# Patient Record
Sex: MALE | Race: WHITE | NOT HISPANIC OR LATINO | Employment: OTHER | URBAN - NONMETROPOLITAN AREA
[De-identification: names, ages, dates, MRNs, and addresses within clinical notes are randomized per-mention and may not be internally consistent; named-entity substitution may affect disease eponyms.]

---

## 2017-02-13 ENCOUNTER — OFFICE VISIT (OUTPATIENT)
Dept: PAIN MEDICINE | Facility: CLINIC | Age: 52
End: 2017-02-13

## 2017-02-13 ENCOUNTER — APPOINTMENT (OUTPATIENT)
Dept: LAB | Facility: HOSPITAL | Age: 52
End: 2017-02-13

## 2017-02-13 VITALS
BODY MASS INDEX: 22.51 KG/M2 | HEIGHT: 69 IN | DIASTOLIC BLOOD PRESSURE: 100 MMHG | WEIGHT: 152 LBS | SYSTOLIC BLOOD PRESSURE: 198 MMHG

## 2017-02-13 DIAGNOSIS — M79.18 MYOFASCIAL PAIN: ICD-10-CM

## 2017-02-13 DIAGNOSIS — M50.30 DDD (DEGENERATIVE DISC DISEASE), CERVICAL: ICD-10-CM

## 2017-02-13 DIAGNOSIS — M54.12 CERVICAL RADICULITIS: ICD-10-CM

## 2017-02-13 DIAGNOSIS — M47.817 LUMBOSACRAL SPONDYLOSIS WITHOUT MYELOPATHY: Primary | ICD-10-CM

## 2017-02-13 PROCEDURE — 80307 DRUG TEST PRSMV CHEM ANLYZR: CPT | Performed by: PAIN MEDICINE

## 2017-02-13 PROCEDURE — 99214 OFFICE O/P EST MOD 30 MIN: CPT | Performed by: PAIN MEDICINE

## 2017-02-13 PROCEDURE — G0481 DRUG TEST DEF 8-14 CLASSES: HCPCS | Performed by: PAIN MEDICINE

## 2017-02-13 RX ORDER — HYDROCODONE BITARTRATE AND ACETAMINOPHEN 7.5; 325 MG/1; MG/1
1 TABLET ORAL 4 TIMES DAILY
Qty: 120 TABLET | Refills: 0 | Status: SHIPPED | OUTPATIENT
Start: 2017-02-13 | End: 2017-03-15

## 2017-02-13 NOTE — PROGRESS NOTES
Lyle Corona is a 51 y.o. male.   1965    HPI:   Location: neck, lower back and bilateral knee and bilateral shoulder  Quality: aching, sharp and tingling  Severity: 6/10  Timing: constant  Alleviating: pain medication  Aggravating: increased activity, weather and distress      Missed appointment, out of opioid for about 2 mos.  States pain is less well controlled.    The following portions of the patient's history were reviewed by me and updated as appropriate: allergies, current medications, past family history, past medical history, past social history, past surgical history and problem list.    Past Medical History   Diagnosis Date   • Aneurysm of infrarenal abdominal aorta    • Angina pectoris    • Anxiety    • Cervical radiculitis    • Cervical spondylosis without myelopathy    • Chest pain    • Chronic kidney disease, stage 3    • Chronic total occlusion of coronary artery    • Common iliac aneurysm    • Coronary arteriosclerosis    • Coronary atherosclerosis of native coronary artery    • Degeneration of cervical intervertebral disc    • Drug therapy      Long-term drug therapy   • Drug therapy      Other long term (current) drug therapy   • Essential hypertension    • Headache    • History of echocardiogram 07/11/2014     Echocardiogram W/ color flow 96467 (1) - Aortic root mildly dilated. AV trileaflet. Mild CLVH with early diastolic dysfunction. EF 50-60%. Possible small inferior left ventricular aneurysm. No sig. regurg. seen. Normal RV size and function. No intracardiac mass, pericardial effusion.   • Hyperlipidemia    • Hypertensive disorder    • Intermittent claudication    • Myofascial pain    • Palpitations    • Preoperative cardiovascular examination    • Tobacco dependence syndrome    • Uric acid renal calculus        Social History     Social History   • Marital status:      Spouse name: N/A   • Number of children: N/A   • Years of education: N/A     Occupational History   •  Not on file.     Social History Main Topics   • Smoking status: Former Smoker     Quit date: 8/28/2016   • Smokeless tobacco: Never Used   • Alcohol use No   • Drug use: No   • Sexual activity: Defer      Comment: Marital status: Single     Other Topics Concern   • Not on file     Social History Narrative       Family History   Problem Relation Age of Onset   • Hyperlipidemia Mother    • Hypertension Mother    • Cancer Father    • Hyperlipidemia Father    • Hypertension Father    • Cancer Brother    • Diabetes Other    • Diabetes Other          Current Outpatient Prescriptions:   •  aspirin 81 MG tablet, Take 81 mg by mouth., Disp: , Rfl:   •  carvedilol (COREG) 12.5 MG tablet, Take 12.5 mg by mouth 2 (two) times a day. Indication: heart, Disp: , Rfl:   •  cyclobenzaprine (FLEXERIL) 10 MG tablet, Take 10 mg by mouth 3 (three) times a day. Indication: Muscle spasm, Disp: , Rfl:   •  hydrochlorothiazide (MICROZIDE) 12.5 MG capsule, , Disp: , Rfl: 6  •  losartan (COZAAR) 25 MG tablet, Take 25 mg by mouth., Disp: , Rfl:   •  pantoprazole (PROTONIX) 40 MG EC tablet, , Disp: , Rfl: 1  •  pravastatin (PRAVACHOL) 40 MG tablet, Take 1 tablet by mouth every night., Disp: 90 tablet, Rfl: 4  •  ranitidine (ZANTAC) 150 MG tablet, Take 150 mg by mouth 2 (two) times a day. Indication: gerd, Disp: , Rfl:   •  terazosin (HYTRIN) 1 MG capsule, , Disp: , Rfl:   •  ticagrelor (BRILINTA) 90 MG tablet tablet, Take 90 mg by mouth 2 (two) times a day. Indication: blood thinner, Disp: , Rfl:   •  HYDROcodone-acetaminophen (LORTAB)  MG per tablet, Take 1 tablet by mouth every 8 (eight) hours as needed. Indication: pain, Disp: , Rfl:   •  HYDROcodone-acetaminophen (NORCO) 7.5-325 MG per tablet, Take 1 tablet by mouth 4 (Four) Times a Day for 30 days., Disp: 120 tablet, Rfl: 0    Allergies   Allergen Reactions   • Amlodipine Rash   • Isosorbide Mononitrate [Isosorbide] Rash   • Lisinopril Rash   • Metoprolol Rash         Review of  Systems   Musculoskeletal: Positive for back pain (lower) and neck pain.        Bilateral knee pain  Bilateral shoulder pain       10 system review of systems was reviewed and negative except for above.    Physical Exam   Constitutional: He appears well-developed and well-nourished. No distress.   Musculoskeletal:        Cervical back: He exhibits decreased range of motion (Flexion and extension limited.  Facet loading bilaterally. L>R) and tenderness (tender to palpation cervical paraspinous musculature.).   Neurological: He is alert.   Psychiatric: He has a normal mood and affect. His behavior is normal. Judgment normal.       Lyle was seen today for neck pain, back pain, shoulder pain and knee pain.    Diagnoses and all orders for this visit:    Lumbosacral spondylosis without myelopathy  -     Ambulatory Referral to Physical Therapy Evaluate and treat (neck pain, s/p acdf >1 yr ago)  -     Tox[facility] Select 13 (MW)    DDD (degenerative disc disease), cervical  -     Ambulatory Referral to Physical Therapy Evaluate and treat (neck pain, s/p acdf >1 yr ago)  -     ToxASSURE Select 13 (MW)    Cervical radiculitis  -     Ambulatory Referral to Physical Therapy Evaluate and treat (neck pain, s/p acdf >1 yr ago)  -     ToxASSURE Select 13 (MW)    Myofascial pain  -     Ambulatory Referral to Physical Therapy Evaluate and treat (neck pain, s/p acdf >1 yr ago)  -     ToxASSURE Select 13 (MW)    Other orders  -     HYDROcodone-acetaminophen (NORCO) 7.5-325 MG per tablet; Take 1 tablet by mouth 4 (Four) Times a Day for 30 days.        Medication: Patient reports no negative side effects, Patient reports appropriate usage and storage habits and Refill opioid medication as above.  This is a reduction after he has been off for two months.  Instructed to take is BP medication asap as his BP was up today.  States he missed it this am.  Suggested he consider going to urgent care.    Interventional: none at this time.  May be  candidate for interventions depending on results of PT.    Rehab: rehabillitation for neck    Behavioral: No aberrant behavior noted. [facility] Report #[id number]  was reviewed and is consistent with stated history    Urine drug screen Ordered today to test for drugs of abuse and prescribed medications.  Should be neg for opioid.          This document has been electronically signed by Socrates Alcantara MD on February 13, 2017 9:29 AM

## 2017-02-17 LAB
CONV REPORT SUMMARY: NORMAL
Lab: NORMAL

## 2017-02-27 RX ORDER — HYDROCHLOROTHIAZIDE 12.5 MG/1
12.5 CAPSULE, GELATIN COATED ORAL DAILY
Qty: 30 CAPSULE | Refills: 11 | Status: SHIPPED | OUTPATIENT
Start: 2017-02-27 | End: 2017-12-11 | Stop reason: HOSPADM

## 2017-03-06 DIAGNOSIS — I25.10 CAD IN NATIVE ARTERY: Primary | ICD-10-CM

## 2017-05-11 ENCOUNTER — HOSPITAL ENCOUNTER (INPATIENT)
Facility: HOSPITAL | Age: 52
LOS: 1 days | Discharge: HOME | End: 2017-05-13
Attending: EMERGENCY MEDICINE | Admitting: INTERNAL MEDICINE

## 2017-05-11 ENCOUNTER — APPOINTMENT (OUTPATIENT)
Dept: CT IMAGING | Facility: HOSPITAL | Age: 52
End: 2017-05-11

## 2017-05-11 ENCOUNTER — APPOINTMENT (OUTPATIENT)
Dept: GENERAL RADIOLOGY | Facility: HOSPITAL | Age: 52
End: 2017-05-11

## 2017-05-11 DIAGNOSIS — I20.0 UNSTABLE ANGINA (HCC): ICD-10-CM

## 2017-05-11 DIAGNOSIS — I16.1 HYPERTENSIVE EMERGENCY, NO CHF: Primary | ICD-10-CM

## 2017-05-11 LAB
ANION GAP SERPL CALCULATED.3IONS-SCNC: 15 MMOL/L (ref 5–15)
APTT PPP: 34.9 SECONDS (ref 20–40.3)
BASOPHILS # BLD AUTO: 0.04 10*3/MM3 (ref 0–0.2)
BASOPHILS NFR BLD AUTO: 0.4 % (ref 0–2)
BUN BLD-MCNC: 23 MG/DL (ref 7–21)
BUN/CREAT SERPL: 14.2 (ref 7–25)
CALCIUM SPEC-SCNC: 9.2 MG/DL (ref 8.4–10.2)
CHLORIDE SERPL-SCNC: 99 MMOL/L (ref 95–110)
CK MB SERPL-CCNC: 0.5 NG/ML (ref 0–5)
CK SERPL-CCNC: 31 U/L (ref 55–170)
CO2 SERPL-SCNC: 22 MMOL/L (ref 22–31)
CREAT BLD-MCNC: 1.62 MG/DL (ref 0.7–1.3)
D-DIMER, QUANTITATIVE (MAD,POW, STR): 498 NG/ML (FEU) (ref 0–470)
DEPRECATED RDW RBC AUTO: 42.3 FL (ref 35.1–43.9)
EOSINOPHIL # BLD AUTO: 0.19 10*3/MM3 (ref 0–0.7)
EOSINOPHIL NFR BLD AUTO: 2.1 % (ref 0–7)
ERYTHROCYTE [DISTWIDTH] IN BLOOD BY AUTOMATED COUNT: 14.6 % (ref 11.5–14.5)
GFR SERPL CREATININE-BSD FRML MDRD: 45 ML/MIN/1.73 (ref 56–130)
GLUCOSE BLD-MCNC: 107 MG/DL (ref 60–100)
HCT VFR BLD AUTO: 38.5 % (ref 39–49)
HGB BLD-MCNC: 13.7 G/DL (ref 13.7–17.3)
IMM GRANULOCYTES # BLD: 0.02 10*3/MM3 (ref 0–0.02)
IMM GRANULOCYTES NFR BLD: 0.2 % (ref 0–0.5)
INR PPP: 1.01 (ref 0.8–1.2)
LYMPHOCYTES # BLD AUTO: 3.97 10*3/MM3 (ref 0.6–4.2)
LYMPHOCYTES NFR BLD AUTO: 44.1 % (ref 10–50)
MCH RBC QN AUTO: 28.5 PG (ref 26.5–34)
MCHC RBC AUTO-ENTMCNC: 35.6 G/DL (ref 31.5–36.3)
MCV RBC AUTO: 80.2 FL (ref 80–98)
MONOCYTES # BLD AUTO: 0.88 10*3/MM3 (ref 0–0.9)
MONOCYTES NFR BLD AUTO: 9.8 % (ref 0–12)
NEUTROPHILS # BLD AUTO: 3.9 10*3/MM3 (ref 2–8.6)
NEUTROPHILS NFR BLD AUTO: 43.4 % (ref 37–80)
PLATELET # BLD AUTO: 158 10*3/MM3 (ref 150–450)
PMV BLD AUTO: 8.5 FL (ref 8–12)
POTASSIUM BLD-SCNC: 4 MMOL/L (ref 3.5–5.1)
PROTHROMBIN TIME: 13.2 SECONDS (ref 11.1–15.3)
RBC # BLD AUTO: 4.8 10*6/MM3 (ref 4.37–5.74)
SODIUM BLD-SCNC: 136 MMOL/L (ref 137–145)
TROPONIN I SERPL-MCNC: <0.012 NG/ML
WBC NRBC COR # BLD: 9 10*3/MM3 (ref 3.2–9.8)

## 2017-05-11 PROCEDURE — 99285 EMERGENCY DEPT VISIT HI MDM: CPT

## 2017-05-11 PROCEDURE — 82550 ASSAY OF CK (CPK): CPT | Performed by: EMERGENCY MEDICINE

## 2017-05-11 PROCEDURE — 93005 ELECTROCARDIOGRAM TRACING: CPT

## 2017-05-11 PROCEDURE — 85379 FIBRIN DEGRADATION QUANT: CPT | Performed by: EMERGENCY MEDICINE

## 2017-05-11 PROCEDURE — 84484 ASSAY OF TROPONIN QUANT: CPT | Performed by: EMERGENCY MEDICINE

## 2017-05-11 PROCEDURE — 85025 COMPLETE CBC W/AUTO DIFF WBC: CPT | Performed by: EMERGENCY MEDICINE

## 2017-05-11 PROCEDURE — 82553 CREATINE MB FRACTION: CPT | Performed by: EMERGENCY MEDICINE

## 2017-05-11 PROCEDURE — 85730 THROMBOPLASTIN TIME PARTIAL: CPT | Performed by: EMERGENCY MEDICINE

## 2017-05-11 PROCEDURE — 25010000002 MORPHINE PER 10 MG: Performed by: EMERGENCY MEDICINE

## 2017-05-11 PROCEDURE — 71010 HC CHEST PA OR AP: CPT

## 2017-05-11 PROCEDURE — 85610 PROTHROMBIN TIME: CPT | Performed by: EMERGENCY MEDICINE

## 2017-05-11 PROCEDURE — 80048 BASIC METABOLIC PNL TOTAL CA: CPT | Performed by: EMERGENCY MEDICINE

## 2017-05-11 RX ORDER — SODIUM CHLORIDE 0.9 % (FLUSH) 0.9 %
10 SYRINGE (ML) INJECTION AS NEEDED
Status: DISCONTINUED | OUTPATIENT
Start: 2017-05-11 | End: 2017-05-13 | Stop reason: HOSPADM

## 2017-05-11 RX ORDER — MORPHINE SULFATE 4 MG/ML
4 INJECTION, SOLUTION INTRAMUSCULAR; INTRAVENOUS ONCE
Status: COMPLETED | OUTPATIENT
Start: 2017-05-11 | End: 2017-05-11

## 2017-05-11 RX ORDER — SODIUM CHLORIDE 9 MG/ML
125 INJECTION, SOLUTION INTRAVENOUS CONTINUOUS
Status: DISCONTINUED | OUTPATIENT
Start: 2017-05-11 | End: 2017-05-12

## 2017-05-11 RX ORDER — NITROGLYCERIN 20 MG/100ML
10-50 INJECTION INTRAVENOUS
Status: DISCONTINUED | OUTPATIENT
Start: 2017-05-11 | End: 2017-05-12

## 2017-05-11 RX ORDER — ASPIRIN 81 MG/1
324 TABLET, CHEWABLE ORAL ONCE
Status: COMPLETED | OUTPATIENT
Start: 2017-05-11 | End: 2017-05-11

## 2017-05-11 RX ORDER — NITROGLYCERIN 0.4 MG/1
0.4 TABLET SUBLINGUAL
Status: DISCONTINUED | OUTPATIENT
Start: 2017-05-11 | End: 2017-05-13 | Stop reason: HOSPADM

## 2017-05-11 RX ADMIN — ASPIRIN 324 MG: 81 TABLET, CHEWABLE ORAL at 22:49

## 2017-05-11 RX ADMIN — SODIUM CHLORIDE 125 ML/HR: 9 INJECTION, SOLUTION INTRAVENOUS at 22:54

## 2017-05-11 RX ADMIN — MORPHINE SULFATE 4 MG: 4 INJECTION, SOLUTION INTRAMUSCULAR; INTRAVENOUS at 22:58

## 2017-05-11 RX ADMIN — Medication 10 ML: at 23:05

## 2017-05-11 RX ADMIN — NITROGLYCERIN 10 MCG/MIN: 20 INJECTION INTRAVENOUS at 23:01

## 2017-05-11 RX ADMIN — NITROGLYCERIN 0.4 MG: 0.4 TABLET SUBLINGUAL at 22:49

## 2017-05-12 PROBLEM — I51.7 LVH (LEFT VENTRICULAR HYPERTROPHY): Chronic | Status: ACTIVE | Noted: 2017-05-12

## 2017-05-12 PROBLEM — I16.1 HYPERTENSIVE EMERGENCY, NO CHF: Status: ACTIVE | Noted: 2017-05-12

## 2017-05-12 LAB
BILIRUB UR QL STRIP: NEGATIVE
CK MB SERPL-CCNC: 0.49 NG/ML (ref 0–5)
CK MB SERPL-CCNC: 0.55 NG/ML (ref 0–5)
CK SERPL-CCNC: 25 U/L (ref 55–170)
CLARITY UR: CLEAR
COLOR UR: YELLOW
GLUCOSE UR STRIP-MCNC: NEGATIVE MG/DL
HGB UR QL STRIP.AUTO: NEGATIVE
HOLD SPECIMEN: NORMAL
HOLD SPECIMEN: NORMAL
KETONES UR QL STRIP: NEGATIVE
LEUKOCYTE ESTERASE UR QL STRIP.AUTO: NEGATIVE
NITRITE UR QL STRIP: NEGATIVE
PH UR STRIP.AUTO: 5.5 [PH] (ref 5–9)
PROT UR QL STRIP: NEGATIVE
SP GR UR STRIP: 1.01 (ref 1–1.03)
TROPONIN I SERPL-MCNC: <0.012 NG/ML
TSH SERPL DL<=0.05 MIU/L-ACNC: 2.92 MIU/ML (ref 0.46–4.68)
UROBILINOGEN UR QL STRIP: NORMAL
WHOLE BLOOD HOLD SPECIMEN: NORMAL

## 2017-05-12 PROCEDURE — 81003 URINALYSIS AUTO W/O SCOPE: CPT | Performed by: INTERNAL MEDICINE

## 2017-05-12 PROCEDURE — 82550 ASSAY OF CK (CPK): CPT | Performed by: INTERNAL MEDICINE

## 2017-05-12 PROCEDURE — 25010000002 HYDRALAZINE PER 20 MG: Performed by: INTERNAL MEDICINE

## 2017-05-12 PROCEDURE — 25010000002 ENOXAPARIN PER 10 MG: Performed by: INTERNAL MEDICINE

## 2017-05-12 PROCEDURE — 25010000002 ENOXAPARIN PER 10 MG: Performed by: EMERGENCY MEDICINE

## 2017-05-12 PROCEDURE — 25010000002 MORPHINE SULFATE (PF) 2 MG/ML SOLUTION: Performed by: INTERNAL MEDICINE

## 2017-05-12 PROCEDURE — 82553 CREATINE MB FRACTION: CPT | Performed by: INTERNAL MEDICINE

## 2017-05-12 PROCEDURE — 99254 IP/OBS CNSLTJ NEW/EST MOD 60: CPT | Performed by: INTERNAL MEDICINE

## 2017-05-12 PROCEDURE — 25010000002 MORPHINE PER 10 MG: Performed by: EMERGENCY MEDICINE

## 2017-05-12 PROCEDURE — 94760 N-INVAS EAR/PLS OXIMETRY 1: CPT

## 2017-05-12 PROCEDURE — 94799 UNLISTED PULMONARY SVC/PX: CPT

## 2017-05-12 PROCEDURE — 84443 ASSAY THYROID STIM HORMONE: CPT | Performed by: INTERNAL MEDICINE

## 2017-05-12 PROCEDURE — 84484 ASSAY OF TROPONIN QUANT: CPT | Performed by: EMERGENCY MEDICINE

## 2017-05-12 PROCEDURE — 96372 THER/PROPH/DIAG INJ SC/IM: CPT

## 2017-05-12 PROCEDURE — 84484 ASSAY OF TROPONIN QUANT: CPT | Performed by: INTERNAL MEDICINE

## 2017-05-12 RX ORDER — PRAVASTATIN SODIUM 40 MG
40 TABLET ORAL NIGHTLY
Status: DISCONTINUED | OUTPATIENT
Start: 2017-05-12 | End: 2017-05-13 | Stop reason: HOSPADM

## 2017-05-12 RX ORDER — NICOTINE 21 MG/24HR
1 PATCH, TRANSDERMAL 24 HOURS TRANSDERMAL EVERY 24 HOURS
Status: DISCONTINUED | OUTPATIENT
Start: 2017-05-12 | End: 2017-05-13 | Stop reason: HOSPADM

## 2017-05-12 RX ORDER — ONDANSETRON 4 MG/1
4 TABLET, FILM COATED ORAL EVERY 6 HOURS PRN
Status: DISCONTINUED | OUTPATIENT
Start: 2017-05-12 | End: 2017-05-13 | Stop reason: HOSPADM

## 2017-05-12 RX ORDER — LABETALOL 300 MG/1
300 TABLET, FILM COATED ORAL EVERY 12 HOURS SCHEDULED
Status: DISCONTINUED | OUTPATIENT
Start: 2017-05-12 | End: 2017-05-13 | Stop reason: HOSPADM

## 2017-05-12 RX ORDER — TERAZOSIN 1 MG/1
1 CAPSULE ORAL NIGHTLY
Status: DISCONTINUED | OUTPATIENT
Start: 2017-05-12 | End: 2017-05-13 | Stop reason: HOSPADM

## 2017-05-12 RX ORDER — CARVEDILOL 12.5 MG/1
12.5 TABLET ORAL 2 TIMES DAILY
Status: DISCONTINUED | OUTPATIENT
Start: 2017-05-12 | End: 2017-05-12

## 2017-05-12 RX ORDER — ALUMINA, MAGNESIA, AND SIMETHICONE 2400; 2400; 240 MG/30ML; MG/30ML; MG/30ML
10 SUSPENSION ORAL EVERY 6 HOURS
Status: DISCONTINUED | OUTPATIENT
Start: 2017-05-12 | End: 2017-05-13 | Stop reason: HOSPADM

## 2017-05-12 RX ORDER — ACETAMINOPHEN 325 MG/1
650 TABLET ORAL EVERY 4 HOURS PRN
Status: DISCONTINUED | OUTPATIENT
Start: 2017-05-12 | End: 2017-05-13 | Stop reason: HOSPADM

## 2017-05-12 RX ORDER — HYDRALAZINE HYDROCHLORIDE 20 MG/ML
20 INJECTION INTRAMUSCULAR; INTRAVENOUS EVERY 6 HOURS PRN
Status: DISCONTINUED | OUTPATIENT
Start: 2017-05-12 | End: 2017-05-13 | Stop reason: HOSPADM

## 2017-05-12 RX ORDER — ASPIRIN 81 MG/1
81 TABLET ORAL DAILY
Status: DISCONTINUED | OUTPATIENT
Start: 2017-05-12 | End: 2017-05-13 | Stop reason: HOSPADM

## 2017-05-12 RX ORDER — MORPHINE SULFATE 4 MG/ML
4 INJECTION, SOLUTION INTRAMUSCULAR; INTRAVENOUS ONCE
Status: COMPLETED | OUTPATIENT
Start: 2017-05-12 | End: 2017-05-12

## 2017-05-12 RX ORDER — NITROGLYCERIN 80 MG/1
1 PATCH TRANSDERMAL DAILY
Status: DISCONTINUED | OUTPATIENT
Start: 2017-05-12 | End: 2017-05-13 | Stop reason: HOSPADM

## 2017-05-12 RX ORDER — ONDANSETRON 2 MG/ML
4 INJECTION INTRAMUSCULAR; INTRAVENOUS EVERY 6 HOURS PRN
Status: DISCONTINUED | OUTPATIENT
Start: 2017-05-12 | End: 2017-05-13 | Stop reason: HOSPADM

## 2017-05-12 RX ORDER — LOSARTAN POTASSIUM 50 MG/1
50 TABLET ORAL DAILY
Status: DISCONTINUED | OUTPATIENT
Start: 2017-05-12 | End: 2017-05-12

## 2017-05-12 RX ORDER — LABETALOL HYDROCHLORIDE 5 MG/ML
20 INJECTION, SOLUTION INTRAVENOUS ONCE
Status: COMPLETED | OUTPATIENT
Start: 2017-05-12 | End: 2017-05-12

## 2017-05-12 RX ORDER — LOSARTAN POTASSIUM 50 MG/1
100 TABLET ORAL DAILY
Status: DISCONTINUED | OUTPATIENT
Start: 2017-05-12 | End: 2017-05-13 | Stop reason: HOSPADM

## 2017-05-12 RX ORDER — MORPHINE SULFATE 2 MG/ML
1 INJECTION, SOLUTION INTRAMUSCULAR; INTRAVENOUS
Status: DISCONTINUED | OUTPATIENT
Start: 2017-05-12 | End: 2017-05-13 | Stop reason: HOSPADM

## 2017-05-12 RX ORDER — PANTOPRAZOLE SODIUM 40 MG/1
40 TABLET, DELAYED RELEASE ORAL
Status: DISCONTINUED | OUTPATIENT
Start: 2017-05-13 | End: 2017-05-13 | Stop reason: HOSPADM

## 2017-05-12 RX ORDER — ALUMINA, MAGNESIA, AND SIMETHICONE 2400; 2400; 240 MG/30ML; MG/30ML; MG/30ML
15 SUSPENSION ORAL EVERY 6 HOURS PRN
Status: DISCONTINUED | OUTPATIENT
Start: 2017-05-12 | End: 2017-05-13 | Stop reason: HOSPADM

## 2017-05-12 RX ORDER — ONDANSETRON 4 MG/1
4 TABLET, ORALLY DISINTEGRATING ORAL EVERY 6 HOURS PRN
Status: DISCONTINUED | OUTPATIENT
Start: 2017-05-12 | End: 2017-05-13 | Stop reason: HOSPADM

## 2017-05-12 RX ORDER — BISACODYL 10 MG
10 SUPPOSITORY, RECTAL RECTAL DAILY PRN
Status: DISCONTINUED | OUTPATIENT
Start: 2017-05-12 | End: 2017-05-13 | Stop reason: HOSPADM

## 2017-05-12 RX ORDER — HYDRALAZINE HYDROCHLORIDE 20 MG/ML
10 INJECTION INTRAMUSCULAR; INTRAVENOUS EVERY 6 HOURS PRN
Status: DISCONTINUED | OUTPATIENT
Start: 2017-05-12 | End: 2017-05-12

## 2017-05-12 RX ORDER — SODIUM CHLORIDE 9 MG/ML
100 INJECTION, SOLUTION INTRAVENOUS CONTINUOUS
Status: DISCONTINUED | OUTPATIENT
Start: 2017-05-12 | End: 2017-05-13 | Stop reason: HOSPADM

## 2017-05-12 RX ORDER — SODIUM CHLORIDE 0.9 % (FLUSH) 0.9 %
1-10 SYRINGE (ML) INJECTION AS NEEDED
Status: DISCONTINUED | OUTPATIENT
Start: 2017-05-12 | End: 2017-05-13 | Stop reason: HOSPADM

## 2017-05-12 RX ORDER — HYDROCHLOROTHIAZIDE 12.5 MG/1
12.5 CAPSULE, GELATIN COATED ORAL DAILY
Status: DISCONTINUED | OUTPATIENT
Start: 2017-05-12 | End: 2017-05-13 | Stop reason: HOSPADM

## 2017-05-12 RX ORDER — DOCUSATE SODIUM 100 MG/1
200 CAPSULE, LIQUID FILLED ORAL 2 TIMES DAILY
Status: DISCONTINUED | OUTPATIENT
Start: 2017-05-12 | End: 2017-05-13 | Stop reason: HOSPADM

## 2017-05-12 RX ORDER — FAMOTIDINE 20 MG/1
20 TABLET, FILM COATED ORAL 2 TIMES DAILY
Status: DISCONTINUED | OUTPATIENT
Start: 2017-05-12 | End: 2017-05-13 | Stop reason: HOSPADM

## 2017-05-12 RX ORDER — LABETALOL 200 MG/1
200 TABLET, FILM COATED ORAL EVERY 12 HOURS SCHEDULED
Status: DISCONTINUED | OUTPATIENT
Start: 2017-05-12 | End: 2017-05-12

## 2017-05-12 RX ADMIN — TICAGRELOR 90 MG: 90 TABLET ORAL at 08:31

## 2017-05-12 RX ADMIN — MORPHINE SULFATE 1 MG: 2 INJECTION, SOLUTION INTRAMUSCULAR; INTRAVENOUS at 15:31

## 2017-05-12 RX ADMIN — PRAVASTATIN SODIUM 40 MG: 40 TABLET ORAL at 20:55

## 2017-05-12 RX ADMIN — LOSARTAN POTASSIUM 100 MG: 50 TABLET, FILM COATED ORAL at 08:30

## 2017-05-12 RX ADMIN — DOCUSATE SODIUM 200 MG: 100 CAPSULE, LIQUID FILLED ORAL at 08:30

## 2017-05-12 RX ADMIN — LABETALOL HCL 300 MG: 300 TABLET, FILM COATED ORAL at 20:54

## 2017-05-12 RX ADMIN — SODIUM CHLORIDE 100 ML/HR: 900 INJECTION, SOLUTION INTRAVENOUS at 06:21

## 2017-05-12 RX ADMIN — LABETALOL HYDROCHLORIDE 200 MG: 200 TABLET, FILM COATED ORAL at 08:30

## 2017-05-12 RX ADMIN — ENOXAPARIN SODIUM 70 MG: 80 INJECTION SUBCUTANEOUS at 02:24

## 2017-05-12 RX ADMIN — NITROGLYCERIN 1 PATCH: 0.4 PATCH TRANSDERMAL at 10:26

## 2017-05-12 RX ADMIN — ALUMINUM HYDROXIDE, MAGNESIUM HYDROXIDE, AND DIMETHICONE 10 ML: 400; 400; 40 SUSPENSION ORAL at 06:41

## 2017-05-12 RX ADMIN — ASPIRIN 81 MG: 81 TABLET, COATED ORAL at 08:30

## 2017-05-12 RX ADMIN — ALUMINUM HYDROXIDE, MAGNESIUM HYDROXIDE, AND DIMETHICONE 10 ML: 400; 400; 40 SUSPENSION ORAL at 13:41

## 2017-05-12 RX ADMIN — ENOXAPARIN SODIUM 40 MG: 40 INJECTION SUBCUTANEOUS at 08:31

## 2017-05-12 RX ADMIN — HYDROCHLOROTHIAZIDE 12.5 MG: 12.5 CAPSULE ORAL at 08:30

## 2017-05-12 RX ADMIN — FAMOTIDINE 20 MG: 20 TABLET ORAL at 18:23

## 2017-05-12 RX ADMIN — MORPHINE SULFATE 4 MG: 4 INJECTION, SOLUTION INTRAMUSCULAR; INTRAVENOUS at 02:19

## 2017-05-12 RX ADMIN — MORPHINE SULFATE 1 MG: 2 INJECTION, SOLUTION INTRAMUSCULAR; INTRAVENOUS at 06:30

## 2017-05-12 RX ADMIN — ALUMINUM HYDROXIDE, MAGNESIUM HYDROXIDE, AND DIMETHICONE 10 ML: 400; 400; 40 SUSPENSION ORAL at 20:55

## 2017-05-12 RX ADMIN — HYDRALAZINE HYDROCHLORIDE 10 MG: 20 INJECTION INTRAMUSCULAR; INTRAVENOUS at 10:59

## 2017-05-12 RX ADMIN — MORPHINE SULFATE 1 MG: 2 INJECTION, SOLUTION INTRAMUSCULAR; INTRAVENOUS at 18:23

## 2017-05-12 RX ADMIN — MORPHINE SULFATE 1 MG: 2 INJECTION, SOLUTION INTRAMUSCULAR; INTRAVENOUS at 11:03

## 2017-05-12 RX ADMIN — SODIUM CHLORIDE 100 ML/HR: 900 INJECTION, SOLUTION INTRAVENOUS at 13:43

## 2017-05-12 RX ADMIN — HYDRALAZINE HYDROCHLORIDE 20 MG: 20 INJECTION INTRAMUSCULAR; INTRAVENOUS at 22:27

## 2017-05-12 RX ADMIN — DOCUSATE SODIUM 200 MG: 100 CAPSULE, LIQUID FILLED ORAL at 17:07

## 2017-05-12 RX ADMIN — SODIUM CHLORIDE 100 ML/HR: 900 INJECTION, SOLUTION INTRAVENOUS at 22:27

## 2017-05-12 RX ADMIN — TERAZOSIN HYDROCHLORIDE 1 MG: 1 CAPSULE ORAL at 20:55

## 2017-05-12 RX ADMIN — LABETALOL HYDROCHLORIDE 20 MG: 5 INJECTION INTRAVENOUS at 02:18

## 2017-05-12 RX ADMIN — FAMOTIDINE 20 MG: 20 TABLET ORAL at 08:31

## 2017-05-12 RX ADMIN — TICAGRELOR 90 MG: 90 TABLET ORAL at 18:23

## 2017-05-13 ENCOUNTER — APPOINTMENT (OUTPATIENT)
Dept: CARDIOLOGY | Facility: HOSPITAL | Age: 52
End: 2017-05-13
Attending: INTERNAL MEDICINE

## 2017-05-13 VITALS
HEART RATE: 79 BPM | WEIGHT: 148.4 LBS | TEMPERATURE: 98 F | BODY MASS INDEX: 21.98 KG/M2 | DIASTOLIC BLOOD PRESSURE: 87 MMHG | HEIGHT: 69 IN | OXYGEN SATURATION: 97 % | RESPIRATION RATE: 18 BRPM | SYSTOLIC BLOOD PRESSURE: 135 MMHG

## 2017-05-13 LAB
ALBUMIN SERPL-MCNC: 3.7 G/DL (ref 3.4–4.8)
ALBUMIN/GLOB SERPL: 1.3 G/DL (ref 1.1–1.8)
ALP SERPL-CCNC: 86 U/L (ref 38–126)
ALT SERPL W P-5'-P-CCNC: 19 U/L (ref 21–72)
ANION GAP SERPL CALCULATED.3IONS-SCNC: 13 MMOL/L (ref 5–15)
ARTICHOKE IGE QN: 91 MG/DL (ref 1–129)
AST SERPL-CCNC: 24 U/L (ref 17–59)
BH CV ECHO MEAS - ACS: 1.7 CM
BH CV ECHO MEAS - AO MAX PG (FULL): 0.6 MMHG
BH CV ECHO MEAS - AO MAX PG: 6.7 MMHG
BH CV ECHO MEAS - AO MEAN PG (FULL): 0.31 MMHG
BH CV ECHO MEAS - AO MEAN PG: 4 MMHG
BH CV ECHO MEAS - AO ROOT AREA: 9.7 CM^2
BH CV ECHO MEAS - AO ROOT DIAM: 3.5 CM
BH CV ECHO MEAS - AO V2 MAX: 129 CM/SEC
BH CV ECHO MEAS - AO V2 MEAN: 92.1 CM/SEC
BH CV ECHO MEAS - AO V2 VTI: 21.4 CM
BH CV ECHO MEAS - AVA(I,A): 4.2 CM^2
BH CV ECHO MEAS - AVA(I,D): 4.2 CM^2
BH CV ECHO MEAS - AVA(V,A): 3.9 CM^2
BH CV ECHO MEAS - AVA(V,D): 3.9 CM^2
BH CV ECHO MEAS - EDV(CUBED): 116.1 ML
BH CV ECHO MEAS - EDV(TEICH): 111.6 ML
BH CV ECHO MEAS - EF(CUBED): 69.3 %
BH CV ECHO MEAS - EF(TEICH): 60.7 %
BH CV ECHO MEAS - ESV(CUBED): 35.6 ML
BH CV ECHO MEAS - ESV(TEICH): 43.8 ML
BH CV ECHO MEAS - FS: 32.5 %
BH CV ECHO MEAS - IVS/LVPW: 0.83
BH CV ECHO MEAS - IVSD: 1.1 CM
BH CV ECHO MEAS - LA DIMENSION: 3.7 CM
BH CV ECHO MEAS - LA/AO: 1.1
BH CV ECHO MEAS - LV MASS(C)D: 219.7 GRAMS
BH CV ECHO MEAS - LV MAX PG: 6 MMHG
BH CV ECHO MEAS - LV MEAN PG: 3.6 MMHG
BH CV ECHO MEAS - LV V1 MAX: 123 CM/SEC
BH CV ECHO MEAS - LV V1 MEAN: 89 CM/SEC
BH CV ECHO MEAS - LV V1 VTI: 22.3 CM
BH CV ECHO MEAS - LVIDD: 4.9 CM
BH CV ECHO MEAS - LVIDS: 3.3 CM
BH CV ECHO MEAS - LVOT AREA (M): 4.2 CM^2
BH CV ECHO MEAS - LVOT AREA: 4.1 CM^2
BH CV ECHO MEAS - LVOT DIAM: 2.3 CM
BH CV ECHO MEAS - LVPWD: 1.3 CM
BH CV ECHO MEAS - MR MAX PG: 53.5 MMHG
BH CV ECHO MEAS - MR MAX VEL: 365.8 CM/SEC
BH CV ECHO MEAS - MV A MAX VEL: 99.8 CM/SEC
BH CV ECHO MEAS - MV DEC SLOPE: 312.5 CM/SEC^2
BH CV ECHO MEAS - MV E MAX VEL: 88.7 CM/SEC
BH CV ECHO MEAS - MV E/A: 0.89
BH CV ECHO MEAS - MV P1/2T MAX VEL: 90.2 CM/SEC
BH CV ECHO MEAS - MV P1/2T: 84.6 MSEC
BH CV ECHO MEAS - MVA P1/2T LCG: 2.4 CM^2
BH CV ECHO MEAS - MVA(P1/2T): 2.6 CM^2
BH CV ECHO MEAS - PA MAX PG: 1.6 MMHG
BH CV ECHO MEAS - PA V2 MAX: 62.4 CM/SEC
BH CV ECHO MEAS - RAP SYSTOLE: 5 MMHG
BH CV ECHO MEAS - RVDD: 2.6 CM
BH CV ECHO MEAS - RVSP: 25.6 MMHG
BH CV ECHO MEAS - SV(AO): 208.1 ML
BH CV ECHO MEAS - SV(CUBED): 80.4 ML
BH CV ECHO MEAS - SV(LVOT): 90.5 ML
BH CV ECHO MEAS - SV(TEICH): 67.8 ML
BH CV ECHO MEAS - TR MAX VEL: 226.7 CM/SEC
BILIRUB SERPL-MCNC: 0.7 MG/DL (ref 0.2–1.3)
BUN BLD-MCNC: 22 MG/DL (ref 7–21)
BUN/CREAT SERPL: 11.8 (ref 7–25)
CALCIUM SPEC-SCNC: 8.4 MG/DL (ref 8.4–10.2)
CHLORIDE SERPL-SCNC: 103 MMOL/L (ref 95–110)
CHOLEST SERPL-MCNC: 145 MG/DL (ref 0–199)
CO2 SERPL-SCNC: 21 MMOL/L (ref 22–31)
CREAT BLD-MCNC: 1.87 MG/DL (ref 0.7–1.3)
GFR SERPL CREATININE-BSD FRML MDRD: 38 ML/MIN/1.73 (ref 60–130)
GLOBULIN UR ELPH-MCNC: 2.9 GM/DL (ref 2.3–3.5)
GLUCOSE BLD-MCNC: 99 MG/DL (ref 60–100)
HDLC SERPL-MCNC: 25 MG/DL (ref 60–200)
LDLC/HDLC SERPL: 3.82 {RATIO} (ref 0–3.55)
LV EF 2D ECHO EST: 60 %
MAGNESIUM SERPL-MCNC: 2.1 MG/DL (ref 1.6–2.3)
POTASSIUM BLD-SCNC: 3.9 MMOL/L (ref 3.5–5.1)
PROT SERPL-MCNC: 6.6 G/DL (ref 6.3–8.6)
SODIUM BLD-SCNC: 137 MMOL/L (ref 137–145)
TRIGL SERPL-MCNC: 122 MG/DL (ref 20–199)
WHOLE BLOOD HOLD SPECIMEN: NORMAL

## 2017-05-13 PROCEDURE — 93320 DOPPLER ECHO COMPLETE: CPT

## 2017-05-13 PROCEDURE — 93306 TTE W/DOPPLER COMPLETE: CPT | Performed by: INTERNAL MEDICINE

## 2017-05-13 PROCEDURE — 80061 LIPID PANEL: CPT | Performed by: INTERNAL MEDICINE

## 2017-05-13 PROCEDURE — 83735 ASSAY OF MAGNESIUM: CPT | Performed by: INTERNAL MEDICINE

## 2017-05-13 PROCEDURE — 80053 COMPREHEN METABOLIC PANEL: CPT | Performed by: INTERNAL MEDICINE

## 2017-05-13 RX ORDER — NICOTINE 21 MG/24HR
1 PATCH, TRANSDERMAL 24 HOURS TRANSDERMAL EVERY 24 HOURS
Qty: 30 PATCH | Refills: 1 | Status: SHIPPED | OUTPATIENT
Start: 2017-05-13 | End: 2018-01-02

## 2017-05-13 RX ORDER — LABETALOL 300 MG/1
300 TABLET, FILM COATED ORAL EVERY 12 HOURS SCHEDULED
Qty: 60 TABLET | Refills: 1 | Status: SHIPPED | OUTPATIENT
Start: 2017-05-13 | End: 2017-12-11 | Stop reason: HOSPADM

## 2017-05-13 RX ORDER — NITROGLYCERIN 80 MG/1
1 PATCH TRANSDERMAL DAILY
Qty: 30 PATCH | Refills: 1 | Status: SHIPPED | OUTPATIENT
Start: 2017-05-13 | End: 2017-06-12 | Stop reason: SDUPTHER

## 2017-05-13 RX ORDER — LOSARTAN POTASSIUM 100 MG/1
100 TABLET ORAL DAILY
Qty: 30 TABLET | Refills: 1 | Status: SHIPPED | OUTPATIENT
Start: 2017-05-13 | End: 2017-12-11 | Stop reason: HOSPADM

## 2017-05-13 RX ORDER — PSEUDOEPHEDRINE HCL 30 MG
200 TABLET ORAL 2 TIMES DAILY
Qty: 60 CAPSULE | Refills: 1 | Status: ON HOLD | OUTPATIENT
Start: 2017-05-13 | End: 2020-08-18

## 2017-05-13 RX ADMIN — NITROGLYCERIN 1 PATCH: 0.4 PATCH TRANSDERMAL at 09:32

## 2017-05-13 RX ADMIN — PANTOPRAZOLE SODIUM 40 MG: 40 TABLET, DELAYED RELEASE ORAL at 05:36

## 2017-05-13 RX ADMIN — LABETALOL HCL 300 MG: 300 TABLET, FILM COATED ORAL at 09:31

## 2017-05-13 RX ADMIN — ASPIRIN 81 MG: 81 TABLET, COATED ORAL at 09:31

## 2017-05-13 RX ADMIN — DOCUSATE SODIUM 200 MG: 100 CAPSULE, LIQUID FILLED ORAL at 09:31

## 2017-05-13 RX ADMIN — LOSARTAN POTASSIUM 100 MG: 50 TABLET, FILM COATED ORAL at 09:31

## 2017-05-13 RX ADMIN — FAMOTIDINE 20 MG: 20 TABLET ORAL at 09:33

## 2017-05-13 RX ADMIN — HYDROCHLOROTHIAZIDE 12.5 MG: 12.5 CAPSULE ORAL at 09:31

## 2017-05-13 RX ADMIN — TICAGRELOR 90 MG: 90 TABLET ORAL at 09:31

## 2017-06-12 ENCOUNTER — OFFICE VISIT (OUTPATIENT)
Dept: CARDIOLOGY | Facility: CLINIC | Age: 52
End: 2017-06-12

## 2017-06-12 VITALS
WEIGHT: 153 LBS | HEIGHT: 69 IN | SYSTOLIC BLOOD PRESSURE: 128 MMHG | DIASTOLIC BLOOD PRESSURE: 92 MMHG | BODY MASS INDEX: 22.66 KG/M2 | HEART RATE: 83 BPM | OXYGEN SATURATION: 97 %

## 2017-06-12 DIAGNOSIS — N18.30 CHRONIC KIDNEY DISEASE, STAGE 3 (HCC): Chronic | ICD-10-CM

## 2017-06-12 DIAGNOSIS — I25.10 CORONARY ARTERIOSCLEROSIS: Primary | Chronic | ICD-10-CM

## 2017-06-12 PROCEDURE — 99212 OFFICE O/P EST SF 10 MIN: CPT | Performed by: INTERNAL MEDICINE

## 2017-06-12 RX ORDER — NITROGLYCERIN 80 MG/1
1 PATCH TRANSDERMAL DAILY
Qty: 30 PATCH | Refills: 6 | Status: SHIPPED | OUTPATIENT
Start: 2017-06-12 | End: 2018-01-02 | Stop reason: RX

## 2017-06-12 NOTE — PROGRESS NOTES
Chief complaint : Coronary artery disease    History of Present Illness very pleasant 51-year-old gentleman who comes today for follow-up visit.  Patient was recently in the hospital with complaint of chest pain and accelerated hypertension.  Blood pressure was in the 200 range.  His losartan was increased 200 mg and he was given Nitropaste patches.  Patient has stopped the losartan due to extreme fatigue and dizziness.  He is also stop the metoprolol and currently is on labetalol.  He continues to have dyspnea on moderate exertion he also complains of occasional chest pain on exertion.  Patient has no orthopnea or PND.  And he has no palpitations dizziness or presyncopal symptoms.    Subjective      Review of Systems   Constitution: Positive for weakness and malaise/fatigue.   Cardiovascular: Positive for chest pain.   Gastrointestinal: Positive for diarrhea.       Past Medical History:   Diagnosis Date   • Aneurysm of infrarenal abdominal aorta    • Anxiety    • Cervical radiculitis    • Cervical spondylosis without myelopathy    • Chronic kidney disease, stage 3    • Common iliac aneurysm    • Coronary arteriosclerosis    • Degeneration of cervical intervertebral disc    • Essential hypertension    • GERD (gastroesophageal reflux disease)    • Headache    • Hyperlipidemia    • Intermittent claudication    • Myocardial infarction    • Uric acid renal calculus        Family History   Problem Relation Age of Onset   • Hyperlipidemia Mother    • Hypertension Mother    • Cancer Father    • Hyperlipidemia Father    • Hypertension Father    • Cancer Brother    • Diabetes Other    • Diabetes Other        Amlodipine; Lisinopril; and Metoprolol     reports that he quit smoking about 9 months ago. He has never used smokeless tobacco. He reports that he does not drink alcohol or use illicit drugs.    Objective     Vital Signs  Heart Rate:  [83] 83  BP: (128)/(92) 128/92    Physical Exam   Constitutional: He is oriented to  person, place, and time. He appears well-developed and well-nourished.   HENT:   Head: Normocephalic and atraumatic.   Eyes: Conjunctivae and EOM are normal. Pupils are equal, round, and reactive to light.   Neck: Neck supple. No JVD present. Carotid bruit is not present. No tracheal deviation and no edema present.   Cardiovascular: Normal rate, regular rhythm, S1 normal, S2 normal, normal heart sounds and intact distal pulses.  Exam reveals no gallop, no S3, no S4 and no friction rub.    No murmur heard.  Pulmonary/Chest: Effort normal and breath sounds normal. He has no wheezes. He has no rales. He exhibits no tenderness.   Abdominal: Bowel sounds are normal. He exhibits no abdominal bruit and no pulsatile midline mass. There is no rebound and no guarding.   Musculoskeletal: Normal range of motion. He exhibits no edema.   Neurological: He is alert and oriented to person, place, and time.   Skin: Skin is warm and dry.   Psychiatric: He has a normal mood and affect.       Procedures    Assessment/Plan     Patient Active Problem List   Diagnosis   • Essential hypertension   • Hypertensive emergency, no CHF   • Coronary arteriosclerosis   • Chronic kidney disease, stage 3   • LVH (left ventricular hypertrophy)     1. Coronary arteriosclerosis  August 1, 2016 patient underwent PCI to the right coronary artery with a 2.5 x 38 and 2.5 x 23 mm xience alpine stent from mid to distal RCA.  We will continue with aspirin and Brilinta.  We'll continue with risk factor modification and guideline direct medical therapy.  Patient has quit smoking cigarettes back in August 2016.  If he continues to have symptoms and/or consider a nuclear perfusion imaging stress test.  I have asked him to see his nephrologist and see if we can start him back on losartan 25 mg by mouth daily.    I discussed the patients findings and my recommendations with patient.          This document has been electronically signed by Merlene Dodge MD on June 12,  2017 10:28 AM     Merlene Dodge MD  06/12/17  10:28 AM

## 2017-10-31 ENCOUNTER — OFFICE VISIT (OUTPATIENT)
Dept: PAIN MEDICINE | Facility: CLINIC | Age: 52
End: 2017-10-31

## 2017-10-31 VITALS
SYSTOLIC BLOOD PRESSURE: 170 MMHG | DIASTOLIC BLOOD PRESSURE: 100 MMHG | BODY MASS INDEX: 23.33 KG/M2 | HEIGHT: 69 IN | WEIGHT: 157.5 LBS

## 2017-10-31 DIAGNOSIS — M79.18 MYOFACIAL MUSCLE PAIN: ICD-10-CM

## 2017-10-31 DIAGNOSIS — M54.12 CERVICAL RADICULOPATHY: ICD-10-CM

## 2017-10-31 DIAGNOSIS — M50.30 DDD (DEGENERATIVE DISC DISEASE), CERVICAL: Primary | ICD-10-CM

## 2017-10-31 DIAGNOSIS — M47.812 CERVICAL SPONDYLOSIS WITHOUT MYELOPATHY: ICD-10-CM

## 2017-10-31 PROCEDURE — 99214 OFFICE O/P EST MOD 30 MIN: CPT | Performed by: PAIN MEDICINE

## 2017-10-31 RX ORDER — TAMSULOSIN HYDROCHLORIDE 0.4 MG/1
1 CAPSULE ORAL NIGHTLY
Status: ON HOLD | COMMUNITY
End: 2020-08-18

## 2017-10-31 NOTE — PROGRESS NOTES
"Lyle Corona is a 51 y.o. male.   1965    HPI:   Location: neck, bilateral shoulder, lower back and bilateral knee  Quality: aching, sharp and tingling  Severity: 7/10  Timing: constant  Alleviating: pain medication  Aggravating: increased activity, weather and distress    Pt with return visit from FEB 2017-  Pt states \"I did complete PT and UDS\"- I have reviewed notes and images. Pt will not be an opioid candidate today- Pt states \"home struggles and now live in shelter- but safe\". Will offer any interventions non-opiate. Instructed pt to seek immediate tx if BP remains high- pt reports \"has not took meds BP today\"    The following portions of the patient's history were reviewed by me and updated as appropriate: allergies, current medications, past family history, past medical history, past social history, past surgical history and problem list.    Past Medical History:   Diagnosis Date   • Aneurysm of infrarenal abdominal aorta    • Anxiety    • Cervical radiculitis    • Cervical spondylosis without myelopathy    • Chronic kidney disease, stage 3    • Common iliac aneurysm    • Coronary arteriosclerosis    • Degeneration of cervical intervertebral disc    • Essential hypertension    • GERD (gastroesophageal reflux disease)    • Headache    • Hyperlipidemia    • Intermittent claudication    • Myocardial infarction    • Uric acid renal calculus        Social History     Social History   • Marital status:      Spouse name: N/A   • Number of children: N/A   • Years of education: N/A     Occupational History   • Not on file.     Social History Main Topics   • Smoking status: Former Smoker     Quit date: 8/28/2016   • Smokeless tobacco: Never Used   • Alcohol use No   • Drug use: No   • Sexual activity: Defer      Comment: Marital status: Single     Other Topics Concern   • Not on file     Social History Narrative       Family History   Problem Relation Age of Onset   • Hyperlipidemia Mother    • " Hypertension Mother    • Cancer Father    • Hyperlipidemia Father    • Hypertension Father    • Cancer Brother    • Diabetes Other    • Diabetes Other          Current Outpatient Prescriptions:   •  aspirin 81 MG tablet, Take 81 mg by mouth., Disp: , Rfl:   •  cyclobenzaprine (FLEXERIL) 10 MG tablet, Take 10 mg by mouth 3 (three) times a day. Indication: Muscle spasm, Disp: , Rfl:   •  docusate sodium 100 MG capsule, Take 200 mg by mouth 2 (Two) Times a Day., Disp: 60 capsule, Rfl: 1  •  hydrochlorothiazide (MICROZIDE) 12.5 MG capsule, Take 1 capsule by mouth Daily., Disp: 30 capsule, Rfl: 11  •  HYDROcodone-acetaminophen (LORTAB)  MG per tablet, Take 1 tablet by mouth every 8 (eight) hours as needed. Indication: pain, Disp: , Rfl:   •  labetalol (NORMODYNE) 300 MG tablet, Take 1 tablet by mouth Every 12 (Twelve) Hours., Disp: 60 tablet, Rfl: 1  •  losartan (COZAAR) 100 MG tablet, Take 1 tablet by mouth Daily., Disp: 30 tablet, Rfl: 1  •  nitroglycerin (NITRODUR) 0.4 MG/HR patch, Place 1 patch on the skin Daily., Disp: 30 patch, Rfl: 6  •  pantoprazole (PROTONIX) 40 MG EC tablet, , Disp: , Rfl: 1  •  pravastatin (PRAVACHOL) 40 MG tablet, Take 1 tablet by mouth every night., Disp: 90 tablet, Rfl: 4  •  ranitidine (ZANTAC) 150 MG tablet, Take 150 mg by mouth 2 (two) times a day. Indication: gerd, Disp: , Rfl:   •  tamsulosin (FLOMAX) 0.4 MG capsule 24 hr capsule, Take 1 capsule by mouth Every Night., Disp: , Rfl:   •  terazosin (HYTRIN) 1 MG capsule, Take 1 mg by mouth Every Night., Disp: , Rfl:   •  ticagrelor (BRILINTA) 90 MG tablet tablet, Take 90 mg by mouth 2 (two) times a day. Indication: blood thinner, Disp: , Rfl:   •  nicotine (NICODERM CQ) 21 MG/24HR patch, Place 1 patch on the skin Daily., Disp: 30 patch, Rfl: 1    Allergies   Allergen Reactions   • Amlodipine Rash   • Lisinopril Rash   • Metoprolol Rash       Review of Systems   Musculoskeletal: Positive for back pain (lower) and neck pain.         "Bilateral knee pain  Bilateral shoulder pain     All other systems reviewed and are negative.    All systems reviewed and negative except for above.    Physical Exam   Constitutional: He is oriented to person, place, and time. He appears well-developed and well-nourished. No distress.   Pulmonary/Chest: Effort normal. No respiratory distress.   Musculoskeletal:        Cervical back: He exhibits decreased range of motion (limited flex and ext with mild cervical facet loading left > right) and tenderness (jessica cerv TTP muscles).   Neurological: He is alert and oriented to person, place, and time. He displays no tremor. He displays no seizure activity. Coordination and gait normal.   Reflex Scores:       Tricep reflexes are 2+ on the right side and 2+ on the left side.       Bicep reflexes are 2+ on the right side and 2+ on the left side.       Brachioradialis reflexes are 2+ on the right side and 2+ on the left side.       Patellar reflexes are 2+ on the right side and 2+ on the left side.       Achilles reflexes are 2+ on the right side and 2+ on the left side.  Bilateral arm str 5/5    Equal hand    Skin: Skin is warm and dry. No rash noted. No pallor.   Psychiatric: He has a normal mood and affect. His behavior is normal. Judgment normal. He expresses no homicidal and no suicidal ideation. He expresses no suicidal plans and no homicidal plans.   Nursing note and vitals reviewed.      Lyle was seen today for neck pain, back pain, knee pain and shoulder pain.    Diagnoses and all orders for this visit:    DDD (degenerative disc disease), cervical    Cervical spondylosis without myelopathy    Cervical radiculopathy    Myofacial muscle pain        Medication: I have discussed and ordered compounded cream topically. This has been fully explained to the patient, who indicates understanding. No opiates discussed.       Interventional: Pt reports \"cervical injections offered\" - may call back in future if decided.  Pt " is chronically on brilinta- cardiology- Dr. Dodge. RICARDO and any neurological impairments discussed with patient that may need of emergency evaluation.    Rehab: Finished PT with min effectiveness.     Behavioral: No aberrant behavior noted. [facility] Report # [id number]  was reviewed and is consistent with stated history    Urine drug screen Reviewed from last visit and is inappropriate- No RX drug in 2/17/2017          This document has been electronically signed by AHSAN Abdi on October 31, 2017 3:18 PM          This document has been electronically signed by AHSAN Abdi on October 31, 2017 3:18 PM

## 2017-11-27 ENCOUNTER — HOSPITAL ENCOUNTER (INPATIENT)
Facility: HOSPITAL | Age: 52
LOS: 14 days | Discharge: NURSING, REHAB OR LONG-TERM CARE | End: 2017-12-11
Attending: FAMILY MEDICINE | Admitting: FAMILY MEDICINE

## 2017-11-27 DIAGNOSIS — Z74.09 IMPAIRED MOBILITY AND ADLS: ICD-10-CM

## 2017-11-27 DIAGNOSIS — I25.10 CORONARY ARTERIOSCLEROSIS: Primary | ICD-10-CM

## 2017-11-27 DIAGNOSIS — I21.4 NSTEMI (NON-ST ELEVATED MYOCARDIAL INFARCTION) (HCC): ICD-10-CM

## 2017-11-27 DIAGNOSIS — Z74.09 IMPAIRED FUNCTIONAL MOBILITY, BALANCE, GAIT, AND ENDURANCE: ICD-10-CM

## 2017-11-27 DIAGNOSIS — I25.110 CORONARY ARTERY DISEASE INVOLVING NATIVE CORONARY ARTERY OF NATIVE HEART WITH UNSTABLE ANGINA PECTORIS (HCC): Chronic | ICD-10-CM

## 2017-11-27 DIAGNOSIS — Z78.9 IMPAIRED MOBILITY AND ADLS: ICD-10-CM

## 2017-11-27 DIAGNOSIS — I20.0 UNSTABLE ANGINA (HCC): ICD-10-CM

## 2017-11-27 LAB
ALBUMIN SERPL-MCNC: 4.3 G/DL (ref 3.4–4.8)
ALBUMIN/GLOB SERPL: 1.2 G/DL (ref 1.1–1.8)
ALP SERPL-CCNC: 99 U/L (ref 38–126)
ALT SERPL W P-5'-P-CCNC: 20 U/L (ref 21–72)
ANION GAP SERPL CALCULATED.3IONS-SCNC: 13 MMOL/L (ref 5–15)
APTT PPP: 31.1 SECONDS (ref 20–40.3)
AST SERPL-CCNC: 21 U/L (ref 17–59)
BASOPHILS # BLD AUTO: 0.03 10*3/MM3 (ref 0–0.2)
BASOPHILS # BLD MANUAL: 0.09 10*3/MM3 (ref 0–0.2)
BASOPHILS NFR BLD AUTO: 0.3 % (ref 0–2)
BASOPHILS NFR BLD AUTO: 1 % (ref 0–2)
BILIRUB SERPL-MCNC: 0.6 MG/DL (ref 0.2–1.3)
BUN BLD-MCNC: 26 MG/DL (ref 7–21)
BUN/CREAT SERPL: 13.1 (ref 7–25)
CALCIUM SPEC-SCNC: 9.5 MG/DL (ref 8.4–10.2)
CHLORIDE SERPL-SCNC: 101 MMOL/L (ref 95–110)
CO2 SERPL-SCNC: 23 MMOL/L (ref 22–31)
CREAT BLD-MCNC: 1.98 MG/DL (ref 0.7–1.3)
DEPRECATED RDW RBC AUTO: 42.1 FL (ref 35.1–43.9)
EOSINOPHIL # BLD AUTO: 0.18 10*3/MM3 (ref 0–0.7)
EOSINOPHIL # BLD MANUAL: 0.09 10*3/MM3 (ref 0–0.7)
EOSINOPHIL NFR BLD AUTO: 1.9 % (ref 0–7)
EOSINOPHIL NFR BLD MANUAL: 1 % (ref 0–7)
ERYTHROCYTE [DISTWIDTH] IN BLOOD BY AUTOMATED COUNT: 14 % (ref 11.5–14.5)
GFR SERPL CREATININE-BSD FRML MDRD: 36 ML/MIN/1.73 (ref 60–130)
GLOBULIN UR ELPH-MCNC: 3.5 GM/DL (ref 2.3–3.5)
GLUCOSE BLD-MCNC: 96 MG/DL (ref 60–100)
HCT VFR BLD AUTO: 36.1 % (ref 39–49)
HGB BLD-MCNC: 12.2 G/DL (ref 13.7–17.3)
IMM GRANULOCYTES # BLD: 0.03 10*3/MM3 (ref 0–0.02)
IMM GRANULOCYTES NFR BLD: 0.3 % (ref 0–0.5)
INR PPP: 1.03 (ref 0.8–1.2)
LYMPHOCYTES # BLD AUTO: 5.3 10*3/MM3 (ref 0.6–4.2)
LYMPHOCYTES # BLD MANUAL: 4.92 10*3/MM3 (ref 0.6–4.2)
LYMPHOCYTES NFR BLD AUTO: 57.1 % (ref 10–50)
LYMPHOCYTES NFR BLD MANUAL: 53 % (ref 10–50)
LYMPHOCYTES NFR BLD MANUAL: 7 % (ref 0–12)
MCH RBC QN AUTO: 27.7 PG (ref 26.5–34)
MCHC RBC AUTO-ENTMCNC: 33.8 G/DL (ref 31.5–36.3)
MCV RBC AUTO: 82 FL (ref 80–98)
MONOCYTES # BLD AUTO: 0.65 10*3/MM3 (ref 0–0.9)
MONOCYTES # BLD AUTO: 0.66 10*3/MM3 (ref 0–0.9)
MONOCYTES NFR BLD AUTO: 7.1 % (ref 0–12)
NEUTROPHILS # BLD AUTO: 3.09 10*3/MM3 (ref 2–8.6)
NEUTROPHILS # BLD AUTO: 3.34 10*3/MM3 (ref 2–8.6)
NEUTROPHILS NFR BLD AUTO: 33.3 % (ref 37–80)
NEUTROPHILS NFR BLD MANUAL: 36 % (ref 37–80)
PLAT MORPH BLD: NORMAL
PLATELET # BLD AUTO: 162 10*3/MM3 (ref 150–450)
PMV BLD AUTO: 9 FL (ref 8–12)
POTASSIUM BLD-SCNC: 3.8 MMOL/L (ref 3.5–5.1)
PROLYMPHOCYTES NFR BLD MANUAL: 1 % (ref 0–0)
PROT SERPL-MCNC: 7.8 G/DL (ref 6.3–8.6)
PROTHROMBIN TIME: 13.4 SECONDS (ref 11.1–15.3)
RBC # BLD AUTO: 4.4 10*6/MM3 (ref 4.37–5.74)
RBC MORPH BLD: NORMAL
SODIUM BLD-SCNC: 137 MMOL/L (ref 137–145)
TROPONIN I SERPL-MCNC: 0.53 NG/ML
TROPONIN I SERPL-MCNC: 0.54 NG/ML
VARIANT LYMPHS NFR BLD MANUAL: 1 % (ref 0–5)
WBC MORPH BLD: NORMAL
WBC NRBC COR # BLD: 9.29 10*3/MM3 (ref 3.2–9.8)

## 2017-11-27 PROCEDURE — 85007 BL SMEAR W/DIFF WBC COUNT: CPT | Performed by: NURSE PRACTITIONER

## 2017-11-27 PROCEDURE — 85610 PROTHROMBIN TIME: CPT | Performed by: NURSE PRACTITIONER

## 2017-11-27 PROCEDURE — 25010000002 HEPARIN (PORCINE) PER 1000 UNITS: Performed by: NURSE PRACTITIONER

## 2017-11-27 PROCEDURE — 25010000002 MORPHINE PER 10 MG: Performed by: FAMILY MEDICINE

## 2017-11-27 PROCEDURE — 85025 COMPLETE CBC W/AUTO DIFF WBC: CPT | Performed by: NURSE PRACTITIONER

## 2017-11-27 PROCEDURE — 25010000002 HYDRALAZINE PER 20 MG: Performed by: NURSE PRACTITIONER

## 2017-11-27 PROCEDURE — 80053 COMPREHEN METABOLIC PANEL: CPT | Performed by: NURSE PRACTITIONER

## 2017-11-27 PROCEDURE — 93010 ELECTROCARDIOGRAM REPORT: CPT | Performed by: INTERNAL MEDICINE

## 2017-11-27 PROCEDURE — 93005 ELECTROCARDIOGRAM TRACING: CPT | Performed by: FAMILY MEDICINE

## 2017-11-27 PROCEDURE — 85730 THROMBOPLASTIN TIME PARTIAL: CPT | Performed by: NURSE PRACTITIONER

## 2017-11-27 PROCEDURE — 84484 ASSAY OF TROPONIN QUANT: CPT | Performed by: NURSE PRACTITIONER

## 2017-11-27 RX ORDER — HYDROCHLOROTHIAZIDE 12.5 MG/1
12.5 CAPSULE, GELATIN COATED ORAL DAILY
Status: DISCONTINUED | OUTPATIENT
Start: 2017-11-27 | End: 2017-11-28

## 2017-11-27 RX ORDER — TAMSULOSIN HYDROCHLORIDE 0.4 MG/1
0.4 CAPSULE ORAL NIGHTLY
Status: DISCONTINUED | OUTPATIENT
Start: 2017-11-27 | End: 2017-12-04 | Stop reason: HOSPADM

## 2017-11-27 RX ORDER — ATORVASTATIN CALCIUM 10 MG/1
10 TABLET, FILM COATED ORAL DAILY
Status: DISCONTINUED | OUTPATIENT
Start: 2017-11-27 | End: 2017-11-28

## 2017-11-27 RX ORDER — LOSARTAN POTASSIUM 50 MG/1
100 TABLET ORAL DAILY
Status: DISCONTINUED | OUTPATIENT
Start: 2017-11-27 | End: 2017-11-28

## 2017-11-27 RX ORDER — ONDANSETRON 2 MG/ML
4 INJECTION INTRAMUSCULAR; INTRAVENOUS EVERY 6 HOURS PRN
Status: DISCONTINUED | OUTPATIENT
Start: 2017-11-27 | End: 2017-12-04 | Stop reason: HOSPADM

## 2017-11-27 RX ORDER — PANTOPRAZOLE SODIUM 40 MG/1
40 TABLET, DELAYED RELEASE ORAL
Status: DISCONTINUED | OUTPATIENT
Start: 2017-11-28 | End: 2017-12-04 | Stop reason: HOSPADM

## 2017-11-27 RX ORDER — HEPARIN SODIUM 5000 [USP'U]/ML
40 INJECTION, SOLUTION INTRAVENOUS; SUBCUTANEOUS AS NEEDED
Status: DISCONTINUED | OUTPATIENT
Start: 2017-11-27 | End: 2017-11-28

## 2017-11-27 RX ORDER — HEPARIN SODIUM 5000 [USP'U]/ML
80 INJECTION, SOLUTION INTRAVENOUS; SUBCUTANEOUS AS NEEDED
Status: DISCONTINUED | OUTPATIENT
Start: 2017-11-27 | End: 2017-11-28

## 2017-11-27 RX ORDER — TERAZOSIN 1 MG/1
1 CAPSULE ORAL NIGHTLY
Status: DISCONTINUED | OUTPATIENT
Start: 2017-11-27 | End: 2017-12-04 | Stop reason: HOSPADM

## 2017-11-27 RX ORDER — MORPHINE SULFATE 2 MG/ML
1 INJECTION, SOLUTION INTRAMUSCULAR; INTRAVENOUS
Status: DISCONTINUED | OUTPATIENT
Start: 2017-11-27 | End: 2017-11-29

## 2017-11-27 RX ORDER — SODIUM CHLORIDE 0.9 % (FLUSH) 0.9 %
1-10 SYRINGE (ML) INJECTION AS NEEDED
Status: DISCONTINUED | OUTPATIENT
Start: 2017-11-27 | End: 2017-12-04 | Stop reason: HOSPADM

## 2017-11-27 RX ORDER — HYDRALAZINE HYDROCHLORIDE 20 MG/ML
10 INJECTION INTRAMUSCULAR; INTRAVENOUS EVERY 6 HOURS PRN
Status: DISCONTINUED | OUTPATIENT
Start: 2017-11-27 | End: 2017-12-04 | Stop reason: HOSPADM

## 2017-11-27 RX ORDER — NITROGLYCERIN 20 MG/100ML
5-200 INJECTION INTRAVENOUS
Status: DISCONTINUED | OUTPATIENT
Start: 2017-11-27 | End: 2017-12-05

## 2017-11-27 RX ORDER — ACETYLCYSTEINE 200 MG/ML
600 SOLUTION ORAL; RESPIRATORY (INHALATION) ONCE
Status: DISCONTINUED | OUTPATIENT
Start: 2017-11-27 | End: 2017-11-27 | Stop reason: CLARIF

## 2017-11-27 RX ORDER — LABETALOL 300 MG/1
300 TABLET, FILM COATED ORAL EVERY 12 HOURS SCHEDULED
Status: DISCONTINUED | OUTPATIENT
Start: 2017-11-27 | End: 2017-11-29

## 2017-11-27 RX ORDER — FAMOTIDINE 20 MG/1
20 TABLET, FILM COATED ORAL 2 TIMES DAILY
Status: DISCONTINUED | OUTPATIENT
Start: 2017-11-27 | End: 2017-12-04 | Stop reason: HOSPADM

## 2017-11-27 RX ORDER — SODIUM CHLORIDE 9 MG/ML
100 INJECTION, SOLUTION INTRAVENOUS CONTINUOUS
Status: DISCONTINUED | OUTPATIENT
Start: 2017-11-27 | End: 2017-11-28

## 2017-11-27 RX ORDER — ACETYLCYSTEINE 100 MG/ML
6 SOLUTION ORAL; RESPIRATORY (INHALATION) ONCE
Status: COMPLETED | OUTPATIENT
Start: 2017-11-27 | End: 2017-11-27

## 2017-11-27 RX ADMIN — SODIUM CHLORIDE 100 ML/HR: 9 INJECTION, SOLUTION INTRAVENOUS at 18:09

## 2017-11-27 RX ADMIN — FAMOTIDINE 20 MG: 20 TABLET ORAL at 20:18

## 2017-11-27 RX ADMIN — ACETYLCYSTEINE 6 ML: 100 SOLUTION ORAL; RESPIRATORY (INHALATION) at 21:43

## 2017-11-27 RX ADMIN — LABETALOL HCL 300 MG: 300 TABLET, FILM COATED ORAL at 20:19

## 2017-11-27 RX ADMIN — HYDRALAZINE HYDROCHLORIDE 10 MG: 20 INJECTION INTRAMUSCULAR; INTRAVENOUS at 17:18

## 2017-11-27 RX ADMIN — ATORVASTATIN CALCIUM 10 MG: 10 TABLET, FILM COATED ORAL at 20:17

## 2017-11-27 RX ADMIN — NITROGLYCERIN 10 MCG/MIN: 20 INJECTION INTRAVENOUS at 18:09

## 2017-11-27 RX ADMIN — MORPHINE SULFATE 1 MG: 2 INJECTION, SOLUTION INTRAMUSCULAR; INTRAVENOUS at 21:45

## 2017-11-27 RX ADMIN — HEPARIN SODIUM 18 UNITS/KG/HR: 10000 INJECTION, SOLUTION INTRAVENOUS at 20:44

## 2017-11-28 ENCOUNTER — PREP FOR SURGERY (OUTPATIENT)
Dept: OTHER | Facility: HOSPITAL | Age: 52
End: 2017-11-28

## 2017-11-28 DIAGNOSIS — I20.0 UNSTABLE ANGINA (HCC): Primary | ICD-10-CM

## 2017-11-28 DIAGNOSIS — I21.4 NSTEMI (NON-ST ELEVATED MYOCARDIAL INFARCTION) (HCC): ICD-10-CM

## 2017-11-28 LAB
ALBUMIN SERPL-MCNC: 3.9 G/DL (ref 3.4–4.8)
ALBUMIN/GLOB SERPL: 1.3 G/DL (ref 1.1–1.8)
ALP SERPL-CCNC: 95 U/L (ref 38–126)
ALT SERPL W P-5'-P-CCNC: 28 U/L (ref 21–72)
ANION GAP SERPL CALCULATED.3IONS-SCNC: 10 MMOL/L (ref 5–15)
APTT PPP: 102.2 SECONDS (ref 20–40.3)
APTT PPP: 33.3 SECONDS (ref 20–40.3)
APTT PPP: 75.7 SECONDS (ref 20–40.3)
AST SERPL-CCNC: 31 U/L (ref 17–59)
BASOPHILS # BLD AUTO: 0.02 10*3/MM3 (ref 0–0.2)
BASOPHILS NFR BLD AUTO: 0.2 % (ref 0–2)
BILIRUB SERPL-MCNC: 0.6 MG/DL (ref 0.2–1.3)
BUN BLD-MCNC: 23 MG/DL (ref 7–21)
BUN/CREAT SERPL: 12.6 (ref 7–25)
CALCIUM SPEC-SCNC: 8.6 MG/DL (ref 8.4–10.2)
CHLORIDE SERPL-SCNC: 105 MMOL/L (ref 95–110)
CO2 SERPL-SCNC: 22 MMOL/L (ref 22–31)
CREAT BLD-MCNC: 1.83 MG/DL (ref 0.7–1.3)
DEPRECATED RDW RBC AUTO: 42.8 FL (ref 35.1–43.9)
EOSINOPHIL # BLD AUTO: 0.12 10*3/MM3 (ref 0–0.7)
EOSINOPHIL NFR BLD AUTO: 1.4 % (ref 0–7)
ERYTHROCYTE [DISTWIDTH] IN BLOOD BY AUTOMATED COUNT: 14.1 % (ref 11.5–14.5)
GFR SERPL CREATININE-BSD FRML MDRD: 39 ML/MIN/1.73 (ref 56–130)
GLOBULIN UR ELPH-MCNC: 3 GM/DL (ref 2.3–3.5)
GLUCOSE BLD-MCNC: 90 MG/DL (ref 60–100)
HCT VFR BLD AUTO: 33.1 % (ref 39–49)
HGB BLD-MCNC: 11.3 G/DL (ref 13.7–17.3)
HOLD SPECIMEN: NORMAL
IMM GRANULOCYTES # BLD: 0.02 10*3/MM3 (ref 0–0.02)
IMM GRANULOCYTES NFR BLD: 0.2 % (ref 0–0.5)
INR PPP: 0.99 (ref 0.8–1.2)
LYMPHOCYTES # BLD AUTO: 4.02 10*3/MM3 (ref 0.6–4.2)
LYMPHOCYTES NFR BLD AUTO: 45.4 % (ref 10–50)
MCH RBC QN AUTO: 28.1 PG (ref 26.5–34)
MCHC RBC AUTO-ENTMCNC: 34.1 G/DL (ref 31.5–36.3)
MCV RBC AUTO: 82.3 FL (ref 80–98)
MONOCYTES # BLD AUTO: 0.66 10*3/MM3 (ref 0–0.9)
MONOCYTES NFR BLD AUTO: 7.5 % (ref 0–12)
NEUTROPHILS # BLD AUTO: 4.01 10*3/MM3 (ref 2–8.6)
NEUTROPHILS NFR BLD AUTO: 45.3 % (ref 37–80)
NRBC BLD MANUAL-RTO: 0 /100 WBC (ref 0–0)
PLATELET # BLD AUTO: 153 10*3/MM3 (ref 150–450)
PMV BLD AUTO: 9.4 FL (ref 8–12)
POTASSIUM BLD-SCNC: 4 MMOL/L (ref 3.5–5.1)
PROT SERPL-MCNC: 6.9 G/DL (ref 6.3–8.6)
PROTHROMBIN TIME: 13 SECONDS (ref 11.1–15.3)
RBC # BLD AUTO: 4.02 10*6/MM3 (ref 4.37–5.74)
SODIUM BLD-SCNC: 137 MMOL/L (ref 137–145)
TROPONIN I SERPL-MCNC: 0.52 NG/ML
WBC NRBC COR # BLD: 8.85 10*3/MM3 (ref 3.2–9.8)
WHOLE BLOOD HOLD SPECIMEN: NORMAL

## 2017-11-28 PROCEDURE — 85730 THROMBOPLASTIN TIME PARTIAL: CPT | Performed by: NURSE PRACTITIONER

## 2017-11-28 PROCEDURE — C1769 GUIDE WIRE: HCPCS | Performed by: INTERNAL MEDICINE

## 2017-11-28 PROCEDURE — C1760 CLOSURE DEV, VASC: HCPCS | Performed by: INTERNAL MEDICINE

## 2017-11-28 PROCEDURE — 80053 COMPREHEN METABOLIC PANEL: CPT | Performed by: NURSE PRACTITIONER

## 2017-11-28 PROCEDURE — 85610 PROTHROMBIN TIME: CPT | Performed by: INTERNAL MEDICINE

## 2017-11-28 PROCEDURE — 99233 SBSQ HOSP IP/OBS HIGH 50: CPT | Performed by: NURSE PRACTITIONER

## 2017-11-28 PROCEDURE — B2011ZZ PLAIN RADIOGRAPHY OF MULTIPLE CORONARY ARTERIES USING LOW OSMOLAR CONTRAST: ICD-10-PCS | Performed by: INTERNAL MEDICINE

## 2017-11-28 PROCEDURE — 25010000002 HEPARIN (PORCINE) PER 1000 UNITS: Performed by: INTERNAL MEDICINE

## 2017-11-28 PROCEDURE — 4A023N7 MEASUREMENT OF CARDIAC SAMPLING AND PRESSURE, LEFT HEART, PERCUTANEOUS APPROACH: ICD-10-PCS | Performed by: INTERNAL MEDICINE

## 2017-11-28 PROCEDURE — 25010000002 MORPHINE PER 10 MG: Performed by: FAMILY MEDICINE

## 2017-11-28 PROCEDURE — C1894 INTRO/SHEATH, NON-LASER: HCPCS | Performed by: INTERNAL MEDICINE

## 2017-11-28 PROCEDURE — 85025 COMPLETE CBC W/AUTO DIFF WBC: CPT | Performed by: NURSE PRACTITIONER

## 2017-11-28 PROCEDURE — 93454 CORONARY ARTERY ANGIO S&I: CPT | Performed by: INTERNAL MEDICINE

## 2017-11-28 PROCEDURE — 93005 ELECTROCARDIOGRAM TRACING: CPT | Performed by: NURSE PRACTITIONER

## 2017-11-28 PROCEDURE — 0 IOPAMIDOL PER 1 ML: Performed by: INTERNAL MEDICINE

## 2017-11-28 PROCEDURE — 84484 ASSAY OF TROPONIN QUANT: CPT | Performed by: NURSE PRACTITIONER

## 2017-11-28 PROCEDURE — 85730 THROMBOPLASTIN TIME PARTIAL: CPT | Performed by: INTERNAL MEDICINE

## 2017-11-28 PROCEDURE — 93010 ELECTROCARDIOGRAM REPORT: CPT | Performed by: INTERNAL MEDICINE

## 2017-11-28 RX ORDER — ACETYLCYSTEINE 100 MG/ML
6 SOLUTION ORAL; RESPIRATORY (INHALATION) EVERY 12 HOURS
Status: COMPLETED | OUTPATIENT
Start: 2017-11-28 | End: 2017-11-29

## 2017-11-28 RX ORDER — HEPARIN SODIUM 5000 [USP'U]/ML
40 INJECTION, SOLUTION INTRAVENOUS; SUBCUTANEOUS AS NEEDED
Status: DISCONTINUED | OUTPATIENT
Start: 2017-11-28 | End: 2017-12-04 | Stop reason: HOSPADM

## 2017-11-28 RX ORDER — ATORVASTATIN CALCIUM 40 MG/1
40 TABLET, FILM COATED ORAL DAILY
Status: DISCONTINUED | OUTPATIENT
Start: 2017-11-29 | End: 2017-12-02

## 2017-11-28 RX ORDER — SODIUM CHLORIDE 9 MG/ML
125 INJECTION, SOLUTION INTRAVENOUS CONTINUOUS
Status: DISCONTINUED | OUTPATIENT
Start: 2017-11-28 | End: 2017-11-28

## 2017-11-28 RX ORDER — HEPARIN SODIUM 5000 [USP'U]/ML
80 INJECTION, SOLUTION INTRAVENOUS; SUBCUTANEOUS AS NEEDED
Status: DISCONTINUED | OUTPATIENT
Start: 2017-11-28 | End: 2017-12-04 | Stop reason: HOSPADM

## 2017-11-28 RX ORDER — SODIUM CHLORIDE 0.9 % (FLUSH) 0.9 %
1-10 SYRINGE (ML) INJECTION AS NEEDED
Status: DISCONTINUED | OUTPATIENT
Start: 2017-11-28 | End: 2017-11-28

## 2017-11-28 RX ORDER — ACETYLCYSTEINE 200 MG/ML
600 SOLUTION ORAL; RESPIRATORY (INHALATION) EVERY 12 HOURS SCHEDULED
Status: DISCONTINUED | OUTPATIENT
Start: 2017-11-28 | End: 2017-11-28

## 2017-11-28 RX ORDER — SODIUM CHLORIDE 9 MG/ML
125 INJECTION, SOLUTION INTRAVENOUS CONTINUOUS
Status: CANCELLED | OUTPATIENT
Start: 2017-11-28

## 2017-11-28 RX ORDER — RANOLAZINE 500 MG/1
500 TABLET, EXTENDED RELEASE ORAL EVERY 12 HOURS SCHEDULED
Status: DISCONTINUED | OUTPATIENT
Start: 2017-11-28 | End: 2017-12-02

## 2017-11-28 RX ORDER — LIDOCAINE HYDROCHLORIDE 20 MG/ML
INJECTION, SOLUTION INFILTRATION; PERINEURAL AS NEEDED
Status: DISCONTINUED | OUTPATIENT
Start: 2017-11-28 | End: 2017-11-28 | Stop reason: HOSPADM

## 2017-11-28 RX ORDER — HYDRALAZINE HYDROCHLORIDE 50 MG/1
50 TABLET, FILM COATED ORAL EVERY 8 HOURS SCHEDULED
Status: DISCONTINUED | OUTPATIENT
Start: 2017-11-28 | End: 2017-12-04 | Stop reason: HOSPADM

## 2017-11-28 RX ORDER — SODIUM CHLORIDE 0.9 % (FLUSH) 0.9 %
1-10 SYRINGE (ML) INJECTION AS NEEDED
Status: CANCELLED | OUTPATIENT
Start: 2017-11-28

## 2017-11-28 RX ADMIN — MORPHINE SULFATE 1 MG: 2 INJECTION, SOLUTION INTRAMUSCULAR; INTRAVENOUS at 09:34

## 2017-11-28 RX ADMIN — SODIUM BICARBONATE 100 ML/HR: 84 INJECTION, SOLUTION INTRAVENOUS at 13:32

## 2017-11-28 RX ADMIN — HYDROCHLOROTHIAZIDE 12.5 MG: 12.5 CAPSULE ORAL at 08:14

## 2017-11-28 RX ADMIN — HYDRALAZINE HYDROCHLORIDE 50 MG: 50 TABLET ORAL at 13:32

## 2017-11-28 RX ADMIN — MORPHINE SULFATE 1 MG: 2 INJECTION, SOLUTION INTRAMUSCULAR; INTRAVENOUS at 21:19

## 2017-11-28 RX ADMIN — ACETYLCYSTEINE 6 ML: 100 SOLUTION ORAL; RESPIRATORY (INHALATION) at 12:33

## 2017-11-28 RX ADMIN — RANOLAZINE 500 MG: 500 TABLET, FILM COATED, EXTENDED RELEASE ORAL at 20:07

## 2017-11-28 RX ADMIN — TAMSULOSIN HYDROCHLORIDE 0.4 MG: 0.4 CAPSULE ORAL at 20:07

## 2017-11-28 RX ADMIN — FAMOTIDINE 20 MG: 20 TABLET ORAL at 18:28

## 2017-11-28 RX ADMIN — PANTOPRAZOLE SODIUM 40 MG: 40 TABLET, DELAYED RELEASE ORAL at 06:17

## 2017-11-28 RX ADMIN — MORPHINE SULFATE 1 MG: 2 INJECTION, SOLUTION INTRAMUSCULAR; INTRAVENOUS at 16:53

## 2017-11-28 RX ADMIN — MORPHINE SULFATE 1 MG: 2 INJECTION, SOLUTION INTRAMUSCULAR; INTRAVENOUS at 12:33

## 2017-11-28 RX ADMIN — LABETALOL HCL 300 MG: 300 TABLET, FILM COATED ORAL at 08:14

## 2017-11-28 RX ADMIN — HEPARIN SODIUM 14.51 UNITS/KG/HR: 10000 INJECTION, SOLUTION INTRAVENOUS at 19:25

## 2017-11-28 RX ADMIN — RANOLAZINE 500 MG: 500 TABLET, FILM COATED, EXTENDED RELEASE ORAL at 12:33

## 2017-11-28 RX ADMIN — LOSARTAN POTASSIUM 100 MG: 50 TABLET, FILM COATED ORAL at 08:14

## 2017-11-28 RX ADMIN — NITROGLYCERIN 60 MCG/MIN: 20 INJECTION INTRAVENOUS at 16:47

## 2017-11-28 RX ADMIN — SODIUM CHLORIDE 100 ML/HR: 9 INJECTION, SOLUTION INTRAVENOUS at 04:13

## 2017-11-28 RX ADMIN — FAMOTIDINE 20 MG: 20 TABLET ORAL at 08:14

## 2017-11-28 RX ADMIN — ATORVASTATIN CALCIUM 10 MG: 10 TABLET, FILM COATED ORAL at 08:15

## 2017-11-29 ENCOUNTER — APPOINTMENT (OUTPATIENT)
Dept: CARDIOLOGY | Facility: HOSPITAL | Age: 52
End: 2017-11-29
Attending: INTERNAL MEDICINE

## 2017-11-29 ENCOUNTER — APPOINTMENT (OUTPATIENT)
Dept: ULTRASOUND IMAGING | Facility: HOSPITAL | Age: 52
End: 2017-11-29

## 2017-11-29 ENCOUNTER — APPOINTMENT (OUTPATIENT)
Dept: PULMONOLOGY | Facility: HOSPITAL | Age: 52
End: 2017-11-29

## 2017-11-29 LAB
ALBUMIN SERPL-MCNC: 3.6 G/DL (ref 3.4–4.8)
ALBUMIN/GLOB SERPL: 1.2 G/DL (ref 1.1–1.8)
ALP SERPL-CCNC: 90 U/L (ref 38–126)
ALT SERPL W P-5'-P-CCNC: 21 U/L (ref 21–72)
ANION GAP SERPL CALCULATED.3IONS-SCNC: 12 MMOL/L (ref 5–15)
APTT PPP: 59 SECONDS (ref 20–40.3)
APTT PPP: 68.9 SECONDS (ref 20–40.3)
APTT PPP: 93.8 SECONDS (ref 20–40.3)
AST SERPL-CCNC: 22 U/L (ref 17–59)
BASOPHILS # BLD AUTO: 0.01 10*3/MM3 (ref 0–0.2)
BASOPHILS NFR BLD AUTO: 0.1 % (ref 0–2)
BH CV ECHO MEAS - ACS: 2.3 CM
BH CV ECHO MEAS - AO ISTHMUS: 3 CM
BH CV ECHO MEAS - AO MAX PG (FULL): 4 MMHG
BH CV ECHO MEAS - AO MAX PG: 9.7 MMHG
BH CV ECHO MEAS - AO MEAN PG (FULL): 1.9 MMHG
BH CV ECHO MEAS - AO MEAN PG: 5.7 MMHG
BH CV ECHO MEAS - AO ROOT AREA (BSA CORRECTED): 2.2
BH CV ECHO MEAS - AO ROOT AREA: 13.9 CM^2
BH CV ECHO MEAS - AO ROOT DIAM: 4.2 CM
BH CV ECHO MEAS - AO V2 MAX: 156 CM/SEC
BH CV ECHO MEAS - AO V2 MEAN: 115.7 CM/SEC
BH CV ECHO MEAS - AO V2 VTI: 29.7 CM
BH CV ECHO MEAS - ASC AORTA: 3.4 CM
BH CV ECHO MEAS - AVA(I,A): 3.2 CM^2
BH CV ECHO MEAS - AVA(I,D): 3.2 CM^2
BH CV ECHO MEAS - AVA(V,A): 3.1 CM^2
BH CV ECHO MEAS - AVA(V,D): 3.1 CM^2
BH CV ECHO MEAS - BSA(HAYCOCK): 1.9 M^2
BH CV ECHO MEAS - BSA: 1.9 M^2
BH CV ECHO MEAS - BZI_BMI: 23.8 KILOGRAMS/M^2
BH CV ECHO MEAS - BZI_METRIC_HEIGHT: 175 CM
BH CV ECHO MEAS - BZI_METRIC_WEIGHT: 73 KG
BH CV ECHO MEAS - EDV(CUBED): 133.7 ML
BH CV ECHO MEAS - EDV(TEICH): 124.6 ML
BH CV ECHO MEAS - EF(CUBED): 68.4 %
BH CV ECHO MEAS - EF(TEICH): 59.6 %
BH CV ECHO MEAS - ESV(CUBED): 42.3 ML
BH CV ECHO MEAS - ESV(TEICH): 50.3 ML
BH CV ECHO MEAS - FS: 31.9 %
BH CV ECHO MEAS - IVS/LVPW: 1
BH CV ECHO MEAS - IVSD: 1.3 CM
BH CV ECHO MEAS - LA DIMENSION: 3.9 CM
BH CV ECHO MEAS - LA/AO: 0.92
BH CV ECHO MEAS - LV MASS(C)D: 266.3 GRAMS
BH CV ECHO MEAS - LV MASS(C)DI: 141.5 GRAMS/M^2
BH CV ECHO MEAS - LV MAX PG: 5.8 MMHG
BH CV ECHO MEAS - LV MEAN PG: 3.8 MMHG
BH CV ECHO MEAS - LV V1 MAX: 120 CM/SEC
BH CV ECHO MEAS - LV V1 MEAN: 93 CM/SEC
BH CV ECHO MEAS - LV V1 VTI: 23.8 CM
BH CV ECHO MEAS - LVIDD: 5.1 CM
BH CV ECHO MEAS - LVIDS: 3.5 CM
BH CV ECHO MEAS - LVOT AREA: 4 CM^2
BH CV ECHO MEAS - LVOT DIAM: 2.3 CM
BH CV ECHO MEAS - LVPWD: 1.3 CM
BH CV ECHO MEAS - MR MAX PG: 60.7 MMHG
BH CV ECHO MEAS - MR MAX VEL: 389.4 CM/SEC
BH CV ECHO MEAS - MV A MAX VEL: 81.6 CM/SEC
BH CV ECHO MEAS - MV E MAX VEL: 107.4 CM/SEC
BH CV ECHO MEAS - MV E/A: 1.3
BH CV ECHO MEAS - MV MAX PG: 5.2 MMHG
BH CV ECHO MEAS - MV MEAN PG: 2.6 MMHG
BH CV ECHO MEAS - MV P1/2T MAX VEL: 90.7 CM/SEC
BH CV ECHO MEAS - MV V2 MAX: 114 CM/SEC
BH CV ECHO MEAS - MV V2 MEAN: 78.3 CM/SEC
BH CV ECHO MEAS - MV V2 VTI: 26.1 CM
BH CV ECHO MEAS - MVA P1/2T LCG: 2.4 CM^2
BH CV ECHO MEAS - MVA(VTI): 3.7 CM^2
BH CV ECHO MEAS - PA MAX PG: 3.5 MMHG
BH CV ECHO MEAS - PA V2 MAX: 92.9 CM/SEC
BH CV ECHO MEAS - RAP SYSTOLE: 5 MMHG
BH CV ECHO MEAS - RVDD: 2.7 CM
BH CV ECHO MEAS - RVSP: 23 MMHG
BH CV ECHO MEAS - SI(AO): 218.8 ML/M^2
BH CV ECHO MEAS - SI(CUBED): 48.6 ML/M^2
BH CV ECHO MEAS - SI(LVOT): 50.8 ML/M^2
BH CV ECHO MEAS - SI(TEICH): 39.5 ML/M^2
BH CV ECHO MEAS - SV(AO): 411.6 ML
BH CV ECHO MEAS - SV(CUBED): 91.4 ML
BH CV ECHO MEAS - SV(LVOT): 95.5 ML
BH CV ECHO MEAS - SV(TEICH): 74.2 ML
BH CV ECHO MEAS - TR MAX VEL: 212 CM/SEC
BILIRUB SERPL-MCNC: 0.5 MG/DL (ref 0.2–1.3)
BUN BLD-MCNC: 24 MG/DL (ref 7–21)
BUN/CREAT SERPL: 11.4 (ref 7–25)
CALCIUM SPEC-SCNC: 8.4 MG/DL (ref 8.4–10.2)
CHLORIDE SERPL-SCNC: 101 MMOL/L (ref 95–110)
CO2 SERPL-SCNC: 25 MMOL/L (ref 22–31)
CREAT BLD-MCNC: 2.1 MG/DL (ref 0.7–1.3)
DEPRECATED RDW RBC AUTO: 42.7 FL (ref 35.1–43.9)
EOSINOPHIL # BLD AUTO: 0.12 10*3/MM3 (ref 0–0.7)
EOSINOPHIL NFR BLD AUTO: 1.8 % (ref 0–7)
ERYTHROCYTE [DISTWIDTH] IN BLOOD BY AUTOMATED COUNT: 14.2 % (ref 11.5–14.5)
GFR SERPL CREATININE-BSD FRML MDRD: 33 ML/MIN/1.73 (ref 60–130)
GLOBULIN UR ELPH-MCNC: 2.9 GM/DL (ref 2.3–3.5)
GLUCOSE BLD-MCNC: 103 MG/DL (ref 60–100)
HCT VFR BLD AUTO: 31.4 % (ref 39–49)
HGB BLD-MCNC: 10.7 G/DL (ref 13.7–17.3)
HOLD SPECIMEN: NORMAL
IMM GRANULOCYTES # BLD: 0.02 10*3/MM3 (ref 0–0.02)
IMM GRANULOCYTES NFR BLD: 0.3 % (ref 0–0.5)
LV EF 2D ECHO EST: 65 %
LYMPHOCYTES # BLD AUTO: 3.27 10*3/MM3 (ref 0.6–4.2)
LYMPHOCYTES NFR BLD AUTO: 48.2 % (ref 10–50)
MAXIMAL PREDICTED HEART RATE: 167 BPM
MCH RBC QN AUTO: 28 PG (ref 26.5–34)
MCHC RBC AUTO-ENTMCNC: 34.1 G/DL (ref 31.5–36.3)
MCV RBC AUTO: 82.2 FL (ref 80–98)
MONOCYTES # BLD AUTO: 0.55 10*3/MM3 (ref 0–0.9)
MONOCYTES NFR BLD AUTO: 8.1 % (ref 0–12)
NEUTROPHILS # BLD AUTO: 2.81 10*3/MM3 (ref 2–8.6)
NEUTROPHILS NFR BLD AUTO: 41.5 % (ref 37–80)
PLATELET # BLD AUTO: 139 10*3/MM3 (ref 150–450)
PMV BLD AUTO: 9.1 FL (ref 8–12)
POTASSIUM BLD-SCNC: 3.6 MMOL/L (ref 3.5–5.1)
PROT SERPL-MCNC: 6.5 G/DL (ref 6.3–8.6)
RBC # BLD AUTO: 3.82 10*6/MM3 (ref 4.37–5.74)
SODIUM BLD-SCNC: 138 MMOL/L (ref 137–145)
STRESS TARGET HR: 142 BPM
WBC NRBC COR # BLD: 6.78 10*3/MM3 (ref 3.2–9.8)

## 2017-11-29 PROCEDURE — 85730 THROMBOPLASTIN TIME PARTIAL: CPT | Performed by: INTERNAL MEDICINE

## 2017-11-29 PROCEDURE — 99232 SBSQ HOSP IP/OBS MODERATE 35: CPT | Performed by: NURSE PRACTITIONER

## 2017-11-29 PROCEDURE — 94010 BREATHING CAPACITY TEST: CPT | Performed by: INTERNAL MEDICINE

## 2017-11-29 PROCEDURE — 80053 COMPREHEN METABOLIC PANEL: CPT | Performed by: NURSE PRACTITIONER

## 2017-11-29 PROCEDURE — 93970 EXTREMITY STUDY: CPT

## 2017-11-29 PROCEDURE — 93880 EXTRACRANIAL BILAT STUDY: CPT

## 2017-11-29 PROCEDURE — 25010000002 MORPHINE PER 10 MG: Performed by: NURSE PRACTITIONER

## 2017-11-29 PROCEDURE — 93306 TTE W/DOPPLER COMPLETE: CPT | Performed by: INTERNAL MEDICINE

## 2017-11-29 PROCEDURE — 85025 COMPLETE CBC W/AUTO DIFF WBC: CPT | Performed by: NURSE PRACTITIONER

## 2017-11-29 PROCEDURE — 94010 BREATHING CAPACITY TEST: CPT

## 2017-11-29 PROCEDURE — 93010 ELECTROCARDIOGRAM REPORT: CPT | Performed by: INTERNAL MEDICINE

## 2017-11-29 PROCEDURE — 93306 TTE W/DOPPLER COMPLETE: CPT

## 2017-11-29 PROCEDURE — 94799 UNLISTED PULMONARY SVC/PX: CPT

## 2017-11-29 PROCEDURE — 25010000002 MORPHINE PER 10 MG: Performed by: FAMILY MEDICINE

## 2017-11-29 PROCEDURE — 93005 ELECTROCARDIOGRAM TRACING: CPT | Performed by: NURSE PRACTITIONER

## 2017-11-29 PROCEDURE — 25010000002 HEPARIN (PORCINE) PER 1000 UNITS: Performed by: INTERNAL MEDICINE

## 2017-11-29 RX ORDER — LABETALOL 100 MG/1
100 TABLET, FILM COATED ORAL EVERY 12 HOURS SCHEDULED
Status: DISCONTINUED | OUTPATIENT
Start: 2017-11-29 | End: 2017-12-04

## 2017-11-29 RX ORDER — SODIUM CHLORIDE 9 MG/ML
75 INJECTION, SOLUTION INTRAVENOUS CONTINUOUS
Status: DISCONTINUED | OUTPATIENT
Start: 2017-11-29 | End: 2017-11-30

## 2017-11-29 RX ORDER — MORPHINE SULFATE 2 MG/ML
2 INJECTION, SOLUTION INTRAMUSCULAR; INTRAVENOUS
Status: DISCONTINUED | OUTPATIENT
Start: 2017-11-29 | End: 2017-12-02 | Stop reason: CLARIF

## 2017-11-29 RX ORDER — LORAZEPAM 0.5 MG/1
0.5 TABLET ORAL EVERY 8 HOURS PRN
Status: DISCONTINUED | OUTPATIENT
Start: 2017-11-29 | End: 2017-12-04 | Stop reason: HOSPADM

## 2017-11-29 RX ADMIN — TAMSULOSIN HYDROCHLORIDE 0.4 MG: 0.4 CAPSULE ORAL at 20:59

## 2017-11-29 RX ADMIN — HEPARIN SODIUM 18 UNITS/KG/HR: 10000 INJECTION, SOLUTION INTRAVENOUS at 21:24

## 2017-11-29 RX ADMIN — MORPHINE SULFATE 1 MG: 2 INJECTION, SOLUTION INTRAMUSCULAR; INTRAVENOUS at 04:18

## 2017-11-29 RX ADMIN — MORPHINE SULFATE 1 MG: 2 INJECTION, SOLUTION INTRAMUSCULAR; INTRAVENOUS at 10:19

## 2017-11-29 RX ADMIN — FAMOTIDINE 20 MG: 20 TABLET ORAL at 16:51

## 2017-11-29 RX ADMIN — HYDRALAZINE HYDROCHLORIDE 50 MG: 50 TABLET ORAL at 21:00

## 2017-11-29 RX ADMIN — RANOLAZINE 500 MG: 500 TABLET, FILM COATED, EXTENDED RELEASE ORAL at 08:51

## 2017-11-29 RX ADMIN — ACETYLCYSTEINE 6 ML: 100 SOLUTION ORAL; RESPIRATORY (INHALATION) at 14:51

## 2017-11-29 RX ADMIN — ACETYLCYSTEINE 6 ML: 100 SOLUTION ORAL; RESPIRATORY (INHALATION) at 01:00

## 2017-11-29 RX ADMIN — LORAZEPAM 0.5 MG: 0.5 TABLET ORAL at 10:43

## 2017-11-29 RX ADMIN — ACETYLCYSTEINE 6 ML: 100 SOLUTION ORAL; RESPIRATORY (INHALATION) at 23:52

## 2017-11-29 RX ADMIN — MORPHINE SULFATE 2 MG: 2 INJECTION, SOLUTION INTRAMUSCULAR; INTRAVENOUS at 16:51

## 2017-11-29 RX ADMIN — RANOLAZINE 500 MG: 500 TABLET, FILM COATED, EXTENDED RELEASE ORAL at 20:57

## 2017-11-29 RX ADMIN — LABETALOL HCL 100 MG: 300 TABLET, FILM COATED ORAL at 14:47

## 2017-11-29 RX ADMIN — TERAZOSIN HYDROCHLORIDE 1 MG: 1 CAPSULE ORAL at 20:58

## 2017-11-29 RX ADMIN — NITROGLYCERIN 65 MCG/MIN: 20 INJECTION INTRAVENOUS at 04:20

## 2017-11-29 RX ADMIN — PANTOPRAZOLE SODIUM 40 MG: 40 TABLET, DELAYED RELEASE ORAL at 05:18

## 2017-11-29 RX ADMIN — MORPHINE SULFATE 2 MG: 2 INJECTION, SOLUTION INTRAMUSCULAR; INTRAVENOUS at 23:47

## 2017-11-29 RX ADMIN — SODIUM CHLORIDE 75 ML/HR: 9 INJECTION, SOLUTION INTRAVENOUS at 09:49

## 2017-11-29 RX ADMIN — FAMOTIDINE 20 MG: 20 TABLET ORAL at 08:51

## 2017-11-29 RX ADMIN — SODIUM CHLORIDE 75 ML/HR: 9 INJECTION, SOLUTION INTRAVENOUS at 21:28

## 2017-11-29 RX ADMIN — ATORVASTATIN CALCIUM 40 MG: 40 TABLET, FILM COATED ORAL at 08:50

## 2017-11-29 RX ADMIN — LABETALOL HCL 100 MG: 300 TABLET, FILM COATED ORAL at 21:22

## 2017-11-30 ENCOUNTER — ANESTHESIA EVENT (OUTPATIENT)
Dept: PERIOP | Facility: HOSPITAL | Age: 52
End: 2017-11-30

## 2017-11-30 ENCOUNTER — APPOINTMENT (OUTPATIENT)
Dept: GENERAL RADIOLOGY | Facility: HOSPITAL | Age: 52
End: 2017-11-30

## 2017-11-30 LAB
ABO GROUP BLD: NORMAL
ALBUMIN SERPL-MCNC: 3.5 G/DL (ref 3.4–4.8)
ALBUMIN/GLOB SERPL: 1.2 G/DL (ref 1.1–1.8)
ALP SERPL-CCNC: 88 U/L (ref 38–126)
ALT SERPL W P-5'-P-CCNC: 26 U/L (ref 21–72)
ANION GAP SERPL CALCULATED.3IONS-SCNC: 14 MMOL/L (ref 5–15)
APTT PPP: 108.5 SECONDS (ref 20–40.3)
APTT PPP: 56 SECONDS (ref 20–40.3)
APTT PPP: 59 SECONDS (ref 20–40.3)
AST SERPL-CCNC: 20 U/L (ref 17–59)
BASOPHILS # BLD AUTO: 0.01 10*3/MM3 (ref 0–0.2)
BASOPHILS NFR BLD AUTO: 0.1 % (ref 0–2)
BILIRUB SERPL-MCNC: 0.4 MG/DL (ref 0.2–1.3)
BLD GP AB SCN SERPL QL: NEGATIVE
BUN BLD-MCNC: 19 MG/DL (ref 7–21)
BUN/CREAT SERPL: 9.1 (ref 7–25)
CALCIUM SPEC-SCNC: 8.5 MG/DL (ref 8.4–10.2)
CHLORIDE SERPL-SCNC: 101 MMOL/L (ref 95–110)
CLOSE TME COLL+ADP + EPINEP PNL BLD: 43 % (ref 80–97)
CO2 SERPL-SCNC: 24 MMOL/L (ref 22–31)
CREAT BLD-MCNC: 2.09 MG/DL (ref 0.7–1.3)
DEPRECATED RDW RBC AUTO: 42.3 FL (ref 35.1–43.9)
EOSINOPHIL # BLD AUTO: 0.15 10*3/MM3 (ref 0–0.7)
EOSINOPHIL NFR BLD AUTO: 1.8 % (ref 0–7)
ERYTHROCYTE [DISTWIDTH] IN BLOOD BY AUTOMATED COUNT: 13.9 % (ref 11.5–14.5)
FIBRINOGEN PPP-MCNC: 422 MG/DL (ref 228–514)
GFR SERPL CREATININE-BSD FRML MDRD: 33 ML/MIN/1.73 (ref 60–130)
GLOBULIN UR ELPH-MCNC: 3 GM/DL (ref 2.3–3.5)
GLUCOSE BLD-MCNC: 119 MG/DL (ref 60–100)
HBA1C MFR BLD: 5.5 % (ref 4–5.6)
HCT VFR BLD AUTO: 30.1 % (ref 39–49)
HGB BLD-MCNC: 10.2 G/DL (ref 13.7–17.3)
HOLD SPECIMEN: NORMAL
HOLD SPECIMEN: NORMAL
IMM GRANULOCYTES # BLD: 0.02 10*3/MM3 (ref 0–0.02)
IMM GRANULOCYTES NFR BLD: 0.2 % (ref 0–0.5)
LYMPHOCYTES # BLD AUTO: 4.06 10*3/MM3 (ref 0.6–4.2)
LYMPHOCYTES NFR BLD AUTO: 49.7 % (ref 10–50)
Lab: NORMAL
MCH RBC QN AUTO: 27.9 PG (ref 26.5–34)
MCHC RBC AUTO-ENTMCNC: 33.9 G/DL (ref 31.5–36.3)
MCV RBC AUTO: 82.2 FL (ref 80–98)
MONOCYTES # BLD AUTO: 0.53 10*3/MM3 (ref 0–0.9)
MONOCYTES NFR BLD AUTO: 6.5 % (ref 0–12)
NEUTROPHILS # BLD AUTO: 3.4 10*3/MM3 (ref 2–8.6)
NEUTROPHILS NFR BLD AUTO: 41.7 % (ref 37–80)
PA COLL PRP: 44 % (ref 70–100)
PLATELET # BLD AUTO: 143 10*3/MM3 (ref 150–450)
PLATELET # BLD AUTO: 143 10*3/MM3 (ref 150–450)
PMV BLD AUTO: 9.1 FL (ref 8–12)
POTASSIUM BLD-SCNC: 3.6 MMOL/L (ref 3.5–5.1)
PROT SERPL-MCNC: 6.5 G/DL (ref 6.3–8.6)
RBC # BLD AUTO: 3.66 10*6/MM3 (ref 4.37–5.74)
RH BLD: NEGATIVE
SODIUM BLD-SCNC: 139 MMOL/L (ref 137–145)
TSH SERPL DL<=0.05 MIU/L-ACNC: 2.11 MIU/ML (ref 0.46–4.68)
WBC NRBC COR # BLD: 8.17 10*3/MM3 (ref 3.2–9.8)
WHOLE BLOOD HOLD SPECIMEN: NORMAL

## 2017-11-30 PROCEDURE — 83550 IRON BINDING TEST: CPT | Performed by: INTERNAL MEDICINE

## 2017-11-30 PROCEDURE — 86850 RBC ANTIBODY SCREEN: CPT | Performed by: NURSE PRACTITIONER

## 2017-11-30 PROCEDURE — 99233 SBSQ HOSP IP/OBS HIGH 50: CPT | Performed by: NURSE PRACTITIONER

## 2017-11-30 PROCEDURE — 82728 ASSAY OF FERRITIN: CPT | Performed by: INTERNAL MEDICINE

## 2017-11-30 PROCEDURE — 86901 BLOOD TYPING SEROLOGIC RH(D): CPT | Performed by: NURSE PRACTITIONER

## 2017-11-30 PROCEDURE — 85576 BLOOD PLATELET AGGREGATION: CPT | Performed by: NURSE PRACTITIONER

## 2017-11-30 PROCEDURE — 84443 ASSAY THYROID STIM HORMONE: CPT | Performed by: NURSE PRACTITIONER

## 2017-11-30 PROCEDURE — 83036 HEMOGLOBIN GLYCOSYLATED A1C: CPT | Performed by: NURSE PRACTITIONER

## 2017-11-30 PROCEDURE — 71010 HC CHEST PA OR AP: CPT

## 2017-11-30 PROCEDURE — 85384 FIBRINOGEN ACTIVITY: CPT | Performed by: NURSE PRACTITIONER

## 2017-11-30 PROCEDURE — 83540 ASSAY OF IRON: CPT | Performed by: INTERNAL MEDICINE

## 2017-11-30 PROCEDURE — 85025 COMPLETE CBC W/AUTO DIFF WBC: CPT | Performed by: NURSE PRACTITIONER

## 2017-11-30 PROCEDURE — 25010000002 MORPHINE PER 10 MG: Performed by: NURSE PRACTITIONER

## 2017-11-30 PROCEDURE — 85730 THROMBOPLASTIN TIME PARTIAL: CPT | Performed by: INTERNAL MEDICINE

## 2017-11-30 PROCEDURE — 99231 SBSQ HOSP IP/OBS SF/LOW 25: CPT | Performed by: NURSE PRACTITIONER

## 2017-11-30 PROCEDURE — 86900 BLOOD TYPING SEROLOGIC ABO: CPT | Performed by: NURSE PRACTITIONER

## 2017-11-30 PROCEDURE — 80053 COMPREHEN METABOLIC PANEL: CPT | Performed by: NURSE PRACTITIONER

## 2017-11-30 RX ORDER — FLUTICASONE PROPIONATE 50 MCG
2 SPRAY, SUSPENSION (ML) NASAL DAILY
Status: DISCONTINUED | OUTPATIENT
Start: 2017-11-30 | End: 2017-12-04 | Stop reason: HOSPADM

## 2017-11-30 RX ORDER — BISACODYL 5 MG/1
10 TABLET, DELAYED RELEASE ORAL DAILY
Status: DISCONTINUED | OUTPATIENT
Start: 2017-11-30 | End: 2017-12-04 | Stop reason: HOSPADM

## 2017-11-30 RX ORDER — ALPRAZOLAM 0.25 MG/1
0.25 TABLET ORAL 4 TIMES DAILY PRN
Status: DISCONTINUED | OUTPATIENT
Start: 2017-11-30 | End: 2017-12-04 | Stop reason: HOSPADM

## 2017-11-30 RX ADMIN — TAMSULOSIN HYDROCHLORIDE 0.4 MG: 0.4 CAPSULE ORAL at 20:37

## 2017-11-30 RX ADMIN — PANTOPRAZOLE SODIUM 40 MG: 40 TABLET, DELAYED RELEASE ORAL at 06:13

## 2017-11-30 RX ADMIN — LABETALOL HCL 100 MG: 300 TABLET, FILM COATED ORAL at 20:37

## 2017-11-30 RX ADMIN — ALPRAZOLAM 0.25 MG: 0.25 TABLET ORAL at 21:34

## 2017-11-30 RX ADMIN — MORPHINE SULFATE 2 MG: 2 INJECTION, SOLUTION INTRAMUSCULAR; INTRAVENOUS at 20:23

## 2017-11-30 RX ADMIN — HYDRALAZINE HYDROCHLORIDE 50 MG: 50 TABLET ORAL at 16:15

## 2017-11-30 RX ADMIN — NITROGLYCERIN 100 MCG/MIN: 20 INJECTION INTRAVENOUS at 09:59

## 2017-11-30 RX ADMIN — ATORVASTATIN CALCIUM 40 MG: 40 TABLET, FILM COATED ORAL at 08:58

## 2017-11-30 RX ADMIN — FLUTICASONE PROPIONATE 2 SPRAY: 50 SPRAY, METERED NASAL at 10:00

## 2017-11-30 RX ADMIN — TERAZOSIN HYDROCHLORIDE 1 MG: 1 CAPSULE ORAL at 20:37

## 2017-11-30 RX ADMIN — HYDRALAZINE HYDROCHLORIDE 50 MG: 50 TABLET ORAL at 06:12

## 2017-11-30 RX ADMIN — ALPRAZOLAM 0.25 MG: 0.25 TABLET ORAL at 18:19

## 2017-11-30 RX ADMIN — ALPRAZOLAM 0.25 MG: 0.25 TABLET ORAL at 11:31

## 2017-11-30 RX ADMIN — FAMOTIDINE 20 MG: 20 TABLET ORAL at 08:58

## 2017-11-30 RX ADMIN — MORPHINE SULFATE 2 MG: 2 INJECTION, SOLUTION INTRAMUSCULAR; INTRAVENOUS at 10:23

## 2017-11-30 RX ADMIN — HYDRALAZINE HYDROCHLORIDE 50 MG: 50 TABLET ORAL at 21:30

## 2017-11-30 RX ADMIN — MORPHINE SULFATE 2 MG: 2 INJECTION, SOLUTION INTRAMUSCULAR; INTRAVENOUS at 16:13

## 2017-11-30 RX ADMIN — NITROGLYCERIN 100 MCG/MIN: 20 INJECTION INTRAVENOUS at 20:17

## 2017-11-30 RX ADMIN — MUPIROCIN 1 APPLICATION: 20 OINTMENT TOPICAL at 18:20

## 2017-11-30 RX ADMIN — RANOLAZINE 500 MG: 500 TABLET, FILM COATED, EXTENDED RELEASE ORAL at 08:58

## 2017-11-30 RX ADMIN — NITROGLYCERIN 100 MCG/MIN: 20 INJECTION INTRAVENOUS at 01:38

## 2017-11-30 RX ADMIN — LABETALOL HCL 100 MG: 300 TABLET, FILM COATED ORAL at 08:58

## 2017-11-30 RX ADMIN — LORAZEPAM 0.5 MG: 0.5 TABLET ORAL at 09:00

## 2017-11-30 RX ADMIN — RANOLAZINE 500 MG: 500 TABLET, FILM COATED, EXTENDED RELEASE ORAL at 20:36

## 2017-11-30 RX ADMIN — FAMOTIDINE 20 MG: 20 TABLET ORAL at 18:19

## 2017-11-30 RX ADMIN — BISACODYL 5 MG: 5 TABLET, COATED ORAL at 09:59

## 2017-12-01 LAB
ALBUMIN SERPL-MCNC: 3.5 G/DL (ref 3.4–4.8)
ALBUMIN/GLOB SERPL: 1.2 G/DL (ref 1.1–1.8)
ALP SERPL-CCNC: 94 U/L (ref 38–126)
ALT SERPL W P-5'-P-CCNC: 24 U/L (ref 21–72)
ANION GAP SERPL CALCULATED.3IONS-SCNC: 10 MMOL/L (ref 5–15)
APTT PPP: 51.9 SECONDS (ref 20–40.3)
APTT PPP: 58.3 SECONDS (ref 20–40.3)
AST SERPL-CCNC: 29 U/L (ref 17–59)
BASOPHILS # BLD AUTO: 0.01 10*3/MM3 (ref 0–0.2)
BASOPHILS NFR BLD AUTO: 0.1 % (ref 0–2)
BILIRUB SERPL-MCNC: 0.7 MG/DL (ref 0.2–1.3)
BILIRUB UR QL STRIP: NEGATIVE
BUN BLD-MCNC: 16 MG/DL (ref 7–21)
BUN/CREAT SERPL: 7.4 (ref 7–25)
CALCIUM SPEC-SCNC: 8.7 MG/DL (ref 8.4–10.2)
CHLORIDE SERPL-SCNC: 101 MMOL/L (ref 95–110)
CLARITY UR: CLEAR
CO2 SERPL-SCNC: 24 MMOL/L (ref 22–31)
COLOR UR: YELLOW
CREAT BLD-MCNC: 2.15 MG/DL (ref 0.7–1.3)
DEPRECATED RDW RBC AUTO: 43 FL (ref 35.1–43.9)
EOSINOPHIL # BLD AUTO: 0.14 10*3/MM3 (ref 0–0.7)
EOSINOPHIL NFR BLD AUTO: 1.8 % (ref 0–7)
ERYTHROCYTE [DISTWIDTH] IN BLOOD BY AUTOMATED COUNT: 14.2 % (ref 11.5–14.5)
FERRITIN SERPL-MCNC: 42.6 NG/ML (ref 17.9–464)
GFR SERPL CREATININE-BSD FRML MDRD: 32 ML/MIN/1.73 (ref 60–130)
GLOBULIN UR ELPH-MCNC: 3 GM/DL (ref 2.3–3.5)
GLUCOSE BLD-MCNC: 113 MG/DL (ref 60–100)
GLUCOSE UR STRIP-MCNC: NEGATIVE MG/DL
HCT VFR BLD AUTO: 30.1 % (ref 39–49)
HGB BLD-MCNC: 10.1 G/DL (ref 13.7–17.3)
HGB UR QL STRIP.AUTO: NEGATIVE
IMM GRANULOCYTES # BLD: 0.04 10*3/MM3 (ref 0–0.02)
IMM GRANULOCYTES NFR BLD: 0.5 % (ref 0–0.5)
IRON 24H UR-MRATE: 31 MCG/DL (ref 49–181)
IRON SATN MFR SERPL: 10 % (ref 20–55)
KETONES UR QL STRIP: NEGATIVE
LEUKOCYTE ESTERASE UR QL STRIP.AUTO: NEGATIVE
LYMPHOCYTES # BLD AUTO: 3.01 10*3/MM3 (ref 0.6–4.2)
LYMPHOCYTES NFR BLD AUTO: 38.5 % (ref 10–50)
MCH RBC QN AUTO: 27.8 PG (ref 26.5–34)
MCHC RBC AUTO-ENTMCNC: 33.6 G/DL (ref 31.5–36.3)
MCV RBC AUTO: 82.9 FL (ref 80–98)
MONOCYTES # BLD AUTO: 0.64 10*3/MM3 (ref 0–0.9)
MONOCYTES NFR BLD AUTO: 8.2 % (ref 0–12)
NEUTROPHILS # BLD AUTO: 3.98 10*3/MM3 (ref 2–8.6)
NEUTROPHILS NFR BLD AUTO: 50.9 % (ref 37–80)
NITRITE UR QL STRIP: NEGATIVE
PH UR STRIP.AUTO: 6 [PH] (ref 5–9)
PLATELET # BLD AUTO: 150 10*3/MM3 (ref 150–450)
PMV BLD AUTO: 9.1 FL (ref 8–12)
POTASSIUM BLD-SCNC: 3.8 MMOL/L (ref 3.5–5.1)
PROT SERPL-MCNC: 6.5 G/DL (ref 6.3–8.6)
PROT UR QL STRIP: NEGATIVE
RBC # BLD AUTO: 3.63 10*6/MM3 (ref 4.37–5.74)
SODIUM BLD-SCNC: 135 MMOL/L (ref 137–145)
SP GR UR STRIP: 1 (ref 1–1.03)
TIBC SERPL-MCNC: 298 MCG/DL (ref 261–462)
UROBILINOGEN UR QL STRIP: NORMAL
WBC NRBC COR # BLD: 7.82 10*3/MM3 (ref 3.2–9.8)

## 2017-12-01 PROCEDURE — 85025 COMPLETE CBC W/AUTO DIFF WBC: CPT | Performed by: NURSE PRACTITIONER

## 2017-12-01 PROCEDURE — 25010000002 HEPARIN (PORCINE) PER 1000 UNITS: Performed by: INTERNAL MEDICINE

## 2017-12-01 PROCEDURE — 80053 COMPREHEN METABOLIC PANEL: CPT | Performed by: NURSE PRACTITIONER

## 2017-12-01 PROCEDURE — 99232 SBSQ HOSP IP/OBS MODERATE 35: CPT | Performed by: INTERNAL MEDICINE

## 2017-12-01 PROCEDURE — 25010000002 ONDANSETRON PER 1 MG: Performed by: NURSE PRACTITIONER

## 2017-12-01 PROCEDURE — 99232 SBSQ HOSP IP/OBS MODERATE 35: CPT | Performed by: NURSE PRACTITIONER

## 2017-12-01 PROCEDURE — 85730 THROMBOPLASTIN TIME PARTIAL: CPT | Performed by: INTERNAL MEDICINE

## 2017-12-01 PROCEDURE — 81003 URINALYSIS AUTO W/O SCOPE: CPT | Performed by: NURSE PRACTITIONER

## 2017-12-01 PROCEDURE — 25010000002 MORPHINE PER 10 MG: Performed by: NURSE PRACTITIONER

## 2017-12-01 RX ORDER — TAMSULOSIN HYDROCHLORIDE 0.4 MG/1
0.4 CAPSULE ORAL NIGHTLY
Status: DISCONTINUED | OUTPATIENT
Start: 2017-12-01 | End: 2017-12-01

## 2017-12-01 RX ADMIN — FAMOTIDINE 20 MG: 20 TABLET ORAL at 09:26

## 2017-12-01 RX ADMIN — LABETALOL HCL 100 MG: 300 TABLET, FILM COATED ORAL at 09:26

## 2017-12-01 RX ADMIN — NITROGLYCERIN 100 MCG/MIN: 20 INJECTION INTRAVENOUS at 13:28

## 2017-12-01 RX ADMIN — MORPHINE SULFATE 2 MG: 2 INJECTION, SOLUTION INTRAMUSCULAR; INTRAVENOUS at 04:11

## 2017-12-01 RX ADMIN — MORPHINE SULFATE 2 MG: 2 INJECTION, SOLUTION INTRAMUSCULAR; INTRAVENOUS at 21:23

## 2017-12-01 RX ADMIN — MUPIROCIN 1 APPLICATION: 20 OINTMENT TOPICAL at 21:25

## 2017-12-01 RX ADMIN — FLUTICASONE PROPIONATE 2 SPRAY: 50 SPRAY, METERED NASAL at 09:27

## 2017-12-01 RX ADMIN — ONDANSETRON 4 MG: 2 INJECTION INTRAMUSCULAR; INTRAVENOUS at 11:40

## 2017-12-01 RX ADMIN — ATORVASTATIN CALCIUM 40 MG: 40 TABLET, FILM COATED ORAL at 09:26

## 2017-12-01 RX ADMIN — BISACODYL 10 MG: 5 TABLET, COATED ORAL at 09:26

## 2017-12-01 RX ADMIN — MORPHINE SULFATE 2 MG: 2 INJECTION, SOLUTION INTRAMUSCULAR; INTRAVENOUS at 11:47

## 2017-12-01 RX ADMIN — Medication 10 ML: at 13:29

## 2017-12-01 RX ADMIN — RANOLAZINE 500 MG: 500 TABLET, FILM COATED, EXTENDED RELEASE ORAL at 09:26

## 2017-12-01 RX ADMIN — HYDRALAZINE HYDROCHLORIDE 50 MG: 50 TABLET ORAL at 13:28

## 2017-12-01 RX ADMIN — MUPIROCIN 1 APPLICATION: 20 OINTMENT TOPICAL at 09:27

## 2017-12-01 RX ADMIN — ALPRAZOLAM 0.25 MG: 0.25 TABLET ORAL at 06:14

## 2017-12-01 RX ADMIN — TERAZOSIN HYDROCHLORIDE 1 MG: 1 CAPSULE ORAL at 21:24

## 2017-12-01 RX ADMIN — HYDRALAZINE HYDROCHLORIDE 50 MG: 50 TABLET ORAL at 21:23

## 2017-12-01 RX ADMIN — NITROGLYCERIN 100 MCG/MIN: 20 INJECTION INTRAVENOUS at 04:12

## 2017-12-01 RX ADMIN — HYDRALAZINE HYDROCHLORIDE 50 MG: 50 TABLET ORAL at 06:13

## 2017-12-01 RX ADMIN — TAMSULOSIN HYDROCHLORIDE 0.4 MG: 0.4 CAPSULE ORAL at 21:24

## 2017-12-01 RX ADMIN — HEPARIN SODIUM 11.61 UNITS/KG/HR: 10000 INJECTION, SOLUTION INTRAVENOUS at 01:46

## 2017-12-01 RX ADMIN — FAMOTIDINE 20 MG: 20 TABLET ORAL at 18:05

## 2017-12-01 RX ADMIN — ALPRAZOLAM 0.25 MG: 0.25 TABLET ORAL at 13:28

## 2017-12-01 RX ADMIN — PANTOPRAZOLE SODIUM 40 MG: 40 TABLET, DELAYED RELEASE ORAL at 06:14

## 2017-12-01 RX ADMIN — MORPHINE SULFATE 2 MG: 2 INJECTION, SOLUTION INTRAMUSCULAR; INTRAVENOUS at 13:47

## 2017-12-01 RX ADMIN — ALPRAZOLAM 0.25 MG: 0.25 TABLET ORAL at 21:23

## 2017-12-01 NOTE — PAYOR COMM NOTE
"Lyle Corona (51 y.o. Male)     Date of Birth Social Security Number Address Home Phone MRN    1965  [address] [facility] [address] [phone] [id number]    Nondenominational Marital Status          Mandaeism        Admission Date Admission Type Admitting Provider Attending Provider Department, Room/Bed    11/27/17 Urgent Taz Guzmán MD Gibson, Bryce, MD [facility] CRITICAL CARE, 02/A    Discharge Date Discharge Disposition Discharge Destination                      Attending Provider: Taz Guzmán MD     Allergies:  Imdur [Isosorbide Dinitrate], Amlodipine, Lisinopril, Metoprolol    Isolation:  None   Infection:  None   Code Status:  FULL    Ht:  69\" (175.3 cm)   Wt:  160 lb 15 oz (73 kg)    Admission Cmt:  None   Principal Problem:  None                Active Insurance as of 11/27/2017     Primary Coverage     Payor Plan Insurance Group Employer/Plan Group    PASSPORT PASSPORT MEDICAID     Payor Plan Address Payor Plan Phone Number Effective From Effective To    [address] [phone] 4/1/2014     [place] [address]       Subscriber Name Subscriber Birth Date Member ID       LYLE CORONA [id number]                 Emergency Contacts      (Rel.) Home Phone Work Phone Mobile Phone    Sue Márquez (Friend) [phone] -- --        SUJEY Woody  [facility]  [phone]   Phone  [phone]   Fax  Continued Stay Review    Vital Signs (last 24 hours)       11/30 0700  -  12/01 0659 12/01 0700  -  12/01 1025   Most Recent    Temp (°F) 97.8 -  98.8      98.2     98.2 (36.8)    Heart Rate 74 -  97    78 -  84     84    Resp 15 -  19       17    BP 95/53 -  155/69    123/71 -  132/75     123/81    SpO2 (%) 91 -  97    93 -  96     94          Hospital Medications (all)       Dose Frequency Start End    acetylcysteine (MUCOMYST) 10 % solution 6 mL 6 mL Once 11/27/2017 11/27/2017    Sig - Route: Take 6 mL by mouth " "1 (One) Time. - Oral    acetylcysteine (MUCOMYST) 10 % solution 6 mL 6 mL Every 12 Hours 11/28/2017 11/29/2017    Sig - Route: Take 6 mL by mouth Every 12 (Twelve) Hours. - Oral    ALPRAZolam (XANAX) tablet 0.25 mg 0.25 mg 4 Times Daily PRN 11/30/2017 12/10/2017    Sig - Route: Take 1 tablet by mouth 4 (Four) Times a Day As Needed for Anxiety. - Oral    atorvastatin (LIPITOR) tablet 40 mg 40 mg Daily 11/29/2017     Sig - Route: Take 1 tablet by mouth Daily. - Oral    bisacodyl (DULCOLAX) EC tablet 10 mg 10 mg Daily 11/30/2017     Sig - Route: Take 2 tablets by mouth Daily. - Oral    ceFAZolin (ANCEF) in SWFI 2 g/20ml IV PUSH syringe 2 g On Call to O.R. 12/4/2017 12/5/2017    Sig - Route: Infuse 20 mL into a venous catheter On Call to the Operating Room. - Intravenous    famotidine (PEPCID) tablet 20 mg 20 mg 2 Times Daily 11/27/2017     Sig - Route: Take 1 tablet by mouth 2 (Two) Times a Day. - Oral    fluticasone (FLONASE) 50 MCG/ACT nasal spray 2 spray 2 spray Daily 11/30/2017     Sig - Route: 2 sprays by Each Nare route Daily. - Each Nare    heparin (porcine) 5000 UNIT/ML injection 2,800 Units 40 Units/kg × 68.9 kg As Needed 11/28/2017     Sig - Route: Infuse 0.56 mL into a venous catheter As Needed (Per Heparin Nomogram - PTT 35-45). - Intravenous    Linked Group 1:  \"And\" Linked Group Details        heparin (porcine) 5000 UNIT/ML injection 5,500 Units 80 Units/kg × 68.9 kg As Needed 11/28/2017     Sig - Route: Infuse 1.1 mL into a venous catheter As Needed (Per Heparin Nomogram - PTT Less Than 35). - Intravenous    Linked Group 1:  \"And\" Linked Group Details        heparin 77918 units/250 mL (100 units/mL) in 0.45 % NaCl infusion 18 Units/kg/hr × 68.9 kg Titrated 11/28/2017     Sig - Route: Infuse 1,240 Units/hr into a venous catheter Dose Adjusted By Provider As Needed. - Intravenous    Linked Group 1:  \"And\" Linked Group Details        hydrALAZINE (APRESOLINE) injection 10 mg 10 mg Every 6 Hours PRN " 11/27/2017     Sig - Route: Infuse 0.5 mL into a venous catheter Every 6 (Six) Hours As Needed for High Blood Pressure. - Intravenous    hydrALAZINE (APRESOLINE) tablet 50 mg 50 mg Every 8 Hours Scheduled 11/28/2017     Sig - Route: Take 1 tablet by mouth Every 8 (Eight) Hours. - Oral    labetalol (NORMODYNE) tablet 100 mg 100 mg Every 12 Hours Scheduled 11/29/2017     Sig - Route: Take 1 tablet by mouth Every 12 (Twelve) Hours. - Oral    LORazepam (ATIVAN) tablet 0.5 mg 0.5 mg Every 8 Hours PRN 11/29/2017 12/9/2017    Sig - Route: Take 1 tablet by mouth Every 8 (Eight) Hours As Needed for Anxiety. - Oral    morphine injection 2 mg 2 mg Every 2 Hours PRN 11/29/2017 12/7/2017    Sig - Route: Infuse 1 mL into a venous catheter Every 2 (Two) Hours As Needed for Severe Pain . - Intravenous    mupirocin (BACTROBAN) 2 % ointment 1 application 1 application Every 12 Hours Scheduled 11/30/2017 12/2/2017    Sig - Route: 1 application into each nostril Every 12 (Twelve) Hours. - Nasal    nitroglycerin 50 mg/250 mL (0.2 mg/mL) infusion 5-200 mcg/min Titrated 11/27/2017     Sig - Route: Infuse 5-200 mcg/min into a venous catheter Dose Adjusted By Provider As Needed. - Intravenous    ondansetron (ZOFRAN) injection 4 mg 4 mg Every 6 Hours PRN 11/27/2017     Sig - Route: Infuse 2 mL into a venous catheter Every 6 (Six) Hours As Needed for Nausea or Vomiting. - Intravenous    pantoprazole (PROTONIX) EC tablet 40 mg 40 mg Every Early Morning 11/28/2017     Sig - Route: Take 1 tablet by mouth Every Morning. - Oral    polyethylene glycol 3350 powder (packet) 17 g Daily 12/2/2017     Sig - Route: Take 17 g by mouth Daily. - Oral    ranolazine (RANEXA) 12 hr tablet 500 mg 500 mg Every 12 Hours Scheduled 11/28/2017     Sig - Route: Take 1 tablet by mouth Every 12 (Twelve) Hours. - Oral    sodium chloride 0.9 % flush 1-10 mL 1-10 mL As Needed 11/27/2017     Sig - Route: Infuse 1-10 mL into a venous catheter As Needed for Line Care. -  "Intravenous    tamsulosin (FLOMAX) 24 hr capsule 0.4 mg 0.4 mg Nightly 11/27/2017     Sig - Route: Take 1 capsule by mouth Every Night. - Oral    terazosin (HYTRIN) capsule 1 mg 1 mg Nightly 11/27/2017     Sig - Route: Take 1 capsule by mouth Every Night. - Oral    acetylcysteine (MUCOMYST) 20 % solution 600 mg (Discontinued) 600 mg Once 11/27/2017 11/27/2017    Sig - Route: Take 3 mL by mouth 1 (One) Time. - Oral    Reason for Discontinue: Formulary change    acetylcysteine (MUCOMYST) 20 % solution 600 mg (Discontinued) 600 mg Every 12 Hours Scheduled 11/28/2017 11/28/2017    Sig - Route: Take 3 mL by mouth Every 12 (Twelve) Hours. - Oral    Reason for Discontinue: Error    atorvastatin (LIPITOR) tablet 10 mg (Discontinued) 10 mg Daily 11/27/2017 11/28/2017    Sig - Route: Take 1 tablet by mouth Daily. - Oral    heparin (porcine) 5000 UNIT/ML injection 2,800 Units (Discontinued) 40 Units/kg × 68.9 kg As Needed 11/27/2017 11/28/2017    Sig - Route: Infuse 0.56 mL into a venous catheter As Needed (Per Heparin Nomogram - PTT 35-45). - Intravenous    Linked Group 2:  \"And\" Linked Group Details        heparin (porcine) 5000 UNIT/ML injection 5,500 Units (Discontinued) 80 Units/kg × 68.9 kg As Needed 11/27/2017 11/28/2017    Sig - Route: Infuse 1.1 mL into a venous catheter As Needed (Per Heparin Nomogram - PTT Less Than 35). - Intravenous    Linked Group 2:  \"And\" Linked Group Details        heparin 1000 units in sodium chloride 0.9% IV infusion (Discontinued)  As Needed 11/28/2017 11/28/2017    Sig: As Needed.    Reason for Discontinue: Patient Discharge    heparin 87973 units/250 mL (100 units/mL) in 0.45 % NaCl infusion (Discontinued) 15 Units/kg/hr × 68.9 kg Titrated 11/27/2017 11/28/2017    Sig - Route: Infuse 1,033 Units/hr into a venous catheter Dose Adjusted By Provider As Needed. - Intravenous    Linked Group 2:  \"And\" Linked Group Details        heparin 5000 units in sodium chloride 0.9% IV infusion " (Discontinued)  As Needed 11/28/2017 11/28/2017    Sig: As Needed.    Reason for Discontinue: Patient Discharge    hydrochlorothiazide (MICROZIDE) capsule 12.5 mg (Discontinued) 12.5 mg Daily 11/27/2017 11/28/2017    Sig - Route: Take 1 capsule by mouth Daily. - Oral    iopamidol (ISOVUE-370) 76 % injection (Discontinued)  As Needed 11/28/2017 11/28/2017    Sig: As Needed.    Reason for Discontinue: Patient Discharge    labetalol (NORMODYNE) tablet 300 mg (Discontinued) 300 mg Every 12 Hours Scheduled 11/27/2017 11/29/2017    Sig - Route: Take 1 tablet by mouth Every 12 (Twelve) Hours. - Oral    lidocaine (XYLOCAINE) 2% injection (Discontinued)  As Needed 11/28/2017 11/28/2017    Sig: As Needed.    Reason for Discontinue: Patient Discharge    losartan (COZAAR) tablet 100 mg (Discontinued) 100 mg Daily 11/27/2017 11/28/2017    Sig - Route: Take 2 tablets by mouth Daily. - Oral    morphine injection 1 mg (Discontinued) 1 mg Every 2 Hours PRN 11/27/2017 11/29/2017    Sig - Route: Infuse 0.5 mL into a venous catheter Every 2 (Two) Hours As Needed for Severe Pain . - Intravenous    sodium bicarbonate 8.4 % 75 mEq in sodium chloride 0.45 % 1,000 mL infusion (less than 75 mEq) (Discontinued) 100 mL/hr Continuous 11/28/2017 11/29/2017    Sig - Route: Infuse 100 mL/hr into a venous catheter Continuous. - Intravenous    sodium chloride 0.9 % flush 1-10 mL (Discontinued) 1-10 mL As Needed 11/28/2017 11/28/2017    Sig - Route: Infuse 1-10 mL into a venous catheter As Needed for Line Care. - Intravenous    sodium chloride 0.9 % infusion (Discontinued) 100 mL/hr Continuous 11/27/2017 11/28/2017    Sig - Route: Infuse 100 mL/hr into a venous catheter Continuous. - Intravenous    sodium chloride 0.9 % infusion (Discontinued) 125 mL/hr Continuous 11/28/2017 11/28/2017    Sig - Route: Infuse 125 mL/hr into a venous catheter Continuous. - Intravenous    sodium chloride 0.9 % infusion (Discontinued) 75 mL/hr Continuous 11/29/2017  11/30/2017    Sig - Route: Infuse 75 mL/hr into a venous catheter Continuous. - Intravenous    tamsulosin (FLOMAX) 24 hr capsule 0.4 mg (Discontinued) 0.4 mg Nightly 12/1/2017 12/1/2017    Sig - Route: Take 1 capsule by mouth Every Night. - Oral          Lab Results (last 24 hours)     Procedure Component Value Units Date/Time    Extra Tubes [[id number]] Collected:  11/30/17 0925    Specimen:  Blood from Blood, Venous Line Updated:  11/30/17 1031    Narrative:       The following orders were created for panel order Extra Tubes.  Procedure                               Abnormality         Status                     ---------                               -----------         ------                     Lavender Top[[id number]]                                     Final result                 Please view results for these tests on the individual orders.    Lavender Top [[id number]] Collected:  11/30/17 0925    Specimen:  Blood Updated:  11/30/17 1031     Extra Tube hold for add-on      Auto resulted       Extra Tubes [[id number]] Collected:  11/30/17 0925    Specimen:  Blood from Blood, Venous Line Updated:  11/30/17 1031    Narrative:       The following orders were created for panel order Extra Tubes.  Procedure                               Abnormality         Status                     ---------                               -----------         ------                     Green Top (Gel)[[id number]]                                  Final result                 Please view results for these tests on the individual orders.    Green Top (Gel) [[id number]] Collected:  11/30/17 0925    Specimen:  Blood Updated:  11/30/17 1031     Extra Tube Hold for add-ons.      Auto resulted.       aPTT [[id number]]  (Abnormal) Collected:  11/30/17 1926    Specimen:  Blood Updated:  11/30/17 1956     PTT 59.0 (H) seconds     Narrative:       The recommended Heparin therapeutic range is 68-97 seconds.    CBC & Differential [[id number]] Collected:   12/01/17 0421    Specimen:  Blood Updated:  12/01/17 0431    Narrative:       The following orders were created for panel order CBC & Differential.  Procedure                               Abnormality         Status                     ---------                               -----------         ------                     CBC Auto Differential[[id number]]        Abnormal            Final result                 Please view results for these tests on the individual orders.    CBC Auto Differential [[id number]]  (Abnormal) Collected:  12/01/17 0421    Specimen:  Blood Updated:  12/01/17 0431     WBC 7.82 10*3/mm3      RBC 3.63 (L) 10*6/mm3      Hemoglobin 10.1 (L) g/dL      Hematocrit 30.1 (L) %      MCV 82.9 fL      MCH 27.8 pg      MCHC 33.6 g/dL      RDW 14.2 %      RDW-SD 43.0 fl      MPV 9.1 fL      Platelets 150 10*3/mm3      Neutrophil % 50.9 %      Lymphocyte % 38.5 %      Monocyte % 8.2 %      Eosinophil % 1.8 %      Basophil % 0.1 %      Immature Grans % 0.5 %      Neutrophils, Absolute 3.98 10*3/mm3      Lymphocytes, Absolute 3.01 10*3/mm3      Monocytes, Absolute 0.64 10*3/mm3      Eosinophils, Absolute 0.14 10*3/mm3      Basophils, Absolute 0.01 10*3/mm3      Immature Grans, Absolute 0.04 (H) 10*3/mm3     Comprehensive Metabolic Panel [[id number]]  (Abnormal) Collected:  12/01/17 0421    Specimen:  Blood Updated:  12/01/17 0442     Glucose 113 (H) mg/dL      BUN 16 mg/dL      Creatinine 2.15 (H) mg/dL      Sodium 135 (L) mmol/L      Potassium 3.8 mmol/L      Chloride 101 mmol/L      CO2 24.0 mmol/L      Calcium 8.7 mg/dL      Total Protein 6.5 g/dL      Albumin 3.50 g/dL      ALT (SGPT) 24 U/L      AST (SGOT) 29 U/L      Alkaline Phosphatase 94 U/L      Total Bilirubin 0.7 mg/dL      eGFR Non African Amer 32 (L) mL/min/1.73      Globulin 3.0 gm/dL      A/G Ratio 1.2 g/dL      BUN/Creatinine Ratio 7.4     Anion Gap 10.0 mmol/L     aPTT [[id number]]  (Abnormal) Collected:  12/01/17 0732    Specimen:  Blood  Updated:  12/01/17 0840     PTT 58.3 (H) seconds     Narrative:       The recommended Heparin therapeutic range is 68-97 seconds.    Urinalysis With / Culture If Indicated - Urine, Clean Catch [[id number]]  (Normal) Collected:  12/01/17 0956    Specimen:  Urine from Urine, Clean Catch Updated:  12/01/17 1017     Color, UA Yellow     Appearance, UA Clear     pH, UA 6.0     Specific Gravity, UA 1.003      Result obtained by Refractometer        Glucose, UA Negative     Ketones, UA Negative     Bilirubin, UA Negative     Blood, UA Negative     Protein, UA Negative     Leuk Esterase, UA Negative     Nitrite, UA Negative     Urobilinogen, UA 1.0 E.U./dL    Narrative:       Urine microscopic not indicated.        Imaging Results (last 24 hours)     ** No results found for the last 24 hours. **        Operative/Procedure Notes (last 24 hours) (Notes from 11/30/2017 10:25 AM through 12/1/2017 10:25 AM)     No notes of this type exist for this encounter.           Physician Progress Notes (last 24 hours) (Notes from 11/30/2017 10:25 AM through 12/1/2017 10:25 AM)      Seymour Ricci MD at 11/30/2017 12:25 PM  Version 1 of 1                [facility]  INPATIENT PROGRESS NOTE     LOS: 3 days   Patient Care Team:  AHSAN Mayer as PCP - General    Chief Complaint:  <principal problem not specified>      Subjective     Interval History:   Patient is seen for follow-up today. He continues to have periodic chest pressure and remains on heparin and nitroglycerin infusion.  He complains of the bilateral nasal congestion with postnasal drip but is doing well otherwise.    Patient Complaints: As above    History taken from: Patient    Review of Systems:    Review of Systems   Constitutional: Negative for activity change, appetite change, chills, diaphoresis, fatigue and fever.   HENT: Positive for postnasal drip. Negative for trouble swallowing and voice change.     Eyes: Negative for photophobia and visual disturbance.   Respiratory: Negative for cough, choking, chest tightness, shortness of breath, wheezing and stridor.    Cardiovascular: Positive for chest pain. Negative for palpitations and leg swelling.   Gastrointestinal: Negative for abdominal distention, abdominal pain, blood in stool, constipation, diarrhea, nausea and vomiting.   Endocrine: Negative for cold intolerance, heat intolerance, polydipsia, polyphagia and polyuria.   Genitourinary: Negative for decreased urine volume, difficulty urinating, dysuria, enuresis, flank pain, frequency, hematuria and urgency.   Musculoskeletal: Negative for arthralgias, gait problem, myalgias, neck pain and neck stiffness.   Skin: Negative for pallor, rash and wound.   Neurological: Negative for dizziness, tremors, seizures, syncope, facial asymmetry, speech difficulty, weakness, light-headedness, numbness and headaches.   Hematological: Does not bruise/bleed easily.   Psychiatric/Behavioral: Negative for agitation, behavioral problems and confusion.         Objective     Vital Signs  Temp:  [97.6 °F (36.4 °C)-98.6 °F (37 °C)] 97.8 °F (36.6 °C)  Heart Rate:  [67-97] 80  Resp:  [15-20] 19  BP: (106-155)/(59-90) 114/64    Physical Exam:   Physical Exam   Constitutional: He is oriented to person, place, and time. He appears well-developed and well-nourished. No distress.   HENT:   Head: Normocephalic and atraumatic.   No sinus tenderness.   Eyes: EOM are normal. Pupils are equal, round, and reactive to light. Right eye exhibits no discharge. Left eye exhibits no discharge. No scleral icterus.   Neck: Normal range of motion. Neck supple. No JVD present. No thyromegaly present.   Cardiovascular: Normal rate, regular rhythm and normal heart sounds.  Exam reveals no gallop and no friction rub.    No murmur heard.  Pulmonary/Chest: Effort normal and breath sounds normal. He has no wheezes. He has no rales. He exhibits no tenderness.    Abdominal: Soft. Bowel sounds are normal. He exhibits no distension and no mass. There is no tenderness. There is no rebound and no guarding.   Musculoskeletal: He exhibits no edema, tenderness or deformity.   Neurological: He is alert and oriented to person, place, and time. No cranial nerve deficit. He exhibits normal muscle tone. Coordination normal.   Skin: Skin is warm and dry. No rash noted. He is not diaphoretic. No erythema. No pallor.   Psychiatric: He has a normal mood and affect. His behavior is normal. Judgment and thought content normal.   Nursing note and vitals reviewed.         Results Review:         Results from last 7 days  Lab Units 11/30/17  0140 11/29/17  0143 11/28/17  1031 11/27/17  1710   SODIUM mmol/L 139 138 137 137   POTASSIUM mmol/L 3.6 3.6 4.0 3.8   CHLORIDE mmol/L 101 101 105 101   CO2 mmol/L 24.0 25.0 22.0 23.0   BUN mg/dL 19 24* 23* 26*   CREATININE mg/dL 2.09* 2.10* 1.83* 1.98*   GLUCOSE mg/dL 119* 103* 90 96   CALCIUM mg/dL 8.5 8.4 8.6 9.5   BILIRUBIN mg/dL 0.4 0.5 0.6 0.6   ALK PHOS U/L 88 90 95 99   ALT (SGPT) U/L 26 21 28 20*   AST (SGOT) U/L 20 22 31 21               Results from last 7 days  Lab Units 11/30/17  0141 11/30/17  0140 11/29/17  0143 11/28/17  1031 11/27/17  1828   WBC 10*3/mm3  --  8.17 6.78 8.85 9.29   HEMOGLOBIN g/dL  --  10.2* 10.7* 11.3* 12.2*   HEMATOCRIT %  --  30.1* 31.4* 33.1* 36.1*   PLATELETS 10*3/mm3 143* 143* 139* 153 162       Lab Results   Component Value Date    CKTOTAL 25 (L) 05/12/2017    CKMB 0.55 05/12/2017    TROPONINI 0.522 (C) 11/28/2017       CO2   Date Value Ref Range Status   11/30/2017 24.0 22.0 - 31.0 mmol/L Final         Results from last 7 days  Lab Units 11/28/17  1743 11/27/17  2040   INR  0.99 1.03        Imaging Results (last 7 days)     ** No results found for the last 168 hours. **                                    Medication Review:   Current Facility-Administered Medications   Medication Dose Route Frequency Provider Last  Rate Last Dose   • ALPRAZolam (XANAX) tablet 0.25 mg  0.25 mg Oral 4x Daily PRN Seymour Ricci MD   0.25 mg at 11/30/17 1131   • atorvastatin (LIPITOR) tablet 40 mg  40 mg Oral Daily Carlos Charles MD   40 mg at 11/30/17 0858   • bisacodyl (DULCOLAX) EC tablet 10 mg  10 mg Oral Daily Seymour Ricci MD   5 mg at 11/30/17 0959   • [START ON 12/4/2017] ceFAZolin (ANCEF) in SWFI 2 g/20ml IV PUSH syringe  2 g Intravenous On Call to OR AHSAN Rand       • famotidine (PEPCID) tablet 20 mg  20 mg Oral BID Nataliya G Levill, APRN   20 mg at 11/30/17 0858   • fluticasone (FLONASE) 50 MCG/ACT nasal spray 2 spray  2 spray Each Nare Daily Seymour Ricci MD   2 spray at 11/30/17 1000   • heparin 84764 units/250 mL (100 units/mL) in 0.45 % NaCl infusion  18 Units/kg/hr Intravenous Titrated Carlos Charles MD 8 mL/hr at 11/30/17 0418 11.611 Units/kg/hr at 11/30/17 0418    And   • heparin (porcine) 5000 UNIT/ML injection 5,500 Units  80 Units/kg Intravenous PRN Carlos Charles MD        And   • heparin (porcine) 5000 UNIT/ML injection 2,800 Units  40 Units/kg Intravenous PRN Carlos Charles MD       • hydrALAZINE (APRESOLINE) injection 10 mg  10 mg Intravenous Q6H PRN Nataliya G Levill, APRN   10 mg at 11/27/17 1718   • hydrALAZINE (APRESOLINE) tablet 50 mg  50 mg Oral Q8H Adriane Butler MD   50 mg at 11/30/17 0612   • labetalol (NORMODYNE) tablet 100 mg  100 mg Oral Q12H Adriane Butler MD   100 mg at 11/30/17 0858   • LORazepam (ATIVAN) tablet 0.5 mg  0.5 mg Oral Q8H PRN AHSAN Rand   0.5 mg at 11/30/17 0900   • morphine injection 2 mg  2 mg Intravenous Q2H PRN AHSAN Rand   2 mg at 11/30/17 1023   • mupirocin (BACTROBAN) 2 % ointment 1 application  1 application Nasal Q12H AHSAN Radn       • nitroglycerin 50 mg/250 mL (0.2 mg/mL) infusion  5-200 mcg/min Intravenous Titrated Nataliyagenia Bee APRN 30 mL/hr at 11/30/17 0959 100 mcg/min at 11/30/17  0959   • ondansetron (ZOFRAN) injection 4 mg  4 mg Intravenous Q6H PRN Nataliya G Levill, APRN       • pantoprazole (PROTONIX) EC tablet 40 mg  40 mg Oral Q AM Nataliya G Levill, APRN   40 mg at 11/30/17 0613   • ranolazine (RANEXA) 12 hr tablet 500 mg  500 mg Oral Q12H Lily Amanda Yepez, APRN   500 mg at 11/30/17 0858   • sodium chloride 0.9 % flush 1-10 mL  1-10 mL Intravenous PRN Nataliya G Levill, APRN       • tamsulosin (FLOMAX) 24 hr capsule 0.4 mg  0.4 mg Oral Nightly Nataliya G Levill, APRN   0.4 mg at 11/29/17 2059   • terazosin (HYTRIN) capsule 1 mg  1 mg Oral Nightly Nataliya G Levill, APRN   1 mg at 11/29/17 2058         Assessment/Plan   1. NSTEMI S/p cardiac cath. with multi-vessel coronary artery disease: Continue current optimal medical management while awaiting cardiac revascularization.  Echocardiogram Shows mild concentric left ventricular wall thickness, normal left ventricular systolic function with ejection fraction of 65%.  There is hypokinesis of the inferior wall.  Cardiologist and CT surgery are actively following.  2.  Acute on chronickidney injury: Creatinine has plateaued. IV hydration has been stopped by the nephrologist.  Continue to monitor renal function and avoiding all nephrotoxic agents. Nephrologist is following.  3.  Hypertension: Stable.  4.  Possible allergic rhinitis with postnasal drip: Prescribe Flonase.  5.  Continue GI and DVT prophylaxis.        Seymour Ricci MD  11/30/17      EMR Dragon/Transcription disclaimer:   Much of this encounter note is an electronic transcription/translation of spoken language to printed text. The electronic translation of spoken language may permit erroneous, or at times, nonsensical words or phrases to be inadvertently transcribed; Although I have reviewed the note for such errors, some may still exist.                 Electronically signed by Seymour Ricci MD at 11/30/2017 12:34 PM      Carlos Charles MD at 11/30/2017 12:44 PM   Version 1 of 1         CARDIOLOGY PROGRESS NOTE      LOS: 3 days   Patient Care Team:  AHSAN Mayer as PCP - General    Problems/Diagnoses:    NSTEMI (non-ST elevated myocardial infarction)    Unstable angina  The patient presents with active problems as noted above undergoing cardiac catheterization which revealed evidence of multivessel coronary artery disease thus recommended for surgical revascularization.  Cardiothoracic services have been consulted and plan for surgical revascularization on Monday, December 4th.     JOSE (mild)/CKD Stage III: Nephrology services, Dr. Butler, is following.       Subjective     Interval History:     She continues to have 4-5 out of 10 chest discomfort.  On IV nitroglycerin and maximal medical management.  Arrangements for CT surgery in progress.  Platelet function testing has been performed.  CT surgery aware.    Review of Systems:     Constitutional:  Denies recent weight loss, weight gain, fever or chills, no change in exercise tolerance     HENT:  Denies any hearing loss, epistaxis, hoarseness, or difficulty speaking.     Eyes: Wears eyeglasses or contact lenses     Respiratory:  Denies dyspnea ,no cough, wheezing, or hemoptysis.     Cardiovascular: Has intermittent chest pain.  No palpitation, dizziness or syncope    Gastrointestinal:  Denies change in bowel habits, dyspepsia, ulcer disease, hematochezia, or melena.       Objective     Vital Signs  Temp:  [97.6 °F (36.4 °C)-98.6 °F (37 °C)] 97.8 °F (36.6 °C)  Heart Rate:  [67-97] 80  Resp:  [15-20] 19  BP: (106-155)/(59-90) 114/64    Physical Exam:    Constitutional: Cooperative, alert and oriented    HENT:   Head: Normocephalic, normal hair patterns, no masses or tenderness.  Ears, Nose, and Throat: No gross abnormalities. No pallor or cyanosis.   Eyes: EOMS intact, PERRL, conjunctivae and lids unremarkable. Fundoscopic exam and visual fields not performed.   Neck: No palpable masses or adenopathy, no thyromegaly, no  JVD, carotid pulses are full and equal bilaterally and without  Bruits.     Cardiovascular: Regular rhythm, S1 and S2 normal, no S3 or S4.  No murmurs, gallops, or rubs detected.     Pulmonary/Chest: Chest: normal symmetry, no tenderness to palpation, normal respiratory excursion,  no use of accessory muscles.            Pulmonary: Normal breath sounds. No rales or ronchi.    Abdominal: Abdomen soft, bowel sounds normoactive, no masses, no hepatosplenomegaly, non-tender, no bruits.     Musculoskeletal: No deformities, clubbing, cyanosis, erythema, or edema observed.     Neurological: No gross motor or sensory deficits noted, affect appropriate, oriented to time, person, place.     Skin: Warm and dry to the touch, no apparent skin lesions or masses noted.     Psychiatric: He has a normal mood and affect. His behavior is normal. Judgment and thought content normal.     Results Review:    Lab Results (last 24 hours)     Procedure Component Value Units Date/Time    aPTT [[id number]]  (Abnormal) Collected:  11/29/17 1626    Specimen:  Blood Updated:  11/29/17 1651     PTT 59.0 (H) seconds     Narrative:       The recommended Heparin therapeutic range is 68-97 seconds.    CBC & Differential [[id number]] Collected:  11/30/17 0140    Specimen:  Blood Updated:  11/30/17 0151    Narrative:       The following orders were created for panel order CBC & Differential.  Procedure                               Abnormality         Status                     ---------                               -----------         ------                     CBC Auto Differential[[id number]]        Abnormal            Final result                 Please view results for these tests on the individual orders.    CBC Auto Differential [[id number]]  (Abnormal) Collected:  11/30/17 0140    Specimen:  Blood Updated:  11/30/17 0151     WBC 8.17 10*3/mm3      RBC 3.66 (L) 10*6/mm3      Hemoglobin 10.2 (L) g/dL      Hematocrit 30.1 (L) %      MCV 82.2 fL       MCH 27.9 pg      MCHC 33.9 g/dL      RDW 13.9 %      RDW-SD 42.3 fl      MPV 9.1 fL      Platelets 143 (L) 10*3/mm3      Neutrophil % 41.7 %      Lymphocyte % 49.7 %      Monocyte % 6.5 %      Eosinophil % 1.8 %      Basophil % 0.1 %      Immature Grans % 0.2 %      Neutrophils, Absolute 3.40 10*3/mm3      Lymphocytes, Absolute 4.06 10*3/mm3      Monocytes, Absolute 0.53 10*3/mm3      Eosinophils, Absolute 0.15 10*3/mm3      Basophils, Absolute 0.01 10*3/mm3      Immature Grans, Absolute 0.02 10*3/mm3     Comprehensive Metabolic Panel [[id number]]  (Abnormal) Collected:  11/30/17 0140    Specimen:  Blood Updated:  11/30/17 0205     Glucose 119 (H) mg/dL      BUN 19 mg/dL      Creatinine 2.09 (H) mg/dL      Sodium 139 mmol/L      Potassium 3.6 mmol/L      Chloride 101 mmol/L      CO2 24.0 mmol/L      Calcium 8.5 mg/dL      Total Protein 6.5 g/dL      Albumin 3.50 g/dL      ALT (SGPT) 26 U/L      AST (SGOT) 20 U/L      Alkaline Phosphatase 88 U/L      Total Bilirubin 0.4 mg/dL      eGFR Non African Amer 33 (L) mL/min/1.73      Globulin 3.0 gm/dL      A/G Ratio 1.2 g/dL      BUN/Creatinine Ratio 9.1     Anion Gap 14.0 mmol/L     Fibrinogen [[id number]]  (Normal) Collected:  11/30/17 0140    Specimen:  Blood Updated:  11/30/17 0221     Fibrinogen 422 mg/dL     aPTT [[id number]]  (Abnormal) Collected:  11/30/17 0140    Specimen:  Blood Updated:  11/30/17 0221     .5 (H) seconds     Narrative:       The recommended Heparin therapeutic range is 68-97 seconds.    Extra Tubes [[id number]] Collected:  11/30/17 0140    Specimen:  Blood from Blood, Venous Line Updated:  11/30/17 0246    Narrative:       The following orders were created for panel order Extra Tubes.  Procedure                               Abnormality         Status                     ---------                               -----------         ------                     Gold Top - RUST[[id number]]                                   Final result                  Please view results for these tests on the individual orders.    Gold Top - SST [[id number]] Collected:  11/30/17 0140    Specimen:  Blood Updated:  11/30/17 0246     Extra Tube Hold for add-ons.      Auto resulted.       TSH [[id number]]  (Normal) Collected:  11/30/17 0140    Specimen:  Blood Updated:  11/30/17 0302     TSH 2.110 mIU/mL     Platelet Function ADP [[id number]]  (Abnormal) Collected:  11/30/17 0141    Specimen:  Blood Updated:  11/30/17 0307     ADP Aggregation, % Platelet 43 (L) %      Platelets 143 (L) 10*3/mm3     Platelet Function Collagen [[id number]]  (Abnormal) Collected:  11/30/17 0141    Specimen:  Blood Updated:  11/30/17 0307     Collagen Aggregation 44 (L) %     Hemoglobin A1c [[id number]]  (Normal) Collected:  11/30/17 0140    Specimen:  Blood Updated:  11/30/17 0625     Hemoglobin A1C 5.5 %     aPTT [[id number]]  (Abnormal) Collected:  11/30/17 0925    Specimen:  Blood Updated:  11/30/17 0957     PTT 56.0 (H) seconds     Narrative:       The recommended Heparin therapeutic range is 68-97 seconds.    Extra Tubes [[id number]] Collected:  11/30/17 0925    Specimen:  Blood from Blood, Venous Line Updated:  11/30/17 1031    Narrative:       The following orders were created for panel order Extra Tubes.  Procedure                               Abnormality         Status                     ---------                               -----------         ------                     Lavender Top[[id number]]                                     Final result                 Please view results for these tests on the individual orders.    Lavender Top [[id number]] Collected:  11/30/17 0925    Specimen:  Blood Updated:  11/30/17 1031     Extra Tube hold for add-on      Auto resulted       Extra Tubes [[id number]] Collected:  11/30/17 0925    Specimen:  Blood from Blood, Venous Line Updated:  11/30/17 1031    Narrative:       The following orders were created for panel order Extra Tubes.  Procedure                                Abnormality         Status                     ---------                               -----------         ------                     Green Top (Gel)[[id number]]                                  Final result                 Please view results for these tests on the individual orders.    Green Top (Gel) [[id number]] Collected:  11/30/17 0925    Specimen:  Blood Updated:  11/30/17 1031     Extra Tube Hold for add-ons.      Auto resulted.           Imaging Results (last 24 hours)     Procedure Component Value Units Date/Time    US Vein Mapping Bilateral [[id number]] Collected:  11/29/17 1544     Updated:  11/29/17 1906    Narrative:       .  EXAMINATION:  Ultrasound, venous, lower extremity    CLINICAL INDICATION / HISTORY:  Preoperative evaluation   TECHNIQUE: Bilateral greater saphenous veins                          grayscale, color flow, spectral and  Doppler     FINDINGS:    Imaged vessels:                                           Right (mm)   Left (mm)  GSV - thigh, prox               5.5                  4.9  GSV - thigh, mid                4.7                  3.1  GSV - thigh, distal             4.6                  3.5  GSV - knee                        5.9                  3.6  GSV - calf, prox                 5.0                  2.9  GSV - calf, mid                  4.7                  4.7  GSV - calf, distal               5.4                  4.2      .    Impression:       CONCLUSION:  1. Venous mapping with sizes as above.    Electronically signed by:  TK Santiago MD  11/29/2017 7:05  PM CST Workstation: [id number]    US Carotid Bilateral [[id number]] Collected:  11/29/17 1544     Updated:  11/29/17 1910    Narrative:       PROCEDURE: US CAROTID BILATERAL    INDICATION: Left carotid bruit. Preop evaluation for CABG.    COMPARISON: None.    RIGHT CAROTID BIFURCATION FINDINGS.    Peak systolic velocities in centimeters per second. CCA 58, ICA  73, . Right ICA/CCA peak  systolic velocity ratio 1.3.    Real-time imaging demonstrates small amount of plaque bulb and  the origin of the right ICA with well less than 50% stenosis with  peak systolic velocity at the level of the plaque of 44 cm/s.    LEFT CAROTID BIFURCATION FINDINGS.    Peak systolic velocities in centimeters per second. Left CCA 85,  ICA 95, ECA 90. Left ICA/CCA peak systolic velocity ratio 1.1.    Real-time imaging demonstrates small amount of plaque in the left  carotid bulb and origin proximal left ICA with less than 50%  stenosis of the left ICA. Peak systolic velocity at the level of  plaque in the proximal left ICA 69 cm/s.    VERTEBRAL ARTERIES. There is antegrade flow through both  vertebral arteries.      Impression:       Small amount of plaque in the origin proximal right  ICA with less than 50% stenosis.    Small amount of plaque in the left carotid bulb and proximal left  ICA with less than 50% stenosis.    Antegrade flow through both vertebral arteries.    [id number]      Electronically signed by:  Luis Pruitt MD  11/29/2017 7:09  PM CST Workstation: [id number]    XR Chest 1 View [[id number]] Collected:  11/30/17 0537     Updated:  11/30/17 0619    Narrative:       Exam: AP portable chest    INDICATION: Shortness of breath    COMPARISON: 5/11/2017    FINDINGS: AP portable chest. Status post anterior fusion  discectomy in the lower cervical spine. The bony structures are  intact. The cardiomediastinal silhouette is unremarkable lungs  are clear. No pneumothorax or pleural effusion.      Impression:       No acute cardiopulmonary abnormality.    Electronically signed by:  Adam Lopez MD  11/30/2017 6:17 AM  CST Workstation: [id number]          Medication Review:   Current Facility-Administered Medications   Medication Dose Route Frequency Provider Last Rate Last Dose   • ALPRAZolam (XANAX) tablet 0.25 mg  0.25 mg Oral 4x Daily PRN Seymour Ricci MD   0.25 mg at 11/30/17 1131   • atorvastatin  (LIPITOR) tablet 40 mg  40 mg Oral Daily Carlos Charles MD   40 mg at 11/30/17 0858   • bisacodyl (DULCOLAX) EC tablet 10 mg  10 mg Oral Daily Seymour Ricci MD   5 mg at 11/30/17 0959   • [START ON 12/4/2017] ceFAZolin (ANCEF) in SWFI 2 g/20ml IV PUSH syringe  2 g Intravenous On Call to OR Elizabeth Benson, APRN       • famotidine (PEPCID) tablet 20 mg  20 mg Oral BID Nataliya G Levill, APRN   20 mg at 11/30/17 0858   • fluticasone (FLONASE) 50 MCG/ACT nasal spray 2 spray  2 spray Each Nare Daily Seymour Ricci MD   2 spray at 11/30/17 1000   • heparin 44267 units/250 mL (100 units/mL) in 0.45 % NaCl infusion  18 Units/kg/hr Intravenous Titrated Carlos Charles MD 8 mL/hr at 11/30/17 0418 11.611 Units/kg/hr at 11/30/17 0418    And   • heparin (porcine) 5000 UNIT/ML injection 5,500 Units  80 Units/kg Intravenous PRN Carlos Charles MD        And   • heparin (porcine) 5000 UNIT/ML injection 2,800 Units  40 Units/kg Intravenous PRN Carlos Charles MD       • hydrALAZINE (APRESOLINE) injection 10 mg  10 mg Intravenous Q6H PRN Nataliya G Levill, APRN   10 mg at 11/27/17 1718   • hydrALAZINE (APRESOLINE) tablet 50 mg  50 mg Oral Q8H Adriane Butler MD   50 mg at 11/30/17 0612   • labetalol (NORMODYNE) tablet 100 mg  100 mg Oral Q12H Adriane Butler MD   100 mg at 11/30/17 0858   • LORazepam (ATIVAN) tablet 0.5 mg  0.5 mg Oral Q8H PRN Elizabeth Benson, APRN   0.5 mg at 11/30/17 0900   • morphine injection 2 mg  2 mg Intravenous Q2H PRN Elizabeth Benson, APRN   2 mg at 11/30/17 1023   • mupirocin (BACTROBAN) 2 % ointment 1 application  1 application Nasal Q12H AHSAN Rand       • nitroglycerin 50 mg/250 mL (0.2 mg/mL) infusion  5-200 mcg/min Intravenous Titrated Nataliya G Levill, APRN 30 mL/hr at 11/30/17 0959 100 mcg/min at 11/30/17 0959   • ondansetron (ZOFRAN) injection 4 mg  4 mg Intravenous Q6H PRN Nataliya G Levill, APRN       • pantoprazole (PROTONIX) EC tablet 40 mg  40 mg  Oral Q AM Nataliya G Levill, APRN   40 mg at 11/30/17 0613   • ranolazine (RANEXA) 12 hr tablet 500 mg  500 mg Oral Q12H Lily Yepez, APRN   500 mg at 11/30/17 0858   • sodium chloride 0.9 % flush 1-10 mL  1-10 mL Intravenous PRN Nataliya G Levill, APRN       • tamsulosin (FLOMAX) 24 hr capsule 0.4 mg  0.4 mg Oral Nightly Nataliya G Levill, APRN   0.4 mg at 11/29/17 2059   • terazosin (HYTRIN) capsule 1 mg  1 mg Oral Nightly Nataliya G Levill, APRN   1 mg at 11/29/17 2058         Assessment/Plan     Continue present management with IV heparin, IV nitroglycerin, labetalol, statins, aspirin, Ranexa.  Patient awaiting CT surgery.    Active Problems:    Unstable angina    NSTEMI (non-ST elevated myocardial infarction)        Carlos Charles MD  11/30/17  12:44 PM             Electronically signed by Carlos Charles MD at 11/30/2017 12:48 PM      AHSAN Rand at 12/1/2017  8:50 AM  Version 1 of 1           Cardiothoracic - Vascular Surgery Daily Note        LOS: 4 days       Chief Complaint: Chest pain pressure continuous increases with activity, difficult voiding at intervals and mild burning      Subjective       VAS 4    ROS:    Review of Systems   Constitution: Positive for malaise/fatigue. Negative for chills, decreased appetite, fever and weakness.   HENT: Negative for hoarse voice, nosebleeds and stridor.    Eyes: Negative for visual disturbance.   Cardiovascular: Positive for chest pain and palpitations. Negative for claudication, cyanosis, irregular heartbeat and leg swelling.   Respiratory: Positive for shortness of breath. Negative for cough and hemoptysis.    Hematologic/Lymphatic: Does not bruise/bleed easily.   Skin: Negative for color change, poor wound healing and rash.   Musculoskeletal: Negative for back pain, falls and muscle weakness.   Gastrointestinal: Negative for abdominal pain, hematemesis, melena and nausea.   Genitourinary: Positive for dysuria and incomplete emptying.  Negative for hematuria.   Neurological: Negative for brief paralysis, dizziness, light-headedness, numbness and paresthesias.   Psychiatric/Behavioral: Negative for altered mental status.   Allergic/Immunologic: Negative for hives.         Objective     Vital Signs  Temp:  [97.9 °F (36.6 °C)-98.8 °F (37.1 °C)] 98 °F (36.7 °C)  Heart Rate:  [74-93] 82  Resp:  [15-19] 17  BP: ()/(53-82) 132/75  Body mass index is 23.77 kg/(m^2).    Intake/Output Summary (Last 24 hours) at 12/01/17 0850  Last data filed at 12/01/17 0603   Gross per 24 hour   Intake             1175 ml   Output             3350 ml   Net            -2175 ml          Wt Readings from Last 3 Encounters:   11/28/17 160 lb 15 oz (73 kg)   10/31/17 157 lb 8 oz (71.4 kg)   06/12/17 153 lb (69.4 kg)         Physical Exam   Physical Exam   Constitutional: He is oriented to person, place, and time. He appears well-nourished.   HENT:   Head: Normocephalic.   Mouth/Throat: Oropharynx is clear and moist.   Eyes: Conjunctivae are normal. Pupils are equal, round, and reactive to light.   Neck: Neck supple. No JVD present.   Cardiovascular: Normal rate, regular rhythm, normal heart sounds and intact distal pulses.    Pulmonary/Chest: Effort normal and breath sounds normal. No stridor.   Abdominal: Soft. Bowel sounds are normal.   Musculoskeletal: He exhibits no edema.   Neurological: He is alert and oriented to person, place, and time.   Skin: Skin is warm and dry. No erythema.   Psychiatric: His behavior is normal.   Calm   Nursing note and vitals reviewed.        Results Review:    Lab Results   Component Value Date    WBC 7.82 12/01/2017    HGB 10.1 (L) 12/01/2017    HCT 30.1 (L) 12/01/2017    MCV 82.9 12/01/2017     12/01/2017     Lab Results   Component Value Date    GLUCOSE 113 (H) 12/01/2017    BUN 16 12/01/2017    CREATININE 2.15 (H) 12/01/2017    EGFRIFNONA 32 (L) 12/01/2017    BCR 7.4 12/01/2017    K 3.8 12/01/2017    CO2 24.0 12/01/2017     CALCIUM 8.7 12/01/2017    ALBUMIN 3.50 12/01/2017    LABIL2 1.2 12/01/2017    AST 29 12/01/2017    ALT 24 12/01/2017                     PT/INR:    Protime   Date Value Ref Range Status   11/28/2017 13.0 11.1 - 15.3 Seconds Final   /  INR   Date Value Ref Range Status   11/28/2017 0.99 0.80 - 1.20 Final               atorvastatin 40 mg Oral Daily   bisacodyl 10 mg Oral Daily   [START ON 12/4/2017] ceFAZolin 2 g Intravenous On Call to OR   famotidine 20 mg Oral BID   fluticasone 2 spray Each Nare Daily   hydrALAZINE 50 mg Oral Q8H   labetalol 100 mg Oral Q12H   mupirocin 1 application Nasal Q12H   pantoprazole 40 mg Oral Q AM   [START ON 12/2/2017] polyethylene glycol 17 g Oral Daily   ranolazine 500 mg Oral Q12H   tamsulosin 0.4 mg Oral Nightly   terazosin 1 mg Oral Nightly       heparin (porcine) 18 Units/kg/hr Last Rate: 11.611 Units/kg/hr (12/01/17 0146)   nitroglycerin 5-200 mcg/min Last Rate: 100 mcg/min (12/01/17 9567)       Patient Active Problem List   Diagnosis Code   • Essential hypertension I10   • Hypertensive emergency, no CHF I16.1   • Coronary arteriosclerosis I25.10   • Chronic kidney disease, stage 3 N18.3   • LVH (left ventricular hypertrophy) I51.7   • NSTEMI (non-ST elevated myocardial infarction) I21.4   • Unstable angina I20.0         ASSESSMENT/PLAN     3 V CAD NSTEMI:    NTG infusion maximum with morphine to control USA.   Continue heparin infusion until 4 hours prior to surgery  CABG is warranted.   Would prefer to reduce chance of blood product transfusion till 12/04/17 if patient symptoms will allow.  Have notified blood bank of potential need for platelet transfusion with surgery.   Platelets and blood ordered for Monday  Continue beta blocker and statin therapy.  ACE/ARB contraindicated by renal.  Hold ASA,    Patient understands, agrees, and wishes to proceed with plan.  No questions.    Urinary symptoms:  Check urinalysis with culture if indicated.  On flomax.     Disp:  Continue care  in CCU                   Electronically signed by AHSAN Rand at 12/1/2017  8:55 AM      Seymour Ricci MD at 12/1/2017 10:18 AM  Version 1 of 1                [facility] Medicine Services  INPATIENT PROGRESS NOTE     LOS: 4 days   Patient Care Team:  AHSAN Mayer as PCP - General    Chief Complaint:  <principal problem not specified>      Subjective     Interval History:   Patient is seen for follow-up today. He continues to have periodic chest pressure and remains on heparin and nitroglycerin infusion. Bilateral nasal congestion with postnasal drip has resolved with Flonase.  He complains of constipation but is doing well otherwise.    Patient Complaints: As above    History taken from: Patient    Review of Systems:    Review of Systems   Constitutional: Negative for activity change, appetite change, chills, diaphoresis, fatigue and fever.   HENT: Negative for postnasal drip, trouble swallowing and voice change.    Eyes: Negative for photophobia and visual disturbance.   Respiratory: Negative for cough, choking, chest tightness, shortness of breath, wheezing and stridor.    Cardiovascular: Positive for chest pain. Negative for palpitations and leg swelling.   Gastrointestinal: Positive for constipation. Negative for abdominal distention, abdominal pain, blood in stool, diarrhea, nausea and vomiting.   Endocrine: Negative for cold intolerance, heat intolerance, polydipsia, polyphagia and polyuria.   Genitourinary: Negative for decreased urine volume, difficulty urinating, dysuria, enuresis, flank pain, frequency, hematuria and urgency.   Musculoskeletal: Negative for arthralgias, gait problem, myalgias, neck pain and neck stiffness.   Skin: Negative for pallor, rash and wound.   Neurological: Negative for dizziness, tremors, seizures, syncope, facial asymmetry, speech difficulty, weakness, light-headedness, numbness and headaches.   Hematological: Does not  bruise/bleed easily.   Psychiatric/Behavioral: Negative for agitation, behavioral problems and confusion.         Objective     Vital Signs  Temp:  [97.9 °F (36.6 °C)-98.8 °F (37.1 °C)] 98.2 °F (36.8 °C)  Heart Rate:  [74-87] 84  Resp:  [15-19] 17  BP: ()/(53-82) 123/81    Physical Exam:   Physical Exam   Constitutional: He is oriented to person, place, and time. He appears well-developed and well-nourished. No distress.   HENT:   Head: Normocephalic and atraumatic.   Eyes: EOM are normal. Pupils are equal, round, and reactive to light. Right eye exhibits no discharge. Left eye exhibits no discharge. No scleral icterus.   Neck: Normal range of motion. Neck supple. No JVD present. No thyromegaly present.   Cardiovascular: Normal rate, regular rhythm and normal heart sounds.  Exam reveals no gallop and no friction rub.    No murmur heard.  Pulmonary/Chest: Effort normal and breath sounds normal. He has no wheezes. He has no rales. He exhibits no tenderness.   Abdominal: Soft. Bowel sounds are normal. He exhibits no distension and no mass. There is no tenderness. There is no rebound and no guarding.   Musculoskeletal: He exhibits no edema, tenderness or deformity.   Neurological: He is alert and oriented to person, place, and time. No cranial nerve deficit. He exhibits normal muscle tone. Coordination normal.   Skin: Skin is warm and dry. No rash noted. He is not diaphoretic. No erythema. No pallor.   Psychiatric: He has a normal mood and affect. His behavior is normal. Judgment and thought content normal.   Nursing note and vitals reviewed.         Results Review:         Results from last 7 days  Lab Units 12/01/17  0421 11/30/17  0140 11/29/17  0143 11/28/17  1031 11/27/17  1710   SODIUM mmol/L 135* 139 138 137 137   POTASSIUM mmol/L 3.8 3.6 3.6 4.0 3.8   CHLORIDE mmol/L 101 101 101 105 101   CO2 mmol/L 24.0 24.0 25.0 22.0 23.0   BUN mg/dL 16 19 24* 23* 26*   CREATININE mg/dL 2.15* 2.09* 2.10* 1.83* 1.98*    GLUCOSE mg/dL 113* 119* 103* 90 96   CALCIUM mg/dL 8.7 8.5 8.4 8.6 9.5   BILIRUBIN mg/dL 0.7 0.4 0.5 0.6 0.6   ALK PHOS U/L 94 88 90 95 99   ALT (SGPT) U/L 24 26 21 28 20*   AST (SGOT) U/L 29 20 22 31 21               Results from last 7 days  Lab Units 12/01/17  0421 11/30/17  0141 11/30/17  0140 11/29/17  0143 11/28/17  1031 11/27/17  1828   WBC 10*3/mm3 7.82  --  8.17 6.78 8.85 9.29   HEMOGLOBIN g/dL 10.1*  --  10.2* 10.7* 11.3* 12.2*   HEMATOCRIT % 30.1*  --  30.1* 31.4* 33.1* 36.1*   PLATELETS 10*3/mm3 150 143* 143* 139* 153 162       Lab Results   Component Value Date    CKTOTAL 25 (L) 05/12/2017    CKMB 0.55 05/12/2017    TROPONINI 0.522 (C) 11/28/2017       CO2   Date Value Ref Range Status   12/01/2017 24.0 22.0 - 31.0 mmol/L Final         Results from last 7 days  Lab Units 11/28/17  1743 11/27/17  2040   INR  0.99 1.03        Imaging Results (last 7 days)     ** No results found for the last 168 hours. **                                    Medication Review:   Current Facility-Administered Medications   Medication Dose Route Frequency Provider Last Rate Last Dose   • ALPRAZolam (XANAX) tablet 0.25 mg  0.25 mg Oral 4x Daily PRN Seymour Ricci MD   0.25 mg at 12/01/17 0614   • atorvastatin (LIPITOR) tablet 40 mg  40 mg Oral Daily Carlos Charles MD   40 mg at 12/01/17 0926   • bisacodyl (DULCOLAX) EC tablet 10 mg  10 mg Oral Daily Seymour Ricci MD   10 mg at 12/01/17 0926   • [START ON 12/4/2017] ceFAZolin (ANCEF) in SWFI 2 g/20ml IV PUSH syringe  2 g Intravenous On Call to OR AHSAN Rand       • famotidine (PEPCID) tablet 20 mg  20 mg Oral BID Nataliya JUSTICE Bee APRMACRINA   20 mg at 12/01/17 0926   • fluticasone (FLONASE) 50 MCG/ACT nasal spray 2 spray  2 spray Each Nare Daily Seymour Ricci MD   2 spray at 12/01/17 0927   • heparin 37051 units/250 mL (100 units/mL) in 0.45 % NaCl infusion  18 Units/kg/hr Intravenous Titrated Carlos Charles MD 8 mL/hr at 12/01/17 0146  11.611 Units/kg/hr at 12/01/17 0146    And   • heparin (porcine) 5000 UNIT/ML injection 5,500 Units  80 Units/kg Intravenous PRN Carlos Charles MD        And   • heparin (porcine) 5000 UNIT/ML injection 2,800 Units  40 Units/kg Intravenous PRN Carlos Charles MD       • hydrALAZINE (APRESOLINE) injection 10 mg  10 mg Intravenous Q6H PRN Nataliya G Levill, APRN   10 mg at 11/27/17 1718   • hydrALAZINE (APRESOLINE) tablet 50 mg  50 mg Oral Q8H Adriane Butler MD   50 mg at 12/01/17 0613   • labetalol (NORMODYNE) tablet 100 mg  100 mg Oral Q12H Adriane Butler MD   100 mg at 12/01/17 0926   • LORazepam (ATIVAN) tablet 0.5 mg  0.5 mg Oral Q8H PRN Elizabeth J Edelen, APRN   0.5 mg at 11/30/17 0900   • morphine injection 2 mg  2 mg Intravenous Q2H PRN Elizabeth J Edelen, APRN   2 mg at 12/01/17 0411   • mupirocin (BACTROBAN) 2 % ointment 1 application  1 application Nasal Q12H Elizabeth J Edelen, APRN   1 application at 12/01/17 0927   • nitroglycerin 50 mg/250 mL (0.2 mg/mL) infusion  5-200 mcg/min Intravenous Titrated Nataliya G Levill, APRN 30 mL/hr at 12/01/17 0412 100 mcg/min at 12/01/17 0412   • ondansetron (ZOFRAN) injection 4 mg  4 mg Intravenous Q6H PRN Nataliya G Levill, APRN       • pantoprazole (PROTONIX) EC tablet 40 mg  40 mg Oral Q AM Nataliya G Levill, APRN   40 mg at 12/01/17 0614   • [START ON 12/2/2017] polyethylene glycol 3350 powder (packet)  17 g Oral Daily Elizabeth J Edelen, APRN       • ranolazine (RANEXA) 12 hr tablet 500 mg  500 mg Oral Q12H Lily Yepez, APRN   500 mg at 12/01/17 0926   • sodium chloride 0.9 % flush 1-10 mL  1-10 mL Intravenous PRN Nataliya G Levill, APRN       • tamsulosin (FLOMAX) 24 hr capsule 0.4 mg  0.4 mg Oral Nightly Nataliya G Levill, APRN   0.4 mg at 11/30/17 2037   • terazosin (HYTRIN) capsule 1 mg  1 mg Oral Nightly Nataliya G Levill, APRN   1 mg at 11/30/17 2037         Assessment/Plan   1. NSTEMI S/p cardiac cath. with multi-vessel coronary artery disease: Continue current  optimal medical management while awaiting cardiac revascularization.  Echocardiogram Shows mild concentric left ventricular wall thickness, normal left ventricular systolic function with ejection fraction of 65%.  There is hypokinesis of the inferior wall.  Cardiologist and CT surgery are actively following. Patient is awaiting revascularization surgery.  2.  Acute on chronickidney injury: Creatinine has increased to 2.15 today. IV hydration was discontinued yesterday. Continue to monitor renal function and avoiding all nephrotoxic agents. Nephrologist is following.  3.  Hypertension: Stable.  4.  Possible allergic rhinitis with postnasal drip: Resolved.   5.  Constipation: Continue bowel regimen.  6.  Continue GI and DVT prophylaxis.        Seymour Ricci MD  12/01/17      EMR Dragon/Transcription disclaimer:   Much of this encounter note is an electronic transcription/translation of spoken language to printed text. The electronic translation of spoken language may permit erroneous, or at times, nonsensical words or phrases to be inadvertently transcribed; Although I have reviewed the note for such errors, some may still exist.                 Electronically signed by Seymour Ricci MD at 12/1/2017 10:22 AM        Medical Student Notes (last 24 hours) (Notes from 11/30/2017 10:25 AM through 12/1/2017 10:25 AM)     No notes of this type exist for this encounter.        Consult Notes (last 24 hours) (Notes from 11/30/2017 10:25 AM through 12/1/2017 10:25 AM)     No notes of this type exist for this encounter.

## 2017-12-01 NOTE — PROGRESS NOTES
CARDIOLOGY PROGRESS NOTE      LOS: 4 days   Patient Care Team:  AHSAN Mayer as PCP - General    Problems/Diagnoses:     NSTEMI (non-ST elevated myocardial infarction)    Unstable angina  The patient presents with active problems as noted above undergoing cardiac catheterization which revealed evidence of multivessel coronary artery disease thus recommended for surgical revascularization.  Cardiothoracic services have been consulted and plan for surgical revascularization on Monday, December 4th.    JOSE (mild)/CKD Stage III: Nephrology services, Dr. Butler, is following.        The patient has remained hemodynamically stable with continued low-grade chest pain.  Still receiving IV nitroglycerin and IV heparin.  CT surgery planned for Monday next week.      Review of Systems:     Constitutional:  Denies recent weight loss, weight gain, fever or chills     HENT:  Denies any hearing loss, epistaxis, hoarseness, or difficulty speaking.     Eyes: Wears eyeglasses or contact lenses     Respiratory:  Denies dyspnea with exertion,no cough, wheezing, or hemoptysis.     Cardiovascular: Has chest pain,  No orthopnea, PND, peripheral edema, syncope, or claudication.     Gastrointestinal:  Denies change in bowel habits, dyspepsia, ulcer disease, hematochezia, or melena.     Endocrine: Negative for cold intolerance, heat intolerance, polydipsia, polyphagia and polyuria. Denies any history of weight change, heat/cold intolerance, polydipsia, polyuria     Genitourinary: Negative.        Objective     Vital Signs  Temp:  [97.9 °F (36.6 °C)-98.4 °F (36.9 °C)] 98.2 °F (36.8 °C)  Heart Rate:  [74-87] 79  Resp:  [15-19] 17  BP: ()/(53-81) 134/71    Physical Exam:    Constitutional: Cooperative, alert and oriented, in no acute distress.     HENT:   Head: Normocephalic, normal hair patterns, no masses or tenderness.  Ears, Nose, and Throat: No gross abnormalities. No pallor or cyanosis.   Eyes: EOMS intact, PERRL,  conjunctivae and lids unremarkable. Fundoscopic exam and visual fields not performed.   Neck: No palpable masses or adenopathy, no thyromegaly, no JVD, carotid pulses are full and equal bilaterally and without  Bruits.     Cardiovascular: Regular rhythm, S1 and S2 normal, no S3 or S4.  No murmurs, gallops, or rubs detected.     Pulmonary/Chest: Chest: normal symmetry, no tenderness to palpation, normal respiratory excursion           Pulmonary: Normal breath sounds. No rales or ronchi.    Abdominal: Abdomen soft, bowel sounds normoactive, no masses, no hepatosplenomegaly, non-tender, no bruits.     Results Review:    Lab Results (last 24 hours)     Procedure Component Value Units Date/Time    aPTT [[id number]]  (Abnormal) Collected:  11/30/17 1926    Specimen:  Blood Updated:  11/30/17 1956     PTT 59.0 (H) seconds     Narrative:       The recommended Heparin therapeutic range is 68-97 seconds.    CBC & Differential [[id number]] Collected:  12/01/17 0421    Specimen:  Blood Updated:  12/01/17 0431    Narrative:       The following orders were created for panel order CBC & Differential.  Procedure                               Abnormality         Status                     ---------                               -----------         ------                     CBC Auto Differential[[id number]]        Abnormal            Final result                 Please view results for these tests on the individual orders.    CBC Auto Differential [[id number]]  (Abnormal) Collected:  12/01/17 0421    Specimen:  Blood Updated:  12/01/17 0431     WBC 7.82 10*3/mm3      RBC 3.63 (L) 10*6/mm3      Hemoglobin 10.1 (L) g/dL      Hematocrit 30.1 (L) %      MCV 82.9 fL      MCH 27.8 pg      MCHC 33.6 g/dL      RDW 14.2 %      RDW-SD 43.0 fl      MPV 9.1 fL      Platelets 150 10*3/mm3      Neutrophil % 50.9 %      Lymphocyte % 38.5 %      Monocyte % 8.2 %      Eosinophil % 1.8 %      Basophil % 0.1 %      Immature Grans % 0.5 %      Neutrophils,  Absolute 3.98 10*3/mm3      Lymphocytes, Absolute 3.01 10*3/mm3      Monocytes, Absolute 0.64 10*3/mm3      Eosinophils, Absolute 0.14 10*3/mm3      Basophils, Absolute 0.01 10*3/mm3      Immature Grans, Absolute 0.04 (H) 10*3/mm3     Comprehensive Metabolic Panel [[id number]]  (Abnormal) Collected:  12/01/17 0421    Specimen:  Blood Updated:  12/01/17 0442     Glucose 113 (H) mg/dL      BUN 16 mg/dL      Creatinine 2.15 (H) mg/dL      Sodium 135 (L) mmol/L      Potassium 3.8 mmol/L      Chloride 101 mmol/L      CO2 24.0 mmol/L      Calcium 8.7 mg/dL      Total Protein 6.5 g/dL      Albumin 3.50 g/dL      ALT (SGPT) 24 U/L      AST (SGOT) 29 U/L      Alkaline Phosphatase 94 U/L      Total Bilirubin 0.7 mg/dL      eGFR Non African Amer 32 (L) mL/min/1.73      Globulin 3.0 gm/dL      A/G Ratio 1.2 g/dL      BUN/Creatinine Ratio 7.4     Anion Gap 10.0 mmol/L     aPTT [[id number]]  (Abnormal) Collected:  12/01/17 0732    Specimen:  Blood Updated:  12/01/17 0840     PTT 58.3 (H) seconds     Narrative:       The recommended Heparin therapeutic range is 68-97 seconds.    Urinalysis With / Culture If Indicated - Urine, Clean Catch [[id number]]  (Normal) Collected:  12/01/17 0956    Specimen:  Urine from Urine, Clean Catch Updated:  12/01/17 1017     Color, UA Yellow     Appearance, UA Clear     pH, UA 6.0     Specific Gravity, UA 1.003      Result obtained by Refractometer        Glucose, UA Negative     Ketones, UA Negative     Bilirubin, UA Negative     Blood, UA Negative     Protein, UA Negative     Leuk Esterase, UA Negative     Nitrite, UA Negative     Urobilinogen, UA 1.0 E.U./dL    Narrative:       Urine microscopic not indicated.    Iron Profile [[id number]]  (Abnormal) Collected:  11/30/17 0140    Specimen:  Blood Updated:  12/01/17 1140     Iron 31 (L) mcg/dL      TIBC 298 mcg/dL      Iron Saturation 10 (L) %     Ferritin [[id number]]  (Normal) Collected:  11/30/17 0140    Specimen:  Blood Updated:  12/01/17 1329      Ferritin 42.60 ng/mL             Medication Review:   Current Facility-Administered Medications   Medication Dose Route Frequency Provider Last Rate Last Dose   • ALPRAZolam (XANAX) tablet 0.25 mg  0.25 mg Oral 4x Daily PRN Seymour Ricci MD   0.25 mg at 12/01/17 0614   • atorvastatin (LIPITOR) tablet 40 mg  40 mg Oral Daily Carlos Charles MD   40 mg at 12/01/17 0926   • bisacodyl (DULCOLAX) EC tablet 10 mg  10 mg Oral Daily Seymour Ricci MD   10 mg at 12/01/17 0926   • [START ON 12/4/2017] ceFAZolin (ANCEF) in SWFI 2 g/20ml IV PUSH syringe  2 g Intravenous On Call to OR AHSAN Rand       • famotidine (PEPCID) tablet 20 mg  20 mg Oral BID Nataliya G Levill, APRN   20 mg at 12/01/17 0926   • fluticasone (FLONASE) 50 MCG/ACT nasal spray 2 spray  2 spray Each Nare Daily Seymour Ricci MD   2 spray at 12/01/17 0927   • heparin 39157 units/250 mL (100 units/mL) in 0.45 % NaCl infusion  18 Units/kg/hr Intravenous Titrated Carlos Charles MD 8 mL/hr at 12/01/17 0146 11.611 Units/kg/hr at 12/01/17 0146    And   • heparin (porcine) 5000 UNIT/ML injection 5,500 Units  80 Units/kg Intravenous PRN Carlos Charles MD        And   • heparin (porcine) 5000 UNIT/ML injection 2,800 Units  40 Units/kg Intravenous PRN Carlos Charles MD       • hydrALAZINE (APRESOLINE) injection 10 mg  10 mg Intravenous Q6H PRN Nataliya G Levill, APRN   10 mg at 11/27/17 1718   • hydrALAZINE (APRESOLINE) tablet 50 mg  50 mg Oral Q8H Adriane Butler MD   50 mg at 12/01/17 0613   • labetalol (NORMODYNE) tablet 100 mg  100 mg Oral Q12H Adriane Butler MD   100 mg at 12/01/17 0926   • LORazepam (ATIVAN) tablet 0.5 mg  0.5 mg Oral Q8H PRN Elizabeth Benson, APRN   0.5 mg at 11/30/17 0900   • morphine injection 2 mg  2 mg Intravenous Q2H PRN Elizabeth Benson, APRN   2 mg at 12/01/17 1147   • mupirocin (BACTROBAN) 2 % ointment 1 application  1 application Nasal Q12H Elizabeth Benson APRN   1 application at  12/01/17 0927   • nitroglycerin 50 mg/250 mL (0.2 mg/mL) infusion  5-200 mcg/min Intravenous Titrated Nataliya G Levill, APRN 30 mL/hr at 12/01/17 0412 100 mcg/min at 12/01/17 0412   • ondansetron (ZOFRAN) injection 4 mg  4 mg Intravenous Q6H PRN Nataliya G Levill, APRN   4 mg at 12/01/17 1140   • pantoprazole (PROTONIX) EC tablet 40 mg  40 mg Oral Q AM Nataliya G Levill, APRN   40 mg at 12/01/17 0614   • [START ON 12/2/2017] polyethylene glycol 3350 powder (packet)  17 g Oral Daily AHSAN Rand       • ranolazine (RANEXA) 12 hr tablet 500 mg  500 mg Oral Q12H Lily Yepez, APRN   500 mg at 12/01/17 0926   • sodium chloride 0.9 % flush 1-10 mL  1-10 mL Intravenous PRN Nataliya G Levill, APRN       • tamsulosin (FLOMAX) 24 hr capsule 0.4 mg  0.4 mg Oral Nightly Nataliya G Levill, APRN   0.4 mg at 11/30/17 2037   • terazosin (HYTRIN) capsule 1 mg  1 mg Oral Nightly Nataliya G Levill, APRN   1 mg at 11/30/17 2037         Assessment/Plan     Continue present management.  Patient awaiting CT surgery.  Nephrology following renal function closely.  Hemodynamic status and CBC being monitored closely.  Active Problems:    Unstable angina    NSTEMI (non-ST elevated myocardial infarction)        Carlos Charles MD  12/01/17  1:24 PM

## 2017-12-01 NOTE — PROGRESS NOTES
"[facility]  [address] [place] [address]. [id number]  T - [phone]  F - [phone]     Progress Note          PATIENT  DEMOGRAPHICS   PATIENT NAME: Lyle Corona                      PHYSICIAN: Jl Young MD  : 1965  MRN: [id number]   LOS: 4 days    Patient Care Team:  AHSAN Mayer as PCP - General  Subjective   SUBJECTIVE   Chest pain is better this morning rated as 5/10. No SOB, nausea, vomiting.          Objective   OBJECTIVE   Vital Signs  Temp:  [97.9 °F (36.6 °C)-98.8 °F (37.1 °C)] 98.2 °F (36.8 °C)  Heart Rate:  [74-87] 84  Resp:  [15-19] 17  BP: ()/(53-82) 123/81    Flowsheet Rows         First Filed Value    Admission Height  69\" (175.3 cm) Documented at 2017 1722    Admission Weight  151 lb 12.8 oz (68.9 kg) Documented at 2017 1722           I/O last 3 completed shifts:  In: 2881 [P.O.:240; I.V.:2641]  Out: 5290 [Urine:5290]    PHYSICAL EXAM    Physical Exam   Constitutional: He is oriented to person, place, and time. He appears well-developed.   Moderately nourished   HENT:   Head: Normocephalic and atraumatic.   Cardiovascular: Normal rate, regular rhythm and normal heart sounds.  Exam reveals no gallop and no friction rub.    No murmur heard.  Pulmonary/Chest: Effort normal and breath sounds normal. No respiratory distress. He has no wheezes. He has no rales. He exhibits no tenderness.   Abdominal: Soft. Bowel sounds are normal. He exhibits no distension and no mass. There is no tenderness. There is no guarding.   Musculoskeletal: He exhibits no edema or tenderness.   Neurological: He is alert and oriented to person, place, and time.   Skin: Skin is warm and dry.   Nursing note and vitals reviewed.      RESULTS   Results Review:      Results from last 7 days  Lab Units 17  0421 17  0140 17  0143   SODIUM mmol/L 135* 139 138   POTASSIUM mmol/L 3.8 3.6 3.6   CHLORIDE mmol/L 101 101 101   CO2 mmol/L 24.0 24.0 " 25.0   BUN mg/dL 16 19 24*   CREATININE mg/dL 2.15* 2.09* 2.10*   CALCIUM mg/dL 8.7 8.5 8.4   BILIRUBIN mg/dL 0.7 0.4 0.5   ALK PHOS U/L 94 88 90   ALT (SGPT) U/L 24 26 21   AST (SGOT) U/L 29 20 22   GLUCOSE mg/dL 113* 119* 103*       Estimated Creatinine Clearance: 41 mL/min (by C-G formula based on Cr of 2.15).        Results from last 7 days  Lab Units 12/01/17  0421 11/30/17  0141 11/30/17  0140 11/29/17  0143 11/28/17  1031 11/27/17  1828   WBC 10*3/mm3 7.82  --  8.17 6.78 8.85 9.29   HEMOGLOBIN g/dL 10.1*  --  10.2* 10.7* 11.3* 12.2*   PLATELETS 10*3/mm3 150 143* 143* 139* 153 162         Results from last 7 days  Lab Units 11/28/17  1743 11/27/17  2040   INR  0.99 1.03         Imaging Results (last 24 hours)     ** No results found for the last 24 hours. **           MEDICATIONS      atorvastatin 40 mg Oral Daily   bisacodyl 10 mg Oral Daily   [START ON 12/4/2017] ceFAZolin 2 g Intravenous On Call to OR   famotidine 20 mg Oral BID   fluticasone 2 spray Each Nare Daily   hydrALAZINE 50 mg Oral Q8H   labetalol 100 mg Oral Q12H   mupirocin 1 application Nasal Q12H   pantoprazole 40 mg Oral Q AM   [START ON 12/2/2017] polyethylene glycol 17 g Oral Daily   ranolazine 500 mg Oral Q12H   tamsulosin 0.4 mg Oral Nightly   terazosin 1 mg Oral Nightly       heparin (porcine) 18 Units/kg/hr Last Rate: 11.611 Units/kg/hr (12/01/17 0146)   nitroglycerin 5-200 mcg/min Last Rate: 100 mcg/min (12/01/17 0412)       Assessment/Plan   ASSESSMENT / PLAN    Active Problems:    NSTEMI (non-ST elevated myocardial infarction)    Unstable angina    1. CKD 3: baseline creatinine 1.7-1.9 mg/dl   - Creatinine is overall stable. Post contrast has mild elevation in creatinine.   - Hold HCTZ and Cozaar    2. Hypertension:  - Blood pressure is acceptable. HOLD HCTZ and Cozaar. On labetalol 100mg BID, hydralazine 50mg TID and terazosin 1mg daily. Also on nitro drip    3. Anemia:  - Hemoglobin is low but acceptable at 10.1 Will check iron  studies, B12 and folate     4. NSTEMI:   - s/p left heart cath which shows multivessel disease, and plan for CABG next week. Currently on nitroglycerin and heparin infusion.               This document has been electronically signed by Jl Young MD on December 1, 2017 10:20 AM

## 2017-12-01 NOTE — PROGRESS NOTES
Cardiothoracic - Vascular Surgery Daily Note        LOS: 4 days       Chief Complaint: Chest pain pressure continuous increases with activity, difficult voiding at intervals and mild burning      Subjective       VAS 4    ROS:    Review of Systems   Constitution: Positive for malaise/fatigue. Negative for chills, decreased appetite, fever and weakness.   HENT: Negative for hoarse voice, nosebleeds and stridor.    Eyes: Negative for visual disturbance.   Cardiovascular: Positive for chest pain and palpitations. Negative for claudication, cyanosis, irregular heartbeat and leg swelling.   Respiratory: Positive for shortness of breath. Negative for cough and hemoptysis.    Hematologic/Lymphatic: Does not bruise/bleed easily.   Skin: Negative for color change, poor wound healing and rash.   Musculoskeletal: Negative for back pain, falls and muscle weakness.   Gastrointestinal: Negative for abdominal pain, hematemesis, melena and nausea.   Genitourinary: Positive for dysuria and incomplete emptying. Negative for hematuria.   Neurological: Negative for brief paralysis, dizziness, light-headedness, numbness and paresthesias.   Psychiatric/Behavioral: Negative for altered mental status.   Allergic/Immunologic: Negative for hives.         Objective     Vital Signs  Temp:  [97.9 °F (36.6 °C)-98.8 °F (37.1 °C)] 98 °F (36.7 °C)  Heart Rate:  [74-93] 82  Resp:  [15-19] 17  BP: ()/(53-82) 132/75  Body mass index is 23.77 kg/(m^2).    Intake/Output Summary (Last 24 hours) at 12/01/17 0850  Last data filed at 12/01/17 0603   Gross per 24 hour   Intake             1175 ml   Output             3350 ml   Net            -2175 ml          Wt Readings from Last 3 Encounters:   11/28/17 160 lb 15 oz (73 kg)   10/31/17 157 lb 8 oz (71.4 kg)   06/12/17 153 lb (69.4 kg)         Physical Exam   Physical Exam   Constitutional: He is oriented to person, place, and time. He appears well-nourished.   HENT:   Head: Normocephalic.    Mouth/Throat: Oropharynx is clear and moist.   Eyes: Conjunctivae are normal. Pupils are equal, round, and reactive to light.   Neck: Neck supple. No JVD present.   Cardiovascular: Normal rate, regular rhythm, normal heart sounds and intact distal pulses.    Pulmonary/Chest: Effort normal and breath sounds normal. No stridor.   Abdominal: Soft. Bowel sounds are normal.   Musculoskeletal: He exhibits no edema.   Neurological: He is alert and oriented to person, place, and time.   Skin: Skin is warm and dry. No erythema.   Psychiatric: His behavior is normal.   Calm   Nursing note and vitals reviewed.        Results Review:    Lab Results   Component Value Date    WBC 7.82 12/01/2017    HGB 10.1 (L) 12/01/2017    HCT 30.1 (L) 12/01/2017    MCV 82.9 12/01/2017     12/01/2017     Lab Results   Component Value Date    GLUCOSE 113 (H) 12/01/2017    BUN 16 12/01/2017    CREATININE 2.15 (H) 12/01/2017    EGFRIFNONA 32 (L) 12/01/2017    BCR 7.4 12/01/2017    K 3.8 12/01/2017    CO2 24.0 12/01/2017    CALCIUM 8.7 12/01/2017    ALBUMIN 3.50 12/01/2017    LABIL2 1.2 12/01/2017    AST 29 12/01/2017    ALT 24 12/01/2017                     PT/INR:    Protime   Date Value Ref Range Status   11/28/2017 13.0 11.1 - 15.3 Seconds Final   /  INR   Date Value Ref Range Status   11/28/2017 0.99 0.80 - 1.20 Final               atorvastatin 40 mg Oral Daily   bisacodyl 10 mg Oral Daily   [START ON 12/4/2017] ceFAZolin 2 g Intravenous On Call to OR   famotidine 20 mg Oral BID   fluticasone 2 spray Each Nare Daily   hydrALAZINE 50 mg Oral Q8H   labetalol 100 mg Oral Q12H   mupirocin 1 application Nasal Q12H   pantoprazole 40 mg Oral Q AM   [START ON 12/2/2017] polyethylene glycol 17 g Oral Daily   ranolazine 500 mg Oral Q12H   tamsulosin 0.4 mg Oral Nightly   terazosin 1 mg Oral Nightly       heparin (porcine) 18 Units/kg/hr Last Rate: 11.611 Units/kg/hr (12/01/17 0146)   nitroglycerin 5-200 mcg/min Last Rate: 100 mcg/min (12/01/17  0412)       Patient Active Problem List   Diagnosis Code   • Essential hypertension I10   • Hypertensive emergency, no CHF I16.1   • Coronary arteriosclerosis I25.10   • Chronic kidney disease, stage 3 N18.3   • LVH (left ventricular hypertrophy) I51.7   • NSTEMI (non-ST elevated myocardial infarction) I21.4   • Unstable angina I20.0         ASSESSMENT/PLAN     3 V CAD NSTEMI:    NTG infusion maximum with morphine to control USA.   Continue heparin infusion until 4 hours prior to surgery  CABG is warranted.   Would prefer to reduce chance of blood product transfusion till 12/04/17 if patient symptoms will allow.  Have notified blood bank of potential need for platelet transfusion with surgery.   Platelets and blood ordered for Monday  Continue beta blocker and statin therapy.  ACE/ARB contraindicated by renal.  Hold ASA,    Patient understands, agrees, and wishes to proceed with plan.  No questions.    Urinary symptoms:  Check urinalysis with culture if indicated.  On flomax.     Disp:  Continue care in CCU

## 2017-12-01 NOTE — PROGRESS NOTES
[facility] Medicine Services  INPATIENT PROGRESS NOTE     LOS: 4 days   Patient Care Team:  AHSAN Mayer as PCP - General    Chief Complaint:  <principal problem not specified>      Subjective     Interval History:   Patient is seen for follow-up today. He continues to have periodic chest pressure and remains on heparin and nitroglycerin infusion. Bilateral nasal congestion with postnasal drip has resolved with Flonase.  He complains of constipation but is doing well otherwise.    Patient Complaints: As above    History taken from: Patient    Review of Systems:    Review of Systems   Constitutional: Negative for activity change, appetite change, chills, diaphoresis, fatigue and fever.   HENT: Negative for postnasal drip, trouble swallowing and voice change.    Eyes: Negative for photophobia and visual disturbance.   Respiratory: Negative for cough, choking, chest tightness, shortness of breath, wheezing and stridor.    Cardiovascular: Positive for chest pain. Negative for palpitations and leg swelling.   Gastrointestinal: Positive for constipation. Negative for abdominal distention, abdominal pain, blood in stool, diarrhea, nausea and vomiting.   Endocrine: Negative for cold intolerance, heat intolerance, polydipsia, polyphagia and polyuria.   Genitourinary: Negative for decreased urine volume, difficulty urinating, dysuria, enuresis, flank pain, frequency, hematuria and urgency.   Musculoskeletal: Negative for arthralgias, gait problem, myalgias, neck pain and neck stiffness.   Skin: Negative for pallor, rash and wound.   Neurological: Negative for dizziness, tremors, seizures, syncope, facial asymmetry, speech difficulty, weakness, light-headedness, numbness and headaches.   Hematological: Does not bruise/bleed easily.   Psychiatric/Behavioral: Negative for agitation, behavioral problems and confusion.         Objective     Vital Signs  Temp:  [97.9 °F (36.6  °C)-98.8 °F (37.1 °C)] 98.2 °F (36.8 °C)  Heart Rate:  [74-87] 84  Resp:  [15-19] 17  BP: ()/(53-82) 123/81    Physical Exam:   Physical Exam   Constitutional: He is oriented to person, place, and time. He appears well-developed and well-nourished. No distress.   HENT:   Head: Normocephalic and atraumatic.   Eyes: EOM are normal. Pupils are equal, round, and reactive to light. Right eye exhibits no discharge. Left eye exhibits no discharge. No scleral icterus.   Neck: Normal range of motion. Neck supple. No JVD present. No thyromegaly present.   Cardiovascular: Normal rate, regular rhythm and normal heart sounds.  Exam reveals no gallop and no friction rub.    No murmur heard.  Pulmonary/Chest: Effort normal and breath sounds normal. He has no wheezes. He has no rales. He exhibits no tenderness.   Abdominal: Soft. Bowel sounds are normal. He exhibits no distension and no mass. There is no tenderness. There is no rebound and no guarding.   Musculoskeletal: He exhibits no edema, tenderness or deformity.   Neurological: He is alert and oriented to person, place, and time. No cranial nerve deficit. He exhibits normal muscle tone. Coordination normal.   Skin: Skin is warm and dry. No rash noted. He is not diaphoretic. No erythema. No pallor.   Psychiatric: He has a normal mood and affect. His behavior is normal. Judgment and thought content normal.   Nursing note and vitals reviewed.         Results Review:         Results from last 7 days  Lab Units 12/01/17  0421 11/30/17  0140 11/29/17  0143 11/28/17  1031 11/27/17  1710   SODIUM mmol/L 135* 139 138 137 137   POTASSIUM mmol/L 3.8 3.6 3.6 4.0 3.8   CHLORIDE mmol/L 101 101 101 105 101   CO2 mmol/L 24.0 24.0 25.0 22.0 23.0   BUN mg/dL 16 19 24* 23* 26*   CREATININE mg/dL 2.15* 2.09* 2.10* 1.83* 1.98*   GLUCOSE mg/dL 113* 119* 103* 90 96   CALCIUM mg/dL 8.7 8.5 8.4 8.6 9.5   BILIRUBIN mg/dL 0.7 0.4 0.5 0.6 0.6   ALK PHOS U/L 94 88 90 95 99   ALT (SGPT) U/L 24 26 21  28 20*   AST (SGOT) U/L 29 20 22 31 21               Results from last 7 days  Lab Units 12/01/17  0421 11/30/17  0141 11/30/17  0140 11/29/17  0143 11/28/17  1031 11/27/17  1828   WBC 10*3/mm3 7.82  --  8.17 6.78 8.85 9.29   HEMOGLOBIN g/dL 10.1*  --  10.2* 10.7* 11.3* 12.2*   HEMATOCRIT % 30.1*  --  30.1* 31.4* 33.1* 36.1*   PLATELETS 10*3/mm3 150 143* 143* 139* 153 162       Lab Results   Component Value Date    CKTOTAL 25 (L) 05/12/2017    CKMB 0.55 05/12/2017    TROPONINI 0.522 (C) 11/28/2017       CO2   Date Value Ref Range Status   12/01/2017 24.0 22.0 - 31.0 mmol/L Final         Results from last 7 days  Lab Units 11/28/17  1743 11/27/17  2040   INR  0.99 1.03        Imaging Results (last 7 days)     ** No results found for the last 168 hours. **                                    Medication Review:   Current Facility-Administered Medications   Medication Dose Route Frequency Provider Last Rate Last Dose   • ALPRAZolam (XANAX) tablet 0.25 mg  0.25 mg Oral 4x Daily PRN Seymour Ricci MD   0.25 mg at 12/01/17 0614   • atorvastatin (LIPITOR) tablet 40 mg  40 mg Oral Daily Carlos Charles MD   40 mg at 12/01/17 0926   • bisacodyl (DULCOLAX) EC tablet 10 mg  10 mg Oral Daily Seymour Ricci MD   10 mg at 12/01/17 0926   • [START ON 12/4/2017] ceFAZolin (ANCEF) in SWFI 2 g/20ml IV PUSH syringe  2 g Intravenous On Call to OR AHSAN Rand       • famotidine (PEPCID) tablet 20 mg  20 mg Oral BID Nataliya Bee APRN   20 mg at 12/01/17 0926   • fluticasone (FLONASE) 50 MCG/ACT nasal spray 2 spray  2 spray Each Nare Daily Seymour Ricci MD   2 spray at 12/01/17 0927   • heparin 30243 units/250 mL (100 units/mL) in 0.45 % NaCl infusion  18 Units/kg/hr Intravenous Titrated Carlos Charles MD 8 mL/hr at 12/01/17 0146 11.611 Units/kg/hr at 12/01/17 0146    And   • heparin (porcine) 5000 UNIT/ML injection 5,500 Units  80 Units/kg Intravenous PRN Carlos Charles MD         And   • heparin (porcine) 5000 UNIT/ML injection 2,800 Units  40 Units/kg Intravenous PRN Carlos Charles MD       • hydrALAZINE (APRESOLINE) injection 10 mg  10 mg Intravenous Q6H PRN Nataliya G Levill, APRN   10 mg at 11/27/17 1718   • hydrALAZINE (APRESOLINE) tablet 50 mg  50 mg Oral Q8H Adriane Butler MD   50 mg at 12/01/17 0613   • labetalol (NORMODYNE) tablet 100 mg  100 mg Oral Q12H Adriane Butler MD   100 mg at 12/01/17 0926   • LORazepam (ATIVAN) tablet 0.5 mg  0.5 mg Oral Q8H PRN Elizabeth J Edelen, APRN   0.5 mg at 11/30/17 0900   • morphine injection 2 mg  2 mg Intravenous Q2H PRN Elizabeth J Edelen, APRN   2 mg at 12/01/17 0411   • mupirocin (BACTROBAN) 2 % ointment 1 application  1 application Nasal Q12H Elizabeth J Edelen, APRN   1 application at 12/01/17 0927   • nitroglycerin 50 mg/250 mL (0.2 mg/mL) infusion  5-200 mcg/min Intravenous Titrated Nataliya G Levill, APRN 30 mL/hr at 12/01/17 0412 100 mcg/min at 12/01/17 0412   • ondansetron (ZOFRAN) injection 4 mg  4 mg Intravenous Q6H PRN Nataliya G Levill, APRN       • pantoprazole (PROTONIX) EC tablet 40 mg  40 mg Oral Q AM Nataliya G Levill, APRN   40 mg at 12/01/17 0614   • [START ON 12/2/2017] polyethylene glycol 3350 powder (packet)  17 g Oral Daily Elizabeth J Edelen, APRN       • ranolazine (RANEXA) 12 hr tablet 500 mg  500 mg Oral Q12H Lily Yepez, APRN   500 mg at 12/01/17 0926   • sodium chloride 0.9 % flush 1-10 mL  1-10 mL Intravenous PRN Nataliya G Levill, APRN       • tamsulosin (FLOMAX) 24 hr capsule 0.4 mg  0.4 mg Oral Nightly Nataliya G Levill, APRN   0.4 mg at 11/30/17 2037   • terazosin (HYTRIN) capsule 1 mg  1 mg Oral Nightly Nataliya G Levill, APRN   1 mg at 11/30/17 2037         Assessment/Plan   1. NSTEMI S/p cardiac cath. with multi-vessel coronary artery disease: Continue current optimal medical management while awaiting cardiac revascularization.  Echocardiogram Shows mild concentric left ventricular wall thickness, normal left ventricular  systolic function with ejection fraction of 65%.  There is hypokinesis of the inferior wall.  Cardiologist and CT surgery are actively following. Patient is awaiting revascularization surgery.  2.  Acute on chronickidney injury: Creatinine has increased to 2.15 today. IV hydration was discontinued yesterday. Continue to monitor renal function and avoiding all nephrotoxic agents. Nephrologist is following.  3.  Hypertension: Stable.  4.  Possible allergic rhinitis with postnasal drip: Resolved.   5.  Constipation: Continue bowel regimen.  6.  Continue GI and DVT prophylaxis.        Seymour Ricci MD  12/01/17      EMR Dragon/Transcription disclaimer:   Much of this encounter note is an electronic transcription/translation of spoken language to printed text. The electronic translation of spoken language may permit erroneous, or at times, nonsensical words or phrases to be inadvertently transcribed; Although I have reviewed the note for such errors, some may still exist.

## 2017-12-01 NOTE — PLAN OF CARE
Problem: Patient Care Overview (Adult)  Goal: Adult Individualization and Mutuality    12/01/17 0459   Individualization   Patient Specific Preferences Wants door closed at all times.   Patient Specific Goals To have CABG on Monday.       Goal: Discharge Needs Assessment    12/01/17 0459   Discharge Needs Assessment   Concerns To Be Addressed adjustment to diagnosis/illness concerns   Concerns Comments Pt is homeless at this time.   Readmission Within The Last 30 Days no previous admission in last 30 days   Equipment Needed After Discharge none   Discharge Facility/Level Of Care Needs other (see comments)   Current Discharge Risk chronically ill;financial support inadequate   Discharge Disposition still a patient   Current Health   Outpatient/Agency/Support Group Needs support group(s) (specify)   Anticipated Changes Related to Illness inability to work   Self-Care   Equipment Currently Used at Home none   Living Environment   Transportation Available public transportation         Problem: Pain, Chronic (Adult)  Goal: Acceptable Pain Control/Comfort Level  Outcome: Ongoing (interventions implemented as appropriate)    12/01/17 0459   Pain, Chronic (Adult)   Acceptable Pain Control/Comfort Level making progress toward outcome         Problem: Cardiac Output, Decreased (NICU)  Goal: Identify Related Risk Factors and Signs and Symptoms  Outcome: Ongoing (interventions implemented as appropriate)    12/01/17 0459   Cardiac Output, Decreased   Cardiac Output, Decreased: Related Risk Factors cardiac muscle disease;cardiac surgery/procedure;disease process   Signs and Symptoms (Cardiac Output Decreased) fatigue;angina       Goal: Adequate Cardiac Output/Effective Tissue Perfusion  Outcome: Ongoing (interventions implemented as appropriate)    12/01/17 0459   Cardiac Output, Decreased (NICU)   Adequate Cardiac Output/Effective Tissue Perfusion making progress toward outcome

## 2017-12-02 LAB
ALBUMIN SERPL-MCNC: 3.6 G/DL (ref 3.4–4.8)
ALBUMIN/GLOB SERPL: 1.2 G/DL (ref 1.1–1.8)
ALP SERPL-CCNC: 97 U/L (ref 38–126)
ALT SERPL W P-5'-P-CCNC: 30 U/L (ref 21–72)
ANION GAP SERPL CALCULATED.3IONS-SCNC: 12 MMOL/L (ref 5–15)
APTT PPP: 50.2 SECONDS (ref 20–40.3)
APTT PPP: 69.7 SECONDS (ref 20–40.3)
AST SERPL-CCNC: 24 U/L (ref 17–59)
BASOPHILS # BLD AUTO: 0.01 10*3/MM3 (ref 0–0.2)
BASOPHILS NFR BLD AUTO: 0.1 % (ref 0–2)
BILIRUB SERPL-MCNC: 0.6 MG/DL (ref 0.2–1.3)
BUN BLD-MCNC: 15 MG/DL (ref 7–21)
BUN/CREAT SERPL: 6.8 (ref 7–25)
CALCIUM SPEC-SCNC: 8.8 MG/DL (ref 8.4–10.2)
CHLORIDE SERPL-SCNC: 102 MMOL/L (ref 95–110)
CO2 SERPL-SCNC: 22 MMOL/L (ref 22–31)
CREAT BLD-MCNC: 2.2 MG/DL (ref 0.7–1.3)
DEPRECATED RDW RBC AUTO: 43.1 FL (ref 35.1–43.9)
EOSINOPHIL # BLD AUTO: 0.16 10*3/MM3 (ref 0–0.7)
EOSINOPHIL NFR BLD AUTO: 2 % (ref 0–7)
ERYTHROCYTE [DISTWIDTH] IN BLOOD BY AUTOMATED COUNT: 14.3 % (ref 11.5–14.5)
FOLATE SERPL-MCNC: 5.48 NG/ML (ref 2.76–21)
GFR SERPL CREATININE-BSD FRML MDRD: 31 ML/MIN/1.73 (ref 56–130)
GLOBULIN UR ELPH-MCNC: 3 GM/DL (ref 2.3–3.5)
GLUCOSE BLD-MCNC: 100 MG/DL (ref 60–100)
HCT VFR BLD AUTO: 30.7 % (ref 39–49)
HGB BLD-MCNC: 10.3 G/DL (ref 13.7–17.3)
HOLD SPECIMEN: NORMAL
IMM GRANULOCYTES # BLD: 0.02 10*3/MM3 (ref 0–0.02)
IMM GRANULOCYTES NFR BLD: 0.2 % (ref 0–0.5)
LYMPHOCYTES # BLD AUTO: 3.66 10*3/MM3 (ref 0.6–4.2)
LYMPHOCYTES NFR BLD AUTO: 45 % (ref 10–50)
MCH RBC QN AUTO: 27.8 PG (ref 26.5–34)
MCHC RBC AUTO-ENTMCNC: 33.6 G/DL (ref 31.5–36.3)
MCV RBC AUTO: 82.7 FL (ref 80–98)
MONOCYTES # BLD AUTO: 0.63 10*3/MM3 (ref 0–0.9)
MONOCYTES NFR BLD AUTO: 7.7 % (ref 0–12)
NEUTROPHILS # BLD AUTO: 3.66 10*3/MM3 (ref 2–8.6)
NEUTROPHILS NFR BLD AUTO: 45 % (ref 37–80)
PLATELET # BLD AUTO: 163 10*3/MM3 (ref 150–450)
PMV BLD AUTO: 9.3 FL (ref 8–12)
POTASSIUM BLD-SCNC: 3.6 MMOL/L (ref 3.5–5.1)
PROT SERPL-MCNC: 6.6 G/DL (ref 6.3–8.6)
RBC # BLD AUTO: 3.71 10*6/MM3 (ref 4.37–5.74)
SODIUM BLD-SCNC: 136 MMOL/L (ref 137–145)
VIT B12 BLD-MCNC: 271 PG/ML (ref 239–931)
WBC NRBC COR # BLD: 8.14 10*3/MM3 (ref 3.2–9.8)

## 2017-12-02 PROCEDURE — 85730 THROMBOPLASTIN TIME PARTIAL: CPT | Performed by: FAMILY MEDICINE

## 2017-12-02 PROCEDURE — 25010000002 MORPHINE PER 10 MG: Performed by: FAMILY MEDICINE

## 2017-12-02 PROCEDURE — 99232 SBSQ HOSP IP/OBS MODERATE 35: CPT | Performed by: THORACIC SURGERY (CARDIOTHORACIC VASCULAR SURGERY)

## 2017-12-02 PROCEDURE — 82607 VITAMIN B-12: CPT | Performed by: INTERNAL MEDICINE

## 2017-12-02 PROCEDURE — 85730 THROMBOPLASTIN TIME PARTIAL: CPT | Performed by: INTERNAL MEDICINE

## 2017-12-02 PROCEDURE — 25010000002 HEPARIN (PORCINE) PER 1000 UNITS: Performed by: INTERNAL MEDICINE

## 2017-12-02 PROCEDURE — 99232 SBSQ HOSP IP/OBS MODERATE 35: CPT | Performed by: INTERNAL MEDICINE

## 2017-12-02 PROCEDURE — 80053 COMPREHEN METABOLIC PANEL: CPT | Performed by: NURSE PRACTITIONER

## 2017-12-02 PROCEDURE — 85025 COMPLETE CBC W/AUTO DIFF WBC: CPT | Performed by: NURSE PRACTITIONER

## 2017-12-02 PROCEDURE — 25010000002 NA FERRIC GLUC CPLX PER 12.5 MG: Performed by: INTERNAL MEDICINE

## 2017-12-02 PROCEDURE — 82746 ASSAY OF FOLIC ACID SERUM: CPT | Performed by: INTERNAL MEDICINE

## 2017-12-02 PROCEDURE — 25010000002 MORPHINE PER 10 MG: Performed by: NURSE PRACTITIONER

## 2017-12-02 RX ORDER — SODIUM CHLORIDE 9 MG/ML
INJECTION, SOLUTION INTRAVENOUS
Status: COMPLETED
Start: 2017-12-02 | End: 2017-12-02

## 2017-12-02 RX ORDER — RANOLAZINE 500 MG/1
1000 TABLET, EXTENDED RELEASE ORAL EVERY 12 HOURS SCHEDULED
Status: DISCONTINUED | OUTPATIENT
Start: 2017-12-02 | End: 2017-12-04 | Stop reason: HOSPADM

## 2017-12-02 RX ORDER — ATORVASTATIN CALCIUM 40 MG/1
80 TABLET, FILM COATED ORAL NIGHTLY
Status: DISCONTINUED | OUTPATIENT
Start: 2017-12-02 | End: 2017-12-04 | Stop reason: HOSPADM

## 2017-12-02 RX ORDER — MORPHINE SULFATE 1 MG/ML
2 INJECTION, SOLUTION EPIDURAL; INTRATHECAL; INTRAVENOUS
Status: DISCONTINUED | OUTPATIENT
Start: 2017-12-02 | End: 2017-12-04 | Stop reason: HOSPADM

## 2017-12-02 RX ADMIN — SODIUM CHLORIDE 125 MG: 9 INJECTION, SOLUTION INTRAVENOUS at 10:03

## 2017-12-02 RX ADMIN — RANOLAZINE 1000 MG: 500 TABLET, FILM COATED, EXTENDED RELEASE ORAL at 09:23

## 2017-12-02 RX ADMIN — FAMOTIDINE 20 MG: 20 TABLET ORAL at 18:43

## 2017-12-02 RX ADMIN — ALPRAZOLAM 0.25 MG: 0.25 TABLET ORAL at 21:45

## 2017-12-02 RX ADMIN — Medication 2 MG: at 15:29

## 2017-12-02 RX ADMIN — Medication 2 MG: at 09:29

## 2017-12-02 RX ADMIN — Medication 2 MG: at 12:20

## 2017-12-02 RX ADMIN — TAMSULOSIN HYDROCHLORIDE 0.4 MG: 0.4 CAPSULE ORAL at 21:48

## 2017-12-02 RX ADMIN — TERAZOSIN HYDROCHLORIDE 1 MG: 1 CAPSULE ORAL at 21:47

## 2017-12-02 RX ADMIN — LABETALOL HCL 100 MG: 300 TABLET, FILM COATED ORAL at 09:23

## 2017-12-02 RX ADMIN — NITROGLYCERIN 80 MCG/MIN: 20 INJECTION INTRAVENOUS at 09:24

## 2017-12-02 RX ADMIN — HYDRALAZINE HYDROCHLORIDE 50 MG: 50 TABLET ORAL at 15:23

## 2017-12-02 RX ADMIN — MORPHINE SULFATE 2 MG: 2 INJECTION, SOLUTION INTRAMUSCULAR; INTRAVENOUS at 01:21

## 2017-12-02 RX ADMIN — ALPRAZOLAM 0.25 MG: 0.25 TABLET ORAL at 03:51

## 2017-12-02 RX ADMIN — NITROGLYCERIN 80 MCG/MIN: 20 INJECTION INTRAVENOUS at 21:47

## 2017-12-02 RX ADMIN — LABETALOL HCL 100 MG: 300 TABLET, FILM COATED ORAL at 21:46

## 2017-12-02 RX ADMIN — FAMOTIDINE 20 MG: 20 TABLET ORAL at 09:23

## 2017-12-02 RX ADMIN — Medication 2 MG: at 04:01

## 2017-12-02 RX ADMIN — NITROGLYCERIN 80 MCG/MIN: 20 INJECTION INTRAVENOUS at 01:21

## 2017-12-02 RX ADMIN — FLUTICASONE PROPIONATE 2 SPRAY: 50 SPRAY, METERED NASAL at 09:24

## 2017-12-02 RX ADMIN — Medication 10 ML: at 12:20

## 2017-12-02 RX ADMIN — SODIUM CHLORIDE 500 ML: 9 INJECTION, SOLUTION INTRAVENOUS at 10:03

## 2017-12-02 RX ADMIN — RANOLAZINE 1000 MG: 500 TABLET, FILM COATED, EXTENDED RELEASE ORAL at 21:45

## 2017-12-02 RX ADMIN — ATORVASTATIN CALCIUM 80 MG: 40 TABLET, FILM COATED ORAL at 21:45

## 2017-12-02 RX ADMIN — POLYETHYLENE GLYCOL 3350 17 G: 17 POWDER, FOR SOLUTION ORAL at 09:22

## 2017-12-02 RX ADMIN — Medication 2 MG: at 21:44

## 2017-12-02 RX ADMIN — HEPARIN SODIUM 800 UNITS/HR: 10000 INJECTION, SOLUTION INTRAVENOUS at 10:02

## 2017-12-02 RX ADMIN — ALPRAZOLAM 0.25 MG: 0.25 TABLET ORAL at 10:12

## 2017-12-02 RX ADMIN — HYDRALAZINE HYDROCHLORIDE 50 MG: 50 TABLET ORAL at 21:45

## 2017-12-02 NOTE — PROGRESS NOTES
"Cardiovascular Daily Progress Note  Rafa Carrasco M.D., Ph.D., [facility]      Subjective     Interval History:   Currently CP free on nitro and heparin drips. Rested well. No new complaints.     Review of Systems - History obtained from the patient  Cardiovascular ROS: no chest pain or dyspnea on exertion    Objective     Vital Sign Min/Max for last 24 hours  Temp  Min: 97.4 °F (36.3 °C)  Max: 98.2 °F (36.8 °C)   BP  Min: 89/53  Max: 134/71   Pulse  Min: 69  Max: 87   No Data Recorded   SpO2  Min: 91 %  Max: 96 %   No Data Recorded   No Data Recorded     Flowsheet Rows         First Filed Value    Admission Height  69\" (175.3 cm) Documented at 11/27/2017 1722    Admission Weight  151 lb 12.8 oz (68.9 kg) Documented at 11/27/2017 1722        Last 3 weights    11/28/17  1630 12/01/17  0603 12/02/17  0502   Weight: 160 lb 15 oz (73 kg) (bed scales not working) (Bed scale is not working. )     Body mass index is 23.77 kg/(m^2).    Physical Exam:   GEN: alert, appears stated age and cooperative  Body Habitus: well-nourished  HEENT: Head: Normocephalic, no lesions, without obvious abnormality.  Neck / Thyroid: Supple, no masses, nodes, nodules or enlargement. No arcus senilis, xanthelasma or xanthomas.   JVP: 7 cm of water at 45 degrees HJR: absent      Carotid:  Upstroke: easily palpated bilaterally Volume: Normal.    Carotid Bruit:  None  Subclavian Bruit: Not present.    Chest:  Normal Excursion: Good    I:E: Good  Pulmonary:clear to auscultation, no wheezes, rales or rhonchi, symmetric air entry      Precordium:  No palpable heaves or thrusts. P2 is not palpable.   [place]:  normal size and placement Palpable S4: Not present.   Heart rate: normal  Heart Rhythm: regular     Heart Sounds: S1: normal  S2: normal intensity  S3: absent   S4: absent  Opening Snap: absent  A2-OS:  N/A  Pericardial rub: absent    Ejection click: None      Murmurs: Systolic: none  Diastolic: none  Abdomen: Soft, non-tender, normal bowel sounds; " no bruits, organomegaly or masses.  Extremity: no edema, cyanosis  Trophic changes: None   Pallor on elevation: Absent.    Rubor on dependency: None  Pulses: Right radial artery has 2+ (normal) pulse         DATA REVIEWED:    Lab Results (last 24 hours)     Procedure Component Value Units Date/Time    aPTT [[id number]]  (Abnormal) Collected:  12/01/17 0732    Specimen:  Blood Updated:  12/01/17 0840     PTT 58.3 (H) seconds     Narrative:       The recommended Heparin therapeutic range is 68-97 seconds.    Urinalysis With / Culture If Indicated - Urine, Clean Catch [[id number]]  (Normal) Collected:  12/01/17 0956    Specimen:  Urine from Urine, Clean Catch Updated:  12/01/17 1017     Color, UA Yellow     Appearance, UA Clear     pH, UA 6.0     Specific Gravity, UA 1.003      Result obtained by Refractometer        Glucose, UA Negative     Ketones, UA Negative     Bilirubin, UA Negative     Blood, UA Negative     Protein, UA Negative     Leuk Esterase, UA Negative     Nitrite, UA Negative     Urobilinogen, UA 1.0 E.U./dL    Narrative:       Urine microscopic not indicated.    Iron Profile [[id number]]  (Abnormal) Collected:  11/30/17 0140    Specimen:  Blood Updated:  12/01/17 1140     Iron 31 (L) mcg/dL      TIBC 298 mcg/dL      Iron Saturation 10 (L) %     Ferritin [[id number]]  (Normal) Collected:  11/30/17 0140    Specimen:  Blood Updated:  12/01/17 1321     Ferritin 42.60 ng/mL     aPTT [[id number]]  (Abnormal) Collected:  12/01/17 1909    Specimen:  Blood Updated:  12/01/17 2028     PTT 51.9 (H) seconds     Narrative:       The recommended Heparin therapeutic range is 68-97 seconds.    Vitamin B12 [[id number]] Collected:  12/02/17 0417    Specimen:  Blood Updated:  12/02/17 0448    CBC Auto Differential [[id number]]  (Abnormal) Collected:  12/02/17 0417    Specimen:  Blood Updated:  12/02/17 0453     WBC 8.14 10*3/mm3      RBC 3.71 (L) 10*6/mm3      Hemoglobin 10.3 (L) g/dL      Hematocrit 30.7 (L) %      MCV 82.7 fL       MCH 27.8 pg      MCHC 33.6 g/dL      RDW 14.3 %      RDW-SD 43.1 fl      MPV 9.3 fL      Platelets 163 10*3/mm3      Neutrophil % 45.0 %      Lymphocyte % 45.0 %      Monocyte % 7.7 %      Eosinophil % 2.0 %      Basophil % 0.1 %      Immature Grans % 0.2 %      Neutrophils, Absolute 3.66 10*3/mm3      Lymphocytes, Absolute 3.66 10*3/mm3      Monocytes, Absolute 0.63 10*3/mm3      Eosinophils, Absolute 0.16 10*3/mm3      Basophils, Absolute 0.01 10*3/mm3      Immature Grans, Absolute 0.02 10*3/mm3     CBC & Differential [[id number]] Collected:  12/02/17 0417    Specimen:  Blood Updated:  12/02/17 0453    Narrative:       The following orders were created for panel order CBC & Differential.  Procedure                               Abnormality         Status                     ---------                               -----------         ------                     CBC Auto Differential[[id number]]        Abnormal            Final result                 Please view results for these tests on the individual orders.    Comprehensive Metabolic Panel [[id number]]  (Abnormal) Collected:  12/02/17 0417    Specimen:  Blood Updated:  12/02/17 0505     Glucose 100 mg/dL      BUN 15 mg/dL      Creatinine 2.20 (H) mg/dL      Sodium 136 (L) mmol/L      Potassium 3.6 mmol/L      Chloride 102 mmol/L      CO2 22.0 mmol/L      Calcium 8.8 mg/dL      Total Protein 6.6 g/dL      Albumin 3.60 g/dL      ALT (SGPT) 30 U/L      AST (SGOT) 24 U/L      Alkaline Phosphatase 97 U/L      Total Bilirubin 0.6 mg/dL      eGFR Non African Amer 31 (L) mL/min/1.73      Globulin 3.0 gm/dL      A/G Ratio 1.2 g/dL      BUN/Creatinine Ratio 6.8 (L)     Anion Gap 12.0 mmol/L     aPTT [[id number]]  (Abnormal) Collected:  12/02/17 0417    Specimen:  Blood Updated:  12/02/17 0508     PTT 50.2 (H) seconds     Narrative:       The recommended Heparin therapeutic range is 68-97 seconds.    Folate [[id number]]  (Normal) Collected:  12/02/17 0417    Specimen:  Blood  Updated:  12/02/17 0634     Folate 5.48 ng/mL         Imaging Results (last 24 hours)     ** No results found for the last 24 hours. **                                    Assessment/Plan      1. NSTEMI with MV ASCAD. Currently on Heparin and nitro awaiting CABG with Dr. Dejesus.  ADP% was 43% on 11/30/2017 and CA of 44%. Most recent post for CABG was Monday, 12/4/2017.   -Agree with CABG, per CTS  -Increase Atorvastatin to 80mg  -Increase Ranexa to 1000 mg PO BID  -Antiplatelets on hold for CABG  -Continue Labetalol, as allergy to lopressor was noted  -Continue nitro and heparin drips    2. Mild AI. NYHA stage B.  -Out-pt follow-up    3. MENG: Mild plaque bilaterally.  -Antiplatelets, post-op  -Statin      Thank you for allowing me to participate in the care of your patient.  If I can be of any further assistance, please do not hesitate contact me.          This document has been electronically signed by Rafa Carrasco MD PhD on December 2, 2017 6:35 AM

## 2017-12-02 NOTE — PROGRESS NOTES
[facility] Medicine Services  INPATIENT PROGRESS NOTE     LOS: 5 days   Patient Care Team:  AHSAN Mayer as PCP - General    Chief Complaint:  <principal problem not specified>      Subjective     Interval History:   Patient is seen for follow-up today. He continues to have periodic chest pressure and remains on heparin and nitroglycerin infusion.  Constipation has resolved with bowel regimen.  Patient otherwise voices no new complaints.      Patient Complaints: As above    History taken from: Patient    Review of Systems:    Review of Systems   Constitutional: Negative for activity change, appetite change, chills, diaphoresis, fatigue and fever.   HENT: Negative for postnasal drip, trouble swallowing and voice change.    Eyes: Negative for photophobia and visual disturbance.   Respiratory: Negative for cough, choking, chest tightness, shortness of breath, wheezing and stridor.    Cardiovascular: Positive for chest pain. Negative for palpitations and leg swelling.   Gastrointestinal: Negative for abdominal distention, abdominal pain, blood in stool, constipation, diarrhea, nausea and vomiting.   Endocrine: Negative for cold intolerance, heat intolerance, polydipsia, polyphagia and polyuria.   Genitourinary: Negative for decreased urine volume, difficulty urinating, dysuria, enuresis, flank pain, frequency, hematuria and urgency.   Musculoskeletal: Negative for arthralgias, gait problem, myalgias, neck pain and neck stiffness.   Skin: Negative for pallor, rash and wound.   Neurological: Negative for dizziness, tremors, seizures, syncope, facial asymmetry, speech difficulty, weakness, light-headedness, numbness and headaches.   Hematological: Does not bruise/bleed easily.   Psychiatric/Behavioral: Negative for agitation, behavioral problems and confusion.         Objective     Vital Signs  Temp:  [97.4 °F (36.3 °C)-98 °F (36.7 °C)] 98 °F (36.7 °C)  Heart Rate:   [69-91] 88  BP: ()/(53-87) 133/87    Physical Exam:   Physical Exam   Constitutional: He is oriented to person, place, and time. He appears well-developed and well-nourished. No distress.   HENT:   Head: Normocephalic and atraumatic.   Eyes: EOM are normal. Pupils are equal, round, and reactive to light. Right eye exhibits no discharge. Left eye exhibits no discharge. No scleral icterus.   Neck: Normal range of motion. Neck supple. No JVD present. No thyromegaly present.   Cardiovascular: Normal rate, regular rhythm and normal heart sounds.  Exam reveals no gallop and no friction rub.    No murmur heard.  Pulmonary/Chest: Effort normal and breath sounds normal. He has no wheezes. He has no rales. He exhibits no tenderness.   Abdominal: Soft. Bowel sounds are normal. He exhibits no distension and no mass. There is no tenderness. There is no rebound and no guarding.   Musculoskeletal: He exhibits no edema, tenderness or deformity.   Neurological: He is alert and oriented to person, place, and time. No cranial nerve deficit. He exhibits normal muscle tone. Coordination normal.   Skin: Skin is warm and dry. No rash noted. He is not diaphoretic. No erythema. No pallor.   Psychiatric: He has a normal mood and affect. His behavior is normal. Judgment and thought content normal.   Nursing note and vitals reviewed.         Results Review:         Results from last 7 days  Lab Units 12/02/17  0417 12/01/17  0421 11/30/17  0140 11/29/17  0143 11/28/17  1031 11/27/17  1710   SODIUM mmol/L 136* 135* 139 138 137 137   POTASSIUM mmol/L 3.6 3.8 3.6 3.6 4.0 3.8   CHLORIDE mmol/L 102 101 101 101 105 101   CO2 mmol/L 22.0 24.0 24.0 25.0 22.0 23.0   BUN mg/dL 15 16 19 24* 23* 26*   CREATININE mg/dL 2.20* 2.15* 2.09* 2.10* 1.83* 1.98*   GLUCOSE mg/dL 100 113* 119* 103* 90 96   CALCIUM mg/dL 8.8 8.7 8.5 8.4 8.6 9.5   BILIRUBIN mg/dL 0.6 0.7 0.4 0.5 0.6 0.6   ALK PHOS U/L 97 94 88 90 95 99   ALT (SGPT) U/L 30 24 26 21 28 20*   AST  (SGOT) U/L 24 29 20 22 31 21               Results from last 7 days  Lab Units 12/02/17  0417 12/01/17  0421 11/30/17  0141 11/30/17  0140 11/29/17  0143 11/28/17  1031 11/27/17  1828   WBC 10*3/mm3 8.14 7.82  --  8.17 6.78 8.85 9.29   HEMOGLOBIN g/dL 10.3* 10.1*  --  10.2* 10.7* 11.3* 12.2*   HEMATOCRIT % 30.7* 30.1*  --  30.1* 31.4* 33.1* 36.1*   PLATELETS 10*3/mm3 163 150 143* 143* 139* 153 162       Lab Results   Component Value Date    CKTOTAL 25 (L) 05/12/2017    CKMB 0.55 05/12/2017    TROPONINI 0.522 (C) 11/28/2017       CO2   Date Value Ref Range Status   12/02/2017 22.0 22.0 - 31.0 mmol/L Final         Results from last 7 days  Lab Units 11/28/17  1743 11/27/17  2040   INR  0.99 1.03        Imaging Results (last 7 days)     ** No results found for the last 168 hours. **                                    Medication Review:   Current Facility-Administered Medications   Medication Dose Route Frequency Provider Last Rate Last Dose   • ALPRAZolam (XANAX) tablet 0.25 mg  0.25 mg Oral 4x Daily PRN Seymour Ricci MD   0.25 mg at 12/02/17 0351   • atorvastatin (LIPITOR) tablet 80 mg  80 mg Oral Nightly Rafa Carrasco MD PhD       • bisacodyl (DULCOLAX) EC tablet 10 mg  10 mg Oral Daily Seymour Ricci MD   10 mg at 12/01/17 0926   • [START ON 12/4/2017] ceFAZolin (ANCEF) in SWFI 2 g/20ml IV PUSH syringe  2 g Intravenous On Call to OR AHSAN Rand       • famotidine (PEPCID) tablet 20 mg  20 mg Oral BID AHSAN Whitaker   20 mg at 12/02/17 0923   • ferric gluconate (FERRLECIT) 125 mg in sodium chloride 0.9 % 110 mL IVPB  125 mg Intravenous Daily Vinai Hannah, MD       • fluticasone (FLONASE) 50 MCG/ACT nasal spray 2 spray  2 spray Each Nare Daily Seymour Ricci MD   2 spray at 12/02/17 0924   • heparin 59941 units/250 mL (100 units/mL) in 0.45 % NaCl infusion  18 Units/kg/hr Intravenous Titrated Carlos Charles MD 8 mL/hr at 12/01/17 0146 11.611 Units/kg/hr at 12/01/17  0146    And   • heparin (porcine) 5000 UNIT/ML injection 5,500 Units  80 Units/kg Intravenous PRN Carlos Charles MD        And   • heparin (porcine) 5000 UNIT/ML injection 2,800 Units  40 Units/kg Intravenous PRN Carlos Charles MD       • hydrALAZINE (APRESOLINE) injection 10 mg  10 mg Intravenous Q6H PRN Nataliya G Levill, APRN   10 mg at 11/27/17 1718   • hydrALAZINE (APRESOLINE) tablet 50 mg  50 mg Oral Q8H Adriane Butler MD   50 mg at 12/01/17 2123   • labetalol (NORMODYNE) tablet 100 mg  100 mg Oral Q12H Adriane Butler MD   100 mg at 12/02/17 0923   • LORazepam (ATIVAN) tablet 0.5 mg  0.5 mg Oral Q8H PRN Elizabeth J Edelen, APRN   0.5 mg at 11/30/17 0900   • morphine injection 2 mg  2 mg Intravenous Q2H PRN Taz Guzmán MD   2 mg at 12/02/17 0929   • nitroglycerin 50 mg/250 mL (0.2 mg/mL) infusion  5-200 mcg/min Intravenous Titrated Nataliya G Levill, APRN 24 mL/hr at 12/02/17 0924 80 mcg/min at 12/02/17 0924   • ondansetron (ZOFRAN) injection 4 mg  4 mg Intravenous Q6H PRN Nataliya G Levill, APRN   4 mg at 12/01/17 1140   • pantoprazole (PROTONIX) EC tablet 40 mg  40 mg Oral Q AM Nataliya G Levill, APRN   40 mg at 12/01/17 0614   • polyethylene glycol 3350 powder (packet)  17 g Oral Daily Elizabeth J Edelen, APRN   17 g at 12/02/17 0922   • ranolazine (RANEXA) 12 hr tablet 1,000 mg  1,000 mg Oral Q12H Rafa Carrasco MD PhD   1,000 mg at 12/02/17 0923   • sodium chloride 0.9 % flush 1-10 mL  1-10 mL Intravenous PRN Nataliya G Levill, APRN   10 mL at 12/01/17 1329   • tamsulosin (FLOMAX) 24 hr capsule 0.4 mg  0.4 mg Oral Nightly Nataliya G Levill, APRN   0.4 mg at 12/01/17 2124   • terazosin (HYTRIN) capsule 1 mg  1 mg Oral Nightly Nataliya G Levill, APRN   1 mg at 12/01/17 2124         Assessment/Plan   1. NSTEMI S/p cardiac cath. with multi-vessel coronary artery disease: Continue current optimal medical management while awaiting cardiac revascularization.  Echocardiogram Shows mild concentric left  ventricular wall thickness, normal left ventricular systolic function with ejection fraction of 65%.  There is hypokinesis of the inferior wall.  Cardiologist and CT surgery are actively following. Patient is awaiting revascularization surgery.  2.  Acute on chronickidney injury: Creatinine has increased to 2.20 today. Continue to monitor renal function and avoiding all nephrotoxic agents. Nephrologist is following.  3.  Hypertension: Stable.  4.  Constipation: Resolved.  Continue bowel regimen prn.  5.  Continue GI and DVT prophylaxis.        Seymour Ricci MD  12/02/17      EMR Dragon/Transcription disclaimer:   Much of this encounter note is an electronic transcription/translation of spoken language to printed text. The electronic translation of spoken language may permit erroneous, or at times, nonsensical words or phrases to be inadvertently transcribed; Although I have reviewed the note for such errors, some may still exist.

## 2017-12-02 NOTE — PROGRESS NOTES
"[facility] DAILY NOTE     LOS: 5 days   CAD  Patient Care Team:  AHSAN Mayer as PCP - General    Chief Complaint: CAD    Subjective:  Symptoms:  Stable.  He reports chest pain and anxiety.    Diet:  Adequate intake.        Vital Signs  Temp:  [97.4 °F (36.3 °C)-98 °F (36.7 °C)] 98 °F (36.7 °C)  Heart Rate:  [69-91] 88  BP: ()/(53-87) 133/87  Body mass index is 23.77 kg/(m^2).    Intake/Output Summary (Last 24 hours) at 12/02/17 0926  Last data filed at 12/02/17 0800   Gross per 24 hour   Intake              384 ml   Output             1765 ml   Net            -1381 ml     I/O this shift:  In: -   Out: 200 [Urine:200]    Wt Readings from Last 3 Encounters:   10/31/17 157 lb 8 oz (71.4 kg)   06/12/17 153 lb (69.4 kg)   05/13/17 148 lb 6.4 oz (67.3 kg)       Objective:  General Appearance:  Comfortable and in no acute distress.    Vital signs: (most recent): Blood pressure 133/87, pulse 88, temperature 98 °F (36.7 °C), temperature source Oral, resp. rate 17, height 69\" (175.3 cm), weight 160 lb 15 oz (73 kg), SpO2 93 %.    Lungs:  Normal effort.  Breath sounds clear to auscultation.    Heart: Normal rate.  Regular rhythm.    Abdomen: Abdomen is soft.    Pulses: Distal pulses are intact.    Neurological: Patient is alert and oriented to person, place and time.    Skin:  Warm and dry.            Incisions:  Clean and dry.    Results Review:      WBC No results found for: WBCS   HGB Hemoglobin   Date/Time Value Ref Range Status   12/02/2017 0417 10.3 (L) 13.7 - 17.3 g/dL Final   12/01/2017 0421 10.1 (L) 13.7 - 17.3 g/dL Final   11/30/2017 0140 10.2 (L) 13.7 - 17.3 g/dL Final      HCT Hematocrit   Date/Time Value Ref Range Status   12/02/2017 0417 30.7 (L) 39.0 - 49.0 % Final   12/01/2017 0421 30.1 (L) 39.0 - 49.0 % Final   11/30/2017 0140 30.1 (L) 39.0 - 49.0 % Final      Platlets No results found for: LABPLAT     PT/INR:  No results found for: PROTIME/No results found for: INR    Sodium Sodium   Date/Time Value " Ref Range Status   12/02/2017 0417 136 (L) 137 - 145 mmol/L Final   12/01/2017 0421 135 (L) 137 - 145 mmol/L Final   11/30/2017 0140 139 137 - 145 mmol/L Final      Potassium Potassium   Date/Time Value Ref Range Status   12/02/2017 0417 3.6 3.5 - 5.1 mmol/L Final   12/01/2017 0421 3.8 3.5 - 5.1 mmol/L Final   11/30/2017 0140 3.6 3.5 - 5.1 mmol/L Final      Chloride Chloride   Date/Time Value Ref Range Status   12/02/2017 0417 102 95 - 110 mmol/L Final   12/01/2017 0421 101 95 - 110 mmol/L Final   11/30/2017 0140 101 95 - 110 mmol/L Final      Bicarbonate No results found for: PLASMABICARB   BUN BUN   Date/Time Value Ref Range Status   12/02/2017 0417 15 7 - 21 mg/dL Final   12/01/2017 0421 16 7 - 21 mg/dL Final   11/30/2017 0140 19 7 - 21 mg/dL Final      Creatinine Creatinine   Date/Time Value Ref Range Status   12/02/2017 0417 2.20 (H) 0.70 - 1.30 mg/dL Final   12/01/2017 0421 2.15 (H) 0.70 - 1.30 mg/dL Final   11/30/2017 0140 2.09 (H) 0.70 - 1.30 mg/dL Final      Calcium Calcium   Date/Time Value Ref Range Status   12/02/2017 0417 8.8 8.4 - 10.2 mg/dL Final   12/01/2017 0421 8.7 8.4 - 10.2 mg/dL Final   11/30/2017 0140 8.5 8.4 - 10.2 mg/dL Final      Magnesium No results found for: MG     Imaging Results (last 72 hours)     Procedure Component Value Units Date/Time    US Vein Mapping Bilateral [[id number]] Collected:  11/29/17 1544     Updated:  11/29/17 1906    Narrative:       .  EXAMINATION:  Ultrasound, venous, lower extremity    CLINICAL INDICATION / HISTORY:  Preoperative evaluation   TECHNIQUE: Bilateral greater saphenous veins                          grayscale, color flow, spectral and  Doppler     FINDINGS:    Imaged vessels:                                           Right (mm)   Left (mm)  GSV - thigh, prox               5.5                  4.9  GSV - thigh, mid                4.7                  3.1  GSV - thigh, distal             4.6                  3.5  GSV - knee                        5.9                   3.6  GSV - calf, prox                 5.0                  2.9  GSV - calf, mid                  4.7                  4.7  GSV - calf, distal               5.4                  4.2      .    Impression:       CONCLUSION:  1. Venous mapping with sizes as above.    Electronically signed by:  TK Santiago MD  11/29/2017 7:05  PM CST Workstation: [id number]    US Carotid Bilateral [[id number]] Collected:  11/29/17 1544     Updated:  11/29/17 1910    Narrative:       PROCEDURE: US CAROTID BILATERAL    INDICATION: Left carotid bruit. Preop evaluation for CABG.    COMPARISON: None.    RIGHT CAROTID BIFURCATION FINDINGS.    Peak systolic velocities in centimeters per second. CCA 58, ICA  73, . Right ICA/CCA peak systolic velocity ratio 1.3.    Real-time imaging demonstrates small amount of plaque bulb and  the origin of the right ICA with well less than 50% stenosis with  peak systolic velocity at the level of the plaque of 44 cm/s.    LEFT CAROTID BIFURCATION FINDINGS.    Peak systolic velocities in centimeters per second. Left CCA 85,  ICA 95, ECA 90. Left ICA/CCA peak systolic velocity ratio 1.1.    Real-time imaging demonstrates small amount of plaque in the left  carotid bulb and origin proximal left ICA with less than 50%  stenosis of the left ICA. Peak systolic velocity at the level of  plaque in the proximal left ICA 69 cm/s.    VERTEBRAL ARTERIES. There is antegrade flow through both  vertebral arteries.      Impression:       Small amount of plaque in the origin proximal right  ICA with less than 50% stenosis.    Small amount of plaque in the left carotid bulb and proximal left  ICA with less than 50% stenosis.    Antegrade flow through both vertebral arteries.    [id number]      Electronically signed by:  Luis Pruitt MD  11/29/2017 7:09  PM CST Workstation: [id number]    XR Chest 1 View [[id number]] Collected:  11/30/17 0537     Updated:  11/30/17 0619    Narrative:       Exam: AP  portable chest    INDICATION: Shortness of breath    COMPARISON: 5/11/2017    FINDINGS: AP portable chest. Status post anterior fusion  discectomy in the lower cervical spine. The bony structures are  intact. The cardiomediastinal silhouette is unremarkable lungs  are clear. No pneumothorax or pleural effusion.      Impression:       No acute cardiopulmonary abnormality.    Electronically signed by:  Adam Lopez MD  11/30/2017 6:17 AM  CST Workstation: [id number]              atorvastatin 80 mg Oral Nightly   bisacodyl 10 mg Oral Daily   [START ON 12/4/2017] ceFAZolin 2 g Intravenous On Call to OR   famotidine 20 mg Oral BID   fluticasone 2 spray Each Nare Daily   hydrALAZINE 50 mg Oral Q8H   labetalol 100 mg Oral Q12H   pantoprazole 40 mg Oral Q AM   polyethylene glycol 17 g Oral Daily   ranolazine 1,000 mg Oral Q12H   tamsulosin 0.4 mg Oral Nightly   terazosin 1 mg Oral Nightly       heparin (porcine) 18 Units/kg/hr Last Rate: 11.611 Units/kg/hr (12/01/17 0146)   nitroglycerin 5-200 mcg/min Last Rate: 80 mcg/min (12/02/17 0924)         Patient Active Problem List   Diagnosis Code   • Essential hypertension I10   • Hypertensive emergency, no CHF I16.1   • Coronary arteriosclerosis I25.10   • Chronic kidney disease, stage 3 N18.3   • LVH (left ventricular hypertrophy) I51.7   • NSTEMI (non-ST elevated myocardial infarction) I21.4   • Unstable angina I20.0         Assessment:  (CAD  CKD3  Worse following contrast from cardiac catheterization (baseline 1.7, now 2.2)  Platelet dysfunction (Brilinta)  ).     Plan:   (CABG Monday  Allowing time for renal recovery and platelet function recovery to decrease risk of renal failure and bleeding.).       Brando Oh MD  12/02/17  9:26 AM

## 2017-12-02 NOTE — PLAN OF CARE
Problem: Patient Care Overview (Adult)  Goal: Plan of Care Review  Outcome: Ongoing (interventions implemented as appropriate)    12/02/17 0500   Coping/Psychosocial Response Interventions   Plan Of Care Reviewed With patient   Patient Care Overview   Progress no change   Outcome Evaluation   Outcome Summary/Follow up Plan Patient did not have any changes through the night. Will continue to monitor closely.       Goal: Adult Individualization and Mutuality  Outcome: Ongoing (interventions implemented as appropriate)  Goal: Discharge Needs Assessment  Outcome: Ongoing (interventions implemented as appropriate)    Problem: Cardiac Output, Decreased (NICU)  Goal: Identify Related Risk Factors and Signs and Symptoms  Outcome: Ongoing (interventions implemented as appropriate)

## 2017-12-02 NOTE — PROGRESS NOTES
"[facility]  [address] [place] [address]. [address]  T - [phone]  F - [phone]     Progress Note          PATIENT  DEMOGRAPHICS   PATIENT NAME: Lyle Corona                      PHYSICIAN: Jl Young MD  : 1965  MRN: [id number]   LOS: 5 days    Patient Care Team:  AHSAN Mayer as PCP - General  Subjective   SUBJECTIVE   Still has chest pain rated as 6/10. No SOB, nausea, vomiting, LE edema.          Objective   OBJECTIVE   Vital Signs  Temp:  [97.4 °F (36.3 °C)-98 °F (36.7 °C)] 98 °F (36.7 °C)  Heart Rate:  [69-91] 88  BP: ()/(53-87) 133/87    Flowsheet Rows         First Filed Value    Admission Height  69\" (175.3 cm) Documented at 2017 1722    Admission Weight  151 lb 12.8 oz (68.9 kg) Documented at 2017 1722           I/O last 3 completed shifts:  In: 838 [I.V.:838]  Out: 3710 [Urine:3710]    PHYSICAL EXAM    Physical Exam   Constitutional: He is oriented to person, place, and time. He appears well-developed.   Moderately nourished   HENT:   Head: Normocephalic and atraumatic.   Cardiovascular: Normal rate, regular rhythm and normal heart sounds.  Exam reveals no gallop and no friction rub.    No murmur heard.  Pulmonary/Chest: Effort normal and breath sounds normal. No respiratory distress. He has no wheezes. He has no rales. He exhibits no tenderness.   Abdominal: Soft. Bowel sounds are normal. He exhibits no distension and no mass. There is no tenderness. There is no guarding.   Musculoskeletal: He exhibits no edema or tenderness.   Neurological: He is alert and oriented to person, place, and time.   Skin: Skin is warm and dry.   Nursing note and vitals reviewed.      RESULTS   Results Review:      Results from last 7 days  Lab Units 17  0417 17  0421 17  0140   SODIUM mmol/L 136* 135* 139   POTASSIUM mmol/L 3.6 3.8 3.6   CHLORIDE mmol/L 102 101 101   CO2 mmol/L 22.0 24.0 24.0   BUN mg/dL 15 16 19   CREATININE " mg/dL 2.20* 2.15* 2.09*   CALCIUM mg/dL 8.8 8.7 8.5   BILIRUBIN mg/dL 0.6 0.7 0.4   ALK PHOS U/L 97 94 88   ALT (SGPT) U/L 30 24 26   AST (SGOT) U/L 24 29 20   GLUCOSE mg/dL 100 113* 119*       Estimated Creatinine Clearance: 40.1 mL/min (by C-G formula based on Cr of 2.2).        Results from last 7 days  Lab Units 12/02/17  0417 12/01/17  0421 11/30/17  0141 11/30/17  0140 11/29/17  0143 11/28/17  1031   WBC 10*3/mm3 8.14 7.82  --  8.17 6.78 8.85   HEMOGLOBIN g/dL 10.3* 10.1*  --  10.2* 10.7* 11.3*   PLATELETS 10*3/mm3 163 150 143* 143* 139* 153         Results from last 7 days  Lab Units 11/28/17  1743 11/27/17  2040   INR  0.99 1.03         Imaging Results (last 24 hours)     ** No results found for the last 24 hours. **           MEDICATIONS      atorvastatin 80 mg Oral Nightly   bisacodyl 10 mg Oral Daily   [START ON 12/4/2017] ceFAZolin 2 g Intravenous On Call to OR   famotidine 20 mg Oral BID   fluticasone 2 spray Each Nare Daily   hydrALAZINE 50 mg Oral Q8H   labetalol 100 mg Oral Q12H   pantoprazole 40 mg Oral Q AM   polyethylene glycol 17 g Oral Daily   ranolazine 1,000 mg Oral Q12H   tamsulosin 0.4 mg Oral Nightly   terazosin 1 mg Oral Nightly       heparin (porcine) 18 Units/kg/hr Last Rate: 11.611 Units/kg/hr (12/01/17 0146)   nitroglycerin 5-200 mcg/min Last Rate: 80 mcg/min (12/02/17 0121)       Assessment/Plan   ASSESSMENT / PLAN    Active Problems:    NSTEMI (non-ST elevated myocardial infarction)    Unstable angina    1. CKD 3: baseline creatinine 1.7-1.9 mg/dl  - Creatinine is 2.2 mg/dl this morning. This is overall stable. Has mild elevation in creatinine post contrast exposure.   - Hold HCTZ and Cozaar    2. Hypertension:  - Blood pressure is acceptable. HOLD HCTZ and Cozaar. On labetalol 100mg BID, hydralazine 50mg TID and terazosin 1mg daily. Also on nitro drip    3. Anemia:  - Hemoglobin is low but acceptable at 10.3.  Iron deficient with ferritin of 42, Tsat 10%. B12 271, folate 5.4  - Will  start IV iron    4. NSTEMI:   - s/p left heart cath which shows multivessel disease, and plan for CABG next week. Currently on nitroglycerin and heparin infusion.               This document has been electronically signed by Jl Young MD on December 2, 2017 9:15 AM

## 2017-12-03 LAB
ALBUMIN SERPL-MCNC: 3.4 G/DL (ref 3.4–4.8)
ALBUMIN/GLOB SERPL: 1.1 G/DL (ref 1.1–1.8)
ALP SERPL-CCNC: 100 U/L (ref 38–126)
ALT SERPL W P-5'-P-CCNC: 30 U/L (ref 21–72)
ANION GAP SERPL CALCULATED.3IONS-SCNC: 12 MMOL/L (ref 5–15)
APTT PPP: 69.7 SECONDS (ref 20–40.3)
APTT PPP: 92 SECONDS (ref 20–40.3)
AST SERPL-CCNC: 22 U/L (ref 17–59)
BASOPHILS # BLD AUTO: 0.01 10*3/MM3 (ref 0–0.2)
BASOPHILS NFR BLD AUTO: 0.1 % (ref 0–2)
BILIRUB SERPL-MCNC: 0.9 MG/DL (ref 0.2–1.3)
BUN BLD-MCNC: 12 MG/DL (ref 7–21)
BUN/CREAT SERPL: 5.3 (ref 7–25)
CALCIUM SPEC-SCNC: 8.7 MG/DL (ref 8.4–10.2)
CHLORIDE SERPL-SCNC: 100 MMOL/L (ref 95–110)
CO2 SERPL-SCNC: 23 MMOL/L (ref 22–31)
CREAT BLD-MCNC: 2.25 MG/DL (ref 0.7–1.3)
DEPRECATED RDW RBC AUTO: 43.3 FL (ref 35.1–43.9)
EOSINOPHIL # BLD AUTO: 0.16 10*3/MM3 (ref 0–0.7)
EOSINOPHIL NFR BLD AUTO: 2 % (ref 0–7)
ERYTHROCYTE [DISTWIDTH] IN BLOOD BY AUTOMATED COUNT: 14.4 % (ref 11.5–14.5)
GFR SERPL CREATININE-BSD FRML MDRD: 31 ML/MIN/1.73 (ref 60–130)
GLOBULIN UR ELPH-MCNC: 3.1 GM/DL (ref 2.3–3.5)
GLUCOSE BLD-MCNC: 101 MG/DL (ref 60–100)
HCT VFR BLD AUTO: 28.7 % (ref 39–49)
HGB BLD-MCNC: 9.7 G/DL (ref 13.7–17.3)
HOLD SPECIMEN: NORMAL
IMM GRANULOCYTES # BLD: 0.04 10*3/MM3 (ref 0–0.02)
IMM GRANULOCYTES NFR BLD: 0.5 % (ref 0–0.5)
LYMPHOCYTES # BLD AUTO: 3.64 10*3/MM3 (ref 0.6–4.2)
LYMPHOCYTES NFR BLD AUTO: 45.5 % (ref 10–50)
MCH RBC QN AUTO: 27.9 PG (ref 26.5–34)
MCHC RBC AUTO-ENTMCNC: 33.8 G/DL (ref 31.5–36.3)
MCV RBC AUTO: 82.5 FL (ref 80–98)
MONOCYTES # BLD AUTO: 0.68 10*3/MM3 (ref 0–0.9)
MONOCYTES NFR BLD AUTO: 8.5 % (ref 0–12)
NEUTROPHILS # BLD AUTO: 3.47 10*3/MM3 (ref 2–8.6)
NEUTROPHILS NFR BLD AUTO: 43.4 % (ref 37–80)
PLATELET # BLD AUTO: 162 10*3/MM3 (ref 150–450)
PMV BLD AUTO: 8.9 FL (ref 8–12)
POTASSIUM BLD-SCNC: 3.6 MMOL/L (ref 3.5–5.1)
PROT SERPL-MCNC: 6.5 G/DL (ref 6.3–8.6)
RBC # BLD AUTO: 3.48 10*6/MM3 (ref 4.37–5.74)
SODIUM BLD-SCNC: 135 MMOL/L (ref 137–145)
WBC NRBC COR # BLD: 8 10*3/MM3 (ref 3.2–9.8)

## 2017-12-03 PROCEDURE — 93010 ELECTROCARDIOGRAM REPORT: CPT | Performed by: INTERNAL MEDICINE

## 2017-12-03 PROCEDURE — 25010000002 HEPARIN (PORCINE) PER 1000 UNITS: Performed by: THORACIC SURGERY (CARDIOTHORACIC VASCULAR SURGERY)

## 2017-12-03 PROCEDURE — 25010000002 NA FERRIC GLUC CPLX PER 12.5 MG: Performed by: INTERNAL MEDICINE

## 2017-12-03 PROCEDURE — 85730 THROMBOPLASTIN TIME PARTIAL: CPT | Performed by: THORACIC SURGERY (CARDIOTHORACIC VASCULAR SURGERY)

## 2017-12-03 PROCEDURE — 94760 N-INVAS EAR/PLS OXIMETRY 1: CPT

## 2017-12-03 PROCEDURE — 85025 COMPLETE CBC W/AUTO DIFF WBC: CPT | Performed by: NURSE PRACTITIONER

## 2017-12-03 PROCEDURE — 93005 ELECTROCARDIOGRAM TRACING: CPT | Performed by: INTERNAL MEDICINE

## 2017-12-03 PROCEDURE — 80053 COMPREHEN METABOLIC PANEL: CPT | Performed by: NURSE PRACTITIONER

## 2017-12-03 PROCEDURE — 25010000002 MORPHINE PER 10 MG: Performed by: FAMILY MEDICINE

## 2017-12-03 PROCEDURE — 99232 SBSQ HOSP IP/OBS MODERATE 35: CPT | Performed by: INTERNAL MEDICINE

## 2017-12-03 PROCEDURE — 85730 THROMBOPLASTIN TIME PARTIAL: CPT | Performed by: INTERNAL MEDICINE

## 2017-12-03 RX ADMIN — Medication 2 MG: at 20:34

## 2017-12-03 RX ADMIN — POLYETHYLENE GLYCOL 3350 17 G: 17 POWDER, FOR SOLUTION ORAL at 08:24

## 2017-12-03 RX ADMIN — RANOLAZINE 1000 MG: 500 TABLET, FILM COATED, EXTENDED RELEASE ORAL at 21:10

## 2017-12-03 RX ADMIN — ATORVASTATIN CALCIUM 80 MG: 40 TABLET, FILM COATED ORAL at 21:08

## 2017-12-03 RX ADMIN — LABETALOL HCL 100 MG: 300 TABLET, FILM COATED ORAL at 21:11

## 2017-12-03 RX ADMIN — FAMOTIDINE 20 MG: 20 TABLET ORAL at 08:24

## 2017-12-03 RX ADMIN — Medication 2 MG: at 14:54

## 2017-12-03 RX ADMIN — Medication 2 MG: at 03:41

## 2017-12-03 RX ADMIN — NITROGLYCERIN 70 MCG/MIN: 20 INJECTION INTRAVENOUS at 19:44

## 2017-12-03 RX ADMIN — ALPRAZOLAM 0.25 MG: 0.25 TABLET ORAL at 22:31

## 2017-12-03 RX ADMIN — HYDRALAZINE HYDROCHLORIDE 50 MG: 50 TABLET ORAL at 14:57

## 2017-12-03 RX ADMIN — PANTOPRAZOLE SODIUM 40 MG: 40 TABLET, DELAYED RELEASE ORAL at 05:52

## 2017-12-03 RX ADMIN — FLUTICASONE PROPIONATE 2 SPRAY: 50 SPRAY, METERED NASAL at 08:25

## 2017-12-03 RX ADMIN — TERAZOSIN HYDROCHLORIDE 1 MG: 1 CAPSULE ORAL at 21:11

## 2017-12-03 RX ADMIN — Medication 2 MG: at 10:05

## 2017-12-03 RX ADMIN — SODIUM CHLORIDE 125 MG: 9 INJECTION, SOLUTION INTRAVENOUS at 10:05

## 2017-12-03 RX ADMIN — LABETALOL HCL 100 MG: 300 TABLET, FILM COATED ORAL at 08:25

## 2017-12-03 RX ADMIN — HEPARIN SODIUM 8 UNITS/HR: 10000 INJECTION, SOLUTION INTRAVENOUS at 14:54

## 2017-12-03 RX ADMIN — RANOLAZINE 1000 MG: 500 TABLET, FILM COATED, EXTENDED RELEASE ORAL at 08:25

## 2017-12-03 RX ADMIN — NITROGLYCERIN 70 MCG/MIN: 20 INJECTION INTRAVENOUS at 08:26

## 2017-12-03 RX ADMIN — FAMOTIDINE 20 MG: 20 TABLET ORAL at 18:32

## 2017-12-03 RX ADMIN — TAMSULOSIN HYDROCHLORIDE 0.4 MG: 0.4 CAPSULE ORAL at 21:11

## 2017-12-03 RX ADMIN — Medication 2 MG: at 22:32

## 2017-12-03 RX ADMIN — HYDRALAZINE HYDROCHLORIDE 50 MG: 50 TABLET ORAL at 21:09

## 2017-12-03 NOTE — PROGRESS NOTES
"[facility]  [address] [place] [address]. [address]  T - [phone]  F - [phone]     Progress Note          PATIENT  DEMOGRAPHICS   PATIENT NAME: Lyle Corona                      PHYSICIAN: Jl Young MD  : 1965  MRN: [id number]   LOS: 6 days    Patient Care Team:  AHSAN Mayer as PCP - General  Subjective   SUBJECTIVE   Still has chest pain rated as 5/10. No SOB, nausea, vomiting, LE edema.          Objective   OBJECTIVE   Vital Signs  Temp:  [97.4 °F (36.3 °C)-98.8 °F (37.1 °C)] 98 °F (36.7 °C)  Heart Rate:  [72-89] 80  BP: ()/(52-85) 109/59    Flowsheet Rows         First Filed Value    Admission Height  69\" (175.3 cm) Documented at 2017 1722    Admission Weight  151 lb 12.8 oz (68.9 kg) Documented at 2017 1722           I/O last 3 completed shifts:  In: 384 [I.V.:384]  Out: 2040 [Urine:0]    PHYSICAL EXAM    Physical Exam   Constitutional: He is oriented to person, place, and time. He appears well-developed.   Moderately nourished   HENT:   Head: Normocephalic and atraumatic.   Cardiovascular: Normal rate, regular rhythm and normal heart sounds.  Exam reveals no gallop and no friction rub.    No murmur heard.  Pulmonary/Chest: Effort normal and breath sounds normal. No respiratory distress. He has no wheezes. He has no rales. He exhibits no tenderness.   Abdominal: Soft. Bowel sounds are normal. He exhibits no distension and no mass. There is no tenderness. There is no guarding.   Musculoskeletal: He exhibits no edema or tenderness.   Neurological: He is alert and oriented to person, place, and time.   Skin: Skin is warm and dry.   Nursing note and vitals reviewed.      RESULTS   Results Review:      Results from last 7 days  Lab Units 17  0240 17  0417 17  0421   SODIUM mmol/L 135* 136* 135*   POTASSIUM mmol/L 3.6 3.6 3.8   CHLORIDE mmol/L 100 102 101   CO2 mmol/L 23.0 22.0 24.0   BUN mg/dL 12 15 16 "   CREATININE mg/dL 2.25* 2.20* 2.15*   CALCIUM mg/dL 8.7 8.8 8.7   BILIRUBIN mg/dL 0.9 0.6 0.7   ALK PHOS U/L 100 97 94   ALT (SGPT) U/L 30 30 24   AST (SGOT) U/L 22 24 29   GLUCOSE mg/dL 101* 100 113*       Estimated Creatinine Clearance: 39.2 mL/min (by C-G formula based on Cr of 2.25).        Results from last 7 days  Lab Units 12/03/17  0240 12/02/17  0417 12/01/17  0421 11/30/17  0141 11/30/17  0140 11/29/17  0143   WBC 10*3/mm3 8.00 8.14 7.82  --  8.17 6.78   HEMOGLOBIN g/dL 9.7* 10.3* 10.1*  --  10.2* 10.7*   PLATELETS 10*3/mm3 162 163 150 143* 143* 139*         Results from last 7 days  Lab Units 11/28/17  1743 11/27/17  2040   INR  0.99 1.03         Imaging Results (last 24 hours)     ** No results found for the last 24 hours. **           MEDICATIONS      atorvastatin 80 mg Oral Nightly   bisacodyl 10 mg Oral Daily   [START ON 12/4/2017] ceFAZolin 2 g Intravenous On Call to OR   famotidine 20 mg Oral BID   ferric gluconate (FERRLECIT) IVPB 125 mg Intravenous Daily   fluticasone 2 spray Each Nare Daily   hydrALAZINE 50 mg Oral Q8H   labetalol 100 mg Oral Q12H   pantoprazole 40 mg Oral Q AM   polyethylene glycol 17 g Oral Daily   ranolazine 1,000 mg Oral Q12H   tamsulosin 0.4 mg Oral Nightly   terazosin 1 mg Oral Nightly       heparin (porcine) 18 Units/kg/hr Last Rate: 800 Units/hr (12/02/17 1002)   nitroglycerin 5-200 mcg/min Last Rate: 70 mcg/min (12/03/17 0826)       Assessment/Plan   ASSESSMENT / PLAN    Active Problems:    NSTEMI (non-ST elevated myocardial infarction)    Unstable angina    1. CKD 3: baseline creatinine 1.7-1.9 mg/dl  - Creatinine is 2.25 mg/dl this morning. This is overall stable. Has mild elevation in creatinine post contrast exposure.   - Hold HCTZ and Cozaar    2. Hypertension:  - Blood pressure is acceptable. HOLD HCTZ and Cozaar. On labetalol 100mg BID, hydralazine 50mg TID and terazosin 1mg daily. Also on nitro drip    3. Anemia:  - Hemoglobin is low at  9.7.  Iron deficient  with ferritin of 42, Tsat 10%. B12 271, folate 5.4  - On IV iron    4. NSTEMI:   - s/p left heart cath which shows multivessel disease, and plan for CABG tomorrow. Currently on nitroglycerin and heparin infusion.               This document has been electronically signed by Jl Young MD on December 3, 2017 9:44 AM

## 2017-12-03 NOTE — PROGRESS NOTES
[facility]  INPATIENT PROGRESS NOTE     LOS: 6 days   Patient Care Team:  AHSAN Mayer as PCP - General    Chief Complaint:  <principal problem not specified>      Subjective     Interval History:   Patient is seen for follow-up today. He continues to have periodic chest pressure and remains on heparin and nitroglycerin infusion.  Patient is doing well otherwise and voices no new complaints.      Patient Complaints: As above    History taken from: Patient    Review of Systems:    Review of Systems   Constitutional: Negative for activity change, appetite change, chills, diaphoresis, fatigue and fever.   HENT: Negative for postnasal drip, trouble swallowing and voice change.    Eyes: Negative for photophobia and visual disturbance.   Respiratory: Negative for cough, choking, chest tightness, shortness of breath, wheezing and stridor.    Cardiovascular: Positive for chest pain. Negative for palpitations and leg swelling.   Gastrointestinal: Negative for abdominal distention, abdominal pain, blood in stool, constipation, diarrhea, nausea and vomiting.   Endocrine: Negative for cold intolerance, heat intolerance, polydipsia, polyphagia and polyuria.   Genitourinary: Negative for decreased urine volume, difficulty urinating, dysuria, enuresis, flank pain, frequency, hematuria and urgency.   Musculoskeletal: Negative for arthralgias, gait problem, myalgias, neck pain and neck stiffness.   Skin: Negative for pallor, rash and wound.   Neurological: Negative for dizziness, tremors, seizures, syncope, facial asymmetry, speech difficulty, weakness, light-headedness, numbness and headaches.   Hematological: Does not bruise/bleed easily.   Psychiatric/Behavioral: Negative for agitation, behavioral problems and confusion.         Objective     Vital Signs  Temp:  [97.4 °F (36.3 °C)-98.8 °F (37.1 °C)] 98 °F (36.7 °C)  Heart Rate:  [72-89] 80  BP:  ()/(52-85) 109/59    Physical Exam:   Physical Exam   Constitutional: He is oriented to person, place, and time. He appears well-developed and well-nourished. No distress.   HENT:   Head: Normocephalic and atraumatic.   Eyes: EOM are normal. Pupils are equal, round, and reactive to light. Right eye exhibits no discharge. Left eye exhibits no discharge. No scleral icterus.   Neck: Normal range of motion. Neck supple. No JVD present. No thyromegaly present.   Cardiovascular: Normal rate, regular rhythm and normal heart sounds.  Exam reveals no gallop and no friction rub.    No murmur heard.  Pulmonary/Chest: Effort normal and breath sounds normal. He has no wheezes. He has no rales. He exhibits no tenderness.   Abdominal: Soft. Bowel sounds are normal. He exhibits no distension and no mass. There is no tenderness. There is no rebound and no guarding.   Musculoskeletal: He exhibits no edema, tenderness or deformity.   Neurological: He is alert and oriented to person, place, and time. No cranial nerve deficit. He exhibits normal muscle tone. Coordination normal.   Skin: Skin is warm and dry. No rash noted. He is not diaphoretic. No erythema. No pallor.   Psychiatric: He has a normal mood and affect. His behavior is normal. Judgment and thought content normal.   Nursing note and vitals reviewed.         Results Review:         Results from last 7 days  Lab Units 12/03/17  0240 12/02/17  0417 12/01/17  0421 11/30/17  0140 11/29/17  0143 11/28/17  1031 11/27/17  1710   SODIUM mmol/L 135* 136* 135* 139 138 137 137   POTASSIUM mmol/L 3.6 3.6 3.8 3.6 3.6 4.0 3.8   CHLORIDE mmol/L 100 102 101 101 101 105 101   CO2 mmol/L 23.0 22.0 24.0 24.0 25.0 22.0 23.0   BUN mg/dL 12 15 16 19 24* 23* 26*   CREATININE mg/dL 2.25* 2.20* 2.15* 2.09* 2.10* 1.83* 1.98*   GLUCOSE mg/dL 101* 100 113* 119* 103* 90 96   CALCIUM mg/dL 8.7 8.8 8.7 8.5 8.4 8.6 9.5   BILIRUBIN mg/dL 0.9 0.6 0.7 0.4 0.5 0.6 0.6   ALK PHOS U/L 100 97 94 88 90 95 99    ALT (SGPT) U/L 30 30 24 26 21 28 20*   AST (SGOT) U/L 22 24 29 20 22 31 21               Results from last 7 days  Lab Units 12/03/17  0240 12/02/17  0417 12/01/17  0421 11/30/17  0141 11/30/17  0140 11/29/17  0143 11/28/17  1031 11/27/17  1828   WBC 10*3/mm3 8.00 8.14 7.82  --  8.17 6.78 8.85 9.29   HEMOGLOBIN g/dL 9.7* 10.3* 10.1*  --  10.2* 10.7* 11.3* 12.2*   HEMATOCRIT % 28.7* 30.7* 30.1*  --  30.1* 31.4* 33.1* 36.1*   PLATELETS 10*3/mm3 162 163 150 143* 143* 139* 153 162       Lab Results   Component Value Date    CKTOTAL 25 (L) 05/12/2017    CKMB 0.55 05/12/2017    TROPONINI 0.522 (C) 11/28/2017       CO2   Date Value Ref Range Status   12/03/2017 23.0 22.0 - 31.0 mmol/L Final         Results from last 7 days  Lab Units 11/28/17  1743 11/27/17  2040   INR  0.99 1.03        Imaging Results (last 7 days)     ** No results found for the last 168 hours. **                                    Medication Review:   Current Facility-Administered Medications   Medication Dose Route Frequency Provider Last Rate Last Dose   • ALPRAZolam (XANAX) tablet 0.25 mg  0.25 mg Oral 4x Daily PRN Seymour Ricci MD   0.25 mg at 12/02/17 2145   • atorvastatin (LIPITOR) tablet 80 mg  80 mg Oral Nightly Rafa Carrasco MD PhD   80 mg at 12/02/17 2145   • bisacodyl (DULCOLAX) EC tablet 10 mg  10 mg Oral Daily Seymour Ricci MD   10 mg at 12/01/17 0926   • [START ON 12/4/2017] ceFAZolin (ANCEF) in SWFI 2 g/20ml IV PUSH syringe  2 g Intravenous On Call to OR AHSAN Rand       • famotidine (PEPCID) tablet 20 mg  20 mg Oral BID Nataliya AHSAN Treviño   20 mg at 12/03/17 0824   • ferric gluconate (FERRLECIT) 125 mg in sodium chloride 0.9 % 110 mL IVPB  125 mg Intravenous Daily Jl Young  mL/hr at 12/02/17 1003 125 mg at 12/02/17 1003   • fluticasone (FLONASE) 50 MCG/ACT nasal spray 2 spray  2 spray Each Nare Daily Seymour Ricci MD   2 spray at 12/03/17 0825   • heparin 35175 units/250 mL (100  units/mL) in 0.45 % NaCl infusion  18 Units/kg/hr Intravenous Titrated Carlos Charles MD 8 mL/hr at 12/02/17 1002 800 Units/hr at 12/02/17 1002    And   • heparin (porcine) 5000 UNIT/ML injection 5,500 Units  80 Units/kg Intravenous PRN Carlos Charles MD        And   • heparin (porcine) 5000 UNIT/ML injection 2,800 Units  40 Units/kg Intravenous PRN Carlos Charles MD       • hydrALAZINE (APRESOLINE) injection 10 mg  10 mg Intravenous Q6H PRN Nataliya G Levill, APRN   10 mg at 11/27/17 1718   • hydrALAZINE (APRESOLINE) tablet 50 mg  50 mg Oral Q8H Adriane Butler MD   50 mg at 12/02/17 2145   • labetalol (NORMODYNE) tablet 100 mg  100 mg Oral Q12H Adriane Butler MD   100 mg at 12/03/17 0825   • LORazepam (ATIVAN) tablet 0.5 mg  0.5 mg Oral Q8H PRN Elizabeth ELIAS Edelen, APRN   0.5 mg at 11/30/17 0900   • morphine injection 2 mg  2 mg Intravenous Q2H PRN Taz Guzmán MD   2 mg at 12/03/17 0341   • nitroglycerin 50 mg/250 mL (0.2 mg/mL) infusion  5-200 mcg/min Intravenous Titrated Nataliya G Levill, APRN 21 mL/hr at 12/03/17 0826 70 mcg/min at 12/03/17 0826   • ondansetron (ZOFRAN) injection 4 mg  4 mg Intravenous Q6H PRN Nataliya G Levill, APRN   4 mg at 12/01/17 1140   • pantoprazole (PROTONIX) EC tablet 40 mg  40 mg Oral Q AM Nataliya G Levill, APRN   40 mg at 12/03/17 0552   • polyethylene glycol 3350 powder (packet)  17 g Oral Daily Elizabeth J Edelen, APRN   17 g at 12/03/17 0824   • ranolazine (RANEXA) 12 hr tablet 1,000 mg  1,000 mg Oral Q12H Rafa Carrasco MD PhD   1,000 mg at 12/03/17 0825   • sodium chloride 0.9 % flush 1-10 mL  1-10 mL Intravenous PRN Nataliya G Levill, APRN   10 mL at 12/02/17 1220   • tamsulosin (FLOMAX) 24 hr capsule 0.4 mg  0.4 mg Oral Nightly Nataliya G Levill, APRN   0.4 mg at 12/02/17 2148   • terazosin (HYTRIN) capsule 1 mg  1 mg Oral Nightly Nataliya G Levill, APRN   1 mg at 12/02/17 2147         Assessment/Plan   1. NSTEMI S/p cardiac cath. with multi-vessel coronary  artery disease: Continue current optimal medical management while awaiting cardiac revascularization.  Echocardiogram Shows mild concentric left ventricular wall thickness, normal left ventricular systolic function with ejection fraction of 65%.  There is hypokinesis of the inferior wall.  Cardiologist and CT surgery are actively following. Patient is awaiting revascularization surgery.  2.  Acute on chronickidney injury: Creatinine has increased to 2.25 today. Continue to monitor renal function and avoiding all nephrotoxic agents. Nephrologist is following.  3.  Hypertension: Stable.  4.  Constipation: Resolved.  Continue bowel regimen prn.  5.  Continue GI and DVT prophylaxis.        Seymour Ricci MD  12/03/17      EMR Dragon/Transcription disclaimer:   Much of this encounter note is an electronic transcription/translation of spoken language to printed text. The electronic translation of spoken language may permit erroneous, or at times, nonsensical words or phrases to be inadvertently transcribed; Although I have reviewed the note for such errors, some may still exist.

## 2017-12-03 NOTE — PROGRESS NOTES
"Cardiovascular Daily Progress Note  Rafa Carrasco M.D., Ph.D., [facility]      Subjective     Interval History:   Currently CP free on nitro and heparin drips. Rested well. No new complaints.     Review of Systems - History obtained from the patient  Cardiovascular ROS: no chest pain or dyspnea on exertion    Objective     Vital Sign Min/Max for last 24 hours  Temp  Min: 97.4 °F (36.3 °C)  Max: 98.8 °F (37.1 °C)   BP  Min: 88/54  Max: 137/79   Pulse  Min: 72  Max: 89   No Data Recorded   SpO2  Min: 90 %  Max: 96 %   No Data Recorded   No Data Recorded     Flowsheet Rows         First Filed Value    Admission Height  69\" (175.3 cm) Documented at 11/27/2017 1722    Admission Weight  151 lb 12.8 oz (68.9 kg) Documented at 11/27/2017 1722        Last 3 weights    11/28/17  1630 12/01/17  0603 12/02/17  0502   Weight: 160 lb 15 oz (73 kg) (bed scales not working) (Bed scale is not working. )     Body mass index is 23.77 kg/(m^2).    Physical Exam:   GEN: alert, appears stated age and cooperative  Body Habitus: well-nourished  HEENT: Head: Normocephalic, no lesions, without obvious abnormality.  Neck / Thyroid: Supple, no masses, nodes, nodules or enlargement. No arcus senilis, xanthelasma or xanthomas.   JVP: 7 cm of water at 45 degrees HJR: absent      Carotid:  Upstroke: easily palpated bilaterally Volume: Normal.    Carotid Bruit:  None  Subclavian Bruit: Not present.    Chest:  Normal Excursion: Good    I:E: Good  Pulmonary:clear to auscultation, no wheezes, rales or rhonchi, symmetric air entry      Precordium:  No palpable heaves or thrusts. P2 is not palpable.   [place]:  normal size and placement Palpable S4: Not present.   Heart rate: normal  Heart Rhythm: regular     Heart Sounds: S1: normal  S2: normal intensity  S3: absent   S4: absent  Opening Snap: absent  A2-OS:  N/A  Pericardial rub: absent    Ejection click: None      Murmurs: Systolic: none  Diastolic: none  Abdomen: Soft, non-tender, normal bowel sounds; " no bruits, organomegaly or masses.  Extremity: no edema, cyanosis  Trophic changes: None   Pallor on elevation: Absent.    Rubor on dependency: None  Pulses: Right radial artery has 2+ (normal) pulse         DATA REVIEWED:    Lab Results (last 24 hours)     Procedure Component Value Units Date/Time    aPTT [[id number]]  (Abnormal) Collected:  12/02/17 1859    Specimen:  Blood Updated:  12/02/17 1932     PTT 69.7 (H) seconds     Narrative:       The recommended Heparin therapeutic range is 68-97 seconds.    Extra Tubes [[id number]] Collected:  12/02/17 1859    Specimen:  Blood from Blood, Venous Line Updated:  12/02/17 2001    Narrative:       The following orders were created for panel order Extra Tubes.  Procedure                               Abnormality         Status                     ---------                               -----------         ------                     Gold Top - SST[[id number]]                                   Final result                 Please view results for these tests on the individual orders.    Gold Top - SST [[id number]] Collected:  12/02/17 1859    Specimen:  Blood Updated:  12/02/17 2001     Extra Tube Hold for add-ons.      Auto resulted.       CBC & Differential [[id number]] Collected:  12/03/17 0240    Specimen:  Blood Updated:  12/03/17 0311    Narrative:       The following orders were created for panel order CBC & Differential.  Procedure                               Abnormality         Status                     ---------                               -----------         ------                     CBC Auto Differential[[id number]]        Abnormal            Final result                 Please view results for these tests on the individual orders.    CBC Auto Differential [[id number]]  (Abnormal) Collected:  12/03/17 0240    Specimen:  Blood Updated:  12/03/17 0311     WBC 8.00 10*3/mm3      RBC 3.48 (L) 10*6/mm3      Hemoglobin 9.7 (L) g/dL      Hematocrit 28.7 (L) %      MCV  82.5 fL      MCH 27.9 pg      MCHC 33.8 g/dL      RDW 14.4 %      RDW-SD 43.3 fl      MPV 8.9 fL      Platelets 162 10*3/mm3      Neutrophil % 43.4 %      Lymphocyte % 45.5 %      Monocyte % 8.5 %      Eosinophil % 2.0 %      Basophil % 0.1 %      Immature Grans % 0.5 %      Neutrophils, Absolute 3.47 10*3/mm3      Lymphocytes, Absolute 3.64 10*3/mm3      Monocytes, Absolute 0.68 10*3/mm3      Eosinophils, Absolute 0.16 10*3/mm3      Basophils, Absolute 0.01 10*3/mm3      Immature Grans, Absolute 0.04 (H) 10*3/mm3     Comprehensive Metabolic Panel [[id number]]  (Abnormal) Collected:  12/03/17 0240    Specimen:  Blood Updated:  12/03/17 0327     Glucose 101 (H) mg/dL      BUN 12 mg/dL      Creatinine 2.25 (H) mg/dL      Sodium 135 (L) mmol/L      Potassium 3.6 mmol/L      Chloride 100 mmol/L      CO2 23.0 mmol/L      Calcium 8.7 mg/dL      Total Protein 6.5 g/dL      Albumin 3.40 g/dL      ALT (SGPT) 30 U/L      AST (SGOT) 22 U/L      Alkaline Phosphatase 100 U/L      Total Bilirubin 0.9 mg/dL      eGFR Non African Amer 31 (L) mL/min/1.73      Globulin 3.1 gm/dL      A/G Ratio 1.1 g/dL      BUN/Creatinine Ratio 5.3 (L)     Anion Gap 12.0 mmol/L     aPTT [[id number]]  (Abnormal) Collected:  12/03/17 0716    Specimen:  Blood Updated:  12/03/17 0742     PTT 69.7 (H) seconds     Narrative:       The recommended Heparin therapeutic range is 68-97 seconds.        Imaging Results (last 24 hours)     ** No results found for the last 24 hours. **                                    Assessment/Plan      1. NSTEMI with MV ASCAD. Currently on Heparin and nitro awaiting CABG with Dr. Dejesus.   -Agree with CABG, per CTS  -Atorvastatin to 80mg  - Ranexa to 1000 mg PO BID  -Antiplatelets on hold for CABG  -Continue Labetalol, as allergy to lopressor was noted  -Continue nitro and heparin drips  -NPO after MN     2. Mild AI. NYHA stage B.  -Out-pt follow-up    3. MENG: Mild plaque bilaterally.  -Antiplatelets, post-op  -Statin      Thank  you for allowing me to participate in the care of your patient.  If I can be of any further assistance, please do not hesitate contact me.          This document has been electronically signed by Rafa Carrasco MD PhD on December 3, 2017 8:29 AM

## 2017-12-04 ENCOUNTER — ANESTHESIA (OUTPATIENT)
Dept: PERIOP | Facility: HOSPITAL | Age: 52
End: 2017-12-04

## 2017-12-04 ENCOUNTER — APPOINTMENT (OUTPATIENT)
Dept: GENERAL RADIOLOGY | Facility: HOSPITAL | Age: 52
End: 2017-12-04

## 2017-12-04 LAB
ABO GROUP BLD: NORMAL
ALBUMIN SERPL-MCNC: 3.5 G/DL (ref 3.4–4.8)
ALBUMIN SERPL-MCNC: 3.6 G/DL (ref 3.4–4.8)
ALBUMIN/GLOB SERPL: 1.1 G/DL (ref 1.1–1.8)
ALP SERPL-CCNC: 111 U/L (ref 38–126)
ALT SERPL W P-5'-P-CCNC: 29 U/L (ref 21–72)
ANION GAP SERPL CALCULATED.3IONS-SCNC: 12 MMOL/L (ref 5–15)
ANION GAP SERPL CALCULATED.3IONS-SCNC: 14 MMOL/L (ref 5–15)
APTT PPP: 42.8 SECONDS (ref 20–40.3)
APTT PPP: 57.4 SECONDS (ref 20–40.3)
ARTERIAL PATENCY WRIST A: ABNORMAL
AST SERPL-CCNC: 29 U/L (ref 17–59)
ATMOSPHERIC PRESS: ABNORMAL MMHG
BASE EXCESS BLDA CALC-SCNC: -0.2 MMOL/L (ref -2.4–2.4)
BASE EXCESS BLDA CALC-SCNC: -1.9 MMOL/L (ref -2.4–2.4)
BASE EXCESS BLDA CALC-SCNC: -2 MMOL/L
BASE EXCESS BLDA CALC-SCNC: -2.6 MMOL/L (ref -2.4–2.4)
BASE EXCESS BLDA CALC-SCNC: -2.9 MMOL/L (ref -2.4–2.4)
BASE EXCESS BLDA CALC-SCNC: -5.6 MMOL/L (ref -2.4–2.4)
BASE EXCESS BLDA CALC-SCNC: 0.5 MMOL/L (ref -2.4–2.4)
BASE EXCESS BLDA CALC-SCNC: 1 MMOL/L
BASE EXCESS BLDA CALC-SCNC: 2 MMOL/L
BASE EXCESS BLDA CALC-SCNC: 2 MMOL/L
BASE EXCESS BLDA CALC-SCNC: 3 MMOL/L
BASE EXCESS BLDA CALC-SCNC: 3 MMOL/L
BASE EXCESS BLDV CALC-SCNC: -4.1 MMOL/L (ref -2.4–2.4)
BASOPHILS # BLD AUTO: 0.01 10*3/MM3 (ref 0–0.2)
BASOPHILS # BLD AUTO: 0.01 10*3/MM3 (ref 0–0.2)
BASOPHILS NFR BLD AUTO: 0.1 % (ref 0–2)
BASOPHILS NFR BLD AUTO: 0.1 % (ref 0–2)
BDY SITE: ABNORMAL
BILIRUB SERPL-MCNC: 0.7 MG/DL (ref 0.2–1.3)
BLD GP AB SCN SERPL QL: NEGATIVE
BUN BLD-MCNC: 13 MG/DL (ref 7–21)
BUN BLD-MCNC: 14 MG/DL (ref 7–21)
BUN/CREAT SERPL: 6 (ref 7–25)
BUN/CREAT SERPL: 6.3 (ref 7–25)
CA-I BLD-MCNC: 4.2 MG/DL (ref 4.5–4.9)
CA-I BLD-MCNC: 4.5 MG/DL (ref 4.5–4.9)
CA-I BLD-MCNC: 4.5 MG/DL (ref 4.5–4.9)
CA-I BLD-MCNC: 4.6 MG/DL (ref 4.5–4.9)
CA-I BLD-MCNC: 4.7 MG/DL (ref 4.5–4.9)
CA-I BLDA-SCNC: 3.6 MMOL/L
CA-I BLDA-SCNC: 3.8 MMOL/L
CA-I BLDA-SCNC: 3.9 MMOL/L
CA-I BLDA-SCNC: 4.2 MMOL/L
CA-I BLDA-SCNC: 4.3 MMOL/L
CA-I BLDA-SCNC: 4.8 MMOL/L
CALCIUM SPEC-SCNC: 8.6 MG/DL (ref 8.4–10.2)
CALCIUM SPEC-SCNC: 8.6 MG/DL (ref 8.4–10.2)
CHLORIDE SERPL-SCNC: 100 MMOL/L (ref 95–110)
CHLORIDE SERPL-SCNC: 98 MMOL/L (ref 95–110)
CO2 BLDA-SCNC: 21.7 MMOL/L (ref 23–27)
CO2 BLDA-SCNC: 23 MMOL/L (ref 23–27)
CO2 BLDA-SCNC: 23.1 MMOL/L (ref 23–27)
CO2 BLDA-SCNC: 24.1 MMOL/L (ref 23–27)
CO2 BLDA-SCNC: 24.6 MMOL/L (ref 23–27)
CO2 BLDA-SCNC: 24.9 MMOL/L (ref 23–27)
CO2 BLDA-SCNC: 25.8 MMOL/L (ref 23–27)
CO2 BLDA-SCNC: 26 MMOL/L (ref 23–27)
CO2 BLDA-SCNC: 26.8 MMOL/L (ref 23–27)
CO2 BLDA-SCNC: 27 MMOL/L (ref 23–27)
CO2 BLDA-SCNC: 28 MMOL/L (ref 23–27)
CO2 BLDA-SCNC: 29 MMOL/L (ref 23–27)
CO2 BLDA-SCNC: 29 MMOL/L (ref 23–27)
CO2 SERPL-SCNC: 24 MMOL/L (ref 22–31)
CO2 SERPL-SCNC: 24 MMOL/L (ref 22–31)
CREAT BLD-MCNC: 2.16 MG/DL (ref 0.7–1.3)
CREAT BLD-MCNC: 2.21 MG/DL (ref 0.7–1.3)
DEPRECATED RDW RBC AUTO: 43.2 FL (ref 35.1–43.9)
DEPRECATED RDW RBC AUTO: 43.5 FL (ref 35.1–43.9)
EOSINOPHIL # BLD AUTO: 0.07 10*3/MM3 (ref 0–0.7)
EOSINOPHIL # BLD AUTO: 0.16 10*3/MM3 (ref 0–0.7)
EOSINOPHIL NFR BLD AUTO: 0.8 % (ref 0–7)
EOSINOPHIL NFR BLD AUTO: 2.3 % (ref 0–7)
ERYTHROCYTE [DISTWIDTH] IN BLOOD BY AUTOMATED COUNT: 14.2 % (ref 11.5–14.5)
ERYTHROCYTE [DISTWIDTH] IN BLOOD BY AUTOMATED COUNT: 14.3 % (ref 11.5–14.5)
GFR SERPL CREATININE-BSD FRML MDRD: 31 ML/MIN/1.73 (ref 60–130)
GFR SERPL CREATININE-BSD FRML MDRD: 32 ML/MIN/1.73 (ref 60–130)
GLOBULIN UR ELPH-MCNC: 3.1 GM/DL (ref 2.3–3.5)
GLUCOSE BLD-MCNC: 103 MG/DL (ref 60–100)
GLUCOSE BLD-MCNC: 108 MG/DL (ref 60–100)
GLUCOSE BLDA-MCNC: 104 MMOL/L
GLUCOSE BLDA-MCNC: 108 MMOL/L
GLUCOSE BLDA-MCNC: 116 MMOL/L
GLUCOSE BLDA-MCNC: 123 MMOL/L
GLUCOSE BLDA-MCNC: 124 MMOL/L
GLUCOSE BLDA-MCNC: 125 MMOL/L
GLUCOSE BLDC GLUCOMTR-MCNC: 103 MG/DL (ref 70–130)
GLUCOSE BLDC GLUCOMTR-MCNC: 85 MG/DL (ref 70–130)
GLUCOSE BLDC GLUCOMTR-MCNC: 88 MG/DL (ref 70–130)
GLUCOSE BLDC GLUCOMTR-MCNC: 97 MG/DL (ref 70–130)
GLUCOSE BLDC GLUCOMTR-MCNC: 98 MG/DL (ref 70–130)
GLUCOSE BLDC GLUCOMTR-MCNC: 98 MG/DL (ref 70–130)
HCO3 BLDA-SCNC: 20.4 MMOL/L (ref 22–26)
HCO3 BLDA-SCNC: 22 MMOL/L (ref 22–26)
HCO3 BLDA-SCNC: 22.3 MMOL/L
HCO3 BLDA-SCNC: 23.4 MMOL/L (ref 22–26)
HCO3 BLDA-SCNC: 23.4 MMOL/L (ref 22–26)
HCO3 BLDA-SCNC: 24.6 MMOL/L (ref 22–26)
HCO3 BLDA-SCNC: 25.2 MMOL/L
HCO3 BLDA-SCNC: 25.5 MMOL/L (ref 22–26)
HCO3 BLDA-SCNC: 25.7 MMOL/L
HCO3 BLDA-SCNC: 26.5 MMOL/L
HCO3 BLDA-SCNC: 27.5 MMOL/L
HCO3 BLDA-SCNC: 27.8 MMOL/L
HCO3 BLDV-SCNC: 22.6 MMOL/L
HCT VFR BLD AUTO: 28 % (ref 39–49)
HCT VFR BLD AUTO: 28.1 % (ref 39–49)
HCT VFR BLD CALC: 28 % (ref 40–54)
HCT VFR BLD CALC: 28 % (ref 40–54)
HCT VFR BLD CALC: 29 % (ref 40–54)
HCT VFR BLDA CALC: 22 %
HCT VFR BLDA CALC: 23 %
HCT VFR BLDA CALC: 23 %
HCT VFR BLDA CALC: 24 %
HCT VFR BLDA CALC: 25 %
HCT VFR BLDA CALC: 25 %
HGB BLD-MCNC: 9.5 G/DL (ref 13.7–17.3)
HGB BLD-MCNC: 9.6 G/DL (ref 13.7–17.3)
HGB BLDA-MCNC: 10 G/DL (ref 14–18)
HGB BLDA-MCNC: 7.5 G/DL
HGB BLDA-MCNC: 7.8 G/DL
HGB BLDA-MCNC: 7.8 G/DL
HGB BLDA-MCNC: 8.2 G/DL
HGB BLDA-MCNC: 8.5 G/DL
HGB BLDA-MCNC: 8.5 G/DL
HGB BLDA-MCNC: 9.4 G/DL (ref 14–18)
HGB BLDA-MCNC: 9.5 G/DL (ref 14–18)
HGB BLDA-MCNC: 9.7 G/DL (ref 14–18)
HGB BLDA-MCNC: 9.8 G/DL (ref 14–18)
HGB BLDA-MCNC: 9.8 G/DL (ref 14–18)
HGB BLDA-MCNC: 9.9 G/DL (ref 14–18)
HOLD SPECIMEN: NORMAL
HOLD SPECIMEN: NORMAL
IMM GRANULOCYTES # BLD: 0.03 10*3/MM3 (ref 0–0.02)
IMM GRANULOCYTES # BLD: 0.07 10*3/MM3 (ref 0–0.02)
IMM GRANULOCYTES NFR BLD: 0.4 % (ref 0–0.5)
IMM GRANULOCYTES NFR BLD: 0.8 % (ref 0–0.5)
INR PPP: 1.24 (ref 0.8–1.2)
LYMPHOCYTES # BLD AUTO: 2.49 10*3/MM3 (ref 0.6–4.2)
LYMPHOCYTES # BLD AUTO: 2.88 10*3/MM3 (ref 0.6–4.2)
LYMPHOCYTES NFR BLD AUTO: 28.6 % (ref 10–50)
LYMPHOCYTES NFR BLD AUTO: 40.7 % (ref 10–50)
Lab: NORMAL
MAGNESIUM SERPL-MCNC: 4 MG/DL (ref 1.6–2.3)
MCH RBC QN AUTO: 28.2 PG (ref 26.5–34)
MCH RBC QN AUTO: 28.4 PG (ref 26.5–34)
MCHC RBC AUTO-ENTMCNC: 33.9 G/DL (ref 31.5–36.3)
MCHC RBC AUTO-ENTMCNC: 34.2 G/DL (ref 31.5–36.3)
MCV RBC AUTO: 82.4 FL (ref 80–98)
MCV RBC AUTO: 83.8 FL (ref 80–98)
MODALITY: ABNORMAL
MONOCYTES # BLD AUTO: 0.56 10*3/MM3 (ref 0–0.9)
MONOCYTES # BLD AUTO: 0.73 10*3/MM3 (ref 0–0.9)
MONOCYTES NFR BLD AUTO: 7.9 % (ref 0–12)
MONOCYTES NFR BLD AUTO: 8.4 % (ref 0–12)
NEUTROPHILS # BLD AUTO: 3.43 10*3/MM3 (ref 2–8.6)
NEUTROPHILS # BLD AUTO: 5.33 10*3/MM3 (ref 2–8.6)
NEUTROPHILS NFR BLD AUTO: 48.6 % (ref 37–80)
NEUTROPHILS NFR BLD AUTO: 61.3 % (ref 37–80)
PCO2 BLDA: 33.8 MM HG
PCO2 BLDA: 34.1 MM HG
PCO2 BLDA: 37.1 MM HG (ref 35–45)
PCO2 BLDA: 40.6 MM HG (ref 35–45)
PCO2 BLDA: 41.8 MM HG (ref 35–45)
PCO2 BLDA: 42 MM HG (ref 35–45)
PCO2 BLDA: 42.7 MM HG
PCO2 BLDA: 42.7 MM HG (ref 35–45)
PCO2 BLDA: 43.2 MM HG
PCO2 BLDA: 43.3 MM HG
PCO2 BLDA: 43.8 MM HG
PCO2 BLDA: 47.7 MM HG (ref 35–45)
PCO2 BLDV: 48.9 MM HG
PH BLDA: 7.3 PH UNITS (ref 7.35–7.45)
PH BLDA: 7.31 PH UNITS (ref 7.35–7.45)
PH BLDA: 7.37 PH UNITS (ref 7.35–7.45)
PH BLDA: 7.38 PH UNITS
PH BLDA: 7.39 PH UNITS
PH BLDA: 7.39 PH UNITS (ref 7.35–7.45)
PH BLDA: 7.39 PH UNITS (ref 7.35–7.45)
PH BLDA: 7.4 PH UNITS (ref 7.35–7.45)
PH BLDA: 7.41 PH UNITS
PH BLDA: 7.42 PH UNITS
PH BLDA: 7.43 PH UNITS
PH BLDA: 7.48 PH UNITS
PH BLDV: 7.28 PH UNITS (ref 7.31–7.42)
PHOSPHATE SERPL-MCNC: 4.3 MG/DL (ref 2.4–4.4)
PLATELET # BLD AUTO: 107 10*3/MM3 (ref 150–450)
PLATELET # BLD AUTO: 148 10*3/MM3 (ref 150–450)
PMV BLD AUTO: 8.7 FL (ref 8–12)
PMV BLD AUTO: 8.9 FL (ref 8–12)
PO2 BLDA: 111.8 MM HG (ref 80–105)
PO2 BLDA: 275 MMHG
PO2 BLDA: 352 MMHG
PO2 BLDA: 401 MMHG
PO2 BLDA: 41 MMHG
PO2 BLDA: 431 MMHG
PO2 BLDA: 453 MMHG
PO2 BLDA: 64.5 MM HG (ref 80–105)
PO2 BLDA: 87.6 MM HG (ref 80–105)
PO2 BLDA: 88.9 MM HG (ref 80–105)
PO2 BLDA: 92.5 MM HG (ref 80–105)
PO2 BLDA: 93.4 MM HG (ref 80–105)
PO2 BLDV: 52.7 MM HG
POTASSIUM BLD-SCNC: 3.7 MMOL/L (ref 3.5–5.1)
POTASSIUM BLD-SCNC: 4.2 MMOL/L (ref 3.5–5.1)
POTASSIUM BLDA-SCNC: 3.6 MMOL/L (ref 3.5–4.9)
POTASSIUM BLDA-SCNC: 3.71 MMOL/L (ref 3.6–4.9)
POTASSIUM BLDA-SCNC: 3.86 MMOL/L (ref 3.6–4.9)
POTASSIUM BLDA-SCNC: 3.9 MMOL/L (ref 3.5–4.9)
POTASSIUM BLDA-SCNC: 3.94 MMOL/L (ref 3.6–4.9)
POTASSIUM BLDA-SCNC: 3.95 MMOL/L (ref 3.6–4.9)
POTASSIUM BLDA-SCNC: 4.1 MMOL/L (ref 3.5–4.9)
POTASSIUM BLDA-SCNC: 4.14 MMOL/L (ref 3.6–4.9)
POTASSIUM BLDA-SCNC: 4.17 MMOL/L (ref 3.6–4.9)
POTASSIUM BLDA-SCNC: 4.2 MMOL/L (ref 3.5–4.9)
POTASSIUM BLDA-SCNC: 4.4 MMOL/L (ref 3.5–4.9)
POTASSIUM BLDA-SCNC: 4.4 MMOL/L (ref 3.5–4.9)
POTASSIUM BLDV-SCNC: 4.1 MMOL/L (ref 3.5–4.9)
PROT SERPL-MCNC: 6.6 G/DL (ref 6.3–8.6)
PROTHROMBIN TIME: 15.6 SECONDS (ref 11.1–15.3)
RBC # BLD AUTO: 3.34 10*6/MM3 (ref 4.37–5.74)
RBC # BLD AUTO: 3.41 10*6/MM3 (ref 4.37–5.74)
RH BLD: NEGATIVE
SAO2 % BLDCOA: 100 %
SAO2 % BLDCOA: 79 %
SAO2 % BLDCOA: 91.1 %
SAO2 % BLDCOA: 95.8 % (ref 94–100)
SAO2 % BLDCOA: 95.9 %
SAO2 % BLDCOA: 95.9 %
SAO2 % BLDCOA: 96.4 % (ref 94–100)
SAO2 % BLDCOA: 97.2 % (ref 94–100)
SAO2 % BLDCOV: 80.7 %
SODIUM BLD-SCNC: 133 MMOL/L (ref 138–146)
SODIUM BLD-SCNC: 134 MMOL/L (ref 138–146)
SODIUM BLD-SCNC: 135 MMOL/L (ref 138–146)
SODIUM BLD-SCNC: 135 MMOL/L (ref 138–146)
SODIUM BLD-SCNC: 136 MMOL/L (ref 137–145)
SODIUM BLD-SCNC: 136 MMOL/L (ref 137–145)
SODIUM BLD-SCNC: 136 MMOL/L (ref 138–146)
SODIUM BLD-SCNC: 138 MMOL/L (ref 138–146)
SODIUM BLDA-SCNC: 134 MMOL/L (ref 138–146)
SODIUM BLDA-SCNC: 134.9 MMOL/L (ref 138–146)
SODIUM BLDA-SCNC: 135.7 MMOL/L (ref 138–146)
SODIUM BLDA-SCNC: 136 MMOL/L (ref 138–146)
SODIUM BLDA-SCNC: 136.8 MMOL/L (ref 138–146)
SODIUM BLDA-SCNC: 137.2 MMOL/L (ref 138–146)
SODIUM BLDV-SCNC: 134.9 MMOL/L (ref 120–160)
WBC NRBC COR # BLD: 7.07 10*3/MM3 (ref 3.2–9.8)
WBC NRBC COR # BLD: 8.7 10*3/MM3 (ref 3.2–9.8)

## 2017-12-04 PROCEDURE — 94640 AIRWAY INHALATION TREATMENT: CPT

## 2017-12-04 PROCEDURE — 33533 CABG ARTERIAL SINGLE: CPT | Performed by: THORACIC SURGERY (CARDIOTHORACIC VASCULAR SURGERY)

## 2017-12-04 PROCEDURE — 80053 COMPREHEN METABOLIC PANEL: CPT | Performed by: NURSE PRACTITIONER

## 2017-12-04 PROCEDURE — 94799 UNLISTED PULMONARY SVC/PX: CPT

## 2017-12-04 PROCEDURE — 33508 ENDOSCOPIC VEIN HARVEST: CPT | Performed by: THORACIC SURGERY (CARDIOTHORACIC VASCULAR SURGERY)

## 2017-12-04 PROCEDURE — 25010000002 HEPARIN (PORCINE) PER 1000 UNITS: Performed by: THORACIC SURGERY (CARDIOTHORACIC VASCULAR SURGERY)

## 2017-12-04 PROCEDURE — 25010000002 SUCCINYLCHOLINE PER 20 MG: Performed by: NURSE ANESTHETIST, CERTIFIED REGISTERED

## 2017-12-04 PROCEDURE — 85025 COMPLETE CBC W/AUTO DIFF WBC: CPT | Performed by: NURSE PRACTITIONER

## 2017-12-04 PROCEDURE — 25010000002 EPINEPHRINE PER 0.1 MG: Performed by: THORACIC SURGERY (CARDIOTHORACIC VASCULAR SURGERY)

## 2017-12-04 PROCEDURE — 86900 BLOOD TYPING SEROLOGIC ABO: CPT | Performed by: THORACIC SURGERY (CARDIOTHORACIC VASCULAR SURGERY)

## 2017-12-04 PROCEDURE — 25010000003 CEFAZOLIN PER 500 MG: Performed by: NURSE ANESTHETIST, CERTIFIED REGISTERED

## 2017-12-04 PROCEDURE — 25010000002 FUROSEMIDE PER 20 MG: Performed by: NURSE ANESTHETIST, CERTIFIED REGISTERED

## 2017-12-04 PROCEDURE — 82947 ASSAY GLUCOSE BLOOD QUANT: CPT | Performed by: THORACIC SURGERY (CARDIOTHORACIC VASCULAR SURGERY)

## 2017-12-04 PROCEDURE — 33518 CABG ARTERY-VEIN TWO: CPT | Performed by: THORACIC SURGERY (CARDIOTHORACIC VASCULAR SURGERY)

## 2017-12-04 PROCEDURE — 63710000001 INSULIN REGULAR HUMAN PER 5 UNITS: Performed by: NURSE ANESTHETIST, CERTIFIED REGISTERED

## 2017-12-04 PROCEDURE — 25010000002 MORPHINE PER 10 MG: Performed by: FAMILY MEDICINE

## 2017-12-04 PROCEDURE — 25010000003 CEFAZOLIN PER 500 MG: Performed by: NURSE PRACTITIONER

## 2017-12-04 PROCEDURE — 25010000002 PROPOFOL 10 MG/ML EMULSION: Performed by: NURSE ANESTHETIST, CERTIFIED REGISTERED

## 2017-12-04 PROCEDURE — 94760 N-INVAS EAR/PLS OXIMETRY 1: CPT

## 2017-12-04 PROCEDURE — 93010 ELECTROCARDIOGRAM REPORT: CPT | Performed by: INTERNAL MEDICINE

## 2017-12-04 PROCEDURE — 84295 ASSAY OF SERUM SODIUM: CPT | Performed by: THORACIC SURGERY (CARDIOTHORACIC VASCULAR SURGERY)

## 2017-12-04 PROCEDURE — 82330 ASSAY OF CALCIUM: CPT | Performed by: THORACIC SURGERY (CARDIOTHORACIC VASCULAR SURGERY)

## 2017-12-04 PROCEDURE — 85730 THROMBOPLASTIN TIME PARTIAL: CPT | Performed by: NURSE PRACTITIONER

## 2017-12-04 PROCEDURE — 86900 BLOOD TYPING SEROLOGIC ABO: CPT

## 2017-12-04 PROCEDURE — 85730 THROMBOPLASTIN TIME PARTIAL: CPT | Performed by: THORACIC SURGERY (CARDIOTHORACIC VASCULAR SURGERY)

## 2017-12-04 PROCEDURE — 36600 WITHDRAWAL OF ARTERIAL BLOOD: CPT

## 2017-12-04 PROCEDURE — P9016 RBC LEUKOCYTES REDUCED: HCPCS

## 2017-12-04 PROCEDURE — 25010000002 PROTAMINE SULFATE PER 10 MG: Performed by: NURSE ANESTHETIST, CERTIFIED REGISTERED

## 2017-12-04 PROCEDURE — 82330 ASSAY OF CALCIUM: CPT | Performed by: NURSE PRACTITIONER

## 2017-12-04 PROCEDURE — 25010000002 ONDANSETRON PER 1 MG: Performed by: NURSE PRACTITIONER

## 2017-12-04 PROCEDURE — 86923 COMPATIBILITY TEST ELECTRIC: CPT

## 2017-12-04 PROCEDURE — 82803 BLOOD GASES ANY COMBINATION: CPT | Performed by: FAMILY MEDICINE

## 2017-12-04 PROCEDURE — 25010000002 MIDAZOLAM PER 1 MG: Performed by: NURSE ANESTHETIST, CERTIFIED REGISTERED

## 2017-12-04 PROCEDURE — 25010000002 PHENYLEPHRINE PER 1 ML: Performed by: NURSE ANESTHETIST, CERTIFIED REGISTERED

## 2017-12-04 PROCEDURE — 86901 BLOOD TYPING SEROLOGIC RH(D): CPT | Performed by: THORACIC SURGERY (CARDIOTHORACIC VASCULAR SURGERY)

## 2017-12-04 PROCEDURE — 83735 ASSAY OF MAGNESIUM: CPT | Performed by: NURSE PRACTITIONER

## 2017-12-04 PROCEDURE — 25010000002 FENTANYL CITRATE (PF) 100 MCG/2ML SOLUTION: Performed by: NURSE ANESTHETIST, CERTIFIED REGISTERED

## 2017-12-04 PROCEDURE — 25010000002 DOBUTAMINE 250 MG/20ML SOLUTION 20 ML VIAL: Performed by: NURSE ANESTHETIST, CERTIFIED REGISTERED

## 2017-12-04 PROCEDURE — 82962 GLUCOSE BLOOD TEST: CPT

## 2017-12-04 PROCEDURE — 36430 TRANSFUSION BLD/BLD COMPNT: CPT

## 2017-12-04 PROCEDURE — 84132 ASSAY OF SERUM POTASSIUM: CPT | Performed by: THORACIC SURGERY (CARDIOTHORACIC VASCULAR SURGERY)

## 2017-12-04 PROCEDURE — C1729 CATH, DRAINAGE: HCPCS | Performed by: THORACIC SURGERY (CARDIOTHORACIC VASCULAR SURGERY)

## 2017-12-04 PROCEDURE — 63710000001 INSULIN DETEMIR PER 5 UNITS: Performed by: NURSE PRACTITIONER

## 2017-12-04 PROCEDURE — 94002 VENT MGMT INPAT INIT DAY: CPT

## 2017-12-04 PROCEDURE — 25010000002 INSULIN REGULAR HUMAN PER 5 UNITS: Performed by: NURSE PRACTITIONER

## 2017-12-04 PROCEDURE — C1896 LEAD, AICD, NON SING/DUAL: HCPCS | Performed by: THORACIC SURGERY (CARDIOTHORACIC VASCULAR SURGERY)

## 2017-12-04 PROCEDURE — 85014 HEMATOCRIT: CPT | Performed by: THORACIC SURGERY (CARDIOTHORACIC VASCULAR SURGERY)

## 2017-12-04 PROCEDURE — 82803 BLOOD GASES ANY COMBINATION: CPT | Performed by: THORACIC SURGERY (CARDIOTHORACIC VASCULAR SURGERY)

## 2017-12-04 PROCEDURE — 86850 RBC ANTIBODY SCREEN: CPT | Performed by: THORACIC SURGERY (CARDIOTHORACIC VASCULAR SURGERY)

## 2017-12-04 PROCEDURE — C1751 CATH, INF, PER/CENT/MIDLINE: HCPCS | Performed by: ANESTHESIOLOGY

## 2017-12-04 PROCEDURE — 71010 HC CHEST PA OR AP: CPT

## 2017-12-04 PROCEDURE — 85018 HEMOGLOBIN: CPT | Performed by: THORACIC SURGERY (CARDIOTHORACIC VASCULAR SURGERY)

## 2017-12-04 PROCEDURE — P9041 ALBUMIN (HUMAN),5%, 50ML: HCPCS | Performed by: NURSE PRACTITIONER

## 2017-12-04 PROCEDURE — 94003 VENT MGMT INPAT SUBQ DAY: CPT

## 2017-12-04 PROCEDURE — 93005 ELECTROCARDIOGRAM TRACING: CPT | Performed by: NURSE PRACTITIONER

## 2017-12-04 PROCEDURE — 25010000002 ALBUMIN HUMAN 5% PER 50 ML: Performed by: NURSE PRACTITIONER

## 2017-12-04 PROCEDURE — 80069 RENAL FUNCTION PANEL: CPT | Performed by: NURSE PRACTITIONER

## 2017-12-04 PROCEDURE — 85610 PROTHROMBIN TIME: CPT | Performed by: NURSE PRACTITIONER

## 2017-12-04 PROCEDURE — 25010000002 PAPAVERINE PER 60 MG: Performed by: THORACIC SURGERY (CARDIOTHORACIC VASCULAR SURGERY)

## 2017-12-04 PROCEDURE — C1713 ANCHOR/SCREW BN/BN,TIS/BN: HCPCS | Performed by: THORACIC SURGERY (CARDIOTHORACIC VASCULAR SURGERY)

## 2017-12-04 PROCEDURE — 82803 BLOOD GASES ANY COMBINATION: CPT | Performed by: NURSE PRACTITIONER

## 2017-12-04 DEVICE — IMPLANTABLE DEVICE: Type: IMPLANTABLE DEVICE | Site: HEART | Status: FUNCTIONAL

## 2017-12-04 RX ORDER — ONDANSETRON 2 MG/ML
4 INJECTION INTRAMUSCULAR; INTRAVENOUS EVERY 6 HOURS PRN
Status: DISCONTINUED | OUTPATIENT
Start: 2017-12-04 | End: 2017-12-11 | Stop reason: HOSPADM

## 2017-12-04 RX ORDER — FENTANYL CITRATE 50 UG/ML
INJECTION, SOLUTION INTRAMUSCULAR; INTRAVENOUS AS NEEDED
Status: DISCONTINUED | OUTPATIENT
Start: 2017-12-04 | End: 2017-12-04 | Stop reason: SURG

## 2017-12-04 RX ORDER — SODIUM CHLORIDE, SODIUM GLUCONATE, SODIUM ACETATE, POTASSIUM CHLORIDE AND MAGNESIUM CHLORIDE 526; 502; 368; 37; 30 MG/100ML; MG/100ML; MG/100ML; MG/100ML; MG/100ML
INJECTION, SOLUTION INTRAVENOUS CONTINUOUS PRN
Status: DISCONTINUED | OUTPATIENT
Start: 2017-12-04 | End: 2017-12-04 | Stop reason: HOSPADM

## 2017-12-04 RX ORDER — CEFAZOLIN SODIUM 1 G/3ML
INJECTION, POWDER, FOR SOLUTION INTRAMUSCULAR; INTRAVENOUS AS NEEDED
Status: DISCONTINUED | OUTPATIENT
Start: 2017-12-04 | End: 2017-12-04 | Stop reason: SURG

## 2017-12-04 RX ORDER — FUROSEMIDE 10 MG/ML
20 INJECTION INTRAMUSCULAR; INTRAVENOUS ONCE
Status: DISCONTINUED | OUTPATIENT
Start: 2017-12-05 | End: 2017-12-05

## 2017-12-04 RX ORDER — HYDROCODONE BITARTRATE AND ACETAMINOPHEN 5; 325 MG/1; MG/1
1 TABLET ORAL EVERY 4 HOURS PRN
Status: DISCONTINUED | OUTPATIENT
Start: 2017-12-04 | End: 2017-12-05

## 2017-12-04 RX ORDER — TRANEXAMIC ACID 100 MG/ML
2.5 INJECTION, SOLUTION INTRAVENOUS ONCE
Status: COMPLETED | OUTPATIENT
Start: 2017-12-04 | End: 2017-12-04

## 2017-12-04 RX ORDER — HYDROCODONE BITARTRATE AND ACETAMINOPHEN 10; 325 MG/1; MG/1
1 TABLET ORAL EVERY 4 HOURS PRN
Status: DISCONTINUED | OUTPATIENT
Start: 2017-12-04 | End: 2017-12-05

## 2017-12-04 RX ORDER — PROTAMINE SULFATE 10 MG/ML
INJECTION, SOLUTION INTRAVENOUS AS NEEDED
Status: DISCONTINUED | OUTPATIENT
Start: 2017-12-04 | End: 2017-12-04 | Stop reason: SURG

## 2017-12-04 RX ORDER — POTASSIUM CHLORIDE 7.45 MG/ML
20 INJECTION INTRAVENOUS
Status: DISCONTINUED | OUTPATIENT
Start: 2017-12-04 | End: 2017-12-05

## 2017-12-04 RX ORDER — LABETALOL 100 MG/1
100 TABLET, FILM COATED ORAL EVERY 12 HOURS SCHEDULED
Status: DISCONTINUED | OUTPATIENT
Start: 2017-12-04 | End: 2017-12-11 | Stop reason: HOSPADM

## 2017-12-04 RX ORDER — MORPHINE SULFATE 1 MG/ML
2 INJECTION, SOLUTION EPIDURAL; INTRATHECAL; INTRAVENOUS
Status: DISCONTINUED | OUTPATIENT
Start: 2017-12-04 | End: 2017-12-05

## 2017-12-04 RX ORDER — SODIUM CHLORIDE, SODIUM GLUCONATE, SODIUM ACETATE, POTASSIUM CHLORIDE, AND MAGNESIUM CHLORIDE 526; 502; 368; 37; 30 MG/100ML; MG/100ML; MG/100ML; MG/100ML; MG/100ML
INJECTION, SOLUTION INTRAVENOUS CONTINUOUS PRN
Status: DISCONTINUED | OUTPATIENT
Start: 2017-12-04 | End: 2017-12-04 | Stop reason: SURG

## 2017-12-04 RX ORDER — SODIUM CHLORIDE 0.9 % (FLUSH) 0.9 %
30 SYRINGE (ML) INJECTION ONCE AS NEEDED
Status: DISCONTINUED | OUTPATIENT
Start: 2017-12-04 | End: 2017-12-05

## 2017-12-04 RX ORDER — MAGNESIUM SULFATE HEPTAHYDRATE 40 MG/ML
2 INJECTION, SOLUTION INTRAVENOUS AS NEEDED
Status: DISCONTINUED | OUTPATIENT
Start: 2017-12-04 | End: 2017-12-05

## 2017-12-04 RX ORDER — ROCURONIUM BROMIDE 10 MG/ML
INJECTION, SOLUTION INTRAVENOUS AS NEEDED
Status: DISCONTINUED | OUTPATIENT
Start: 2017-12-04 | End: 2017-12-04 | Stop reason: SURG

## 2017-12-04 RX ORDER — SUCCINYLCHOLINE CHLORIDE 20 MG/ML
INJECTION INTRAMUSCULAR; INTRAVENOUS AS NEEDED
Status: DISCONTINUED | OUTPATIENT
Start: 2017-12-04 | End: 2017-12-04 | Stop reason: SURG

## 2017-12-04 RX ORDER — IPRATROPIUM BROMIDE AND ALBUTEROL SULFATE 2.5; .5 MG/3ML; MG/3ML
3 SOLUTION RESPIRATORY (INHALATION)
Status: DISCONTINUED | OUTPATIENT
Start: 2017-12-04 | End: 2017-12-11 | Stop reason: HOSPADM

## 2017-12-04 RX ORDER — EPINEPHRINE 1 MG/ML
INJECTION, SOLUTION, CONCENTRATE INTRAVENOUS AS NEEDED
Status: DISCONTINUED | OUTPATIENT
Start: 2017-12-04 | End: 2017-12-11 | Stop reason: HOSPADM

## 2017-12-04 RX ORDER — SENNA AND DOCUSATE SODIUM 50; 8.6 MG/1; MG/1
1 TABLET, FILM COATED ORAL 2 TIMES DAILY
Status: DISCONTINUED | OUTPATIENT
Start: 2017-12-04 | End: 2017-12-11 | Stop reason: HOSPADM

## 2017-12-04 RX ORDER — ALBUMIN, HUMAN INJ 5% 5 %
500 SOLUTION INTRAVENOUS ONCE AS NEEDED
Status: DISCONTINUED | OUTPATIENT
Start: 2017-12-04 | End: 2017-12-05

## 2017-12-04 RX ORDER — DEXTROSE AND SODIUM CHLORIDE 5; .45 G/100ML; G/100ML
30 INJECTION, SOLUTION INTRAVENOUS CONTINUOUS
Status: DISCONTINUED | OUTPATIENT
Start: 2017-12-04 | End: 2017-12-05

## 2017-12-04 RX ORDER — PAPAVERINE HYDROCHLORIDE 30 MG/ML
INJECTION INTRAMUSCULAR; INTRAVENOUS AS NEEDED
Status: DISCONTINUED | OUTPATIENT
Start: 2017-12-04 | End: 2017-12-11 | Stop reason: HOSPADM

## 2017-12-04 RX ORDER — FUROSEMIDE 10 MG/ML
INJECTION INTRAMUSCULAR; INTRAVENOUS AS NEEDED
Status: DISCONTINUED | OUTPATIENT
Start: 2017-12-04 | End: 2017-12-04 | Stop reason: SURG

## 2017-12-04 RX ORDER — MIDAZOLAM HYDROCHLORIDE 1 MG/ML
INJECTION INTRAMUSCULAR; INTRAVENOUS AS NEEDED
Status: DISCONTINUED | OUTPATIENT
Start: 2017-12-04 | End: 2017-12-04 | Stop reason: SURG

## 2017-12-04 RX ORDER — PROPOFOL 10 MG/ML
VIAL (ML) INTRAVENOUS AS NEEDED
Status: DISCONTINUED | OUTPATIENT
Start: 2017-12-04 | End: 2017-12-04 | Stop reason: SURG

## 2017-12-04 RX ORDER — SODIUM CHLORIDE 9 MG/ML
30 INJECTION, SOLUTION INTRAVENOUS CONTINUOUS PRN
Status: DISCONTINUED | OUTPATIENT
Start: 2017-12-04 | End: 2017-12-05

## 2017-12-04 RX ORDER — ASPIRIN 81 MG/1
81 TABLET ORAL DAILY
Status: DISCONTINUED | OUTPATIENT
Start: 2017-12-05 | End: 2017-12-11 | Stop reason: HOSPADM

## 2017-12-04 RX ORDER — DOBUTAMINE HYDROCHLORIDE 100 MG/100ML
2-20 INJECTION INTRAVENOUS CONTINUOUS PRN
Status: DISCONTINUED | OUTPATIENT
Start: 2017-12-04 | End: 2017-12-05

## 2017-12-04 RX ORDER — ALBUMIN, HUMAN INJ 5% 5 %
1000 SOLUTION INTRAVENOUS AS NEEDED
Status: DISCONTINUED | OUTPATIENT
Start: 2017-12-04 | End: 2017-12-05

## 2017-12-04 RX ADMIN — MIDAZOLAM 2 MG: 1 INJECTION INTRAMUSCULAR; INTRAVENOUS at 07:24

## 2017-12-04 RX ADMIN — IPRATROPIUM BROMIDE AND ALBUTEROL SULFATE 3 ML: 2.5; .5 SOLUTION RESPIRATORY (INHALATION) at 21:00

## 2017-12-04 RX ADMIN — ONDANSETRON 4 MG: 2 INJECTION INTRAMUSCULAR; INTRAVENOUS at 20:07

## 2017-12-04 RX ADMIN — TRANEXAMIC ACID 730 MG: 100 INJECTION, SOLUTION INTRAVENOUS at 07:24

## 2017-12-04 RX ADMIN — MIDAZOLAM 1 MG: 1 INJECTION INTRAMUSCULAR; INTRAVENOUS at 09:20

## 2017-12-04 RX ADMIN — CEFAZOLIN SODIUM 2 G: 1 INJECTION, POWDER, FOR SOLUTION INTRAMUSCULAR; INTRAVENOUS at 11:10

## 2017-12-04 RX ADMIN — MIDAZOLAM 2 MG: 1 INJECTION INTRAMUSCULAR; INTRAVENOUS at 11:45

## 2017-12-04 RX ADMIN — HEPARIN SODIUM 5000 UNITS: 5000 INJECTION, SOLUTION INTRAVENOUS; SUBCUTANEOUS at 08:15

## 2017-12-04 RX ADMIN — DEXMEDETOMIDINE HYDROCHLORIDE 0.2 MCG/KG/HR: 100 INJECTION, SOLUTION INTRAVENOUS at 21:59

## 2017-12-04 RX ADMIN — MIDAZOLAM 1 MG: 1 INJECTION INTRAMUSCULAR; INTRAVENOUS at 10:44

## 2017-12-04 RX ADMIN — PROTAMINE SULFATE 200 MG: 10 INJECTION, SOLUTION INTRAVENOUS at 11:20

## 2017-12-04 RX ADMIN — DOBUTAMINE 5 MCG/KG/MIN: 12.5 INJECTION, SOLUTION, CONCENTRATE INTRAVENOUS at 10:35

## 2017-12-04 RX ADMIN — Medication 2 MG: at 01:35

## 2017-12-04 RX ADMIN — MIDAZOLAM 2 MG: 1 INJECTION INTRAMUSCULAR; INTRAVENOUS at 07:15

## 2017-12-04 RX ADMIN — SUCCINYLCHOLINE CHLORIDE 140 MG: 20 INJECTION, SOLUTION INTRAMUSCULAR; INTRAVENOUS at 07:24

## 2017-12-04 RX ADMIN — FENTANYL CITRATE 50 MCG: 50 INJECTION, SOLUTION INTRAMUSCULAR; INTRAVENOUS at 10:44

## 2017-12-04 RX ADMIN — SODIUM CHLORIDE 30 ML/HR: 9 INJECTION, SOLUTION INTRAVENOUS at 14:01

## 2017-12-04 RX ADMIN — ROCURONIUM BROMIDE 30 MG: 10 INJECTION INTRAVENOUS at 07:30

## 2017-12-04 RX ADMIN — INSULIN DETEMIR 20 UNITS: 100 INJECTION, SOLUTION SUBCUTANEOUS at 20:23

## 2017-12-04 RX ADMIN — WATER 2 G: 1 INJECTION INTRAMUSCULAR; INTRAVENOUS; SUBCUTANEOUS at 15:51

## 2017-12-04 RX ADMIN — SODIUM CHLORIDE, SODIUM GLUCONATE, SODIUM ACETATE, POTASSIUM CHLORIDE, AND MAGNESIUM CHLORIDE: 526; 502; 368; 37; 30 INJECTION, SOLUTION INTRAVENOUS at 10:13

## 2017-12-04 RX ADMIN — DEXTROSE AND SODIUM CHLORIDE 30 ML/HR: 5; 450 INJECTION, SOLUTION INTRAVENOUS at 14:00

## 2017-12-04 RX ADMIN — CALCIUM CHLORIDE 1 G: 100 INJECTION, SOLUTION INTRAVENOUS at 14:41

## 2017-12-04 RX ADMIN — FENTANYL CITRATE 100 MCG: 50 INJECTION, SOLUTION INTRAMUSCULAR; INTRAVENOUS at 11:45

## 2017-12-04 RX ADMIN — SODIUM CHLORIDE 1.4 UNITS/HR: 9 INJECTION, SOLUTION INTRAVENOUS at 18:07

## 2017-12-04 RX ADMIN — MIDAZOLAM 2 MG: 1 INJECTION INTRAMUSCULAR; INTRAVENOUS at 08:25

## 2017-12-04 RX ADMIN — WATER 2 G: 1 INJECTION INTRAMUSCULAR; INTRAVENOUS; SUBCUTANEOUS at 08:01

## 2017-12-04 RX ADMIN — DEXMEDETOMIDINE HYDROCHLORIDE 0.2 MCG/KG/HR: 100 INJECTION, SOLUTION INTRAVENOUS at 14:00

## 2017-12-04 RX ADMIN — PHENYLEPHRINE HYDROCHLORIDE 0.5 MCG/KG/MIN: 10 INJECTION INTRAVENOUS at 09:36

## 2017-12-04 RX ADMIN — FENTANYL CITRATE 50 MCG: 50 INJECTION, SOLUTION INTRAMUSCULAR; INTRAVENOUS at 09:48

## 2017-12-04 RX ADMIN — PROPOFOL 100 MG: 10 INJECTION, EMULSION INTRAVENOUS at 07:24

## 2017-12-04 RX ADMIN — Medication 2 MG: at 04:30

## 2017-12-04 RX ADMIN — HEPARIN SODIUM 22000 UNITS: 5000 INJECTION, SOLUTION INTRAVENOUS; SUBCUTANEOUS at 08:47

## 2017-12-04 RX ADMIN — MIDAZOLAM 1 MG: 1 INJECTION INTRAMUSCULAR; INTRAVENOUS at 09:48

## 2017-12-04 RX ADMIN — MIDAZOLAM 1 MG: 1 INJECTION INTRAMUSCULAR; INTRAVENOUS at 08:50

## 2017-12-04 RX ADMIN — HYDROCODONE BITARTRATE AND ACETAMINOPHEN 1 TABLET: 10; 325 TABLET ORAL at 20:09

## 2017-12-04 RX ADMIN — NOREPINEPHRINE BITARTRATE 0.08 MCG: 1 INJECTION INTRAVENOUS at 11:10

## 2017-12-04 RX ADMIN — ALBUMIN HUMAN 1000 ML: 0.05 INJECTION, SOLUTION INTRAVENOUS at 14:00

## 2017-12-04 RX ADMIN — HYDRALAZINE HYDROCHLORIDE 50 MG: 50 TABLET ORAL at 05:54

## 2017-12-04 RX ADMIN — DOBUTAMINE 5 MCG/KG/MIN: 12.5 INJECTION, SOLUTION, CONCENTRATE INTRAVENOUS at 10:55

## 2017-12-04 RX ADMIN — FUROSEMIDE 20 MG: 10 INJECTION, SOLUTION INTRAVENOUS at 08:25

## 2017-12-04 RX ADMIN — MIDAZOLAM 1 MG: 1 INJECTION INTRAMUSCULAR; INTRAVENOUS at 09:01

## 2017-12-04 RX ADMIN — FENTANYL CITRATE 100 MCG: 50 INJECTION, SOLUTION INTRAMUSCULAR; INTRAVENOUS at 08:38

## 2017-12-04 RX ADMIN — TRANEXAMIC ACID 876 MG: 1 INJECTION, SOLUTION INTRAVENOUS at 08:00

## 2017-12-04 RX ADMIN — FENTANYL CITRATE 100 MCG: 50 INJECTION, SOLUTION INTRAMUSCULAR; INTRAVENOUS at 08:30

## 2017-12-04 RX ADMIN — SODIUM CHLORIDE, SODIUM GLUCONATE, SODIUM ACETATE, POTASSIUM CHLORIDE, AND MAGNESIUM CHLORIDE: 526; 502; 368; 37; 30 INJECTION, SOLUTION INTRAVENOUS at 07:14

## 2017-12-04 RX ADMIN — PANTOPRAZOLE SODIUM 40 MG: 40 TABLET, DELAYED RELEASE ORAL at 05:53

## 2017-12-04 RX ADMIN — SODIUM CHLORIDE 1 UNITS/HR: 9 INJECTION, SOLUTION INTRAVENOUS at 08:01

## 2017-12-04 RX ADMIN — SODIUM CHLORIDE 2.5 MG/HR: 9 INJECTION, SOLUTION INTRAVENOUS at 14:10

## 2017-12-04 RX ADMIN — SODIUM BICARBONATE 50 MEQ: 84 INJECTION INTRAVENOUS at 14:51

## 2017-12-04 RX ADMIN — FENTANYL CITRATE 250 MCG: 50 INJECTION, SOLUTION INTRAMUSCULAR; INTRAVENOUS at 07:24

## 2017-12-04 NOTE — ANESTHESIA PROCEDURE NOTES
Central Line    Patient location during procedure: OR  Indications: central pressure monitoring, vascular access and MD/Surgeon request  Staff  Anesthesiologist: MARQUITA ECKERT  Preanesthetic Checklist  Completed: patient identified, site marked, surgical consent, pre-op evaluation, timeout performed, IV checked, risks and benefits discussed and monitors and equipment checked  Central Line Prep  Sterile Tech:gloves, cap, gown, mask and sterile barriers  Patient monitoring: blood pressure monitoring, continuous pulse oximetry and EKG  Central Line Procedure  Laterality:right  Location:internal jugular  Catheter Type:MAC, double lumen and [place]  Catheter Size:9 Fr  Guidance:ultrasound guided  PROCEDURE NOTE/ULTRASOUND INTERPRETATION.  Using ultrasound guidance the potential vascular sites for insertion of the catheter were visualized to determine the patency of the vessel to be used for vascular access.  After selecting the appropriate site for insertion, the needle was visualized under ultrasound being inserted into the internal jugular vein, followed by ultrasound confirmation of wire and catheter placement. There were no abnormalities seen on ultrasound; an image was taken; and the patient tolerated the procedure with no complications.   Assessment  Post procedure:biopatch applied, line sutured and occlusive dressing applied  Assessement:blood return through all ports, free fluid flow, chest x-ray ordered, no pneumothorax on x-ray and Shakir Test  Complications:no  Patient Tolerance:patient tolerated the procedure well with no apparent complications  Additional Notes  Line placed under U/S w/o complications.

## 2017-12-04 NOTE — ANESTHESIA POSTPROCEDURE EVALUATION
Patient: Lyle Corona    Procedure Summary     Date Anesthesia Start Anesthesia Stop Room / Location    12/04/17 0714 1229 [facility] OR 04 / [facility]       Procedure Diagnosis Surgeon Provider    CORONARY ARTERY BYPASS GRAFTINGX3, ENDOSCOPIC VEIN HARVEST     (CELL SAVER) (N/A Chest) Coronary arteriosclerosis; NSTEMI (non-ST elevated myocardial infarction)  (Coronary arteriosclerosis [I25.10]; NSTEMI (non-ST elevated myocardial infarction) [I21.4]) MD Shlomo Bailey MD          Anesthesia Type: general  Last vitals  BP   116/76 (12/04/17 0650)   Temp   98.4 °F (36.9 °C) (12/04/17 0400)   Pulse   75 (12/04/17 0650)   Resp   17 (12/01/17 0602)     SpO2   94 % (12/04/17 0650)     Post Anesthesia Care and Evaluation    Patient location during evaluation: ICU  Patient participation: complete - patient cannot participate  Post-procedure mental status: Sedated.  Pain score: 0  Pain management: adequate  Airway patency: patent  Anesthetic complications: No anesthetic complications  PONV Status: none  Cardiovascular status: acceptable  Respiratory status: ETT and acceptable  Hydration status: acceptable    Comments: Bedside sbar report given to ABRIL Lomax in ICU. No complications during transport or at sign off.

## 2017-12-04 NOTE — ANESTHESIA PROCEDURE NOTES
Airway  Urgency: elective    Airway not difficult    General Information and Staff    Patient location during procedure: OR    Indications and Patient Condition  Indications for airway management: airway protection    Preoxygenated: yes  Mask difficulty assessment: 1 - vent by mask    Final Airway Details  Final airway type: endotracheal airway      Successful airway: ETT  Cuffed: yes   Successful intubation technique: direct laryngoscopy  Facilitating devices/methods: intubating stylet  Endotracheal tube insertion site: oral  Blade: [place]  Blade size: #3  ETT size: 7.5 mm  Cormack-Lehane Classification: grade IIa - partial view of glottis  Placement verified by: chest auscultation, capnometry and palpation of cuff   Measured from: lips  ETT to lips (cm): 22  Number of attempts at approach: 1    Additional Comments  Atraumatic Intubation.

## 2017-12-04 NOTE — ANESTHESIA PROCEDURE NOTES
Arterial Line    Patient location during procedure: OR   Line placed for hemodynamic monitoring and ABGs/Labs/ISTAT.  Performed By   CRNA: SHANE GONZALEZ  Preanesthetic Checklist  Completed: patient identified, site marked, surgical consent, pre-op evaluation, timeout performed, IV checked, risks and benefits discussed and monitors and equipment checked  Arterial Line Prep   Sterile Tech: cap, gloves, gown, mask and sterile barriers  Prep: ChloraPrep  Patient monitoring: blood pressure monitoring, continuous pulse oximetry and EKG  Arterial Line Procedure   Laterality:left  Location:  radial artery  Catheter size: 20 G   Guidance: landmark technique and palpation technique  Number of attempts: 1  Successful placement: yes          Post Assessment   Dressing Type: occlusive dressing applied and secured with tape.   Complications no  Circ/Move/Sens Assessment: normal and unchanged.   Patient Tolerance: patient tolerated the procedure well with no apparent complications  Additional Notes  Ultrasound interpretation note: Under ultrasound guidance, the above artery was evaluated and deemed patent, the needle, wire and catheter were seen entering the vessel.  No abnormalities noted.

## 2017-12-04 NOTE — PAYOR COMM NOTE
"Lyle Corona (51 y.o. Male)     Date of Birth Social Security Number Address Home Phone MRN    1965  [address] [facility] [address] [phone] [id number]    Yarsani Marital Status          [employer]        Admission Date Admission Type Admitting Provider Attending Provider Department, Room/Bed    11/27/17 Urgent Taz Guzmán MD Gibson, Bryce, MD [facility] OR, MAD OR/NONE    Discharge Date Discharge Disposition Discharge Destination                      Attending Provider: Taz Guzmán MD     Allergies:  Imdur [Isosorbide Dinitrate], Amlodipine, Lisinopril, Metoprolol    Isolation:  None   Infection:  None   Code Status:  FULL    Ht:  69\" (175.3 cm)   Wt:  160 lb 15 oz (73 kg)    Admission Cmt:  None   Principal Problem:  None                Active Insurance as of 11/27/2017     Primary Coverage     Payor Plan Insurance Group Employer/Plan Group    PAS[employer]ORT PAS[employer]ORT MEDICAID     Payor Plan Address Payor Plan Phone Number Effective From Effective To    [address] [phone] 4/1/2014     [place] [address]       Subscriber Name Subscriber Birth Date Member ID       LYLE CORONA 1965 [id number]                 Emergency Contacts      (Rel.) Home Phone Work Phone Mobile Phone    Sue Márquez (Friend) [phone] -- --    Elizabeth Sagastume (Other) [phone] -- [phone]        [facility]  [phone]   Phone  [phone]   Fax  Cont. Stay Reviw       Physician Progress Notes (last 72 hours) (Notes from 12/1/2017  9:28 AM through 12/4/2017  9:28 AM)      Seymour Ricci MD at 12/1/2017 10:18 AM  Version 1 of 1                [facility] Medicine Services  INPATIENT PROGRESS NOTE     LOS: 4 days   Patient Care Team:  AHSAN Mayer as PCP - General    Chief Complaint:  <principal problem not specified>      Subjective     Interval History: "   Patient is seen for follow-up today. He continues to have periodic chest pressure and remains on heparin and nitroglycerin infusion. Bilateral nasal congestion with postnasal drip has resolved with Flonase.  He complains of constipation but is doing well otherwise.    Patient Complaints: As above    History taken from: Patient    Review of Systems:    Review of Systems   Constitutional: Negative for activity change, appetite change, chills, diaphoresis, fatigue and fever.   HENT: Negative for postnasal drip, trouble swallowing and voice change.    Eyes: Negative for photophobia and visual disturbance.   Respiratory: Negative for cough, choking, chest tightness, shortness of breath, wheezing and stridor.    Cardiovascular: Positive for chest pain. Negative for palpitations and leg swelling.   Gastrointestinal: Positive for constipation. Negative for abdominal distention, abdominal pain, blood in stool, diarrhea, nausea and vomiting.   Endocrine: Negative for cold intolerance, heat intolerance, polydipsia, polyphagia and polyuria.   Genitourinary: Negative for decreased urine volume, difficulty urinating, dysuria, enuresis, flank pain, frequency, hematuria and urgency.   Musculoskeletal: Negative for arthralgias, gait problem, myalgias, neck pain and neck stiffness.   Skin: Negative for pallor, rash and wound.   Neurological: Negative for dizziness, tremors, seizures, syncope, facial asymmetry, speech difficulty, weakness, light-headedness, numbness and headaches.   Hematological: Does not bruise/bleed easily.   Psychiatric/Behavioral: Negative for agitation, behavioral problems and confusion.         Objective     Vital Signs  Temp:  [97.9 °F (36.6 °C)-98.8 °F (37.1 °C)] 98.2 °F (36.8 °C)  Heart Rate:  [74-87] 84  Resp:  [15-19] 17  BP: ()/(53-82) 123/81    Physical Exam:   Physical Exam   Constitutional: He is oriented to person, place, and time. He appears well-developed and well-nourished. No distress.    HENT:   Head: Normocephalic and atraumatic.   Eyes: EOM are normal. Pupils are equal, round, and reactive to light. Right eye exhibits no discharge. Left eye exhibits no discharge. No scleral icterus.   Neck: Normal range of motion. Neck supple. No JVD present. No thyromegaly present.   Cardiovascular: Normal rate, regular rhythm and normal heart sounds.  Exam reveals no gallop and no friction rub.    No murmur heard.  Pulmonary/Chest: Effort normal and breath sounds normal. He has no wheezes. He has no rales. He exhibits no tenderness.   Abdominal: Soft. Bowel sounds are normal. He exhibits no distension and no mass. There is no tenderness. There is no rebound and no guarding.   Musculoskeletal: He exhibits no edema, tenderness or deformity.   Neurological: He is alert and oriented to person, place, and time. No cranial nerve deficit. He exhibits normal muscle tone. Coordination normal.   Skin: Skin is warm and dry. No rash noted. He is not diaphoretic. No erythema. No pallor.   Psychiatric: He has a normal mood and affect. His behavior is normal. Judgment and thought content normal.   Nursing note and vitals reviewed.         Results Review:         Results from last 7 days  Lab Units 12/01/17 0421 11/30/17  0140 11/29/17  0143 11/28/17  1031 11/27/17  1710   SODIUM mmol/L 135* 139 138 137 137   POTASSIUM mmol/L 3.8 3.6 3.6 4.0 3.8   CHLORIDE mmol/L 101 101 101 105 101   CO2 mmol/L 24.0 24.0 25.0 22.0 23.0   BUN mg/dL 16 19 24* 23* 26*   CREATININE mg/dL 2.15* 2.09* 2.10* 1.83* 1.98*   GLUCOSE mg/dL 113* 119* 103* 90 96   CALCIUM mg/dL 8.7 8.5 8.4 8.6 9.5   BILIRUBIN mg/dL 0.7 0.4 0.5 0.6 0.6   ALK PHOS U/L 94 88 90 95 99   ALT (SGPT) U/L 24 26 21 28 20*   AST (SGOT) U/L 29 20 22 31 21               Results from last 7 days  Lab Units 12/01/17 0421 11/30/17  0141 11/30/17  0140 11/29/17  0143 11/28/17  1031 11/27/17  1828   WBC 10*3/mm3 7.82  --  8.17 6.78 8.85 9.29   HEMOGLOBIN g/dL 10.1*  --  10.2* 10.7*  11.3* 12.2*   HEMATOCRIT % 30.1*  --  30.1* 31.4* 33.1* 36.1*   PLATELETS 10*3/mm3 150 143* 143* 139* 153 162       Lab Results   Component Value Date    CKTOTAL 25 (L) 05/12/2017    CKMB 0.55 05/12/2017    TROPONINI 0.522 (C) 11/28/2017       CO2   Date Value Ref Range Status   12/01/2017 24.0 22.0 - 31.0 mmol/L Final         Results from last 7 days  Lab Units 11/28/17  1743 11/27/17  2040   INR  0.99 1.03        Imaging Results (last 7 days)     ** No results found for the last 168 hours. **                                    Medication Review:   Current Facility-Administered Medications   Medication Dose Route Frequency Provider Last Rate Last Dose   • ALPRAZolam (XANAX) tablet 0.25 mg  0.25 mg Oral 4x Daily PRN Seymour Ricci MD   0.25 mg at 12/01/17 0614   • atorvastatin (LIPITOR) tablet 40 mg  40 mg Oral Daily Carlos Charles MD   40 mg at 12/01/17 0926   • bisacodyl (DULCOLAX) EC tablet 10 mg  10 mg Oral Daily Seymour Ricci MD   10 mg at 12/01/17 0926   • [START ON 12/4/2017] ceFAZolin (ANCEF) in SWFI 2 g/20ml IV PUSH syringe  2 g Intravenous On Call to OR AHSAN Rand       • famotidine (PEPCID) tablet 20 mg  20 mg Oral BID AHSAN Whitaker   20 mg at 12/01/17 0926   • fluticasone (FLONASE) 50 MCG/ACT nasal spray 2 spray  2 spray Each Nare Daily Seymour Ricci MD   2 spray at 12/01/17 0927   • heparin 68593 units/250 mL (100 units/mL) in 0.45 % NaCl infusion  18 Units/kg/hr Intravenous Titrated Carlos Charles MD 8 mL/hr at 12/01/17 0146 11.611 Units/kg/hr at 12/01/17 0146    And   • heparin (porcine) 5000 UNIT/ML injection 5,500 Units  80 Units/kg Intravenous PRN Carlos Charles MD        And   • heparin (porcine) 5000 UNIT/ML injection 2,800 Units  40 Units/kg Intravenous PRN Carlos Charles MD       • hydrALAZINE (APRESOLINE) injection 10 mg  10 mg Intravenous Q6H PRN AHSAN Whitaker   10 mg at 11/27/17 1718   • hydrALAZINE  (APRESOLINE) tablet 50 mg  50 mg Oral Q8H Adriane Butler MD   50 mg at 12/01/17 0613   • labetalol (NORMODYNE) tablet 100 mg  100 mg Oral Q12H Adriane Butler MD   100 mg at 12/01/17 0926   • LORazepam (ATIVAN) tablet 0.5 mg  0.5 mg Oral Q8H PRN Elizabeth J Edelen, APRN   0.5 mg at 11/30/17 0900   • morphine injection 2 mg  2 mg Intravenous Q2H PRN Elizabeth J Edelen, APRN   2 mg at 12/01/17 0411   • mupirocin (BACTROBAN) 2 % ointment 1 application  1 application Nasal Q12H Elizabeth J Edelen, APRN   1 application at 12/01/17 0927   • nitroglycerin 50 mg/250 mL (0.2 mg/mL) infusion  5-200 mcg/min Intravenous Titrated Nataliya G Levill, APRN 30 mL/hr at 12/01/17 0412 100 mcg/min at 12/01/17 0412   • ondansetron (ZOFRAN) injection 4 mg  4 mg Intravenous Q6H PRN Nataliya G Levill, APRN       • pantoprazole (PROTONIX) EC tablet 40 mg  40 mg Oral Q AM Nataliya G Levill, APRN   40 mg at 12/01/17 0614   • [START ON 12/2/2017] polyethylene glycol 3350 powder (packet)  17 g Oral Daily Elizabeth J Edelen, APRN       • ranolazine (RANEXA) 12 hr tablet 500 mg  500 mg Oral Q12H Lily Yepez, APRN   500 mg at 12/01/17 0926   • sodium chloride 0.9 % flush 1-10 mL  1-10 mL Intravenous PRN Nataliya G Levill, APRN       • tamsulosin (FLOMAX) 24 hr capsule 0.4 mg  0.4 mg Oral Nightly Nataliya G Levill, APRN   0.4 mg at 11/30/17 2037   • terazosin (HYTRIN) capsule 1 mg  1 mg Oral Nightly Nataliya G Levill, APRN   1 mg at 11/30/17 2037         Assessment/Plan   1. NSTEMI S/p cardiac cath. with multi-vessel coronary artery disease: Continue current optimal medical management while awaiting cardiac revascularization.  Echocardiogram Shows mild concentric left ventricular wall thickness, normal left ventricular systolic function with ejection fraction of 65%.  There is hypokinesis of the inferior wall.  Cardiologist and CT surgery are actively following. Patient is awaiting revascularization surgery.  2.  Acute on chronickidney injury: Creatinine has increased to  "2.15 today. IV hydration was discontinued yesterday. Continue to monitor renal function and avoiding all nephrotoxic agents. Nephrologist is following.  3.  Hypertension: Stable.  4.  Possible allergic rhinitis with postnasal drip: Resolved.   5.  Constipation: Continue bowel regimen.  6.  Continue GI and DVT prophylaxis.        Seymour Ricci MD  17      EMR Dragon/Transcription disclaimer:   Much of this encounter note is an electronic transcription/translation of spoken language to printed text. The electronic translation of spoken language may permit erroneous, or at times, nonsensical words or phrases to be inadvertently transcribed; Although I have reviewed the note for such errors, some may still exist.                 Electronically signed by Seymour Ricci MD at 2017 10:22 AM      Jl Young MD at 2017 10:20 AM  Version 1 of 1         [facility]  [address] [place] [address]. [address]  T - [phone]  F - [phone]     Progress Note          PATIENT  DEMOGRAPHICS   PATIENT NAME: Lyle Corona                      PHYSICIAN: Jl Young MD  : 1965  MRN: [id number]   LOS: 4 days    Patient Care Team:  AHSAN Mayer as PCP - General  Subjective   SUBJECTIVE   Chest pain is better this morning rated as 5/10. No SOB, nausea, vomiting.          Objective   OBJECTIVE   Vital Signs  Temp:  [97.9 °F (36.6 °C)-98.8 °F (37.1 °C)] 98.2 °F (36.8 °C)  Heart Rate:  [74-87] 84  Resp:  [15-19] 17  BP: ()/(53-82) 123/81    Flowsheet Rows         First Filed Value    Admission Height  69\" (175.3 cm) Documented at 2017 1722    Admission Weight  151 lb 12.8 oz (68.9 kg) Documented at 2017 1722           I/O last 3 completed shifts:  In: 2881 [P.O.:240; I.V.:2641]  Out: 5290 [Urine:5290]    PHYSICAL EXAM    Physical Exam   Constitutional: He is oriented to person, place, and time. He appears well-developed.   Moderately " nourished   HENT:   Head: Normocephalic and atraumatic.   Cardiovascular: Normal rate, regular rhythm and normal heart sounds.  Exam reveals no gallop and no friction rub.    No murmur heard.  Pulmonary/Chest: Effort normal and breath sounds normal. No respiratory distress. He has no wheezes. He has no rales. He exhibits no tenderness.   Abdominal: Soft. Bowel sounds are normal. He exhibits no distension and no mass. There is no tenderness. There is no guarding.   Musculoskeletal: He exhibits no edema or tenderness.   Neurological: He is alert and oriented to person, place, and time.   Skin: Skin is warm and dry.   Nursing note and vitals reviewed.      RESULTS   Results Review:      Results from last 7 days  Lab Units 12/01/17 0421 11/30/17 0140 11/29/17  0143   SODIUM mmol/L 135* 139 138   POTASSIUM mmol/L 3.8 3.6 3.6   CHLORIDE mmol/L 101 101 101   CO2 mmol/L 24.0 24.0 25.0   BUN mg/dL 16 19 24*   CREATININE mg/dL 2.15* 2.09* 2.10*   CALCIUM mg/dL 8.7 8.5 8.4   BILIRUBIN mg/dL 0.7 0.4 0.5   ALK PHOS U/L 94 88 90   ALT (SGPT) U/L 24 26 21   AST (SGOT) U/L 29 20 22   GLUCOSE mg/dL 113* 119* 103*       Estimated Creatinine Clearance: 41 mL/min (by C-G formula based on Cr of 2.15).        Results from last 7 days  Lab Units 12/01/17  0421 11/30/17  0141 11/30/17  0140 11/29/17  0143 11/28/17  1031 11/27/17  1828   WBC 10*3/mm3 7.82  --  8.17 6.78 8.85 9.29   HEMOGLOBIN g/dL 10.1*  --  10.2* 10.7* 11.3* 12.2*   PLATELETS 10*3/mm3 150 143* 143* 139* 153 162         Results from last 7 days  Lab Units 11/28/17  1743 11/27/17  2040   INR  0.99 1.03         Imaging Results (last 24 hours)     ** No results found for the last 24 hours. **           MEDICATIONS      atorvastatin 40 mg Oral Daily   bisacodyl 10 mg Oral Daily   [START ON 12/4/2017] ceFAZolin 2 g Intravenous On Call to OR   famotidine 20 mg Oral BID   fluticasone 2 spray Each Nare Daily   hydrALAZINE 50 mg Oral Q8H   labetalol 100 mg Oral Q12H   mupirocin 1  application Nasal Q12H   pantoprazole 40 mg Oral Q AM   [START ON 12/2/2017] polyethylene glycol 17 g Oral Daily   ranolazine 500 mg Oral Q12H   tamsulosin 0.4 mg Oral Nightly   terazosin 1 mg Oral Nightly       heparin (porcine) 18 Units/kg/hr Last Rate: 11.611 Units/kg/hr (12/01/17 0146)   nitroglycerin 5-200 mcg/min Last Rate: 100 mcg/min (12/01/17 7888)       Assessment/Plan   ASSESSMENT / PLAN    Active Problems:    NSTEMI (non-ST elevated myocardial infarction)    Unstable angina    1. CKD 3: baseline creatinine 1.7-1.9 mg/dl   - Creatinine is overall stable. Post contrast has mild elevation in creatinine.   - Hold HCTZ and Cozaar    2. Hypertension:  - Blood pressure is acceptable. HOLD HCTZ and Cozaar. On labetalol 100mg BID, hydralazine 50mg TID and terazosin 1mg daily. Also on nitro drip    3. Anemia:  - Hemoglobin is low but acceptable at 10.1 Will check iron studies, B12 and folate     4. NSTEMI:   - s/p left heart cath which shows multivessel disease, and plan for CABG next week. Currently on nitroglycerin and heparin infusion.               This document has been electronically signed by Jl Young MD on December 1, 2017 10:20 AM            Electronically signed by Jl Young MD at 12/1/2017  1:23 PM      Carlos Charles MD at 12/1/2017  1:23 PM  Version 1 of 1         CARDIOLOGY PROGRESS NOTE      LOS: 4 days   Patient Care Team:  AHSAN Mayer as PCP - General    Problems/Diagnoses:     NSTEMI (non-ST elevated myocardial infarction)    Unstable angina  The patient presents with active problems as noted above undergoing cardiac catheterization which revealed evidence of multivessel coronary artery disease thus recommended for surgical revascularization.  Cardiothoracic services have been consulted and plan for surgical revascularization on Monday, December 4th.    JOSE (mild)/CKD Stage III: Nephrology services, Dr. Butler, is following.        The patient has remained  hemodynamically stable with continued low-grade chest pain.  Still receiving IV nitroglycerin and IV heparin.  CT surgery planned for Monday next week.      Review of Systems:     Constitutional:  Denies recent weight loss, weight gain, fever or chills     HENT:  Denies any hearing loss, epistaxis, hoarseness, or difficulty speaking.     Eyes: Wears eyeglasses or contact lenses     Respiratory:  Denies dyspnea with exertion,no cough, wheezing, or hemoptysis.     Cardiovascular: Has chest pain,  No orthopnea, PND, peripheral edema, syncope, or claudication.     Gastrointestinal:  Denies change in bowel habits, dyspepsia, ulcer disease, hematochezia, or melena.     Endocrine: Negative for cold intolerance, heat intolerance, polydipsia, polyphagia and polyuria. Denies any history of weight change, heat/cold intolerance, polydipsia, polyuria     Genitourinary: Negative.        Objective     Vital Signs  Temp:  [97.9 °F (36.6 °C)-98.4 °F (36.9 °C)] 98.2 °F (36.8 °C)  Heart Rate:  [74-87] 79  Resp:  [15-19] 17  BP: ()/(53-81) 134/71    Physical Exam:    Constitutional: Cooperative, alert and oriented, in no acute distress.     HENT:   Head: Normocephalic, normal hair patterns, no masses or tenderness.  Ears, Nose, and Throat: No gross abnormalities. No pallor or cyanosis.   Eyes: EOMS intact, PERRL, conjunctivae and lids unremarkable. Fundoscopic exam and visual fields not performed.   Neck: No palpable masses or adenopathy, no thyromegaly, no JVD, carotid pulses are full and equal bilaterally and without  Bruits.     Cardiovascular: Regular rhythm, S1 and S2 normal, no S3 or S4.  No murmurs, gallops, or rubs detected.     Pulmonary/Chest: Chest: normal symmetry, no tenderness to palpation, normal respiratory excursion           Pulmonary: Normal breath sounds. No rales or ronchi.    Abdominal: Abdomen soft, bowel sounds normoactive, no masses, no hepatosplenomegaly, non-tender, no bruits.     Results Review:    Lab  Results (last 24 hours)     Procedure Component Value Units Date/Time    aPTT [[id number]]  (Abnormal) Collected:  11/30/17 1926    Specimen:  Blood Updated:  11/30/17 1956     PTT 59.0 (H) seconds     Narrative:       The recommended Heparin therapeutic range is 68-97 seconds.    CBC & Differential [[id number]] Collected:  12/01/17 0421    Specimen:  Blood Updated:  12/01/17 0431    Narrative:       The following orders were created for panel order CBC & Differential.  Procedure                               Abnormality         Status                     ---------                               -----------         ------                     CBC Auto Differential[[id number]]        Abnormal            Final result                 Please view results for these tests on the individual orders.    CBC Auto Differential [[id number]]  (Abnormal) Collected:  12/01/17 0421    Specimen:  Blood Updated:  12/01/17 0431     WBC 7.82 10*3/mm3      RBC 3.63 (L) 10*6/mm3      Hemoglobin 10.1 (L) g/dL      Hematocrit 30.1 (L) %      MCV 82.9 fL      MCH 27.8 pg      MCHC 33.6 g/dL      RDW 14.2 %      RDW-SD 43.0 fl      MPV 9.1 fL      Platelets 150 10*3/mm3      Neutrophil % 50.9 %      Lymphocyte % 38.5 %      Monocyte % 8.2 %      Eosinophil % 1.8 %      Basophil % 0.1 %      Immature Grans % 0.5 %      Neutrophils, Absolute 3.98 10*3/mm3      Lymphocytes, Absolute 3.01 10*3/mm3      Monocytes, Absolute 0.64 10*3/mm3      Eosinophils, Absolute 0.14 10*3/mm3      Basophils, Absolute 0.01 10*3/mm3      Immature Grans, Absolute 0.04 (H) 10*3/mm3     Comprehensive Metabolic Panel [[id number]]  (Abnormal) Collected:  12/01/17 0421    Specimen:  Blood Updated:  12/01/17 0442     Glucose 113 (H) mg/dL      BUN 16 mg/dL      Creatinine 2.15 (H) mg/dL      Sodium 135 (L) mmol/L      Potassium 3.8 mmol/L      Chloride 101 mmol/L      CO2 24.0 mmol/L      Calcium 8.7 mg/dL      Total Protein 6.5 g/dL      Albumin 3.50 g/dL      ALT (SGPT) 24  U/L      AST (SGOT) 29 U/L      Alkaline Phosphatase 94 U/L      Total Bilirubin 0.7 mg/dL      eGFR Non African Amer 32 (L) mL/min/1.73      Globulin 3.0 gm/dL      A/G Ratio 1.2 g/dL      BUN/Creatinine Ratio 7.4     Anion Gap 10.0 mmol/L     aPTT [[id number]]  (Abnormal) Collected:  12/01/17 0732    Specimen:  Blood Updated:  12/01/17 0840     PTT 58.3 (H) seconds     Narrative:       The recommended Heparin therapeutic range is 68-97 seconds.    Urinalysis With / Culture If Indicated - Urine, Clean Catch [[id number]]  (Normal) Collected:  12/01/17 0956    Specimen:  Urine from Urine, Clean Catch Updated:  12/01/17 1017     Color, UA Yellow     Appearance, UA Clear     pH, UA 6.0     Specific Gravity, UA 1.003      Result obtained by Refractometer        Glucose, UA Negative     Ketones, UA Negative     Bilirubin, UA Negative     Blood, UA Negative     Protein, UA Negative     Leuk Esterase, UA Negative     Nitrite, UA Negative     Urobilinogen, UA 1.0 E.U./dL    Narrative:       Urine microscopic not indicated.    Iron Profile [[id number]]  (Abnormal) Collected:  11/30/17 0140    Specimen:  Blood Updated:  12/01/17 1140     Iron 31 (L) mcg/dL      TIBC 298 mcg/dL      Iron Saturation 10 (L) %     Ferritin [[id number]]  (Normal) Collected:  11/30/17 0140    Specimen:  Blood Updated:  12/01/17 1321     Ferritin 42.60 ng/mL             Medication Review:   Current Facility-Administered Medications   Medication Dose Route Frequency Provider Last Rate Last Dose   • ALPRAZolam (XANAX) tablet 0.25 mg  0.25 mg Oral 4x Daily PRN Seymour Ricci MD   0.25 mg at 12/01/17 0614   • atorvastatin (LIPITOR) tablet 40 mg  40 mg Oral Daily Carlos Charles MD   40 mg at 12/01/17 0926   • bisacodyl (DULCOLAX) EC tablet 10 mg  10 mg Oral Daily Seymour Ricci MD   10 mg at 12/01/17 0926   • [START ON 12/4/2017] ceFAZolin (ANCEF) in SWFI 2 g/20ml IV PUSH syringe  2 g Intravenous On Call to OR Elizabeth Benson, APRN       •  famotidine (PEPCID) tablet 20 mg  20 mg Oral BID Nataliya G Levill, APRN   20 mg at 12/01/17 0926   • fluticasone (FLONASE) 50 MCG/ACT nasal spray 2 spray  2 spray Each Nare Daily Seymour Ricci MD   2 spray at 12/01/17 0927   • heparin 82742 units/250 mL (100 units/mL) in 0.45 % NaCl infusion  18 Units/kg/hr Intravenous Titrated Carlos Charles MD 8 mL/hr at 12/01/17 0146 11.611 Units/kg/hr at 12/01/17 0146    And   • heparin (porcine) 5000 UNIT/ML injection 5,500 Units  80 Units/kg Intravenous PRN Carlos Charles MD        And   • heparin (porcine) 5000 UNIT/ML injection 2,800 Units  40 Units/kg Intravenous PRN Carlos Charles MD       • hydrALAZINE (APRESOLINE) injection 10 mg  10 mg Intravenous Q6H PRN Nataliya G Levill, APRN   10 mg at 11/27/17 1718   • hydrALAZINE (APRESOLINE) tablet 50 mg  50 mg Oral Q8H Adriane Butler MD   50 mg at 12/01/17 0613   • labetalol (NORMODYNE) tablet 100 mg  100 mg Oral Q12H Adriane Butler MD   100 mg at 12/01/17 0926   • LORazepam (ATIVAN) tablet 0.5 mg  0.5 mg Oral Q8H PRN Elizabeth J Edelen, APRN   0.5 mg at 11/30/17 0900   • morphine injection 2 mg  2 mg Intravenous Q2H PRN Elizabeth J Edelen, APRN   2 mg at 12/01/17 1147   • mupirocin (BACTROBAN) 2 % ointment 1 application  1 application Nasal Q12H Elizabeth J Edelen, APRN   1 application at 12/01/17 0927   • nitroglycerin 50 mg/250 mL (0.2 mg/mL) infusion  5-200 mcg/min Intravenous Titrated Nataliya G Levill, APRN 30 mL/hr at 12/01/17 0412 100 mcg/min at 12/01/17 0412   • ondansetron (ZOFRAN) injection 4 mg  4 mg Intravenous Q6H PRN Nataliya G Levill, APRN   4 mg at 12/01/17 1140   • pantoprazole (PROTONIX) EC tablet 40 mg  40 mg Oral Q AM Nataliya G Levill, APRN   40 mg at 12/01/17 0614   • [START ON 12/2/2017] polyethylene glycol 3350 powder (packet)  17 g Oral Daily AHSAN Rand       • ranolazine (RANEXA) 12 hr tablet 500 mg  500 mg Oral Q12H AHSAN Verdugo   500 mg at 12/01/17 0926   •  "sodium chloride 0.9 % flush 1-10 mL  1-10 mL Intravenous PRN Nataliya G Levill, APRN       • tamsulosin (FLOMAX) 24 hr capsule 0.4 mg  0.4 mg Oral Nightly Nataliya G Levill, APRN   0.4 mg at 11/30/17 2037   • terazosin (HYTRIN) capsule 1 mg  1 mg Oral Nightly Nataliya G Levill, APRN   1 mg at 11/30/17 2037         Assessment/Plan     Continue present management.  Patient awaiting CT surgery.  Nephrology following renal function closely.  Hemodynamic status and CBC being monitored closely.  Active Problems:    Unstable angina    NSTEMI (non-ST elevated myocardial infarction)        Carlos Charles MD  12/01/17  1:24 PM           Electronically signed by Carlos Charles MD at 12/1/2017  1:28 PM      Rafa Carrasco MD PhD at 12/2/2017  6:35 AM  Version 1 of 1         Cardiovascular Daily Progress Note  Rafa Carrasco M.D., Ph.D., [facility]      Subjective     Interval History:   Currently CP free on nitro and heparin drips. Rested well. No new complaints.     Review of Systems - History obtained from the patient  Cardiovascular ROS: no chest pain or dyspnea on exertion    Objective     Vital Sign Min/Max for last 24 hours  Temp  Min: 97.4 °F (36.3 °C)  Max: 98.2 °F (36.8 °C)   BP  Min: 89/53  Max: 134/71   Pulse  Min: 69  Max: 87   No Data Recorded   SpO2  Min: 91 %  Max: 96 %   No Data Recorded   No Data Recorded     Flowsheet Rows         First Filed Value    Admission Height  69\" (175.3 cm) Documented at 11/27/2017 1722    Admission Weight  151 lb 12.8 oz (68.9 kg) Documented at 11/27/2017 1722        Last 3 weights    11/28/17  1630 12/01/17  0603 12/02/17  0502   Weight: 160 lb 15 oz (73 kg) (bed scales not working) (Bed scale is not working. )     Body mass index is 23.77 kg/(m^2).    Physical Exam:   GEN: alert, appears stated age and cooperative  Body Habitus: well-nourished  HEENT: Head: Normocephalic, no lesions, without obvious abnormality.  Neck / Thyroid: Supple, no masses, nodes, " nodules or enlargement. No arcus senilis, xanthelasma or xanthomas.   JVP: 7 cm of water at 45 degrees HJR: absent      Carotid:  Upstroke: easily palpated bilaterally Volume: Normal.    Carotid Bruit:  None  Subclavian Bruit: Not present.    Chest:  Normal Excursion: Good    I:E: Good  Pulmonary:clear to auscultation, no wheezes, rales or rhonchi, symmetric air entry      Precordium:  No palpable heaves or thrusts. P2 is not palpable.   [place]:  normal size and placement Palpable S4: Not present.   Heart rate: normal  Heart Rhythm: regular     Heart Sounds: S1: normal  S2: normal intensity  S3: absent   S4: absent  Opening Snap: absent  A2-OS:  N/A  Pericardial rub: absent    Ejection click: None      Murmurs: Systolic: none  Diastolic: none  Abdomen: Soft, non-tender, normal bowel sounds; no bruits, organomegaly or masses.  Extremity: no edema, cyanosis  Trophic changes: None   Pallor on elevation: Absent.    Rubor on dependency: None  Pulses: Right radial artery has 2+ (normal) pulse         DATA REVIEWED:    Lab Results (last 24 hours)     Procedure Component Value Units Date/Time    aPTT [[id number]]  (Abnormal) Collected:  12/01/17 0732    Specimen:  Blood Updated:  12/01/17 0840     PTT 58.3 (H) seconds     Narrative:       The recommended Heparin therapeutic range is 68-97 seconds.    Urinalysis With / Culture If Indicated - Urine, Clean Catch [[id number]]  (Normal) Collected:  12/01/17 0956    Specimen:  Urine from Urine, Clean Catch Updated:  12/01/17 1017     Color, UA Yellow     Appearance, UA Clear     pH, UA 6.0     Specific Gravity, UA 1.003      Result obtained by Refractometer        Glucose, UA Negative     Ketones, UA Negative     Bilirubin, UA Negative     Blood, UA Negative     Protein, UA Negative     Leuk Esterase, UA Negative     Nitrite, UA Negative     Urobilinogen, UA 1.0 E.U./dL    Narrative:       Urine microscopic not indicated.    Iron Profile [[id number]]  (Abnormal) Collected:  11/30/17  0140    Specimen:  Blood Updated:  12/01/17 1140     Iron 31 (L) mcg/dL      TIBC 298 mcg/dL      Iron Saturation 10 (L) %     Ferritin [[id number]]  (Normal) Collected:  11/30/17 0140    Specimen:  Blood Updated:  12/01/17 1321     Ferritin 42.60 ng/mL     aPTT [[id number]]  (Abnormal) Collected:  12/01/17 1909    Specimen:  Blood Updated:  12/01/17 2028     PTT 51.9 (H) seconds     Narrative:       The recommended Heparin therapeutic range is 68-97 seconds.    Vitamin B12 [[id number]] Collected:  12/02/17 0417    Specimen:  Blood Updated:  12/02/17 0448    CBC Auto Differential [[id number]]  (Abnormal) Collected:  12/02/17 0417    Specimen:  Blood Updated:  12/02/17 0453     WBC 8.14 10*3/mm3      RBC 3.71 (L) 10*6/mm3      Hemoglobin 10.3 (L) g/dL      Hematocrit 30.7 (L) %      MCV 82.7 fL      MCH 27.8 pg      MCHC 33.6 g/dL      RDW 14.3 %      RDW-SD 43.1 fl      MPV 9.3 fL      Platelets 163 10*3/mm3      Neutrophil % 45.0 %      Lymphocyte % 45.0 %      Monocyte % 7.7 %      Eosinophil % 2.0 %      Basophil % 0.1 %      Immature Grans % 0.2 %      Neutrophils, Absolute 3.66 10*3/mm3      Lymphocytes, Absolute 3.66 10*3/mm3      Monocytes, Absolute 0.63 10*3/mm3      Eosinophils, Absolute 0.16 10*3/mm3      Basophils, Absolute 0.01 10*3/mm3      Immature Grans, Absolute 0.02 10*3/mm3     CBC & Differential [[id number]] Collected:  12/02/17 0417    Specimen:  Blood Updated:  12/02/17 0453    Narrative:       The following orders were created for panel order CBC & Differential.  Procedure                               Abnormality         Status                     ---------                               -----------         ------                     CBC Auto Differential[[id number]]        Abnormal            Final result                 Please view results for these tests on the individual orders.    Comprehensive Metabolic Panel [[id number]]  (Abnormal) Collected:  12/02/17 0417    Specimen:  Blood Updated:   12/02/17 0505     Glucose 100 mg/dL      BUN 15 mg/dL      Creatinine 2.20 (H) mg/dL      Sodium 136 (L) mmol/L      Potassium 3.6 mmol/L      Chloride 102 mmol/L      CO2 22.0 mmol/L      Calcium 8.8 mg/dL      Total Protein 6.6 g/dL      Albumin 3.60 g/dL      ALT (SGPT) 30 U/L      AST (SGOT) 24 U/L      Alkaline Phosphatase 97 U/L      Total Bilirubin 0.6 mg/dL      eGFR Non African Amer 31 (L) mL/min/1.73      Globulin 3.0 gm/dL      A/G Ratio 1.2 g/dL      BUN/Creatinine Ratio 6.8 (L)     Anion Gap 12.0 mmol/L     aPTT [[id number]]  (Abnormal) Collected:  12/02/17 0417    Specimen:  Blood Updated:  12/02/17 0508     PTT 50.2 (H) seconds     Narrative:       The recommended Heparin therapeutic range is 68-97 seconds.    Folate [[id number]]  (Normal) Collected:  12/02/17 0417    Specimen:  Blood Updated:  12/02/17 0634     Folate 5.48 ng/mL         Imaging Results (last 24 hours)     ** No results found for the last 24 hours. **                                    Assessment/Plan      1. NSTEMI with MV ASCAD. Currently on Heparin and nitro awaiting CABG with Dr. Dejesus.  ADP% was 43% on 11/30/2017 and CA of 44%. Most recent post for CABG was Monday, 12/4/2017.   -Agree with CABG, per CTS  -Increase Atorvastatin to 80mg  -Increase Ranexa to 1000 mg PO BID  -Antiplatelets on hold for CABG  -Continue Labetalol, as allergy to lopressor was noted  -Continue nitro and heparin drips    2. Mild AI. NYHA stage B.  -Out-pt follow-up    3. MENG: Mild plaque bilaterally.  -Antiplatelets, post-op  -Statin      Thank you for allowing me to participate in the care of your patient.  If I can be of any further assistance, please do not hesitate contact me.          This document has been electronically signed by Rafa Carrasco MD PhD on December 2, 2017 6:35 AM           Electronically signed by Rafa Carrasco MD PhD at 12/2/2017  6:43 AM      Jl Young MD at 12/2/2017  9:15 AM  Version 1 of 1      "[facility]  [address] [place] [address]. [address]  T - [phone]  F - [phone]     Progress Note          PATIENT  DEMOGRAPHICS   PATIENT NAME: Lyle Corona                      PHYSICIAN: Jl Young MD  : 1965  MRN: [id number]   LOS: 5 days    Patient Care Team:  AHSAN Mayer as PCP - General  Subjective   SUBJECTIVE   Still has chest pain rated as 6/10. No SOB, nausea, vomiting, LE edema.          Objective   OBJECTIVE   Vital Signs  Temp:  [97.4 °F (36.3 °C)-98 °F (36.7 °C)] 98 °F (36.7 °C)  Heart Rate:  [69-91] 88  BP: ()/(53-87) 133/87    Flowsheet Rows         First Filed Value    Admission Height  69\" (175.3 cm) Documented at 2017 1722    Admission Weight  151 lb 12.8 oz (68.9 kg) Documented at 2017 1722           I/O last 3 completed shifts:  In: 838 [I.V.:838]  Out: 3710 [Urine:3710]    PHYSICAL EXAM    Physical Exam   Constitutional: He is oriented to person, place, and time. He appears well-developed.   Moderately nourished   HENT:   Head: Normocephalic and atraumatic.   Cardiovascular: Normal rate, regular rhythm and normal heart sounds.  Exam reveals no gallop and no friction rub.    No murmur heard.  Pulmonary/Chest: Effort normal and breath sounds normal. No respiratory distress. He has no wheezes. He has no rales. He exhibits no tenderness.   Abdominal: Soft. Bowel sounds are normal. He exhibits no distension and no mass. There is no tenderness. There is no guarding.   Musculoskeletal: He exhibits no edema or tenderness.   Neurological: He is alert and oriented to person, place, and time.   Skin: Skin is warm and dry.   Nursing note and vitals reviewed.      RESULTS   Results Review:      Results from last 7 days  Lab Units 17  0417 17  0421 17  0140   SODIUM mmol/L 136* 135* 139   POTASSIUM mmol/L 3.6 3.8 3.6   CHLORIDE mmol/L 102 101 101   CO2 mmol/L 22.0 24.0 24.0   BUN mg/dL 15 16 19 "   CREATININE mg/dL 2.20* 2.15* 2.09*   CALCIUM mg/dL 8.8 8.7 8.5   BILIRUBIN mg/dL 0.6 0.7 0.4   ALK PHOS U/L 97 94 88   ALT (SGPT) U/L 30 24 26   AST (SGOT) U/L 24 29 20   GLUCOSE mg/dL 100 113* 119*       Estimated Creatinine Clearance: 40.1 mL/min (by C-G formula based on Cr of 2.2).        Results from last 7 days  Lab Units 12/02/17  0417 12/01/17  0421 11/30/17  0141 11/30/17  0140 11/29/17  0143 11/28/17  1031   WBC 10*3/mm3 8.14 7.82  --  8.17 6.78 8.85   HEMOGLOBIN g/dL 10.3* 10.1*  --  10.2* 10.7* 11.3*   PLATELETS 10*3/mm3 163 150 143* 143* 139* 153         Results from last 7 days  Lab Units 11/28/17  1743 11/27/17  2040   INR  0.99 1.03         Imaging Results (last 24 hours)     ** No results found for the last 24 hours. **           MEDICATIONS      atorvastatin 80 mg Oral Nightly   bisacodyl 10 mg Oral Daily   [START ON 12/4/2017] ceFAZolin 2 g Intravenous On Call to OR   famotidine 20 mg Oral BID   fluticasone 2 spray Each Nare Daily   hydrALAZINE 50 mg Oral Q8H   labetalol 100 mg Oral Q12H   pantoprazole 40 mg Oral Q AM   polyethylene glycol 17 g Oral Daily   ranolazine 1,000 mg Oral Q12H   tamsulosin 0.4 mg Oral Nightly   terazosin 1 mg Oral Nightly       heparin (porcine) 18 Units/kg/hr Last Rate: 11.611 Units/kg/hr (12/01/17 0146)   nitroglycerin 5-200 mcg/min Last Rate: 80 mcg/min (12/02/17 0121)       Assessment/Plan   ASSESSMENT / PLAN    Active Problems:    NSTEMI (non-ST elevated myocardial infarction)    Unstable angina    1. CKD 3: baseline creatinine 1.7-1.9 mg/dl  - Creatinine is 2.2 mg/dl this morning. This is overall stable. Has mild elevation in creatinine post contrast exposure.   - Hold HCTZ and Cozaar    2. Hypertension:  - Blood pressure is acceptable. HOLD HCTZ and Cozaar. On labetalol 100mg BID, hydralazine 50mg TID and terazosin 1mg daily. Also on nitro drip    3. Anemia:  - Hemoglobin is low but acceptable at 10.3.  Iron deficient with ferritin of 42, Tsat 10%. B12 271,  "folate 5.4  - Will start IV iron    4. NSTEMI:   - s/p left heart cath which shows multivessel disease, and plan for CABG next week. Currently on nitroglycerin and heparin infusion.               This document has been electronically signed by Jl Young MD on December 2, 2017 9:15 AM            Electronically signed by Jl Young MD at 12/2/2017  9:27 AM      Brando Oh MD at 12/2/2017  9:26 AM  Version 1 of 1         [facility] DAILY NOTE     LOS: 5 days   CAD  Patient Care Team:  AHSAN Mayer as PCP - General    Chief Complaint: CAD    Subjective:  Symptoms:  Stable.  He reports chest pain and anxiety.    Diet:  Adequate intake.        Vital Signs  Temp:  [97.4 °F (36.3 °C)-98 °F (36.7 °C)] 98 °F (36.7 °C)  Heart Rate:  [69-91] 88  BP: ()/(53-87) 133/87  Body mass index is 23.77 kg/(m^2).    Intake/Output Summary (Last 24 hours) at 12/02/17 0926  Last data filed at 12/02/17 0800   Gross per 24 hour   Intake              384 ml   Output             1765 ml   Net            -1381 ml     I/O this shift:  In: -   Out: 200 [Urine:200]    Wt Readings from Last 3 Encounters:   10/31/17 157 lb 8 oz (71.4 kg)   06/12/17 153 lb (69.4 kg)   05/13/17 148 lb 6.4 oz (67.3 kg)       Objective:  General Appearance:  Comfortable and in no acute distress.    Vital signs: (most recent): Blood pressure 133/87, pulse 88, temperature 98 °F (36.7 °C), temperature source Oral, resp. rate 17, height 69\" (175.3 cm), weight 160 lb 15 oz (73 kg), SpO2 93 %.    Lungs:  Normal effort.  Breath sounds clear to auscultation.    Heart: Normal rate.  Regular rhythm.    Abdomen: Abdomen is soft.    Pulses: Distal pulses are intact.    Neurological: Patient is alert and oriented to person, place and time.    Skin:  Warm and dry.            Incisions:  Clean and dry.    Results Review:      WBC No results found for: WBCS   HGB Hemoglobin   Date/Time Value Ref Range Status   12/02/2017 0417 10.3 (L) 13.7 - 17.3 g/dL " Final   12/01/2017 0421 10.1 (L) 13.7 - 17.3 g/dL Final   11/30/2017 0140 10.2 (L) 13.7 - 17.3 g/dL Final      HCT Hematocrit   Date/Time Value Ref Range Status   12/02/2017 0417 30.7 (L) 39.0 - 49.0 % Final   12/01/2017 0421 30.1 (L) 39.0 - 49.0 % Final   11/30/2017 0140 30.1 (L) 39.0 - 49.0 % Final      Platlets No results found for: LABPLAT     PT/INR:  No results found for: PROTIME/No results found for: INR    Sodium Sodium   Date/Time Value Ref Range Status   12/02/2017 0417 136 (L) 137 - 145 mmol/L Final   12/01/2017 0421 135 (L) 137 - 145 mmol/L Final   11/30/2017 0140 139 137 - 145 mmol/L Final      Potassium Potassium   Date/Time Value Ref Range Status   12/02/2017 0417 3.6 3.5 - 5.1 mmol/L Final   12/01/2017 0421 3.8 3.5 - 5.1 mmol/L Final   11/30/2017 0140 3.6 3.5 - 5.1 mmol/L Final      Chloride Chloride   Date/Time Value Ref Range Status   12/02/2017 0417 102 95 - 110 mmol/L Final   12/01/2017 0421 101 95 - 110 mmol/L Final   11/30/2017 0140 101 95 - 110 mmol/L Final      Bicarbonate No results found for: PLASMABICARB   BUN BUN   Date/Time Value Ref Range Status   12/02/2017 0417 15 7 - 21 mg/dL Final   12/01/2017 0421 16 7 - 21 mg/dL Final   11/30/2017 0140 19 7 - 21 mg/dL Final      Creatinine Creatinine   Date/Time Value Ref Range Status   12/02/2017 0417 2.20 (H) 0.70 - 1.30 mg/dL Final   12/01/2017 0421 2.15 (H) 0.70 - 1.30 mg/dL Final   11/30/2017 0140 2.09 (H) 0.70 - 1.30 mg/dL Final      Calcium Calcium   Date/Time Value Ref Range Status   12/02/2017 0417 8.8 8.4 - 10.2 mg/dL Final   12/01/2017 0421 8.7 8.4 - 10.2 mg/dL Final   11/30/2017 0140 8.5 8.4 - 10.2 mg/dL Final      Magnesium No results found for: MG     Imaging Results (last 72 hours)     Procedure Component Value Units Date/Time    US Vein Mapping Bilateral [[id number]] Collected:  11/29/17 1544     Updated:  11/29/17 1906    Narrative:       .  EXAMINATION:  Ultrasound, venous, lower extremity    CLINICAL INDICATION / HISTORY:   Preoperative evaluation   TECHNIQUE: Bilateral greater saphenous veins                          grayscale, color flow, spectral and  Doppler     FINDINGS:    Imaged vessels:                                           Right (mm)   Left (mm)  GSV - thigh, prox               5.5                  4.9  GSV - thigh, mid                4.7                  3.1  GSV - thigh, distal             4.6                  3.5  GSV - knee                        5.9                  3.6  GSV - calf, prox                 5.0                  2.9  GSV - calf, mid                  4.7                  4.7  GSV - calf, distal               5.4                  4.2      .    Impression:       CONCLUSION:  1. Venous mapping with sizes as above.    Electronically signed by:  TK Santiago MD  11/29/2017 7:05  PM CST Workstation: [id number]    US Carotid Bilateral [[id number]] Collected:  11/29/17 1544     Updated:  11/29/17 1910    Narrative:       PROCEDURE: US CAROTID BILATERAL    INDICATION: Left carotid bruit. Preop evaluation for CABG.    COMPARISON: None.    RIGHT CAROTID BIFURCATION FINDINGS.    Peak systolic velocities in centimeters per second. CCA 58, ICA  73, . Right ICA/CCA peak systolic velocity ratio 1.3.    Real-time imaging demonstrates small amount of plaque bulb and  the origin of the right ICA with well less than 50% stenosis with  peak systolic velocity at the level of the plaque of 44 cm/s.    LEFT CAROTID BIFURCATION FINDINGS.    Peak systolic velocities in centimeters per second. Left CCA 85,  ICA 95, ECA 90. Left ICA/CCA peak systolic velocity ratio 1.1.    Real-time imaging demonstrates small amount of plaque in the left  carotid bulb and origin proximal left ICA with less than 50%  stenosis of the left ICA. Peak systolic velocity at the level of  plaque in the proximal left ICA 69 cm/s.    VERTEBRAL ARTERIES. There is antegrade flow through both  vertebral arteries.      Impression:       Small amount  of plaque in the origin proximal right  ICA with less than 50% stenosis.    Small amount of plaque in the left carotid bulb and proximal left  ICA with less than 50% stenosis.    Antegrade flow through both vertebral arteries.    [id number]      Electronically signed by:  Luis Pruitt MD  11/29/2017 7:09  PM CST Workstation: [id number]    XR Chest 1 View [[id number]] Collected:  11/30/17 0537     Updated:  11/30/17 0619    Narrative:       Exam: AP portable chest    INDICATION: Shortness of breath    COMPARISON: 5/11/2017    FINDINGS: AP portable chest. Status post anterior fusion  discectomy in the lower cervical spine. The bony structures are  intact. The cardiomediastinal silhouette is unremarkable lungs  are clear. No pneumothorax or pleural effusion.      Impression:       No acute cardiopulmonary abnormality.    Electronically signed by:  Adam Lopez MD  11/30/2017 6:17 AM  CST Workstation: [id number]              atorvastatin 80 mg Oral Nightly   bisacodyl 10 mg Oral Daily   [START ON 12/4/2017] ceFAZolin 2 g Intravenous On Call to OR   famotidine 20 mg Oral BID   fluticasone 2 spray Each Nare Daily   hydrALAZINE 50 mg Oral Q8H   labetalol 100 mg Oral Q12H   pantoprazole 40 mg Oral Q AM   polyethylene glycol 17 g Oral Daily   ranolazine 1,000 mg Oral Q12H   tamsulosin 0.4 mg Oral Nightly   terazosin 1 mg Oral Nightly       heparin (porcine) 18 Units/kg/hr Last Rate: 11.611 Units/kg/hr (12/01/17 0146)   nitroglycerin 5-200 mcg/min Last Rate: 80 mcg/min (12/02/17 0924)         Patient Active Problem List   Diagnosis Code   • Essential hypertension I10   • Hypertensive emergency, no CHF I16.1   • Coronary arteriosclerosis I25.10   • Chronic kidney disease, stage 3 N18.3   • LVH (left ventricular hypertrophy) I51.7   • NSTEMI (non-ST elevated myocardial infarction) I21.4   • Unstable angina I20.0         Assessment:  (CAD  CKD3  Worse following contrast from cardiac catheterization (baseline 1.7, now  2.2)  Platelet dysfunction (Brilinta)  ).     Plan:   (CABG Monday  Allowing time for renal recovery and platelet function recovery to decrease risk of renal failure and bleeding.).       Brando hO MD  12/02/17  9:26 AM                 Electronically signed by Brando Oh MD at 12/2/2017  9:33 AM      Seymour Ricci MD at 12/2/2017  9:44 AM  Version 1 of 1                [facility] Medicine Services  INPATIENT PROGRESS NOTE     LOS: 5 days   Patient Care Team:  AHSAN Mayer as PCP - General    Chief Complaint:  <principal problem not specified>      Subjective     Interval History:   Patient is seen for follow-up today. He continues to have periodic chest pressure and remains on heparin and nitroglycerin infusion.  Constipation has resolved with bowel regimen.  Patient otherwise voices no new complaints.      Patient Complaints: As above    History taken from: Patient    Review of Systems:    Review of Systems   Constitutional: Negative for activity change, appetite change, chills, diaphoresis, fatigue and fever.   HENT: Negative for postnasal drip, trouble swallowing and voice change.    Eyes: Negative for photophobia and visual disturbance.   Respiratory: Negative for cough, choking, chest tightness, shortness of breath, wheezing and stridor.    Cardiovascular: Positive for chest pain. Negative for palpitations and leg swelling.   Gastrointestinal: Negative for abdominal distention, abdominal pain, blood in stool, constipation, diarrhea, nausea and vomiting.   Endocrine: Negative for cold intolerance, heat intolerance, polydipsia, polyphagia and polyuria.   Genitourinary: Negative for decreased urine volume, difficulty urinating, dysuria, enuresis, flank pain, frequency, hematuria and urgency.   Musculoskeletal: Negative for arthralgias, gait problem, myalgias, neck pain and neck stiffness.   Skin: Negative for pallor, rash and wound.    Neurological: Negative for dizziness, tremors, seizures, syncope, facial asymmetry, speech difficulty, weakness, light-headedness, numbness and headaches.   Hematological: Does not bruise/bleed easily.   Psychiatric/Behavioral: Negative for agitation, behavioral problems and confusion.         Objective     Vital Signs  Temp:  [97.4 °F (36.3 °C)-98 °F (36.7 °C)] 98 °F (36.7 °C)  Heart Rate:  [69-91] 88  BP: ()/(53-87) 133/87    Physical Exam:   Physical Exam   Constitutional: He is oriented to person, place, and time. He appears well-developed and well-nourished. No distress.   HENT:   Head: Normocephalic and atraumatic.   Eyes: EOM are normal. Pupils are equal, round, and reactive to light. Right eye exhibits no discharge. Left eye exhibits no discharge. No scleral icterus.   Neck: Normal range of motion. Neck supple. No JVD present. No thyromegaly present.   Cardiovascular: Normal rate, regular rhythm and normal heart sounds.  Exam reveals no gallop and no friction rub.    No murmur heard.  Pulmonary/Chest: Effort normal and breath sounds normal. He has no wheezes. He has no rales. He exhibits no tenderness.   Abdominal: Soft. Bowel sounds are normal. He exhibits no distension and no mass. There is no tenderness. There is no rebound and no guarding.   Musculoskeletal: He exhibits no edema, tenderness or deformity.   Neurological: He is alert and oriented to person, place, and time. No cranial nerve deficit. He exhibits normal muscle tone. Coordination normal.   Skin: Skin is warm and dry. No rash noted. He is not diaphoretic. No erythema. No pallor.   Psychiatric: He has a normal mood and affect. His behavior is normal. Judgment and thought content normal.   Nursing note and vitals reviewed.         Results Review:         Results from last 7 days  Lab Units 12/02/17  0417 12/01/17  0421 11/30/17  0140 11/29/17  0143 11/28/17  1031 11/27/17  1710   SODIUM mmol/L 136* 135* 139 138 137 137   POTASSIUM mmol/L  3.6 3.8 3.6 3.6 4.0 3.8   CHLORIDE mmol/L 102 101 101 101 105 101   CO2 mmol/L 22.0 24.0 24.0 25.0 22.0 23.0   BUN mg/dL 15 16 19 24* 23* 26*   CREATININE mg/dL 2.20* 2.15* 2.09* 2.10* 1.83* 1.98*   GLUCOSE mg/dL 100 113* 119* 103* 90 96   CALCIUM mg/dL 8.8 8.7 8.5 8.4 8.6 9.5   BILIRUBIN mg/dL 0.6 0.7 0.4 0.5 0.6 0.6   ALK PHOS U/L 97 94 88 90 95 99   ALT (SGPT) U/L 30 24 26 21 28 20*   AST (SGOT) U/L 24 29 20 22 31 21               Results from last 7 days  Lab Units 12/02/17  0417 12/01/17  0421 11/30/17  0141 11/30/17  0140 11/29/17  0143 11/28/17  1031 11/27/17  1828   WBC 10*3/mm3 8.14 7.82  --  8.17 6.78 8.85 9.29   HEMOGLOBIN g/dL 10.3* 10.1*  --  10.2* 10.7* 11.3* 12.2*   HEMATOCRIT % 30.7* 30.1*  --  30.1* 31.4* 33.1* 36.1*   PLATELETS 10*3/mm3 163 150 143* 143* 139* 153 162       Lab Results   Component Value Date    CKTOTAL 25 (L) 05/12/2017    CKMB 0.55 05/12/2017    TROPONINI 0.522 (C) 11/28/2017       CO2   Date Value Ref Range Status   12/02/2017 22.0 22.0 - 31.0 mmol/L Final         Results from last 7 days  Lab Units 11/28/17  1743 11/27/17  2040   INR  0.99 1.03        Imaging Results (last 7 days)     ** No results found for the last 168 hours. **                                    Medication Review:   Current Facility-Administered Medications   Medication Dose Route Frequency Provider Last Rate Last Dose   • ALPRAZolam (XANAX) tablet 0.25 mg  0.25 mg Oral 4x Daily PRN Seymour Ricci MD   0.25 mg at 12/02/17 0351   • atorvastatin (LIPITOR) tablet 80 mg  80 mg Oral Nightly Rafa Carrasco MD PhD       • bisacodyl (DULCOLAX) EC tablet 10 mg  10 mg Oral Daily Seymour Ricci MD   10 mg at 12/01/17 0926   • [START ON 12/4/2017] ceFAZolin (ANCEF) in SWFI 2 g/20ml IV PUSH syringe  2 g Intravenous On Call to OR AHSAN Rand       • famotidine (PEPCID) tablet 20 mg  20 mg Oral BID AHSAN Whitaker   20 mg at 12/02/17 0923   • ferric gluconate (FERRLECIT) 125 mg in sodium  chloride 0.9 % 110 mL IVPB  125 mg Intravenous Daily Jl Young MD       • fluticasone (FLONASE) 50 MCG/ACT nasal spray 2 spray  2 spray Each Nare Daily Seymour Ricci MD   2 spray at 12/02/17 0924   • heparin 96572 units/250 mL (100 units/mL) in 0.45 % NaCl infusion  18 Units/kg/hr Intravenous Titrated Carlos Charles MD 8 mL/hr at 12/01/17 0146 11.611 Units/kg/hr at 12/01/17 0146    And   • heparin (porcine) 5000 UNIT/ML injection 5,500 Units  80 Units/kg Intravenous PRN Carlos Charles MD        And   • heparin (porcine) 5000 UNIT/ML injection 2,800 Units  40 Units/kg Intravenous PRN Carlos Charles MD       • hydrALAZINE (APRESOLINE) injection 10 mg  10 mg Intravenous Q6H PRN Nataliya G Levill, APRN   10 mg at 11/27/17 1718   • hydrALAZINE (APRESOLINE) tablet 50 mg  50 mg Oral Q8H Adriane Butler MD   50 mg at 12/01/17 2123   • labetalol (NORMODYNE) tablet 100 mg  100 mg Oral Q12H Adriane Butler MD   100 mg at 12/02/17 0923   • LORazepam (ATIVAN) tablet 0.5 mg  0.5 mg Oral Q8H PRN Elizabeth ELIAS Edelen, APRN   0.5 mg at 11/30/17 0900   • morphine injection 2 mg  2 mg Intravenous Q2H PRN Taz Guzmán MD   2 mg at 12/02/17 0929   • nitroglycerin 50 mg/250 mL (0.2 mg/mL) infusion  5-200 mcg/min Intravenous Titrated Nataliya G Levill, APRN 24 mL/hr at 12/02/17 0924 80 mcg/min at 12/02/17 0924   • ondansetron (ZOFRAN) injection 4 mg  4 mg Intravenous Q6H PRN Nataliya G Levill, APRN   4 mg at 12/01/17 1140   • pantoprazole (PROTONIX) EC tablet 40 mg  40 mg Oral Q AM Nataliya G Levill, APRN   40 mg at 12/01/17 0614   • polyethylene glycol 3350 powder (packet)  17 g Oral Daily AHSAN Rand   17 g at 12/02/17 0922   • ranolazine (RANEXA) 12 hr tablet 1,000 mg  1,000 mg Oral Q12H Rafa Carrasco MD PhD   1,000 mg at 12/02/17 0923   • sodium chloride 0.9 % flush 1-10 mL  1-10 mL Intravenous PRN AHSAN Whitaker   10 mL at 12/01/17 1329   • tamsulosin (FLOMAX) 24 hr capsule 0.4 mg   0.4 mg Oral Nightly Nataliya G Levill, APRN   0.4 mg at 12/01/17 2124   • terazosin (HYTRIN) capsule 1 mg  1 mg Oral Nightly Nataliya G Levill, APRN   1 mg at 12/01/17 2124         Assessment/Plan   1. NSTEMI S/p cardiac cath. with multi-vessel coronary artery disease: Continue current optimal medical management while awaiting cardiac revascularization.  Echocardiogram Shows mild concentric left ventricular wall thickness, normal left ventricular systolic function with ejection fraction of 65%.  There is hypokinesis of the inferior wall.  Cardiologist and CT surgery are actively following. Patient is awaiting revascularization surgery.  2.  Acute on chronickidney injury: Creatinine has increased to 2.20 today. Continue to monitor renal function and avoiding all nephrotoxic agents. Nephrologist is following.  3.  Hypertension: Stable.  4.  Constipation: Resolved.  Continue bowel regimen prn.  5.  Continue GI and DVT prophylaxis.        Seymour Ricci MD  12/02/17      EMR Dragon/Transcription disclaimer:   Much of this encounter note is an electronic transcription/translation of spoken language to printed text. The electronic translation of spoken language may permit erroneous, or at times, nonsensical words or phrases to be inadvertently transcribed; Although I have reviewed the note for such errors, some may still exist.                 Electronically signed by Seymour Ricci MD at 12/2/2017  9:47 AM      Rafa Carrasco MD PhD at 12/3/2017  8:29 AM  Version 1 of 1         Cardiovascular Daily Progress Note  Rafa Carrasco M.D., Ph.D., [facility]      Subjective     Interval History:   Currently CP free on nitro and heparin drips. Rested well. No new complaints.     Review of Systems - History obtained from the patient  Cardiovascular ROS: no chest pain or dyspnea on exertion    Objective     Vital Sign Min/Max for last 24 hours  Temp  Min: 97.4 °F (36.3 °C)  Max: 98.8 °F (37.1 °C)   BP  Min: 88/54  Max:  "137/79   Pulse  Min: 72  Max: 89   No Data Recorded   SpO2  Min: 90 %  Max: 96 %   No Data Recorded   No Data Recorded     Flowsheet Rows         First Filed Value    Admission Height  69\" (175.3 cm) Documented at 11/27/2017 1722    Admission Weight  151 lb 12.8 oz (68.9 kg) Documented at 11/27/2017 1722        Last 3 weights    11/28/17  1630 12/01/17  0603 12/02/17  0502   Weight: 160 lb 15 oz (73 kg) (bed scales not working) (Bed scale is not working. )     Body mass index is 23.77 kg/(m^2).    Physical Exam:   GEN: alert, appears stated age and cooperative  Body Habitus: well-nourished  HEENT: Head: Normocephalic, no lesions, without obvious abnormality.  Neck / Thyroid: Supple, no masses, nodes, nodules or enlargement. No arcus senilis, xanthelasma or xanthomas.   JVP: 7 cm of water at 45 degrees HJR: absent      Carotid:  Upstroke: easily palpated bilaterally Volume: Normal.    Carotid Bruit:  None  Subclavian Bruit: Not present.    Chest:  Normal Excursion: Good    I:E: Good  Pulmonary:clear to auscultation, no wheezes, rales or rhonchi, symmetric air entry      Precordium:  No palpable heaves or thrusts. P2 is not palpable.   Overton:  normal size and placement Palpable S4: Not present.   Heart rate: normal  Heart Rhythm: regular     Heart Sounds: S1: normal  S2: normal intensity  S3: absent   S4: absent  Opening Snap: absent  A2-OS:  N/A  Pericardial rub: absent    Ejection click: None      Murmurs: Systolic: none  Diastolic: none  Abdomen: Soft, non-tender, normal bowel sounds; no bruits, organomegaly or masses.  Extremity: no edema, cyanosis  Trophic changes: None   Pallor on elevation: Absent.    Rubor on dependency: None  Pulses: Right radial artery has 2+ (normal) pulse         DATA REVIEWED:    Lab Results (last 24 hours)     Procedure Component Value Units Date/Time    aPTT [[id number]]  (Abnormal) Collected:  12/02/17 1859    Specimen:  Blood Updated:  12/02/17 1932     PTT 69.7 (H) seconds     " Narrative:       The recommended Heparin therapeutic range is 68-97 seconds.    Extra Tubes [[id number]] Collected:  12/02/17 1859    Specimen:  Blood from Blood, Venous Line Updated:  12/02/17 2001    Narrative:       The following orders were created for panel order Extra Tubes.  Procedure                               Abnormality         Status                     ---------                               -----------         ------                     Gold Top - SST[[id number]]                                   Final result                 Please view results for these tests on the individual orders.    Gold Top - SST [[id number]] Collected:  12/02/17 1859    Specimen:  Blood Updated:  12/02/17 2001     Extra Tube Hold for add-ons.      Auto resulted.       CBC & Differential [[id number]] Collected:  12/03/17 0240    Specimen:  Blood Updated:  12/03/17 0311    Narrative:       The following orders were created for panel order CBC & Differential.  Procedure                               Abnormality         Status                     ---------                               -----------         ------                     CBC Auto Differential[[id number]]        Abnormal            Final result                 Please view results for these tests on the individual orders.    CBC Auto Differential [[id number]]  (Abnormal) Collected:  12/03/17 0240    Specimen:  Blood Updated:  12/03/17 0311     WBC 8.00 10*3/mm3      RBC 3.48 (L) 10*6/mm3      Hemoglobin 9.7 (L) g/dL      Hematocrit 28.7 (L) %      MCV 82.5 fL      MCH 27.9 pg      MCHC 33.8 g/dL      RDW 14.4 %      RDW-SD 43.3 fl      MPV 8.9 fL      Platelets 162 10*3/mm3      Neutrophil % 43.4 %      Lymphocyte % 45.5 %      Monocyte % 8.5 %      Eosinophil % 2.0 %      Basophil % 0.1 %      Immature Grans % 0.5 %      Neutrophils, Absolute 3.47 10*3/mm3      Lymphocytes, Absolute 3.64 10*3/mm3      Monocytes, Absolute 0.68 10*3/mm3      Eosinophils, Absolute 0.16 10*3/mm3       Basophils, Absolute 0.01 10*3/mm3      Immature Grans, Absolute 0.04 (H) 10*3/mm3     Comprehensive Metabolic Panel [[id number]]  (Abnormal) Collected:  12/03/17 0240    Specimen:  Blood Updated:  12/03/17 0327     Glucose 101 (H) mg/dL      BUN 12 mg/dL      Creatinine 2.25 (H) mg/dL      Sodium 135 (L) mmol/L      Potassium 3.6 mmol/L      Chloride 100 mmol/L      CO2 23.0 mmol/L      Calcium 8.7 mg/dL      Total Protein 6.5 g/dL      Albumin 3.40 g/dL      ALT (SGPT) 30 U/L      AST (SGOT) 22 U/L      Alkaline Phosphatase 100 U/L      Total Bilirubin 0.9 mg/dL      eGFR Non African Amer 31 (L) mL/min/1.73      Globulin 3.1 gm/dL      A/G Ratio 1.1 g/dL      BUN/Creatinine Ratio 5.3 (L)     Anion Gap 12.0 mmol/L     aPTT [[id number]]  (Abnormal) Collected:  12/03/17 0716    Specimen:  Blood Updated:  12/03/17 0742     PTT 69.7 (H) seconds     Narrative:       The recommended Heparin therapeutic range is 68-97 seconds.        Imaging Results (last 24 hours)     ** No results found for the last 24 hours. **                                    Assessment/Plan      1. NSTEMI with MV ASCAD. Currently on Heparin and nitro awaiting CABG with Dr. Dejesus.   -Agree with CABG, per CTS  -Atorvastatin to 80mg  - Ranexa to 1000 mg PO BID  -Antiplatelets on hold for CABG  -Continue Labetalol, as allergy to lopressor was noted  -Continue nitro and heparin drips  -NPO after MN     2. Mild AI. NYHA stage B.  -Out-pt follow-up    3. MENG: Mild plaque bilaterally.  -Antiplatelets, post-op  -Statin      Thank you for allowing me to participate in the care of your patient.  If I can be of any further assistance, please do not hesitate contact me.          This document has been electronically signed by Rafa Carrasco MD PhD on December 3, 2017 8:29 AM         Electronically signed by Rafa Carrasco MD PhD at 12/3/2017  8:30 AM      Jl Young MD at 12/3/2017  9:44 AM  Version 1 of 1         [facility]  [address] [place] [address]. [address]  T - [phone]  F - [phone]     Progress Note          PATIENT  DEMOGRAPHICS   PATIENT NAME: Lyle Corona                      PHYSICIAN: Jl Young MD  : 1965  MRN: [id number]   LOS: 6 days    Patient Care Team:  AHSAN Mayer as PCP - General  Subjective   SUBJECTIVE   Still has chest pain rated as 5/10. No SOB, nausea, vomiting, LE edema.          Objective   OBJECTIVE   Vital Signs  Temp:  [97.4 °F (36.3 °C)-98.8 °F (37.1 °C)] 98 °F (36.7 °C)  Heart Rate:  [72-89] 80  BP: ()/(52-85) 109/59    Flowsheet Rows         First Filed Value    Admission Height  69\" (175.3 cm) Documented at 2017 1722    Admission Weight  151 lb 12.8 oz (68.9 kg) Documented at 2017 1722           I/O last 3 completed shifts:  In: 384 [I.V.:384]  Out: 0 [Urine:0]    PHYSICAL EXAM    Physical Exam   Constitutional: He is oriented to person, place, and time. He appears well-developed.   Moderately nourished   HENT:   Head: Normocephalic and atraumatic.   Cardiovascular: Normal rate, regular rhythm and normal heart sounds.  Exam reveals no gallop and no friction rub.    No murmur heard.  Pulmonary/Chest: Effort normal and breath sounds normal. No respiratory distress. He has no wheezes. He has no rales. He exhibits no tenderness.   Abdominal: Soft. Bowel sounds are normal. He exhibits no distension and no mass. There is no tenderness. There is no guarding.   Musculoskeletal: He exhibits no edema or tenderness.   Neurological: He is alert and oriented to person, place, and time.   Skin: Skin is warm and dry.   Nursing note and vitals reviewed.      RESULTS   Results Review:      Results from last 7 days  Lab Units 17  0240 17  0417 17  0421   SODIUM mmol/L 135* 136* 135*   POTASSIUM mmol/L 3.6 3.6 3.8   CHLORIDE mmol/L 100 102 101   CO2 mmol/L 23.0 22.0 24.0   BUN mg/dL 12 15 16   CREATININE mg/dL 2.25* 2.20* " 2.15*   CALCIUM mg/dL 8.7 8.8 8.7   BILIRUBIN mg/dL 0.9 0.6 0.7   ALK PHOS U/L 100 97 94   ALT (SGPT) U/L 30 30 24   AST (SGOT) U/L 22 24 29   GLUCOSE mg/dL 101* 100 113*       Estimated Creatinine Clearance: 39.2 mL/min (by C-G formula based on Cr of 2.25).        Results from last 7 days  Lab Units 12/03/17  0240 12/02/17  0417 12/01/17  0421 11/30/17  0141 11/30/17  0140 11/29/17  0143   WBC 10*3/mm3 8.00 8.14 7.82  --  8.17 6.78   HEMOGLOBIN g/dL 9.7* 10.3* 10.1*  --  10.2* 10.7*   PLATELETS 10*3/mm3 162 163 150 143* 143* 139*         Results from last 7 days  Lab Units 11/28/17  1743 11/27/17  2040   INR  0.99 1.03         Imaging Results (last 24 hours)     ** No results found for the last 24 hours. **           MEDICATIONS      atorvastatin 80 mg Oral Nightly   bisacodyl 10 mg Oral Daily   [START ON 12/4/2017] ceFAZolin 2 g Intravenous On Call to OR   famotidine 20 mg Oral BID   ferric gluconate (FERRLECIT) IVPB 125 mg Intravenous Daily   fluticasone 2 spray Each Nare Daily   hydrALAZINE 50 mg Oral Q8H   labetalol 100 mg Oral Q12H   pantoprazole 40 mg Oral Q AM   polyethylene glycol 17 g Oral Daily   ranolazine 1,000 mg Oral Q12H   tamsulosin 0.4 mg Oral Nightly   terazosin 1 mg Oral Nightly       heparin (porcine) 18 Units/kg/hr Last Rate: 800 Units/hr (12/02/17 1002)   nitroglycerin 5-200 mcg/min Last Rate: 70 mcg/min (12/03/17 0826)       Assessment/Plan   ASSESSMENT / PLAN    Active Problems:    NSTEMI (non-ST elevated myocardial infarction)    Unstable angina    1. CKD 3: baseline creatinine 1.7-1.9 mg/dl  - Creatinine is 2.25 mg/dl this morning. This is overall stable. Has mild elevation in creatinine post contrast exposure.   - Hold HCTZ and Cozaar    2. Hypertension:  - Blood pressure is acceptable. HOLD HCTZ and Cozaar. On labetalol 100mg BID, hydralazine 50mg TID and terazosin 1mg daily. Also on nitro drip    3. Anemia:  - Hemoglobin is low at  9.7.  Iron deficient with ferritin of 42, Tsat 10%. B12  271, folate 5.4  - On IV iron    4. NSTEMI:   - s/p left heart cath which shows multivessel disease, and plan for CABG tomorrow. Currently on nitroglycerin and heparin infusion.               This document has been electronically signed by Jl Young MD on December 3, 2017 9:44 AM            Electronically signed by Jl Young MD at 12/3/2017  9:46 AM      Seymour Ricci MD at 12/3/2017  9:57 AM  Version 1 of 1                [facility] Medicine Services  INPATIENT PROGRESS NOTE     LOS: 6 days   Patient Care Team:  AHSAN Mayer as PCP - General    Chief Complaint:  <principal problem not specified>      Subjective     Interval History:   Patient is seen for follow-up today. He continues to have periodic chest pressure and remains on heparin and nitroglycerin infusion.  Patient is doing well otherwise and voices no new complaints.      Patient Complaints: As above    History taken from: Patient    Review of Systems:    Review of Systems   Constitutional: Negative for activity change, appetite change, chills, diaphoresis, fatigue and fever.   HENT: Negative for postnasal drip, trouble swallowing and voice change.    Eyes: Negative for photophobia and visual disturbance.   Respiratory: Negative for cough, choking, chest tightness, shortness of breath, wheezing and stridor.    Cardiovascular: Positive for chest pain. Negative for palpitations and leg swelling.   Gastrointestinal: Negative for abdominal distention, abdominal pain, blood in stool, constipation, diarrhea, nausea and vomiting.   Endocrine: Negative for cold intolerance, heat intolerance, polydipsia, polyphagia and polyuria.   Genitourinary: Negative for decreased urine volume, difficulty urinating, dysuria, enuresis, flank pain, frequency, hematuria and urgency.   Musculoskeletal: Negative for arthralgias, gait problem, myalgias, neck pain and neck stiffness.   Skin: Negative for pallor, rash  and wound.   Neurological: Negative for dizziness, tremors, seizures, syncope, facial asymmetry, speech difficulty, weakness, light-headedness, numbness and headaches.   Hematological: Does not bruise/bleed easily.   Psychiatric/Behavioral: Negative for agitation, behavioral problems and confusion.         Objective     Vital Signs  Temp:  [97.4 °F (36.3 °C)-98.8 °F (37.1 °C)] 98 °F (36.7 °C)  Heart Rate:  [72-89] 80  BP: ()/(52-85) 109/59    Physical Exam:   Physical Exam   Constitutional: He is oriented to person, place, and time. He appears well-developed and well-nourished. No distress.   HENT:   Head: Normocephalic and atraumatic.   Eyes: EOM are normal. Pupils are equal, round, and reactive to light. Right eye exhibits no discharge. Left eye exhibits no discharge. No scleral icterus.   Neck: Normal range of motion. Neck supple. No JVD present. No thyromegaly present.   Cardiovascular: Normal rate, regular rhythm and normal heart sounds.  Exam reveals no gallop and no friction rub.    No murmur heard.  Pulmonary/Chest: Effort normal and breath sounds normal. He has no wheezes. He has no rales. He exhibits no tenderness.   Abdominal: Soft. Bowel sounds are normal. He exhibits no distension and no mass. There is no tenderness. There is no rebound and no guarding.   Musculoskeletal: He exhibits no edema, tenderness or deformity.   Neurological: He is alert and oriented to person, place, and time. No cranial nerve deficit. He exhibits normal muscle tone. Coordination normal.   Skin: Skin is warm and dry. No rash noted. He is not diaphoretic. No erythema. No pallor.   Psychiatric: He has a normal mood and affect. His behavior is normal. Judgment and thought content normal.   Nursing note and vitals reviewed.         Results Review:         Results from last 7 days  Lab Units 12/03/17  0240 12/02/17  0417 12/01/17  0421 11/30/17  0140 11/29/17  0143 11/28/17  1031 11/27/17  1710   SODIUM mmol/L 135* 136* 135*  139 138 137 137   POTASSIUM mmol/L 3.6 3.6 3.8 3.6 3.6 4.0 3.8   CHLORIDE mmol/L 100 102 101 101 101 105 101   CO2 mmol/L 23.0 22.0 24.0 24.0 25.0 22.0 23.0   BUN mg/dL 12 15 16 19 24* 23* 26*   CREATININE mg/dL 2.25* 2.20* 2.15* 2.09* 2.10* 1.83* 1.98*   GLUCOSE mg/dL 101* 100 113* 119* 103* 90 96   CALCIUM mg/dL 8.7 8.8 8.7 8.5 8.4 8.6 9.5   BILIRUBIN mg/dL 0.9 0.6 0.7 0.4 0.5 0.6 0.6   ALK PHOS U/L 100 97 94 88 90 95 99   ALT (SGPT) U/L 30 30 24 26 21 28 20*   AST (SGOT) U/L 22 24 29 20 22 31 21               Results from last 7 days  Lab Units 12/03/17  0240 12/02/17  0417 12/01/17  0421 11/30/17  0141 11/30/17  0140 11/29/17  0143 11/28/17  1031 11/27/17  1828   WBC 10*3/mm3 8.00 8.14 7.82  --  8.17 6.78 8.85 9.29   HEMOGLOBIN g/dL 9.7* 10.3* 10.1*  --  10.2* 10.7* 11.3* 12.2*   HEMATOCRIT % 28.7* 30.7* 30.1*  --  30.1* 31.4* 33.1* 36.1*   PLATELETS 10*3/mm3 162 163 150 143* 143* 139* 153 162       Lab Results   Component Value Date    CKTOTAL 25 (L) 05/12/2017    CKMB 0.55 05/12/2017    TROPONINI 0.522 (C) 11/28/2017       CO2   Date Value Ref Range Status   12/03/2017 23.0 22.0 - 31.0 mmol/L Final         Results from last 7 days  Lab Units 11/28/17  1743 11/27/17  2040   INR  0.99 1.03        Imaging Results (last 7 days)     ** No results found for the last 168 hours. **                                    Medication Review:   Current Facility-Administered Medications   Medication Dose Route Frequency Provider Last Rate Last Dose   • ALPRAZolam (XANAX) tablet 0.25 mg  0.25 mg Oral 4x Daily PRN Seymour Ricci MD   0.25 mg at 12/02/17 2145   • atorvastatin (LIPITOR) tablet 80 mg  80 mg Oral Nightly Rafa Carrasco MD PhD   80 mg at 12/02/17 2145   • bisacodyl (DULCOLAX) EC tablet 10 mg  10 mg Oral Daily Seymour Ricci MD   10 mg at 12/01/17 0967   • [START ON 12/4/2017] ceFAZolin (ANCEF) in SWFI 2 g/20ml IV PUSH syringe  2 g Intravenous On Call to OR AHSAN Rand       • famotidine  (PEPCID) tablet 20 mg  20 mg Oral BID Nataliya G Levill, APRN   20 mg at 12/03/17 0824   • ferric gluconate (FERRLECIT) 125 mg in sodium chloride 0.9 % 110 mL IVPB  125 mg Intravenous Daily Jl Young  mL/hr at 12/02/17 1003 125 mg at 12/02/17 1003   • fluticasone (FLONASE) 50 MCG/ACT nasal spray 2 spray  2 spray Each Nare Daily Seymour Ricci MD   2 spray at 12/03/17 0825   • heparin 20493 units/250 mL (100 units/mL) in 0.45 % NaCl infusion  18 Units/kg/hr Intravenous Titrated Carlos Charles MD 8 mL/hr at 12/02/17 1002 800 Units/hr at 12/02/17 1002    And   • heparin (porcine) 5000 UNIT/ML injection 5,500 Units  80 Units/kg Intravenous PRN Carlos Charles MD        And   • heparin (porcine) 5000 UNIT/ML injection 2,800 Units  40 Units/kg Intravenous PRN Carlos Charles MD       • hydrALAZINE (APRESOLINE) injection 10 mg  10 mg Intravenous Q6H PRN Nataliya G Levill, APRN   10 mg at 11/27/17 1718   • hydrALAZINE (APRESOLINE) tablet 50 mg  50 mg Oral Q8H Adriane Butler MD   50 mg at 12/02/17 2145   • labetalol (NORMODYNE) tablet 100 mg  100 mg Oral Q12H Adriane Butler MD   100 mg at 12/03/17 0825   • LORazepam (ATIVAN) tablet 0.5 mg  0.5 mg Oral Q8H PRN Elizabeth Benson, APRN   0.5 mg at 11/30/17 0900   • morphine injection 2 mg  2 mg Intravenous Q2H PRN Taz Guzmán MD   2 mg at 12/03/17 0341   • nitroglycerin 50 mg/250 mL (0.2 mg/mL) infusion  5-200 mcg/min Intravenous Titrated Nataliya G Levill, APRN 21 mL/hr at 12/03/17 0826 70 mcg/min at 12/03/17 0826   • ondansetron (ZOFRAN) injection 4 mg  4 mg Intravenous Q6H PRN Nataliya G Levill, APRN   4 mg at 12/01/17 1140   • pantoprazole (PROTONIX) EC tablet 40 mg  40 mg Oral Q AM Nataliya G Levill, APRN   40 mg at 12/03/17 0552   • polyethylene glycol 3350 powder (packet)  17 g Oral Daily Elizabeth Benson, AHSAN   17 g at 12/03/17 0824   • ranolazine (RANEXA) 12 hr tablet 1,000 mg  1,000 mg Oral Q12H Rafa Carrasco MD PhD   1,000 mg  at 12/03/17 0825   • sodium chloride 0.9 % flush 1-10 mL  1-10 mL Intravenous PRN Nataliya G Levill, APRN   10 mL at 12/02/17 1220   • tamsulosin (FLOMAX) 24 hr capsule 0.4 mg  0.4 mg Oral Nightly Nataliya G Levill, APRN   0.4 mg at 12/02/17 2148   • terazosin (HYTRIN) capsule 1 mg  1 mg Oral Nightly Nataliya G Levill, APRN   1 mg at 12/02/17 2147         Assessment/Plan   1. NSTEMI S/p cardiac cath. with multi-vessel coronary artery disease: Continue current optimal medical management while awaiting cardiac revascularization.  Echocardiogram Shows mild concentric left ventricular wall thickness, normal left ventricular systolic function with ejection fraction of 65%.  There is hypokinesis of the inferior wall.  Cardiologist and CT surgery are actively following. Patient is awaiting revascularization surgery.  2.  Acute on chronickidney injury: Creatinine has increased to 2.25 today. Continue to monitor renal function and avoiding all nephrotoxic agents. Nephrologist is following.  3.  Hypertension: Stable.  4.  Constipation: Resolved.  Continue bowel regimen prn.  5.  Continue GI and DVT prophylaxis.        Seymour Ricci MD  12/03/17      EMR Dragon/Transcription disclaimer:   Much of this encounter note is an electronic transcription/translation of spoken language to printed text. The electronic translation of spoken language may permit erroneous, or at times, nonsensical words or phrases to be inadvertently transcribed; Although I have reviewed the note for such errors, some may still exist.                 Electronically signed by Seymour Ricci MD at 12/3/2017 10:00 AM

## 2017-12-04 NOTE — PLAN OF CARE
Problem: Patient Care Overview (Adult)  Goal: Plan of Care Review  Outcome: Ongoing (interventions implemented as appropriate)  Goal: Adult Individualization and Mutuality  Outcome: Ongoing (interventions implemented as appropriate)  Goal: Discharge Needs Assessment  Outcome: Ongoing (interventions implemented as appropriate)    Problem: Pain, Chronic (Adult)  Goal: Acceptable Pain Control/Comfort Level  Outcome: Ongoing (interventions implemented as appropriate)    Problem: Cardiac Output, Decreased (NICU)  Goal: Identify Related Risk Factors and Signs and Symptoms  Outcome: Ongoing (interventions implemented as appropriate)  Goal: Adequate Cardiac Output/Effective Tissue Perfusion  Outcome: Ongoing (interventions implemented as appropriate)

## 2017-12-04 NOTE — ANESTHESIA PREPROCEDURE EVALUATION
" Anesthesia Evaluation     Patient summary reviewed and Nursing notes reviewed   NPO Solid Status: > 8 hours  NPO Liquid Status: > 8 hours     Airway   Mallampati: II  TM distance: >3 FB  Neck ROM: full  possible difficult intubation  Dental    (+) poor dentation    Comment: Upper edentulous with \"6 left\" lower in hopeless condition.    Pulmonary - normal exam    breath sounds clear to auscultation  (+) a smoker Former,   Cardiovascular - normal exam    ECG reviewed  Rhythm: regular  Rate: normal    (+) hypertension, past MI , CAD, angina, CHF, PVD (Common illiac and lower aortic infrarenal aneurysm.), hyperlipidemia    ROS comment: EKG:NSR EF 65%.    Neuro/Psych  (+) headaches, numbness (Cervical radiculitis.), psychiatric history Anxiety,    GI/Hepatic/Renal/Endo    (+)  GERD well controlled, renal disease (Creatinine 2.16) CRI,     ROS Comment: HGB 9.6 HCT 28.1    Musculoskeletal     Abdominal    Substance History - negative use     OB/GYN negative ob/gyn ROS         Other   (+) arthritis                                     Anesthesia Plan    ASA 4     general   (Discussed central line,arterial line,[place] monitoring,post op ventilation and patient understands possible complications,risks and agrees.)  intravenous induction   Anesthetic plan and risks discussed with patient and spouse/significant other.      "

## 2017-12-05 ENCOUNTER — APPOINTMENT (OUTPATIENT)
Dept: GENERAL RADIOLOGY | Facility: HOSPITAL | Age: 52
End: 2017-12-05

## 2017-12-05 PROBLEM — I20.0 UNSTABLE ANGINA (HCC): Status: ACTIVE | Noted: 2017-11-27

## 2017-12-05 LAB
ANION GAP SERPL CALCULATED.3IONS-SCNC: 13 MMOL/L (ref 5–15)
ARTERIAL PATENCY WRIST A: ABNORMAL
ATMOSPHERIC PRESS: ABNORMAL MMHG
BASE EXCESS BLDA CALC-SCNC: -1.3 MMOL/L (ref -2.4–2.4)
BASOPHILS # BLD AUTO: 0.01 10*3/MM3 (ref 0–0.2)
BASOPHILS NFR BLD AUTO: 0.1 % (ref 0–2)
BDY SITE: ABNORMAL
BUN BLD-MCNC: 16 MG/DL (ref 7–21)
BUN/CREAT SERPL: 8.4 (ref 7–25)
CA-I BLD-MCNC: 4.4 MG/DL (ref 4.5–4.9)
CALCIUM SPEC-SCNC: 8.5 MG/DL (ref 8.4–10.2)
CHLORIDE SERPL-SCNC: 97 MMOL/L (ref 95–110)
CO2 BLDA-SCNC: 24.4 MMOL/L (ref 23–27)
CO2 SERPL-SCNC: 25 MMOL/L (ref 22–31)
CREAT BLD-MCNC: 1.91 MG/DL (ref 0.7–1.3)
DEPRECATED RDW RBC AUTO: 43.8 FL (ref 35.1–43.9)
EOSINOPHIL # BLD AUTO: 0.01 10*3/MM3 (ref 0–0.7)
EOSINOPHIL NFR BLD AUTO: 0.1 % (ref 0–7)
ERYTHROCYTE [DISTWIDTH] IN BLOOD BY AUTOMATED COUNT: 14.5 % (ref 11.5–14.5)
GFR SERPL CREATININE-BSD FRML MDRD: 37 ML/MIN/1.73 (ref 60–130)
GLUCOSE BLD-MCNC: 108 MG/DL (ref 60–100)
GLUCOSE BLDA-MCNC: 100 MMOL/L
GLUCOSE BLDC GLUCOMTR-MCNC: 101 MG/DL (ref 70–130)
GLUCOSE BLDC GLUCOMTR-MCNC: 103 MG/DL (ref 70–130)
GLUCOSE BLDC GLUCOMTR-MCNC: 104 MG/DL (ref 70–130)
GLUCOSE BLDC GLUCOMTR-MCNC: 107 MG/DL (ref 70–130)
GLUCOSE BLDC GLUCOMTR-MCNC: 109 MG/DL (ref 70–130)
GLUCOSE BLDC GLUCOMTR-MCNC: 114 MG/DL (ref 70–130)
GLUCOSE BLDC GLUCOMTR-MCNC: 121 MG/DL (ref 70–130)
GLUCOSE BLDC GLUCOMTR-MCNC: 124 MG/DL (ref 70–130)
GLUCOSE BLDC GLUCOMTR-MCNC: 127 MG/DL (ref 70–130)
GLUCOSE BLDC GLUCOMTR-MCNC: 130 MG/DL (ref 70–130)
GLUCOSE BLDC GLUCOMTR-MCNC: 137 MG/DL (ref 70–130)
GLUCOSE BLDC GLUCOMTR-MCNC: 144 MG/DL (ref 70–130)
HCO3 BLDA-SCNC: 23.2 MMOL/L (ref 22–26)
HCT VFR BLD AUTO: 26.5 % (ref 39–49)
HCT VFR BLD CALC: 26 % (ref 40–54)
HGB BLD-MCNC: 9.1 G/DL (ref 13.7–17.3)
HGB BLDA-MCNC: 9 G/DL (ref 14–18)
IMM GRANULOCYTES # BLD: 0.04 10*3/MM3 (ref 0–0.02)
IMM GRANULOCYTES NFR BLD: 0.4 % (ref 0–0.5)
LYMPHOCYTES # BLD AUTO: 2.74 10*3/MM3 (ref 0.6–4.2)
LYMPHOCYTES NFR BLD AUTO: 27 % (ref 10–50)
MAGNESIUM SERPL-MCNC: 2.4 MG/DL (ref 1.6–2.3)
MCH RBC QN AUTO: 28.5 PG (ref 26.5–34)
MCHC RBC AUTO-ENTMCNC: 34.3 G/DL (ref 31.5–36.3)
MCV RBC AUTO: 83.1 FL (ref 80–98)
MODALITY: ABNORMAL
MONOCYTES # BLD AUTO: 0.84 10*3/MM3 (ref 0–0.9)
MONOCYTES NFR BLD AUTO: 8.3 % (ref 0–12)
NEUTROPHILS # BLD AUTO: 6.49 10*3/MM3 (ref 2–8.6)
NEUTROPHILS NFR BLD AUTO: 64.1 % (ref 37–80)
NRBC BLD MANUAL-RTO: 0 /100 WBC (ref 0–0)
PCO2 BLDA: 37.8 MM HG (ref 35–45)
PH BLDA: 7.41 PH UNITS (ref 7.35–7.45)
PLATELET # BLD AUTO: 144 10*3/MM3 (ref 150–450)
PMV BLD AUTO: 9 FL (ref 8–12)
PO2 BLDA: 74.2 MM HG (ref 80–105)
POTASSIUM BLD-SCNC: 3.6 MMOL/L (ref 3.5–5.1)
POTASSIUM BLDA-SCNC: 3.49 MMOL/L (ref 3.6–4.9)
RBC # BLD AUTO: 3.19 10*6/MM3 (ref 4.37–5.74)
SAO2 % BLDCOA: 94.1 %
SODIUM BLD-SCNC: 135 MMOL/L (ref 137–145)
SODIUM BLDA-SCNC: 133.1 MMOL/L (ref 138–146)
WBC NRBC COR # BLD: 10.13 10*3/MM3 (ref 3.2–9.8)

## 2017-12-05 PROCEDURE — 99024 POSTOP FOLLOW-UP VISIT: CPT | Performed by: NURSE PRACTITIONER

## 2017-12-05 PROCEDURE — 83735 ASSAY OF MAGNESIUM: CPT | Performed by: NURSE PRACTITIONER

## 2017-12-05 PROCEDURE — 94640 AIRWAY INHALATION TREATMENT: CPT

## 2017-12-05 PROCEDURE — 82803 BLOOD GASES ANY COMBINATION: CPT | Performed by: NURSE PRACTITIONER

## 2017-12-05 PROCEDURE — 97166 OT EVAL MOD COMPLEX 45 MIN: CPT

## 2017-12-05 PROCEDURE — 94799 UNLISTED PULMONARY SVC/PX: CPT

## 2017-12-05 PROCEDURE — G8979 MOBILITY GOAL STATUS: HCPCS

## 2017-12-05 PROCEDURE — 63710000001 INSULIN DETEMIR PER 5 UNITS: Performed by: NURSE PRACTITIONER

## 2017-12-05 PROCEDURE — 71010 HC CHEST PA OR AP: CPT

## 2017-12-05 PROCEDURE — G8978 MOBILITY CURRENT STATUS: HCPCS

## 2017-12-05 PROCEDURE — 82962 GLUCOSE BLOOD TEST: CPT

## 2017-12-05 PROCEDURE — 93010 ELECTROCARDIOGRAM REPORT: CPT | Performed by: INTERNAL MEDICINE

## 2017-12-05 PROCEDURE — 80048 BASIC METABOLIC PNL TOTAL CA: CPT | Performed by: NURSE PRACTITIONER

## 2017-12-05 PROCEDURE — 94760 N-INVAS EAR/PLS OXIMETRY 1: CPT

## 2017-12-05 PROCEDURE — G8988 SELF CARE GOAL STATUS: HCPCS

## 2017-12-05 PROCEDURE — 97162 PT EVAL MOD COMPLEX 30 MIN: CPT

## 2017-12-05 PROCEDURE — G8987 SELF CARE CURRENT STATUS: HCPCS

## 2017-12-05 PROCEDURE — 25010000003 CEFAZOLIN PER 500 MG: Performed by: NURSE PRACTITIONER

## 2017-12-05 PROCEDURE — 85025 COMPLETE CBC W/AUTO DIFF WBC: CPT | Performed by: NURSE PRACTITIONER

## 2017-12-05 PROCEDURE — 93005 ELECTROCARDIOGRAM TRACING: CPT | Performed by: NURSE PRACTITIONER

## 2017-12-05 RX ORDER — ASCORBIC ACID 500 MG
500 TABLET ORAL 2 TIMES DAILY
Status: DISCONTINUED | OUTPATIENT
Start: 2017-12-05 | End: 2017-12-11 | Stop reason: HOSPADM

## 2017-12-05 RX ORDER — POTASSIUM CHLORIDE 1.5 G/1.77G
40 POWDER, FOR SOLUTION ORAL AS NEEDED
Status: DISCONTINUED | OUTPATIENT
Start: 2017-12-05 | End: 2017-12-11 | Stop reason: HOSPADM

## 2017-12-05 RX ORDER — PRENATAL VIT/IRON FUM/FOLIC AC 27MG-0.8MG
1 TABLET ORAL DAILY
Status: DISCONTINUED | OUTPATIENT
Start: 2017-12-05 | End: 2017-12-11 | Stop reason: HOSPADM

## 2017-12-05 RX ORDER — OXYCODONE AND ACETAMINOPHEN 10; 325 MG/1; MG/1
1 TABLET ORAL EVERY 4 HOURS PRN
Status: DISPENSED | OUTPATIENT
Start: 2017-12-05 | End: 2017-12-10

## 2017-12-05 RX ORDER — BISACODYL 5 MG/1
10 TABLET, DELAYED RELEASE ORAL DAILY PRN
Status: DISCONTINUED | OUTPATIENT
Start: 2017-12-05 | End: 2017-12-11 | Stop reason: HOSPADM

## 2017-12-05 RX ORDER — BISACODYL 10 MG
10 SUPPOSITORY, RECTAL RECTAL DAILY PRN
Status: DISCONTINUED | OUTPATIENT
Start: 2017-12-05 | End: 2017-12-11 | Stop reason: HOSPADM

## 2017-12-05 RX ORDER — DEXTROSE MONOHYDRATE 25 G/50ML
25 INJECTION, SOLUTION INTRAVENOUS
Status: DISCONTINUED | OUTPATIENT
Start: 2017-12-05 | End: 2017-12-11 | Stop reason: HOSPADM

## 2017-12-05 RX ORDER — OXYCODONE HYDROCHLORIDE 5 MG/1
5 TABLET ORAL EVERY 4 HOURS PRN
Status: DISCONTINUED | OUTPATIENT
Start: 2017-12-05 | End: 2017-12-11 | Stop reason: HOSPADM

## 2017-12-05 RX ORDER — OXYCODONE HYDROCHLORIDE 5 MG/1
5 TABLET ORAL EVERY 4 HOURS PRN
Status: DISCONTINUED | OUTPATIENT
Start: 2017-12-05 | End: 2017-12-05

## 2017-12-05 RX ORDER — CYCLOBENZAPRINE HCL 10 MG
10 TABLET ORAL 3 TIMES DAILY
Status: DISCONTINUED | OUTPATIENT
Start: 2017-12-05 | End: 2017-12-11 | Stop reason: HOSPADM

## 2017-12-05 RX ORDER — TAMSULOSIN HYDROCHLORIDE 0.4 MG/1
0.4 CAPSULE ORAL NIGHTLY
Status: DISCONTINUED | OUTPATIENT
Start: 2017-12-05 | End: 2017-12-11 | Stop reason: HOSPADM

## 2017-12-05 RX ORDER — NICOTINE POLACRILEX 4 MG
15 LOZENGE BUCCAL
Status: DISCONTINUED | OUTPATIENT
Start: 2017-12-05 | End: 2017-12-11 | Stop reason: HOSPADM

## 2017-12-05 RX ORDER — OXYCODONE HYDROCHLORIDE AND ACETAMINOPHEN 5; 325 MG/1; MG/1
1 TABLET ORAL EVERY 4 HOURS PRN
Status: DISCONTINUED | OUTPATIENT
Start: 2017-12-05 | End: 2017-12-05 | Stop reason: ALTCHOICE

## 2017-12-05 RX ORDER — OXYCODONE HYDROCHLORIDE AND ACETAMINOPHEN 5; 325 MG/1; MG/1
1 TABLET ORAL EVERY 4 HOURS PRN
Status: DISCONTINUED | OUTPATIENT
Start: 2017-12-05 | End: 2017-12-11 | Stop reason: HOSPADM

## 2017-12-05 RX ORDER — ATORVASTATIN CALCIUM 10 MG/1
10 TABLET, FILM COATED ORAL DAILY
Status: DISCONTINUED | OUTPATIENT
Start: 2017-12-05 | End: 2017-12-11 | Stop reason: HOSPADM

## 2017-12-05 RX ORDER — SODIUM CHLORIDE 0.9 % (FLUSH) 0.9 %
1-10 SYRINGE (ML) INJECTION EVERY 8 HOURS
Status: DISCONTINUED | OUTPATIENT
Start: 2017-12-05 | End: 2017-12-11 | Stop reason: HOSPADM

## 2017-12-05 RX ADMIN — POTASSIUM CHLORIDE 40 MEQ: 1.5 POWDER, FOR SOLUTION ORAL at 22:12

## 2017-12-05 RX ADMIN — ASPIRIN 81 MG: 81 TABLET, COATED ORAL at 09:01

## 2017-12-05 RX ADMIN — Medication 10 ML: at 21:55

## 2017-12-05 RX ADMIN — PRENATAL VIT W/ FE FUMARATE-FA TAB 27-0.8 MG 1 TABLET: 27-0.8 TAB at 12:51

## 2017-12-05 RX ADMIN — ATORVASTATIN CALCIUM 10 MG: 10 TABLET, FILM COATED ORAL at 21:31

## 2017-12-05 RX ADMIN — Medication 10 ML: at 12:53

## 2017-12-05 RX ADMIN — HYDROCODONE BITARTRATE AND ACETAMINOPHEN 1 TABLET: 10; 325 TABLET ORAL at 00:02

## 2017-12-05 RX ADMIN — CYCLOBENZAPRINE HYDROCHLORIDE 10 MG: 10 TABLET, FILM COATED ORAL at 16:24

## 2017-12-05 RX ADMIN — SENNOSIDES AND DOCUSATE SODIUM 1 TABLET: 8.6; 5 TABLET ORAL at 09:01

## 2017-12-05 RX ADMIN — TICAGRELOR 90 MG: 90 TABLET ORAL at 12:51

## 2017-12-05 RX ADMIN — POTASSIUM CHLORIDE 40 MEQ: 1.5 POWDER, FOR SOLUTION ORAL at 18:35

## 2017-12-05 RX ADMIN — MAGNESIUM OXIDE TAB 400 MG (241.3 MG ELEMENTAL MG) 400 MG: 400 (241.3 MG) TAB at 12:52

## 2017-12-05 RX ADMIN — HYDROCODONE BITARTRATE AND ACETAMINOPHEN 1 TABLET: 10; 325 TABLET ORAL at 04:12

## 2017-12-05 RX ADMIN — IPRATROPIUM BROMIDE AND ALBUTEROL SULFATE 3 ML: 2.5; .5 SOLUTION RESPIRATORY (INHALATION) at 19:44

## 2017-12-05 RX ADMIN — WATER 2 G: 1 INJECTION INTRAMUSCULAR; INTRAVENOUS; SUBCUTANEOUS at 01:00

## 2017-12-05 RX ADMIN — OXYCODONE HYDROCHLORIDE AND ACETAMINOPHEN 1 TABLET: 10; 325 TABLET ORAL at 15:34

## 2017-12-05 RX ADMIN — MAGNESIUM OXIDE TAB 400 MG (241.3 MG ELEMENTAL MG) 400 MG: 400 (241.3 MG) TAB at 18:36

## 2017-12-05 RX ADMIN — CYCLOBENZAPRINE HYDROCHLORIDE 10 MG: 10 TABLET, FILM COATED ORAL at 12:51

## 2017-12-05 RX ADMIN — OXYCODONE HYDROCHLORIDE AND ACETAMINOPHEN 500 MG: 500 TABLET ORAL at 12:51

## 2017-12-05 RX ADMIN — WATER 2 G: 1 INJECTION INTRAMUSCULAR; INTRAVENOUS; SUBCUTANEOUS at 16:24

## 2017-12-05 RX ADMIN — OXYCODONE HYDROCHLORIDE 5 MG: 5 TABLET ORAL at 18:34

## 2017-12-05 RX ADMIN — CYCLOBENZAPRINE HYDROCHLORIDE 10 MG: 10 TABLET, FILM COATED ORAL at 22:12

## 2017-12-05 RX ADMIN — OXYCODONE HYDROCHLORIDE AND ACETAMINOPHEN 1 TABLET: 10; 325 TABLET ORAL at 23:51

## 2017-12-05 RX ADMIN — TICAGRELOR 90 MG: 90 TABLET ORAL at 18:36

## 2017-12-05 RX ADMIN — TAMSULOSIN HYDROCHLORIDE 0.4 MG: 0.4 CAPSULE ORAL at 21:30

## 2017-12-05 RX ADMIN — HYDROCODONE BITARTRATE AND ACETAMINOPHEN 1 TABLET: 10; 325 TABLET ORAL at 09:01

## 2017-12-05 RX ADMIN — WATER 2 G: 1 INJECTION INTRAMUSCULAR; INTRAVENOUS; SUBCUTANEOUS at 09:01

## 2017-12-05 RX ADMIN — SENNOSIDES AND DOCUSATE SODIUM 1 TABLET: 8.6; 5 TABLET ORAL at 19:35

## 2017-12-05 RX ADMIN — WATER 2 G: 1 INJECTION INTRAMUSCULAR; INTRAVENOUS; SUBCUTANEOUS at 23:51

## 2017-12-05 RX ADMIN — IPRATROPIUM BROMIDE AND ALBUTEROL SULFATE 3 ML: 2.5; .5 SOLUTION RESPIRATORY (INHALATION) at 06:50

## 2017-12-05 RX ADMIN — LABETALOL HYDROCHLORIDE 100 MG: 100 TABLET, FILM COATED ORAL at 21:30

## 2017-12-05 RX ADMIN — INSULIN DETEMIR 20 UNITS: 100 INJECTION, SOLUTION SUBCUTANEOUS at 21:31

## 2017-12-05 RX ADMIN — OXYCODONE HYDROCHLORIDE AND ACETAMINOPHEN 500 MG: 500 TABLET ORAL at 18:36

## 2017-12-05 RX ADMIN — OXYCODONE HYDROCHLORIDE AND ACETAMINOPHEN 1 TABLET: 10; 325 TABLET ORAL at 11:39

## 2017-12-05 NOTE — PLAN OF CARE
Problem: Patient Care Overview (Adult)  Goal: Plan of Care Review  Outcome: Ongoing (interventions implemented as appropriate)    12/05/17 1424   Coping/Psychosocial Response Interventions   Plan Of Care Reviewed With patient   Outcome Evaluation   Outcome Summary/Follow up Plan OT scotty completed this date. Pt pain level and fatigue hindered engagement of t/f and mobiltiy but pt pleasant and attempted to engage (pt had just gotten up for toileting). Pt displays decreased endurance, strength, and independence with ADL and could benefit from skilled OT services. Pt may need further inpatient rehab such as ARU before d/c as pt reports being homeless and curerntly needing assist with ADL.          Problem: Inpatient Occupational Therapy  Goal: Transfer Training Goal 1 LTG- OT  Outcome: Ongoing (interventions implemented as appropriate)    12/05/17 1424   Transfer Training OT LTG   Transfer Training OT LTG, Date Established 12/05/17   Transfer Training OT LTG, Time to Achieve by discharge   Transfer Training OT LTG, Activity Type toilet   Transfer Training OT LTG, [place] Level supervision required       Goal: Patient Education Goal LTG- OT  Outcome: Ongoing (interventions implemented as appropriate)    12/05/17 1424   Patient Education OT LTG   Patient Education OT LTG, Date Established 12/05/17   Patient Education OT LTG, Time to Achieve by discharge   Patient Education OT LTG, Education Type HEP;precautions per surgeon;home safety;energy conservation;adaptive equipment mgmt   Patient Education OT LTG, Education Understanding independent;demonstrates adequately;verbalizes understanding       Goal: ADL Goal LTG- OT  Outcome: Ongoing (interventions implemented as appropriate)    12/05/17 1424   ADL OT LTG   ADL OT LTG, Date Established 12/05/17   ADL OT LTG, Time to Achieve by discharge   ADL OT LTG, Activity Type ADL skills   ADL OT LTG, [place] Level standby assist       Goal: Functional Mobility Goal LTG-  OT  Outcome: Ongoing (interventions implemented as appropriate)    12/05/17 1424   Functional Mobility OT LTG   Functional Mobility Goal OT LTG, Date Established 12/05/17   Functional Mobility Goal OT LTG, Time to Achieve by discharge   Functional Mobility Goal OT LTG, [place] Level supervision   Functional Mobility Goal OT LTG, Distance to Achieve to the sink;to the bathroom       Goal: Endurance Goal LTG- OT  Outcome: Ongoing (interventions implemented as appropriate)    12/05/17 1424   Endurance OT LTG   Endurance Goal OT LTG, Date Established 12/05/17   Endurance Goal OT LTG, Time to Achieve by discharge   Endurance Goal OT LTG, Activity Level endurance 2 good-

## 2017-12-05 NOTE — PLAN OF CARE
Problem: Patient Care Overview (Adult)  Goal: Plan of Care Review  Outcome: Ongoing (interventions implemented as appropriate)    12/05/17 1452   Coping/Psychosocial Response Interventions   Plan Of Care Reviewed With patient   Outcome Evaluation   Outcome Summary/Follow up Plan previously indep male s/p NSTEMI, now s/p CABG; pt had signficant pain limiting participation; he was willing to participate and move; will need skilled service to help increase his ex genny for self care; particularly challenged w/ homelessness       Goal: Adult Individualization and Mutuality  Outcome: Ongoing (interventions implemented as appropriate)    12/05/17 1452   Individualization   Patient Specific Interventions incentive; regular therapy    Mutuality/Individual Preferences   What Anxieties, Fears or Concerns Do You Have About Your Health or Care? trouble coughing up        Goal: Discharge Needs Assessment  Outcome: Ongoing (interventions implemented as appropriate)    12/05/17 1452   Discharge Needs Assessment   Concerns To Be Addressed homelessness;home safety concerns   Concerns Comments pt was homeless before admit; will need support system and safe location post d/c   Equipment Needed After Discharge walker, rolling   Discharge Facility/Level Of Care Needs skilled transitional care;rehabilitation facility   Discharge Planning Comments if d/c to homeless situation, it appears he will need to be as strong as possible to survive homelessnes   Current Health   Outpatient/Agency/Support Group Needs skilled nursing facility (specify);inpatient rehabilitation facility (specify)   Anticipated Changes Related to Illness inability to care for self         Problem: Inpatient Physical Therapy  Goal: Bed Mobility Goal LTG- PT  Outcome: Ongoing (interventions implemented as appropriate)    12/05/17 1452   Bed Mobility PT LTG   Bed Mobility PT LTG, Date Established 12/05/17   Bed Mobility PT LTG, Time to Achieve by discharge   Bed Mobility PT  LTG, Activity Type roll left/roll right;supine to sit/sit to supine   Bed Mobility PT LTG, [place] Level conditional independence   Bed Mobility PT LTG, Outcome goal not met       Goal: Transfer Training Goal 1 LTG- PT  Outcome: Ongoing (interventions implemented as appropriate)    12/05/17 1452   Transfer Training PT LTG   Transfer Training PT LTG, Date Established 12/05/17   Transfer Training PT LTG, Time to Achieve by discharge   Transfer Training PT LTG, Activity Type bed to chair /chair to bed;sit to stand/stand to sit   Transfer Training PT LTG, [place] Level conditional independence   Transfer Training PT LTG, Outcome goal not met       Goal: Gait Training Goal LTG- PT  Outcome: Ongoing (interventions implemented as appropriate)    12/05/17 1452   Gait Training PT LTG   Gait Training Goal PT LTG, Date Established 12/05/17   Gait Training Goal PT LTG, Time to Achieve by discharge   Gait Training Goal PT LTG, [place] Level conditional independence   Gait Training Goal PT LTG, Outcome goal not met       Goal: Stair Training Goal LTG- PT  Outcome: Ongoing (interventions implemented as appropriate)    12/05/17 1452   Stair Training PT LTG   Stair Training Goal PT LTG, Date Established 12/05/17   Stair Training Goal PT LTG, Time to Achieve by discharge   Stair Training Goal PT LTG, Number of Steps 4   Stair Training Goal PT LTG, [place] Level conditional independence   Stair Training Goal PT LTG, Outcome goal not met

## 2017-12-05 NOTE — PROGRESS NOTES
"[facility]  [address] [place] [address]. [id number]  T - [phone]  F - [phone]     Progress Note          PATIENT  DEMOGRAPHICS   PATIENT NAME: Lyle Corona                      PHYSICIAN: Adriane Butler MD  : 1965  MRN: [id number]   LOS: 8 days    Patient Care Team:  AHSAN Mayer as PCP - General  Subjective   SUBJECTIVE   Post operative pain,          Objective   OBJECTIVE   Vital Signs  Temp:  [99.1 °F (37.3 °C)-100.5 °F (38.1 °C)] 99.5 °F (37.5 °C)  Heart Rate:  [80-98] 91  Resp:  [12-16] 16  BP: (102-117)/(57-75) 102/75  Arterial Line BP: ()/(50-73) 118/63  FiO2 (%):  [35 %-40 %] 35 %    Flowsheet Rows         First Filed Value    Admission Height  69\" (175.3 cm) Documented at 2017 1722    Admission Weight  151 lb 12.8 oz (68.9 kg) Documented at 2017 1722           I/O last 3 completed shifts:  In: 4704 [P.O.:240; I.V.:1594; Blood:300; IV Piggyback:2570]  Out: 5110 [Urine:4930; Other:180]    PHYSICAL EXAM    Physical Exam   Constitutional: He is oriented to person, place, and time. He appears well-developed.   Moderately nourished   HENT:   Head: Normocephalic and atraumatic.   Cardiovascular: Normal rate, regular rhythm and normal heart sounds.  Exam reveals no gallop and no friction rub.    No murmur heard.  Pulmonary/Chest: Effort normal and breath sounds normal. No respiratory distress. He has no wheezes. He has no rales. He exhibits no tenderness.   Abdominal: Soft. Bowel sounds are normal. He exhibits no distension and no mass. There is no tenderness. There is no guarding.   Musculoskeletal: He exhibits no edema or tenderness.   Neurological: He is alert and oriented to person, place, and time.   Skin: Skin is warm and dry.   Nursing note and vitals reviewed.      RESULTS   Results Review:      Results from last 7 days  Lab Units 17  0416 17  0402 17  2053  17  1252 17  1245  17  0407 " 12/03/17  0240 12/02/17  0417   SODIUM mmol/L 135*  --   --   --   --  136*  --  136* 135* 136*   SODIUM, VENOUS mmol/L  --   --   --   --  134.9  --   --   --   --   --    SODIUM, ARTERIAL mmol/L  --  133.1* 137.2*  < >  --   --   < >  --   --   --    POTASSIUM mmol/L 3.6  --   --   --   --  4.2  --  3.7 3.6 3.6   POTASSIUM, VENOUS mmol/L  --   --   --   --  4.1  --   --   --   --   --    CHLORIDE mmol/L 97  --   --   --   --  98  --  100 100 102   CO2 mmol/L 25.0  --   --   --   --  24.0  --  24.0 23.0 22.0   BUN mg/dL 16  --   --   --   --  14  --  13 12 15   CREATININE mg/dL 1.91*  --   --   --   --  2.21*  --  2.16* 2.25* 2.20*   CALCIUM mg/dL 8.5  --   --   --   --  8.6  --  8.6 8.7 8.8   BILIRUBIN mg/dL  --   --   --   --   --   --   --  0.7 0.9 0.6   ALK PHOS U/L  --   --   --   --   --   --   --  111 100 97   ALT (SGPT) U/L  --   --   --   --   --   --   --  29 30 30   AST (SGOT) U/L  --   --   --   --   --   --   --  29 22 24   GLUCOSE mg/dL 108*  --   --   --   --  108*  --  103* 101* 100   GLUCOSE, ARTERIAL mmol/L  --  100 124  < >  --   --   < >  --   --   --    < > = values in this interval not displayed.    Estimated Creatinine Clearance: 45.9 mL/min (by C-G formula based on Cr of 1.91).        Results from last 7 days  Lab Units 12/05/17  0416 12/04/17  1245 12/04/17  1131 12/04/17  1036 12/04/17  0958  12/04/17  0407 12/03/17  0240 12/02/17  0417   WBC 10*3/mm3 10.13* 8.70  --   --   --   --  7.07 8.00 8.14   HEMOGLOBIN g/dL 9.1* 9.5*  --   --   --   --  9.6* 9.7* 10.3*   HEMOGLOBIN, POC g/dL  --   --  8.2 7.8 7.8  < >  --   --   --    PLATELETS 10*3/mm3 144* 107*  --   --   --   --  148* 162 163   < > = values in this interval not displayed.      Results from last 7 days  Lab Units 12/04/17  1245 11/28/17  1743   INR  1.24* 0.99         Imaging Results (last 24 hours)     Procedure Component Value Units Date/Time    XR Chest 1 View [[id number]] Collected:  12/04/17 1245     Updated:  12/04/17 1303     Narrative:         PROCEDURE: Single chest view AP    REASON FOR EXAM:Post-Op Check Line & Tube Placement, I25.10  Atherosclerotic heart disease of native coronary artery without  angina pectoris I20.0 Unstable angina I21.4 Non-ST elevation  (NSTEMI) myocardial infarction    FINDINGS: ET tube with tip 7.5 cm above jalen. Right jugular  [place] catheter with tip within the region the main pulmonary  artery. Postsurgical changes with median sternotomy. Tubing  overlying the mid chest of uncertain etiology. This may represent  mediastinal drain. Recommend clinical correlation. Left chest  tube in place. No pneumothorax. Cardiac and pulmonary vasculature  are normal. Right lung base small linear opacity. Lungs otherwise  clear. Pleural spaces are normal. No acute osseous abnormality.      Impression:       1.  Tubes and lines as described above.  2.  Right lung base small focus of discoid atelectasis.    Electronically signed by:  Derek Galloway MD  12/4/2017 1:08 PM CST  Workstation: [id number]    XR Chest 1 View [[id number]] Collected:  12/05/17 0636     Updated:  12/05/17 0705    Narrative:         Chest single view on  12/5/2017     CLINICAL INDICATION: Postop open heart surgery    COMPARISON: 12/4/2017    FINDINGS: ET tube has been removed. Mediastinal drain and left  basilar chest tube are noted in place. Right IJ [place]  catheter has been removed with introducer catheter tip in the  SVC. The patient is status post median sternotomy. Heart is  within normal limits for size. There is developing left greater  than right basilar atelectasis. There is no pneumothorax.  Pulmonary vascularity is within normal limits.      Impression:       Interval extubation with developing left greater than  right basilar atelectasis.    Electronically signed by:  Arnaldo Rivers  12/5/2017 7:04 AM  CST Workstation: [id number]           MEDICATIONS      amiodarone 150 mg Intravenous Once   aspirin 81 mg Oral Daily    atorvastatin 10 mg Oral Daily   ceFAZolin 2 g Intravenous Q8H   furosemide 20 mg Intravenous Once   insulin detemir 20 Units Subcutaneous Nightly   ipratropium-albuterol 3 mL Nebulization 4x Daily - RT   labetalol 100 mg Oral Q12H   mupirocin 1 application Topical Daily   sennosides-docusate sodium 1 tablet Oral BID   tamsulosin 0.4 mg Oral Nightly       amiodarone 1 mg/min    dexmedetomidine 0.2-1.5 mcg/kg/hr Last Rate: 0.2 mcg/kg/hr (12/04/17 2159)   dextrose 5 % and sodium chloride 0.45 % 30 mL/hr Last Rate: 30 mL/hr (12/04/17 1400)   DOBUTamine 2-20 mcg/kg/min Last Rate: Stopped (12/04/17 1400)   insulin regular infusion 1 unit/mL (CCU use) 0-50 Units/hr Last Rate: 2.7 Units/hr (12/05/17 0110)   niCARdipine 5-15 mg/hr Last Rate: Stopped (12/04/17 2214)   nitroglycerin 5-200 mcg/min Last Rate: 70 mcg/min (12/03/17 1944)   phenylephrine 0.5-3 mcg/kg/min Last Rate: Stopped (12/04/17 1411)   sodium chloride 30 mL/hr Last Rate: 30 mL/hr (12/04/17 1401)       Assessment/Plan   ASSESSMENT / PLAN    Principal Problem:    Coronary artery disease involving native coronary artery of native heart with unstable angina pectoris  Active Problems:    Chronic kidney disease, stage 3    NSTEMI (non-ST elevated myocardial infarction)    1. CKD 3: baseline creatinine 1.7-1.9 mg/dl  - Creatinine is down to 1.9 and at baseline. Hold HCTZ and Cozaar for now    2. Hypertension:  - Blood pressure is acceptable. HOLD HCTZ and Cozaar. On labetalol 100mg BID, off hydralazine and terazosin. Will resume cozaar by tomorrow    3. Anemia:  - Hemoglobin is low and received 1 Unit PRBC. Received iv fe series    4. NSTEMI:   - s/p left heart cath which shows multivessel disease, s/p CABG.              This document has been electronically signed by Adriane Butler MD on December 5, 2017 9:26 AM

## 2017-12-05 NOTE — SIGNIFICANT NOTE
12/05/17 1001   Rehab Treatment   Discipline occupational therapist   Rehab Evaluation   Evaluation Not Performed other (see comments)  (OT checked with RN, he stated to defer for now and check back later. Rn stated chest tube had been disconnected and pt back on suction. OT will attempt at a later time. )

## 2017-12-05 NOTE — PLAN OF CARE
Problem: Patient Care Overview (Adult)  Goal: Plan of Care Review  Outcome: Ongoing (interventions implemented as appropriate)    12/05/17 0652   Coping/Psychosocial Response Interventions   Plan Of Care Reviewed With significant other   Patient Care Overview   Progress no change   Outcome Evaluation   Outcome Summary/Follow up Plan Pt extubated at 1955. Cardene off early into shift. Precedex on minimally to keep patient calm and aide with pain control. Insulin drip continues at 1.2 . Pt up to chair, very pleasant. Significant other gone at this time. Call light is in reach.        Goal: Adult Individualization and Mutuality  Outcome: Ongoing (interventions implemented as appropriate)    Problem: Cardiac Output, Decreased (Adult)  Goal: Identify Related Risk Factors and Signs and Symptoms  Outcome: Ongoing (interventions implemented as appropriate)  Goal: Adequate Cardiac Output/Effective Tissue Perfusion  Outcome: Ongoing (interventions implemented as appropriate)    Problem: Cardiac Surgery (Adult)  Goal: Signs and Symptoms of Listed Potential Problems Will be Absent or Manageable (Cardiac Surgery)  Outcome: Ongoing (interventions implemented as appropriate)    Problem: Fall Risk (Adult)  Goal: Identify Related Risk Factors and Signs and Symptoms  Outcome: Ongoing (interventions implemented as appropriate)  Goal: Absence of Falls  Outcome: Ongoing (interventions implemented as appropriate)

## 2017-12-05 NOTE — PROGRESS NOTES
"  Cardiothoracic - Vascular Surgery Daily Note        LOS: 8 days   Patient Care Team:  AHSAN Mayer as PCP - General     POD1 CABGX3    Chief Complaint: Surgical Chest Pain      Subjective     The following portions of the patient's history were reviewed and updated as appropriate: allergies, current medications, past family history, past medical history, past social history, past surgical history and problem list.     Subjective:  Symptoms:  Stable.  He reports chest pain (surgical).    Diet:  Adequate intake.  No nausea.    Activity level: Impaired due to pain.    Pain:  He complains of pain that is moderate.  He reports pain is unchanged.  Pain is partially controlled and requiring pain medication.          Objective     Vital Signs  Temp:  [99.1 °F (37.3 °C)-100.5 °F (38.1 °C)] 99.5 °F (37.5 °C)  Heart Rate:  [80-98] 91  Resp:  [12-16] 16  BP: (102-117)/(57-75) 102/75  Arterial Line BP: ()/(50-73) 118/63  FiO2 (%):  [35 %-40 %] 35 %  Body mass index is 23.63 kg/(m^2).    Intake/Output Summary (Last 24 hours) at 12/05/17 0853  Last data filed at 12/05/17 0607   Gross per 24 hour   Intake             3124 ml   Output             3810 ml   Net             -686 ml          Wt Readings from Last 3 Encounters:   12/05/17 72.6 kg (160 lb)   10/31/17 71.4 kg (157 lb 8 oz)   06/12/17 69.4 kg (153 lb)     CT 300cc No Air Leak -20sx    Physical Exam   Objective:  General Appearance:  Uncomfortable.    Vital signs: (most recent): Blood pressure 102/75, pulse 91, temperature 99.5 °F (37.5 °C), temperature source Core, resp. rate 16, height 175.3 cm (69\"), weight 72.6 kg (160 lb), SpO2 93 %.  Vital signs are normal.  No fever.    Output: Producing urine and no stool output.    HEENT: Normal HEENT exam.    Lungs:  Normal respiratory rate and normal effort.  There are decreased breath sounds (bases).    Heart: Normal rate.  Regular rhythm.    Chest: (CT No Air leak serosanginous  V wires to chest)  Abdomen: " Abdomen is soft and non-distended.  Hypoactive bowel sounds.     Extremities: Decreased range of motion.  There is no dependent edema.    Pulses: Distal pulses are intact.    Neurological: Patient is alert and oriented to person, place and time.    Pupils:  Pupils are equal, round, and reactive to light.    Skin:  Warm.  (M/S INC: CDI, Well approximated  LLE EVH: Ace wrap, CDI)              Results Review:    Lab Results   Component Value Date    WBC 10.13 (H) 12/05/2017    HGB 9.1 (L) 12/05/2017    HCT 26.5 (L) 12/05/2017    MCV 83.1 12/05/2017     (L) 12/05/2017       Estimated Creatinine Clearance: 45.9 mL/min (by C-G formula based on Cr of 1.91).      Results from last 7 days  Lab Units 12/05/17  0416 12/04/17  1245   MAGNESIUM mg/dL 2.4* 4.0*   PHOSPHORUS mg/dL  --  4.3       Lab Results   Component Value Date    GLUCOSE 108 (H) 12/05/2017    BUN 16 12/05/2017    CREATININE 1.91 (H) 12/05/2017    EGFRIFNONA 37 (L) 12/05/2017    BCR 8.4 12/05/2017    K 3.6 12/05/2017    CO2 25.0 12/05/2017    CALCIUM 8.5 12/05/2017    ALBUMIN 3.60 12/04/2017    LABIL2 1.1 12/04/2017    AST 29 12/04/2017    ALT 29 12/04/2017         Results from last 7 days  Lab Units 12/04/17  1245 11/28/17  1743   INR  1.24* 0.99       Imaging Results (last 24 hours)     Procedure Component Value Units Date/Time    XR Chest 1 View [[id number]] Collected:  12/04/17 1245     Updated:  12/04/17 1309    Narrative:         PROCEDURE: Single chest view AP    REASON FOR EXAM:Post-Op Check Line & Tube Placement, I25.10  Atherosclerotic heart disease of native coronary artery without  angina pectoris I20.0 Unstable angina I21.4 Non-ST elevation  (NSTEMI) myocardial infarction    FINDINGS: ET tube with tip 7.5 cm above jalen. Right jugular  [place] catheter with tip within the region the main pulmonary  artery. Postsurgical changes with median sternotomy. Tubing  overlying the mid chest of uncertain etiology. This may represent  mediastinal  drain. Recommend clinical correlation. Left chest  tube in place. No pneumothorax. Cardiac and pulmonary vasculature  are normal. Right lung base small linear opacity. Lungs otherwise  clear. Pleural spaces are normal. No acute osseous abnormality.      Impression:       1.  Tubes and lines as described above.  2.  Right lung base small focus of discoid atelectasis.    Electronically signed by:  Derek Galloway MD  12/4/2017 1:08 PM CST  Workstation: [id number]    XR Chest 1 View [[id number]] Collected:  12/05/17 0636     Updated:  12/05/17 0705    Narrative:         Chest single view on  12/5/2017     CLINICAL INDICATION: Postop open heart surgery    COMPARISON: 12/4/2017    FINDINGS: ET tube has been removed. Mediastinal drain and left  basilar chest tube are noted in place. Right IJ [place]  catheter has been removed with introducer catheter tip in the  SVC. The patient is status post median sternotomy. Heart is  within normal limits for size. There is developing left greater  than right basilar atelectasis. There is no pneumothorax.  Pulmonary vascularity is within normal limits.      Impression:       Interval extubation with developing left greater than  right basilar atelectasis.    Electronically signed by:  Arnaldo Rivers  12/5/2017 7:04 AM  CST Workstation: [id number]                                amiodarone 150 mg Intravenous Once   aspirin 81 mg Oral Daily   ceFAZolin 2 g Intravenous Q8H   furosemide 20 mg Intravenous Once   insulin detemir 20 Units Subcutaneous Nightly   ipratropium-albuterol 3 mL Nebulization 4x Daily - RT   labetalol 100 mg Oral Q12H   mupirocin 1 application Topical Daily   sennosides-docusate sodium 1 tablet Oral BID       amiodarone 1 mg/min    dexmedetomidine 0.2-1.5 mcg/kg/hr Last Rate: 0.2 mcg/kg/hr (12/04/17 2159)   dextrose 5 % and sodium chloride 0.45 % 30 mL/hr Last Rate: 30 mL/hr (12/04/17 1400)   DOBUTamine 2-20 mcg/kg/min Last Rate: Stopped (12/04/17 1400)   insulin  regular infusion 1 unit/mL (CCU use) 0-50 Units/hr Last Rate: 2.7 Units/hr (12/05/17 0110)   niCARdipine 5-15 mg/hr Last Rate: Stopped (12/04/17 2214)   nitroglycerin 5-200 mcg/min Last Rate: 70 mcg/min (12/03/17 1944)   phenylephrine 0.5-3 mcg/kg/min Last Rate: Stopped (12/04/17 1411)   sodium chloride 30 mL/hr Last Rate: 30 mL/hr (12/04/17 1401)             ASSESSMENT/PLAN     Principal Problem:    Coronary artery disease involving native coronary artery of native heart with unstable angina pectoris  Active Problems:    Chronic kidney disease, stage 3    NSTEMI (non-ST elevated myocardial infarction)      Assessment:    Condition: In stable condition.   (Stable Post Op CABG: Progressing well    CAD: ASA, restart Brilinta, Statin. Beta as BP tolerates. Hold ARB    Resp: Incentive. Wean O2, CT H2O seal. Extubate 1955    Pain: DC Norco change to Percocet    Rehab: PT/OT, Ambulate    Transient Post op Hyperglycemia: FSBS 109. Dc Insulin gtt. Start SSI coverage. Continue Levemir  ).     Plan:   Transfer to floor.  Encourage ambulation and out of bed and up to chair.  Continue respiratory treatments and wean off oxygen.  Advance diet as tolerated.  Increase analgesics and administer medications as ordered.   (Stable. Transfer 3W telemetry).                 This document has been electronically signed by AHSAN Castro on December 5, 2017 8:53 AM

## 2017-12-05 NOTE — CONSULTS
"Adult Nutrition  Assessment    Patient Name:  Lyle Corona  YOB: 1965  MRN: [id number]  Admit Date:  11/27/2017    Assessment Date:  12/5/2017    Comments:  Pt is S/P CABG.  Pt transferred from ICU to acute services this date.  Voiced no food preferences.  Intake 25% - 1x.  Blood glucose is elevated.  Levemir insulin, sliding scale novolog prescribed.  Hgb, Hct are low.  Prenatal Vit, Vit C prescribed.  Pt has some problems chewing but wants a regular consistency diet.  Pt declined Heart Healthy diet ed this date but accepted diet copy.          Reason for Assessment       12/05/17 1422    Reason for Assessment    Reason For Assessment/Visit education;length of stay;per organizational policy   Heart Healthy Diet    Identified At Risk By Screening Criteria need for education   S/P CABG                  Anthropometrics       12/05/17 1423    Anthropometrics    Height 175.3 cm (69.02\")    Weight 71.4 kg (157 lb 6.5 oz)    Ideal Body Weight (IBW)    Ideal Body Weight (IBW), Male (kg) 73.73    % Ideal Body Weight 97.04    Body Mass Index (BMI)    BMI (kg/m2) 23.28    BMI Grade 19.1 - 24.9 - normal            Labs/Tests/Procedures/Meds       12/05/17 1424    Labs/Tests/Procedures/Meds    Labs/Tests Review Glucose;Na+;Creat;Hgb Hct    Medication Review Insulin;Reviewed, pertinent;Multivitamin            Physical Findings       12/05/17 1427    Physical Findings/Assessment    Additional Documentation Physical Appearance (Group)    Physical Appearance    Skin other (see comments)   incisions, wound            Estimated/Assessed Needs       12/05/17 1428    Calculation Measurements    Weight Used For Calculations 71.4 kg (157 lb 6.5 oz)    Height Used for Calculations 1.753 m (5' 9.02\")    Estimated/Assessed Energy Needs    Energy Need Method [place]-St Jeor    Age 51    RMR (C[place]azon-St. Jeor Equation) 1549.63    Activity Factors ([place] St Jeor)  Out of bed, ambulatory  1.3    Total estimated needs " ([place] Yoonor) 2014    Estimated/Assessed Protein Needs    Weight Used for Protein Calculation 71.4 kg (157 lb 6.5 oz)    Protein (gm/kg) 1.2    1.2 Gm Protein (gm) 85.68    Estimated/Assessed Fluid Needs    Fluid Need Method --   2142            Nutrition Prescription Ordered       12/05/17 1430    Nutrition Prescription PO    Current PO Diet Regular    Common Modifiers Consistent Carbohydrate            Evaluation of Received Nutrient/Fluid Intake       12/05/17 1430    PO Evaluation    Number of Meals 1    % PO Intake 25            Electronically signed by:  Jazmin Rueda RD  12/05/17 2:31 PM

## 2017-12-05 NOTE — PLAN OF CARE
Problem: Patient Care Overview (Adult)  Goal: Plan of Care Review  Outcome: Ongoing (interventions implemented as appropriate)  Pt declined Heart Healthy diet ed this date but accepted diet copy.  Encourage intake.    12/05/17 1440   Coping/Psychosocial Response Interventions   Plan Of Care Reviewed With patient   Patient Care Overview   Progress no change   Outcome Evaluation   Outcome Summary/Follow up Plan initial assessment

## 2017-12-05 NOTE — THERAPY EVALUATION
Acute Care - Physical Therapy Initial Evaluation  [facility]     Patient Name: Lyle Corona  : 1965  MRN: [id number]  Today's Date: 2017   Onset of Illness/Injury or Date of Surgery Date: 17 (CABG 17)  Date of Referral to PT: 17  Referring Physician: AHSAN Oglesby      Admit Date: 2017     Visit Dx:    ICD-10-CM ICD-9-CM   1. Coronary arteriosclerosis I25.10 414.00   2. Unstable angina I20.0 411.1   3. NSTEMI (non-ST elevated myocardial infarction) I21.4 410.70   4. Coronary artery disease involving native coronary artery of native heart with unstable angina pectoris I25.110 414.01     411.1   5. Impaired functional mobility, balance, gait, and endurance Z74.09 V49.89     Patient Active Problem List   Diagnosis   • Essential hypertension   • Hypertensive emergency, no CHF   • Coronary artery disease involving native coronary artery of native heart with unstable angina pectoris   • Chronic kidney disease, stage 3   • LVH (left ventricular hypertrophy)   • NSTEMI (non-ST elevated myocardial infarction)   • Unstable angina     Past Medical History:   Diagnosis Date   • Aneurysm of infrarenal abdominal aorta    • Anxiety    • Cervical radiculitis    • Cervical spondylosis without myelopathy    • CHF (congestive heart failure)    • Chronic kidney disease, stage 3    • Common iliac aneurysm    • Coronary arteriosclerosis    • Degeneration of cervical intervertebral disc    • Essential hypertension    • GERD (gastroesophageal reflux disease)    • Headache    • Hyperlipidemia    • Intermittent claudication    • Myocardial infarction    • Uric acid renal calculus      Past Surgical History:   Procedure Laterality Date   • APPENDECTOMY     • CARDIAC CATHETERIZATION  2016    Cardiac cath 03973 (2) - Patent left circumflex stent.Chronic total occlusion of the RCA,more than 20 mm in length.   • CARDIAC CATHETERIZATION N/A 2017    Procedure: Left Heart Cath/ PCI if  indicated;  Surgeon: Carlos Charles MD;  Location: [facility] INVASIVE LOCATION;  Service:    • CERVICAL FUSION     • CHOLECYSTECTOMY     • HERNIA REPAIR     • SPINAL FUSION            PT ASSESSMENT (last 72 hours)      PT Evaluation       12/05/17 1424 12/05/17 1001    Rehab Evaluation    Document Type evaluation  -     Subjective Information agree to therapy;complains of;pain  -     Evaluation Not Performed  other (see comments)   OT checked with RN, he stated to defer for now and check back later. Rn stated chest tube had been disconnected and pt back on suction. OT will attempt at a later time.   -    General Information    Patient Profile Review yes  -     Onset of Illness/Injury or Date of Surgery Date 11/27/17   CABG 12/4/17  -     Referring Physician AHSAN Oglesby  -     General Observations supine HOB up on O2 2.5, chest tube, tele, PICC, heart pillow, xray came during beginning of eval  -     Pertinent History Of Current Problem recent CABG 12-3-17  -     Precautions/Limitations fall precautions;cardiac precautions;spinal precautions;oxygen therapy device and L/min  -     Prior Level of Function independent:;all household mobility;transfer;ADL's   pt did laundry 1x a week;   -     Equipment Currently Used at Home none  -     Living Environment    Lives With other (see comments)   pt reports homeless shelter  -     Living Arrangements homeless  -     Transportation Available public transportation;family or friend will provide  -     Living Environment Comment Pt reports when he leaves hospital he is probally going to a friends house, Pt reports he has a disability appointment on 28th. Pt reports friends house as tub shower with no stairs.   -     Vital Signs    Pre Systolic BP Rehab 108  -BH     Pre Treatment Diastolic BP 74  -BH     Post Systolic BP Rehab 118  -BH     Post Treatment Diastolic BP 74  -BH     Pretreatment Heart Rate (beats/min) 95  -BH      Posttreatment Heart Rate (beats/min) 95  -BH     Pre SpO2 (%) 95  -     O2 Delivery Pre Treatment supplemental O2  -     Post SpO2 (%) 97  -     O2 Delivery Post Treatment supplemental O2  -     Pre Patient Position Supine  -BH     Post Patient Position Supine  -     Pain Assessment    Pain Assessment 0-10  -     Pain Score 10  -     Pain Intervention(s) Medication (See MAR)   stated he had recent pain meds  -     Vision Assessment/Intervention    Visual Impairment WFL with corrective lenses  -     Cognitive Assessment/Intervention    Current Cognitive/Communication Assessment functional  -     Orientation Status oriented x 4  -     ROM (Range of Motion)    General ROM no range of motion deficits identified  -     General ROM Detail BUE WFL within CABG precuations  -     MMT (Manual Muscle Testing)    General MMT Assessment upper extremity strength deficits identified  -     General MMT Assessment Detail defer - Pt at least 3/5 not formally assessed   -     Sensory Assessment/Intervention    Sensory Impairment --   left hand/digits numb at times 2* neck per pt  -     Light Touch LUE;RUE  -     LUE Light Touch WNL  -     RUE Light Touch WNL  -       12/05/17 0912       Rehab Evaluation    Document Type evaluation  -     Subjective Information agree to therapy  -GB     Patient Effort, Rehab Treatment fair  -GB     Symptoms Noted During/After Treatment dizziness   pain but meds given prior to eval  -GB     General Information    Patient Profile Review yes  -GB     Onset of Illness/Injury or Date of Surgery Date 12/04/17  -GB     Referring Physician AHSAN Oglesby  -GB     General Observations up in chair; trying to cough  -GB     Pertinent History Of Current Problem recent CABG 12.4.17  -GB     Precautions/Limitations fall precautions;cardiac precautions  -GB     Equipment Currently Used at Home none  -GB     Plans/Goals Discussed With patient  -GB     Risks Reviewed  patient:;dizziness;increased discomfort  -GB     Benefits Reviewed patient:;improve function  -GB     Living Environment    Living Arrangements homeless  -GB     Clinical Impression    Date of Referral to PT 12/04/17  -GB     PT Diagnosis s/p CABG post NSTEMI and uncontrolled angina  -GB     Prognosis per provider  -GB     Functional Level At Time Of Evaluation required help  -GB     Criteria for Skilled Therapeutic Interventions Met yes;treatment indicated  -GB     Pathology/Pathophysiology Noted (Describe Specifically for Each System) musculoskeletal;cardiovascular  -GB     Impairments Found (describe specific impairments) aerobic capacity/endurance;gait, locomotion, and balance  -GB     Functional Limitations in Following Categories (Describe Specific Limitations) self-care  -GB     Rehab Potential good, to achieve stated therapy goals  -GB     Predicted Duration of Therapy Intervention (days/wks) till d/c  -GB     Vital Signs    Pre Systolic BP Rehab 111  -GB     Pre Treatment Diastolic BP 74  -GB     Intra Systolic BP Rehab --  -GB     Post Systolic BP Rehab 107  -GB     Post Treatment Diastolic BP 69  -GB     Pretreatment Heart Rate (beats/min) 89  -GB     Posttreatment Heart Rate (beats/min) 89  -GB     Pre SpO2 (%) 94  -GB     O2 Delivery Pre Treatment supplemental O2  -GB     Intra SpO2 (%) 89  -GB     O2 Delivery Intra Treatment supplemental O2  -GB     Post SpO2 (%) 93  -GB     O2 Delivery Post Treatment supplemental O2  -GB     Pre Patient Position Sitting  -GB     Intra Patient Position Sitting  -GB     Post Patient Position Sitting  -GB     Pain Assessment    Pain Assessment 0-10  -GB     Pain Score 10  -GB     Pain Type Surgical pain  -GB     Pain Location Chest  -GB     Pain Intervention(s) Medication (See MAR)  -GB     Vision Assessment/Intervention    Visual Impairment WNL  -GB     Cognitive Assessment/Intervention    Current Cognitive/Communication Assessment functional  -GB     Orientation Status  oriented x 4  -GB     Follows Commands/Answers Questions able to follow multi-step instructions;100% of the time  -GB     Personal Safety WNL/WFL  -GB     Personal Safety Interventions fall prevention program maintained;nonskid shoes/slippers when out of bed;supervised activity  -GB     ROM (Range of Motion)    General ROM upper extremity range of motion deficits identified  -GB     General ROM Detail instructed pt to avoid shoulder flx above shoulders;  -GB     MMT (Manual Muscle Testing)    General MMT Assessment upper extremity strength deficits identified;lower extremity strength deficits identified   resistance deferred UE's except  which were 3+-4-/5;  -GB     Lower Extremity    Lower Ext Manual Muscle Testing right LE strength is WFL   LLE test only isometric and limited resistance w incisions  -GB     Bed Mobility, Assessment/Treatment    Bed Mobility, Roll Left, [place] not tested  -GB     Bed Mobility, Roll Right, [place] not tested  -GB     Transfer Assessment/Treatment    Transfers, Chair-Bed [place] not tested   RN states pt did well bed to chair;pt stayed up in chair  -GB     Gait Assessment/Treatment    Gait, [place] Level not tested  -GB     Sensory Assessment/Intervention    Light Touch LUE;RUE;LLE;RLE   intact  -GB     Positioning and Restraints    Pre-Treatment Position sitting in chair/recliner  -GB     Post Treatment Position chair  -GB     In Chair notified nsg;reclined;call light within reach;encouraged to call for assist;LLE elevated;RLE elevated  -GB       User Key  (r) = Recorded By, (t) = Taken By, (c) = Cosigned By    Initials Name Provider Type    JENNIFER Chandler, ISAAC Physical Therapist     Rossy Ospina, OTR/L Occupational Therapist          Physical Therapy Education     Title: PT OT SLP Therapies (Done)     Topic: Physical Therapy (Done)     Point: Mobility training (Done)    Learning Progress Summary    Learner Readiness Method Response Comment  Documented by Status   Patient Acceptance E,D NR,DU,VU POC; coughing w/ abdominal muscles; use of pillow to help cough; tx  12/05/17 1450 Done               Point: Home exercise program (Done)    Learning Progress Summary    Learner Readiness Method Response Comment Documented by Status   Patient Acceptance E,D NR,DU,VU POC; coughing w/ abdominal muscles; use of pillow to help cough; tx  12/05/17 1450 Done               Point: Body mechanics (Done)    Learning Progress Summary    Learner Readiness Method Response Comment Documented by Status   Patient Acceptance E,D NR,DU,VU POC; coughing w/ abdominal muscles; use of pillow to help cough; tx  12/05/17 1450 Done               Point: Precautions (Done)    Learning Progress Summary    Learner Readiness Method Response Comment Documented by Status   Patient Acceptance E,D NR,DU,VU POC; coughing w/ abdominal muscles; use of pillow to help cough; tx  12/05/17 1450 Done                      User Key     Initials Effective Dates Name Provider Type Discipline     10/17/16 -  Paige Chandler, PT Physical Therapist PT                PT Recommendation and Plan  Anticipated Equipment Needs At Discharge: front wheeled walker  Anticipated Discharge Disposition: home with home health  Planned Therapy Interventions: bed mobility training, gait training, home exercise program, stair training, strengthening, transfer training  PT Frequency: daily  Plan of Care Review  Plan Of Care Reviewed With: patient  Outcome Summary/Follow up Plan: previously indep male s/p NSTEMI, now s/p CABG; pt had signficant pain limiting participation; he was willing to participate and move; will need skilled service to help increase his ex genny for self care; particularly challenged w/ homelessness          IP PT Goals       12/05/17 1452          Bed Mobility PT LTG    Bed Mobility PT LTG, Date Established 12/05/17  -GB      Bed Mobility PT LTG, Time to Achieve by discharge  -GB      Bed  Mobility PT LTG, Activity Type roll left/roll right;supine to sit/sit to supine  -GB      Bed Mobility PT LTG, [place] Level conditional independence  -GB      Bed Mobility PT LTG, Outcome goal not met  -GB      Transfer Training PT LTG    Transfer Training PT LTG, Date Established 12/05/17  -GB      Transfer Training PT LTG, Time to Achieve by discharge  -GB      Transfer Training PT LTG, Activity Type bed to chair /chair to bed;sit to stand/stand to sit  -GB      Transfer Training PT LTG, [place] Level conditional independence  -GB      Transfer Training PT LTG, Outcome goal not met  -GB      Gait Training PT LTG    Gait Training Goal PT LTG, Date Established 12/05/17  -GB      Gait Training Goal PT LTG, Time to Achieve by discharge  -GB      Gait Training Goal PT LTG, [place] Level conditional independence  -GB      Gait Training Goal PT LTG, Outcome goal not met  -GB      Stair Training PT LTG    Stair Training Goal PT LTG, Date Established 12/05/17  -GB      Stair Training Goal PT LTG, Time to Achieve by discharge  -GB      Stair Training Goal PT LTG, Number of Steps 4  -GB      Stair Training Goal PT LTG, [place] Level conditional independence  -GB      Stair Training Goal PT LTG, Outcome goal not met  -GB        User Key  (r) = Recorded By, (t) = Taken By, (c) = Cosigned By    Initials Name Provider Type    JENNIFER Chandler, PT Physical Therapist                Outcome Measures       12/05/17 1400          How much help from another person do you currently need...    Turning from your back to your side while in flat bed without using bedrails? 3  -GB      Moving from lying on back to sitting on the side of a flat bed without bedrails? 3  -GB      Moving to and from a bed to a chair (including a wheelchair)? 2  -GB      Standing up from a chair using your arms (e.g., wheelchair, bedside chair)? 2  -GB      Climbing 3-5 steps with a railing? 1  -GB      To walk in hospital room? 1   -GB      AM-PAC 6 Clicks Score 12  -GB      Functional Assessment    Outcome Measure Options AM-PAC 6 Clicks Basic Mobility (PT)  -GB        User Key  (r) = Recorded By, (t) = Taken By, (c) = Cosigned By    Initials Name Provider Type    JENNIFER hCandler PT Physical Therapist           Time Calculation:         PT Charges       12/05/17 1501          Time Calculation    Start Time 0912  -GB      Stop Time 0940  -GB      Time Calculation (min) 28 min  -GB      PT Received On 12/05/17  -GB      PT Goal Re-Cert Due Date 12/18/17  -GB        User Key  (r) = Recorded By, (t) = Taken By, (c) = Cosigned By    Initials Name Provider Type    JENNIFER Chandler PT Physical Therapist          Therapy Charges for Today     Code Description Service Date Service Provider Modifiers Qty    46946310393 HC PT MOBILITY CURRENT 12/5/2017 Paige Chandler, PT GP, CL 1    28204534860 HC PT MOBILITY PROJECTED 12/5/2017 Paige Chandler, PT GP, CI 1    14580051087 HC PT EVAL MOD COMPLEXITY 2 12/5/2017 Paige Chandler, PT GP 1          PT G-Codes  PT Professional Judgement Used?: Yes  Outcome Measure Options: AM-PAC 6 Clicks Basic Mobility (PT)  Score: 12  Functional Limitation: Mobility: Walking and moving around  Mobility: Walking and Moving Around Current Status (): At least 60 percent but less than 80 percent impaired, limited or restricted  Mobility: Walking and Moving Around Goal Status (): At least 1 percent but less than 20 percent impaired, limited or restricted      Paige Chandler PT  12/5/2017

## 2017-12-05 NOTE — THERAPY EVALUATION
Acute Care - Occupational Therapy Initial Evaluation  [facility]     Patient Name: Lyle Corona  : 1965  MRN: [id number]  Today's Date: 2017  Onset of Illness/Injury or Date of Surgery Date: 17 (CABG 17)  Date of Referral to OT: 17  Referring Physician: AHSAN Oglesby    Admit Date: 2017       ICD-10-CM ICD-9-CM   1. Coronary arteriosclerosis I25.10 414.00   2. Unstable angina I20.0 411.1   3. NSTEMI (non-ST elevated myocardial infarction) I21.4 410.70   4. Coronary artery disease involving native coronary artery of native heart with unstable angina pectoris I25.110 414.01     411.1   5. Impaired functional mobility, balance, gait, and endurance Z74.09 V49.89   6. Impaired mobility and ADLs Z74.09 799.89     Patient Active Problem List   Diagnosis   • Essential hypertension   • Hypertensive emergency, no CHF   • Coronary artery disease involving native coronary artery of native heart with unstable angina pectoris   • Chronic kidney disease, stage 3   • LVH (left ventricular hypertrophy)   • NSTEMI (non-ST elevated myocardial infarction)   • Unstable angina     Past Medical History:   Diagnosis Date   • Aneurysm of infrarenal abdominal aorta    • Anxiety    • Cervical radiculitis    • Cervical spondylosis without myelopathy    • CHF (congestive heart failure)    • Chronic kidney disease, stage 3    • Common iliac aneurysm    • Coronary arteriosclerosis    • Degeneration of cervical intervertebral disc    • Essential hypertension    • GERD (gastroesophageal reflux disease)    • Headache    • Hyperlipidemia    • Intermittent claudication    • Myocardial infarction    • Uric acid renal calculus      Past Surgical History:   Procedure Laterality Date   • APPENDECTOMY     • CARDIAC CATHETERIZATION  2016    Cardiac cath 86902 (2) - Patent left circumflex stent.Chronic total occlusion of the RCA,more than 20 mm in length.   • CARDIAC CATHETERIZATION N/A 2017     Procedure: Left Heart Cath/ PCI if indicated;  Surgeon: Carlos Charles MD;  Location: [facility] CATH INVASIVE LOCATION;  Service:    • CERVICAL FUSION     • CHOLECYSTECTOMY     • HERNIA REPAIR     • SPINAL FUSION            OT ASSESSMENT FLOWSHEET (last 72 hours)      OT Evaluation       12/05/17 1424 12/05/17 1001 12/05/17 0912          Rehab Evaluation    Document Type evaluation  -  evaluation  -      Subjective Information agree to therapy;complains of;pain  -  agree to therapy  -GB      Evaluation Not Performed  other (see comments)   OT checked with RN, he stated to defer for now and check back later. Rn stated chest tube had been disconnected and pt back on suction. OT will attempt at a later time.   -       Patient Effort, Rehab Treatment adequate  -  fair  -GB      Symptoms Noted During/After Treatment --   pain/not feeling well, RN notified, pain meds not due yet  -  dizziness   pain but meds given prior to eval  -GB      Symptoms Noted Comment Xray and Cardiac rehab individual came in during OT eval and communicated/completed xray.   -        General Information    Patient Profile Review yes  -  yes  -      Onset of Illness/Injury or Date of Surgery Date 11/27/17   CABG 12/4/17  -  12/04/17  -      Referring Physician AHSAN Oglesby  -  AHSAN Oglesby  -      General Observations supine HOB up on O2 2.5, chest tube, tele, PICC, heart pillow, xray came during beginning of eval  -  up in chair; trying to cough  -      Pertinent History Of Current Problem recent CABG 12-3-17  -  recent CABG 12.4.17  -GB      Precautions/Limitations fall precautions;cardiac precautions;spinal precautions;oxygen therapy device and L/min  -  fall precautions;cardiac precautions  -      Prior Level of Function independent:;all household mobility;transfer;ADL's   pt did laundry 1x a week;   -        Equipment Currently Used at Home none  -  none  -      Plans/Goals  Discussed With patient  -  patient  -      Risks Reviewed patient:  -  patient:;dizziness;increased discomfort  -      Benefits Reviewed patient:  -  patient:;improve function  -      Living Environment    Lives With other (see comments)   pt reports homeless shelter  -        Living Arrangements homeless  -  homeless  -      Transportation Available public transportation;family or friend will provide  -        Living Environment Comment Pt reports when he leaves hospital he is probally going to a friends house, Pt reports he has a disability appointment on 28th. Pt reports friends house as tub shower with no stairs.   -        Clinical Impression    Date of Referral to OT 12/04/17  -        OT Diagnosis Impaired mobility and ADL  -        Prognosis fair  -        Functional Level At Time Of Evaluation Pt very fatigued and in pain. Pt able to recal part of sternal precautions, pt attemtped but not feeling well, RN stated pt had just gotten up to use the restroom. Pt was apologetic and stated he would work more tomorrow.   -        Impairments Found (describe specific impairments) aerobic capacity/endurance;gait, locomotion, and balance;joint integrity and mobility;motor function;muscle performance;posture;ventilation and respiration/gas exchange   ADL, functional t/f and mobiltiy, ax tolerance  -        Patient/Family Goals Statement pt did not indicate stated he might go to  friend's at d/c.  -        Criteria for Skilled Therapeutic Interventions Met yes;treatment indicated  -        Rehab Potential good, to achieve stated therapy goals  -        Therapy Frequency other (see comments)   3-14x a week  -        Predicted Duration of Therapy Intervention (days/wks) until d/c or all goals met  -        Anticipated Discharge Disposition inpatient rehabilitation facility  -        Functional Level Prior    Ambulation 0-->independent  -        Transferring 0-->independent   -        Toileting 0-->independent  -        Bathing 0-->independent  -        Dressing 0-->independent  -        Eating 0-->independent  -        Communication 0-->understands/communicates without difficulty  -        Swallowing 0-->swallows foods/liquids without difficulty  -        Prior Functional Level Comment Pt reports was doing for self trouble washing hair and using arms loosing air at times.   -        Vital Signs    Pre Systolic BP Rehab 108  -BH  111  -GB      Pre Treatment Diastolic BP 74  -BH  74  -GB      Intra Systolic BP Rehab   --  -GB      Post Systolic BP Rehab 118  -BH  107  -GB      Post Treatment Diastolic BP 74  -BH  69  -GB      Pretreatment Heart Rate (beats/min) 95  -BH  89  -GB      Posttreatment Heart Rate (beats/min) 95  -BH  89  -GB      Pre SpO2 (%) 95  -BH  94  -GB      O2 Delivery Pre Treatment supplemental O2  -BH  supplemental O2  -GB      Intra SpO2 (%)   89  -GB      O2 Delivery Intra Treatment   supplemental O2  -GB      Post SpO2 (%) 97  -BH  93  -GB      O2 Delivery Post Treatment supplemental O2  -BH  supplemental O2  -GB      Pre Patient Position Supine  -BH  Sitting  -GB      Intra Patient Position   Sitting  -GB      Post Patient Position Supine  -BH  Sitting  -GB      Pain Assessment    Pain Assessment 0-10  -BH  0-10  -GB      Pain Score 10  -BH  10  -GB      Post Pain Score 10  -BH        Pain Type   Surgical pain  -GB      Pain Location   Chest  -GB      Pain Intervention(s) Medication (See MAR)   stated he had recent pain meds; RN notified not due yet  -  Medication (See MAR)  -GB      Vision Assessment/Intervention    Visual Impairment WFL with corrective lenses  -  WNL  -GB      Cognitive Assessment/Intervention    Current Cognitive/Communication Assessment functional  -  functional  -      Orientation Status oriented x 4  -BH  oriented x 4  -GB      Follows Commands/Answers Questions 100% of the time;able to follow single-step  instructions;needs cueing  -  able to follow multi-step instructions;100% of the time  -      Personal Safety unable/difficult to assess   WFL with bed activities  -  WNL/WFL  -      Personal Safety Interventions fall prevention program maintained;muscle strengthening facilitated;nonskid shoes/slippers when out of bed;supervised activity  -  fall prevention program maintained;nonskid shoes/slippers when out of bed;supervised activity  -      ROM (Range of Motion)    General ROM no range of motion deficits identified  -  upper extremity range of motion deficits identified  -      General ROM Detail BUE WFL within CABG precuations  -  instructed pt to avoid shoulder flx above shoulders;  -      MMT (Manual Muscle Testing)    General MMT Assessment upper extremity strength deficits identified  -  upper extremity strength deficits identified;lower extremity strength deficits identified   resistance deferred UE's except  which were 3+-4-/5;  -      General MMT Assessment Detail defer - Pt at least 3/5 not formally assessed   -        Bed Mobility, Assessment/Treatment    Bed Mobility, Roll Left, [place]   not tested  -      Bed Mobility, Roll Right, [place]   not tested  -      Bed Mobility, Comment pt defer to get to EOB or out of bed stating he was in too much pain and not feeling well. RN notified. RN also stated pt up for toileting right before OT entered.   -        Transfer Assessment/Treatment    Transfers, Chair-Bed [place]   not tested   RN states pt did well bed to chair;pt stayed up in chair  -      Transfer, Comment defer  -        Functional Mobility    Functional Mobility- Comment defer  -        Upper Body Bathing Assessment/Training    UB Bathing Assess/Train, Comment Educated pt on sternal precuations. Pt able to recall part of them.   -        Motor Skills/Interventions    Additional Documentation Fine Motor Coordination Training (Group)  -         Fine Motor Coordination Training    Opposition Right:;Left:;intact  -        Sensory Assessment/Intervention    Sensory Impairment --   left hand/digits numb at times 2* neck per pt  -        Light Touch LUE;RUE  -BH  LUE;RUE;LLE;RLE   intact  -GB      LUE Light Touch WNL  -BH        RUE Light Touch WNL  -        General Therapy Interventions    Planned Therapy Interventions activity intolerance;adaptive equipment training;ADL retraining;IADL retraining;balance training;bed mobility training;energy conservation;fine motor coordination training;home exercise program;motor coordination training;ROM (Range of Motion);strengthening;transfer training  -        Positioning and Restraints    Pre-Treatment Position in bed  -  sitting in chair/recliner  -      Post Treatment Position bed  -  chair  -      In Bed notified ns;supine;call light within reach;encouraged to call for assist   with a friend  -        In Chair   notified nsg;reclined;call light within reach;encouraged to call for assist;LLE elevated;RLE elevated  -GB      Lower Extremity    Lower Ext Manual Muscle Testing   right LE strength is WFL   LLE test only isometric and limited resistance w incisions  -GB        User Key  (r) = Recorded By, (t) = Taken By, (c) = Cosigned By    Initials Name Effective Dates    JENNIFER Chandler, PT 10/17/16 -      Rossy Ospina OTR/L 10/17/16 -            Occupational Therapy Education     Title: PT OT SLP Therapies (Active)     Topic: Occupational Therapy (Active)     Point: ADL training (Done)    Description: Instruct learner(s) on proper safety adaptation and remediation techniques during self care or transfers.   Instruct in proper use of assistive devices.    Learning Progress Summary    Learner Readiness Method Response Comment Documented by Status   Patient Acceptance E VU Educated about OT and POC. Educated on CABG precuations. Educated to call for assist and not get up on his own.   12/05/17 1520 Done               Point: Precautions (Done)    Description: Instruct learner(s) on prescribed precautions during self-care and functional transfers.    Learning Progress Summary    Learner Readiness Method Response Comment Documented by Status   Patient Acceptance E VU Educated about OT and POC. Educated on CABG precuations. Educated to call for assist and not get up on his own.  12/05/17 1520 Done               Point: Body mechanics (Done)    Description: Instruct learner(s) on proper positioning and spine alignment during self-care, functional mobility activities and/or exercises.    Learning Progress Summary    Learner Readiness Method Response Comment Documented by Status   Patient Acceptance E VU Educated about OT and POC. Educated on CABG precuations. Educated to call for assist and not get up on his own.  12/05/17 1520 Done                      User Key     Initials Effective Dates Name Provider Type Discipline     10/17/16 -  Rossy Ospina OTR/L Occupational Therapist OT                  OT Recommendation and Plan  Anticipated Discharge Disposition: inpatient rehabilitation facility  Planned Therapy Interventions: activity intolerance, adaptive equipment training, ADL retraining, IADL retraining, balance training, bed mobility training, energy conservation, fine motor coordination training, home exercise program, motor coordination training, ROM (Range of Motion), strengthening, transfer training  Therapy Frequency: other (see comments) (3-14x a week)  Plan of Care Review  Plan Of Care Reviewed With: patient  Outcome Summary/Follow up Plan: OT scotty completed this date. Pt pain level and fatigue hindered engagement of t/f and mobiltiy but pt pleasant and attempted to engage (pt had just gotten up for toileting). Pt displays decreased endurance, strength, and independence with ADL and could benefit from skilled OT services. Pt may need further inpatient rehab such as ARU before d/c as  pt reports being homeless and curerntly needing assist with ADL.           OT Goals       12/05/17 1424          Transfer Training OT LTG    Transfer Training OT LTG, Date Established 12/05/17  -      Transfer Training OT LTG, Time to Achieve by discharge  -      Transfer Training OT LTG, Activity Type toilet  -      Transfer Training OT LTG, [place] Level supervision required  -      Patient Education OT LTG    Patient Education OT LTG, Date Established 12/05/17  -      Patient Education OT LTG, Time to Achieve by discharge  -      Patient Education OT LTG, Education Type HEP;precautions per surgeon;home safety;energy conservation;adaptive equipment mgmt  -      Patient Education OT LTG, Education Understanding independent;demonstrates adequately;verbalizes understanding  -      ADL OT LTG    ADL OT LTG, Date Established 12/05/17  -      ADL OT LTG, Time to Achieve by discharge  -      ADL OT LTG, Activity Type ADL skills  -      ADL OT LTG, [place] Level standby assist  -      Functional Mobility OT LTG    Functional Mobility Goal OT LTG, Date Established 12/05/17  -      Functional Mobility Goal OT LTG, Time to Achieve by discharge  -      Functional Mobility Goal OT LTG, [place] Level supervision  -      Functional Mobility Goal OT LTG, Distance to Achieve to the sink;to the bathroom  -      Endurance OT LTG    Endurance Goal OT LTG, Date Established 12/05/17  -      Endurance Goal OT LTG, Time to Achieve by discharge  -      Endurance Goal OT LTG, Activity Level endurance 2 good-  -        User Key  (r) = Recorded By, (t) = Taken By, (c) = Cosigned By    Initials Name Provider Type     Rossy Ospina OTR/L Occupational Therapist                Outcome Measures       12/05/17 1424 12/05/17 1400       How much help from another person do you currently need...    Turning from your back to your side while in flat bed without using bedrails?  3  -GB     Moving  from lying on back to sitting on the side of a flat bed without bedrails?  3  -GB     Moving to and from a bed to a chair (including a wheelchair)?  2  -GB     Standing up from a chair using your arms (e.g., wheelchair, bedside chair)?  2  -GB     Climbing 3-5 steps with a railing?  1  -GB     To walk in hospital room?  1  -GB     AM-PAC 6 Clicks Score  12  -GB     How much help from another is currently needed...    Putting on and taking off regular lower body clothing? 1  -BH      Bathing (including washing, rinsing, and drying) 1  -BH      Toileting (which includes using toilet bed pan or urinal) 2  -BH      Putting on and taking off regular upper body clothing 2  -BH      Taking care of personal grooming (such as brushing teeth) 3  -BH      Eating meals 3  -BH      Score 12  -BH      Functional Assessment    Outcome Measure Options AM-PAC 6 Clicks Daily Activity (OT)  -BH AM-PAC 6 Clicks Basic Mobility (PT)  -GB       User Key  (r) = Recorded By, (t) = Taken By, (c) = Cosigned By    Initials Name Provider Type    JENNIFER Chandler, PT Physical Therapist     Rossy Ospina OTR/L Occupational Therapist          Time Calculation:   OT Start Time: 1424  OT Stop Time: 1459  OT Time Calculation (min): 35 min    Therapy Charges for Today     Code Description Service Date Service Provider Modifiers Qty    [id number]  OT EVAL MOD COMPLEXITY 2 12/5/2017 Rossy Ospina OTR/L GO 1    [id number]  OT SELFCARE CURRENT 12/5/2017 Rossy Ospina OTR/L GO, CL 1    [id number]  OT SELFCARE PROJECTED 12/5/2017 Rossy Ospina OTR/L GO, CK 1          OT G-codes  OT Professional Judgement Used?: Yes  OT Functional Scales Options: AM-PAC 6 Clicks Daily Activity (OT)  Score: 12  Functional Limitation: Self care  Self Care Current Status (): At least 60 percent but less than 80 percent impaired, limited or restricted  Self Care Goal Status (): At least 40 percent but less than 60 percent impaired, limited  or restricted    Rossy Ospina, OTR/DWAYNE  12/5/2017

## 2017-12-06 ENCOUNTER — TRANSCRIBE ORDERS (OUTPATIENT)
Dept: CARDIAC SURGERY | Facility: CLINIC | Age: 52
End: 2017-12-06

## 2017-12-06 ENCOUNTER — APPOINTMENT (OUTPATIENT)
Dept: GENERAL RADIOLOGY | Facility: HOSPITAL | Age: 52
End: 2017-12-06

## 2017-12-06 DIAGNOSIS — Z95.1 S/P CABG (CORONARY ARTERY BYPASS GRAFT): Primary | ICD-10-CM

## 2017-12-06 LAB
GLUCOSE BLDC GLUCOMTR-MCNC: 110 MG/DL (ref 70–130)
GLUCOSE BLDC GLUCOMTR-MCNC: 135 MG/DL (ref 70–130)
GLUCOSE BLDC GLUCOMTR-MCNC: 81 MG/DL (ref 70–130)
GLUCOSE BLDC GLUCOMTR-MCNC: 90 MG/DL (ref 70–130)
MAGNESIUM SERPL-MCNC: 2.3 MG/DL (ref 1.6–2.3)
POTASSIUM BLD-SCNC: 4.5 MMOL/L (ref 3.5–5.1)

## 2017-12-06 PROCEDURE — 87040 BLOOD CULTURE FOR BACTERIA: CPT | Performed by: INTERNAL MEDICINE

## 2017-12-06 PROCEDURE — 021109W BYPASS CORONARY ARTERY, TWO ARTERIES FROM AORTA WITH AUTOLOGOUS VENOUS TISSUE, OPEN APPROACH: ICD-10-PCS | Performed by: THORACIC SURGERY (CARDIOTHORACIC VASCULAR SURGERY)

## 2017-12-06 PROCEDURE — 71010 HC CHEST PA OR AP: CPT

## 2017-12-06 PROCEDURE — 97535 SELF CARE MNGMENT TRAINING: CPT

## 2017-12-06 PROCEDURE — 06BQ4ZZ EXCISION OF LEFT SAPHENOUS VEIN, PERCUTANEOUS ENDOSCOPIC APPROACH: ICD-10-PCS | Performed by: THORACIC SURGERY (CARDIOTHORACIC VASCULAR SURGERY)

## 2017-12-06 PROCEDURE — 82962 GLUCOSE BLOOD TEST: CPT

## 2017-12-06 PROCEDURE — 83735 ASSAY OF MAGNESIUM: CPT | Performed by: NURSE PRACTITIONER

## 2017-12-06 PROCEDURE — 84132 ASSAY OF SERUM POTASSIUM: CPT | Performed by: THORACIC SURGERY (CARDIOTHORACIC VASCULAR SURGERY)

## 2017-12-06 PROCEDURE — 02100Z9 BYPASS CORONARY ARTERY, ONE ARTERY FROM LEFT INTERNAL MAMMARY, OPEN APPROACH: ICD-10-PCS | Performed by: THORACIC SURGERY (CARDIOTHORACIC VASCULAR SURGERY)

## 2017-12-06 PROCEDURE — 94760 N-INVAS EAR/PLS OXIMETRY 1: CPT

## 2017-12-06 PROCEDURE — 5A1221Z PERFORMANCE OF CARDIAC OUTPUT, CONTINUOUS: ICD-10-PCS | Performed by: THORACIC SURGERY (CARDIOTHORACIC VASCULAR SURGERY)

## 2017-12-06 PROCEDURE — 99024 POSTOP FOLLOW-UP VISIT: CPT | Performed by: NURSE PRACTITIONER

## 2017-12-06 PROCEDURE — 94799 UNLISTED PULMONARY SVC/PX: CPT

## 2017-12-06 PROCEDURE — 97530 THERAPEUTIC ACTIVITIES: CPT

## 2017-12-06 PROCEDURE — 87086 URINE CULTURE/COLONY COUNT: CPT | Performed by: INTERNAL MEDICINE

## 2017-12-06 PROCEDURE — 97116 GAIT TRAINING THERAPY: CPT

## 2017-12-06 PROCEDURE — 25010000003 CEFAZOLIN PER 500 MG: Performed by: NURSE PRACTITIONER

## 2017-12-06 RX ORDER — LOSARTAN POTASSIUM 25 MG/1
25 TABLET ORAL NIGHTLY
Status: DISCONTINUED | OUTPATIENT
Start: 2017-12-06 | End: 2017-12-08

## 2017-12-06 RX ADMIN — IPRATROPIUM BROMIDE AND ALBUTEROL SULFATE 3 ML: 2.5; .5 SOLUTION RESPIRATORY (INHALATION) at 12:19

## 2017-12-06 RX ADMIN — TAMSULOSIN HYDROCHLORIDE 0.4 MG: 0.4 CAPSULE ORAL at 21:01

## 2017-12-06 RX ADMIN — BISACODYL 10 MG: 10 SUPPOSITORY RECTAL at 11:58

## 2017-12-06 RX ADMIN — IPRATROPIUM BROMIDE AND ALBUTEROL SULFATE 3 ML: 2.5; .5 SOLUTION RESPIRATORY (INHALATION) at 23:07

## 2017-12-06 RX ADMIN — OXYCODONE HYDROCHLORIDE AND ACETAMINOPHEN 500 MG: 500 TABLET ORAL at 08:25

## 2017-12-06 RX ADMIN — ASPIRIN 81 MG: 81 TABLET, COATED ORAL at 08:25

## 2017-12-06 RX ADMIN — SENNOSIDES AND DOCUSATE SODIUM 1 TABLET: 8.6; 5 TABLET ORAL at 17:30

## 2017-12-06 RX ADMIN — Medication 10 ML: at 13:16

## 2017-12-06 RX ADMIN — OXYCODONE HYDROCHLORIDE AND ACETAMINOPHEN 1 TABLET: 10; 325 TABLET ORAL at 15:11

## 2017-12-06 RX ADMIN — WATER 2 G: 1 INJECTION INTRAMUSCULAR; INTRAVENOUS; SUBCUTANEOUS at 08:28

## 2017-12-06 RX ADMIN — OXYCODONE HYDROCHLORIDE AND ACETAMINOPHEN 1 TABLET: 10; 325 TABLET ORAL at 18:55

## 2017-12-06 RX ADMIN — ATORVASTATIN CALCIUM 10 MG: 10 TABLET, FILM COATED ORAL at 21:02

## 2017-12-06 RX ADMIN — OXYCODONE HYDROCHLORIDE AND ACETAMINOPHEN 1 TABLET: 10; 325 TABLET ORAL at 08:39

## 2017-12-06 RX ADMIN — MAGNESIUM OXIDE TAB 400 MG (241.3 MG ELEMENTAL MG) 400 MG: 400 (241.3 MG) TAB at 08:26

## 2017-12-06 RX ADMIN — TICAGRELOR 90 MG: 90 TABLET ORAL at 17:30

## 2017-12-06 RX ADMIN — LABETALOL HYDROCHLORIDE 100 MG: 100 TABLET, FILM COATED ORAL at 18:56

## 2017-12-06 RX ADMIN — CYCLOBENZAPRINE HYDROCHLORIDE 10 MG: 10 TABLET, FILM COATED ORAL at 17:30

## 2017-12-06 RX ADMIN — TICAGRELOR 90 MG: 90 TABLET ORAL at 08:25

## 2017-12-06 RX ADMIN — Medication 10 ML: at 21:03

## 2017-12-06 RX ADMIN — MAGNESIUM OXIDE TAB 400 MG (241.3 MG ELEMENTAL MG) 400 MG: 400 (241.3 MG) TAB at 17:30

## 2017-12-06 RX ADMIN — IPRATROPIUM BROMIDE AND ALBUTEROL SULFATE 3 ML: 2.5; .5 SOLUTION RESPIRATORY (INHALATION) at 08:56

## 2017-12-06 RX ADMIN — PRENATAL VIT W/ FE FUMARATE-FA TAB 27-0.8 MG 1 TABLET: 27-0.8 TAB at 08:26

## 2017-12-06 RX ADMIN — CYCLOBENZAPRINE HYDROCHLORIDE 10 MG: 10 TABLET, FILM COATED ORAL at 08:25

## 2017-12-06 RX ADMIN — OXYCODONE HYDROCHLORIDE AND ACETAMINOPHEN 1 TABLET: 10; 325 TABLET ORAL at 04:12

## 2017-12-06 RX ADMIN — OXYCODONE HYDROCHLORIDE AND ACETAMINOPHEN 500 MG: 500 TABLET ORAL at 17:30

## 2017-12-06 RX ADMIN — LABETALOL HYDROCHLORIDE 100 MG: 100 TABLET, FILM COATED ORAL at 08:25

## 2017-12-06 RX ADMIN — LOSARTAN POTASSIUM 25 MG: 25 TABLET ORAL at 21:02

## 2017-12-06 RX ADMIN — Medication 10 ML: at 04:23

## 2017-12-06 NOTE — NURSING NOTE
Called Dr. Hankins because pt's temp was 100.1, HR 99, and blood pressure was 140/80 and patient has been having pain that hasn't been controlled well by percocet 10mg and flexeril. Potassium was 3.6 since 4 o'clock this morning so I ordered the potassium per the protocol to see if that might help the HR. He ordered to give oxycodone IR in addition to percocet.

## 2017-12-06 NOTE — PLAN OF CARE
Problem: Patient Care Overview (Adult)  Goal: Plan of Care Review  Outcome: Ongoing (interventions implemented as appropriate)    12/06/17 0420   Coping/Psychosocial Response Interventions   Plan Of Care Reviewed With patient   Patient Care Overview   Progress improving   Outcome Evaluation   Outcome Summary/Follow up Plan pt tolerating more activity- walked in [place], temp decreasing, CT still producing output         Problem: Pain, Chronic (Adult)  Goal: Acceptable Pain Control/Comfort Level  Outcome: Ongoing (interventions implemented as appropriate)    Problem: Cardiac Output, Decreased (Adult)  Goal: Identify Related Risk Factors and Signs and Symptoms  Outcome: Outcome(s) achieved Date Met:  12/06/17  Goal: Adequate Cardiac Output/Effective Tissue Perfusion  Outcome: Ongoing (interventions implemented as appropriate)    Problem: Cardiac Surgery (Adult)  Goal: Signs and Symptoms of Listed Potential Problems Will be Absent or Manageable (Cardiac Surgery)  Outcome: Ongoing (interventions implemented as appropriate)    Problem: Fall Risk (Adult)  Goal: Identify Related Risk Factors and Signs and Symptoms  Outcome: Outcome(s) achieved Date Met:  12/06/17  Goal: Absence of Falls  Outcome: Ongoing (interventions implemented as appropriate)

## 2017-12-06 NOTE — PROGRESS NOTES
"  Cardiothoracic - Vascular Surgery Daily Note        LOS: 9 days   Patient Care Team:  AHSAN Mayer as PCP - General     POD2 CABGX3    Chief Complaint: Surgical Chest Pain      Subjective     The following portions of the patient's history were reviewed and updated as appropriate: allergies, current medications, past family history, past medical history, past social history, past surgical history and problem list.     Subjective:  Symptoms:  Stable.  He reports chest pain (surgical).    Diet:  Adequate intake.  No nausea.    Activity level: Impaired due to pain.    Pain:  He complains of pain that is moderate.  He reports pain is improving.  Pain is requiring pain medication and well controlled.          Objective     Vital Signs  Temp:  [97.5 °F (36.4 °C)-101.3 °F (38.5 °C)] 97.5 °F (36.4 °C)  Heart Rate:  [] 100  Resp:  [16-20] 20  BP: (110-148)/(60-87) 112/82  Body mass index is 23.09 kg/(m^2).    Intake/Output Summary (Last 24 hours) at 12/06/17 0933  Last data filed at 12/06/17 0532   Gross per 24 hour   Intake              400 ml   Output             1520 ml   Net            -1120 ml          Wt Readings from Last 3 Encounters:   12/06/17 70.9 kg (156 lb 6.4 oz)   10/31/17 71.4 kg (157 lb 8 oz)   06/12/17 69.4 kg (153 lb)     CT 280cc No Air Leak H2O seal    Physical Exam   Objective:  General Appearance:  Uncomfortable.    Vital signs: (most recent): Blood pressure 112/82, pulse 100, temperature 97.5 °F (36.4 °C), temperature source Temporal Artery , resp. rate 20, height 175.3 cm (69.02\"), weight 70.9 kg (156 lb 6.4 oz), SpO2 95 %.  Vital signs are normal.  No fever.    Output: Producing urine and no stool output.    HEENT: Normal HEENT exam.    Lungs:  Normal respiratory rate and normal effort.  There are decreased breath sounds (bases).    Heart: Normal rate.  Regular rhythm.    Chest: (CT No Air leak serosanginous  V wires to chest)  Abdomen: Abdomen is soft and non-distended.  Bowel " sounds are normal.     Extremities: Decreased range of motion.  There is no dependent edema.    Pulses: Distal pulses are intact.    Neurological: Patient is alert and oriented to person, place and time.    Pupils:  Pupils are equal, round, and reactive to light.    Skin:  Warm.  (M/S INC: CDI, Well approximated  LLE EVH: CDI)              Results Review:    Lab Results   Component Value Date    WBC 10.13 (H) 12/05/2017    HGB 9.1 (L) 12/05/2017    HCT 26.5 (L) 12/05/2017    MCV 83.1 12/05/2017     (L) 12/05/2017       Estimated Creatinine Clearance: 44.9 mL/min (by C-G formula based on Cr of 1.91).      Results from last 7 days  Lab Units 12/06/17  0212 12/05/17  0416 12/04/17  1245   MAGNESIUM mg/dL 2.3 2.4* 4.0*   PHOSPHORUS mg/dL  --   --  4.3       Lab Results   Component Value Date    GLUCOSE 108 (H) 12/05/2017    BUN 16 12/05/2017    CREATININE 1.91 (H) 12/05/2017    EGFRIFNONA 37 (L) 12/05/2017    BCR 8.4 12/05/2017    K 4.5 12/06/2017    CO2 25.0 12/05/2017    CALCIUM 8.5 12/05/2017    ALBUMIN 3.60 12/04/2017    LABIL2 1.1 12/04/2017    AST 29 12/04/2017    ALT 29 12/04/2017         Results from last 7 days  Lab Units 12/04/17  1245   INR  1.24*       Imaging Results (last 24 hours)     Procedure Component Value Units Date/Time    XR Chest 1 View [[id number]] Collected:  12/05/17 1425     Updated:  12/05/17 1451    Narrative:         PROCEDURE: Single chest view AP    REASON FOR EXAM:CT, I25.10 Atherosclerotic heart disease of  native coronary artery without angina pectoris I20.0 Unstable  angina I21.4 Non-ST elevation (NSTEMI) myocardial infarction  I25.110 Atherosclerotic heart disease of native coronary artery  with unstable angina pectoris    FINDINGS: Comparison exam dated December 5, 2017. Stable right  jugular central venous catheter introducer. Stable metallic plate  fixating the lower cervical spine. Stable postsurgical changes  with median sternotomy. Stable mediastinal drain and left  chest  tube. Cardiac and pulmonary vasculature are normal. Right lung  base small linear opacity. Lungs otherwise clear. Small right  apical pneumothorax less than 5%. No left pneumothorax. No acute  osseous abnormality.      Impression:       1.  Small right apical pneumothorax less than 5%.  2.  Right lung base small linear opacities suspicious for discoid  atelectasis.  3.  Remainder chest exam is unremarkable    Electronically signed by:  Derek Galloway MD  12/5/2017 2:50 PM CST  Workstation: [id number]                                  amiodarone 150 mg Intravenous Once   aspirin 81 mg Oral Daily   atorvastatin 10 mg Oral Daily   cyclobenzaprine 10 mg Oral TID   insulin aspart 0-24 Units Subcutaneous 4x Daily AC & at Bedtime   insulin detemir 20 Units Subcutaneous Nightly   ipratropium-albuterol 3 mL Nebulization 4x Daily - RT   labetalol 100 mg Oral Q12H   magnesium oxide 400 mg Oral BID   prenatal vitamin 27-0.8 1 tablet Oral Daily   sennosides-docusate sodium 1 tablet Oral BID   sodium chloride 1-10 mL Intravenous Q8H   tamsulosin 0.4 mg Oral Nightly   ticagrelor 90 mg Oral BID   vitamin C 500 mg Oral BID       amiodarone 1 mg/min             ASSESSMENT/PLAN     Principal Problem:    Coronary artery disease involving native coronary artery of native heart with unstable angina pectoris  Active Problems:    Chronic kidney disease, stage 3    NSTEMI (non-ST elevated myocardial infarction)      Assessment:    Condition: In stable condition.   (Stable Post Op CABG: Progressing well    CAD: ASA, Brilinta, Statin. Beta as BP tolerates. Hold ARB    Resp: Incentive. Wean O2, CT H2O DC. F/U CXR    Pain: Percocet    Rehab: PT/OT, Ambulate. ARU consult    Transient Post op Hyperglycemia: SSI coverage. Continue Levemir  ).     Plan:   Encourage ambulation and out of bed and up to chair.  Continue respiratory treatments and wean off oxygen.  Advance diet as tolerated.  Chest x-ray.  Increase analgesics and administer medications  as ordered.   (Stable. Progress Care 3W. DC CT/Vwires. F/U CXR. Await ARU Consult.).                 This document has been electronically signed by AHSAN Castro on December 6, 2017 9:33 AM

## 2017-12-06 NOTE — NURSING NOTE
ARU consult received. Chart reviewed. Patient is not appropriate for ARU at this time due to not having a top 10 rehab diagnosis and no stable discharge plan. Notified .

## 2017-12-06 NOTE — NURSING NOTE
Called Dr. Oh because pt's temp was 100.1,  HR 99, and blood pressure was 140/80 and patient has been having pain that hasn't been controlled well by percocet 10mg and flexeril. Potassium was 3.6 since 4 o'clock this morning, so I ordered the potassium protocol.

## 2017-12-06 NOTE — THERAPY TREATMENT NOTE
Acute Care - Occupational Therapy Treatment Note  [facility]     Patient Name: Lyle Corona  : 1965  MRN: [id number]  Today's Date: 2017  Onset of Illness/Injury or Date of Surgery Date: 17 (CABG 17)  Date of Referral to OT: 17  Referring Physician: AHSAN Oglesby      Admit Date: 2017    Visit Dx:     ICD-10-CM ICD-9-CM   1. Coronary arteriosclerosis I25.10 414.00   2. Unstable angina I20.0 411.1   3. NSTEMI (non-ST elevated myocardial infarction) I21.4 410.70   4. Coronary artery disease involving native coronary artery of native heart with unstable angina pectoris I25.110 414.01     411.1   5. Impaired functional mobility, balance, gait, and endurance Z74.09 V49.89   6. Impaired mobility and ADLs Z74.09 799.89     Patient Active Problem List   Diagnosis   • Essential hypertension   • Hypertensive emergency, no CHF   • Coronary artery disease involving native coronary artery of native heart with unstable angina pectoris   • Chronic kidney disease, stage 3   • LVH (left ventricular hypertrophy)   • NSTEMI (non-ST elevated myocardial infarction)   • Unstable angina             Adult Rehabilitation Note       17 0915          Rehab Assessment/Intervention    Discipline occupational therapy assistant  -KD      Document Type therapy note (daily note)  -KD      Subjective Information agree to therapy  -KD      Patient Effort, Rehab Treatment good  -KD      Recorded by [KD] HARMEET Corona/L      Vital Signs    Pre Systolic BP Rehab 110  -KD      Pre Treatment Diastolic BP 84  -KD      Pretreatment Heart Rate (beats/min) 95  -KD      Posttreatment Heart Rate (beats/min) 96  -KD      Pre SpO2 (%) 95  -KD      O2 Delivery Pre Treatment supplemental O2  -KD      Post SpO2 (%) 97  -KD      O2 Delivery Post Treatment supplemental O2  -KD      Pre Patient Position Supine  -KD      Intra Patient Position Standing  -KD      Post Patient Position Sitting  -KD       Recorded by [KD] MARK Corona      Pain Assessment    Pain Assessment 0-10  -KD      Pain Score 9  -KD      Post Pain Score 9  -KD      Pain Type Surgical pain  -KD      Pain Location Chest  -KD      Pain Intervention(s) Medication (See MAR)  -KD      Recorded by [KD] MARK Corona      Vision Assessment/Intervention    Visual Impairment WFL with corrective lenses  -KD      Recorded by [KD] MARK Corona      Cognitive Assessment/Intervention    Current Cognitive/Communication Assessment functional  -KD      Orientation Status oriented x 4  -KD      Follows Commands/Answers Questions 100% of the time  -KD      Personal Safety fully aware of deficits  -KD      Personal Safety Interventions fall prevention program maintained;gait belt;nonskid shoes/slippers when out of bed  -KD      Recorded by [KD] MARK Corona      Transfer Assessment/Treatment    Transfers, Sit-Stand Collier supervision required  -KD      Transfers, Stand-Sit Collier supervision required  -KD      Toilet Transfer, Collier contact guard assist  -KD      Toilet Transfer, Assistive Device rolling walker  -KD      Recorded by [KD] MARK Corona      Functional Mobility    Functional Mobility- Ind. Level contact guard assist  -KD      Functional Mobility- Device rolling walker  -KD      Functional Mobility-Distance (Feet) --   10 x 2  -KD      Recorded by [KD] MARK Corona      Upper Body Bathing Assessment/Training    UB Bathing Assess/Train Assistive Device bath mitt  -KD      UB Bathing Assess/Train, Position edge of bed;sitting  -KD      UB Bathing Assess/Train, Collier Level set up required  -KD      Recorded by [KD] MARK Corona      Lower Body Bathing Assessment/Training    LB Bathing Assess/Train Assistive Device bath mitt  -KD      LB Bathing Assess/Train, Position edge of bed;sitting  -KD      LB Bathing Assess/Train, Collier Level moderate assist (50%  patient effort)  -KD      LB Bathing Assess/Train, Impairments strength decreased  -KD      Recorded by [KD] HARMEET Corona/L      Upper Body Dressing Assessment/Training    UB Dressing Assess/Train, Clothing Type doffing:;donning:;hospital gown  -KD      UB Dressing Assess/Train, Position edge of bed;sitting  -KD      UB Dressing Assess/Train, Young supervision required  -KD      Recorded by [KD] HARMEET Corona/L      Lower Body Dressing Assessment/Training    LB Dressing Assess/Train, Clothing Type doffing:;donning:;slipper socks  -KD      LB Dressing Assess/Train, Position edge of bed;sitting  -KD      LB Dressing Assess/Train, Young moderate assist (50% patient effort)  -KD      LB Dressing Assess/Train, Impairments strength decreased  -KD      Recorded by [KD] MARK Corona      Toileting Assessment/Training    Toileting Assess/Train, Assistive Device grab bars  -KD      Toileting Assess/Train, Position edge of bed;sitting  -KD      Toileting Assess/Train, Indepen Level supervision required  -KD      Recorded by [KD] MARK Corona      Grooming Assessment/Training    Grooming Assess/Train, Assistive Device --   wash face  -KD      Grooming Assess/Train, Position edge of bed;sitting  -KD      Grooming Assess/Train, Indepen Level conditional independence  -KD      Recorded by [JENNIFER] MARK Corona      Positioning and Restraints    Pre-Treatment Position sitting in chair/recliner  -KD      Post Treatment Position chair  -KD      In Chair sitting;call light within reach;encouraged to call for assist  -KD      Recorded by [KD] MARK Corona        User Key  (r) = Recorded By, (t) = Taken By, (c) = Cosigned By    Initials Name Effective Dates    MARK Trujillo 10/17/16 -                 OT Goals       12/06/17 0915 12/05/17 1424       Transfer Training OT LTG    Transfer Training OT LTG, Date Established  12/05/17  -     Transfer Training OT  LTG, Time to Achieve  by discharge  -     Transfer Training OT LTG, Activity Type  toilet  -     Transfer Training OT LTG, Ness Level  supervision required  -     Transfer Training OT LTG, Date Goal Reviewed 12/06/17  -      Transfer Training OT LTG, Outcome goal not met  -      Patient Education OT LTG    Patient Education OT LTG, Date Established  12/05/17  -     Patient Education OT LTG, Time to Achieve  by discharge  -     Patient Education OT LTG, Education Type  HEP;precautions per surgeon;home safety;energy conservation;adaptive equipment mgmt  -     Patient Education OT LTG, Education Understanding  independent;demonstrates adequately;verbalizes understanding  -     Patient Education OT LTG, Date Goal Reviewed 12/06/17  -      Patient Education OT LTG Outcome goal not met  -      ADL OT LTG    ADL OT LTG, Date Established  12/05/17  -     ADL OT LTG, Time to Achieve  by discharge  -     ADL OT LTG, Activity Type  ADL skills  -     ADL OT LTG, Ness Level  standby assist  -     ADL OT LTG, Date Goal Reviewed 12/06/17  -      ADL OT LTG, Outcome goal not met  -      Functional Mobility OT LTG    Functional Mobility Goal OT LTG, Date Established  12/05/17  -     Functional Mobility Goal OT LTG, Time to Achieve  by discharge  -     Functional Mobility Goal OT LTG, Ness Level  supervision  -     Functional Mobility Goal OT LTG, Distance to Achieve  to the sink;to the bathroom  -     Functional Mobility Goal OT LTG, Date Goal Reviewed 12/06/17  -      Functional Mobility Goal OT LTG, Outcome goal not met  -      Endurance OT LTG    Endurance Goal OT LTG, Date Established  12/05/17  -     Endurance Goal OT LTG, Time to Achieve  by discharge  -     Endurance Goal OT LTG, Activity Level  endurance 2 good-  -     Endurance Goal OT LTG, Date Goal Reviewed 12/06/17  -        User Key  (r) = Recorded By, (t) = Taken By, (c) = Cosigned By     Initials Name Provider Type     Rossy Ospina OTR/L Occupational Therapist    HARMEET Truj[place]/L Occupational Therapy Assistant          Occupational Therapy Education     Title: PT OT SLP Therapies (Active)     Topic: Occupational Therapy (Active)     Point: ADL training (Done)    Description: Instruct learner(s) on proper safety adaptation and remediation techniques during self care or transfers.   Instruct in proper use of assistive devices.    Learning Progress Summary    Learner Readiness Method Response Comment Documented by Status   Patient Acceptance E VU Educated about OT and POC. Educated on CABG precuations. Educated to call for assist and not get up on his own.  12/05/17 1520 Done               Point: Precautions (Done)    Description: Instruct learner(s) on prescribed precautions during self-care and functional transfers.    Learning Progress Summary    Learner Readiness Method Response Comment Documented by Status   Patient Acceptance E VU Educated about OT and POC. Educated on CABG precuations. Educated to call for assist and not get up on his own.  12/05/17 1520 Done               Point: Body mechanics (Done)    Description: Instruct learner(s) on proper positioning and spine alignment during self-care, functional mobility activities and/or exercises.    Learning Progress Summary    Learner Readiness Method Response Comment Documented by Status   Patient Acceptance E VU Educated about OT and POC. Educated on CABG precuations. Educated to call for assist and not get up on his own.  12/05/17 1520 Done                      User Key     Initials Effective Dates Name Provider Type Discipline     10/17/16 -  Rossy Ospina OTR/L Occupational Therapist OT                  OT Recommendation and Plan  Anticipated Discharge Disposition: inpatient rehabilitation facility  Planned Therapy Interventions: activity intolerance, adaptive equipment training, ADL retraining, IADL retraining,  balance training, bed mobility training, energy conservation, fine motor coordination training, home exercise program, motor coordination training, ROM (Range of Motion), strengthening, transfer training  Therapy Frequency: other (see comments) (3-14x a week)  Plan of Care Review  Outcome Summary/Follow up Plan: sit-stand-sit CGA; t/f's CGA. UB bathibg/dressing SBA ; LB bathing/dressing CGA-Radha        Outcome Measures       12/06/17 0915 12/05/17 1424 12/05/17 1400    How much help from another person do you currently need...    Turning from your back to your side while in flat bed without using bedrails?   3  -GB    Moving from lying on back to sitting on the side of a flat bed without bedrails?   3  -GB    Moving to and from a bed to a chair (including a wheelchair)?   2  -GB    Standing up from a chair using your arms (e.g., wheelchair, bedside chair)?   2  -GB    Climbing 3-5 steps with a railing?   1  -GB    To walk in hospital room?   1  -GB    AM-PAC 6 Clicks Score   12  -GB    How much help from another is currently needed...    Putting on and taking off regular lower body clothing? 2  -KD 1  -BH     Bathing (including washing, rinsing, and drying) 2  -KD 1  -BH     Toileting (which includes using toilet bed pan or urinal) 3  -KD 2  -BH     Putting on and taking off regular upper body clothing 4  -KD 2  -BH     Taking care of personal grooming (such as brushing teeth) 4  -KD 3  -BH     Eating meals 4  -KD 3  -BH     Score 19  -KD 12  -BH     Functional Assessment    Outcome Measure Options  AM-PAC 6 Clicks Daily Activity (OT)  - AM-PAC 6 Clicks Basic Mobility (PT)  -GB      User Key  (r) = Recorded By, (t) = Taken By, (c) = Cosigned By    Initials Name Provider Type    JENNIFER Chandler, PT Physical Therapist     Rossy Ospina, OTR/L Occupational Therapist    JENNIFER Bower GA/L Occupational Therapy Assistant           Time Calculation:         Time Calculation- OT       12/06/17 6590           Time Calculation- OT    OT Start Time 0915  -KD      OT Stop Time 1000  -KD      OT Time Calculation (min) 45 min  -KD      Total Timed Code Minutes- OT 45 minute(s)  -KD      OT Received On 12/06/17  -KD        User Key  (r) = Recorded By, (t) = Taken By, (c) = Cosigned By    Initials Name Provider Type     HARMEET Corona/L Occupational Therapy Assistant           Therapy Charges for Today     Code Description Service Date Service Provider Modifiers Qty    [id number]  OT SELF CARE/MGMT/TRAIN EA 15 MIN 12/6/2017 HARMEET Corona/L GO 3          OT G-codes  OT Professional Judgement Used?: Yes  OT Functional Scales Options: AM-PAC 6 Clicks Daily Activity (OT)  Score: 12  Functional Limitation: Self care  Self Care Current Status (): At least 60 percent but less than 80 percent impaired, limited or restricted  Self Care Goal Status (): At least 40 percent but less than 60 percent impaired, limited or restricted    MARK Corona  12/6/2017

## 2017-12-06 NOTE — PLAN OF CARE
Problem: Patient Care Overview (Adult)  Goal: Plan of Care Review  Outcome: Ongoing (interventions implemented as appropriate)    12/06/17 0420 12/06/17 0915   Coping/Psychosocial Response Interventions   Plan Of Care Reviewed With patient --    Patient Care Overview   Progress improving --    Outcome Evaluation   Outcome Summary/Follow up Plan --  sit-stand-sit CGA; t/f's CGA. UB bathibg/dressing SBA ; LB bathing/dressing CGA-Radha       Goal: Discharge Needs Assessment  Outcome: Ongoing (interventions implemented as appropriate)    12/01/17 0459 12/05/17 1424 12/05/17 1452   Discharge Needs Assessment   Concerns To Be Addressed --  --  homelessness;home safety concerns   Concerns Comments --  --  pt was homeless before admit; will need support system and safe location post d/c   Readmission Within The Last 30 Days no previous admission in last 30 days --  --    Equipment Needed After Discharge --  --  walker, rolling   Discharge Facility/Level Of Care Needs --  --  skilled transitional care;rehabilitation facility   Current Discharge Risk chronically ill;financial support inadequate --  --    Discharge Disposition still a patient --  --    Discharge Planning Comments --  --  if d/c to homeless situation, it appears he will need to be as strong as possible to survive homelessnes   Current Health   Outpatient/Agency/Support Group Needs --  --  skilled nursing facility (specify);inpatient rehabilitation facility (specify)   Anticipated Changes Related to Illness --  --  inability to care for self   Self-Care   Equipment Currently Used at Home --  none --    Living Environment   Transportation Available --  public transportation;family or friend will provide --          Problem: Inpatient Occupational Therapy  Goal: Transfer Training Goal 1 LTG- OT  Outcome: Ongoing (interventions implemented as appropriate)    12/05/17 1424 12/06/17 0915   Transfer Training OT LTG   Transfer Training OT LTG, Date Established 12/05/17  --    Transfer Training OT LTG, Time to Achieve by discharge --    Transfer Training OT LTG, Activity Type toilet --    Transfer Training OT LTG, Webb Level supervision required --    Transfer Training OT LTG, Date Goal Reviewed --  12/06/17   Transfer Training OT LTG, Outcome --  goal not met       Goal: Patient Education Goal LTG- OT  Outcome: Ongoing (interventions implemented as appropriate)    12/05/17 1424 12/06/17 0915   Patient Education OT LTG   Patient Education OT LTG, Date Established 12/05/17 --    Patient Education OT LTG, Time to Achieve by discharge --    Patient Education OT LTG, Education Type HEP;precautions per surgeon;home safety;energy conservation;adaptive equipment mgmt --    Patient Education OT LTG, Education Understanding independent;demonstrates adequately;verbalizes understanding --    Patient Education OT LTG, Date Goal Reviewed --  12/06/17   Patient Education OT LTG Outcome --  goal not met       Goal: ADL Goal LTG- OT  Outcome: Ongoing (interventions implemented as appropriate)    12/05/17 1424 12/06/17 0915   ADL OT LTG   ADL OT LTG, Date Established 12/05/17 --    ADL OT LTG, Time to Achieve by discharge --    ADL OT LTG, Activity Type ADL skills --    ADL OT LTG, Webb Level standby assist --    ADL OT LTG, Date Goal Reviewed --  12/06/17   ADL OT LTG, Outcome --  goal not met       Goal: Functional Mobility Goal LTG- OT  Outcome: Ongoing (interventions implemented as appropriate)  Goal: Endurance Goal LTG- OT  Outcome: Ongoing (interventions implemented as appropriate)    12/05/17 1424 12/06/17 0915   Endurance OT LTG   Endurance Goal OT LTG, Date Established 12/05/17 --    Endurance Goal OT LTG, Time to Achieve by discharge --    Endurance Goal OT LTG, Activity Level endurance 2 good- --    Endurance Goal OT LTG, Date Goal Reviewed --  12/06/17

## 2017-12-06 NOTE — PLAN OF CARE
Problem: Patient Care Overview (Adult)  Goal: Plan of Care Review  Outcome: Ongoing (interventions implemented as appropriate)    12/05/17 2012   Outcome Evaluation   Outcome Summary/Follow up Plan Patient has complained of pain that has not been relieved with percocet. Patient's HR and temp have steadily started to increase, Dr. Oh called and made aware.        Goal: Adult Individualization and Mutuality  Outcome: Ongoing (interventions implemented as appropriate)  Goal: Discharge Needs Assessment  Outcome: Ongoing (interventions implemented as appropriate)    Problem: Cardiac Output, Decreased (Adult)  Goal: Identify Related Risk Factors and Signs and Symptoms  Outcome: Ongoing (interventions implemented as appropriate)  Goal: Adequate Cardiac Output/Effective Tissue Perfusion  Outcome: Ongoing (interventions implemented as appropriate)    Problem: Cardiac Surgery (Adult)  Goal: Signs and Symptoms of Listed Potential Problems Will be Absent or Manageable (Cardiac Surgery)  Outcome: Ongoing (interventions implemented as appropriate)    Problem: Fall Risk (Adult)  Goal: Identify Related Risk Factors and Signs and Symptoms  Outcome: Ongoing (interventions implemented as appropriate)  Goal: Absence of Falls  Outcome: Ongoing (interventions implemented as appropriate)

## 2017-12-06 NOTE — THERAPY TREATMENT NOTE
Acute Care - Physical Therapy Treatment Note  [facility]     Patient Name: Lyle Corona  : 1965  MRN: [id number]  Today's Date: 2017  Onset of Illness/Injury or Date of Surgery Date: 17 (CABG 17)  Date of Referral to PT: 17  Referring Physician: AHSAN Oglesby    Admit Date: 2017    Visit Dx:    ICD-10-CM ICD-9-CM   1. Coronary arteriosclerosis I25.10 414.00   2. Unstable angina I20.0 411.1   3. NSTEMI (non-ST elevated myocardial infarction) I21.4 410.70   4. Coronary artery disease involving native coronary artery of native heart with unstable angina pectoris I25.110 414.01     411.1   5. Impaired functional mobility, balance, gait, and endurance Z74.09 V49.89   6. Impaired mobility and ADLs Z74.09 799.89     Patient Active Problem List   Diagnosis   • Essential hypertension   • Hypertensive emergency, no CHF   • Coronary artery disease involving native coronary artery of native heart with unstable angina pectoris   • Chronic kidney disease, stage 3   • LVH (left ventricular hypertrophy)   • NSTEMI (non-ST elevated myocardial infarction)   • Unstable angina               Adult Rehabilitation Note       17 1435 17 0915       Rehab Assessment/Intervention    Discipline  occupational therapy assistant  -KD     Document Type  therapy note (daily note)  -KD     Subjective Information agree to therapy;complains of;pain  -LN agree to therapy  -KD     Patient Effort, Rehab Treatment  good  -KD     Precautions/Limitations cardiac precautions;fall precautions;oxygen therapy device and L/min;sternal precautions  -LN      Recorded by [LN] Denise Herron PTA [KD] HARMEET Corona/L     Vital Signs    Pre Systolic BP Rehab 127  -  -KD     Pre Treatment Diastolic BP 84  -LN 84  -KD     Post Systolic BP Rehab 135  -LN      Post Treatment Diastolic BP 68  -LN      Pretreatment Heart Rate (beats/min) 107  -LN 95  -KD     Intratreatment Heart Rate (beats/min) 82   -LN      Posttreatment Heart Rate (beats/min) 108  -LN 96  -KD     Pre SpO2 (%) 88  -LN 95  -KD     O2 Delivery Pre Treatment room air   nc laying in bed-put 02 back on 93%  -LN supplemental O2  -KD     Intra SpO2 (%) 93  -LN      Post SpO2 (%) 94  -LN 97  -KD     O2 Delivery Post Treatment  supplemental O2  -KD     Pre Patient Position Supine  -LN Supine  -KD     Intra Patient Position Standing  -LN Standing  -KD     Post Patient Position Supine  -LN Sitting  -KD     Recorded by [LN] Denise Herron, LINDA [KD] TUCKRE CoronaA/L     Pain Assessment    Pain Assessment 0-10  -LN 0-10  -KD     Pain Score 5  -LN 9  -KD     Post Pain Score 7  -LN 9  -KD     Pain Type Acute pain;Surgical pain  -LN Surgical pain  -KD     Pain Location  Chest  -KD     Pain Intervention(s) Medication (See MAR)  -LN Medication (See MAR)  -KD     Recorded by [LN] Denise Herron PTA [KD] Margaret Bower GA/L     Vision Assessment/Intervention    Visual Impairment  WFL with corrective lenses  -KD     Recorded by  [KD] Margaret Bower GA/L     Cognitive Assessment/Intervention    Current Cognitive/Communication Assessment  functional  -KD     Orientation Status oriented to;person     -LN oriented x 4  -KD     Follows Commands/Answers Questions  100% of the time  -KD     Personal Safety  fully aware of deficits  -KD     Personal Safety Interventions  fall prevention program maintained;gait belt;nonskid shoes/slippers when out of bed  -KD     Recorded by [LN] Denise Herron PTA [KD] Margaret Bower GA/L     Bed Mobility, Assessment/Treatment    Bed Mobility, Assistive Device head of bed elevated  -LN      Bed Mob, Supine to Sit, [place] supervision required  -LN      Bed Mob, Sit to Supine, [place] supervision required  -LN      Recorded by [LN] Denise Herron PTA      Transfer Assessment/Treatment    Transfers, Sit-Stand [place] supervision required  -LN supervision required  -KD     Transfers, Stand-Sit [place]  supervision required  -LN supervision required  -KD     Transfers, Sit-Stand-Sit, Assist Device --   none  -LN      Toilet Transfer, [place] not tested  -LN contact guard assist  -KD     Toilet Transfer, Assistive Device  rolling walker  -KD     Recorded by [LN] Denise Herron PTA [KD] MARK Corona     Gait Assessment/Treatment    Gait, [place] Level minimum assist (75% patient effort);stand by assist  -LN      Gait, Assistive Device rolling walker  -LN      Gait, Distance (Feet) 50   350  -LN      Gait, Comment 50' w/o ad pt with several lob and reaching for rail,finished walking with rw  -LN      Recorded by [LN] Denise Herron PTA      Functional Mobility    Functional Mobility- Ind. Level  contact guard assist  -KD     Functional Mobility- Device  rolling walker  -KD     Functional Mobility-Distance (Feet)  --   10 x 2  -KD     Recorded by  [KD] HARMEET Corona/L     Upper Body Bathing Assessment/Training    UB Bathing Assess/Train Assistive Device  bath mitt  -KD     UB Bathing Assess/Train, Position  edge of bed;sitting  -KD     UB Bathing Assess/Train, [place] Level  set up required  -KD     Recorded by  [KD] HARMEET Corona/L     Lower Body Bathing Assessment/Training    LB Bathing Assess/Train Assistive Device  bath mitt  -KD     LB Bathing Assess/Train, Position  edge of bed;sitting  -KD     LB Bathing Assess/Train, [place] Level  moderate assist (50% patient effort)  -KD     LB Bathing Assess/Train, Impairments  strength decreased  -KD     Recorded by  [KD] HARMEET Corona/L     Upper Body Dressing Assessment/Training    UB Dressing Assess/Train, Clothing Type  doffing:;donning:;hospital gown  -KD     UB Dressing Assess/Train, Position  edge of bed;sitting  -KD     UB Dressing Assess/Train, [place]  supervision required  -KD     Recorded by  [KD] HARMEET Corona/L     Lower Body Dressing Assessment/Training    LB Dressing Assess/Train, Clothing Type   doffing:;donning:;slipper socks  -KD     LB Dressing Assess/Train, Position  edge of bed;sitting  -KD     LB Dressing Assess/Train, [place]  moderate assist (50% patient effort)  -KD     LB Dressing Assess/Train, Impairments  strength decreased  -KD     Recorded by  [KD] Margaret Bower GA/L     Toileting Assessment/Training    Toileting Assess/Train, Assistive Device  grab bars  -KD     Toileting Assess/Train, Position  edge of bed;sitting  -KD     Toileting Assess/Train, [place]ep[place] Level  supervision required  -KD     Recorded by  [KD] TUCKER CoronaA/L     Grooming Assessment/Training    Grooming Assess/Train, Assistive Device  --   wash face  -KD     Grooming Assess/Train, Position  edge of bed;sitting  -KD     Grooming Assess/Train, Indepen Level  conditional independence  -KD     Recorded by  [KD] Margaret Bower GA/L     Positioning and Restraints    Pre-Treatment Position  sitting in chair/recliner  -KD     Post Treatment Position bed  -LN chair  -KD     In Bed supine;call light within reach;encouraged to call for assist;exit alarm on  -LN      In Chair  sitting;call light within reach;encouraged to call for assist  -KD     Recorded by [LN] Denise Herron, PTA [KD] Margaret Bower GA/L       User Key  (r) = Recorded By, (t) = Taken By, (c) = Cosigned By    Initials Name Effective Dates    LN Denise Herron, PTA 10/17/16 -     KD TUCKER CoronaA/DWAYNE 10/17/16 -                 IP PT Goals       12/06/17 1435 12/05/17 1452       Bed Mobility PT LTG    Bed Mobility PT LTG, Date Established  12/05/17  -GB     Bed Mobility PT LTG, Time to Achieve  by discharge  -GB     Bed Mobility PT LTG, Activity Type  roll left/roll right;supine to sit/sit to supine  -GB     Bed Mobility PT LTG, [place] Level 1 person + 1 person to manage equipment  -LN conditional independence  -GB     Bed Mobility PT LTG, Date Goal Reviewed 12/06/17  -LN      Bed Mobility PT LTG, Outcome goal not met  -LN goal not met  -GB      Transfer Training PT LTG    Transfer Training PT LTG, Date Established  12/05/17  -GB     Transfer Training PT LTG, Time to Achieve  by discharge  -GB     Transfer Training PT LTG, Activity Type  bed to chair /chair to bed;sit to stand/stand to sit  -GB     Transfer Training PT LTG, [place] Level  conditional independence  -GB     Transfer Training PT  LTG, Date Goal Reviewed 12/06/17  -LN      Transfer Training PT LTG, Outcome goal not met  -LN goal not met  -GB     Gait Training PT LTG    Gait Training Goal PT LTG, Date Established  12/05/17  -GB     Gait Training Goal PT LTG, Time to Achieve  by discharge  -GB     Gait Training Goal PT LTG, [place] Level  conditional independence  -GB     Gait Training Goal PT LTG, Date Goal Reviewed 12/06/17  -LN      Gait Training Goal PT LTG, Outcome goal not met  -LN goal not met  -GB     Stair Training PT LTG    Stair Training Goal PT LTG, Date Established  12/05/17  -GB     Stair Training Goal PT LTG, Time to Achieve  by discharge  -GB     Stair Training Goal PT LTG, Number of Steps  4  -GB     Stair Training Goal PT LTG, [place] Level  conditional independence  -GB     Stair Training Goal PT LTG, Date Goal Reviewed 12/06/17  -LN      Stair Training Goal PT LTG, Outcome goal not met  -LN goal not met  -GB       User Key  (r) = Recorded By, (t) = Taken By, (c) = Cosigned By    Initials Name Provider Type    JENNIFER Chandler, PT Physical Therapist    LN Denise Herron, PTA Physical Therapy Assistant          Physical Therapy Education     Title: PT OT SLP Therapies (Active)     Topic: Physical Therapy (Active)     Point: Mobility training (Active)    Learning Progress Summary    Learner Readiness Method Response Comment Documented by Status   Patient Acceptance E NR cardiac precautions LN 12/06/17 1650 Active    Acceptance E,D NR,DU,VU POC; coughing w/ abdominal muscles; use of pillow to help cough; tx GB 12/05/17 1450 Done               Point: Home  exercise program (Done)    Learning Progress Summary    Learner Readiness Method Response Comment Documented by Status   Patient Acceptance E,D NR,DU,VU POC; coughing w/ abdominal muscles; use of pillow to help cough; tx  12/05/17 1450 Done               Point: Body mechanics (Active)    Learning Progress Summary    Learner Readiness Method Response Comment Documented by Status   Patient Acceptance E NR cardiac precautions LN 12/06/17 1650 Active    Acceptance E,D NR,DU,VU POC; coughing w/ abdominal muscles; use of pillow to help cough; tx  12/05/17 1450 Done               Point: Precautions (Active)    Learning Progress Summary    Learner Readiness Method Response Comment Documented by Status   Patient Acceptance E NR cardiac precautions LN 12/06/17 1650 Active    Acceptance E,D NR,DU,VU POC; coughing w/ abdominal muscles; use of pillow to help cough; tx  12/05/17 1450 Done                      User Key     Initials Effective Dates Name Provider Type Discipline     10/17/16 -  Paige Chandler, PT Physical Therapist PT     10/17/16 -  Denise Herron PTA Physical Therapy Assistant PT                    PT Recommendation and Plan  Anticipated Equipment Needs At Discharge: front wheeled walker  Anticipated Discharge Disposition: home with home health  Planned Therapy Interventions: bed mobility training, gait training, home exercise program, stair training, strengthening, transfer training  PT Frequency: daily  Plan of Care Review  Plan Of Care Reviewed With: patient  Progress: progress toward functional goals as expected  Outcome Summary/Follow up Plan: sup-sit-sup sba,sit-stand-sit sba,amb 50' w/o and min of 1,350' with rw and sba of 1,reviewed heart precautions-no goals met-if d/c today would benefit from aru          Outcome Measures       12/06/17 1435 12/06/17 0915 12/05/17 1424    How much help from another person do you currently need...    Turning from your back to your side while in flat bed  without using bedrails? 3  -LN      Moving from lying on back to sitting on the side of a flat bed without bedrails? 3  -LN      Moving to and from a bed to a chair (including a wheelchair)? 3  -LN      Standing up from a chair using your arms (e.g., wheelchair, bedside chair)? 3  -LN      Climbing 3-5 steps with a railing? 3  -LN      To walk in hospital room? 3  -LN      AM-PAC 6 Clicks Score 18  -LN      How much help from another is currently needed...    Putting on and taking off regular lower body clothing?  2  -KD 1  -BH    Bathing (including washing, rinsing, and drying)  2  -KD 1  -BH    Toileting (which includes using toilet bed pan or urinal)  3  -KD 2  -BH    Putting on and taking off regular upper body clothing  4  -KD 2  -BH    Taking care of personal grooming (such as brushing teeth)  4  -KD 3  -BH    Eating meals  4  -KD 3  -BH    Score  19  -KD 12  -BH    Functional Assessment    Outcome Measure Options AM-PAC 6 Clicks Basic Mobility (PT)  -LN  AM-PAC 6 Clicks Daily Activity (OT)  -BH      12/05/17 1400          How much help from another person do you currently need...    Turning from your back to your side while in flat bed without using bedrails? 3  -GB      Moving from lying on back to sitting on the side of a flat bed without bedrails? 3  -GB      Moving to and from a bed to a chair (including a wheelchair)? 2  -GB      Standing up from a chair using your arms (e.g., wheelchair, bedside chair)? 2  -GB      Climbing 3-5 steps with a railing? 1  -GB      To walk in hospital room? 1  -GB      AM-PAC 6 Clicks Score 12  -GB      Functional Assessment    Outcome Measure Options AM-PAC 6 Clicks Basic Mobility (PT)  -GB        User Key  (r) = Recorded By, (t) = Taken By, (c) = Cosigned By    Initials Name Provider Type    JENNIFER Chandler, PT Physical Therapist     Rossy Ospina, OTR/L Occupational Therapist    LN Denise Herron, PTA Physical Therapy Assistant    KD Margaret Bower, GA/L  Occupational Therapy Assistant           Time Calculation:         PT Charges       12/06/17 1435          Time Calculation    Start Time 1435  -LN      Stop Time 1515  -LN      Time Calculation (min) 40 min  -LN      PT Received On 12/06/17  -LN      Time Calculation- PT    Total Timed Code Minutes- PT 40 minute(s)  -LN        User Key  (r) = Recorded By, (t) = Taken By, (c) = Cosigned By    Initials Name Provider Type    LN Denise Herron PTA Physical Therapy Assistant          Therapy Charges for Today     Code Description Service Date Service Provider Modifiers Qty    79680644533 HC GAIT TRAINING EA 15 MIN 12/6/2017 Denise Herron, PTA GP 1    35313108981 HC PT THERAPEUTIC ACT EA 15 MIN 12/6/2017 Denise Herron, PTA GP 1    81657415274 HC PT SELF CARE/MGMT/TRAIN EA 15 MIN 12/6/2017 Denise Herron, PTA GP 1          PT G-Codes  PT Professional Judgement Used?: Yes  Outcome Measure Options: AM-PAC 6 Clicks Basic Mobility (PT)  Score: 12  Functional Limitation: Mobility: Walking and moving around  Mobility: Walking and Moving Around Current Status (): At least 60 percent but less than 80 percent impaired, limited or restricted  Mobility: Walking and Moving Around Goal Status (): At least 1 percent but less than 20 percent impaired, limited or restricted    Denise Herron PTA  12/6/2017

## 2017-12-06 NOTE — NURSING NOTE
Called María Saleh about patient's hr of 118 and temp of 101.5. Got an order for blood cultures and was instructed to encourage the patient to cough

## 2017-12-06 NOTE — PLAN OF CARE
Problem: Patient Care Overview (Adult)  Goal: Plan of Care Review  Outcome: Ongoing (interventions implemented as appropriate)    12/06/17 1435   Coping/Psychosocial Response Interventions   Plan Of Care Reviewed With patient   Patient Care Overview   Progress progress toward functional goals as expected   Outcome Evaluation   Outcome Summary/Follow up Plan sup-sit-sup sba,sit-stand-sit sba,amb 50' w/o and min of 1,350' with rw and sba of 1,reviewed heart precautions-no goals met-if d/c today would benefit from aru       Goal: Discharge Needs Assessment  Outcome: Ongoing (interventions implemented as appropriate)    12/01/17 0459 12/05/17 1424 12/05/17 1452   Discharge Needs Assessment   Concerns To Be Addressed --  --  homelessness;home safety concerns   Concerns Comments --  --  pt was homeless before admit; will need support system and safe location post d/c   Readmission Within The Last 30 Days no previous admission in last 30 days --  --    Equipment Needed After Discharge --  --  walker, rolling   Discharge Facility/Level Of Care Needs --  --  skilled transitional care;rehabilitation facility   Current Discharge Risk chronically ill;financial support inadequate --  --    Discharge Disposition still a patient --  --    Discharge Planning Comments --  --  if d/c to homeless situation, it appears he will need to be as strong as possible to survive homelessnes   Current Health   Outpatient/Agency/Support Group Needs --  --  skilled nursing facility (specify);inpatient rehabilitation facility (specify)   Anticipated Changes Related to Illness --  --  inability to care for self   Self-Care   Equipment Currently Used at Home --  none --    Living Environment   Transportation Available --  public transportation;family or friend will provide --          Problem: Inpatient Physical Therapy  Goal: Bed Mobility Goal LTG- PT  Outcome: Ongoing (interventions implemented as appropriate)    12/05/17 1452 12/06/17 1435   Bed  Mobility PT LTG   Bed Mobility PT LTG, Date Established 12/05/17 --    Bed Mobility PT LTG, Time to Achieve by discharge --    Bed Mobility PT LTG, Activity Type roll left/roll right;supine to sit/sit to supine --    Bed Mobility PT LTG, [place] Level --  1 person + 1 person to manage equipment   Bed Mobility PT LTG, Date Goal Reviewed --  12/06/17   Bed Mobility PT LTG, Outcome --  goal not met       Goal: Transfer Training Goal 1 LTG- PT  Outcome: Ongoing (interventions implemented as appropriate)    12/05/17 1452 12/06/17 1435   Transfer Training PT LTG   Transfer Training PT LTG, Date Established 12/05/17 --    Transfer Training PT LTG, Time to Achieve by discharge --    Transfer Training PT LTG, Activity Type bed to chair /chair to bed;sit to stand/stand to sit --    Transfer Training PT LTG, [place] Level conditional independence --    Transfer Training PT LTG, Date Goal Reviewed --  12/06/17   Transfer Training PT LTG, Outcome --  goal not met       Goal: Gait Training Goal LTG- PT  Outcome: Ongoing (interventions implemented as appropriate)    12/05/17 1452 12/06/17 1435   Gait Training PT LTG   Gait Training Goal PT LTG, Date Established 12/05/17 --    Gait Training Goal PT LTG, Time to Achieve by discharge --    Gait Training Goal PT LTG, [place] Level conditional independence --    Gait Training Goal PT LTG, Date Goal Reviewed --  12/06/17   Gait Training Goal PT LTG, Outcome --  goal not met       Goal: Stair Training Goal LTG- PT  Outcome: Ongoing (interventions implemented as appropriate)    12/05/17 1452 12/06/17 1435   Stair Training PT LTG   Stair Training Goal PT LTG, Date Established 12/05/17 --    Stair Training Goal PT LTG, Time to Achieve by discharge --    Stair Training Goal PT LTG, Number of Steps 4 --    Stair Training Goal PT LTG, [place] Level conditional independence --    Stair Training Goal PT LTG, Date Goal Reviewed --  12/06/17   Stair Training Goal PT LTG,  Outcome --  goal not met

## 2017-12-06 NOTE — DISCHARGE PLACEMENT REQUEST
"Lyle Corona (51 y.o. Male)     Date of Birth Social Security Number Address Home Phone MRN    1965  [address] [facility] [address] [phone] [id number]    Yarsanism Marital Status          Hindu        Admission Date Admission Type Admitting Provider Attending Provider Department, Room/Bed    11/27/17 Urgent Taz Guzmán MD Lipson, Wayne E, MD [address], [address]    Discharge Date Discharge Disposition Discharge Destination                      Attending Provider: Darien Dejesus MD     Allergies:  Imdur [Isosorbide Dinitrate], Amlodipine, Lisinopril, Metoprolol    Isolation:  None   Infection:  None   Code Status:  FULL    Ht:  175.3 cm (69.02\")   Wt:  70.9 kg (156 lb 6.4 oz)    Admission Cmt:  None   Principal Problem:  Coronary artery disease involving native coronary artery of native heart with unstable angina pectoris [I25.110]                 Active Insurance as of 11/27/2017     Primary Coverage     Payor Plan Insurance Group Employer/Plan Group    PASSPORT PAS[employer]ORT MEDICAID     Payor Plan Address Payor Plan Phone Number Effective From Effective To    [address] [phone] 4/1/2014     [place] [address]       Subscriber Name Subscriber Birth Date Member ID       LYLE CORONA 1965 [id number]                 Emergency Contacts      (Rel.) Home Phone Work Phone Mobile Phone    Sue Márquez (Friend) [phone] -- --    Elizabeth Sagastume (Other) [phone] -- [phone]            Insurance Information                PASSPORT/PASSPORT Phone: [phone]    Subscriber: Lyle Corona Subscriber#: [id number]    Group#: MEDICAID Precert#:           "

## 2017-12-06 NOTE — PROGRESS NOTES
"[facility]  [address] [place] [address]. [address]  T - [phone]  F - [phone]     Progress Note          PATIENT  DEMOGRAPHICS   PATIENT NAME: Lyle Corona                      PHYSICIAN: Adriane Butler MD  : 1965  MRN: [id number]   LOS: 9 days    Patient Care Team:  AHSAN Mayer as PCP - General  Subjective   SUBJECTIVE   Pain well controlled now has fever and tachycardia       Objective   OBJECTIVE   Vital Signs  Temp:  [97.5 °F (36.4 °C)-101.5 °F (38.6 °C)] 101.5 °F (38.6 °C)  Heart Rate:  [] 118  Resp:  [16-20] 20  BP: (110-140)/(70-84) 130/70    Flowsheet Rows         First Filed Value    Admission Height  69\" (175.3 cm) Documented at 2017 1722    Admission Weight  151 lb 12.8 oz (68.9 kg) Documented at 2017 1722           I/O last 3 completed shifts:  In: 400 [P.O.:400]  Out: 2350 [Urine:1960; Other:390]    PHYSICAL EXAM    Physical Exam   Constitutional: He is oriented to person, place, and time. He appears well-developed.   Moderately nourished   HENT:   Head: Normocephalic and atraumatic.   Cardiovascular: Normal rate, regular rhythm and normal heart sounds.  Exam reveals no gallop and no friction rub.    No murmur heard.  Pulmonary/Chest: Effort normal and breath sounds normal. No respiratory distress. He has no wheezes. He has no rales. He exhibits no tenderness.   Abdominal: Soft. Bowel sounds are normal. He exhibits no distension and no mass. There is no tenderness. There is no guarding.   Musculoskeletal: He exhibits no edema or tenderness.   Neurological: He is alert and oriented to person, place, and time.   Skin: Skin is warm and dry.   Nursing note and vitals reviewed.      RESULTS   Results Review:      Results from last 7 days  Lab Units 17  0212 17  0416 17  0402 17  2053  17  1252 17  1245  17  0407 17  0240 17  0417   SODIUM mmol/L  --  135*  --   --   --   --  " 136*  --  136* 135* 136*   SODIUM, VENOUS mmol/L  --   --   --   --   --  134.9  --   --   --   --   --    SODIUM, ARTERIAL mmol/L  --   --  133.1* 137.2*  < >  --   --   < >  --   --   --    POTASSIUM mmol/L 4.5 3.6  --   --   --   --  4.2  --  3.7 3.6 3.6   POTASSIUM, VENOUS mmol/L  --   --   --   --   --  4.1  --   --   --   --   --    CHLORIDE mmol/L  --  97  --   --   --   --  98  --  100 100 102   CO2 mmol/L  --  25.0  --   --   --   --  24.0  --  24.0 23.0 22.0   BUN mg/dL  --  16  --   --   --   --  14  --  13 12 15   CREATININE mg/dL  --  1.91*  --   --   --   --  2.21*  --  2.16* 2.25* 2.20*   CALCIUM mg/dL  --  8.5  --   --   --   --  8.6  --  8.6 8.7 8.8   BILIRUBIN mg/dL  --   --   --   --   --   --   --   --  0.7 0.9 0.6   ALK PHOS U/L  --   --   --   --   --   --   --   --  111 100 97   ALT (SGPT) U/L  --   --   --   --   --   --   --   --  29 30 30   AST (SGOT) U/L  --   --   --   --   --   --   --   --  29 22 24   GLUCOSE mg/dL  --  108*  --   --   --   --  108*  --  103* 101* 100   GLUCOSE, ARTERIAL mmol/L  --   --  100 124  < >  --   --   < >  --   --   --    < > = values in this interval not displayed.    Estimated Creatinine Clearance: 44.9 mL/min (by C-G formula based on Cr of 1.91).        Results from last 7 days  Lab Units 12/05/17  0416 12/04/17  1245 12/04/17  1131 12/04/17  1036 12/04/17  0958  12/04/17  0407 12/03/17  0240 12/02/17  0417   WBC 10*3/mm3 10.13* 8.70  --   --   --   --  7.07 8.00 8.14   HEMOGLOBIN g/dL 9.1* 9.5*  --   --   --   --  9.6* 9.7* 10.3*   HEMOGLOBIN, POC g/dL  --   --  8.2 7.8 7.8  < >  --   --   --    PLATELETS 10*3/mm3 144* 107*  --   --   --   --  148* 162 163   < > = values in this interval not displayed.      Results from last 7 days  Lab Units 12/04/17  1245   INR  1.24*         Imaging Results (last 24 hours)     Procedure Component Value Units Date/Time    XR Chest 1 View [[id number]] Collected:  12/06/17 1211     Updated:  12/06/17 1653    Narrative:          PORTABLE CHEST    HISTORY: Chest tube removal. Right apical pneumothorax.    Portable AP upright film of the chest was obtained at 12:04 PM.  COMPARISON: December 5, 2017    Right sided chest tube or mediastinal drainage tube has been  removed.  Right internal jugular catheter sheath remains in place.  Extremely small residual right apical pneumothorax.  Small left pleural effusion.  Minimal subsegmental atelectasis or infiltrate left lung base.  Discoid atelectasis right lung base.  Sternotomy.  Minimal cardiomegaly.   The pulmonary vasculature is not increased.  No acute osseous abnormality.  Internal fixation plate and screw device lower cervical spine.      Impression:       CONCLUSION:  Right sided chest tube or mediastinal drainage tube has been  removed.  Right internal jugular catheter sheath remains in place.  Extremely small residual right apical pneumothorax.  Small left pleural effusion.  Minimal subsegmental atelectasis or infiltrate left lung base.  Discoid atelectasis right lung base.  Sternotomy.  Minimal cardiomegaly.     [id number]    Electronically signed by:  Melvin Preciado MD  12/6/2017 4:52 PM CST  Workstation: [id number]           MEDICATIONS      amiodarone 150 mg Intravenous Once   aspirin 81 mg Oral Daily   atorvastatin 10 mg Oral Daily   cyclobenzaprine 10 mg Oral TID   insulin aspart 0-24 Units Subcutaneous 4x Daily AC & at Bedtime   insulin detemir 20 Units Subcutaneous Nightly   ipratropium-albuterol 3 mL Nebulization 4x Daily - RT   labetalol 100 mg Oral Q12H   magnesium oxide 400 mg Oral BID   prenatal vitamin 27-0.8 1 tablet Oral Daily   sennosides-docusate sodium 1 tablet Oral BID   sodium chloride 1-10 mL Intravenous Q8H   tamsulosin 0.4 mg Oral Nightly   ticagrelor 90 mg Oral BID   vitamin C 500 mg Oral BID       amiodarone 1 mg/min       Assessment/Plan   ASSESSMENT / PLAN    Principal Problem:    Coronary artery disease involving native coronary artery of native heart with  unstable angina pectoris  Active Problems:    Chronic kidney disease, stage 3    NSTEMI (non-ST elevated myocardial infarction)    1. CKD 3: baseline creatinine 1.7-1.9 mg/dl  - Creatinine is down to 1.9 from yesterday lab. k stable. Check lab in am. Resume cozaar at low dose. Hold hctz and at baseline. Hold HCTZ for now since bp is stable    2. Hypertension:  - Blood pressure is acceptable. On labetalol 100mg BID, off hydralazine and terazosin. Plan as above    3. Anemia:  - Hemoglobin is low and received 1 UPRBC. Received iv fe series. Overall stable    4. NSTEMI:   - s/p left heart cath which shows multivessel disease, s/p CABG.       5. Fever tachycardia check blood and urine culture    Will review lab in am and If all stable f/u in office in 4 weeks           This document has been electronically signed by Adriane Butler MD on December 6, 2017 4:54 PM

## 2017-12-07 LAB
ALBUMIN SERPL-MCNC: 4 G/DL (ref 3.4–4.8)
ALBUMIN/GLOB SERPL: 1.2 G/DL (ref 1.1–1.8)
ALP SERPL-CCNC: 148 U/L (ref 38–126)
ALT SERPL W P-5'-P-CCNC: 14 U/L (ref 21–72)
ANION GAP SERPL CALCULATED.3IONS-SCNC: 15 MMOL/L (ref 5–15)
AST SERPL-CCNC: 46 U/L (ref 17–59)
BILIRUB SERPL-MCNC: 1.2 MG/DL (ref 0.2–1.3)
BUN BLD-MCNC: 29 MG/DL (ref 7–21)
BUN/CREAT SERPL: 13.6 (ref 7–25)
CALCIUM SPEC-SCNC: 9.1 MG/DL (ref 8.4–10.2)
CHLORIDE SERPL-SCNC: 93 MMOL/L (ref 95–110)
CO2 SERPL-SCNC: 25 MMOL/L (ref 22–31)
CREAT BLD-MCNC: 2.13 MG/DL (ref 0.7–1.3)
DEPRECATED RDW RBC AUTO: 45.7 FL (ref 35.1–43.9)
ERYTHROCYTE [DISTWIDTH] IN BLOOD BY AUTOMATED COUNT: 15 % (ref 11.5–14.5)
GFR SERPL CREATININE-BSD FRML MDRD: 33 ML/MIN/1.73 (ref 60–130)
GLOBULIN UR ELPH-MCNC: 3.3 GM/DL (ref 2.3–3.5)
GLUCOSE BLD-MCNC: 99 MG/DL (ref 60–100)
GLUCOSE BLDC GLUCOMTR-MCNC: 100 MG/DL (ref 70–130)
GLUCOSE BLDC GLUCOMTR-MCNC: 104 MG/DL (ref 70–130)
GLUCOSE BLDC GLUCOMTR-MCNC: 109 MG/DL (ref 70–130)
GLUCOSE BLDC GLUCOMTR-MCNC: 90 MG/DL (ref 70–130)
HCT VFR BLD AUTO: 31.4 % (ref 39–49)
HGB BLD-MCNC: 10.9 G/DL (ref 13.7–17.3)
MCH RBC QN AUTO: 29 PG (ref 26.5–34)
MCHC RBC AUTO-ENTMCNC: 34.7 G/DL (ref 31.5–36.3)
MCV RBC AUTO: 83.5 FL (ref 80–98)
PLATELET # BLD AUTO: 212 10*3/MM3 (ref 150–450)
PMV BLD AUTO: 8.7 FL (ref 8–12)
POTASSIUM BLD-SCNC: 4.7 MMOL/L (ref 3.5–5.1)
PROT SERPL-MCNC: 7.3 G/DL (ref 6.3–8.6)
RBC # BLD AUTO: 3.76 10*6/MM3 (ref 4.37–5.74)
SODIUM BLD-SCNC: 133 MMOL/L (ref 137–145)
WBC NRBC COR # BLD: 15.64 10*3/MM3 (ref 3.2–9.8)

## 2017-12-07 PROCEDURE — 99024 POSTOP FOLLOW-UP VISIT: CPT | Performed by: NURSE PRACTITIONER

## 2017-12-07 PROCEDURE — 93005 ELECTROCARDIOGRAM TRACING: CPT | Performed by: NURSE PRACTITIONER

## 2017-12-07 PROCEDURE — 80053 COMPREHEN METABOLIC PANEL: CPT | Performed by: NURSE PRACTITIONER

## 2017-12-07 PROCEDURE — 97110 THERAPEUTIC EXERCISES: CPT

## 2017-12-07 PROCEDURE — 97530 THERAPEUTIC ACTIVITIES: CPT

## 2017-12-07 PROCEDURE — 94799 UNLISTED PULMONARY SVC/PX: CPT

## 2017-12-07 PROCEDURE — 82962 GLUCOSE BLOOD TEST: CPT

## 2017-12-07 PROCEDURE — 94760 N-INVAS EAR/PLS OXIMETRY 1: CPT

## 2017-12-07 PROCEDURE — 97535 SELF CARE MNGMENT TRAINING: CPT

## 2017-12-07 PROCEDURE — 85027 COMPLETE CBC AUTOMATED: CPT | Performed by: NURSE PRACTITIONER

## 2017-12-07 PROCEDURE — 97116 GAIT TRAINING THERAPY: CPT

## 2017-12-07 PROCEDURE — 93010 ELECTROCARDIOGRAM REPORT: CPT | Performed by: INTERNAL MEDICINE

## 2017-12-07 RX ADMIN — LABETALOL HYDROCHLORIDE 100 MG: 100 TABLET, FILM COATED ORAL at 23:30

## 2017-12-07 RX ADMIN — OXYCODONE HYDROCHLORIDE AND ACETAMINOPHEN 500 MG: 500 TABLET ORAL at 18:14

## 2017-12-07 RX ADMIN — IPRATROPIUM BROMIDE AND ALBUTEROL SULFATE 3 ML: 2.5; .5 SOLUTION RESPIRATORY (INHALATION) at 15:04

## 2017-12-07 RX ADMIN — OXYCODONE HYDROCHLORIDE AND ACETAMINOPHEN 1 TABLET: 5; 325 TABLET ORAL at 21:05

## 2017-12-07 RX ADMIN — MAGNESIUM OXIDE TAB 400 MG (241.3 MG ELEMENTAL MG) 400 MG: 400 (241.3 MG) TAB at 09:02

## 2017-12-07 RX ADMIN — OXYCODONE HYDROCHLORIDE AND ACETAMINOPHEN 1 TABLET: 10; 325 TABLET ORAL at 10:18

## 2017-12-07 RX ADMIN — IPRATROPIUM BROMIDE AND ALBUTEROL SULFATE 3 ML: 2.5; .5 SOLUTION RESPIRATORY (INHALATION) at 11:24

## 2017-12-07 RX ADMIN — PRENATAL VIT W/ FE FUMARATE-FA TAB 27-0.8 MG 1 TABLET: 27-0.8 TAB at 09:03

## 2017-12-07 RX ADMIN — OXYCODONE HYDROCHLORIDE AND ACETAMINOPHEN 500 MG: 500 TABLET ORAL at 09:02

## 2017-12-07 RX ADMIN — OXYCODONE HYDROCHLORIDE AND ACETAMINOPHEN 1 TABLET: 5; 325 TABLET ORAL at 14:27

## 2017-12-07 RX ADMIN — ASPIRIN 81 MG: 81 TABLET, COATED ORAL at 09:02

## 2017-12-07 RX ADMIN — TICAGRELOR 90 MG: 90 TABLET ORAL at 09:02

## 2017-12-07 RX ADMIN — TICAGRELOR 90 MG: 90 TABLET ORAL at 18:14

## 2017-12-07 RX ADMIN — CYCLOBENZAPRINE HYDROCHLORIDE 5 MG: 10 TABLET, FILM COATED ORAL at 16:00

## 2017-12-07 RX ADMIN — OXYCODONE HYDROCHLORIDE AND ACETAMINOPHEN 1 TABLET: 5; 325 TABLET ORAL at 06:19

## 2017-12-07 RX ADMIN — ATORVASTATIN CALCIUM 10 MG: 10 TABLET, FILM COATED ORAL at 21:05

## 2017-12-07 RX ADMIN — IPRATROPIUM BROMIDE AND ALBUTEROL SULFATE 3 ML: 2.5; .5 SOLUTION RESPIRATORY (INHALATION) at 07:27

## 2017-12-07 RX ADMIN — Medication 10 ML: at 21:07

## 2017-12-07 RX ADMIN — Medication 10 ML: at 06:19

## 2017-12-07 RX ADMIN — Medication 10 ML: at 13:04

## 2017-12-07 RX ADMIN — IPRATROPIUM BROMIDE AND ALBUTEROL SULFATE 3 ML: 2.5; .5 SOLUTION RESPIRATORY (INHALATION) at 19:48

## 2017-12-07 RX ADMIN — TAMSULOSIN HYDROCHLORIDE 0.4 MG: 0.4 CAPSULE ORAL at 21:05

## 2017-12-07 RX ADMIN — SENNOSIDES AND DOCUSATE SODIUM 1 TABLET: 8.6; 5 TABLET ORAL at 09:02

## 2017-12-07 RX ADMIN — MAGNESIUM OXIDE TAB 400 MG (241.3 MG ELEMENTAL MG) 400 MG: 400 (241.3 MG) TAB at 18:14

## 2017-12-07 RX ADMIN — LABETALOL HYDROCHLORIDE 100 MG: 100 TABLET, FILM COATED ORAL at 09:12

## 2017-12-07 NOTE — PROGRESS NOTES
"  Cardiothoracic - Vascular Surgery Daily Note        LOS: 10 days   Patient Care Team:  AHSAN Mayer as PCP - General     POD2 CABGX3    Chief Complaint: Surgical Chest Pain      Subjective     The following portions of the patient's history were reviewed and updated as appropriate: allergies, current medications, past family history, past medical history, past social history, past surgical history and problem list.     Subjective:  Symptoms:  Stable.  He reports chest pain (surgical).    Diet:  Adequate intake.  No nausea.    Activity level: Impaired due to pain.    Pain:  He complains of pain that is moderate.  He reports pain is improving.  Pain is requiring pain medication and well controlled.          Objective     Vital Signs  Temp:  [98.3 °F (36.8 °C)-101.7 °F (38.7 °C)] 98.5 °F (36.9 °C)  Heart Rate:  [] 111  Resp:  [20] 20  BP: ()/(62-86) 116/78  Body mass index is 22.95 kg/(m^2).    Intake/Output Summary (Last 24 hours) at 12/07/17 1120  Last data filed at 12/07/17 1033   Gross per 24 hour   Intake              900 ml   Output              150 ml   Net              750 ml     I/O this shift:  In: 300 [P.O.:300]  Out: -     Wt Readings from Last 3 Encounters:   12/07/17 70.5 kg (155 lb 8 oz)   10/31/17 71.4 kg (157 lb 8 oz)   06/12/17 69.4 kg (153 lb)         Physical Exam   Objective:  General Appearance:  Uncomfortable and comfortable.    Vital signs: (most recent): Blood pressure 116/78, pulse 111, temperature 98.5 °F (36.9 °C), temperature source Oral, resp. rate 20, height 175.3 cm (69.02\"), weight 70.5 kg (155 lb 8 oz), SpO2 93 %.  Vital signs are normal.  Fever (blood cx-Neg tmax 101.5).    Output: Producing urine and producing stool.    HEENT: Normal HEENT exam.    Lungs:  Normal respiratory rate and normal effort.  There are decreased breath sounds (bases).  (On RA)  Heart: Tachycardia.    Abdomen: Abdomen is soft and non-distended.  Bowel sounds are normal.     Extremities: " Normal range of motion.  There is no dependent edema.    Pulses: Distal pulses are intact.    Neurological: Patient is alert and oriented to person, place and time.    Pupils:  Pupils are equal, round, and reactive to light.    Skin:  Warm.  (M/S INC: Sm amt sanginous drainage distal, Well approximated. prineo strip intact  LLE EVH: CDI)              Results Review:    Lab Results   Component Value Date    WBC 15.64 (H) 12/07/2017    HGB 10.9 (L) 12/07/2017    HCT 31.4 (L) 12/07/2017    MCV 83.5 12/07/2017     12/07/2017       Estimated Creatinine Clearance: 40 mL/min (by C-G formula based on Cr of 2.13).      Results from last 7 days  Lab Units 12/06/17  0212 12/05/17  0416 12/04/17  1245   MAGNESIUM mg/dL 2.3 2.4* 4.0*   PHOSPHORUS mg/dL  --   --  4.3       Lab Results   Component Value Date    GLUCOSE 99 12/07/2017    BUN 29 (H) 12/07/2017    CREATININE 2.13 (H) 12/07/2017    EGFRIFNONA 33 (L) 12/07/2017    BCR 13.6 12/07/2017    K 4.7 12/07/2017    CO2 25.0 12/07/2017    CALCIUM 9.1 12/07/2017    ALBUMIN 4.00 12/07/2017    LABIL2 1.2 12/07/2017    AST 46 12/07/2017    ALT 14 (L) 12/07/2017         Results from last 7 days  Lab Units 12/04/17  1245   INR  1.24*       Imaging Results (last 24 hours)     Procedure Component Value Units Date/Time    XR Chest 1 View [[id number]] Collected:  12/06/17 1211     Updated:  12/06/17 1653    Narrative:         PORTABLE CHEST    HISTORY: Chest tube removal. Right apical pneumothorax.    Portable AP upright film of the chest was obtained at 12:04 PM.  COMPARISON: December 5, 2017    Right sided chest tube or mediastinal drainage tube has been  removed.  Right internal jugular catheter sheath remains in place.  Extremely small residual right apical pneumothorax.  Small left pleural effusion.  Minimal subsegmental atelectasis or infiltrate left lung base.  Discoid atelectasis right lung base.  Sternotomy.  Minimal cardiomegaly.   The pulmonary vasculature is not  increased.  No acute osseous abnormality.  Internal fixation plate and screw device lower cervical spine.      Impression:       CONCLUSION:  Right sided chest tube or mediastinal drainage tube has been  removed.  Right internal jugular catheter sheath remains in place.  Extremely small residual right apical pneumothorax.  Small left pleural effusion.  Minimal subsegmental atelectasis or infiltrate left lung base.  Discoid atelectasis right lung base.  Sternotomy.  Minimal cardiomegaly.     [id number]    Electronically signed by:  Melvin Preciado MD  12/6/2017 4:52 PM CST  Workstation: [email or web address]                                  amiodarone 150 mg Intravenous Once   aspirin 81 mg Oral Daily   atorvastatin 10 mg Oral Daily   cyclobenzaprine 10 mg Oral TID   insulin aspart 0-24 Units Subcutaneous 4x Daily AC & at Bedtime   insulin detemir 20 Units Subcutaneous Nightly   ipratropium-albuterol 3 mL Nebulization 4x Daily - RT   labetalol 100 mg Oral Q12H   losartan 25 mg Oral Nightly   magnesium oxide 400 mg Oral BID   prenatal vitamin 27-0.8 1 tablet Oral Daily   sennosides-docusate sodium 1 tablet Oral BID   sodium chloride 1-10 mL Intravenous Q8H   tamsulosin 0.4 mg Oral Nightly   ticagrelor 90 mg Oral BID   vitamin C 500 mg Oral BID       amiodarone 1 mg/min             ASSESSMENT/PLAN     Principal Problem:    Coronary artery disease involving native coronary artery of native heart with unstable angina pectoris  Active Problems:    Chronic kidney disease, stage 3    NSTEMI (non-ST elevated myocardial infarction)      Assessment:    Condition: In stable condition.   (Stable Post Op CABG: Progressing well    CAD: ASA, Brilinta, Statin. Beta as BP tolerates. Low dose ARB    Fever: Aggressive respiratory interventions. BC negative. Urine cx pending.     Resp: Incentive. On RA. Aggressive Pulmonary Toilet    Pain: Percocet    Rehab: PT/OT, Ambulate. ARU consult-denied. SNF PC pending    Transient Post op Hyperglycemia: SSI  coverage. Continue Levemir  ).     Plan:   Encourage ambulation and out of bed and up to chair.  Continue respiratory treatments and start/continue incentive spirometry.  Advance diet as tolerated.  Increase analgesics and administer medications as ordered.   (Stable. Progress Care 3W.  Await SNF PC. Aggressive pulmonary toilet).                 This document has been electronically signed by AHSAN Castro on December 7, 2017 11:20 AM

## 2017-12-07 NOTE — THERAPY TREATMENT NOTE
Acute Care - Physical Therapy Treatment Note  [facility]     Patient Name: Lyle Corona  : 1965  MRN: [id number]  Today's Date: 2017  Onset of Illness/Injury or Date of Surgery Date: 17 (CABG 17)  Date of Referral to PT: 17  Referring Physician: AHSAN Oglesby    Admit Date: 2017    Visit Dx:    ICD-10-CM ICD-9-CM   1. Coronary arteriosclerosis I25.10 414.00   2. Unstable angina I20.0 411.1   3. NSTEMI (non-ST elevated myocardial infarction) I21.4 410.70   4. Coronary artery disease involving native coronary artery of native heart with unstable angina pectoris I25.110 414.01     411.1   5. Impaired functional mobility, balance, gait, and endurance Z74.09 V49.89   6. Impaired mobility and ADLs Z74.09 799.89     Patient Active Problem List   Diagnosis   • Essential hypertension   • Hypertensive emergency, no CHF   • Coronary artery disease involving native coronary artery of native heart with unstable angina pectoris   • Chronic kidney disease, stage 3   • LVH (left ventricular hypertrophy)   • NSTEMI (non-ST elevated myocardial infarction)   • Unstable angina               Adult Rehabilitation Note       17 1403 17 0845 17 1435    Rehab Assessment/Intervention    Discipline physical therapy assistant  -LN occupational therapy assistant  -KD     Document Type therapy note (daily note)  -LN therapy note (daily note)  -KD     Subjective Information complains of;fatigue;pain  -LN agree to therapy  -KD agree to therapy;complains of;pain  -LN    Precautions/Limitations cardiac precautions;fall precautions;sternal precautions  -LN cardiac precautions;fall precautions  -KD cardiac precautions;fall precautions;oxygen therapy device and L/min;sternal precautions  -LN    Recorded by [LN] Denise Herron PTA [KD] HARMEET Corona/DWAYNE [LN] Denise Herron PTA    Vital Signs    Pre Systolic BP Rehab 118  -  -  -LN    Pre Treatment Diastolic BP 75  -LN  66  -KD 84  -LN    Post Systolic BP Rehab 130  -LN  135  -LN    Post Treatment Diastolic BP 74   nsg aware  -LN  68  -LN    Pretreatment Heart Rate (beats/min) 107  -LN 94  -  -LN    Intratreatment Heart Rate (beats/min) 118  -LN  82  -LN    Posttreatment Heart Rate (beats/min) 113  -  -  -LN    Pre SpO2 (%) --   unable to obtain sat reading before/after rx-nsg aware  -LN 94  -KD 88  -LN    O2 Delivery Pre Treatment  room air  -KD room air   nc laying in bed-put 02 back on 93%  -LN    Intra SpO2 (%)   93  -LN    Post SpO2 (%)  96  -KD 94  -LN    O2 Delivery Post Treatment  room air  -KD     Pre Patient Position Supine  -LN Supine  -KD Supine  -LN    Intra Patient Position Standing  -LN Standing  -KD Standing  -LN    Post Patient Position Supine  -LN Sitting  -KD Supine  -LN    Recorded by [LN] Denise Herron, LINDA [KD] Margaret Bower, GA/L [LN] Denise Herron, LINDA    Pain Assessment    Pain Assessment 0-10  -LN 0-10  -KD 0-10  -LN    Pain Score 8  -LN 8  -KD 5  -LN    Post Pain Score 8  -LN 8  -KD 7  -LN    Pain Type Acute pain;Surgical pain  -LN Acute pain;Surgical pain  -KD Acute pain;Surgical pain  -LN    Pain Location Chest  -LN Chest  -KD     Pain Intervention(s) Medication (See MAR)  -LN Medication (See MAR)  -KD Medication (See MAR)  -LN    Recorded by [LN] Denise Herron, PTA [KD] TUCKER CornoaA/L [LN] Denise Herron, LINDA    Vision Assessment/Intervention    Visual Impairment  WFL with corrective lenses  -KD     Recorded by  [KD] Margaret Bower, GA/L     Cognitive Assessment/Intervention    Current Cognitive/Communication Assessment  functional  -KD     Orientation Status oriented to;person     -LN oriented x 4  -KD oriented to;person     -LN    Follows Commands/Answers Questions  100% of the time  -KD     Personal Safety  fully aware of deficits  -KD     Personal Safety Interventions  fall prevention program maintained  -KD     Recorded by [LN] Denise Herron, PTA [KD] Margaret Bower,  GA/L [LN] Denise Herron, PTA    Bed Mobility, Assessment/Treatment    Bed Mobility, Assistive Device   head of bed elevated  -LN    Bed Mob, Supine to Sit, [place] contact guard assist  -LN conditional independence  -KD supervision required  -LN    Bed Mob, Sit to Supine, [place] conditional independence  -LN  supervision required  -LN    Bed Mob, Sidelying to Sit, [place]  conditional independence  -KD     Recorded by [LN] Denise Herron, PTA [KD] Margaret Bower, GA/L [LN] Denise Herron, PTA    Transfer Assessment/Treatment    Transfers, Bed-Chair [place]  conditional independence  -KD     Transfers, Sit-Stand [place] independent  -LN supervision required  -KD supervision required  -LN    Transfers, Stand-Sit [place] independent  -LN supervision required  -KD supervision required  -LN    Transfers, Sit-Stand-Sit, Assist Device   --   none  -LN    Toilet Transfer, [place]   not tested  -LN    Recorded by [LN] Denise Herron, PTA [KD] Margaret Bower, GA/L [LN] Denise Herron, PTA    Gait Assessment/Treatment    Gait, [place] Level stand by assist  -LN  minimum assist (75% patient effort);stand by assist  -LN    Gait, Assistive Device --   none  -LN  rolling walker  -LN    Gait, Distance (Feet) 354  -LN  50   350  -LN    Gait, Comment occasional reaching for rails but no noticeable lob or unsteadiness  -LN  50' w/o ad pt with several lob and reaching for rail,finished walking with rw  -LN    Recorded by [LN] Denise Herron, PTA  [LN] Denise Herron, PTA    Functional Mobility    Functional Mobility- Ind. Level  supervision required  -KD     Functional Mobility- Device  rolling walker  -KD     Functional Mobility-Distance (Feet)  --   15 x 2  -KD     Recorded by  [KD] Margaret Bower, GA/L     Toileting Assessment/Training    Toileting Assess/Train, Assistive Device  grab bars  -KD     Toileting Assess/Train, Position  sitting  -KD     Toileting Assess/Train, Indepen Level   supervision required  -KD     Toileting Assess/Train, Comment  Pt t/f to BR x 2 trips  -KD     Recorded by  [KD] MARK Corona     Grooming Assessment/Training    Grooming Assess/Train, Assistive Device  --   wash hands  -KD     Grooming Assess/Train, Position  sink side;standing  -KD     Grooming Assess/Train, Indepen Level  supervision required  -KD     Recorded by  [KD] MARK Corona     Therapy Exercises    Bilateral Lower Extremities AROM:;15 reps;20 reps;ankle pumps/circles;sitting;hip flexion;LAQ  -LN      Bilateral Upper Extremity  AROM:;20 reps;sitting;elbow flexion/extension;pronation/supination;shoulder abduction/adduction;shoulder ER/IR  -KD     BUE Resistance  manual resistance- minimal   1 lb HW  -KD     Recorded by [LN] Denise Herron PTA [KD] MARK Corona     Positioning and Restraints    Pre-Treatment Position  in bed  -KD     Post Treatment Position bed  -LN chair  -KD bed  -LN    In Bed notified nsg;supine;call light within reach;encouraged to call for assist  -LN  supine;call light within reach;encouraged to call for assist;exit alarm on  -LN    In Chair  sitting;call light within reach;encouraged to call for assist  -KD     Recorded by [LN] Denise Herron PTA [KD] MARK Corona [LN] Denise Herron PTA      12/06/17 0915          Rehab Assessment/Intervention    Discipline occupational therapy assistant  -KD      Document Type therapy note (daily note)  -KD      Subjective Information agree to therapy  -KD      Patient Effort, Rehab Treatment good  -KD      Recorded by [KD] MARK Corona      Vital Signs    Pre Systolic BP Rehab 110  -KD      Pre Treatment Diastolic BP 84  -KD      Pretreatment Heart Rate (beats/min) 95  -KD      Posttreatment Heart Rate (beats/min) 96  -KD      Pre SpO2 (%) 95  -KD      O2 Delivery Pre Treatment supplemental O2  -KD      Post SpO2 (%) 97  -KD      O2 Delivery Post Treatment supplemental O2  -KD      Pre Patient Position  Supine  -KD      Intra Patient Position Standing  -KD      Post Patient Position Sitting  -KD      Recorded by [KD] MARK Corona      Pain Assessment    Pain Assessment 0-10  -KD      Pain Score 9  -KD      Post Pain Score 9  -KD      Pain Type Surgical pain  -KD      Pain Location Chest  -KD      Pain Intervention(s) Medication (See MAR)  -KD      Recorded by [KD] HARMEET Corona/L      Vision Assessment/Intervention    Visual Impairment WFL with corrective lenses  -KD      Recorded by [KD] HARMEET Corona/L      Cognitive Assessment/Intervention    Current Cognitive/Communication Assessment functional  -KD      Orientation Status oriented x 4  -KD      Follows Commands/Answers Questions 100% of the time  -KD      Personal Safety fully aware of deficits  -KD      Personal Safety Interventions fall prevention program maintained;gait belt;nonskid shoes/slippers when out of bed  -KD      Recorded by [KD] HARMEET Corona/L      Transfer Assessment/Treatment    Transfers, Sit-Stand [place] supervision required  -KD      Transfers, Stand-Sit [place] supervision required  -KD      Toilet Transfer, [place] contact guard assist  -KD      Toilet Transfer, Assistive Device rolling walker  -KD      Recorded by [KD] HARMEET Corona/L      Functional Mobility    Functional Mobility- Ind. Level contact guard assist  -KD      Functional Mobility- Device rolling walker  -KD      Functional Mobility-Distance (Feet) --   10 x 2  -KD      Recorded by [KD] HARMEET Corona/L      Upper Body Bathing Assessment/Training    UB Bathing Assess/Train Assistive Device bath mitt  -KD      UB Bathing Assess/Train, Position edge of bed;sitting  -KD      UB Bathing Assess/Train, [place] Level set up required  -KD      Recorded by [KD] HARMEET Corona/L      Lower Body Bathing Assessment/Training    LB Bathing Assess/Train Assistive Device bath mitt  -KD      LB Bathing Assess/Train,  Position edge of bed;sitting  -KD      LB Bathing Assess/Train, [place] Level moderate assist (50% patient effort)  -KD      LB Bathing Assess/Train, Impairments strength decreased  -KD      Recorded by [KD] HARMEET Corona/L      Upper Body Dressing Assessment/Training    UB Dressing Assess/Train, Clothing Type doffing:;donning:;hospital gown  -KD      UB Dressing Assess/Train, Position edge of bed;sitting  -KD      UB Dressing Assess/Train, [place] supervision required  -KD      Recorded by [KD] HARMEET Corona/L      Lower Body Dressing Assessment/Training    LB Dressing Assess/Train, Clothing Type doffing:;donning:;slipper socks  -KD      LB Dressing Assess/Train, Position edge of bed;sitting  -KD      LB Dressing Assess/Train, [place] moderate assist (50% patient effort)  -KD      LB Dressing Assess/Train, Impairments strength decreased  -KD      Recorded by [KD] HARMEET Corona/L      Toileting Assessment/Training    Toileting Assess/Train, Assistive Device grab bars  -KD      Toileting Assess/Train, Position edge of bed;sitting  -KD      Toileting Assess/Train, [place] Level supervision required  -KD      Recorded by [KD] HARMEET Corona/L      Grooming Assessment/Training    Grooming Assess/Train, Assistive Device --   wash face  -KD      Grooming Assess/Train, Position edge of bed;sitting  -KD      Grooming Assess/Train, [place] Level conditional independence  -KD      Recorded by [KD] MARK Corona      Positioning and Restraints    Pre-Treatment Position sitting in chair/recliner  -KD      Post Treatment Position chair  -KD      In Chair sitting;call light within reach;encouraged to call for assist  -KD      Recorded by [KD] HARMEET Corona/L        User Key  (r) = Recorded By, (t) = Taken By, (c) = Cosigned By    Initials Name Effective Dates    BEBE Herron, PTA 10/17/16 -     KD MARK Corona 10/17/16 -                 IP PT Goals        12/07/17 1403 12/06/17 1435 12/05/17 1452    Bed Mobility PT LTG    Bed Mobility PT LTG, Date Established   12/05/17  -GB    Bed Mobility PT LTG, Time to Achieve   by discharge  -GB    Bed Mobility PT LTG, Activity Type   roll left/roll right;supine to sit/sit to supine  -GB    Bed Mobility PT LTG, [place] Level  1 person + 1 person to manage equipment  -LN conditional independence  -GB    Bed Mobility PT LTG, Date Goal Reviewed 12/07/17  -LN 12/06/17  -LN     Bed Mobility PT LTG, Outcome goal not met  -LN goal not met  -LN goal not met  -GB    Transfer Training PT LTG    Transfer Training PT LTG, Date Established   12/05/17  -GB    Transfer Training PT LTG, Time to Achieve   by discharge  -GB    Transfer Training PT LTG, Activity Type   bed to chair /chair to bed;sit to stand/stand to sit  -GB    Transfer Training PT LTG, [place] Level   conditional independence  -GB    Transfer Training PT  LTG, Date Goal Reviewed 12/07/17  -LN 12/06/17  -LN     Transfer Training PT LTG, Outcome goal not met  -LN goal not met  -LN goal not met  -GB    Gait Training PT LTG    Gait Training Goal PT LTG, Date Established   12/05/17  -GB    Gait Training Goal PT LTG, Time to Achieve   by discharge  -GB    Gait Training Goal PT LTG, [place] Level   conditional independence  -GB    Gait Training Goal PT LTG, Date Goal Reviewed 12/07/17  -LN 12/06/17  -LN     Gait Training Goal PT LTG, Outcome goal not met  -LN goal not met  -LN goal not met  -GB    Stair Training PT LTG    Stair Training Goal PT LTG, Date Established 12/07/17  -LN  12/05/17  -GB    Stair Training Goal PT LTG, Time to Achieve   by discharge  -GB    Stair Training Goal PT LTG, Number of Steps   4  -GB    Stair Training Goal PT LTG, [place] Level   conditional independence  -GB    Stair Training Goal PT LTG, Date Goal Reviewed 12/07/17  -LN 12/06/17  -LN     Stair Training Goal PT LTG, Outcome goal not met  -LN goal not met  -LN goal not met  -GB       User Key  (r) = Recorded By, (t) = Taken By, (c) = Cosigned By    Initials Name Provider Type    JENNIFER Chandler, PT Physical Therapist    BEBE Herron PTA Physical Therapy Assistant          Physical Therapy Education     Title: PT OT SLP Therapies (Active)     Topic: Physical Therapy (Active)     Point: Mobility training (Active)    Learning Progress Summary    Learner Readiness Method Response Comment Documented by Status   Patient Acceptance E NR cardiac precautions  12/06/17 1650 Active    Acceptance E,D NR,DU,VU POC; coughing w/ abdominal muscles; use of pillow to help cough; tx  12/05/17 1450 Done               Point: Home exercise program (Done)    Learning Progress Summary    Learner Readiness Method Response Comment Documented by Status   Patient Acceptance E,D NR,DU,VU POC; coughing w/ abdominal muscles; use of pillow to help cough; tx  12/05/17 1450 Done               Point: Body mechanics (Active)    Learning Progress Summary    Learner Readiness Method Response Comment Documented by Status   Patient Acceptance E NR cardiac precautions  12/06/17 1650 Active    Acceptance E,D NR,DU,VU POC; coughing w/ abdominal muscles; use of pillow to help cough; tx  12/05/17 1450 Done               Point: Precautions (Active)    Learning Progress Summary    Learner Readiness Method Response Comment Documented by Status   Patient Acceptance E NR cardiac precautions  12/06/17 1650 Active    Acceptance E,D NR,DU,VU POC; coughing w/ abdominal muscles; use of pillow to help cough; tx  12/05/17 1450 Done                      User Key     Initials Effective Dates Name Provider Type Discipline     10/17/16 -  Paige Chandler, PT Physical Therapist PT     10/17/16 -  Denise Herron PTA Physical Therapy Assistant PT                    PT Recommendation and Plan  Anticipated Equipment Needs At Discharge: front wheeled walker  Anticipated Discharge Disposition: home with home  health  Planned Therapy Interventions: bed mobility training, gait training, home exercise program, stair training, strengthening, transfer training  PT Frequency: daily  Plan of Care Review  Plan Of Care Reviewed With: patient  Progress: progress toward functional goals as expected  Outcome Summary/Follow up Plan: sup-sit cga of 1,sit-sup cond ind,sit-stand-sit ind,amb 354' w/o ad and sba of 1,reviewed cardiac precautions and needs v.c's not to push through arms when getting back to bed-no new goals met-if d/c would benefit from cardiac rehab          Outcome Measures       12/07/17 1403 12/07/17 0845 12/06/17 1435    How much help from another person do you currently need...    Turning from your back to your side while in flat bed without using bedrails? 3  -LN  3  -LN    Moving from lying on back to sitting on the side of a flat bed without bedrails? 3  -LN  3  -LN    Moving to and from a bed to a chair (including a wheelchair)? 2  -LN  3  -LN    Standing up from a chair using your arms (e.g., wheelchair, bedside chair)? 4  -LN  3  -LN    Climbing 3-5 steps with a railing? 3  -LN  3  -LN    To walk in hospital room? 3  -LN  3  -LN    AM-PAC 6 Clicks Score 18  -LN  18  -LN    How much help from another is currently needed...    Putting on and taking off regular lower body clothing?  3  -KD     Bathing (including washing, rinsing, and drying)  3  -KD     Toileting (which includes using toilet bed pan or urinal)  3  -KD     Putting on and taking off regular upper body clothing  4  -KD     Taking care of personal grooming (such as brushing teeth)  4  -KD     Eating meals  4  -KD     Score  21  -KD     Functional Assessment    Outcome Measure Options AM-PAC 6 Clicks Basic Mobility (PT)  -LN  AM-PAC 6 Clicks Basic Mobility (PT)  -LN      12/06/17 0915 12/05/17 1424 12/05/17 1400    How much help from another person do you currently need...    Turning from your back to your side while in flat bed without using bedrails?    3  -GB    Moving from lying on back to sitting on the side of a flat bed without bedrails?   3  -GB    Moving to and from a bed to a chair (including a wheelchair)?   2  -GB    Standing up from a chair using your arms (e.g., wheelchair, bedside chair)?   2  -GB    Climbing 3-5 steps with a railing?   1  -GB    To walk in hospital room?   1  -GB    AM-PAC 6 Clicks Score   12  -GB    How much help from another is currently needed...    Putting on and taking off regular lower body clothing? 2  -KD 1  -BH     Bathing (including washing, rinsing, and drying) 2  -KD 1  -BH     Toileting (which includes using toilet bed pan or urinal) 3  -KD 2  -BH     Putting on and taking off regular upper body clothing 4  -KD 2  -BH     Taking care of personal grooming (such as brushing teeth) 4  -KD 3  -BH     Eating meals 4  -KD 3  -BH     Score 19  -KD 12  -BH     Functional Assessment    Outcome Measure Options  AM-PAC 6 Clicks Daily Activity (OT)  -BH AM-PAC 6 Clicks Basic Mobility (PT)  -GB      User Key  (r) = Recorded By, (t) = Taken By, (c) = Cosigned By    Initials Name Provider Type     Paige Chandler, PT Physical Therapist     Rossy Ospina, OTR/L Occupational Therapist    BEBE Herron PTA Physical Therapy Assistant     Margaret Bower GA/L Occupational Therapy Assistant           Time Calculation:         PT Charges       12/07/17 1403          Time Calculation    Start Time 1403  -LN      Stop Time 1426  -LN      Time Calculation (min) 23 min  -LN      PT Received On 12/07/17  -LN      Time Calculation- PT    Total Timed Code Minutes- PT 23 minute(s)  -LN        User Key  (r) = Recorded By, (t) = Taken By, (c) = Cosigned By    Initials Name Provider Type    BEBE Herron PTA Physical Therapy Assistant          Therapy Charges for Today     Code Description Service Date Service Provider Modifiers Qty    95835267263  GAIT TRAINING EA 15 MIN 12/6/2017 Denise Herron PTA GP 1    81223853064 HC PT  THERAPEUTIC ACT EA 15 MIN 12/6/2017 Denise Herron, PTA GP 1    74533495196 HC PT SELF CARE/MGMT/TRAIN EA 15 MIN 12/6/2017 Denise Herron, PTA GP 1    40662564163 HC GAIT TRAINING EA 15 MIN 12/7/2017 Denise CAMARENA Germaine, PTA GP 1    50394992628 HC PT THER PROC EA 15 MIN 12/7/2017 Denise CAMARENA Germaine, PTA GP 1          PT G-Codes  PT Professional Judgement Used?: Yes  Outcome Measure Options: AM-PAC 6 Clicks Basic Mobility (PT)  Score: 12  Functional Limitation: Mobility: Walking and moving around  Mobility: Walking and Moving Around Current Status (): At least 60 percent but less than 80 percent impaired, limited or restricted  Mobility: Walking and Moving Around Goal Status (): At least 1 percent but less than 20 percent impaired, limited or restricted    Denise S LINDA Herron  12/7/2017

## 2017-12-07 NOTE — THERAPY TREATMENT NOTE
Acute Care - Occupational Therapy Treatment Note  [facility]     Patient Name: Lyle Corona  : 1965  MRN: [id number]  Today's Date: 2017  Onset of Illness/Injury or Date of Surgery Date: 17 (CABG 17)  Date of Referral to OT: 17  Referring Physician: AHSAN Oglesby      Admit Date: 2017    Visit Dx:     ICD-10-CM ICD-9-CM   1. Coronary arteriosclerosis I25.10 414.00   2. Unstable angina I20.0 411.1   3. NSTEMI (non-ST elevated myocardial infarction) I21.4 410.70   4. Coronary artery disease involving native coronary artery of native heart with unstable angina pectoris I25.110 414.01     411.1   5. Impaired functional mobility, balance, gait, and endurance Z74.09 V49.89   6. Impaired mobility and ADLs Z74.09 799.89     Patient Active Problem List   Diagnosis   • Essential hypertension   • Hypertensive emergency, no CHF   • Coronary artery disease involving native coronary artery of native heart with unstable angina pectoris   • Chronic kidney disease, stage 3   • LVH (left ventricular hypertrophy)   • NSTEMI (non-ST elevated myocardial infarction)   • Unstable angina             Adult Rehabilitation Note       17 0845 17 1435 17 0915    Rehab Assessment/Intervention    Discipline occupational therapy assistant  -KD  occupational therapy assistant  -KD    Document Type therapy note (daily note)  -KD  therapy note (daily note)  -KD    Subjective Information agree to therapy  -KD agree to therapy;complains of;pain  -LN agree to therapy  -KD    Patient Effort, Rehab Treatment   good  -KD    Precautions/Limitations cardiac precautions;fall precautions  -KD cardiac precautions;fall precautions;oxygen therapy device and L/min;sternal precautions  -LN     Recorded by [KD] Margaret Bower, GA/L [LN] Denise Herron PTA [KD] TUCKER CoronaA/L    Vital Signs    Pre Systolic BP Rehab 120  -  -  -KD    Pre Treatment Diastolic BP 66  -KD 84  -LN  84  -KD    Post Systolic BP Rehab  135  -LN     Post Treatment Diastolic BP  68  -LN     Pretreatment Heart Rate (beats/min) 94  -  -LN 95  -KD    Intratreatment Heart Rate (beats/min)  82  -LN     Posttreatment Heart Rate (beats/min) 100  -  -LN 96  -KD    Pre SpO2 (%) 94  -KD 88  -LN 95  -KD    O2 Delivery Pre Treatment room air  -KD room air   nc laying in bed-put 02 back on 93%  -LN supplemental O2  -KD    Intra SpO2 (%)  93  -LN     Post SpO2 (%) 96  -KD 94  -LN 97  -KD    O2 Delivery Post Treatment room air  -KD  supplemental O2  -KD    Pre Patient Position Supine  -KD Supine  -LN Supine  -KD    Intra Patient Position Standing  -KD Standing  -LN Standing  -KD    Post Patient Position Sitting  -KD Supine  -LN Sitting  -KD    Recorded by [KD] TUCKER CoronaA/L [LN] Denise Herron, PTA [KD] Margaret Bower GA/L    Pain Assessment    Pain Assessment 0-10  -KD 0-10  -LN 0-10  -KD    Pain Score 8  -KD 5  -LN 9  -KD    Post Pain Score 8  -KD 7  -LN 9  -KD    Pain Type Acute pain;Surgical pain  -KD Acute pain;Surgical pain  -LN Surgical pain  -KD    Pain Location Chest  -KD  Chest  -KD    Pain Intervention(s) Medication (See MAR)  -KD Medication (See MAR)  -LN Medication (See MAR)  -KD    Recorded by [KD] Margaret Bower GA/L [LN] Denise Herron, PTA [KD] Margaret Bower GA/L    Vision Assessment/Intervention    Visual Impairment WFL with corrective lenses  -KD  WFL with corrective lenses  -KD    Recorded by [KD] TUCKER CoronaA/L  [KD] Margaret Bower, GA/L    Cognitive Assessment/Intervention    Current Cognitive/Communication Assessment functional  -KD  functional  -KD    Orientation Status oriented x 4  -KD oriented to;person     -LN oriented x 4  -KD    Follows Commands/Answers Questions 100% of the time  -KD  100% of the time  -KD    Personal Safety fully aware of deficits  -KD  fully aware of deficits  -KD    Personal Safety Interventions fall prevention program maintained  -KD  fall  prevention program maintained;gait belt;nonskid shoes/slippers when out of bed  -KD    Recorded by [KD] HARMEET oCrona/DWAYNE [LN] Denise Herron PTA [KD] HARMEET Corona/L    Bed Mobility, Assessment/Treatment    Bed Mobility, Assistive Device  head of bed elevated  -LN     Bed Mob, Supine to Sit, [place] conditional independence  -KD supervision required  -LN     Bed Mob, Sit to Supine, [place]  supervision required  -LN     Bed Mob, Sidelying to Sit, [place] conditional independence  -KD      Recorded by [KD] HARMEET Corona/DWAYNE [LN] Denise Herron PTA     Transfer Assessment/Treatment    Transfers, Bed-Chair [place] conditional independence  -KD      Transfers, Sit-Stand [place] supervision required  -KD supervision required  -LN supervision required  -KD    Transfers, Stand-Sit [place] supervision required  -KD supervision required  -LN supervision required  -KD    Transfers, Sit-Stand-Sit, Assist Device  --   none  -LN     Toilet Transfer, [place]  not tested  -LN contact guard assist  -KD    Toilet Transfer, Assistive Device   rolling walker  -KD    Recorded by [KD] HARMEET Corona/DWAYNE [LN] Denise Herron, PTA [KD] TUCKER CoronaA/L    Gait Assessment/Treatment    Gait, [place] Level  minimum assist (75% patient effort);stand by assist  -LN     Gait, Assistive Device  rolling walker  -LN     Gait, Distance (Feet)  50   350  -LN     Gait, Comment  50' w/o ad pt with several lob and reaching for rail,finished walking with rw  -LN     Recorded by  [LN] Denise Herron PTA     Functional Mobility    Functional Mobility- Ind. Level supervision required  -KD  contact guard assist  -KD    Functional Mobility- Device rolling walker  -KD  rolling walker  -KD    Functional Mobility-Distance (Feet) --   15 x 2  -KD  --   10 x 2  -KD    Recorded by [KD] HARMEET Corona/DWAYNE  [KD] HARMEET Corona/L    Upper Body Bathing Assessment/Training    UB Bathing  Assess/Train Assistive Device   bath mitt  -KD    UB Bathing Assess/Train, Position   edge of bed;sitting  -KD    UB Bathing Assess/Train, [place] Level   set up required  -KD    Recorded by   [KD] HARMEET Corona/L    Lower Body Bathing Assessment/Training    LB Bathing Assess/Train Assistive Device   bath mitt  -KD    LB Bathing Assess/Train, Position   edge of bed;sitting  -KD    LB Bathing Assess/Train, [place] Level   moderate assist (50% patient effort)  -KD    LB Bathing Assess/Train, Impairments   strength decreased  -KD    Recorded by   [KD] HARMEET Corona/L    Upper Body Dressing Assessment/Training    UB Dressing Assess/Train, Clothing Type   doffing:;donning:;hospital gown  -KD    UB Dressing Assess/Train, Position   edge of bed;sitting  -KD    UB Dressing Assess/Train, [place]   supervision required  -KD    Recorded by   [KD] HRAMEET Corona/L    Lower Body Dressing Assessment/Training    LB Dressing Assess/Train, Clothing Type   doffing:;donning:;slipper socks  -KD    LB Dressing Assess/Train, Position   edge of bed;sitting  -KD    LB Dressing Assess/Train, [place]   moderate assist (50% patient effort)  -KD    LB Dressing Assess/Train, Impairments   strength decreased  -KD    Recorded by   [KD] HARMEET Corona/L    Toileting Assessment/Training    Toileting Assess/Train, Assistive Device grab bars  -KD  grab bars  -KD    Toileting Assess/Train, Position sitting  -KD  edge of bed;sitting  -KD    Toileting Assess/Train, Indepen Level supervision required  -KD  supervision required  -KD    Toileting Assess/Train, Comment Pt t/f to BR x 2 trips  -KD      Recorded by [KD] HARMEET Corona/DWAYNE  [KD] HARMEET Corona/L    Grooming Assessment/Training    Grooming Assess/Train, Assistive Device --   wash hands  -KD  --   wash face  -KD    Grooming Assess/Train, Position sink side;standing  -KD  edge of bed;sitting  -KD    Grooming Assess/Train, [place]ep[place] Level  supervision required  -KD  conditional independence  -KD    Recorded by [KD] HARMEET Corona/DWAYNE  [KD] HARMEET Corona/L    Therapy Exercises    Bilateral Upper Extremity AROM:;20 reps;sitting;elbow flexion/extension;pronation/supination;shoulder abduction/adduction;shoulder ER/IR  -KD      BUE Resistance manual resistance- minimal   1 lb HW  -KD      Recorded by [KD] HARMEET Corona/L      Positioning and Restraints    Pre-Treatment Position in bed  -KD  sitting in chair/recliner  -KD    Post Treatment Position chair  -KD bed  -LN chair  -KD    In Bed  supine;call light within reach;encouraged to call for assist;exit alarm on  -LN     In Chair sitting;call light within reach;encouraged to call for assist  -KD  sitting;call light within reach;encouraged to call for assist  -KD    Recorded by [KD] HARMEET Corona/DWAYNE [LN] Denise Herron, PTA [KD] TUCKER CoronaA/L      User Key  (r) = Recorded By, (t) = Taken By, (c) = Cosigned By    Initials Name Effective Dates    LN Denise Herron, PTA 10/17/16 -     KD HARMEET Corona/DWAYNE 10/17/16 -                 OT Goals       12/07/17 1310 12/07/17 0845 12/06/17 0915    Transfer Training OT LTG    Transfer Training OT LTG, Date Goal Reviewed (P)  12/07/17  -KD  12/06/17  -KD    Transfer Training OT LTG, Outcome (P)  goal not met  -KD  goal not met  -KD    Patient Education OT LTG    Patient Education OT LTG, Date Goal Reviewed (P)  12/07/17  -KD  12/06/17  -KD    Patient Education OT LTG Outcome (P)  goal not met  -KD  goal not met  -KD    ADL OT LTG    ADL OT LTG, Date Goal Reviewed (P)  12/07/17  -KD  12/06/17  -KD    ADL OT LTG, Outcome (P)  goal not met  -KD  goal not met  -KD    Functional Mobility OT LTG    Functional Mobility Goal OT LTG, Date Goal Reviewed   12/06/17  -KD    Functional Mobility Goal OT LTG, Outcome (P)  goal not met  -KD  goal not met  -KD    Endurance OT LTG    Endurance Goal OT LTG, Date Goal Reviewed  (P)  12/07/17  -KD 12/06/17   -    Endurance Goal OT LTG, Outcome  (P)  goal not met  -KD       12/05/17 1424          Transfer Training OT LTG    Transfer Training OT LTG, Date Established 12/05/17  -      Transfer Training OT LTG, Time to Achieve by discharge  -      Transfer Training OT LTG, Activity Type toilet  -      Transfer Training OT LTG, [place] Level supervision required  -      Patient Education OT LTG    Patient Education OT LTG, Date Established 12/05/17  -      Patient Education OT LTG, Time to Achieve by discharge  -      Patient Education OT LTG, Education Type HEP;precautions per surgeon;home safety;energy conservation;adaptive equipment mgmt  -      Patient Education OT LTG, Education Understanding independent;demonstrates adequately;verbalizes understanding  -      ADL OT LTG    ADL OT LTG, Date Established 12/05/17  -      ADL OT LTG, Time to Achieve by discharge  -      ADL OT LTG, Activity Type ADL skills  -      ADL OT LTG, [place] Level standby assist  -      Functional Mobility OT LTG    Functional Mobility Goal OT LTG, Date Established 12/05/17  -      Functional Mobility Goal OT LTG, Time to Achieve by discharge  -      Functional Mobility Goal OT LTG, [place] Level supervision  -      Functional Mobility Goal OT LTG, Distance to Achieve to the sink;to the bathroom  -      Endurance OT LTG    Endurance Goal OT LTG, Date Established 12/05/17  -      Endurance Goal OT LTG, Time to Achieve by discharge  -      Endurance Goal OT LTG, Activity Level endurance 2 good-  -        User Key  (r) = Recorded By, (t) = Taken By, (c) = Cosigned By    Initials Name Provider Type     VALENTÍN Herrera/L Occupational Therapist    HARMEET Trujillo/L Occupational Therapy Assistant          Occupational Therapy Education     Title: PT OT SLP Therapies (Active)     Topic: Occupational Therapy (Active)     Point: ADL training (Done)    Description: Instruct learner(s) on  proper safety adaptation and remediation techniques during self care or transfers.   Instruct in proper use of assistive devices.    Learning Progress Summary    Learner Readiness Method Response Comment Documented by Status   Patient Acceptance E VU Educated about OT and POC. Educated on CABG precuations. Educated to call for assist and not get up on his own.  12/05/17 1520 Done               Point: Precautions (Done)    Description: Instruct learner(s) on prescribed precautions during self-care and functional transfers.    Learning Progress Summary    Learner Readiness Method Response Comment Documented by Status   Patient Acceptance E VU Educated about OT and POC. Educated on CABG precuations. Educated to call for assist and not get up on his own.  12/05/17 1520 Done               Point: Body mechanics (Done)    Description: Instruct learner(s) on proper positioning and spine alignment during self-care, functional mobility activities and/or exercises.    Learning Progress Summary    Learner Readiness Method Response Comment Documented by Status   Patient Acceptance E VU Educated about OT and POC. Educated on CABG precuations. Educated to call for assist and not get up on his own.  12/05/17 1520 Done                      User Key     Initials Effective Dates Name Provider Type Discipline     10/17/16 -  Rossy Ospina OTR/L Occupational Therapist OT                  OT Recommendation and Plan  Anticipated Discharge Disposition: inpatient rehabilitation facility  Planned Therapy Interventions: activity intolerance, adaptive equipment training, ADL retraining, IADL retraining, balance training, bed mobility training, energy conservation, fine motor coordination training, home exercise program, motor coordination training, ROM (Range of Motion), strengthening, transfer training  Therapy Frequency: other (see comments) (3-14x a week)  Plan of Care Review  Outcome Summary/Follow up Plan: (P) Bed mobility- cond  indep, sit-stand-sit-SBA, t/f's CGA. Pt with pain of 8 in chest nsg notified, meds given        Outcome Measures       12/07/17 0845 12/06/17 1435 12/06/17 0915    How much help from another person do you currently need...    Turning from your back to your side while in flat bed without using bedrails?  3  -LN     Moving from lying on back to sitting on the side of a flat bed without bedrails?  3  -LN     Moving to and from a bed to a chair (including a wheelchair)?  3  -LN     Standing up from a chair using your arms (e.g., wheelchair, bedside chair)?  3  -LN     Climbing 3-5 steps with a railing?  3  -LN     To walk in hospital room?  3  -LN     AM-PAC 6 Clicks Score  18  -LN     How much help from another is currently needed...    Putting on and taking off regular lower body clothing? 3  -KD  2  -KD    Bathing (including washing, rinsing, and drying) 3  -KD  2  -KD    Toileting (which includes using toilet bed pan or urinal) 3  -KD  3  -KD    Putting on and taking off regular upper body clothing 4  -KD  4  -KD    Taking care of personal grooming (such as brushing teeth) 4  -KD  4  -KD    Eating meals 4  -KD  4  -KD    Score 21  -KD  19  -KD    Functional Assessment    Outcome Measure Options  AM-PAC 6 Clicks Basic Mobility (PT)  -LN       12/05/17 1424 12/05/17 1400       How much help from another person do you currently need...    Turning from your back to your side while in flat bed without using bedrails?  3  -GB     Moving from lying on back to sitting on the side of a flat bed without bedrails?  3  -GB     Moving to and from a bed to a chair (including a wheelchair)?  2  -GB     Standing up from a chair using your arms (e.g., wheelchair, bedside chair)?  2  -GB     Climbing 3-5 steps with a railing?  1  -GB     To walk in hospital room?  1  -GB     AM-PAC 6 Clicks Score  12  -GB     How much help from another is currently needed...    Putting on and taking off regular lower body clothing? 1  -BH       Bathing (including washing, rinsing, and drying) 1  -      Toileting (which includes using toilet bed pan or urinal) 2  -BH      Putting on and taking off regular upper body clothing 2  -      Taking care of personal grooming (such as brushing teeth) 3  -      Eating meals 3  -      Score 12  -      Functional Assessment    Outcome Measure Options AM-PAC 6 Clicks Daily Activity (OT)  - AM-PAC 6 Clicks Basic Mobility (PT)  -GB       User Key  (r) = Recorded By, (t) = Taken By, (c) = Cosigned By    Initials Name Provider Type    JENNIFER Chandler, PT Physical Therapist     Rossy Ospina, OTR/L Occupational Therapist    LN Denise Herron, PTA Physical Therapy Assistant    JENNIFER Bower GA/L Occupational Therapy Assistant           Time Calculation:         Time Calculation- OT       12/07/17 1313          Time Calculation- OT    OT Start Time 0845  -KD      OT Stop Time 0925  -KD      OT Time Calculation (min) 40 min  -KD      Total Timed Code Minutes- OT 40 minute(s)  -KD      OT Received On 12/07/17  -KD        User Key  (r) = Recorded By, (t) = Taken By, (c) = Cosigned By    Initials Name Provider Type    JENNIFER Bower GA/L Occupational Therapy Assistant           Therapy Charges for Today     Code Description Service Date Service Provider Modifiers Qty    73040757958 HC OT SELF CARE/MGMT/TRAIN EA 15 MIN 12/6/2017 HARMEET Corona/L GO 3    60449162177 HC OT SELF CARE/MGMT/TRAIN EA 15 MIN 12/7/2017 HARMEET Corona/L GO 1    56427802562 HC OT THER PROC EA 15 MIN 12/7/2017 HARMEET Corona/L GO 1    64284134687 HC OT THERAPEUTIC ACT EA 15 MIN 12/7/2017 HARMEET Corona/L GO 1          OT G-codes  OT Professional Judgement Used?: Yes  OT Functional Scales Options: AM-PAC 6 Clicks Daily Activity (OT)  Score: 12  Functional Limitation: Self care  Self Care Current Status (): At least 60 percent but less than 80 percent impaired, limited or restricted  Self Care  Goal Status (): At least 40 percent but less than 60 percent impaired, limited or restricted    HARMEET Corona/DWAYNE  12/7/2017

## 2017-12-07 NOTE — NURSING NOTE
Pt. Is currently sitting up in bed and requests to eat breakfast there this morning. Was up in chair from 4 am to current time.

## 2017-12-07 NOTE — PLAN OF CARE
Problem: Patient Care Overview (Adult)  Goal: Plan of Care Review  Outcome: Ongoing (interventions implemented as appropriate)    12/07/17 0340 12/07/17 1310   Coping/Psychosocial Response Interventions   Plan Of Care Reviewed With patient --    Patient Care Overview   Progress no change --    Outcome Evaluation   Outcome Summary/Follow up Plan --  Bed mobility- cond indep, sit-stand-sit-SBA, t/f's CGA. Pt with pain of 8 in chest nsg notified, meds given       Goal: Discharge Needs Assessment  Outcome: Ongoing (interventions implemented as appropriate)    12/01/17 0459 12/05/17 1424 12/05/17 1452   Discharge Needs Assessment   Concerns To Be Addressed --  --  homelessness;home safety concerns   Concerns Comments --  --  pt was homeless before admit; will need support system and safe location post d/c   Readmission Within The Last 30 Days no previous admission in last 30 days --  --    Equipment Needed After Discharge --  --  walker, rolling   Discharge Facility/Level Of Care Needs --  --  skilled transitional care;rehabilitation facility   Current Discharge Risk chronically ill;financial support inadequate --  --    Discharge Disposition still a patient --  --    Discharge Planning Comments --  --  if d/c to homeless situation, it appears he will need to be as strong as possible to survive homelessnes   Current Health   Outpatient/Agency/Support Group Needs --  --  skilled nursing facility (specify);inpatient rehabilitation facility (specify)   Anticipated Changes Related to Illness --  --  inability to care for self   Self-Care   Equipment Currently Used at Home --  none --    Living Environment   Transportation Available --  public transportation;family or friend will provide --          Problem: Inpatient Occupational Therapy  Goal: Transfer Training Goal 1 LTG- OT  Outcome: Ongoing (interventions implemented as appropriate)    12/05/17 1424 12/07/17 1310   Transfer Training OT LTG   Transfer Training OT LTG,  Date Established 12/05/17 --    Transfer Training OT LTG, Time to Achieve by discharge --    Transfer Training OT LTG, Activity Type toilet --    Transfer Training OT LTG, [place] Level supervision required --    Transfer Training OT LTG, Date Goal Reviewed --  12/07/17   Transfer Training OT LTG, Outcome --  goal not met       Goal: Patient Education Goal LTG- OT  Outcome: Ongoing (interventions implemented as appropriate)    12/05/17 1424 12/07/17 1310   Patient Education OT LTG   Patient Education OT LTG, Date Established 12/05/17 --    Patient Education OT LTG, Time to Achieve by discharge --    Patient Education OT LTG, Education Type HEP;precautions per surgeon;home safety;energy conservation;adaptive equipment mgmt --    Patient Education OT LTG, Education Understanding independent;demonstrates adequately;verbalizes understanding --    Patient Education OT LTG, Date Goal Reviewed --  12/07/17   Patient Education OT LTG Outcome --  goal not met       Goal: ADL Goal LTG- OT  Outcome: Ongoing (interventions implemented as appropriate)    12/05/17 1424 12/07/17 1310   ADL OT LTG   ADL OT LTG, Date Established 12/05/17 --    ADL OT LTG, Time to Achieve by discharge --    ADL OT LTG, Activity Type ADL skills --    ADL OT LTG, [place] Level standby assist --    ADL OT LTG, Date Goal Reviewed --  12/07/17   ADL OT LTG, Outcome --  goal not met       Goal: Functional Mobility Goal LTG- OT  Outcome: Ongoing (interventions implemented as appropriate)    12/05/17 1424 12/06/17 0915 12/07/17 1310   Functional Mobility OT LTG   Functional Mobility Goal OT LTG, Date Established 12/05/17 --  --    Functional Mobility Goal OT LTG, Time to Achieve by discharge --  --    Functional Mobility Goal OT LTG, [place] Level supervision --  --    Functional Mobility Goal OT LTG, Distance to Achieve to the sink;to the bathroom --  --    Functional Mobility Goal OT LTG, Date Goal Reviewed --  12/06/17 --    Functional  Mobility Goal OT LTG, Outcome --  --  goal not met       Goal: Endurance Goal LTG- OT  Outcome: Ongoing (interventions implemented as appropriate)    12/05/17 1424 12/07/17 0845   Endurance OT LTG   Endurance Goal OT LTG, Date Established 12/05/17 --    Endurance Goal OT LTG, Time to Achieve by discharge --    Endurance Goal OT LTG, Activity Level endurance 2 good- --    Endurance Goal OT LTG, Date Goal Reviewed --  12/07/17   Endurance Goal OT LTG, Outcome --  goal not met

## 2017-12-07 NOTE — DISCHARGE PLACEMENT REQUEST
"Lyle Corona (51 y.o. Male)     Date of Birth Social Security Number Address Home Phone MRN    1965  [id number] [facility] [address] [phone] [id number]    Yarsanism Marital Status          Jain        Admission Date Admission Type Admitting Provider Attending Provider Department, Room/Bed    11/27/17 Urgent Taz Guzmán MD Lipson, Wayne E, MD [address], 309/1    Discharge Date Discharge Disposition Discharge Destination                      Attending Provider: Darien Dejesus MD     Allergies:  Imdur [Isosorbide Dinitrate], Amlodipine, Lisinopril, Metoprolol    Isolation:  None   Infection:  None   Code Status:  FULL    Ht:  175.3 cm (69.02\")   Wt:  70.5 kg (155 lb 8 oz)    Admission Cmt:  None   Principal Problem:  Coronary artery disease involving native coronary artery of native heart with unstable angina pectoris [I25.110]                 Active Insurance as of 11/27/2017     Primary Coverage     Payor Plan Insurance Group Employer/Plan Group    PASSPORT PASSPORT MEDICAID     Payor Plan Address Payor Plan Phone Number Effective From Effective To    [address] [phone] 4/1/2014     [place] [address]       Subscriber Name Subscriber Birth Date Member ID       LYLE CORONA 1965 [id number]                 Emergency Contacts      (Rel.) Home Phone Work Phone Mobile Phone    Sue Márquez (Friend) [phone] -- --    Elizabeth Sagastume (Other) [phone] -- [phone]            Insurance Information                PASSPORT/PASSPORT Phone: [phone]    Subscriber: Lyle Corona Subscriber#: [id number]    Group#: MEDICAID Precert#:              History & Physical      AHSAN Whitaker at 11/27/2017  4:47 PM     Attestation signed by Taz Guzmán MD at 11/27/2017  7:09 PM        I personally evaluated and examined the patient in conjunction with AHSAN Burnett and agree with the assessment, treatment " plan, and disposition of the patient as recorded.    Exam:  CV: S1 + S2  Chest: CTAB          This document has been electronically signed by Taz Guzmán MD on November 27, 2017 7:09 PM                                           [facility] Medicine Admission      Date of Admission: 11/27/2017      Primary Care Physician: AHSAN Gentile      Chief Complaint: Chest pain    HPI:  This is a 51-year-old male direct admitted from [facility] with complaints of chest pain.  The patient states he went for a walk yesterday morning and became nauseated, short of breath and fatigued.  He came home, went to bed and began having sharp chest pain at 8pm.  He states the pain waxed and waned and radiated down his left arm and into his left scapula and left jaw.  He states the pain became more consistent, so he presented to [facility].  Troponin was elevated at 0.427.  The patient was previously seen by Dr. Dodge, but recently changed to Dr. Carrasco. The patient has known CAD and history of PCI to the RCA and left circumflex on 8/2016 and PCI left circumflex 5/2015.  EKG currently shows no ST elevation.  The patient also has a history of CKD III, and creatinine drawn at [facility] was 1.98.  The patient was started on a heparin drip and transferred to our facility.  Currently, his chest pain has not resolved.  He reports it as a 7/10.  I spoke with Dr. Carrasco.  The patient will be started on a nitro drip.  If his pain is not controlled, Dr. Charles will be consulted for interventional cardiology.       Concurrent Medical History:  has a past medical history of Aneurysm of infrarenal abdominal aorta; Anxiety; Cervical radiculitis; Cervical spondylosis without myelopathy; CHF (congestive heart failure); Chronic kidney disease, stage 3; Common iliac aneurysm; Coronary arteriosclerosis; Degeneration of cervical intervertebral disc; Essential hypertension; GERD (gastroesophageal  reflux disease); Headache; Hyperlipidemia; Intermittent claudication; Myocardial infarction; and Uric acid renal calculus.    Past Surgical History:  has a past surgical history that includes Cardiac catheterization (05/25/2016); Cholecystectomy; Hernia repair; Cervical fusion; Spinal fusion; and Appendectomy.    Family History: family history includes Cancer in his brother and father; Diabetes in his other and other; Hyperlipidemia in his father and mother; Hypertension in his father and mother.    Social History:  reports that he quit smoking about 15 months ago. He has never used smokeless tobacco. He reports that he does not drink alcohol or use illicit drugs.    Allergies:   Allergies   Allergen Reactions   • Imdur [Isosorbide Dinitrate]    • Amlodipine Rash   • Lisinopril Rash   • Metoprolol Rash       Medications:   Prior to Admission medications    Medication Sig Start Date End Date Taking? Authorizing Provider   aspirin 81 MG tablet Take 81 mg by mouth.   Yes Historical Provider, MD   hydrochlorothiazide (MICROZIDE) 12.5 MG capsule Take 1 capsule by mouth Daily. 2/27/17  Yes Carlos Morse MD   labetalol (NORMODYNE) 300 MG tablet Take 1 tablet by mouth Every 12 (Twelve) Hours. 5/13/17  Yes Emile Kinsey MD   losartan (COZAAR) 100 MG tablet Take 1 tablet by mouth Daily. 5/13/17  Yes Emile Kinsey MD   nitroglycerin (NITRODUR) 0.4 MG/HR patch Place 1 patch on the skin Daily. 6/12/17  Yes Merlene Dodge MD   pantoprazole (PROTONIX) 40 MG EC tablet  6/16/16  Yes Historical Provider, MD   pravastatin (PRAVACHOL) 40 MG tablet Take 1 tablet by mouth every night. 8/29/16  Yes Merlene Dodge MD   ranitidine (ZANTAC) 150 MG tablet Take 150 mg by mouth 2 (two) times a day. Indication: gerd   Yes Historical Provider, MD   tamsulosin (FLOMAX) 0.4 MG capsule 24 hr capsule Take 1 capsule by mouth Every Night.   Yes Historical Provider, MD   terazosin (HYTRIN) 1 MG capsule Take 1 mg by mouth Every Night. 9/19/16  Yes  Historical Provider, MD   ticagrelor (BRILINTA) 90 MG tablet tablet Take 90 mg by mouth 2 (two) times a day. Indication: blood thinner   Yes Historical Provider, MD   cyclobenzaprine (FLEXERIL) 10 MG tablet Take 10 mg by mouth 3 (three) times a day. Indication: Muscle spasm    Historical Provider, MD   docusate sodium 100 MG capsule Take 200 mg by mouth 2 (Two) Times a Day. 5/13/17   Emile Kinsey MD   HYDROcodone-acetaminophen (LORTAB)  MG per tablet Take 1 tablet by mouth every 8 (eight) hours as needed. Indication: pain    Historical Provider, MD   nicotine (NICODERM CQ) 21 MG/24HR patch Place 1 patch on the skin Daily. 5/13/17   Emile Kinsey MD       Review of Systems:  Review of Systems   Constitutional: Positive for fatigue.   Respiratory: Positive for shortness of breath.    Cardiovascular: Positive for chest pain.   All other systems reviewed and are negative.     Otherwise complete ROS is negative except as mentioned above.    Physical Exam:   Temp:  [98 °F (36.7 °C)] 98 °F (36.7 °C)  Heart Rate:  [71] 71  Resp:  [18] 18  BP: (171)/(110) 171/110  Physical Exam   Constitutional: He is oriented to person, place, and time. He appears well-developed and well-nourished.   HENT:   Head: Normocephalic and atraumatic.   Eyes: EOM are normal. Pupils are equal, round, and reactive to light.   Neck: Normal range of motion. Neck supple.   Cardiovascular: Normal rate and regular rhythm.    Pulmonary/Chest: Effort normal and breath sounds normal.   Abdominal: Soft. Bowel sounds are normal.   Musculoskeletal: Normal range of motion.   Neurological: He is alert and oriented to person, place, and time.   Skin: Skin is warm and dry.   Psychiatric: He has a normal mood and affect.     Results Reviewed:  I have personally reviewed current lab, radiology, and data and agree with results.  Lab Results (last 24 hours)     ** No results found for the last 24 hours. **        Imaging Results (last 24 hours)     ** No  results found for the last 24 hours. **        Plan:  1.  Chest pain, rule out acute coronary syndrome:  Continue heparin drip.  Will start nitro drip.  Morphine and oxygen.  Dr. Carrasco consulted.    2.  Chronic kidney disease, stage III:  Creatinine currently 1.98.  Dr. Parikh on call for nephrology.  IVF and Acetylcysteine started.   3.  Hypertension:  Home medications started.      I discussed the patients findings and my recommendations with: Patient      This document has been electronically signed by AHSAN Whitaker on November 27, 2017 4:47 PM                       Electronically signed by Taz Guzmán MD at 11/27/2017  7:09 PM      Carlos Charles MD at 11/28/2017 10:44 AM              [facility]    Referring Provider: Taz Guzmán MD    Reason for Consultation: AHSAN Rivera    Patient Care Team:  AHSAN Mayer as PCP - General    Subjective .     History of present illness:    The patient is a 51-year-old  male with history of coronary artery disease, prior attached cardiologist Dr. Dodge.    12/18/2013 [facility]: left circumflex, mid: Resolute 2.5 mm X 22 mm stent  08/03/2016 [facility]:  RCA mid to distal: 2.5 x 38 mm and a 2.5 x 23 mm Xience Alpine stent    The patient reports that he takes daily walks however 2 days prior following his walk he became nauseated with onset of chest pain and shortness of breath with fatigue.  He reports that as the chest pain continued, he make contact with nurse on call through his insurance company with recommendation for presentation at local emergency department for further evaluation.  The patient initially presented to [facility] in [facility] then being transferred here to our facility.    He reports continuation of chest discomfort on a waxing and waning basis.  He describes the location as left anterior to the lateral chest being of sharp type sensation of moderate intensity  (4-6/10).  He does report activity as being an aggravating factor.    The patient reports allergic reactions to both isosorbide lite mononitrate as well as amlodipine.    The patient was initiated on intravenous heparin and nitroglycerin upon presentation to our facility.    The patient was noted to be hypertensive (171/110 mmHg) upon presentation to our facility.  The patient reports compliance with his medication management.  The patient does have history of CKD stage III followed by Dr. Evans who has been consulted re: presentation.    Review of Systems   Constitutional: Positive for fatigue. Negative for chills and fever.   HENT: Negative for congestion.    Respiratory: Negative for cough, choking and wheezing.    Cardiovascular: Positive for chest pain. Negative for palpitations and leg swelling.   Gastrointestinal: Negative for abdominal distention.   Neurological: Negative for dizziness, syncope and light-headedness.   Hematological: Does not bruise/bleed easily.     History  Past Medical History:   Diagnosis Date   • Aneurysm of infrarenal abdominal aorta    • Anxiety    • Cervical radiculitis    • Cervical spondylosis without myelopathy    • CHF (congestive heart failure)    • Chronic kidney disease, stage 3    • Common iliac aneurysm    • Coronary arteriosclerosis    • Degeneration of cervical intervertebral disc    • Essential hypertension    • GERD (gastroesophageal reflux disease)    • Headache    • Hyperlipidemia    • Intermittent claudication    • Myocardial infarction    • Uric acid renal calculus    ,   Past Surgical History:   Procedure Laterality Date   • APPENDECTOMY     • CARDIAC CATHETERIZATION  05/25/2016    Cardiac cath 15226 (2) - Patent left circumflex stent.Chronic total occlusion of the RCA,more than 20 mm in length.   • CERVICAL FUSION     • CHOLECYSTECTOMY     • HERNIA REPAIR     • SPINAL FUSION     ,   Family History   Problem Relation Age of Onset   • Hyperlipidemia Mother    •  Hypertension Mother    • Cancer Father    • Hyperlipidemia Father    • Hypertension Father    • Cancer Brother    • Diabetes Other    • Diabetes Other    ,   Social History   Substance Use Topics   • Smoking status: Former Smoker     Quit date: 8/28/2016   • Smokeless tobacco: Never Used   • Alcohol use No   ,   Prescriptions Prior to Admission   Medication Sig Dispense Refill Last Dose   • aspirin 81 MG tablet Take 81 mg by mouth.   11/27/2017 at Unknown time   • hydrochlorothiazide (MICROZIDE) 12.5 MG capsule Take 1 capsule by mouth Daily. 30 capsule 11 11/27/2017 at Unknown time   • labetalol (NORMODYNE) 300 MG tablet Take 1 tablet by mouth Every 12 (Twelve) Hours. 60 tablet 1 11/27/2017 at Unknown time   • losartan (COZAAR) 100 MG tablet Take 1 tablet by mouth Daily. 30 tablet 1 11/27/2017 at Unknown time   • nitroglycerin (NITRODUR) 0.4 MG/HR patch Place 1 patch on the skin Daily. 30 patch 6 11/26/2017 at Unknown time   • pantoprazole (PROTONIX) 40 MG EC tablet   1 11/27/2017 at Unknown time   • pravastatin (PRAVACHOL) 40 MG tablet Take 1 tablet by mouth every night. 90 tablet 4 11/26/2017 at Unknown time   • ranitidine (ZANTAC) 150 MG tablet Take 150 mg by mouth 2 (two) times a day. Indication: gerd   11/27/2017 at Unknown time   • tamsulosin (FLOMAX) 0.4 MG capsule 24 hr capsule Take 1 capsule by mouth Every Night.   11/26/2017 at Unknown time   • terazosin (HYTRIN) 1 MG capsule Take 1 mg by mouth Every Night.   11/26/2017 at Unknown time   • ticagrelor (BRILINTA) 90 MG tablet tablet Take 90 mg by mouth 2 (two) times a day. Indication: blood thinner   11/27/2017 at Unknown time   • cyclobenzaprine (FLEXERIL) 10 MG tablet Take 10 mg by mouth 3 (three) times a day. Indication: Muscle spasm   Taking   • docusate sodium 100 MG capsule Take 200 mg by mouth 2 (Two) Times a Day. 60 capsule 1 Unknown at Unknown time   • HYDROcodone-acetaminophen (LORTAB)  MG per tablet Take 1 tablet by mouth every 8 (eight)  "hours as needed. Indication: pain   Taking   • nicotine (NICODERM CQ) 21 MG/24HR patch Place 1 patch on the skin Daily. 30 patch 1 Not Taking    and Allergies:  Imdur [isosorbide dinitrate]; Amlodipine; Lisinopril; and Metoprolol    Objective     Vital Sign Min/Max for last 24 hours  Temp  Min: 96.6 °F (35.9 °C)  Max: 98.5 °F (36.9 °C)   BP  Min: 100/68  Max: 171/110   Pulse  Min: 67  Max: 85   Resp  Min: 18  Max: 18   SpO2  Min: 96 %  Max: 100 %   Flow (L/min)  Min: 2  Max: 2   Weight  Min: 151 lb 12.8 oz (68.9 kg)  Max: 152 lb (68.9 kg)     Flowsheet Rows         First Filed Value    Admission Height  69\" (175.3 cm) Documented at 2017 1722    Admission Weight  151 lb 12.8 oz (68.9 kg) Documented at 2017 1722           Cardiographics  EC2017  Vent. Rate : 080 BPM     Atrial Rate : 080 BPM     P-R Int : 156 ms          QRS Dur : 098 ms      QT Int : 406 ms       P-R-T Axes : 064 -27 066 degrees     QTc Int : 468 ms    Normal sinus rhythm  Normal ECG    Echocardiogram: 2017  · Left ventricular wall thickness is consistent with mild concentric hypertrophy.  · Left ventricular function is normal. Estimated EF = 60%.  · Left ventricular diastolic dysfunction (grade I a) consistent with impaired relaxation.  · Left atrial cavity size is borderline dilated.      LHC: 2016 - Dr Dodge  1. Chronic total occlusion of the mid to distal right coronary artery.   2. Successful recannulization using atherectomy, laser atherectomy, angioplasty, and subsequently stent placement of a 2.5 x 38 mm and a 2.5 x 23 mm Xience Alpine stent, with acceptable results.     LHC: 2016 - Dr Dodge  CORONARIES: Right dominant coronary artery system.   1. Left main: Left main originates from the left coronary sinus. It divides into LAD and left circumflex. Left main is free of disease.   2. LAD: Left anterior descending coronary artery courses along the anterior interventricular groove and wraps around the " apex. It gives rise to 2 diagonal branches. The mid LAD, after the origin of the 1st diagonal branch, has less than 50% stenosis. The 2nd diagonal branch is subtotally occluded and appears to be a small caliber vessel.   3. Left circumflex: The left circumflex is a decent caliber vessel. It courses along the anterior AV groove. The previously placed stent in the circumflex is widely patent with minimal narrowing noted just proximal to the stent. The left circumflex gives rise to an obtuse marginal branch which originates high in the circ and the obtuse marginal branch. It is decent in caliber and free of disease. Distally it gives 2 subbranches.   4. RCA: The right coronary artery originates from the right coronary sinus right after the curve proximally it is 100% occluded, just after the origin of the acute marginal branch. It appears to have a micro channel. The vessel has more than 20 mm occlusion and then there is some filling via collaterals at the crux. Distal target was noted but not very well clear. There is possible collateral from the septal perforators but it is not well matured.     MPS: 07/14/2014  1.    Abnormal myocardial perfusion imaging test with evidence of fixed        perfusion defect in the inferior segment of the left ventricular        myocardium.  2.    Normal left ventricular systolic function with ejection fraction        of 54% and akinetic inferior wall of the left ventricular        myocardium.    Physical Exam   Constitutional: He is oriented to person, place, and time. He appears well-developed and well-nourished. He is cooperative. No distress.   HENT:   Head: Normocephalic and atraumatic.   Neck: No JVD present.   Cardiovascular: Normal rate, regular rhythm, S1 normal and S2 normal.  Exam reveals no gallop, no S3 and no S4.    No murmur heard.  Pulses:       Radial pulses are 2+ on the right side, and 2+ on the left side.        Dorsalis pedis pulses are 1+ on the right side, and 1+  on the left side.   Pulmonary/Chest: Effort normal. No respiratory distress. He has no wheezes. He has no rales.   Abdominal: He exhibits no distension.   Musculoskeletal: He exhibits no edema.   Neurological: He is alert and oriented to person, place, and time.   Skin: Skin is warm and dry.   Psychiatric: He has a normal mood and affect. His behavior is normal. Judgment and thought content normal.   Vitals reviewed.    Results Review:  Lab Results (last 24 hours)     Procedure Component Value Units Date/Time    Comprehensive Metabolic Panel [[id number]]  (Abnormal) Collected:  11/27/17 1710    Specimen:  Blood Updated:  11/27/17 1816     Glucose 96 mg/dL      BUN 26 (H) mg/dL      Creatinine 1.98 (H) mg/dL      Sodium 137 mmol/L      Potassium 3.8 mmol/L      Chloride 101 mmol/L      CO2 23.0 mmol/L      Calcium 9.5 mg/dL      Total Protein 7.8 g/dL      Albumin 4.30 g/dL      ALT (SGPT) 20 (L) U/L      AST (SGOT) 21 U/L      Alkaline Phosphatase 99 U/L      Total Bilirubin 0.6 mg/dL      eGFR Non African Amer 36 (L) mL/min/1.73      Globulin 3.5 gm/dL      A/G Ratio 1.2 g/dL      BUN/Creatinine Ratio 13.1     Anion Gap 13.0 mmol/L     Troponin [[id number]]  (Abnormal) Collected:  11/27/17 1710    Specimen:  Blood Updated:  11/27/17 1840     Troponin I 0.529 (C) ng/mL     CBC Auto Differential [[id number]]  (Abnormal) Collected:  11/27/17 1828    Specimen:  Blood Updated:  11/27/17 1850     WBC 9.29 10*3/mm3      RBC 4.40 10*6/mm3      Hemoglobin 12.2 (L) g/dL      Hematocrit 36.1 (L) %      MCV 82.0 fL      MCH 27.7 pg      MCHC 33.8 g/dL      RDW 14.0 %      RDW-SD 42.1 fl      MPV 9.0 fL      Platelets 162 10*3/mm3      Neutrophil % 33.3 (L) %      Lymphocyte % 57.1 (H) %      Monocyte % 7.1 %      Eosinophil % 1.9 %      Basophil % 0.3 %      Immature Grans % 0.3 %      Neutrophils, Absolute 3.09 10*3/mm3      Lymphocytes, Absolute 5.30 (H) 10*3/mm3      Monocytes, Absolute 0.66 10*3/mm3      Eosinophils, Absolute  0.18 10*3/mm3      Basophils, Absolute 0.03 10*3/mm3      Immature Grans, Absolute 0.03 (H) 10*3/mm3     CBC & Differential [[id number]] Collected:  11/27/17 1828    Specimen:  Blood Updated:  11/27/17 1917    Narrative:       The following orders were created for panel order CBC & Differential.  Procedure                               Abnormality         Status                     ---------                               -----------         ------                     Manual Differential[[id number]]          Abnormal            Final result               Scan Slide[[id number]]                                                                  CBC Auto Differential[[id number]]        Abnormal            Final result                 Please view results for these tests on the individual orders.    Manual Differential [[id number]]  (Abnormal) Collected:  11/27/17 1828    Specimen:  Blood Updated:  11/27/17 1917     Neutrophil % 36.0 (L) %      Lymphocyte % 53.0 (H) %      Monocyte % 7.0 %      Eosinophil % 1.0 %      Basophil % 1.0 %      Atypical Lymphocyte % 1.0 %      Prolymphocyte % 1.0 (H) %      Neutrophils Absolute 3.34 10*3/mm3      Lymphocytes Absolute 4.92 (H) 10*3/mm3      Monocytes Absolute 0.65 10*3/mm3      Eosinophils Absolute 0.09 10*3/mm3      Basophils Absolute 0.09 10*3/mm3      RBC Morphology Normal     WBC Morphology Normal     Platelet Morphology Normal    Troponin [[id number]]  (Abnormal) Collected:  11/27/17 1942    Specimen:  Blood Updated:  11/27/17 2029     Troponin I 0.540 (C) ng/mL     Protime-INR [[id number]]  (Normal) Collected:  11/27/17 2040    Specimen:  Blood Updated:  11/27/17 2100     Protime 13.4 Seconds      INR 1.03    Narrative:       Therapeutic range for most indications is 2.0-3.0 INR,  or 2.5-3.5 for mechanical heart valves.    aPTT [[id number]]  (Normal) Collected:  11/27/17 2040    Specimen:  Blood Updated:  11/27/17 2100     PTT 31.1 seconds     Narrative:       The recommended  Heparin therapeutic range is 68-97 seconds.    aPTT [[id number]]  (Abnormal) Collected:  11/28/17 0238    Specimen:  Blood Updated:  11/28/17 0330     .2 (H) seconds     Narrative:       The recommended Heparin therapeutic range is 68-97 seconds.    Troponin [[id number]]  (Abnormal) Collected:  11/28/17 0238    Specimen:  Blood Updated:  11/28/17 0330     Troponin I 0.522 (C) ng/mL     Extra Tubes [[id number]] Collected:  11/28/17 0238    Specimen:  Blood from Blood, Venous Line Updated:  11/28/17 0347    Narrative:       The following orders were created for panel order Extra Tubes.  Procedure                               Abnormality         Status                     ---------                               -----------         ------                     Lavender Top[[id number]]                                     Final result                 Please view results for these tests on the individual orders.    Lavender Top [[id number]] Collected:  11/28/17 0238    Specimen:  Blood Updated:  11/28/17 0347     Extra Tube hold for add-on      Auto resulted           Imaging Results (last 24 hours)     ** No results found for the last 24 hours. **        Assessment/Plan     Active Problems:    NSTEMI (non-ST elevated myocardial infarction)  The patient presents with chest discomfort (anginal equivalent) in the setting of coronary artery disease.  Serial troponins are with findings for which non-ST elevated myocardial infarction cannot entirely be ruled out.  Electrocardiogram is with findings of normal sinus rhythm without evidence of ST segment elevation.   Hemodynamic status is stable and he is well perfused.    Discussion with the patient regarding left heart catheterization for definitive evaluation of coronary artery anatomy for which risk benefits have been brought forward, specifically worsening of renal function in the setting of chronic kidney disease.  Laboratory diagnostics are pending for today, as well as  obtaining follow-up electrocardiogram.    At this time, we will proceed with maximization of medical management, addition of Ranexa, noting patient's history of allergy to isosorbide mononitrate as well as amlodipine and metoprolol.    Nephrology services, Dr. Butler, have been consulted.    I have made telephone contact with Dr. Charles, interventional cardiologist, with discussion of presentation and findings.  The above recommendations are agreed upon.  Dr. Charles will see the patient later today to have further discussion regarding invasive procedures with risks benefits.    I discussed the patients findings and my recommendations with patient           This document has been electronically signed by AHSAN Verdugo on November 28, 2017 11:06 AM        AHSAN Verdugo  11/28/17  10:44 AM    Patient examined and chart reviewed.  Findings consistent with unstable angina/small NSTEMI.  Agree with above assessment by Lily FOREMAN.  I discussed the different treatment options with him and did discuss with Dr. Butler.  The plan would be to proceed with cardiac catheterization to reassess the coronary anatomy.  Cardiac catheterization films from August 2016 was reviewed and this showed chronic total occlusion of the right coronary artery.  This did not improve significantly post-PCI . The patient fully aware of the fact that he could have small vessel disease which may not be amenable to intervention.  He is fully aware of the increased risk for worsening renal dysfunction.  The plan would be to proceed with cardiac catheterization today.  All risks and benefits explained. He will be ASA 3, and Mallampati 2.    Carlos Charles MD, [facility], [facility]           Electronically signed by Carlos Charles MD at 11/28/2017  2:25 PM      Carlos Charles MD at 11/28/2017  2:31 PM            H&P reviewed. The patient was examined and there are no changes to the H&P.          Electronically signed by Carlos Cahrles MD at 11/28/2017  2:31 PM    Source Note                 [facility]    Referring Provider: Taz Guzmán MD    Reason for Consultation: AHSAN Rivera    Patient Care Team:  AHSAN Mayer as PCP - General    Subjective .     History of present illness:    The patient is a 51-year-old  male with history of coronary artery disease, prior attached cardiologist Dr. Dodge.    12/18/2013  [facility]: left circumflex, mid: Resolute 2.5 mm X 22 mm stent  08/03/2016 [facility]:  RCA mid to distal: 2.5 x 38 mm and a 2.5 x 23 mm Xience Alpine stent    The patient reports that he takes daily walks however 2 days prior following his walk he became nauseated with onset of chest pain and shortness of breath with fatigue.  He reports that as the chest pain continued, he make contact with nurse on call through his insurance company with recommendation for presentation at local emergency department for further evaluation.  The patient initially presented to [facility] in [facility] then being transferred here to our facility.    He reports continuation of chest discomfort on a waxing and waning basis.  He describes the location as left anterior to the lateral chest being of sharp type sensation of moderate intensity (4-6/10).  He does report activity as being an aggravating factor.    The patient reports allergic reactions to both isosorbide lite mononitrate as well as amlodipine.    The patient was initiated on intravenous heparin and nitroglycerin upon presentation to our facility.    The patient was noted to be hypertensive (171/110 mmHg) upon presentation to our facility.  The patient reports compliance with his medication management.  The patient does have history of CKD stage III followed by Dr. Evans who has been consulted re: presentation.    Review of Systems   Constitutional: Positive for fatigue. Negative  for chills and fever.   HENT: Negative for congestion.    Respiratory: Negative for cough, choking and wheezing.    Cardiovascular: Positive for chest pain. Negative for palpitations and leg swelling.   Gastrointestinal: Negative for abdominal distention.   Neurological: Negative for dizziness, syncope and light-headedness.   Hematological: Does not bruise/bleed easily.     History  Past Medical History:   Diagnosis Date   • Aneurysm of infrarenal abdominal aorta    • Anxiety    • Cervical radiculitis    • Cervical spondylosis without myelopathy    • CHF (congestive heart failure)    • Chronic kidney disease, stage 3    • Common iliac aneurysm    • Coronary arteriosclerosis    • Degeneration of cervical intervertebral disc    • Essential hypertension    • GERD (gastroesophageal reflux disease)    • Headache    • Hyperlipidemia    • Intermittent claudication    • Myocardial infarction    • Uric acid renal calculus    ,   Past Surgical History:   Procedure Laterality Date   • APPENDECTOMY     • CARDIAC CATHETERIZATION  05/25/2016    Cardiac cath 92790 (2) - Patent left circumflex stent.Chronic total occlusion of the RCA,more than 20 mm in length.   • CERVICAL FUSION     • CHOLECYSTECTOMY     • HERNIA REPAIR     • SPINAL FUSION     ,   Family History   Problem Relation Age of Onset   • Hyperlipidemia Mother    • Hypertension Mother    • Cancer Father    • Hyperlipidemia Father    • Hypertension Father    • Cancer Brother    • Diabetes Other    • Diabetes Other    ,   Social History   Substance Use Topics   • Smoking status: Former Smoker     Quit date: 8/28/2016   • Smokeless tobacco: Never Used   • Alcohol use No   ,   Prescriptions Prior to Admission   Medication Sig Dispense Refill Last Dose   • aspirin 81 MG tablet Take 81 mg by mouth.   11/27/2017 at Unknown time   • hydrochlorothiazide (MICROZIDE) 12.5 MG capsule Take 1 capsule by mouth Daily. 30 capsule 11 11/27/2017 at Unknown time   • labetalol (NORMODYNE) 300  "MG tablet Take 1 tablet by mouth Every 12 (Twelve) Hours. 60 tablet 1 11/27/2017 at Unknown time   • losartan (COZAAR) 100 MG tablet Take 1 tablet by mouth Daily. 30 tablet 1 11/27/2017 at Unknown time   • nitroglycerin (NITRODUR) 0.4 MG/HR patch Place 1 patch on the skin Daily. 30 patch 6 11/26/2017 at Unknown time   • pantoprazole (PROTONIX) 40 MG EC tablet   1 11/27/2017 at Unknown time   • pravastatin (PRAVACHOL) 40 MG tablet Take 1 tablet by mouth every night. 90 tablet 4 11/26/2017 at Unknown time   • ranitidine (ZANTAC) 150 MG tablet Take 150 mg by mouth 2 (two) times a day. Indication: gerd   11/27/2017 at Unknown time   • tamsulosin (FLOMAX) 0.4 MG capsule 24 hr capsule Take 1 capsule by mouth Every Night.   11/26/2017 at Unknown time   • terazosin (HYTRIN) 1 MG capsule Take 1 mg by mouth Every Night.   11/26/2017 at Unknown time   • ticagrelor (BRILINTA) 90 MG tablet tablet Take 90 mg by mouth 2 (two) times a day. Indication: blood thinner   11/27/2017 at Unknown time   • cyclobenzaprine (FLEXERIL) 10 MG tablet Take 10 mg by mouth 3 (three) times a day. Indication: Muscle spasm   Taking   • docusate sodium 100 MG capsule Take 200 mg by mouth 2 (Two) Times a Day. 60 capsule 1 Unknown at Unknown time   • HYDROcodone-acetaminophen (LORTAB)  MG per tablet Take 1 tablet by mouth every 8 (eight) hours as needed. Indication: pain   Taking   • nicotine (NICODERM CQ) 21 MG/24HR patch Place 1 patch on the skin Daily. 30 patch 1 Not Taking    and Allergies:  Imdur [isosorbide dinitrate]; Amlodipine; Lisinopril; and Metoprolol    Objective     Vital Sign Min/Max for last 24 hours  Temp  Min: 96.6 °F (35.9 °C)  Max: 98.5 °F (36.9 °C)   BP  Min: 100/68  Max: 171/110   Pulse  Min: 67  Max: 85   Resp  Min: 18  Max: 18   SpO2  Min: 96 %  Max: 100 %   Flow (L/min)  Min: 2  Max: 2   Weight  Min: 151 lb 12.8 oz (68.9 kg)  Max: 152 lb (68.9 kg)     Flowsheet Rows         First Filed Value    Admission Height  69\" (175.3 " cm) Documented at 2017 1722    Admission Weight  151 lb 12.8 oz (68.9 kg) Documented at 2017 1722           Cardiographics  EC2017  Vent. Rate : 080 BPM     Atrial Rate : 080 BPM     P-R Int : 156 ms          QRS Dur : 098 ms      QT Int : 406 ms       P-R-T Axes : 064 -27 066 degrees     QTc Int : 468 ms    Normal sinus rhythm  Normal ECG    Echocardiogram: 2017  · Left ventricular wall thickness is consistent with mild concentric hypertrophy.  · Left ventricular function is normal. Estimated EF = 60%.  · Left ventricular diastolic dysfunction (grade I a) consistent with impaired relaxation.  · Left atrial cavity size is borderline dilated.      [facility]: 2016 - Dr Dodge  1. Chronic total occlusion of the mid to distal right coronary artery.   2. Successful recannulization using atherectomy, laser atherectomy, angioplasty, and subsequently stent placement of a 2.5 x 38 mm and a 2.5 x 23 mm Xience Alpine stent, with acceptable results.     [facility]: 2016 - Dr Dodge  CORONARIES: Right dominant coronary artery system.   1. Left main: Left main originates from the left coronary sinus. It divides into LAD and left circumflex. Left main is free of disease.   2. LAD: Left anterior descending coronary artery courses along the anterior interventricular groove and wraps around the apex. It gives rise to 2 diagonal branches. The mid LAD, after the origin of the 1st diagonal branch, has less than 50% stenosis. The 2nd diagonal branch is subtotally occluded and appears to be a small caliber vessel.   3. Left circumflex: The left circumflex is a decent caliber vessel. It courses along the anterior AV groove. The previously placed stent in the circumflex is widely patent with minimal narrowing noted just proximal to the stent. The left circumflex gives rise to an obtuse marginal branch which originates high in the circ and the obtuse marginal branch. It is decent in caliber and free of disease.  Distally it gives 2 subbranches.   4. RCA: The right coronary artery originates from the right coronary sinus right after the curve proximally it is 100% occluded, just after the origin of the acute marginal branch. It appears to have a micro channel. The vessel has more than 20 mm occlusion and then there is some filling via collaterals at the crux. Distal target was noted but not very well clear. There is possible collateral from the septal perforators but it is not well matured.     [facility]: 07/14/2014  1.    Abnormal myocardial perfusion imaging test with evidence of fixed        perfusion defect in the inferior segment of the left ventricular        myocardium.  2.    Normal left ventricular systolic function with ejection fraction        of 54% and akinetic inferior wall of the left ventricular        myocardium.    Physical Exam   Constitutional: He is oriented to person, place, and time. He appears well-developed and well-nourished. He is cooperative. No distress.   HENT:   Head: Normocephalic and atraumatic.   Neck: No JVD present.   Cardiovascular: Normal rate, regular rhythm, S1 normal and S2 normal.  Exam reveals no gallop, no S3 and no S4.    No murmur heard.  Pulses:       Radial pulses are 2+ on the right side, and 2+ on the left side.        Dorsalis pedis pulses are 1+ on the right side, and 1+ on the left side.   Pulmonary/Chest: Effort normal. No respiratory distress. He has no wheezes. He has no rales.   Abdominal: He exhibits no distension.   Musculoskeletal: He exhibits no edema.   Neurological: He is alert and oriented to person, place, and time.   Skin: Skin is warm and dry.   Psychiatric: He has a normal mood and affect. His behavior is normal. Judgment and thought content normal.   Vitals reviewed.    Results Review:  Lab Results (last 24 hours)     Procedure Component Value Units Date/Time    Comprehensive Metabolic Panel [[id number]]  (Abnormal) Collected:  11/27/17 1710    Specimen:  Blood  Updated:  11/27/17 1816     Glucose 96 mg/dL      BUN 26 (H) mg/dL      Creatinine 1.98 (H) mg/dL      Sodium 137 mmol/L      Potassium 3.8 mmol/L      Chloride 101 mmol/L      CO2 23.0 mmol/L      Calcium 9.5 mg/dL      Total Protein 7.8 g/dL      Albumin 4.30 g/dL      ALT (SGPT) 20 (L) U/L      AST (SGOT) 21 U/L      Alkaline Phosphatase 99 U/L      Total Bilirubin 0.6 mg/dL      eGFR Non African Amer 36 (L) mL/min/1.73      Globulin 3.5 gm/dL      A/G Ratio 1.2 g/dL      BUN/Creatinine Ratio 13.1     Anion Gap 13.0 mmol/L     Troponin [[id number]]  (Abnormal) Collected:  11/27/17 1710    Specimen:  Blood Updated:  11/27/17 1840     Troponin I 0.529 (C) ng/mL     CBC Auto Differential [[id number]]  (Abnormal) Collected:  11/27/17 1828    Specimen:  Blood Updated:  11/27/17 1850     WBC 9.29 10*3/mm3      RBC 4.40 10*6/mm3      Hemoglobin 12.2 (L) g/dL      Hematocrit 36.1 (L) %      MCV 82.0 fL      MCH 27.7 pg      MCHC 33.8 g/dL      RDW 14.0 %      RDW-SD 42.1 fl      MPV 9.0 fL      Platelets 162 10*3/mm3      Neutrophil % 33.3 (L) %      Lymphocyte % 57.1 (H) %      Monocyte % 7.1 %      Eosinophil % 1.9 %      Basophil % 0.3 %      Immature Grans % 0.3 %      Neutrophils, Absolute 3.09 10*3/mm3      Lymphocytes, Absolute 5.30 (H) 10*3/mm3      Monocytes, Absolute 0.66 10*3/mm3      Eosinophils, Absolute 0.18 10*3/mm3      Basophils, Absolute 0.03 10*3/mm3      Immature Grans, Absolute 0.03 (H) 10*3/mm3     CBC & Differential [[id number]] Collected:  11/27/17 1828    Specimen:  Blood Updated:  11/27/17 1917    Narrative:       The following orders were created for panel order CBC & Differential.  Procedure                               Abnormality         Status                     ---------                               -----------         ------                     Manual Differential[[id number]]          Abnormal            Final result               Scan Slide[[id number]]                                                                   CBC Auto Differential[[id number]]        Abnormal            Final result                 Please view results for these tests on the individual orders.    Manual Differential [[id number]]  (Abnormal) Collected:  11/27/17 1828    Specimen:  Blood Updated:  11/27/17 1917     Neutrophil % 36.0 (L) %      Lymphocyte % 53.0 (H) %      Monocyte % 7.0 %      Eosinophil % 1.0 %      Basophil % 1.0 %      Atypical Lymphocyte % 1.0 %      Prolymphocyte % 1.0 (H) %      Neutrophils Absolute 3.34 10*3/mm3      Lymphocytes Absolute 4.92 (H) 10*3/mm3      Monocytes Absolute 0.65 10*3/mm3      Eosinophils Absolute 0.09 10*3/mm3      Basophils Absolute 0.09 10*3/mm3      RBC Morphology Normal     WBC Morphology Normal     Platelet Morphology Normal    Troponin [[id number]]  (Abnormal) Collected:  11/27/17 1942    Specimen:  Blood Updated:  11/27/17 2029     Troponin I 0.540 (C) ng/mL     Protime-INR [[id number]]  (Normal) Collected:  11/27/17 2040    Specimen:  Blood Updated:  11/27/17 2100     Protime 13.4 Seconds      INR 1.03    Narrative:       Therapeutic range for most indications is 2.0-3.0 INR,  or 2.5-3.5 for mechanical heart valves.    aPTT [[id number]]  (Normal) Collected:  11/27/17 2040    Specimen:  Blood Updated:  11/27/17 2100     PTT 31.1 seconds     Narrative:       The recommended Heparin therapeutic range is 68-97 seconds.    aPTT [[id number]]  (Abnormal) Collected:  11/28/17 0238    Specimen:  Blood Updated:  11/28/17 0330     .2 (H) seconds     Narrative:       The recommended Heparin therapeutic range is 68-97 seconds.    Troponin [[id number]]  (Abnormal) Collected:  11/28/17 0238    Specimen:  Blood Updated:  11/28/17 0330     Troponin I 0.522 (C) ng/mL     Extra Tubes [[id number]] Collected:  11/28/17 0238    Specimen:  Blood from Blood, Venous Line Updated:  11/28/17 0347    Narrative:       The following orders were created for panel order Extra Tubes.  Procedure                                Abnormality         Status                     ---------                               -----------         ------                     Mona Wallace[[id number]]                                     Final result                 Please view results for these tests on the individual orders.    Mona Wallace [[id number]] Collected:  11/28/17 0238    Specimen:  Blood Updated:  11/28/17 0347     Extra Tube hold for add-on      Auto resulted           Imaging Results (last 24 hours)     ** No results found for the last 24 hours. **        Assessment/Plan     Active Problems:    NSTEMI (non-ST elevated myocardial infarction)  The patient presents with chest discomfort (anginal equivalent) in the setting of coronary artery disease.  Serial troponins are with findings for which non-ST elevated myocardial infarction cannot entirely be ruled out.  Electrocardiogram is with findings of normal sinus rhythm without evidence of ST segment elevation.   Hemodynamic status is stable and he is well perfused.    Discussion with the patient regarding left heart catheterization for definitive evaluation of coronary artery anatomy for which risk benefits have been brought forward, specifically worsening of renal function in the setting of chronic kidney disease.  Laboratory diagnostics are pending for today, as well as obtaining follow-up electrocardiogram.    At this time, we will proceed with maximization of medical management, addition of Ranexa, noting patient's history of allergy to isosorbide mononitrate as well as amlodipine and metoprolol.    Nephrology services, Dr. Butler, have been consulted.    I have made telephone contact with Dr. Charles, interventional cardiologist, with discussion of presentation and findings.  The above recommendations are agreed upon.  Dr. Charles will see the patient later today to have further discussion regarding invasive procedures with risks benefits.    I discussed the patients findings  and my recommendations with patient           This document has been electronically signed by AHSAN Verdugo on November 28, 2017 11:06 AM        AHSAN Verdugo  11/28/17  10:44 AM    Patient examined and chart reviewed.  Findings consistent with unstable angina/small NSTEMI.  Agree with above assessment by Lily FOREMAN.  I discussed the different treatment options with him and did discuss with Dr. Butler.  The plan would be to proceed with cardiac catheterization to reassess the coronary anatomy.  Cardiac catheterization films from August 2016 was reviewed and this showed chronic total occlusion of the right coronary artery.  This did not improve significantly post-PCI . The patient fully aware of the fact that he could have small vessel disease which may not be amenable to intervention.  He is fully aware of the increased risk for worsening renal dysfunction.  The plan would be to proceed with cardiac catheterization today.  All risks and benefits explained. He will be ASA 3, and Mallampati 2.    Carlos Charles MD, [facility], [facility]            Electronically signed by Carlos Charles MD at 11/28/2017  2:25 PM                 Physician Progress Notes (most recent note)      AHSAN Casrto at 12/7/2017 11:20 AM  Version 1 of 1           Cardiothoracic - Vascular Surgery Daily Note        LOS: 10 days   Patient Care Team:  AHSAN Mayer as PCP - General     POD2 CABGX3    Chief Complaint: Surgical Chest Pain      Subjective     The following portions of the patient's history were reviewed and updated as appropriate: allergies, current medications, past family history, past medical history, past social history, past surgical history and problem list.     Subjective:  Symptoms:  Stable.  He reports chest pain (surgical).    Diet:  Adequate intake.  No nausea.    Activity level: Impaired due to pain.    Pain:  He complains of pain that is moderate.  He reports  "pain is improving.  Pain is requiring pain medication and well controlled.          Objective     Vital Signs  Temp:  [98.3 °F (36.8 °C)-101.7 °F (38.7 °C)] 98.5 °F (36.9 °C)  Heart Rate:  [] 111  Resp:  [20] 20  BP: ()/(62-86) 116/78  Body mass index is 22.95 kg/(m^2).    Intake/Output Summary (Last 24 hours) at 12/07/17 1120  Last data filed at 12/07/17 1033   Gross per 24 hour   Intake              900 ml   Output              150 ml   Net              750 ml     I/O this shift:  In: 300 [P.O.:300]  Out: -     Wt Readings from Last 3 Encounters:   12/07/17 70.5 kg (155 lb 8 oz)   10/31/17 71.4 kg (157 lb 8 oz)   06/12/17 69.4 kg (153 lb)         Physical Exam   Objective:  General Appearance:  Uncomfortable and comfortable.    Vital signs: (most recent): Blood pressure 116/78, pulse 111, temperature 98.5 °F (36.9 °C), temperature source Oral, resp. rate 20, height 175.3 cm (69.02\"), weight 70.5 kg (155 lb 8 oz), SpO2 93 %.  Vital signs are normal.  Fever (blood cx-Neg tmax 101.5).    Output: Producing urine and producing stool.    HEENT: Normal HEENT exam.    Lungs:  Normal respiratory rate and normal effort.  There are decreased breath sounds (bases).  (On RA)  Heart: Tachycardia.    Abdomen: Abdomen is soft and non-distended.  Bowel sounds are normal.     Extremities: Normal range of motion.  There is no dependent edema.    Pulses: Distal pulses are intact.    Neurological: Patient is alert and oriented to person, place and time.    Pupils:  Pupils are equal, round, and reactive to light.    Skin:  Warm.  (M/S INC: Sm amt sanginous drainage distal, Well approximated. prineo strip intact  LLE EVH: CDI)              Results Review:    Lab Results   Component Value Date    WBC 15.64 (H) 12/07/2017    HGB 10.9 (L) 12/07/2017    HCT 31.4 (L) 12/07/2017    MCV 83.5 12/07/2017     12/07/2017       Estimated Creatinine Clearance: 40 mL/min (by C-G formula based on Cr of 2.13).      Results from last " 7 days  Lab Units 12/06/17  0212 12/05/17  0416 12/04/17  1245   MAGNESIUM mg/dL 2.3 2.4* 4.0*   PHOSPHORUS mg/dL  --   --  4.3       Lab Results   Component Value Date    GLUCOSE 99 12/07/2017    BUN 29 (H) 12/07/2017    CREATININE 2.13 (H) 12/07/2017    EGFRIFNONA 33 (L) 12/07/2017    BCR 13.6 12/07/2017    K 4.7 12/07/2017    CO2 25.0 12/07/2017    CALCIUM 9.1 12/07/2017    ALBUMIN 4.00 12/07/2017    LABIL2 1.2 12/07/2017    AST 46 12/07/2017    ALT 14 (L) 12/07/2017         Results from last 7 days  Lab Units 12/04/17  1245   INR  1.24*       Imaging Results (last 24 hours)     Procedure Component Value Units Date/Time    XR Chest 1 View [[id number]] Collected:  12/06/17 1211     Updated:  12/06/17 1653    Narrative:         PORTABLE CHEST    HISTORY: Chest tube removal. Right apical pneumothorax.    Portable AP upright film of the chest was obtained at 12:04 PM.  COMPARISON: December 5, 2017    Right sided chest tube or mediastinal drainage tube has been  removed.  Right internal jugular catheter sheath remains in place.  Extremely small residual right apical pneumothorax.  Small left pleural effusion.  Minimal subsegmental atelectasis or infiltrate left lung base.  Discoid atelectasis right lung base.  Sternotomy.  Minimal cardiomegaly.   The pulmonary vasculature is not increased.  No acute osseous abnormality.  Internal fixation plate and screw device lower cervical spine.      Impression:       CONCLUSION:  Right sided chest tube or mediastinal drainage tube has been  removed.  Right internal jugular catheter sheath remains in place.  Extremely small residual right apical pneumothorax.  Small left pleural effusion.  Minimal subsegmental atelectasis or infiltrate left lung base.  Discoid atelectasis right lung base.  Sternotomy.  Minimal cardiomegaly.     [id number]    Electronically signed by:  Melvin Preciado MD  12/6/2017 4:52 PM CST  Workstation: [id number]                                  amiodarone 150 mg  Intravenous Once   aspirin 81 mg Oral Daily   atorvastatin 10 mg Oral Daily   cyclobenzaprine 10 mg Oral TID   insulin aspart 0-24 Units Subcutaneous 4x Daily AC & at Bedtime   insulin detemir 20 Units Subcutaneous Nightly   ipratropium-albuterol 3 mL Nebulization 4x Daily - RT   labetalol 100 mg Oral Q12H   losartan 25 mg Oral Nightly   magnesium oxide 400 mg Oral BID   prenatal vitamin 27-0.8 1 tablet Oral Daily   sennosides-docusate sodium 1 tablet Oral BID   sodium chloride 1-10 mL Intravenous Q8H   tamsulosin 0.4 mg Oral Nightly   ticagrelor 90 mg Oral BID   vitamin C 500 mg Oral BID       amiodarone 1 mg/min             ASSESSMENT/PLAN     Principal Problem:    Coronary artery disease involving native coronary artery of native heart with unstable angina pectoris  Active Problems:    Chronic kidney disease, stage 3    NSTEMI (non-ST elevated myocardial infarction)      Assessment:    Condition: In stable condition.   (Stable Post Op CABG: Progressing well    CAD: ASA, Brilinta, Statin. Beta as BP tolerates. Low dose ARB    Fever: Aggressive respiratory interventions. BC negative. Urine cx pending.     Resp: Incentive. On RA. Aggressive Pulmonary Toilet    Pain: Percocet    Rehab: PT/OT, Ambulate. ARU consult-denied. SNF PC pending    Transient Post op Hyperglycemia: SSI coverage. Continue Levemir  ).     Plan:   Encourage ambulation and out of bed and up to chair.  Continue respiratory treatments and start/continue incentive spirometry.  Advance diet as tolerated.  Increase analgesics and administer medications as ordered.   (Stable. [facility] 3W.  Await [facility]. Aggressive pulmonary toilet).                 This document has been electronically signed by AHSAN Castro on December 7, 2017 11:20 AM           Electronically signed by AHSAN Castro at 12/7/2017 11:24 AM           Consult Notes (most recent note)      AHSAN Rand at 11/29/2017 10:31 AM  Version 2 of 2     Consult  Orders:    1. Inpatient Consult to Cardiothoracic Surgery [[id number]] ordered by Carlos Charles MD at 11/28/17 1603                CVTS CONSULTATION  CVTS/Dr Dejesus is requested to see Mr Corona in consultation    Patient Care Team:  AHSAN Mayer as PCP - General  Cardiology AHSAN De La Torre MD     Chief complaint:  VAS 6 Chest pain with radiation,  SOA, and  Nausea      Subjective     History of Present Illness:  51 y.o. male with CKD III presented with USA/NSTEMI prompt medical management was initiated.  He has ongoing angina on NTG infusion and heparin infusion.  He denies smoking.  He has a history of known CAD with previous PCI with DUE stent placement to CX (2013) and RCA X 2 proximal and mid (2016).  Comorbidities include difficult to control HTN, HLP,CHF, and AAA.  He does have chronic back pain and GERD. His chest pain is at rest,   He underwent cardiac cath which demonstrated 3 V CAD.  ECHO pending.  Last dose of Brilinta 11/27/17.  No other associated symptoms or modifying factors except  as listed in ROS.    The following portions of the patient's history were reviewed and updated as appropriate: allergies, current medications, past family history, past medical history, past social history, past surgical history and problem list.  Recent images independently reviewed.  Available laboratory values reviewed.    Review of Systems   Constitutional: Positive for appetite change, diaphoresis (with CP ) and fatigue. Negative for activity change and fever.        Exercise and walking are limited by his SOA and CP   HENT: Negative for hearing loss, mouth sores, nosebleeds and trouble swallowing.    Eyes: Positive for visual disturbance.        Light flashes visual changes    Respiratory: Positive for chest tightness and shortness of breath. Negative for apnea, cough, wheezing and stridor.    Cardiovascular: Positive for chest pain and palpitations. Negative for leg swelling.    Gastrointestinal: Negative for abdominal distention and abdominal pain.        Heart burn controlled with medications   Genitourinary: Negative for hematuria.   Musculoskeletal: Positive for back pain. Negative for myalgias.   Skin: Negative for pallor, rash and wound.   Neurological: Positive for dizziness, weakness and light-headedness. Negative for seizures, syncope, speech difficulty, numbness and headaches.   Hematological: Does not bruise/bleed easily.   Psychiatric/Behavioral: The patient is nervous/anxious.         Past Medical History:   Diagnosis Date   • Aneurysm of infrarenal abdominal aorta    • Anxiety    • Cervical radiculitis    • Cervical spondylosis without myelopathy    • CHF (congestive heart failure)    • Chronic kidney disease, stage 3    • Common iliac aneurysm    • Coronary arteriosclerosis    • Degeneration of cervical intervertebral disc    • Essential hypertension    • GERD (gastroesophageal reflux disease)    • Headache    • Hyperlipidemia    • Intermittent claudication    • Myocardial infarction    • Uric acid renal calculus      Past Surgical History:   Procedure Laterality Date   • APPENDECTOMY     • CARDIAC CATHETERIZATION  05/25/2016    Cardiac cath 58264 (2) - Patent left circumflex stent.Chronic total occlusion of the RCA,more than 20 mm in length.   • CERVICAL FUSION     • CHOLECYSTECTOMY     • HERNIA REPAIR     • SPINAL FUSION       Family History   Problem Relation Age of Onset   • Hyperlipidemia Mother    • Hypertension Mother    • Cancer Father    • Hyperlipidemia Father    • Hypertension Father    • Cancer Brother    • Diabetes Other    • Diabetes Other      Social History   Substance Use Topics   • Smoking status: Former Smoker     Quit date: 8/28/2016   • Smokeless tobacco: Never Used   • Alcohol use No     Prescriptions Prior to Admission   Medication Sig Dispense Refill Last Dose   • aspirin 81 MG tablet Take 81 mg by mouth.   11/27/2017 at Unknown time   •  hydrochlorothiazide (MICROZIDE) 12.5 MG capsule Take 1 capsule by mouth Daily. 30 capsule 11 11/27/2017 at Unknown time   • labetalol (NORMODYNE) 300 MG tablet Take 1 tablet by mouth Every 12 (Twelve) Hours. 60 tablet 1 11/27/2017 at Unknown time   • losartan (COZAAR) 100 MG tablet Take 1 tablet by mouth Daily. 30 tablet 1 11/27/2017 at Unknown time   • nitroglycerin (NITRODUR) 0.4 MG/HR patch Place 1 patch on the skin Daily. 30 patch 6 11/26/2017 at Unknown time   • pantoprazole (PROTONIX) 40 MG EC tablet   1 11/27/2017 at Unknown time   • pravastatin (PRAVACHOL) 40 MG tablet Take 1 tablet by mouth every night. 90 tablet 4 11/26/2017 at Unknown time   • ranitidine (ZANTAC) 150 MG tablet Take 150 mg by mouth 2 (two) times a day. Indication: gerd   11/27/2017 at Unknown time   • tamsulosin (FLOMAX) 0.4 MG capsule 24 hr capsule Take 1 capsule by mouth Every Night.   11/26/2017 at Unknown time   • terazosin (HYTRIN) 1 MG capsule Take 1 mg by mouth Every Night.   11/26/2017 at Unknown time   • ticagrelor (BRILINTA) 90 MG tablet tablet Take 90 mg by mouth 2 (two) times a day. Indication: blood thinner   11/27/2017 at Unknown time   • cyclobenzaprine (FLEXERIL) 10 MG tablet Take 10 mg by mouth 3 (three) times a day. Indication: Muscle spasm   Taking   • docusate sodium 100 MG capsule Take 200 mg by mouth 2 (Two) Times a Day. 60 capsule 1 Unknown at Unknown time   • HYDROcodone-acetaminophen (LORTAB)  MG per tablet Take 1 tablet by mouth every 8 (eight) hours as needed. Indication: pain   Taking   • nicotine (NICODERM CQ) 21 MG/24HR patch Place 1 patch on the skin Daily. 30 patch 1 Not Taking       acetylcysteine 6 mL Oral Q12H   atorvastatin 40 mg Oral Daily   [START ON 12/4/2017] ceFAZolin 2 g Intravenous On Call to OR   famotidine 20 mg Oral BID   hydrALAZINE 50 mg Oral Q8H   labetalol 100 mg Oral Q12H   pantoprazole 40 mg Oral Q AM   ranolazine 500 mg Oral Q12H   tamsulosin 0.4 mg Oral Nightly   terazosin 1 mg  "Oral Nightly     Allergies:  Imdur [isosorbide dinitrate]; Amlodipine; Lisinopril; and Metoprolol    Objective      Vital Signs  Temp:  [97.6 °F (36.4 °C)-98.9 °F (37.2 °C)] 98.6 °F (37 °C)  Heart Rate:  [71-97] 76  Resp:  [14-18] 18  BP: ()/(53-99) 128/80    Flowsheet Rows         First Filed Value    Admission Height  69\" (175.3 cm) Documented at 11/27/2017 1722    Admission Weight  151 lb 12.8 oz (68.9 kg) Documented at 11/27/2017 1722        69\" (175.3 cm)    Physical Exam   Constitutional: He is oriented to person, place, and time. He appears well-nourished.   HENT:   Head: Normocephalic.   Mouth/Throat: Oropharynx is clear and moist.   Tongue midline Facial movements symmetrical      Eyes: Conjunctivae are normal. Pupils are equal, round, and reactive to light.   Neck: Neck supple. JVD present. No tracheal deviation present.   Cardiovascular: Normal rate, regular rhythm, normal heart sounds and intact distal pulses.    Pulses:       Carotid pulses are 1+ on the right side, and 1+ on the left side with bruit.       Radial pulses are 2+ on the right side, and 2+ on the left side.        Dorsalis pedis pulses are 2+ on the right side, and 2+ on the left side.        Posterior tibial pulses are 2+ on the right side, and 2+ on the left side.   Pulmonary/Chest: Effort normal and breath sounds normal. No stridor.   Abdominal: Soft. Bowel sounds are normal. He exhibits no distension. There is no tenderness.   Musculoskeletal: He exhibits no edema or tenderness.   Neurological: He is alert and oriented to person, place, and time.   Skin: Skin is warm and dry. No rash noted. No erythema. No pallor.   Psychiatric: His behavior is normal. Judgment normal.   Nursing note and vitals reviewed.      Results Review:   Lab Results (last 24 hours)     Procedure Component Value Units Date/Time    CBC & Differential [[id number]] Collected:  11/28/17 1031    Specimen:  Blood Updated:  11/28/17 1053    Narrative:       The " following orders were created for panel order CBC & Differential.  Procedure                               Abnormality         Status                     ---------                               -----------         ------                     CBC Auto Differential[[id number]]        Abnormal            Final result                 Please view results for these tests on the individual orders.    CBC Auto Differential [[id number]]  (Abnormal) Collected:  11/28/17 1031    Specimen:  Blood Updated:  11/28/17 1053     WBC 8.85 10*3/mm3      RBC 4.02 (L) 10*6/mm3      Hemoglobin 11.3 (L) g/dL      Hematocrit 33.1 (L) %      MCV 82.3 fL      MCH 28.1 pg      MCHC 34.1 g/dL      RDW 14.1 %      RDW-SD 42.8 fl      MPV 9.4 fL      Platelets 153 10*3/mm3      Neutrophil % 45.3 %      Lymphocyte % 45.4 %      Monocyte % 7.5 %      Eosinophil % 1.4 %      Basophil % 0.2 %      Immature Grans % 0.2 %      Neutrophils, Absolute 4.01 10*3/mm3      Lymphocytes, Absolute 4.02 10*3/mm3      Monocytes, Absolute 0.66 10*3/mm3      Eosinophils, Absolute 0.12 10*3/mm3      Basophils, Absolute 0.02 10*3/mm3      Immature Grans, Absolute 0.02 10*3/mm3      nRBC 0.0 /100 WBC     aPTT [[id number]]  (Abnormal) Collected:  11/28/17 1031    Specimen:  Blood Updated:  11/28/17 1113     PTT 75.7 (H) seconds     Narrative:       The recommended Heparin therapeutic range is 68-97 seconds.    Comprehensive Metabolic Panel [[id number]]  (Abnormal) Collected:  11/28/17 1031    Specimen:  Blood Updated:  11/28/17 1115     Glucose 90 mg/dL      BUN 23 (H) mg/dL      Creatinine 1.83 (H) mg/dL      Sodium 137 mmol/L      Potassium 4.0 mmol/L      Chloride 105 mmol/L      CO2 22.0 mmol/L      Calcium 8.6 mg/dL      Total Protein 6.9 g/dL      Albumin 3.90 g/dL      ALT (SGPT) 28 U/L      AST (SGOT) 31 U/L      Alkaline Phosphatase 95 U/L      Total Bilirubin 0.6 mg/dL      eGFR Non African Amer 39 (L) mL/min/1.73      Globulin 3.0 gm/dL      A/G Ratio 1.3 g/dL       BUN/Creatinine Ratio 12.6     Anion Gap 10.0 mmol/L     Protime-INR [[id number]]  (Normal) Collected:  11/28/17 1743    Specimen:  Blood Updated:  11/28/17 1820     Protime 13.0 Seconds      INR 0.99    Narrative:       Therapeutic range for most indications is 2.0-3.0 INR,  or 2.5-3.5 for mechanical heart valves.    aPTT [[id number]]  (Normal) Collected:  11/28/17 1743    Specimen:  Blood Updated:  11/28/17 1820     PTT 33.3 seconds     Narrative:       The recommended Heparin therapeutic range is 68-97 seconds.    Extra Tubes [[id number]] Collected:  11/28/17 1743    Specimen:  Blood from Blood, Venous Line Updated:  11/28/17 1846    Narrative:       The following orders were created for panel order Extra Tubes.  Procedure                               Abnormality         Status                     ---------                               -----------         ------                     Gold Top - SST[[id number]]                                   Final result                 Please view results for these tests on the individual orders.    Gold Top - SST [[id number]] Collected:  11/28/17 1743    Specimen:  Blood Updated:  11/28/17 1846     Extra Tube Hold for add-ons.      Auto resulted.       CBC & Differential [[id number]] Collected:  11/29/17 0143    Specimen:  Blood Updated:  11/29/17 0151    Narrative:       The following orders were created for panel order CBC & Differential.  Procedure                               Abnormality         Status                     ---------                               -----------         ------                     CBC Auto Differential[[id number]]        Abnormal            Final result                 Please view results for these tests on the individual orders.    CBC Auto Differential [[id number]]  (Abnormal) Collected:  11/29/17 0143    Specimen:  Blood Updated:  11/29/17 0151     WBC 6.78 10*3/mm3      RBC 3.82 (L) 10*6/mm3      Hemoglobin 10.7 (L) g/dL      Hematocrit 31.4 (L)  %      MCV 82.2 fL      MCH 28.0 pg      MCHC 34.1 g/dL      RDW 14.2 %      RDW-SD 42.7 fl      MPV 9.1 fL      Platelets 139 (L) 10*3/mm3      Neutrophil % 41.5 %      Lymphocyte % 48.2 %      Monocyte % 8.1 %      Eosinophil % 1.8 %      Basophil % 0.1 %      Immature Grans % 0.3 %      Neutrophils, Absolute 2.81 10*3/mm3      Lymphocytes, Absolute 3.27 10*3/mm3      Monocytes, Absolute 0.55 10*3/mm3      Eosinophils, Absolute 0.12 10*3/mm3      Basophils, Absolute 0.01 10*3/mm3      Immature Grans, Absolute 0.02 10*3/mm3     Comprehensive Metabolic Panel [[id number]]  (Abnormal) Collected:  11/29/17 0143    Specimen:  Blood Updated:  11/29/17 0212     Glucose 103 (H) mg/dL      BUN 24 (H) mg/dL      Creatinine 2.10 (H) mg/dL      Sodium 138 mmol/L      Potassium 3.6 mmol/L      Chloride 101 mmol/L      CO2 25.0 mmol/L      Calcium 8.4 mg/dL      Total Protein 6.5 g/dL      Albumin 3.60 g/dL      ALT (SGPT) 21 U/L      AST (SGOT) 22 U/L      Alkaline Phosphatase 90 U/L      Total Bilirubin 0.5 mg/dL      eGFR Non African Amer 33 (L) mL/min/1.73      Globulin 2.9 gm/dL      A/G Ratio 1.2 g/dL      BUN/Creatinine Ratio 11.4     Anion Gap 12.0 mmol/L     aPTT [[id number]]  (Abnormal) Collected:  11/29/17 0143    Specimen:  Blood Updated:  11/29/17 0228     PTT 68.9 (H) seconds     Narrative:       The recommended Heparin therapeutic range is 68-97 seconds.    Extra Tubes [[id number]] Collected:  11/29/17 0144    Specimen:  Blood from Blood, Venous Line Updated:  11/29/17 0246    Narrative:       The following orders were created for panel order Extra Tubes.  Procedure                               Abnormality         Status                     ---------                               -----------         ------                     Gold Top - SST[[id number]]                                   Final result                 Please view results for these tests on the individual orders.    Gold Top - SST [[id number]] Collected:   11/29/17 0144    Specimen:  Blood Updated:  11/29/17 0246     Extra Tube Hold for add-ons.      Auto resulted.       aPTT [[id number]]  (Abnormal) Collected:  11/29/17 0847    Specimen:  Blood Updated:  11/29/17 0928     PTT 93.8 (H) seconds     Narrative:       The recommended Heparin therapeutic range is 68-97 seconds.        Imaging Results (last 72 hours)     ** No results found for the last 72 hours. **      Cardiac Cath:  LADo80%  D1 diffuse, CX 92%  RCA 90%        Assessment/Plan     Active Problems:    NSTEMI (non-ST elevated myocardial infarction)    Unstable angina      Assessment & Plan  3 V CAD NSTEMI:    NTG to control USA.   Continue heparin infusion until 4 hours prior to surgery  CABG is warranted.   Would prefer to reduce chance of blood product transfusion till 12/04/17 if patient symptoms will allow.  Will begin preop work up:  ECHO pending.  Carotid US left carotid bruit  Vein mapping for conduit.  LAB:  To include CMP, CBC, TSH, AIC, Fibrogen, INR.  Will check platelet function as received antiplatelet therapy.  Have notified blood bank of potential need for platelet transfusion with surgery.    Continue beta blocker and statin therapy.  ACE/ARB contraindicated by renal.  Hold ASA,  Obtain PFT and chest xray for pulmonary function.  Complete STS risk calculator when diagnostics completed.   Case request completed.  Preop teaching completed.    Will increase Morphine to 2 mg to control pain.     Thank you for the opportunity to participate in this patient's care.    Anxiety with chest pain:  Will add Ativan to reduce anxiety, tachycardia, and increase 02 consumption.    Copy to primary care provider.  Eugene [id number]        I discussed the patients findings and my recommendations with Dr Dejesus.      AHSAN Canseco  11/29/17  10:32 AM               Electronically signed by AHSAN Rand at 11/29/2017 11:23 AM      AHSAN Rand at 11/29/2017 10:31 AM  Version 1 of 2          CVTS CONSULTATION  CVTS/Dr Dejesus is requested to see Mr Corona in consultation    Patient Care Team:  AHSAN Mayer as PCP - General  Cardiology AHSAN De La Torre MD     Chief complaint:  VAS 6 Chest pain with radiation,  SOA, and  Nausea      Subjective     History of Present Illness:  51 y.o. male with CKD III presented with USA/NSTEMI prompt medical management was initiated.  He has ongoing angina on NTG infusion and heparin infusion.  He denies smoking.  He has a history of known CAD with previous PCI with DUE stent placement to CX (2013) and RCA X 2 proximal and mid (2016).  Comorbidities include difficult to control HTN, HLP,CHF, and AAA.  He does have chronic back pain and GERD. His chest pain is at rest,   He underwent cardiac cath which demonstrated 3 V CAD.  ECHO pending.  Last dose of Brilinta 11/27/17.  No other associated symptoms or modifying factors except  as listed in ROS.    The following portions of the patient's history were reviewed and updated as appropriate: allergies, current medications, past family history, past medical history, past social history, past surgical history and problem list.  Recent images independently reviewed.  Available laboratory values reviewed.    Review of Systems   Constitutional: Positive for appetite change, diaphoresis (with CP ) and fatigue. Negative for activity change and fever.        Exercise and walking are limited by his SOA and CP   HENT: Negative for hearing loss, mouth sores, nosebleeds and trouble swallowing.    Eyes: Positive for visual disturbance.        Light flashes visual changes    Respiratory: Positive for chest tightness and shortness of breath. Negative for apnea, cough, wheezing and stridor.    Cardiovascular: Positive for chest pain and palpitations. Negative for leg swelling.   Gastrointestinal: Negative for abdominal distention and abdominal pain.        Heart burn controlled with medications   Genitourinary: Negative  for hematuria.   Musculoskeletal: Positive for back pain. Negative for myalgias.   Skin: Negative for pallor, rash and wound.   Neurological: Positive for dizziness, weakness and light-headedness. Negative for seizures, syncope, speech difficulty, numbness and headaches.   Hematological: Does not bruise/bleed easily.   Psychiatric/Behavioral: The patient is nervous/anxious.         Past Medical History:   Diagnosis Date   • Aneurysm of infrarenal abdominal aorta    • Anxiety    • Cervical radiculitis    • Cervical spondylosis without myelopathy    • CHF (congestive heart failure)    • Chronic kidney disease, stage 3    • Common iliac aneurysm    • Coronary arteriosclerosis    • Degeneration of cervical intervertebral disc    • Essential hypertension    • GERD (gastroesophageal reflux disease)    • Headache    • Hyperlipidemia    • Intermittent claudication    • Myocardial infarction    • Uric acid renal calculus      Past Surgical History:   Procedure Laterality Date   • APPENDECTOMY     • CARDIAC CATHETERIZATION  05/25/2016    Cardiac cath 24929 (2) - Patent left circumflex stent.Chronic total occlusion of the RCA,more than 20 mm in length.   • CERVICAL FUSION     • CHOLECYSTECTOMY     • HERNIA REPAIR     • SPINAL FUSION       Family History   Problem Relation Age of Onset   • Hyperlipidemia Mother    • Hypertension Mother    • Cancer Father    • Hyperlipidemia Father    • Hypertension Father    • Cancer Brother    • Diabetes Other    • Diabetes Other      Social History   Substance Use Topics   • Smoking status: Former Smoker     Quit date: 8/28/2016   • Smokeless tobacco: Never Used   • Alcohol use No     Prescriptions Prior to Admission   Medication Sig Dispense Refill Last Dose   • aspirin 81 MG tablet Take 81 mg by mouth.   11/27/2017 at Unknown time   • hydrochlorothiazide (MICROZIDE) 12.5 MG capsule Take 1 capsule by mouth Daily. 30 capsule 11 11/27/2017 at Unknown time   • labetalol (NORMODYNE) 300 MG  tablet Take 1 tablet by mouth Every 12 (Twelve) Hours. 60 tablet 1 11/27/2017 at Unknown time   • losartan (COZAAR) 100 MG tablet Take 1 tablet by mouth Daily. 30 tablet 1 11/27/2017 at Unknown time   • nitroglycerin (NITRODUR) 0.4 MG/HR patch Place 1 patch on the skin Daily. 30 patch 6 11/26/2017 at Unknown time   • pantoprazole (PROTONIX) 40 MG EC tablet   1 11/27/2017 at Unknown time   • pravastatin (PRAVACHOL) 40 MG tablet Take 1 tablet by mouth every night. 90 tablet 4 11/26/2017 at Unknown time   • ranitidine (ZANTAC) 150 MG tablet Take 150 mg by mouth 2 (two) times a day. Indication: gerd   11/27/2017 at Unknown time   • tamsulosin (FLOMAX) 0.4 MG capsule 24 hr capsule Take 1 capsule by mouth Every Night.   11/26/2017 at Unknown time   • terazosin (HYTRIN) 1 MG capsule Take 1 mg by mouth Every Night.   11/26/2017 at Unknown time   • ticagrelor (BRILINTA) 90 MG tablet tablet Take 90 mg by mouth 2 (two) times a day. Indication: blood thinner   11/27/2017 at Unknown time   • cyclobenzaprine (FLEXERIL) 10 MG tablet Take 10 mg by mouth 3 (three) times a day. Indication: Muscle spasm   Taking   • docusate sodium 100 MG capsule Take 200 mg by mouth 2 (Two) Times a Day. 60 capsule 1 Unknown at Unknown time   • HYDROcodone-acetaminophen (LORTAB)  MG per tablet Take 1 tablet by mouth every 8 (eight) hours as needed. Indication: pain   Taking   • nicotine (NICODERM CQ) 21 MG/24HR patch Place 1 patch on the skin Daily. 30 patch 1 Not Taking       acetylcysteine 6 mL Oral Q12H   atorvastatin 40 mg Oral Daily   [START ON 12/4/2017] ceFAZolin 2 g Intravenous On Call to OR   famotidine 20 mg Oral BID   hydrALAZINE 50 mg Oral Q8H   labetalol 100 mg Oral Q12H   pantoprazole 40 mg Oral Q AM   ranolazine 500 mg Oral Q12H   tamsulosin 0.4 mg Oral Nightly   terazosin 1 mg Oral Nightly     Allergies:  Imdur [isosorbide dinitrate]; Amlodipine; Lisinopril; and Metoprolol    Objective      Vital Signs  Temp:  [97.6 °F (36.4  "°C)-98.9 °F (37.2 °C)] 98.6 °F (37 °C)  Heart Rate:  [71-97] 76  Resp:  [14-18] 18  BP: ()/(53-99) 128/80    Flowsheet Rows         First Filed Value    Admission Height  69\" (175.3 cm) Documented at 11/27/2017 1722    Admission Weight  151 lb 12.8 oz (68.9 kg) Documented at 11/27/2017 1722        69\" (175.3 cm)    Physical Exam   Constitutional: He is oriented to person, place, and time. He appears well-nourished.   HENT:   Head: Normocephalic.   Mouth/Throat: Oropharynx is clear and moist.   Tongue midline Facial movements symmetrical      Eyes: Conjunctivae are normal. Pupils are equal, round, and reactive to light.   Neck: Neck supple. JVD present. No tracheal deviation present.   Cardiovascular: Normal rate, regular rhythm, normal heart sounds and intact distal pulses.    Pulses:       Carotid pulses are 1+ on the right side, and 1+ on the left side with bruit.       Radial pulses are 2+ on the right side, and 2+ on the left side.        Dorsalis pedis pulses are 2+ on the right side, and 2+ on the left side.        Posterior tibial pulses are 2+ on the right side, and 2+ on the left side.   Pulmonary/Chest: Effort normal and breath sounds normal. No stridor.   Abdominal: Soft. Bowel sounds are normal. He exhibits no distension. There is no tenderness.   Musculoskeletal: He exhibits no edema or tenderness.   Neurological: He is alert and oriented to person, place, and time.   Skin: Skin is warm and dry. No rash noted. No erythema. No pallor.   Psychiatric: His behavior is normal. Judgment normal.   Nursing note and vitals reviewed.      Results Review:   Lab Results (last 24 hours)     Procedure Component Value Units Date/Time    CBC & Differential [[id number]] Collected:  11/28/17 1031    Specimen:  Blood Updated:  11/28/17 1053    Narrative:       The following orders were created for panel order CBC & Differential.  Procedure                               Abnormality         Status                   "   ---------                               -----------         ------                     CBC Auto Differential[[id number]]        Abnormal            Final result                 Please view results for these tests on the individual orders.    CBC Auto Differential [[id number]]  (Abnormal) Collected:  11/28/17 1031    Specimen:  Blood Updated:  11/28/17 1053     WBC 8.85 10*3/mm3      RBC 4.02 (L) 10*6/mm3      Hemoglobin 11.3 (L) g/dL      Hematocrit 33.1 (L) %      MCV 82.3 fL      MCH 28.1 pg      MCHC 34.1 g/dL      RDW 14.1 %      RDW-SD 42.8 fl      MPV 9.4 fL      Platelets 153 10*3/mm3      Neutrophil % 45.3 %      Lymphocyte % 45.4 %      Monocyte % 7.5 %      Eosinophil % 1.4 %      Basophil % 0.2 %      Immature Grans % 0.2 %      Neutrophils, Absolute 4.01 10*3/mm3      Lymphocytes, Absolute 4.02 10*3/mm3      Monocytes, Absolute 0.66 10*3/mm3      Eosinophils, Absolute 0.12 10*3/mm3      Basophils, Absolute 0.02 10*3/mm3      Immature Grans, Absolute 0.02 10*3/mm3      nRBC 0.0 /100 WBC     aPTT [[id number]]  (Abnormal) Collected:  11/28/17 1031    Specimen:  Blood Updated:  11/28/17 1113     PTT 75.7 (H) seconds     Narrative:       The recommended Heparin therapeutic range is 68-97 seconds.    Comprehensive Metabolic Panel [[id number]]  (Abnormal) Collected:  11/28/17 1031    Specimen:  Blood Updated:  11/28/17 1115     Glucose 90 mg/dL      BUN 23 (H) mg/dL      Creatinine 1.83 (H) mg/dL      Sodium 137 mmol/L      Potassium 4.0 mmol/L      Chloride 105 mmol/L      CO2 22.0 mmol/L      Calcium 8.6 mg/dL      Total Protein 6.9 g/dL      Albumin 3.90 g/dL      ALT (SGPT) 28 U/L      AST (SGOT) 31 U/L      Alkaline Phosphatase 95 U/L      Total Bilirubin 0.6 mg/dL      eGFR Non African Amer 39 (L) mL/min/1.73      Globulin 3.0 gm/dL      A/G Ratio 1.3 g/dL      BUN/Creatinine Ratio 12.6     Anion Gap 10.0 mmol/L     Protime-INR [[id number]]  (Normal) Collected:  11/28/17 2660    Specimen:  Blood Updated:   11/28/17 1820     Protime 13.0 Seconds      INR 0.99    Narrative:       Therapeutic range for most indications is 2.0-3.0 INR,  or 2.5-3.5 for mechanical heart valves.    aPTT [[id number]]  (Normal) Collected:  11/28/17 1743    Specimen:  Blood Updated:  11/28/17 1820     PTT 33.3 seconds     Narrative:       The recommended Heparin therapeutic range is 68-97 seconds.    Extra Tubes [[id number]] Collected:  11/28/17 1743    Specimen:  Blood from Blood, Venous Line Updated:  11/28/17 1846    Narrative:       The following orders were created for panel order Extra Tubes.  Procedure                               Abnormality         Status                     ---------                               -----------         ------                     Gold Top - SST[[id number]]                                   Final result                 Please view results for these tests on the individual orders.    Gold Top - SST [[id number]] Collected:  11/28/17 1743    Specimen:  Blood Updated:  11/28/17 1846     Extra Tube Hold for add-ons.      Auto resulted.       CBC & Differential [[id number]] Collected:  11/29/17 0143    Specimen:  Blood Updated:  11/29/17 0151    Narrative:       The following orders were created for panel order CBC & Differential.  Procedure                               Abnormality         Status                     ---------                               -----------         ------                     CBC Auto Differential[[id number]]        Abnormal            Final result                 Please view results for these tests on the individual orders.    CBC Auto Differential [[id number]]  (Abnormal) Collected:  11/29/17 0143    Specimen:  Blood Updated:  11/29/17 0151     WBC 6.78 10*3/mm3      RBC 3.82 (L) 10*6/mm3      Hemoglobin 10.7 (L) g/dL      Hematocrit 31.4 (L) %      MCV 82.2 fL      MCH 28.0 pg      MCHC 34.1 g/dL      RDW 14.2 %      RDW-SD 42.7 fl      MPV 9.1 fL      Platelets 139 (L) 10*3/mm3       Neutrophil % 41.5 %      Lymphocyte % 48.2 %      Monocyte % 8.1 %      Eosinophil % 1.8 %      Basophil % 0.1 %      Immature Grans % 0.3 %      Neutrophils, Absolute 2.81 10*3/mm3      Lymphocytes, Absolute 3.27 10*3/mm3      Monocytes, Absolute 0.55 10*3/mm3      Eosinophils, Absolute 0.12 10*3/mm3      Basophils, Absolute 0.01 10*3/mm3      Immature Grans, Absolute 0.02 10*3/mm3     Comprehensive Metabolic Panel [[id number]]  (Abnormal) Collected:  11/29/17 0143    Specimen:  Blood Updated:  11/29/17 0212     Glucose 103 (H) mg/dL      BUN 24 (H) mg/dL      Creatinine 2.10 (H) mg/dL      Sodium 138 mmol/L      Potassium 3.6 mmol/L      Chloride 101 mmol/L      CO2 25.0 mmol/L      Calcium 8.4 mg/dL      Total Protein 6.5 g/dL      Albumin 3.60 g/dL      ALT (SGPT) 21 U/L      AST (SGOT) 22 U/L      Alkaline Phosphatase 90 U/L      Total Bilirubin 0.5 mg/dL      eGFR Non African Amer 33 (L) mL/min/1.73      Globulin 2.9 gm/dL      A/G Ratio 1.2 g/dL      BUN/Creatinine Ratio 11.4     Anion Gap 12.0 mmol/L     aPTT [[id number]]  (Abnormal) Collected:  11/29/17 0143    Specimen:  Blood Updated:  11/29/17 0228     PTT 68.9 (H) seconds     Narrative:       The recommended Heparin therapeutic range is 68-97 seconds.    Extra Tubes [[id number]] Collected:  11/29/17 0144    Specimen:  Blood from Blood, Venous Line Updated:  11/29/17 0246    Narrative:       The following orders were created for panel order Extra Tubes.  Procedure                               Abnormality         Status                     ---------                               -----------         ------                     Gold Top - SST[[id number]]                                   Final result                 Please view results for these tests on the individual orders.    Gold Top - SST [[id number]] Collected:  11/29/17 0144    Specimen:  Blood Updated:  11/29/17 0246     Extra Tube Hold for add-ons.      Auto resulted.       aPTT [[id number]]  (Abnormal)  Collected:  17 0847    Specimen:  Blood Updated:  17     PTT 93.8 (H) seconds     Narrative:       The recommended Heparin therapeutic range is 68-97 seconds.        Imaging Results (last 72 hours)     ** No results found for the last 72 hours. **      Cardiac Cath:  LADo80%  D1 diffuse, CX 92%  RCA 90%        Assessment/Plan     Active Problems:    NSTEMI (non-ST elevated myocardial infarction)    Unstable angina      Assessment & Plan  3 V CAD NSTEMI:    NTG to control [place].   Continue heparin infusion until 4 hours prior to surgery  CABG is warranted.   Would prefer to reduce chance of blood product transfusion till 17 if patient symptoms will allow.  Will begin preop work up:  ECHO pending.  Carotid US left carotid bruit  Vein mapping for conduit.  LAB:  To include CMP, CBC, TSH, AIC, Fibrogen, INR.  Will check platelet function as received antiplatelet therapy.  Have notified blood bank of potential need for platelet transfusion with surgery.    Continue beta blocker and statin therapy.  ACE/ARB contraindicated by renal.  Hold ASA,  Obtain PFT and chest xray for pulmonary function.  Complete STS risk calculator when diagnostics completed.   Case request completed.  Preop teaching completed.    Thank you for the opportunity to participate in this patient's care.    Copy to primary care provider.        I discussed the patients findings and my recommendations with Dr Dejesus.      AHSAN Canseco  17  10:32 AM               Electronically signed by AHSAN Rand at 2017 11:13 AM           Physical Therapy Notes (most recent note)      Denise Herron PTA at 2017  4:54 PM  Version 1 of 1         Acute Care - Physical Therapy Treatment Note  [facility]     Patient Name: Lyle Corona  : 1965  MRN: [id number]  Today's Date: 2017  Onset of Illness/Injury or Date of Surgery Date: 17 (CABG 17)  Date of Referral to PT:  12/04/17  Referring Physician: AHSAN Oglesby    Admit Date: 11/27/2017    Visit Dx:    ICD-10-CM ICD-9-CM   1. Coronary arteriosclerosis I25.10 414.00   2. Unstable angina I20.0 411.1   3. NSTEMI (non-ST elevated myocardial infarction) I21.4 410.70   4. Coronary artery disease involving native coronary artery of native heart with unstable angina pectoris I25.110 414.01     411.1   5. Impaired functional mobility, balance, gait, and endurance Z74.09 V49.89   6. Impaired mobility and ADLs Z74.09 799.89     Patient Active Problem List   Diagnosis   • Essential hypertension   • Hypertensive emergency, no CHF   • Coronary artery disease involving native coronary artery of native heart with unstable angina pectoris   • Chronic kidney disease, stage 3   • LVH (left ventricular hypertrophy)   • NSTEMI (non-ST elevated myocardial infarction)   • Unstable angina               Adult Rehabilitation Note       12/06/17 1435 12/06/17 0915       Rehab Assessment/Intervention    Discipline  occupational therapy assistant  -KD     Document Type  therapy note (daily note)  -KD     Subjective Information agree to therapy;complains of;pain  -LN agree to therapy  -KD     Patient Effort, Rehab Treatment  good  -KD     Precautions/Limitations cardiac precautions;fall precautions;oxygen therapy device and L/min;sternal precautions  -LN      Recorded by [LN] Denise Herron PTA [KD] HARMEET Corona/L     Vital Signs    Pre Systolic BP Rehab 127  -  -KD     Pre Treatment Diastolic BP 84  -LN 84  -KD     Post Systolic BP Rehab 135  -LN      Post Treatment Diastolic BP 68  -LN      Pretreatment Heart Rate (beats/min) 107  -LN 95  -KD     Intratreatment Heart Rate (beats/min) 82  -LN      Posttreatment Heart Rate (beats/min) 108  -LN 96  -KD     Pre SpO2 (%) 88  -LN 95  -KD     O2 Delivery Pre Treatment room air   nc laying in bed-put 02 back on 93%  -LN supplemental O2  -KD     Intra SpO2 (%) 93  -LN      Post SpO2 (%) 94  -LN 97   -KD     O2 Delivery Post Treatment  supplemental O2  -KD     Pre Patient Position Supine  -LN Supine  -KD     Intra Patient Position Standing  -LN Standing  -KD     Post Patient Position Supine  -LN Sitting  -KD     Recorded by [LN] Denise Herron PTA [KD] TUCKER CoronaA/L     Pain Assessment    Pain Assessment 0-10  -LN 0-10  -KD     Pain Score 5  -LN 9  -KD     Post Pain Score 7  -LN 9  -KD     Pain Type Acute pain;Surgical pain  -LN Surgical pain  -KD     Pain Location  Chest  -KD     Pain Intervention(s) Medication (See MAR)  -LN Medication (See MAR)  -KD     Recorded by [LN] Denise Herron PTA [KD] TUCKER CoronaA/L     Vision Assessment/Intervention    Visual Impairment  WFL with corrective lenses  -KD     Recorded by  [KD] TUCKER CoronaA/L     Cognitive Assessment/Intervention    Current Cognitive/Communication Assessment  functional  -KD     Orientation Status oriented to;person     -LN oriented x 4  -KD     Follows Commands/Answers Questions  100% of the time  -KD     Personal Safety  fully aware of deficits  -KD     Personal Safety Interventions  fall prevention program maintained;gait belt;nonskid shoes/slippers when out of bed  -KD     Recorded by [LN] Denise Herron PTA [KD] TUCKER CoronaA/L     Bed Mobility, Assessment/Treatment    Bed Mobility, Assistive Device head of bed elevated  -LN      Bed Mob, Supine to Sit, [place] supervision required  -LN      Bed Mob, Sit to Supine, [place] supervision required  -LN      Recorded by [LN] Denise Herron PTA      Transfer Assessment/Treatment    Transfers, Sit-Stand [place] supervision required  -LN supervision required  -KD     Transfers, Stand-Sit [place] supervision required  -LN supervision required  -KD     Transfers, Sit-Stand-Sit, Assist Device --   none  -LN      Toilet Transfer, [place] not tested  -LN contact guard assist  -KD     Toilet Transfer, Assistive Device  rolling walker  -KD     Recorded by  [LN] Denise Herron, PTA [KD] HARMEET Corona/L     Gait Assessment/Treatment    Gait, [place] Level minimum assist (75% patient effort);stand by assist  -LN      Gait, Assistive Device rolling walker  -LN      Gait, Distance (Feet) 50   350  -LN      Gait, Comment 50' w/o ad pt with several lob and reaching for rail,finished walking with rw  -LN      Recorded by [LN] Denise Herron, PTA      Functional Mobility    Functional Mobility- Ind. Level  contact guard assist  -KD     Functional Mobility- Device  rolling walker  -KD     Functional Mobility-Distance (Feet)  --   10 x 2  -KD     Recorded by  [KD] HARMEET Corona/L     Upper Body Bathing Assessment/Training    UB Bathing Assess/Train Assistive Device  bath mitt  -KD     UB Bathing Assess/Train, Position  edge of bed;sitting  -KD     UB Bathing Assess/Train, [place] Level  set up required  -KD     Recorded by  [KD] HARMEET Corona/L     Lower Body Bathing Assessment/Training    LB Bathing Assess/Train Assistive Device  bath mitt  -KD     LB Bathing Assess/Train, Position  edge of bed;sitting  -KD     LB Bathing Assess/Train, [place] Level  moderate assist (50% patient effort)  -KD     LB Bathing Assess/Train, Impairments  strength decreased  -KD     Recorded by  [KD] HARMEET Corona/L     Upper Body Dressing Assessment/Training    UB Dressing Assess/Train, Clothing Type  doffing:;donning:;hospital gown  -KD     UB Dressing Assess/Train, Position  edge of bed;sitting  -KD     UB Dressing Assess/Train, [place]  supervision required  -KD     Recorded by  [KD] HARMEET Corona/L     Lower Body Dressing Assessment/Training    LB Dressing Assess/Train, Clothing Type  doffing:;donning:;slipper socks  -KD     LB Dressing Assess/Train, Position  edge of bed;sitting  -KD     LB Dressing Assess/Train, [place]  moderate assist (50% patient effort)  -KD     LB Dressing Assess/Train, Impairments  strength decreased  -KD      Recorded by  [KD] TUCKER CoronaA/L     Toileting Assessment/Training    Toileting Assess/Train, Assistive Device  grab bars  -KD     Toileting Assess/Train, Position  edge of bed;sitting  -KD     Toileting Assess/Train, Indepen Level  supervision required  -KD     Recorded by  [KD] TUCKER CoronaA/L     Grooming Assessment/Training    Grooming Assess/Train, Assistive Device  --   wash face  -KD     Grooming Assess/Train, Position  edge of bed;sitting  -KD     Grooming Assess/Train, Indepen Level  conditional independence  -KD     Recorded by  [KD] Margaret Bower GA/L     Positioning and Restraints    Pre-Treatment Position  sitting in chair/recliner  -KD     Post Treatment Position bed  -LN chair  -KD     In Bed supine;call light within reach;encouraged to call for assist;exit alarm on  -LN      In Chair  sitting;call light within reach;encouraged to call for assist  -KD     Recorded by [LN] Denise Herron, PTA [KD] Margaret Bower GA/L       User Key  (r) = Recorded By, (t) = Taken By, (c) = Cosigned By    Initials Name Effective Dates    LN Denise Herron, PTA 10/17/16 -     KD TUCKER CoronaA/DWAYNE 10/17/16 -                 IP PT Goals       12/06/17 1435 12/05/17 1452       Bed Mobility PT LTG    Bed Mobility PT LTG, Date Established  12/05/17  -GB     Bed Mobility PT LTG, Time to Achieve  by discharge  -GB     Bed Mobility PT LTG, Activity Type  roll left/roll right;supine to sit/sit to supine  -GB     Bed Mobility PT LTG, [place] Level 1 person + 1 person to manage equipment  -LN conditional independence  -GB     Bed Mobility PT LTG, Date Goal Reviewed 12/06/17  -LN      Bed Mobility PT LTG, Outcome goal not met  -LN goal not met  -GB     Transfer Training PT LTG    Transfer Training PT LTG, Date Established  12/05/17  -GB     Transfer Training PT LTG, Time to Achieve  by discharge  -GB     Transfer Training PT LTG, Activity Type  bed to chair /chair to bed;sit to stand/stand to sit  -GB      Transfer Training PT LTG, [place] Level  conditional independence  -GB     Transfer Training PT  LTG, Date Goal Reviewed 12/06/17  -LN      Transfer Training PT LTG, Outcome goal not met  -LN goal not met  -GB     Gait Training PT LTG    Gait Training Goal PT LTG, Date Established  12/05/17  -GB     Gait Training Goal PT LTG, Time to Achieve  by discharge  -GB     Gait Training Goal PT LTG, [place] Level  conditional independence  -GB     Gait Training Goal PT LTG, Date Goal Reviewed 12/06/17  -LN      Gait Training Goal PT LTG, Outcome goal not met  -LN goal not met  -GB     Stair Training PT LTG    Stair Training Goal PT LTG, Date Established  12/05/17  -GB     Stair Training Goal PT LTG, Time to Achieve  by discharge  -GB     Stair Training Goal PT LTG, Number of Steps  4  -GB     Stair Training Goal PT LTG, [place] Level  conditional independence  -GB     Stair Training Goal PT LTG, Date Goal Reviewed 12/06/17  -LN      Stair Training Goal PT LTG, Outcome goal not met  -LN goal not met  -GB       User Key  (r) = Recorded By, (t) = Taken By, (c) = Cosigned By    Initials Name Provider Type    JENNIFER Chandler, PT Physical Therapist    LN Denise Herron, PTA Physical Therapy Assistant          Physical Therapy Education     Title: PT OT SLP Therapies (Active)     Topic: Physical Therapy (Active)     Point: Mobility training (Active)    Learning Progress Summary    Learner Readiness Method Response Comment Documented by Status   Patient Acceptance E NR cardiac precautions LN 12/06/17 1650 Active    Acceptance E,D NR,DU,VU POC; coughing w/ abdominal muscles; use of pillow to help cough; tx GB 12/05/17 1450 Done               Point: Home exercise program (Done)    Learning Progress Summary    Learner Readiness Method Response Comment Documented by Status   Patient Acceptance E,D NR,DU,VU POC; coughing w/ abdominal muscles; use of pillow to help cough; tx GB 12/05/17 1450 Done                Point: Body mechanics (Active)    Learning Progress Summary    Learner Readiness Method Response Comment Documented by Status   Patient Acceptance E NR cardiac precautions  12/06/17 1650 Active    Acceptance E,D NR,DU,VU POC; coughing w/ abdominal muscles; use of pillow to help cough; tx  12/05/17 1450 Done               Point: Precautions (Active)    Learning Progress Summary    Learner Readiness Method Response Comment Documented by Status   Patient Acceptance E NR cardiac precautions LN 12/06/17 1650 Active    Acceptance E,D NR,DU,VU POC; coughing w/ abdominal muscles; use of pillow to help cough; tx  12/05/17 1450 Done                      User Key     Initials Effective Dates Name Provider Type Discipline     10/17/16 -  Paige Chandler, PT Physical Therapist PT     10/17/16 -  Denise Herron PTA Physical Therapy Assistant PT                    PT Recommendation and Plan  Anticipated Equipment Needs At Discharge: front wheeled walker  Anticipated Discharge Disposition: home with home health  Planned Therapy Interventions: bed mobility training, gait training, home exercise program, stair training, strengthening, transfer training  PT Frequency: daily  Plan of Care Review  Plan Of Care Reviewed With: patient  Progress: progress toward functional goals as expected  Outcome Summary/Follow up Plan: sup-sit-sup sba,sit-stand-sit sba,amb 50' w/o and min of 1,350' with rw and sba of 1,reviewed heart precautions-no goals met-if d/c today would benefit from aru          Outcome Measures       12/06/17 1435 12/06/17 0915 12/05/17 1424    How much help from another person do you currently need...    Turning from your back to your side while in flat bed without using bedrails? 3  -LN      Moving from lying on back to sitting on the side of a flat bed without bedrails? 3  -LN      Moving to and from a bed to a chair (including a wheelchair)? 3  -LN      Standing up from a chair using your arms (e.g.,  wheelchair, bedside chair)? 3  -LN      Climbing 3-5 steps with a railing? 3  -LN      To walk in hospital room? 3  -LN      AM-PAC 6 Clicks Score 18  -LN      How much help from another is currently needed...    Putting on and taking off regular lower body clothing?  2  -KD 1  -BH    Bathing (including washing, rinsing, and drying)  2  -KD 1  -BH    Toileting (which includes using toilet bed pan or urinal)  3  -KD 2  -BH    Putting on and taking off regular upper body clothing  4  -KD 2  -BH    Taking care of personal grooming (such as brushing teeth)  4  -KD 3  -BH    Eating meals  4  -KD 3  -BH    Score  19  -KD 12  -BH    Functional Assessment    Outcome Measure Options AM-PAC 6 Clicks Basic Mobility (PT)  -LN  AM-PAC 6 Clicks Daily Activity (OT)  -      12/05/17 1400          How much help from another person do you currently need...    Turning from your back to your side while in flat bed without using bedrails? 3  -GB      Moving from lying on back to sitting on the side of a flat bed without bedrails? 3  -GB      Moving to and from a bed to a chair (including a wheelchair)? 2  -GB      Standing up from a chair using your arms (e.g., wheelchair, bedside chair)? 2  -GB      Climbing 3-5 steps with a railing? 1  -GB      To walk in hospital room? 1  -GB      AM-PAC 6 Clicks Score 12  -GB      Functional Assessment    Outcome Measure Options AM-PAC 6 Clicks Basic Mobility (PT)  -GB        User Key  (r) = Recorded By, (t) = Taken By, (c) = Cosigned By    Initials Name Provider Type    JENNIFER Chandler, PT Physical Therapist     Rossy Ospina, OTR/L Occupational Therapist    LN Denise Herron, LINDA Physical Therapy Assistant    KD Margaret Bower, GA/L Occupational Therapy Assistant           Time Calculation:         PT Charges       12/06/17 1435          Time Calculation    Start Time 1435  -LN      Stop Time 1515  -LN      Time Calculation (min) 40 min  -LN      PT Received On 12/06/17  -LN       Time Calculation- PT    Total Timed Code Minutes- PT 40 minute(s)  -LN        User Key  (r) = Recorded By, (t) = Taken By, (c) = Cosigned By    Initials Name Provider Type    BEBE Herron PTA Physical Therapy Assistant          Therapy Charges for Today     Code Description Service Date Service Provider Modifiers Qty    10371985588 HC GAIT TRAINING EA 15 MIN 12/6/2017 Denise Herron PTA GP 1    76484569084 HC PT THERAPEUTIC ACT EA 15 MIN 12/6/2017 Denise Herron PTA GP 1    14070345640 HC PT SELF CARE/MGMT/TRAIN EA 15 MIN 12/6/2017 Denise Herron PTA GP 1          PT G-Codes  PT Professional Judgement Used?: Yes  Outcome Measure Options: AM-PAC 6 Clicks Basic Mobility (PT)  Score: 12  Functional Limitation: Mobility: Walking and moving around  Mobility: Walking and Moving Around Current Status (): At least 60 percent but less than 80 percent impaired, limited or restricted  Mobility: Walking and Moving Around Goal Status (): At least 1 percent but less than 20 percent impaired, limited or restricted    Denise Herron PTA  12/6/2017            Electronically signed by Denise Herron PTA at 12/6/2017  4:55 PM           Occupational Therapy Notes (most recent note)      HARMEET Corona/L at 12/7/2017  1:15 PM  Version 1 of 1         Problem: Patient Care Overview (Adult)  Goal: Plan of Care Review  Outcome: Ongoing (interventions implemented as appropriate)    12/07/17 0340 12/07/17 1310   Coping/Psychosocial Response Interventions   Plan Of Care Reviewed With patient --    Patient Care Overview   Progress no change --    Outcome Evaluation   Outcome Summary/Follow up Plan --  Bed mobility- cond indep, sit-stand-sit-SBA, t/f's CGA. Pt with pain of 8 in chest nsg notified, meds given       Goal: Discharge Needs Assessment  Outcome: Ongoing (interventions implemented as appropriate)    12/01/17 0459 12/05/17 1424 12/05/17 1452   Discharge Needs Assessment   Concerns To Be Addressed --  --  homelessness;home  safety concerns   Concerns Comments --  --  pt was homeless before admit; will need support system and safe location post d/c   Readmission Within The Last 30 Days no previous admission in last 30 days --  --    Equipment Needed After Discharge --  --  walker, rolling   Discharge Facility/Level Of Care Needs --  --  skilled transitional care;rehabilitation facility   Current Discharge Risk chronically ill;financial support inadequate --  --    Discharge Disposition still a patient --  --    Discharge Planning Comments --  --  if d/c to homeless situation, it appears he will need to be as strong as possible to survive homelessnes   Current Health   Outpatient/Agency/Support Group Needs --  --  skilled nursing facility (specify);inpatient rehabilitation facility (specify)   Anticipated Changes Related to Illness --  --  inability to care for self   Self-Care   Equipment Currently Used at Home --  none --    Living Environment   Transportation Available --  public transportation;family or friend will provide --          Problem: Inpatient Occupational Therapy  Goal: Transfer Training Goal 1 LTG- OT  Outcome: Ongoing (interventions implemented as appropriate)    12/05/17 1424 12/07/17 1310   Transfer Training OT LTG   Transfer Training OT LTG, Date Established 12/05/17 --    Transfer Training OT LTG, Time to Achieve by discharge --    Transfer Training OT LTG, Activity Type toilet --    Transfer Training OT LTG, [place] Level supervision required --    Transfer Training OT LTG, Date Goal Reviewed --  12/07/17   Transfer Training OT LTG, Outcome --  goal not met       Goal: Patient Education Goal LTG- OT  Outcome: Ongoing (interventions implemented as appropriate)    12/05/17 1424 12/07/17 1310   Patient Education OT LTG   Patient Education OT LTG, Date Established 12/05/17 --    Patient Education OT LTG, Time to Achieve by discharge --    Patient Education OT LTG, Education Type HEP;precautions per surgeon;home  safety;energy conservation;adaptive equipment mgmt --    Patient Education OT LTG, Education Understanding independent;demonstrates adequately;verbalizes understanding --    Patient Education OT LTG, Date Goal Reviewed --  12/07/17   Patient Education OT LTG Outcome --  goal not met       Goal: ADL Goal LTG- OT  Outcome: Ongoing (interventions implemented as appropriate)    12/05/17 1424 12/07/17 1310   ADL OT LTG   ADL OT LTG, Date Established 12/05/17 --    ADL OT LTG, Time to Achieve by discharge --    ADL OT LTG, Activity Type ADL skills --    ADL OT LTG, [place] Level standby assist --    ADL OT LTG, Date Goal Reviewed --  12/07/17   ADL OT LTG, Outcome --  goal not met       Goal: Functional Mobility Goal LTG- OT  Outcome: Ongoing (interventions implemented as appropriate)    12/05/17 1424 12/06/17 0915 12/07/17 1310   Functional Mobility OT LTG   Functional Mobility Goal OT LTG, Date Established 12/05/17 --  --    Functional Mobility Goal OT LTG, Time to Achieve by discharge --  --    Functional Mobility Goal OT LTG, [place] Level supervision --  --    Functional Mobility Goal OT LTG, Distance to Achieve to the sink;to the bathroom --  --    Functional Mobility Goal OT LTG, Date Goal Reviewed --  12/06/17 --    Functional Mobility Goal OT LTG, Outcome --  --  goal not met       Goal: Endurance Goal LTG- OT  Outcome: Ongoing (interventions implemented as appropriate)    12/05/17 1424 12/07/17 0845   Endurance OT LTG   Endurance Goal OT LTG, Date Established 12/05/17 --    Endurance Goal OT LTG, Time to Achieve by discharge --    Endurance Goal OT LTG, Activity Level endurance 2 good- --    Endurance Goal OT LTG, Date Goal Reviewed --  12/07/17   Endurance Goal OT LTG, Outcome --  goal not met              Electronically signed by MARK Corona at 12/7/2017  1:15 PM

## 2017-12-07 NOTE — PLAN OF CARE
Problem: Patient Care Overview (Adult)  Goal: Plan of Care Review  Outcome: Ongoing (interventions implemented as appropriate)    12/07/17 1403   Coping/Psychosocial Response Interventions   Plan Of Care Reviewed With patient   Patient Care Overview   Progress progress toward functional goals as expected   Outcome Evaluation   Outcome Summary/Follow up Plan sup-sit cga of 1,sit-sup cond ind,sit-stand-sit ind,amb 354' w/o ad and sba of 1,reviewed cardiac precautions and needs v.c's not to push through arms when getting back to bed-no new goals met-if d/c would benefit from cardiac rehab       Goal: Discharge Needs Assessment  Outcome: Ongoing (interventions implemented as appropriate)    12/01/17 0459 12/05/17 1424 12/05/17 1452   Discharge Needs Assessment   Concerns To Be Addressed --  --  homelessness;home safety concerns   Concerns Comments --  --  pt was homeless before admit; will need support system and safe location post d/c   Readmission Within The Last 30 Days no previous admission in last 30 days --  --    Equipment Needed After Discharge --  --  walker, rolling   Discharge Facility/Level Of Care Needs --  --  skilled transitional care;rehabilitation facility   Current Discharge Risk chronically ill;financial support inadequate --  --    Discharge Disposition still a patient --  --    Discharge Planning Comments --  --  if d/c to homeless situation, it appears he will need to be as strong as possible to survive homelessnes   Current Health   Outpatient/Agency/Support Group Needs --  --  skilled nursing facility (specify);inpatient rehabilitation facility (specify)   Anticipated Changes Related to Illness --  --  inability to care for self   Self-Care   Equipment Currently Used at Home --  none --    Living Environment   Transportation Available --  public transportation;family or friend will provide --          Problem: Inpatient Physical Therapy  Goal: Bed Mobility Goal LTG- PT  Outcome: Ongoing  (interventions implemented as appropriate)    12/05/17 1452 12/06/17 1435 12/07/17 1403   Bed Mobility PT LTG   Bed Mobility PT LTG, Date Established 12/05/17 --  --    Bed Mobility PT LTG, Time to Achieve by discharge --  --    Bed Mobility PT LTG, Activity Type roll left/roll right;supine to sit/sit to supine --  --    Bed Mobility PT LTG, [place] Level --  1 person + 1 person to manage equipment --    Bed Mobility PT LTG, Date Goal Reviewed --  --  12/07/17   Bed Mobility PT LTG, Outcome --  --  goal not met       Goal: Transfer Training Goal 1 LTG- PT  Outcome: Ongoing (interventions implemented as appropriate)    12/05/17 1452 12/07/17 1403   Transfer Training PT LTG   Transfer Training PT LTG, Date Established 12/05/17 --    Transfer Training PT LTG, Time to Achieve by discharge --    Transfer Training PT LTG, Activity Type bed to chair /chair to bed;sit to stand/stand to sit --    Transfer Training PT LTG, [place] Level conditional independence --    Transfer Training PT LTG, Date Goal Reviewed --  12/07/17   Transfer Training PT LTG, Outcome --  goal not met       Goal: Gait Training Goal LTG- PT  Outcome: Ongoing (interventions implemented as appropriate)    12/05/17 1452 12/07/17 1403   Gait Training PT LTG   Gait Training Goal PT LTG, Date Established 12/05/17 --    Gait Training Goal PT LTG, Time to Achieve by discharge --    Gait Training Goal PT LTG, [place] Level conditional independence --    Gait Training Goal PT LTG, Date Goal Reviewed --  12/07/17   Gait Training Goal PT LTG, Outcome --  goal not met       Goal: Stair Training Goal LTG- PT  Outcome: Ongoing (interventions implemented as appropriate)    12/05/17 1452 12/07/17 1403   Stair Training PT LTG   Stair Training Goal PT LTG, Date Established --  12/07/17   Stair Training Goal PT LTG, Time to Achieve by discharge --    Stair Training Goal PT LTG, Number of Steps 4 --    Stair Training Goal PT LTG, [place] Level  conditional independence --    Stair Training Goal PT LTG, Date Goal Reviewed --  12/07/17   Stair Training Goal PT LTG, Outcome --  goal not met

## 2017-12-07 NOTE — PAYOR COMM NOTE
"    Updated clinicals for ref # [id number].  Bozena Quesada, RN Phone [phone] Fax [phone]      Lyle Corona (51 y.o. Male)     Date of Birth Social Security Number Address Home Phone MRN    1965  [address] [facility] [phone] [id number]    Yarsanism Marital Status          Shinto        Admission Date Admission Type Admitting Provider Attending Provider Department, Room/Bed    11/27/17 Urgent Taz Guzmán MD Lipson, Wayne E, MD [address], 309/1    Discharge Date Discharge Disposition Discharge Destination                      Attending Provider: Darien Dejesus MD     Allergies:  Imdur [Isosorbide Dinitrate], Amlodipine, Lisinopril, Metoprolol    Isolation:  None   Infection:  None   Code Status:  FULL    Ht:  175.3 cm (69.02\")   Wt:  70.5 kg (155 lb 8 oz)    Admission Cmt:  None   Principal Problem:  Coronary artery disease involving native coronary artery of native heart with unstable angina pectoris [I25.110]                 Active Insurance as of 11/27/2017     Primary Coverage     Payor Plan Insurance Group Employer/Plan Group    PASSPORT PASSPORT MEDICAID     Payor Plan Address Payor Plan Phone Number Effective From Effective To    [address] [phone] 4/1/2014     [place] [address]       Subscriber Name Subscriber Birth Date Member ID       LYEL CORONA 1965 [id number]                 Emergency Contacts      (Rel.) Home Phone Work Phone Mobile Phone    Sue Márquez (Friend) [phone] -- --    Elizabeth Sagastume (Other) [phone] -- [phone]            Insurance Information                PASSPORT/PASSPORT Phone: [phone]    Subscriber: Cj Lyleleo Godinez Subscriber#: [id number]    Group#: MEDICAID Precert#:              Physician Progress Notes (most recent note)      AHSAN Castor at 12/7/2017 11:20 AM  Version 1 of 1           Cardiothoracic - Vascular Surgery Daily " "Note        LOS: 10 days   Patient Care Team:  AHSAN Mayer as PCP - General     POD2 CABGX3    Chief Complaint: Surgical Chest Pain      Subjective     The following portions of the patient's history were reviewed and updated as appropriate: allergies, current medications, past family history, past medical history, past social history, past surgical history and problem list.     Subjective:  Symptoms:  Stable.  He reports chest pain (surgical).    Diet:  Adequate intake.  No nausea.    Activity level: Impaired due to pain.    Pain:  He complains of pain that is moderate.  He reports pain is improving.  Pain is requiring pain medication and well controlled.          Objective     Vital Signs  Temp:  [98.3 °F (36.8 °C)-101.7 °F (38.7 °C)] 98.5 °F (36.9 °C)  Heart Rate:  [] 111  Resp:  [20] 20  BP: ()/(62-86) 116/78  Body mass index is 22.95 kg/(m^2).    Intake/Output Summary (Last 24 hours) at 12/07/17 1120  Last data filed at 12/07/17 1033   Gross per 24 hour   Intake              900 ml   Output              150 ml   Net              750 ml     I/O this shift:  In: 300 [P.O.:300]  Out: -     Wt Readings from Last 3 Encounters:   12/07/17 70.5 kg (155 lb 8 oz)   10/31/17 71.4 kg (157 lb 8 oz)   06/12/17 69.4 kg (153 lb)         Physical Exam   Objective:  General Appearance:  Uncomfortable and comfortable.    Vital signs: (most recent): Blood pressure 116/78, pulse 111, temperature 98.5 °F (36.9 °C), temperature source Oral, resp. rate 20, height 175.3 cm (69.02\"), weight 70.5 kg (155 lb 8 oz), SpO2 93 %.  Vital signs are normal.  Fever (blood cx-Neg tmax 101.5).    Output: Producing urine and producing stool.    HEENT: Normal HEENT exam.    Lungs:  Normal respiratory rate and normal effort.  There are decreased breath sounds (bases).  (On RA)  Heart: Tachycardia.    Abdomen: Abdomen is soft and non-distended.  Bowel sounds are normal.     Extremities: Normal range of motion.  There is no " dependent edema.    Pulses: Distal pulses are intact.    Neurological: Patient is alert and oriented to person, place and time.    Pupils:  Pupils are equal, round, and reactive to light.    Skin:  Warm.  (M/S INC: Sm amt sanginous drainage distal, Well approximated. prineo strip intact  LLE EVH: CDI)              Results Review:    Lab Results   Component Value Date    WBC 15.64 (H) 12/07/2017    HGB 10.9 (L) 12/07/2017    HCT 31.4 (L) 12/07/2017    MCV 83.5 12/07/2017     12/07/2017       Estimated Creatinine Clearance: 40 mL/min (by C-G formula based on Cr of 2.13).      Results from last 7 days  Lab Units 12/06/17  0212 12/05/17  0416 12/04/17  1245   MAGNESIUM mg/dL 2.3 2.4* 4.0*   PHOSPHORUS mg/dL  --   --  4.3       Lab Results   Component Value Date    GLUCOSE 99 12/07/2017    BUN 29 (H) 12/07/2017    CREATININE 2.13 (H) 12/07/2017    EGFRIFNONA 33 (L) 12/07/2017    BCR 13.6 12/07/2017    K 4.7 12/07/2017    CO2 25.0 12/07/2017    CALCIUM 9.1 12/07/2017    ALBUMIN 4.00 12/07/2017    LABIL2 1.2 12/07/2017    AST 46 12/07/2017    ALT 14 (L) 12/07/2017         Results from last 7 days  Lab Units 12/04/17  1245   INR  1.24*       Imaging Results (last 24 hours)     Procedure Component Value Units Date/Time    XR Chest 1 View [[id number]] Collected:  12/06/17 1211     Updated:  12/06/17 1653    Narrative:         PORTABLE CHEST    HISTORY: Chest tube removal. Right apical pneumothorax.    Portable AP upright film of the chest was obtained at 12:04 PM.  COMPARISON: December 5, 2017    Right sided chest tube or mediastinal drainage tube has been  removed.  Right internal jugular catheter sheath remains in place.  Extremely small residual right apical pneumothorax.  Small left pleural effusion.  Minimal subsegmental atelectasis or infiltrate left lung base.  Discoid atelectasis right lung base.  Sternotomy.  Minimal cardiomegaly.   The pulmonary vasculature is not increased.  No acute osseous  abnormality.  Internal fixation plate and screw device lower cervical spine.      Impression:       CONCLUSION:  Right sided chest tube or mediastinal drainage tube has been  removed.  Right internal jugular catheter sheath remains in place.  Extremely small residual right apical pneumothorax.  Small left pleural effusion.  Minimal subsegmental atelectasis or infiltrate left lung base.  Discoid atelectasis right lung base.  Sternotomy.  Minimal cardiomegaly.     [id number]    Electronically signed by:  Melvni Preciado MD  12/6/2017 4:52 PM CST  Workstation: [email or web address]                                  amiodarone 150 mg Intravenous Once   aspirin 81 mg Oral Daily   atorvastatin 10 mg Oral Daily   cyclobenzaprine 10 mg Oral TID   insulin aspart 0-24 Units Subcutaneous 4x Daily AC & at Bedtime   insulin detemir 20 Units Subcutaneous Nightly   ipratropium-albuterol 3 mL Nebulization 4x Daily - RT   labetalol 100 mg Oral Q12H   losartan 25 mg Oral Nightly   magnesium oxide 400 mg Oral BID   prenatal vitamin 27-0.8 1 tablet Oral Daily   sennosides-docusate sodium 1 tablet Oral BID   sodium chloride 1-10 mL Intravenous Q8H   tamsulosin 0.4 mg Oral Nightly   ticagrelor 90 mg Oral BID   vitamin C 500 mg Oral BID       amiodarone 1 mg/min             ASSESSMENT/PLAN     Principal Problem:    Coronary artery disease involving native coronary artery of native heart with unstable angina pectoris  Active Problems:    Chronic kidney disease, stage 3    NSTEMI (non-ST elevated myocardial infarction)      Assessment:    Condition: In stable condition.   (Stable Post Op CABG: Progressing well    CAD: ASA, Brilinta, Statin. Beta as BP tolerates. Low dose ARB    Fever: Aggressive respiratory interventions. BC negative. Urine cx pending.     Resp: Incentive. On RA. Aggressive Pulmonary Toilet    Pain: Percocet    Rehab: PT/OT, Ambulate. ARU consult-denied. SNF PC pending    Transient Post op Hyperglycemia: SSI coverage. Continue Levemir  ).      Plan:   Encourage ambulation and out of bed and up to chair.  Continue respiratory treatments and start/continue incentive spirometry.  Advance diet as tolerated.  Increase analgesics and administer medications as ordered.   (Stable. Progress Care 3W.  Await SNF PC. Aggressive pulmonary toilet).                 This document has been electronically signed by AHSAN Castro on December 7, 2017 11:20 AM           Electronically signed by AHSAN Castro at 12/7/2017 11:24 AM           Consult Notes (most recent note)      AHSAN Rand at 11/29/2017 10:31 AM  Version 2 of 2     Consult Orders:    1. Inpatient Consult to Cardiothoracic Surgery [[id number]] ordered by Carlos Charles MD at 11/28/17 1603                CVTS CONSULTATION  CVTS/Dr Dejesus is requested to see Mr Corona in consultation    Patient Care Team:  AHSAN Mayer as PCP - General  Cardiology AHSAN De La Torre MD     Chief complaint:  VAS 6 Chest pain with radiation,  SOA, and  Nausea      Subjective     History of Present Illness:  51 y.o. male with CKD III presented with USA/NSTEMI prompt medical management was initiated.  He has ongoing angina on NTG infusion and heparin infusion.  He denies smoking.  He has a history of known CAD with previous PCI with DUE stent placement to CX (2013) and RCA X 2 proximal and mid (2016).  Comorbidities include difficult to control HTN, HLP,CHF, and AAA.  He does have chronic back pain and GERD. His chest pain is at rest,   He underwent cardiac cath which demonstrated 3 V CAD.  ECHO pending.  Last dose of Brilinta 11/27/17.  No other associated symptoms or modifying factors except  as listed in ROS.    The following portions of the patient's history were reviewed and updated as appropriate: allergies, current medications, past family history, past medical history, past social history, past surgical history and problem list.  Recent images independently  reviewed.  Available laboratory values reviewed.    Review of Systems   Constitutional: Positive for appetite change, diaphoresis (with CP ) and fatigue. Negative for activity change and fever.        Exercise and walking are limited by his SOA and CP   HENT: Negative for hearing loss, mouth sores, nosebleeds and trouble swallowing.    Eyes: Positive for visual disturbance.        Light flashes visual changes    Respiratory: Positive for chest tightness and shortness of breath. Negative for apnea, cough, wheezing and stridor.    Cardiovascular: Positive for chest pain and palpitations. Negative for leg swelling.   Gastrointestinal: Negative for abdominal distention and abdominal pain.        Heart burn controlled with medications   Genitourinary: Negative for hematuria.   Musculoskeletal: Positive for back pain. Negative for myalgias.   Skin: Negative for pallor, rash and wound.   Neurological: Positive for dizziness, weakness and light-headedness. Negative for seizures, syncope, speech difficulty, numbness and headaches.   Hematological: Does not bruise/bleed easily.   Psychiatric/Behavioral: The patient is nervous/anxious.         Past Medical History:   Diagnosis Date   • Aneurysm of infrarenal abdominal aorta    • Anxiety    • Cervical radiculitis    • Cervical spondylosis without myelopathy    • CHF (congestive heart failure)    • Chronic kidney disease, stage 3    • Common iliac aneurysm    • Coronary arteriosclerosis    • Degeneration of cervical intervertebral disc    • Essential hypertension    • GERD (gastroesophageal reflux disease)    • Headache    • Hyperlipidemia    • Intermittent claudication    • Myocardial infarction    • Uric acid renal calculus      Past Surgical History:   Procedure Laterality Date   • APPENDECTOMY     • CARDIAC CATHETERIZATION  05/25/2016    Cardiac cath 62888 (2) - Patent left circumflex stent.Chronic total occlusion of the RCA,more than 20 mm in length.   • CERVICAL FUSION      • CHOLECYSTECTOMY     • HERNIA REPAIR     • SPINAL FUSION       Family History   Problem Relation Age of Onset   • Hyperlipidemia Mother    • Hypertension Mother    • Cancer Father    • Hyperlipidemia Father    • Hypertension Father    • Cancer Brother    • Diabetes Other    • Diabetes Other      Social History   Substance Use Topics   • Smoking status: Former Smoker     Quit date: 8/28/2016   • Smokeless tobacco: Never Used   • Alcohol use No     Prescriptions Prior to Admission   Medication Sig Dispense Refill Last Dose   • aspirin 81 MG tablet Take 81 mg by mouth.   11/27/2017 at Unknown time   • hydrochlorothiazide (MICROZIDE) 12.5 MG capsule Take 1 capsule by mouth Daily. 30 capsule 11 11/27/2017 at Unknown time   • labetalol (NORMODYNE) 300 MG tablet Take 1 tablet by mouth Every 12 (Twelve) Hours. 60 tablet 1 11/27/2017 at Unknown time   • losartan (COZAAR) 100 MG tablet Take 1 tablet by mouth Daily. 30 tablet 1 11/27/2017 at Unknown time   • nitroglycerin (NITRODUR) 0.4 MG/HR patch Place 1 patch on the skin Daily. 30 patch 6 11/26/2017 at Unknown time   • pantoprazole (PROTONIX) 40 MG EC tablet   1 11/27/2017 at Unknown time   • pravastatin (PRAVACHOL) 40 MG tablet Take 1 tablet by mouth every night. 90 tablet 4 11/26/2017 at Unknown time   • ranitidine (ZANTAC) 150 MG tablet Take 150 mg by mouth 2 (two) times a day. Indication: gerd   11/27/2017 at Unknown time   • tamsulosin (FLOMAX) 0.4 MG capsule 24 hr capsule Take 1 capsule by mouth Every Night.   11/26/2017 at Unknown time   • terazosin (HYTRIN) 1 MG capsule Take 1 mg by mouth Every Night.   11/26/2017 at Unknown time   • ticagrelor (BRILINTA) 90 MG tablet tablet Take 90 mg by mouth 2 (two) times a day. Indication: blood thinner   11/27/2017 at Unknown time   • cyclobenzaprine (FLEXERIL) 10 MG tablet Take 10 mg by mouth 3 (three) times a day. Indication: Muscle spasm   Taking   • docusate sodium 100 MG capsule Take 200 mg by mouth 2 (Two) Times a Day.  "60 capsule 1 Unknown at Unknown time   • HYDROcodone-acetaminophen (LORTAB)  MG per tablet Take 1 tablet by mouth every 8 (eight) hours as needed. Indication: pain   Taking   • nicotine (NICODERM CQ) 21 MG/24HR patch Place 1 patch on the skin Daily. 30 patch 1 Not Taking       acetylcysteine 6 mL Oral Q12H   atorvastatin 40 mg Oral Daily   [START ON 12/4/2017] ceFAZolin 2 g Intravenous On Call to OR   famotidine 20 mg Oral BID   hydrALAZINE 50 mg Oral Q8H   labetalol 100 mg Oral Q12H   pantoprazole 40 mg Oral Q AM   ranolazine 500 mg Oral Q12H   tamsulosin 0.4 mg Oral Nightly   terazosin 1 mg Oral Nightly     Allergies:  Imdur [isosorbide dinitrate]; Amlodipine; Lisinopril; and Metoprolol    Objective      Vital Signs  Temp:  [97.6 °F (36.4 °C)-98.9 °F (37.2 °C)] 98.6 °F (37 °C)  Heart Rate:  [71-97] 76  Resp:  [14-18] 18  BP: ()/(53-99) 128/80    Flowsheet Rows         First Filed Value    Admission Height  69\" (175.3 cm) Documented at 11/27/2017 1722    Admission Weight  151 lb 12.8 oz (68.9 kg) Documented at 11/27/2017 1722        69\" (175.3 cm)    Physical Exam   Constitutional: He is oriented to person, place, and time. He appears well-nourished.   HENT:   Head: Normocephalic.   Mouth/Throat: Oropharynx is clear and moist.   Tongue midline Facial movements symmetrical      Eyes: Conjunctivae are normal. Pupils are equal, round, and reactive to light.   Neck: Neck supple. JVD present. No tracheal deviation present.   Cardiovascular: Normal rate, regular rhythm, normal heart sounds and intact distal pulses.    Pulses:       Carotid pulses are 1+ on the right side, and 1+ on the left side with bruit.       Radial pulses are 2+ on the right side, and 2+ on the left side.        Dorsalis pedis pulses are 2+ on the right side, and 2+ on the left side.        Posterior tibial pulses are 2+ on the right side, and 2+ on the left side.   Pulmonary/Chest: Effort normal and breath sounds normal. No stridor. "   Abdominal: Soft. Bowel sounds are normal. He exhibits no distension. There is no tenderness.   Musculoskeletal: He exhibits no edema or tenderness.   Neurological: He is alert and oriented to person, place, and time.   Skin: Skin is warm and dry. No rash noted. No erythema. No pallor.   Psychiatric: His behavior is normal. Judgment normal.   Nursing note and vitals reviewed.      Results Review:   Lab Results (last 24 hours)     Procedure Component Value Units Date/Time    CBC & Differential [[id number]] Collected:  11/28/17 1031    Specimen:  Blood Updated:  11/28/17 1053    Narrative:       The following orders were created for panel order CBC & Differential.  Procedure                               Abnormality         Status                     ---------                               -----------         ------                     CBC Auto Differential[[id number]]        Abnormal            Final result                 Please view results for these tests on the individual orders.    CBC Auto Differential [[id number]]  (Abnormal) Collected:  11/28/17 1031    Specimen:  Blood Updated:  11/28/17 1053     WBC 8.85 10*3/mm3      RBC 4.02 (L) 10*6/mm3      Hemoglobin 11.3 (L) g/dL      Hematocrit 33.1 (L) %      MCV 82.3 fL      MCH 28.1 pg      MCHC 34.1 g/dL      RDW 14.1 %      RDW-SD 42.8 fl      MPV 9.4 fL      Platelets 153 10*3/mm3      Neutrophil % 45.3 %      Lymphocyte % 45.4 %      Monocyte % 7.5 %      Eosinophil % 1.4 %      Basophil % 0.2 %      Immature Grans % 0.2 %      Neutrophils, Absolute 4.01 10*3/mm3      Lymphocytes, Absolute 4.02 10*3/mm3      Monocytes, Absolute 0.66 10*3/mm3      Eosinophils, Absolute 0.12 10*3/mm3      Basophils, Absolute 0.02 10*3/mm3      Immature Grans, Absolute 0.02 10*3/mm3      nRBC 0.0 /100 WBC     aPTT [[id number]]  (Abnormal) Collected:  11/28/17 1031    Specimen:  Blood Updated:  11/28/17 1113     PTT 75.7 (H) seconds     Narrative:       The recommended Heparin  therapeutic range is 68-97 seconds.    Comprehensive Metabolic Panel [[id number]]  (Abnormal) Collected:  11/28/17 1031    Specimen:  Blood Updated:  11/28/17 1115     Glucose 90 mg/dL      BUN 23 (H) mg/dL      Creatinine 1.83 (H) mg/dL      Sodium 137 mmol/L      Potassium 4.0 mmol/L      Chloride 105 mmol/L      CO2 22.0 mmol/L      Calcium 8.6 mg/dL      Total Protein 6.9 g/dL      Albumin 3.90 g/dL      ALT (SGPT) 28 U/L      AST (SGOT) 31 U/L      Alkaline Phosphatase 95 U/L      Total Bilirubin 0.6 mg/dL      eGFR Non African Amer 39 (L) mL/min/1.73      Globulin 3.0 gm/dL      A/G Ratio 1.3 g/dL      BUN/Creatinine Ratio 12.6     Anion Gap 10.0 mmol/L     Protime-INR [[id number]]  (Normal) Collected:  11/28/17 1743    Specimen:  Blood Updated:  11/28/17 1820     Protime 13.0 Seconds      INR 0.99    Narrative:       Therapeutic range for most indications is 2.0-3.0 INR,  or 2.5-3.5 for mechanical heart valves.    aPTT [[id number]]  (Normal) Collected:  11/28/17 1743    Specimen:  Blood Updated:  11/28/17 1820     PTT 33.3 seconds     Narrative:       The recommended Heparin therapeutic range is 68-97 seconds.    Extra Tubes [[id number]] Collected:  11/28/17 1743    Specimen:  Blood from Blood, Venous Line Updated:  11/28/17 1846    Narrative:       The following orders were created for panel order Extra Tubes.  Procedure                               Abnormality         Status                     ---------                               -----------         ------                     Gold Top - SST[[id number]]                                   Final result                 Please view results for these tests on the individual orders.    Gold Top - SST [[id number]] Collected:  11/28/17 1743    Specimen:  Blood Updated:  11/28/17 1846     Extra Tube Hold for add-ons.      Auto resulted.       CBC & Differential [[id number]] Collected:  11/29/17 0143    Specimen:  Blood Updated:  11/29/17 0151    Narrative:       The  following orders were created for panel order CBC & Differential.  Procedure                               Abnormality         Status                     ---------                               -----------         ------                     CBC Auto Differential[[id number]]        Abnormal            Final result                 Please view results for these tests on the individual orders.    CBC Auto Differential [[id number]]  (Abnormal) Collected:  11/29/17 0143    Specimen:  Blood Updated:  11/29/17 0151     WBC 6.78 10*3/mm3      RBC 3.82 (L) 10*6/mm3      Hemoglobin 10.7 (L) g/dL      Hematocrit 31.4 (L) %      MCV 82.2 fL      MCH 28.0 pg      MCHC 34.1 g/dL      RDW 14.2 %      RDW-SD 42.7 fl      MPV 9.1 fL      Platelets 139 (L) 10*3/mm3      Neutrophil % 41.5 %      Lymphocyte % 48.2 %      Monocyte % 8.1 %      Eosinophil % 1.8 %      Basophil % 0.1 %      Immature Grans % 0.3 %      Neutrophils, Absolute 2.81 10*3/mm3      Lymphocytes, Absolute 3.27 10*3/mm3      Monocytes, Absolute 0.55 10*3/mm3      Eosinophils, Absolute 0.12 10*3/mm3      Basophils, Absolute 0.01 10*3/mm3      Immature Grans, Absolute 0.02 10*3/mm3     Comprehensive Metabolic Panel [[id number]]  (Abnormal) Collected:  11/29/17 0143    Specimen:  Blood Updated:  11/29/17 0212     Glucose 103 (H) mg/dL      BUN 24 (H) mg/dL      Creatinine 2.10 (H) mg/dL      Sodium 138 mmol/L      Potassium 3.6 mmol/L      Chloride 101 mmol/L      CO2 25.0 mmol/L      Calcium 8.4 mg/dL      Total Protein 6.5 g/dL      Albumin 3.60 g/dL      ALT (SGPT) 21 U/L      AST (SGOT) 22 U/L      Alkaline Phosphatase 90 U/L      Total Bilirubin 0.5 mg/dL      eGFR Non African Amer 33 (L) mL/min/1.73      Globulin 2.9 gm/dL      A/G Ratio 1.2 g/dL      BUN/Creatinine Ratio 11.4     Anion Gap 12.0 mmol/L     aPTT [[id number]]  (Abnormal) Collected:  11/29/17 0143    Specimen:  Blood Updated:  11/29/17 0228     PTT 68.9 (H) seconds     Narrative:       The recommended  Heparin therapeutic range is 68-97 seconds.    Extra Tubes [[id number]] Collected:  11/29/17 0144    Specimen:  Blood from Blood, Venous Line Updated:  11/29/17 0246    Narrative:       The following orders were created for panel order Extra Tubes.  Procedure                               Abnormality         Status                     ---------                               -----------         ------                     Gold Top - SST[[id number]]                                   Final result                 Please view results for these tests on the individual orders.    Gold Top - SST [[id number]] Collected:  11/29/17 0144    Specimen:  Blood Updated:  11/29/17 0246     Extra Tube Hold for add-ons.      Auto resulted.       aPTT [[id number]]  (Abnormal) Collected:  11/29/17 0847    Specimen:  Blood Updated:  11/29/17 0928     PTT 93.8 (H) seconds     Narrative:       The recommended Heparin therapeutic range is 68-97 seconds.        Imaging Results (last 72 hours)     ** No results found for the last 72 hours. **      Cardiac Cath:  LADo80%  D1 diffuse, CX 92%  RCA 90%        Assessment/Plan     Active Problems:    NSTEMI (non-ST elevated myocardial infarction)    Unstable angina      Assessment & Plan  3 V CAD NSTEMI:    NTG to control USA.   Continue heparin infusion until 4 hours prior to surgery  CABG is warranted.   Would prefer to reduce chance of blood product transfusion till 12/04/17 if patient symptoms will allow.  Will begin preop work up:  ECHO pending.  Carotid US left carotid bruit  Vein mapping for conduit.  LAB:  To include CMP, CBC, TSH, AIC, Fibrogen, INR.  Will check platelet function as received antiplatelet therapy.  Have notified blood bank of potential need for platelet transfusion with surgery.    Continue beta blocker and statin therapy.  ACE/ARB contraindicated by renal.  Hold ASA,  Obtain PFT and chest xray for pulmonary function.  Complete STS risk calculator when diagnostics completed.    Case request completed.  Preop teaching completed.    Will increase Morphine to 2 mg to control pain.     Thank you for the opportunity to participate in this patient's care.    Anxiety with chest pain:  Will add Ativan to reduce anxiety, tachycardia, and increase 02 consumption.    Copy to primary care provider.  Eugene [id number]        I discussed the patients findings and my recommendations with Dr Dejesus.      AHSAN Canseco  11/29/17  10:32 AM               Electronically signed by AHSAN Rand at 11/29/2017 11:23 AM      AHSAN Rand at 11/29/2017 10:31 AM  Version 1 of 2         CVTS CONSULTATION  CVTS/Dr Dejesus is requested to see Mr Corona in consultation    Patient Care Team:  AHSAN Mayer as PCP - General  Cardiology AHSAN De La Torre MD     Chief complaint:  VAS 6 Chest pain with radiation,  SOA, and  Nausea      Subjective     History of Present Illness:  51 y.o. male with CKD III presented with USA/NSTEMI prompt medical management was initiated.  He has ongoing angina on NTG infusion and heparin infusion.  He denies smoking.  He has a history of known CAD with previous PCI with DUE stent placement to CX (2013) and RCA X 2 proximal and mid (2016).  Comorbidities include difficult to control HTN, HLP,CHF, and AAA.  He does have chronic back pain and GERD. His chest pain is at rest,   He underwent cardiac cath which demonstrated 3 V CAD.  ECHO pending.  Last dose of Brilinta 11/27/17.  No other associated symptoms or modifying factors except  as listed in ROS.    The following portions of the patient's history were reviewed and updated as appropriate: allergies, current medications, past family history, past medical history, past social history, past surgical history and problem list.  Recent images independently reviewed.  Available laboratory values reviewed.    Review of Systems   Constitutional: Positive for appetite change, diaphoresis (with CP ) and  fatigue. Negative for activity change and fever.        Exercise and walking are limited by his SOA and CP   HENT: Negative for hearing loss, mouth sores, nosebleeds and trouble swallowing.    Eyes: Positive for visual disturbance.        Light flashes visual changes    Respiratory: Positive for chest tightness and shortness of breath. Negative for apnea, cough, wheezing and stridor.    Cardiovascular: Positive for chest pain and palpitations. Negative for leg swelling.   Gastrointestinal: Negative for abdominal distention and abdominal pain.        Heart burn controlled with medications   Genitourinary: Negative for hematuria.   Musculoskeletal: Positive for back pain. Negative for myalgias.   Skin: Negative for pallor, rash and wound.   Neurological: Positive for dizziness, weakness and light-headedness. Negative for seizures, syncope, speech difficulty, numbness and headaches.   Hematological: Does not bruise/bleed easily.   Psychiatric/Behavioral: The patient is nervous/anxious.         Past Medical History:   Diagnosis Date   • Aneurysm of infrarenal abdominal aorta    • Anxiety    • Cervical radiculitis    • Cervical spondylosis without myelopathy    • CHF (congestive heart failure)    • Chronic kidney disease, stage 3    • Common iliac aneurysm    • Coronary arteriosclerosis    • Degeneration of cervical intervertebral disc    • Essential hypertension    • GERD (gastroesophageal reflux disease)    • Headache    • Hyperlipidemia    • Intermittent claudication    • Myocardial infarction    • Uric acid renal calculus      Past Surgical History:   Procedure Laterality Date   • APPENDECTOMY     • CARDIAC CATHETERIZATION  05/25/2016    Cardiac cath 69728 (2) - Patent left circumflex stent.Chronic total occlusion of the RCA,more than 20 mm in length.   • CERVICAL FUSION     • CHOLECYSTECTOMY     • HERNIA REPAIR     • SPINAL FUSION       Family History   Problem Relation Age of Onset   • Hyperlipidemia Mother    •  Hypertension Mother    • Cancer Father    • Hyperlipidemia Father    • Hypertension Father    • Cancer Brother    • Diabetes Other    • Diabetes Other      Social History   Substance Use Topics   • Smoking status: Former Smoker     Quit date: 8/28/2016   • Smokeless tobacco: Never Used   • Alcohol use No     Prescriptions Prior to Admission   Medication Sig Dispense Refill Last Dose   • aspirin 81 MG tablet Take 81 mg by mouth.   11/27/2017 at Unknown time   • hydrochlorothiazide (MICROZIDE) 12.5 MG capsule Take 1 capsule by mouth Daily. 30 capsule 11 11/27/2017 at Unknown time   • labetalol (NORMODYNE) 300 MG tablet Take 1 tablet by mouth Every 12 (Twelve) Hours. 60 tablet 1 11/27/2017 at Unknown time   • losartan (COZAAR) 100 MG tablet Take 1 tablet by mouth Daily. 30 tablet 1 11/27/2017 at Unknown time   • nitroglycerin (NITRODUR) 0.4 MG/HR patch Place 1 patch on the skin Daily. 30 patch 6 11/26/2017 at Unknown time   • pantoprazole (PROTONIX) 40 MG EC tablet   1 11/27/2017 at Unknown time   • pravastatin (PRAVACHOL) 40 MG tablet Take 1 tablet by mouth every night. 90 tablet 4 11/26/2017 at Unknown time   • ranitidine (ZANTAC) 150 MG tablet Take 150 mg by mouth 2 (two) times a day. Indication: gerd   11/27/2017 at Unknown time   • tamsulosin (FLOMAX) 0.4 MG capsule 24 hr capsule Take 1 capsule by mouth Every Night.   11/26/2017 at Unknown time   • terazosin (HYTRIN) 1 MG capsule Take 1 mg by mouth Every Night.   11/26/2017 at Unknown time   • ticagrelor (BRILINTA) 90 MG tablet tablet Take 90 mg by mouth 2 (two) times a day. Indication: blood thinner   11/27/2017 at Unknown time   • cyclobenzaprine (FLEXERIL) 10 MG tablet Take 10 mg by mouth 3 (three) times a day. Indication: Muscle spasm   Taking   • docusate sodium 100 MG capsule Take 200 mg by mouth 2 (Two) Times a Day. 60 capsule 1 Unknown at Unknown time   • HYDROcodone-acetaminophen (LORTAB)  MG per tablet Take 1 tablet by mouth every 8 (eight) hours  "as needed. Indication: pain   Taking   • nicotine (NICODERM CQ) 21 MG/24HR patch Place 1 patch on the skin Daily. 30 patch 1 Not Taking       acetylcysteine 6 mL Oral Q12H   atorvastatin 40 mg Oral Daily   [START ON 12/4/2017] ceFAZolin 2 g Intravenous On Call to OR   famotidine 20 mg Oral BID   hydrALAZINE 50 mg Oral Q8H   labetalol 100 mg Oral Q12H   pantoprazole 40 mg Oral Q AM   ranolazine 500 mg Oral Q12H   tamsulosin 0.4 mg Oral Nightly   terazosin 1 mg Oral Nightly     Allergies:  Imdur [isosorbide dinitrate]; Amlodipine; Lisinopril; and Metoprolol    Objective      Vital Signs  Temp:  [97.6 °F (36.4 °C)-98.9 °F (37.2 °C)] 98.6 °F (37 °C)  Heart Rate:  [71-97] 76  Resp:  [14-18] 18  BP: ()/(53-99) 128/80    Flowsheet Rows         First Filed Value    Admission Height  69\" (175.3 cm) Documented at 11/27/2017 1722    Admission Weight  151 lb 12.8 oz (68.9 kg) Documented at 11/27/2017 1722        69\" (175.3 cm)    Physical Exam   Constitutional: He is oriented to person, place, and time. He appears well-nourished.   HENT:   Head: Normocephalic.   Mouth/Throat: Oropharynx is clear and moist.   Tongue midline Facial movements symmetrical      Eyes: Conjunctivae are normal. Pupils are equal, round, and reactive to light.   Neck: Neck supple. JVD present. No tracheal deviation present.   Cardiovascular: Normal rate, regular rhythm, normal heart sounds and intact distal pulses.    Pulses:       Carotid pulses are 1+ on the right side, and 1+ on the left side with bruit.       Radial pulses are 2+ on the right side, and 2+ on the left side.        Dorsalis pedis pulses are 2+ on the right side, and 2+ on the left side.        Posterior tibial pulses are 2+ on the right side, and 2+ on the left side.   Pulmonary/Chest: Effort normal and breath sounds normal. No stridor.   Abdominal: Soft. Bowel sounds are normal. He exhibits no distension. There is no tenderness.   Musculoskeletal: He exhibits no edema or " tenderness.   Neurological: He is alert and oriented to person, place, and time.   Skin: Skin is warm and dry. No rash noted. No erythema. No pallor.   Psychiatric: His behavior is normal. Judgment normal.   Nursing note and vitals reviewed.      Results Review:   Lab Results (last 24 hours)     Procedure Component Value Units Date/Time    CBC & Differential [[id number]] Collected:  11/28/17 1031    Specimen:  Blood Updated:  11/28/17 1053    Narrative:       The following orders were created for panel order CBC & Differential.  Procedure                               Abnormality         Status                     ---------                               -----------         ------                     CBC Auto Differential[[id number]]        Abnormal            Final result                 Please view results for these tests on the individual orders.    CBC Auto Differential [[id number]]  (Abnormal) Collected:  11/28/17 1031    Specimen:  Blood Updated:  11/28/17 1053     WBC 8.85 10*3/mm3      RBC 4.02 (L) 10*6/mm3      Hemoglobin 11.3 (L) g/dL      Hematocrit 33.1 (L) %      MCV 82.3 fL      MCH 28.1 pg      MCHC 34.1 g/dL      RDW 14.1 %      RDW-SD 42.8 fl      MPV 9.4 fL      Platelets 153 10*3/mm3      Neutrophil % 45.3 %      Lymphocyte % 45.4 %      Monocyte % 7.5 %      Eosinophil % 1.4 %      Basophil % 0.2 %      Immature Grans % 0.2 %      Neutrophils, Absolute 4.01 10*3/mm3      Lymphocytes, Absolute 4.02 10*3/mm3      Monocytes, Absolute 0.66 10*3/mm3      Eosinophils, Absolute 0.12 10*3/mm3      Basophils, Absolute 0.02 10*3/mm3      Immature Grans, Absolute 0.02 10*3/mm3      nRBC 0.0 /100 WBC     aPTT [[id number]]  (Abnormal) Collected:  11/28/17 1031    Specimen:  Blood Updated:  11/28/17 1113     PTT 75.7 (H) seconds     Narrative:       The recommended Heparin therapeutic range is 68-97 seconds.    Comprehensive Metabolic Panel [[id number]]  (Abnormal) Collected:  11/28/17 1031    Specimen:  Blood  Updated:  11/28/17 1115     Glucose 90 mg/dL      BUN 23 (H) mg/dL      Creatinine 1.83 (H) mg/dL      Sodium 137 mmol/L      Potassium 4.0 mmol/L      Chloride 105 mmol/L      CO2 22.0 mmol/L      Calcium 8.6 mg/dL      Total Protein 6.9 g/dL      Albumin 3.90 g/dL      ALT (SGPT) 28 U/L      AST (SGOT) 31 U/L      Alkaline Phosphatase 95 U/L      Total Bilirubin 0.6 mg/dL      eGFR Non African Amer 39 (L) mL/min/1.73      Globulin 3.0 gm/dL      A/G Ratio 1.3 g/dL      BUN/Creatinine Ratio 12.6     Anion Gap 10.0 mmol/L     Protime-INR [[id number]]  (Normal) Collected:  11/28/17 1743    Specimen:  Blood Updated:  11/28/17 1820     Protime 13.0 Seconds      INR 0.99    Narrative:       Therapeutic range for most indications is 2.0-3.0 INR,  or 2.5-3.5 for mechanical heart valves.    aPTT [[id number]]  (Normal) Collected:  11/28/17 1743    Specimen:  Blood Updated:  11/28/17 1820     PTT 33.3 seconds     Narrative:       The recommended Heparin therapeutic range is 68-97 seconds.    Extra Tubes [[id number]] Collected:  11/28/17 1743    Specimen:  Blood from Blood, Venous Line Updated:  11/28/17 1846    Narrative:       The following orders were created for panel order Extra Tubes.  Procedure                               Abnormality         Status                     ---------                               -----------         ------                     Gold Top - SST[[id number]]                                   Final result                 Please view results for these tests on the individual orders.    Gold Top - SST [[id number]] Collected:  11/28/17 1743    Specimen:  Blood Updated:  11/28/17 1846     Extra Tube Hold for add-ons.      Auto resulted.       CBC & Differential [[id number]] Collected:  11/29/17 0143    Specimen:  Blood Updated:  11/29/17 0151    Narrative:       The following orders were created for panel order CBC & Differential.  Procedure                               Abnormality         Status                      ---------                               -----------         ------                     CBC Auto Differential[[id number]]        Abnormal            Final result                 Please view results for these tests on the individual orders.    CBC Auto Differential [[id number]]  (Abnormal) Collected:  11/29/17 0143    Specimen:  Blood Updated:  11/29/17 0151     WBC 6.78 10*3/mm3      RBC 3.82 (L) 10*6/mm3      Hemoglobin 10.7 (L) g/dL      Hematocrit 31.4 (L) %      MCV 82.2 fL      MCH 28.0 pg      MCHC 34.1 g/dL      RDW 14.2 %      RDW-SD 42.7 fl      MPV 9.1 fL      Platelets 139 (L) 10*3/mm3      Neutrophil % 41.5 %      Lymphocyte % 48.2 %      Monocyte % 8.1 %      Eosinophil % 1.8 %      Basophil % 0.1 %      Immature Grans % 0.3 %      Neutrophils, Absolute 2.81 10*3/mm3      Lymphocytes, Absolute 3.27 10*3/mm3      Monocytes, Absolute 0.55 10*3/mm3      Eosinophils, Absolute 0.12 10*3/mm3      Basophils, Absolute 0.01 10*3/mm3      Immature Grans, Absolute 0.02 10*3/mm3     Comprehensive Metabolic Panel [[id number]]  (Abnormal) Collected:  11/29/17 0143    Specimen:  Blood Updated:  11/29/17 0212     Glucose 103 (H) mg/dL      BUN 24 (H) mg/dL      Creatinine 2.10 (H) mg/dL      Sodium 138 mmol/L      Potassium 3.6 mmol/L      Chloride 101 mmol/L      CO2 25.0 mmol/L      Calcium 8.4 mg/dL      Total Protein 6.5 g/dL      Albumin 3.60 g/dL      ALT (SGPT) 21 U/L      AST (SGOT) 22 U/L      Alkaline Phosphatase 90 U/L      Total Bilirubin 0.5 mg/dL      eGFR Non African Amer 33 (L) mL/min/1.73      Globulin 2.9 gm/dL      A/G Ratio 1.2 g/dL      BUN/Creatinine Ratio 11.4     Anion Gap 12.0 mmol/L     aPTT [[id number]]  (Abnormal) Collected:  11/29/17 0143    Specimen:  Blood Updated:  11/29/17 0228     PTT 68.9 (H) seconds     Narrative:       The recommended Heparin therapeutic range is 68-97 seconds.    Extra Tubes [[id number]] Collected:  11/29/17 0144    Specimen:  Blood from Blood, Venous Line  Updated:  11/29/17 0246    Narrative:       The following orders were created for panel order Extra Tubes.  Procedure                               Abnormality         Status                     ---------                               -----------         ------                     Gold Top - SST[[id number]]                                   Final result                 Please view results for these tests on the individual orders.    Gold Top - SST [[id number]] Collected:  11/29/17 0144    Specimen:  Blood Updated:  11/29/17 0246     Extra Tube Hold for add-ons.      Auto resulted.       aPTT [[id number]]  (Abnormal) Collected:  11/29/17 0847    Specimen:  Blood Updated:  11/29/17 0928     PTT 93.8 (H) seconds     Narrative:       The recommended Heparin therapeutic range is 68-97 seconds.        Imaging Results (last 72 hours)     ** No results found for the last 72 hours. **      Cardiac Cath:  LADo80%  D1 diffuse, CX 92%  RCA 90%        Assessment/Plan     Active Problems:    NSTEMI (non-ST elevated myocardial infarction)    Unstable angina      Assessment & Plan  3 V CAD NSTEMI:    NTG to control USA.   Continue heparin infusion until 4 hours prior to surgery  CABG is warranted.   Would prefer to reduce chance of blood product transfusion till 12/04/17 if patient symptoms will allow.  Will begin preop work up:  ECHO pending.  Carotid US left carotid bruit  Vein mapping for conduit.  LAB:  To include CMP, CBC, TSH, AIC, Fibrogen, INR.  Will check platelet function as received antiplatelet therapy.  Have notified blood bank of potential need for platelet transfusion with surgery.    Continue beta blocker and statin therapy.  ACE/ARB contraindicated by renal.  Hold ASA,  Obtain PFT and chest xray for pulmonary function.  Complete STS risk calculator when diagnostics completed.   Case request completed.  Preop teaching completed.    Thank you for the opportunity to participate in this patient's care.    Copy to primary  care provider.        I discussed the patients findings and my recommendations with Dr Dejesus.      AHSAN Canseco  11/29/17  10:32 AM               Electronically signed by AHSAN Rand at 11/29/2017 11:13 AM

## 2017-12-08 LAB
ABO + RH BLD: NORMAL
ANION GAP SERPL CALCULATED.3IONS-SCNC: 13 MMOL/L (ref 5–15)
BACTERIA SPEC AEROBE CULT: NORMAL
BASOPHILS # BLD AUTO: 0.01 10*3/MM3 (ref 0–0.2)
BASOPHILS NFR BLD AUTO: 0.1 % (ref 0–2)
BH BB BLOOD EXPIRATION DATE: NORMAL
BH BB BLOOD TYPE BARCODE: 2800
BH BB BLOOD TYPE BARCODE: 2800
BH BB BLOOD TYPE BARCODE: 600
BH BB BLOOD TYPE BARCODE: 600
BH BB DISPENSE STATUS: NORMAL
BH BB PRODUCT CODE: NORMAL
BH BB UNIT NUMBER: NORMAL
BUN BLD-MCNC: 30 MG/DL (ref 7–21)
BUN/CREAT SERPL: 14 (ref 7–25)
CALCIUM SPEC-SCNC: 9.1 MG/DL (ref 8.4–10.2)
CHLORIDE SERPL-SCNC: 95 MMOL/L (ref 95–110)
CO2 SERPL-SCNC: 25 MMOL/L (ref 22–31)
CREAT BLD-MCNC: 2.14 MG/DL (ref 0.7–1.3)
DEPRECATED RDW RBC AUTO: 45.9 FL (ref 35.1–43.9)
EOSINOPHIL # BLD AUTO: 0.24 10*3/MM3 (ref 0–0.7)
EOSINOPHIL NFR BLD AUTO: 2 % (ref 0–7)
ERYTHROCYTE [DISTWIDTH] IN BLOOD BY AUTOMATED COUNT: 14.9 % (ref 11.5–14.5)
ERYTHROCYTE [SEDIMENTATION RATE] IN BLOOD: 78 MM/HR (ref 0–15)
GFR SERPL CREATININE-BSD FRML MDRD: 32 ML/MIN/1.73 (ref 56–130)
GLUCOSE BLD-MCNC: 107 MG/DL (ref 60–100)
GLUCOSE BLDC GLUCOMTR-MCNC: 101 MG/DL (ref 70–130)
GLUCOSE BLDC GLUCOMTR-MCNC: 112 MG/DL (ref 70–130)
GLUCOSE BLDC GLUCOMTR-MCNC: 123 MG/DL (ref 70–130)
GLUCOSE BLDC GLUCOMTR-MCNC: 130 MG/DL (ref 70–130)
HCT VFR BLD AUTO: 24.6 % (ref 39–49)
HGB BLD-MCNC: 8.4 G/DL (ref 13.7–17.3)
IMM GRANULOCYTES # BLD: 0.04 10*3/MM3 (ref 0–0.02)
IMM GRANULOCYTES NFR BLD: 0.3 % (ref 0–0.5)
LYMPHOCYTES # BLD AUTO: 5.52 10*3/MM3 (ref 0.6–4.2)
LYMPHOCYTES NFR BLD AUTO: 47.1 % (ref 10–50)
MCH RBC QN AUTO: 28.6 PG (ref 26.5–34)
MCHC RBC AUTO-ENTMCNC: 34.1 G/DL (ref 31.5–36.3)
MCV RBC AUTO: 83.7 FL (ref 80–98)
MONOCYTES # BLD AUTO: 0.81 10*3/MM3 (ref 0–0.9)
MONOCYTES NFR BLD AUTO: 6.9 % (ref 0–12)
NEUTROPHILS # BLD AUTO: 5.1 10*3/MM3 (ref 2–8.6)
NEUTROPHILS NFR BLD AUTO: 43.6 % (ref 37–80)
PLATELET # BLD AUTO: 231 10*3/MM3 (ref 150–450)
PMV BLD AUTO: 8.5 FL (ref 8–12)
POTASSIUM BLD-SCNC: 5.2 MMOL/L (ref 3.5–5.1)
RBC # BLD AUTO: 2.94 10*6/MM3 (ref 4.37–5.74)
SODIUM BLD-SCNC: 133 MMOL/L (ref 137–145)
UNIT  ABO: NORMAL
UNIT  RH: NORMAL
WBC NRBC COR # BLD: 11.72 10*3/MM3 (ref 3.2–9.8)

## 2017-12-08 PROCEDURE — 25010000002 LEVOFLOXACIN PER 250 MG: Performed by: THORACIC SURGERY (CARDIOTHORACIC VASCULAR SURGERY)

## 2017-12-08 PROCEDURE — 97116 GAIT TRAINING THERAPY: CPT

## 2017-12-08 PROCEDURE — 85651 RBC SED RATE NONAUTOMATED: CPT | Performed by: THORACIC SURGERY (CARDIOTHORACIC VASCULAR SURGERY)

## 2017-12-08 PROCEDURE — 97535 SELF CARE MNGMENT TRAINING: CPT

## 2017-12-08 PROCEDURE — 97530 THERAPEUTIC ACTIVITIES: CPT

## 2017-12-08 PROCEDURE — 80048 BASIC METABOLIC PNL TOTAL CA: CPT | Performed by: INTERNAL MEDICINE

## 2017-12-08 PROCEDURE — 94799 UNLISTED PULMONARY SVC/PX: CPT

## 2017-12-08 PROCEDURE — 94760 N-INVAS EAR/PLS OXIMETRY 1: CPT

## 2017-12-08 PROCEDURE — 99024 POSTOP FOLLOW-UP VISIT: CPT | Performed by: NURSE PRACTITIONER

## 2017-12-08 PROCEDURE — 97110 THERAPEUTIC EXERCISES: CPT

## 2017-12-08 PROCEDURE — 87205 SMEAR GRAM STAIN: CPT | Performed by: THORACIC SURGERY (CARDIOTHORACIC VASCULAR SURGERY)

## 2017-12-08 PROCEDURE — 82962 GLUCOSE BLOOD TEST: CPT

## 2017-12-08 PROCEDURE — 87070 CULTURE OTHR SPECIMN AEROBIC: CPT | Performed by: THORACIC SURGERY (CARDIOTHORACIC VASCULAR SURGERY)

## 2017-12-08 PROCEDURE — 85025 COMPLETE CBC W/AUTO DIFF WBC: CPT | Performed by: THORACIC SURGERY (CARDIOTHORACIC VASCULAR SURGERY)

## 2017-12-08 RX ORDER — ACETAMINOPHEN 325 MG/1
650 TABLET ORAL EVERY 6 HOURS PRN
Status: DISCONTINUED | OUTPATIENT
Start: 2017-12-08 | End: 2017-12-11 | Stop reason: HOSPADM

## 2017-12-08 RX ORDER — LEVOFLOXACIN 5 MG/ML
250 INJECTION, SOLUTION INTRAVENOUS EVERY 24 HOURS
Status: DISCONTINUED | OUTPATIENT
Start: 2017-12-09 | End: 2017-12-11 | Stop reason: HOSPADM

## 2017-12-08 RX ORDER — LOSARTAN POTASSIUM 25 MG/1
12.5 TABLET ORAL NIGHTLY
Status: DISCONTINUED | OUTPATIENT
Start: 2017-12-08 | End: 2017-12-10

## 2017-12-08 RX ORDER — LEVOFLOXACIN 5 MG/ML
500 INJECTION, SOLUTION INTRAVENOUS ONCE
Status: COMPLETED | OUTPATIENT
Start: 2017-12-08 | End: 2017-12-08

## 2017-12-08 RX ORDER — FUROSEMIDE 20 MG/1
20 TABLET ORAL DAILY
Status: DISCONTINUED | OUTPATIENT
Start: 2017-12-08 | End: 2017-12-10

## 2017-12-08 RX ADMIN — OXYCODONE HYDROCHLORIDE AND ACETAMINOPHEN 1 TABLET: 5; 325 TABLET ORAL at 08:30

## 2017-12-08 RX ADMIN — Medication 10 ML: at 04:32

## 2017-12-08 RX ADMIN — PRENATAL VIT W/ FE FUMARATE-FA TAB 27-0.8 MG 1 TABLET: 27-0.8 TAB at 08:30

## 2017-12-08 RX ADMIN — TICAGRELOR 90 MG: 90 TABLET ORAL at 08:30

## 2017-12-08 RX ADMIN — OXYCODONE HYDROCHLORIDE AND ACETAMINOPHEN 1 TABLET: 5; 325 TABLET ORAL at 12:18

## 2017-12-08 RX ADMIN — SENNOSIDES AND DOCUSATE SODIUM 1 TABLET: 8.6; 5 TABLET ORAL at 17:47

## 2017-12-08 RX ADMIN — Medication 12.5 MG: at 21:15

## 2017-12-08 RX ADMIN — ATORVASTATIN CALCIUM 10 MG: 10 TABLET, FILM COATED ORAL at 21:15

## 2017-12-08 RX ADMIN — OXYCODONE HYDROCHLORIDE AND ACETAMINOPHEN 1 TABLET: 5; 325 TABLET ORAL at 04:31

## 2017-12-08 RX ADMIN — MAGNESIUM OXIDE TAB 400 MG (241.3 MG ELEMENTAL MG) 400 MG: 400 (241.3 MG) TAB at 08:30

## 2017-12-08 RX ADMIN — TICAGRELOR 90 MG: 90 TABLET ORAL at 17:47

## 2017-12-08 RX ADMIN — OXYCODONE HYDROCHLORIDE AND ACETAMINOPHEN 1 TABLET: 5; 325 TABLET ORAL at 21:14

## 2017-12-08 RX ADMIN — LABETALOL HYDROCHLORIDE 100 MG: 100 TABLET, FILM COATED ORAL at 08:30

## 2017-12-08 RX ADMIN — OXYCODONE HYDROCHLORIDE AND ACETAMINOPHEN 500 MG: 500 TABLET ORAL at 17:47

## 2017-12-08 RX ADMIN — LABETALOL HYDROCHLORIDE 100 MG: 100 TABLET, FILM COATED ORAL at 21:14

## 2017-12-08 RX ADMIN — FUROSEMIDE 20 MG: 20 TABLET ORAL at 12:18

## 2017-12-08 RX ADMIN — MAGNESIUM OXIDE TAB 400 MG (241.3 MG ELEMENTAL MG) 400 MG: 400 (241.3 MG) TAB at 17:47

## 2017-12-08 RX ADMIN — IPRATROPIUM BROMIDE AND ALBUTEROL SULFATE 3 ML: 2.5; .5 SOLUTION RESPIRATORY (INHALATION) at 19:43

## 2017-12-08 RX ADMIN — SENNOSIDES AND DOCUSATE SODIUM 1 TABLET: 8.6; 5 TABLET ORAL at 08:30

## 2017-12-08 RX ADMIN — OXYCODONE HYDROCHLORIDE AND ACETAMINOPHEN 500 MG: 500 TABLET ORAL at 08:30

## 2017-12-08 RX ADMIN — CYCLOBENZAPRINE HYDROCHLORIDE 10 MG: 10 TABLET, FILM COATED ORAL at 15:34

## 2017-12-08 RX ADMIN — TAMSULOSIN HYDROCHLORIDE 0.4 MG: 0.4 CAPSULE ORAL at 21:15

## 2017-12-08 RX ADMIN — IPRATROPIUM BROMIDE AND ALBUTEROL SULFATE 3 ML: 2.5; .5 SOLUTION RESPIRATORY (INHALATION) at 07:00

## 2017-12-08 RX ADMIN — IPRATROPIUM BROMIDE AND ALBUTEROL SULFATE 3 ML: 2.5; .5 SOLUTION RESPIRATORY (INHALATION) at 14:50

## 2017-12-08 RX ADMIN — ACETAMINOPHEN 650 MG: 325 TABLET ORAL at 18:35

## 2017-12-08 RX ADMIN — OXYCODONE HYDROCHLORIDE AND ACETAMINOPHEN 1 TABLET: 10; 325 TABLET ORAL at 17:47

## 2017-12-08 RX ADMIN — LEVOFLOXACIN 500 MG: 5 INJECTION, SOLUTION INTRAVENOUS at 21:17

## 2017-12-08 RX ADMIN — ASPIRIN 81 MG: 81 TABLET, COATED ORAL at 08:30

## 2017-12-08 NOTE — PROGRESS NOTES
"[facility]  [address] [place] [address]. [id number]  T - [phone]  F - [phone]     Progress Note          PATIENT  DEMOGRAPHICS   PATIENT NAME: Lyle Corona                      PHYSICIAN: Adriane Butler MD  : 1965  MRN: [id number]   LOS: 11 days    Patient Care Team:  AHSAN Mayer as PCP - General  Subjective   SUBJECTIVE   Pain well controlled doing well no further fever episode       Objective   OBJECTIVE   Vital Signs  Temp:  [97.9 °F (36.6 °C)-100 °F (37.8 °C)] 97.9 °F (36.6 °C)  Heart Rate:  [] 107  Resp:  [18-20] 18  BP: (103-130)/(60-82) 103/66    Flowsheet Rows         First Filed Value    Admission Height  69\" (175.3 cm) Documented at 2017 1722    Admission Weight  151 lb 12.8 oz (68.9 kg) Documented at 2017 1722           I/O last 3 completed shifts:  In: 880 [P.O.:880]  Out: -     PHYSICAL EXAM    Physical Exam   Constitutional: He is oriented to person, place, and time. He appears well-developed.   Moderately nourished   HENT:   Head: Normocephalic and atraumatic.   Cardiovascular: Normal rate, regular rhythm and normal heart sounds.  Exam reveals no gallop and no friction rub.    No murmur heard.  Pulmonary/Chest: Effort normal and breath sounds normal. No respiratory distress. He has no wheezes. He has no rales. He exhibits no tenderness.   Abdominal: Soft. Bowel sounds are normal. He exhibits no distension and no mass. There is no tenderness. There is no guarding.   Musculoskeletal: He exhibits no edema or tenderness.   Neurological: He is alert and oriented to person, place, and time.   Skin: Skin is warm and dry.   Nursing note and vitals reviewed.      RESULTS   Results Review:      Results from last 7 days  Lab Units 17  1014 17  0527 17  0212 17  0416  17  0407 17  0240   SODIUM mmol/L 133* 133*  --  135*  < > 136* 135*   SODIUM, VENOUS   --   --   --   --   < >  --   --    SODIUM, " ARTERIAL   --   --   --   --   < >  --   --    POTASSIUM mmol/L 5.2* 4.7 4.5 3.6  < > 3.7 3.6   POTASSIUM, VENOUS   --   --   --   --   < >  --   --    CHLORIDE mmol/L 95 93*  --  97  < > 100 100   CO2 mmol/L 25.0 25.0  --  25.0  < > 24.0 23.0   BUN mg/dL 30* 29*  --  16  < > 13 12   CREATININE mg/dL 2.14* 2.13*  --  1.91*  < > 2.16* 2.25*   CALCIUM mg/dL 9.1 9.1  --  8.5  < > 8.6 8.7   BILIRUBIN mg/dL  --  1.2  --   --   --  0.7 0.9   ALK PHOS U/L  --  148*  --   --   --  111 100   ALT (SGPT) U/L  --  14*  --   --   --  29 30   AST (SGOT) U/L  --  46  --   --   --  29 22   GLUCOSE mg/dL 107* 99  --  108*  < > 103* 101*   GLUCOSE, ARTERIAL   --   --   --   --   < >  --   --    < > = values in this interval not displayed.    Estimated Creatinine Clearance: 40 mL/min (by C-G formula based on Cr of 2.14).        Results from last 7 days  Lab Units 12/07/17  0614 12/05/17  0416 12/04/17  1245 12/04/17  1131 12/04/17  1036  12/04/17  0407 12/03/17  0240   WBC 10*3/mm3 15.64* 10.13* 8.70  --   --   --  7.07 8.00   HEMOGLOBIN g/dL 10.9* 9.1* 9.5*  --   --   --  9.6* 9.7*   HEMOGLOBIN, POC g/dL  --   --   --  8.2 7.8  < >  --   --    PLATELETS 10*3/mm3 212 144* 107*  --   --   --  148* 162   < > = values in this interval not displayed.      Results from last 7 days  Lab Units 12/04/17  1245   INR  1.24*         Imaging Results (last 24 hours)     ** No results found for the last 24 hours. **           MEDICATIONS      amiodarone 150 mg Intravenous Once   aspirin 81 mg Oral Daily   atorvastatin 10 mg Oral Daily   cyclobenzaprine 10 mg Oral TID   insulin aspart 0-24 Units Subcutaneous 4x Daily AC & at Bedtime   insulin detemir 20 Units Subcutaneous Nightly   ipratropium-albuterol 3 mL Nebulization 4x Daily - RT   labetalol 100 mg Oral Q12H   losartan 25 mg Oral Nightly   magnesium oxide 400 mg Oral BID   prenatal vitamin 27-0.8 1 tablet Oral Daily   sennosides-docusate sodium 1 tablet Oral BID   sodium chloride 1-10 mL  Intravenous Q8H   tamsulosin 0.4 mg Oral Nightly   ticagrelor 90 mg Oral BID   vitamin C 500 mg Oral BID       amiodarone 1 mg/min       Assessment/Plan   ASSESSMENT / PLAN    Principal Problem:    Coronary artery disease involving native coronary artery of native heart with unstable angina pectoris  Active Problems:    Chronic kidney disease, stage 3    NSTEMI (non-ST elevated myocardial infarction)    1. CKD 3: baseline creatinine 1.7-1.9 mg/dl  - Creatinine is 2.1 slightly higher. Observe. k Is high also observe, keep losartan. Cant start hctz. Add low dose of lasix for kaliuresis. Lab tomorrow    2. Hypertension:  - Blood pressure is acceptable. On labetalol 100mg BID, off hydralazine and terazosin. Plan as above    3. Anemia:  - Hemoglobin is low and received 1 UPRBC. Received iv fe series. Overall stable hgb is >10    4. NSTEMI:   - s/p left heart cath which shows multivessel disease, s/p CABG.       5. Fever tachycardia cultures pending             This document has been electronically signed by Adriane Butler MD on December 8, 2017 10:51 AM

## 2017-12-08 NOTE — PROGRESS NOTES
"  Cardiothoracic - Vascular Surgery Daily Note        LOS: 11 days   Patient Care Team:  AHSAN Mayer as PCP - General     POD4 CABGX3    Chief Complaint: Surgical Chest Pain      Subjective     The following portions of the patient's history were reviewed and updated as appropriate: allergies, current medications, past family history, past medical history, past social history, past surgical history and problem list.     Subjective:  Symptoms:  Stable.  He reports chest pain (surgical).    Diet:  Adequate intake.  No nausea.    Activity level: Impaired due to pain.    Pain:  He complains of pain that is moderate.  He reports pain is improving.  Pain is requiring pain medication and well controlled.          Objective     Vital Signs  Temp:  [97.9 °F (36.6 °C)-100 °F (37.8 °C)] 97.9 °F (36.6 °C)  Heart Rate:  [] 107  Resp:  [18-20] 18  BP: (103-130)/(60-82) 103/66  Body mass index is 23.03 kg/(m^2).    Intake/Output Summary (Last 24 hours) at 12/08/17 1057  Last data filed at 12/08/17 0833   Gross per 24 hour   Intake              820 ml   Output                0 ml   Net              820 ml     I/O this shift:  In: 240 [P.O.:240]  Out: -     Wt Readings from Last 3 Encounters:   12/08/17 70.8 kg (156 lb)   10/31/17 71.4 kg (157 lb 8 oz)   06/12/17 69.4 kg (153 lb)         Physical Exam   Objective:  General Appearance:  Uncomfortable and comfortable.    Vital signs: (most recent): Blood pressure 103/66, pulse 107, temperature 97.9 °F (36.6 °C), temperature source Oral, resp. rate 18, height 175.3 cm (69.02\"), weight 70.8 kg (156 lb), SpO2 93 %.  Vital signs are normal.    Output: Producing urine and producing stool.    HEENT: Normal HEENT exam.    Lungs:  Normal respiratory rate and normal effort.  There are decreased breath sounds (bases).  (On RA)  Heart: Tachycardia.    Abdomen: Abdomen is soft and non-distended.  Bowel sounds are normal.     Extremities: Normal range of motion.  There is no " dependent edema.    Pulses: Distal pulses are intact.    Neurological: Patient is alert and oriented to person, place and time.    Pupils:  Pupils are equal, round, and reactive to light.    Skin:  Warm.  (M/S INC: Sm amt sanginous drainage distal, Well approximated. prineo strip intact  LLE EVH: CDI)              Results Review:    Lab Results   Component Value Date    WBC 15.64 (H) 12/07/2017    HGB 10.9 (L) 12/07/2017    HCT 31.4 (L) 12/07/2017    MCV 83.5 12/07/2017     12/07/2017       Estimated Creatinine Clearance: 40 mL/min (by C-G formula based on Cr of 2.14).      Results from last 7 days  Lab Units 12/06/17  0212 12/05/17  0416 12/04/17  1245   MAGNESIUM mg/dL 2.3 2.4* 4.0*   PHOSPHORUS mg/dL  --   --  4.3       Lab Results   Component Value Date    GLUCOSE 107 (H) 12/08/2017    BUN 30 (H) 12/08/2017    CREATININE 2.14 (H) 12/08/2017    EGFRIFNONA 32 (L) 12/08/2017    BCR 14.0 12/08/2017    K 5.2 (H) 12/08/2017    CO2 25.0 12/08/2017    CALCIUM 9.1 12/08/2017    ALBUMIN 4.00 12/07/2017    LABIL2 1.2 12/07/2017    AST 46 12/07/2017    ALT 14 (L) 12/07/2017         Results from last 7 days  Lab Units 12/04/17  1245   INR  1.24*       Imaging Results (last 24 hours)     ** No results found for the last 24 hours. **                                  amiodarone 150 mg Intravenous Once   aspirin 81 mg Oral Daily   atorvastatin 10 mg Oral Daily   cyclobenzaprine 10 mg Oral TID   furosemide 20 mg Oral Daily   insulin aspart 0-24 Units Subcutaneous 4x Daily AC & at Bedtime   insulin detemir 20 Units Subcutaneous Nightly   ipratropium-albuterol 3 mL Nebulization 4x Daily - RT   labetalol 100 mg Oral Q12H   losartan 12.5 mg Oral Nightly   magnesium oxide 400 mg Oral BID   prenatal vitamin 27-0.8 1 tablet Oral Daily   sennosides-docusate sodium 1 tablet Oral BID   sodium chloride 1-10 mL Intravenous Q8H   tamsulosin 0.4 mg Oral Nightly   ticagrelor 90 mg Oral BID   vitamin C 500 mg Oral BID       amiodarone 1  mg/min             ASSESSMENT/PLAN     Principal Problem:    Coronary artery disease involving native coronary artery of native heart with unstable angina pectoris  Active Problems:    Chronic kidney disease, stage 3    NSTEMI (non-ST elevated myocardial infarction)      Assessment:    Condition: In stable condition.   (Stable Post Op CABG: Progressing well    CAD: ASA, Brilinta, Statin. Beta as BP tolerates. Low dose ARB    Fever (Resolved): Aggressive respiratory interventions. BC negative. Urine cx neg.    Resp: Incentive. On RA. Aggressive Pulmonary Toilet    Pain: Percocet    Rehab: PT/OT, Ambulate. ARU consult-denied. SNF Transfer for Mon (Bed availability)    Transient Post op Hyperglycemia: SSI coverage. Continue Levemir    CKD: Cr peaked 2.1. Follow.).     Plan:   Encourage ambulation and out of bed and up to chair.  Continue respiratory treatments and start/continue incentive spirometry.  Advance diet as tolerated.  Increase analgesics and administer medications as ordered.   (Stable. Progress Care 3W.   SNF Approval for Monday DC. Aggressive pulmonary toilet).                 This document has been electronically signed by AHSAN Castro on December 8, 2017 10:57 AM

## 2017-12-08 NOTE — PLAN OF CARE
Problem: Inpatient Occupational Therapy  Goal: Transfer Training Goal 1 LTG- OT  Outcome: Ongoing (interventions implemented as appropriate)    12/05/17 1424 12/07/17 1310 12/08/17 1354   Transfer Training OT LTG   Transfer Training OT LTG, Date Established 12/05/17 --  --    Transfer Training OT LTG, Time to Achieve by discharge --  --    Transfer Training OT LTG, Activity Type toilet --  --    Transfer Training OT LTG, Stone Level supervision required --  --    Transfer Training OT LTG, Date Goal Reviewed --  --  12/08/17   Transfer Training OT LTG, Outcome --  goal not met --        Goal: Patient Education Goal LTG- OT  Outcome: Ongoing (interventions implemented as appropriate)    12/05/17 1424 12/07/17 1310 12/08/17 1354   Patient Education OT LTG   Patient Education OT LTG, Date Established 12/05/17 --  --    Patient Education OT LTG, Time to Achieve by discharge --  --    Patient Education OT LTG, Education Type HEP;precautions per surgeon;home safety;energy conservation;adaptive equipment mgmt --  --    Patient Education OT LTG, Education Understanding independent;demonstrates adequately;verbalizes understanding --  --    Patient Education OT LTG, Date Goal Reviewed --  --  12/08/17   Patient Education OT LTG Outcome --  goal not met --        Goal: ADL Goal LTG- OT  Outcome: Ongoing (interventions implemented as appropriate)    12/05/17 1424 12/07/17 1310 12/08/17 1354   ADL OT LTG   ADL OT LTG, Date Established 12/05/17 --  --    ADL OT LTG, Time to Achieve by discharge --  --    ADL OT LTG, Activity Type ADL skills --  --    ADL OT LTG, Stone Level standby assist --  --    ADL OT LTG, Date Goal Reviewed --  --  12/08/17   ADL OT LTG, Outcome --  goal not met --        Goal: Functional Mobility Goal LTG- OT  Outcome: Ongoing (interventions implemented as appropriate)    12/05/17 1424 12/07/17 1310 12/08/17 1354   Functional Mobility OT LTG   Functional Mobility Goal OT LTG, Date Established  12/05/17 --  --    Functional Mobility Goal OT LTG, Time to Achieve by discharge --  --    Functional Mobility Goal OT LTG, [place] Level supervision --  --    Functional Mobility Goal OT LTG, Distance to Achieve to the sink;to the bathroom --  --    Functional Mobility Goal OT LTG, Date Goal Reviewed --  --  12/08/17   Functional Mobility Goal OT LTG, Outcome --  goal not met --        Goal: Endurance Goal LTG- OT  Outcome: Ongoing (interventions implemented as appropriate)    12/05/17 1424 12/07/17 0845 12/08/17 1354   Endurance OT LTG   Endurance Goal OT LTG, Date Established 12/05/17 --  --    Endurance Goal OT LTG, Time to Achieve by discharge --  --    Endurance Goal OT LTG, Activity Level endurance 2 good- --  --    Endurance Goal OT LTG, Date Goal Reviewed --  --  12/08/17   Endurance Goal OT LTG, Outcome --  goal not met --

## 2017-12-08 NOTE — CONSULTS
"Adult Nutrition  Assessment    Patient Name:  Lyle Corona  YOB: 1965  MRN: [id number]  Admit Date:  11/27/2017    Assessment Date:  12/8/2017    Comments:  Pt reports fiar appetite.  Voiced no food preferences.  Intake 100% - 1x, 75% - 2x, bites - 1x, milkshake - 1x, 0% - 1x.  Blood glucose is elevated.  Levemir insulin, sliding scale novolog prescribed.  Hgb, Hct are low.  Prenatal Vit, Vit C prescribed.          Reason for Assessment       12/08/17 1648    Reason for Assessment    Reason For Assessment/Visit follow up protocol                  Anthropometrics       12/08/17 1648    Anthropometrics    Height 175.3 cm (69.02\")    Weight 70.8 kg (156 lb 1.4 oz)    Ideal Body Weight (IBW)    Ideal Body Weight (IBW), Male (kg) 73.73    % Ideal Body Weight 96.23    Body Mass Index (BMI)    BMI (kg/m2) 23.09    BMI Grade 19.1 - 24.9 - normal            Labs/Tests/Procedures/Meds       12/08/17 1649    Labs/Tests/Procedures/Meds    Labs/Tests Review Glucose;Creat;Na+;BUN;K+;Hgb Hct    Medication Review Insulin;Multivitamin;Diuretic            Physical Findings       12/08/17 1650    Physical Appearance    Skin other (see comments)   incisions, wound              Nutrition Prescription Ordered       12/08/17 1650    Nutrition Prescription PO    Current PO Diet Regular    Supplement Milk    Supplement Frequency 3 times a day    Common Modifiers Consistent Carbohydrate            Evaluation of Received Nutrient/Fluid Intake       12/08/17 1651    PO Evaluation    Number of Meals 7    % PO Intake 100% - 1x, 75% - 2x, 0% - 2x, milkshake - 1x, bites - 1x            Electronically signed by:  Jazmin Rueda, DONTA  12/08/17 4:52 PM  "

## 2017-12-08 NOTE — THERAPY TREATMENT NOTE
Acute Care - Occupational Therapy Treatment Note  [facility]     Patient Name: Lyle Corona  : 1965  MRN: [id number]  Today's Date: 2017  Onset of Illness/Injury or Date of Surgery Date: 17 (CABG 17)  Date of Referral to OT: 17  Referring Physician: AHSAN Oglesby      Admit Date: 2017    Visit Dx:     ICD-10-CM ICD-9-CM   1. Coronary arteriosclerosis I25.10 414.00   2. Unstable angina I20.0 411.1   3. NSTEMI (non-ST elevated myocardial infarction) I21.4 410.70   4. Coronary artery disease involving native coronary artery of native heart with unstable angina pectoris I25.110 414.01     411.1   5. Impaired functional mobility, balance, gait, and endurance Z74.09 V49.89   6. Impaired mobility and ADLs Z74.09 799.89     Patient Active Problem List   Diagnosis   • Essential hypertension   • Hypertensive emergency, no CHF   • Coronary artery disease involving native coronary artery of native heart with unstable angina pectoris   • Chronic kidney disease, stage 3   • LVH (left ventricular hypertrophy)   • NSTEMI (non-ST elevated myocardial infarction)   • Unstable angina             Adult Rehabilitation Note       17 1058 17 0955 17 1403    Rehab Assessment/Intervention    Discipline physical therapy assistant  -LN occupational therapy assistant  - physical therapy assistant  -    Document Type therapy note (daily note)  -LN therapy note (daily note)  - therapy note (daily note)  -LN    Subjective Information agree to therapy;complains of;pain  - agree to therapy  - complains of;fatigue;pain  -LN    Patient Effort, Rehab Treatment  good  -     Precautions/Limitations cardiac precautions;fall precautions;oxygen therapy device and L/min;sternal precautions  -LN cardiac precautions  - cardiac precautions;fall precautions;sternal precautions  -LN    Recorded by [LN] Denise Herron PTA [] HARMEET Luna/DWAYNE [LN] Denise Herron PTA    Vital  Signs    Pre Systolic BP Rehab 124  -LN  118  -LN    Pre Treatment Diastolic BP 47  -LN  75  -LN    Intra Systolic BP Rehab 128  -LN      Intra Treatment Diastolic BP 64  -LN      Post Systolic BP Rehab 108  -LN  130  -LN    Post Treatment Diastolic BP 57  -LN  74   nsg aware  -LN    Pretreatment Heart Rate (beats/min) 75  -  -  -LN    Intratreatment Heart Rate (beats/min) 83  -LN  118  -LN    Posttreatment Heart Rate (beats/min) 100   nsg aware of all vs  -LN  113  -LN    Pre SpO2 (%) --   unable to obtain  -LN 96  -JH --   unable to obtain sat reading before/after rx-nsg aware  -LN    O2 Delivery Pre Treatment  room air  -JH     Pre Patient Position Sitting  -LN  Supine  -LN    Intra Patient Position Standing  -LN  Standing  -LN    Post Patient Position Sitting  -LN  Supine  -LN    Recorded by [LN] Denise Herron PTA [JH] HARMEET Luna/DWAYNE [LN] Denise Herron PTA    Pain Assessment    Pain Assessment 0-10  -LN 0-10  -JH 0-10  -LN    Pain Score 6  -LN 6  -JH 8  -LN    Post Pain Score 6  -LN 7  -JH 8  -LN    Pain Type Acute pain;Surgical pain  -LN Surgical pain  - Acute pain;Surgical pain  -LN    Pain Location Chest  -LN Chest  -JH Chest  -LN    Pain Intervention(s) --   per nsg meds not due  -LN Repositioned  - Medication (See MAR)  -LN    Recorded by [LN] Denise Herron PTA [JH] HARMEET Lnua/DWAYNE [LN] Denise Herron PTA    Cognitive Assessment/Intervention    Current Cognitive/Communication Assessment  functional  -     Orientation Status oriented to;person     -LN oriented x 4  -JH oriented to;person     -LN    Follows Commands/Answers Questions  100% of the time  -JH     Recorded by [LN] Denise Herron PTA [JH] HARMEET Luna/DWAYNE [LN] Denise Herron PTA    Bed Mobility, Assessment/Treatment    Bed Mob, Supine to Sit, [place] not tested  -LN  contact guard assist  -LN    Bed Mob, Sit to Supine, [place] not tested  -LN  conditional independence  -LN    Recorded by [LN]  Denise Herron, PTA  [LN] Denise Herron, LINDA    Transfer Assessment/Treatment    Transfers, Sit-Stand [place] independent  -LN independent  -JH independent  -LN    Transfers, Stand-Sit [place] independent  -LN independent  -JH independent  -LN    Toilet Transfer, [place]  supervision required  -JH     Recorded by [LN] Denise Herron, LINDA [JH] HARMEET Luna/L [LN] Denise Herron, LINDA    Gait Assessment/Treatment    Gait, [place] Level supervision required;independent  -LN  stand by assist  -LN    Gait, Assistive Device --   none  -LN  --   none  -LN    Gait, Distance (Feet) 280  -LN  354  -LN    Gait, Comment decreased distance due to pt not wanting to increase pain since pain meds not due for another 1 1/2 hrs  -LN  occasional reaching for rails but no noticeable lob or unsteadiness  -LN    Recorded by [LN] Denise Herron, PTA  [LN] Denise Herron, LINDA    Stairs Assessment/Treatment    Number of Stairs 5  -LN      Stairs, Handrail Location none  -LN      Stairs, [place] Level independent  -LN      Stairs, Technique Used step over step (ascending);step over step (descending)  -LN      Recorded by [LN] Denise Herron PTA      Toileting Assessment/Training    Toileting Assess/Train, [place]ep[place] Level  supervision required  -JH     Recorded by  [JH] HARMEET Luna/L     Grooming Assessment/Training    Grooming Assess/Train, [place]ep[place] Level  supervision required  -JH     Recorded by  [JH] HARMEET Luna/L     Therapy Exercises    Bilateral Lower Extremities AROM:;20 reps;sitting;ankle pumps/circles;hip flexion;LAQ  -LN  AROM:;15 reps;20 reps;ankle pumps/circles;sitting;hip flexion;LAQ  -LN    Bilateral Upper Extremity  AROM:;15 reps;sitting;elbow flexion/extension;hand pumps;pronation/supination;shoulder ER/IR  -JH     BUE Resistance  manual resistance- minimal  -JH     Recorded by [LN] Denise Herron, LINDA [JH] TUCKER LunaA/L [LN] Denise Herron PTA    Positioning and Restraints     Pre-Treatment Position  sitting in chair/recliner  -     Post Treatment Position chair  -LN chair  - bed  -LN    In Bed --  -LN  notified nsg;supine;call light within reach;encouraged to call for assist  -LN    In Chair sitting;call light within reach;encouraged to call for assist  -LN sitting;call light within reach;encouraged to call for assist;with other staff  -     Recorded by [LN] Denise Herron PTA [] HARMEET Luna/DWAYNE [LN] Denise Herron PTA      12/07/17 0845 12/06/17 1435 12/06/17 0915    Rehab Assessment/Intervention    Discipline occupational therapy assistant  -KD  occupational therapy assistant  -    Document Type therapy note (daily note)  -KD  therapy note (daily note)  -KD    Subjective Information agree to therapy  -KD agree to therapy;complains of;pain  -LN agree to therapy  -KD    Patient Effort, Rehab Treatment   good  -KD    Precautions/Limitations cardiac precautions;fall precautions  -KD cardiac precautions;fall precautions;oxygen therapy device and L/min;sternal precautions  -LN     Recorded by [KD] HARMEET Corona/DWAYNE [LN] Denise Herron PTA [KD] TUCKER CoronaA/L    Vital Signs    Pre Systolic BP Rehab 120  -  -  -KD    Pre Treatment Diastolic BP 66  -KD 84  -LN 84  -KD    Post Systolic BP Rehab  135  -LN     Post Treatment Diastolic BP  68  -LN     Pretreatment Heart Rate (beats/min) 94  -  -LN 95  -KD    Intratreatment Heart Rate (beats/min)  82  -LN     Posttreatment Heart Rate (beats/min) 100  -  -LN 96  -KD    Pre SpO2 (%) 94  -KD 88  -LN 95  -KD    O2 Delivery Pre Treatment room air  -KD room air   nc laying in bed-put 02 back on 93%  -LN supplemental O2  -KD    Intra SpO2 (%)  93  -LN     Post SpO2 (%) 96  -KD 94  -LN 97  -KD    O2 Delivery Post Treatment room air  -KD  supplemental O2  -KD    Pre Patient Position Supine  -KD Supine  -LN Supine  -KD    Intra Patient Position Standing  -KD Standing  -LN Standing  -KD    Post Patient  Position Sitting  -KD Supine  -LN Sitting  -KD    Recorded by [KD] TUCKER CoronaA/DWAYNE [LN] Denise Herron, PTA [KD] TUCKER CoronaA/L    Pain Assessment    Pain Assessment 0-10  -KD 0-10  -LN 0-10  -KD    Pain Score 8  -KD 5  -LN 9  -KD    Post Pain Score 8  -KD 7  -LN 9  -KD    Pain Type Acute pain;Surgical pain  -KD Acute pain;Surgical pain  -LN Surgical pain  -KD    Pain Location Chest  -KD  Chest  -KD    Pain Intervention(s) Medication (See MAR)  -KD Medication (See MAR)  -LN Medication (See MAR)  -KD    Recorded by [KD] TUCKER CoronaA/DWAYNE [LN] Denise Herron, PTA [KD] TUCKER CoronaA/L    Vision Assessment/Intervention    Visual Impairment WFL with corrective lenses  -KD  WFL with corrective lenses  -KD    Recorded by [KD] TUCKER CoronaA/DWAYNE  [KD] TUCKER CoronaA/L    Cognitive Assessment/Intervention    Current Cognitive/Communication Assessment functional  -KD  functional  -KD    Orientation Status oriented x 4  -KD oriented to;person     -LN oriented x 4  -KD    Follows Commands/Answers Questions 100% of the time  -KD  100% of the time  -KD    Personal Safety fully aware of deficits  -KD  fully aware of deficits  -KD    Personal Safety Interventions fall prevention program maintained  -KD  fall prevention program maintained;gait belt;nonskid shoes/slippers when out of bed  -KD    Recorded by [KD] TUCKER CoronaA/DWAYNE [LN] Denise Herron, PTA [KD] TUCKER CoronaA/L    Bed Mobility, Assessment/Treatment    Bed Mobility, Assistive Device  head of bed elevated  -LN     Bed Mob, Supine to Sit, [place] conditional independence  -KD supervision required  -LN     Bed Mob, Sit to Supine, [place]  supervision required  -LN     Bed Mob, Sidelying to Sit, [place] conditional independence  -KD      Recorded by [KD] TUCKER CoronaA/DWAYNE [LN] Denise Herron, PTA     Transfer Assessment/Treatment    Transfers, Bed-Chair [place] conditional independence  -KD      Transfers,  Sit-Stand [place] supervision required  -KD supervision required  -LN supervision required  -KD    Transfers, Stand-Sit [place] supervision required  -KD supervision required  -LN supervision required  -KD    Transfers, Sit-Stand-Sit, Assist Device  --   none  -LN     Toilet Transfer, [place]  not tested  -LN contact guard assist  -KD    Toilet Transfer, Assistive Device   rolling walker  -KD    Recorded by [KD] HARMEET Corona/DWAYNE [LN] Denise Herron PTA [KD] TUCKER CoronaA/L    Gait Assessment/Treatment    Gait, [place] Level  minimum assist (75% patient effort);stand by assist  -LN     Gait, Assistive Device  rolling walker  -LN     Gait, Distance (Feet)  50   350  -LN     Gait, Comment  50' w/o ad pt with several lob and reaching for rail,finished walking with rw  -LN     Recorded by  [LN] Denise Herron PTA     Functional Mobility    Functional Mobility- Ind. Level supervision required  -KD  contact guard assist  -KD    Functional Mobility- Device rolling walker  -KD  rolling walker  -KD    Functional Mobility-Distance (Feet) --   15 x 2  -KD  --   10 x 2  -KD    Recorded by [KD] TUCKER CoronaA/L  [KD] TUCKER CoronaA/L    Upper Body Bathing Assessment/Training    UB Bathing Assess/Train Assistive Device   bath mitt  -KD    UB Bathing Assess/Train, Position   edge of bed;sitting  -KD    UB Bathing Assess/Train, [place] Level   set up required  -KD    Recorded by   [KD] HARMEET Corona/L    Lower Body Bathing Assessment/Training    LB Bathing Assess/Train Assistive Device   bath mitt  -KD    LB Bathing Assess/Train, Position   edge of bed;sitting  -KD    LB Bathing Assess/Train, [place] Level   moderate assist (50% patient effort)  -KD    LB Bathing Assess/Train, Impairments   strength decreased  -KD    Recorded by   [KD] TUCKER CoronaA/L    Upper Body Dressing Assessment/Training    UB Dressing Assess/Train, Clothing Type   doffing:;donning:;hospital  gown  -KD    UB Dressing Assess/Train, Position   edge of bed;sitting  -KD    UB Dressing Assess/Train, [place]   supervision required  -KD    Recorded by   [KD] MARK Corona    Lower Body Dressing Assessment/Training    LB Dressing Assess/Train, Clothing Type   doffing:;donning:;slipper socks  -KD    LB Dressing Assess/Train, Position   edge of bed;sitting  -KD    LB Dressing Assess/Train, [place]   moderate assist (50% patient effort)  -KD    LB Dressing Assess/Train, Impairments   strength decreased  -KD    Recorded by   [KD] MARK Corona    Toileting Assessment/Training    Toileting Assess/Train, Assistive Device grab bars  -KD  grab bars  -KD    Toileting Assess/Train, Position sitting  -KD  edge of bed;sitting  -KD    Toileting Assess/Train, Indepen Level supervision required  -KD  supervision required  -KD    Toileting Assess/Train, Comment Pt t/f to BR x 2 trips  -KD      Recorded by [KD] MARK Corona  [KD] MARK Corona    Grooming Assessment/Training    Grooming Assess/Train, Assistive Device --   wash hands  -KD  --   wash face  -KD    Grooming Assess/Train, Position sink side;standing  -KD  edge of bed;sitting  -KD    Grooming Assess/Train, Indepen Level supervision required  -KD  conditional independence  -KD    Recorded by [KD] MARK Corona  [KD] MARK Corona    Therapy Exercises    Bilateral Upper Extremity AROM:;20 reps;sitting;elbow flexion/extension;pronation/supination;shoulder abduction/adduction;shoulder ER/IR  -KD      BUE Resistance manual resistance- minimal   1 lb HW  -KD      Recorded by [KD] MARK Corona      Positioning and Restraints    Pre-Treatment Position in bed  -KD  sitting in chair/recliner  -KD    Post Treatment Position chair  -KD bed  -LN chair  -KD    In Bed  supine;call light within reach;encouraged to call for assist;exit alarm on  -LN     In Chair sitting;call light within reach;encouraged to  call for assist  -KD  sitting;call light within reach;encouraged to call for assist  -KD    Recorded by [KD] Margaret Bower, GA/L [LN] Denise Herron, PTA [KD] Margraet Bower GA/L      User Key  (r) = Recorded By, (t) = Taken By, (c) = Cosigned By    Initials Name Effective Dates    LN Denise Herron, PTA 10/17/16 -     KD Margaret Bower GA/L 10/17/16 -      Kandis Hassan GA/L 10/17/16 -                 OT Goals       12/08/17 1354 12/07/17 1310 12/07/17 0845    Transfer Training OT LTG    Transfer Training OT LTG, Date Goal Reviewed 12/08/17  - 12/07/17  -     Transfer Training OT LTG, Outcome  goal not met  -KD     Patient Education OT LTG    Patient Education OT LTG, Date Goal Reviewed 12/08/17  - 12/07/17  -     Patient Education OT LTG Outcome  goal not met  -KD     ADL OT LTG    ADL OT LTG, Date Goal Reviewed 12/08/17  - 12/07/17  -     ADL OT LTG, Outcome  goal not met  -KD     Functional Mobility OT LTG    Functional Mobility Goal OT LTG, Date Goal Reviewed 12/08/17  -      Functional Mobility Goal OT LTG, Outcome  goal not met  -KD     Endurance OT LTG    Endurance Goal OT LTG, Date Goal Reviewed 12/08/17  -  12/07/17  -    Endurance Goal OT LTG, Outcome   goal not met  -KD      12/06/17 0915 12/05/17 1424       Transfer Training OT LTG    Transfer Training OT LTG, Date Established  12/05/17  -     Transfer Training OT LTG, Time to Achieve  by discharge  -     Transfer Training OT LTG, Activity Type  toilet  -     Transfer Training OT LTG, [place] Level  supervision required  -     Transfer Training OT LTG, Date Goal Reviewed 12/06/17  -      Transfer Training OT LTG, Outcome goal not met  -      Patient Education OT LTG    Patient Education OT LTG, Date Established  12/05/17  -     Patient Education OT LTG, Time to Achieve  by discharge  -     Patient Education OT LTG, Education Type  HEP;precautions per surgeon;home safety;energy conservation;adaptive  equipment mgmt  -     Patient Education OT LTG, Education Understanding  independent;demonstrates adequately;verbalizes understanding  -     Patient Education OT LTG, Date Goal Reviewed 12/06/17  -      Patient Education OT LTG Outcome goal not met  -      ADL OT LTG    ADL OT LTG, Date Established  12/05/17  -     ADL OT LTG, Time to Achieve  by discharge  -     ADL OT LTG, Activity Type  ADL skills  -     ADL OT LTG, [place] Level  standby assist  -     ADL OT LTG, Date Goal Reviewed 12/06/17  -      ADL OT LTG, Outcome goal not met  -      Functional Mobility OT LTG    Functional Mobility Goal OT LTG, Date Established  12/05/17  -     Functional Mobility Goal OT LTG, Time to Achieve  by discharge  -     Functional Mobility Goal OT LTG, [place] Level  supervision  -     Functional Mobility Goal OT LTG, Distance to Achieve  to the sink;to the bathroom  -     Functional Mobility Goal OT LTG, Date Goal Reviewed 12/06/17  -      Functional Mobility Goal OT LTG, Outcome goal not met  -      Endurance OT LTG    Endurance Goal OT LTG, Date Established  12/05/17  -     Endurance Goal OT LTG, Time to Achieve  by discharge  -     Endurance Goal OT LTG, Activity Level  endurance 2 good-  -     Endurance Goal OT LTG, Date Goal Reviewed 12/06/17  -        User Key  (r) = Recorded By, (t) = Taken By, (c) = Cosigned By    Initials Name Provider Type     Rossy Ospina, OTR/L Occupational Therapist     Margaret Bower GA/L Occupational Therapy Assistant     Kandis Hassan GA/L Occupational Therapy Assistant          Occupational Therapy Education     Title: PT OT SLP Therapies (Active)     Topic: Occupational Therapy (Active)     Point: ADL training (Done)    Description: Instruct learner(s) on proper safety adaptation and remediation techniques during self care or transfers.   Instruct in proper use of assistive devices.    Learning Progress Summary    Learner  Readiness Method Response Comment Documented by Status   Patient Acceptance E VU Educated about OT and POC. Educated on CABG precuations. Educated to call for assist and not get up on his own.  12/05/17 1520 Done               Point: Precautions (Done)    Description: Instruct learner(s) on prescribed precautions during self-care and functional transfers.    Learning Progress Summary    Learner Readiness Method Response Comment Documented by Status   Patient Acceptance E VU Educated about OT and POC. Educated on CABG precuations. Educated to call for assist and not get up on his own.  12/05/17 1520 Done               Point: Body mechanics (Done)    Description: Instruct learner(s) on proper positioning and spine alignment during self-care, functional mobility activities and/or exercises.    Learning Progress Summary    Learner Readiness Method Response Comment Documented by Status   Patient Acceptance E VU Educated about OT and POC. Educated on CABG precuations. Educated to call for assist and not get up on his own.  12/05/17 1520 Done                      User Key     Initials Effective Dates Name Provider Type Discipline     10/17/16 -  Rossy Ospina, OTR/L Occupational Therapist OT                  OT Recommendation and Plan  Anticipated Discharge Disposition: inpatient rehabilitation facility  Planned Therapy Interventions: activity intolerance, adaptive equipment training, ADL retraining, IADL retraining, balance training, bed mobility training, energy conservation, fine motor coordination training, home exercise program, motor coordination training, ROM (Range of Motion), strengthening, transfer training  Therapy Frequency: other (see comments) (3-14x a week)           Outcome Measures       12/08/17 1058 12/08/17 0955 12/07/17 1403    How much help from another person do you currently need...    Turning from your back to your side while in flat bed without using bedrails? 3  -LN  3  -LN    Moving  from lying on back to sitting on the side of a flat bed without bedrails? 3  -LN  3  -LN    Moving to and from a bed to a chair (including a wheelchair)? 3  -LN  2  -LN    Standing up from a chair using your arms (e.g., wheelchair, bedside chair)? 4  -LN  4  -LN    Climbing 3-5 steps with a railing? 4  -LN  3  -LN    To walk in hospital room? 3  -LN  3  -LN    AM-PAC 6 Clicks Score 20  -LN  18  -LN    How much help from another is currently needed...    Putting on and taking off regular lower body clothing?  3  -JH     Bathing (including washing, rinsing, and drying)  3  -JH     Toileting (which includes using toilet bed pan or urinal)  3  -JH     Putting on and taking off regular upper body clothing  3  -JH     Taking care of personal grooming (such as brushing teeth)  4  -JH     Eating meals  4  -JH     Score  20  -JH     Functional Assessment    Outcome Measure Options AM-PAC 6 Clicks Basic Mobility (PT)  -LN  AM-PAC 6 Clicks Basic Mobility (PT)  -LN      12/07/17 0845 12/06/17 1435 12/06/17 0915    How much help from another person do you currently need...    Turning from your back to your side while in flat bed without using bedrails?  3  -LN     Moving from lying on back to sitting on the side of a flat bed without bedrails?  3  -LN     Moving to and from a bed to a chair (including a wheelchair)?  3  -LN     Standing up from a chair using your arms (e.g., wheelchair, bedside chair)?  3  -LN     Climbing 3-5 steps with a railing?  3  -LN     To walk in hospital room?  3  -LN     AM-PAC 6 Clicks Score  18  -LN     How much help from another is currently needed...    Putting on and taking off regular lower body clothing? 3  -KD  2  -KD    Bathing (including washing, rinsing, and drying) 3  -KD  2  -KD    Toileting (which includes using toilet bed pan or urinal) 3  -KD  3  -KD    Putting on and taking off regular upper body clothing 4  -KD  4  -KD    Taking care of personal grooming (such as brushing teeth) 4   -KD  4  -KD    Eating meals 4  -KD  4  -KD    Score 21  -KD  19  -KD    Functional Assessment    Outcome Measure Options  AM-PAC 6 Clicks Basic Mobility (PT)  -LN       12/05/17 1424 12/05/17 1400       How much help from another person do you currently need...    Turning from your back to your side while in flat bed without using bedrails?  3  -GB     Moving from lying on back to sitting on the side of a flat bed without bedrails?  3  -GB     Moving to and from a bed to a chair (including a wheelchair)?  2  -GB     Standing up from a chair using your arms (e.g., wheelchair, bedside chair)?  2  -GB     Climbing 3-5 steps with a railing?  1  -GB     To walk in hospital room?  1  -GB     AM-PAC 6 Clicks Score  12  -GB     How much help from another is currently needed...    Putting on and taking off regular lower body clothing? 1  -BH      Bathing (including washing, rinsing, and drying) 1  -BH      Toileting (which includes using toilet bed pan or urinal) 2  -BH      Putting on and taking off regular upper body clothing 2  -BH      Taking care of personal grooming (such as brushing teeth) 3  -BH      Eating meals 3  -BH      Score 12  -      Functional Assessment    Outcome Measure Options AM-PAC 6 Clicks Daily Activity (OT)  - AM-PAC 6 Clicks Basic Mobility (PT)  -       User Key  (r) = Recorded By, (t) = Taken By, (c) = Cosigned By    Initials Name Provider Type    JENNIFER Chandler, PT Physical Therapist     Rossy Ospina, OTR/L Occupational Therapist    BEBE Herron PTA Physical Therapy Assistant     Margaret Bower, GA/L Occupational Therapy Assistant     Kandis Hassan, GA/L Occupational Therapy Assistant           Time Calculation:         Time Calculation- OT       12/08/17 1357          Time Calculation- OT    OT Start Time 0955  -      OT Stop Time 1055  -      OT Time Calculation (min) 60 min  -      OT Received On 12/08/17  -        User Key  (r) = Recorded By, (t) =  Taken By, (c) = Cosigned By    Initials Name Provider Type     HARMEET Luna/L Occupational Therapy Assistant           Therapy Charges for Today     Code Description Service Date Service Provider Modifiers Qty    20333741197 HC OT THERAPEUTIC ACT EA 15 MIN 12/8/2017 HARMEET Luna/L GO 2    51758736039 HC OT THER PROC EA 15 MIN 12/8/2017 HARMEET Luna/L GO 1    23140719672 HC OT SELF CARE/MGMT/TRAIN EA 15 MIN 12/8/2017 HARMEET Luna/L GO 1    04829232080 HC OT THER SUPP EA 15 MIN 12/8/2017 HARMEET Luna/L GO 1          OT G-codes  OT Professional Judgement Used?: Yes  OT Functional Scales Options: AM-PAC 6 Clicks Daily Activity (OT)  Score: 12  Functional Limitation: Self care  Self Care Current Status (): At least 60 percent but less than 80 percent impaired, limited or restricted  Self Care Goal Status (): At least 40 percent but less than 60 percent impaired, limited or restricted    HARMEET Luna/DWAYNE  12/8/2017

## 2017-12-08 NOTE — NURSING NOTE
Notified María Saleh about patient's vital changes, and elevated creatnine and elevated WBC. No orders were received.

## 2017-12-08 NOTE — PLAN OF CARE
Problem: Patient Care Overview (Adult)  Goal: Plan of Care Review  Outcome: Ongoing (interventions implemented as appropriate)    12/08/17 1058   Coping/Psychosocial Response Interventions   Plan Of Care Reviewed With patient   Patient Care Overview   Progress improving   Outcome Evaluation   Outcome Summary/Follow up Plan sit-stand-sit ind,amb 280' w/o sba/ind,up/down steps w/o hr ind-1 new goal met-will check on bed mobs next rx       Goal: Discharge Needs Assessment  Outcome: Ongoing (interventions implemented as appropriate)    12/01/17 0459 12/05/17 1424 12/05/17 1452   Discharge Needs Assessment   Concerns To Be Addressed --  --  homelessness;home safety concerns   Concerns Comments --  --  pt was homeless before admit; will need support system and safe location post d/c   Readmission Within The Last 30 Days no previous admission in last 30 days --  --    Equipment Needed After Discharge --  --  walker, rolling   Discharge Facility/Level Of Care Needs --  --  skilled transitional care;rehabilitation facility   Current Discharge Risk chronically ill;financial support inadequate --  --    Discharge Disposition still a patient --  --    Discharge Planning Comments --  --  if d/c to homeless situation, it appears he will need to be as strong as possible to survive homelessnes   Current Health   Outpatient/Agency/Support Group Needs --  --  skilled nursing facility (specify);inpatient rehabilitation facility (specify)   Anticipated Changes Related to Illness --  --  inability to care for self   Self-Care   Equipment Currently Used at Home --  none --    Living Environment   Transportation Available --  public transportation;family or friend will provide --          Problem: Inpatient Physical Therapy  Goal: Bed Mobility Goal LTG- PT  Outcome: Ongoing (interventions implemented as appropriate)    12/05/17 1452 12/06/17 1435 12/08/17 1058   Bed Mobility PT LTG   Bed Mobility PT LTG, Date Established 12/05/17 --  --     Bed Mobility PT LTG, Time to Achieve by discharge --  --    Bed Mobility PT LTG, Activity Type roll left/roll right;supine to sit/sit to supine --  --    Bed Mobility PT LTG, [place] Level --  1 person + 1 person to manage equipment --    Bed Mobility PT LTG, Date Goal Reviewed --  --  12/08/17   Bed Mobility PT LTG, Outcome --  --  goal not met       Goal: Transfer Training Goal 1 LTG- PT  Outcome: Ongoing (interventions implemented as appropriate)    12/05/17 1452 12/08/17 1058   Transfer Training PT LTG   Transfer Training PT LTG, Date Established 12/05/17 --    Transfer Training PT LTG, Time to Achieve by discharge --    Transfer Training PT LTG, Activity Type bed to chair /chair to bed;sit to stand/stand to sit --    Transfer Training PT LTG, [place] Level conditional independence --    Transfer Training PT LTG, Date Goal Reviewed --  12/08/17   Transfer Training PT LTG, Outcome --  goal not met       Goal: Gait Training Goal LTG- PT  Outcome: Ongoing (interventions implemented as appropriate)    12/05/17 1452 12/08/17 1058   Gait Training PT LTG   Gait Training Goal PT LTG, Date Established 12/05/17 --    Gait Training Goal PT LTG, Time to Achieve by discharge --    Gait Training Goal PT LTG, [place] Level conditional independence --    Gait Training Goal PT LTG, Date Goal Reviewed --  12/08/17   Gait Training Goal PT LTG, Outcome --  goal not met       Goal: Stair Training Goal LTG- PT  Outcome: Outcome(s) achieved Date Met:  12/08/17 12/05/17 1452 12/07/17 1403 12/08/17 1058   Stair Training PT LTG   Stair Training Goal PT LTG, Date Established --  12/07/17 --    Stair Training Goal PT LTG, Time to Achieve by discharge --  --    Stair Training Goal PT LTG, Number of Steps 4 --  --    Stair Training Goal PT LTG, [place] Level conditional independence --  --    Stair Training Goal PT LTG, Date Goal Reviewed --  --  12/08/17   Stair Training Goal PT LTG, Outcome --  --  goal met

## 2017-12-08 NOTE — PLAN OF CARE
Problem: Patient Care Overview (Adult)  Goal: Plan of Care Review  Outcome: Ongoing (interventions implemented as appropriate)  Goal: Adult Individualization and Mutuality  Outcome: Ongoing (interventions implemented as appropriate)  Goal: Discharge Needs Assessment  Outcome: Ongoing (interventions implemented as appropriate)    Problem: Pain, Chronic (Adult)  Goal: Acceptable Pain Control/Comfort Level  Outcome: Ongoing (interventions implemented as appropriate)    Problem: Cardiac Output, Decreased (Adult)  Goal: Adequate Cardiac Output/Effective Tissue Perfusion  Outcome: Ongoing (interventions implemented as appropriate)    Problem: Cardiac Surgery (Adult)  Goal: Signs and Symptoms of Listed Potential Problems Will be Absent or Manageable (Cardiac Surgery)  Outcome: Ongoing (interventions implemented as appropriate)    Problem: Fall Risk (Adult)  Goal: Absence of Falls  Outcome: Ongoing (interventions implemented as appropriate)

## 2017-12-08 NOTE — THERAPY TREATMENT NOTE
Acute Care - Physical Therapy Treatment Note  [facility]     Patient Name: Lyle Corona  : 1965  MRN: [id number]  Today's Date: 2017  Onset of Illness/Injury or Date of Surgery Date: 17 (CABG 17)  Date of Referral to PT: 17  Referring Physician: AHSAN Oglesby    Admit Date: 2017    Visit Dx:    ICD-10-CM ICD-9-CM   1. Coronary arteriosclerosis I25.10 414.00   2. Unstable angina I20.0 411.1   3. NSTEMI (non-ST elevated myocardial infarction) I21.4 410.70   4. Coronary artery disease involving native coronary artery of native heart with unstable angina pectoris I25.110 414.01     411.1   5. Impaired functional mobility, balance, gait, and endurance Z74.09 V49.89   6. Impaired mobility and ADLs Z74.09 799.89     Patient Active Problem List   Diagnosis   • Essential hypertension   • Hypertensive emergency, no CHF   • Coronary artery disease involving native coronary artery of native heart with unstable angina pectoris   • Chronic kidney disease, stage 3   • LVH (left ventricular hypertrophy)   • NSTEMI (non-ST elevated myocardial infarction)   • Unstable angina               Adult Rehabilitation Note       17 1058 17 1403 17 0845    Rehab Assessment/Intervention    Discipline physical therapy assistant  -LN physical therapy assistant  -LN occupational therapy assistant  -KD    Document Type therapy note (daily note)  -LN therapy note (daily note)  -LN therapy note (daily note)  -KD    Subjective Information agree to therapy;complains of;pain  -LN complains of;fatigue;pain  -LN agree to therapy  -KD    Precautions/Limitations cardiac precautions;fall precautions;oxygen therapy device and L/min;sternal precautions  -LN cardiac precautions;fall precautions;sternal precautions  -LN cardiac precautions;fall precautions  -KD    Recorded by [LN] Denise Herron PTA [LN] Denise Herron PTA [KD] HARMEET Corona/L    Vital Signs    Pre Systolic BP Rehab 124   -  -  -KD    Pre Treatment Diastolic BP 47  -LN 75  -LN 66  -KD    Intra Systolic BP Rehab 128  -LN      Intra Treatment Diastolic BP 64  -LN      Post Systolic BP Rehab 108  -  -LN     Post Treatment Diastolic BP 57  -LN 74   nsg aware  -LN     Pretreatment Heart Rate (beats/min) 75  -  -LN 94  -KD    Intratreatment Heart Rate (beats/min) 83  -  -LN     Posttreatment Heart Rate (beats/min) 100   nsg aware of all vs  -  -  -KD    Pre SpO2 (%) --   unable to obtain  -LN --   unable to obtain sat reading before/after rx-nsg aware  -LN 94  -KD    O2 Delivery Pre Treatment   room air  -KD    Post SpO2 (%)   96  -KD    O2 Delivery Post Treatment   room air  -KD    Pre Patient Position Sitting  -LN Supine  -LN Supine  -KD    Intra Patient Position Standing  -LN Standing  -LN Standing  -KD    Post Patient Position Sitting  -LN Supine  -LN Sitting  -KD    Recorded by [LN] Denise Herron PTA [LN] Denise Herron, PTA [KD] HARMEET Corona/L    Pain Assessment    Pain Assessment 0-10  -LN 0-10  -LN 0-10  -KD    Pain Score 6  -LN 8  -LN 8  -KD    Post Pain Score 6  -LN 8  -LN 8  -KD    Pain Type Acute pain;Surgical pain  -LN Acute pain;Surgical pain  -LN Acute pain;Surgical pain  -KD    Pain Location Chest  -LN Chest  -LN Chest  -KD    Pain Intervention(s) --   per nsg meds not due  -LN Medication (See MAR)  -LN Medication (See MAR)  -KD    Recorded by [LN] Denise Herron PTA [LN] Deinse Herron, PTA [KD] TUCKER CoronaA/L    Vision Assessment/Intervention    Visual Impairment   WFL with corrective lenses  -KD    Recorded by   [KD] TUCKER CoronaA/L    Cognitive Assessment/Intervention    Current Cognitive/Communication Assessment   functional  -KD    Orientation Status oriented to;person     -LN oriented to;person     -LN oriented x 4  -KD    Follows Commands/Answers Questions   100% of the time  -KD    Personal Safety   fully aware of deficits  -KD    Personal Safety  Interventions   fall prevention program maintained  -KD    Recorded by [LN] Denise Herron, PTA [LN] Denise Herron, PTA [KD] Margaret Bower, GA/L    Bed Mobility, Assessment/Treatment    Bed Mob, Supine to Sit, Vera not tested  -LN contact guard assist  -LN conditional independence  -KD    Bed Mob, Sit to Supine, Vera not tested  -LN conditional independence  -LN     Bed Mob, Sidelying to Sit, Vera   conditional independence  -KD    Recorded by [LN] Denise Herron, PTA [LN] Denise Herron, PTA [KD] Margaret Bower, GA/L    Transfer Assessment/Treatment    Transfers, Bed-Chair Vera   conditional independence  -KD    Transfers, Sit-Stand Vera independent  -LN independent  -LN supervision required  -KD    Transfers, Stand-Sit Vera independent  -LN independent  -LN supervision required  -KD    Recorded by [LN] Denise Herron, PTA [LN] Denise Herron, PTA [KD] Margaret Bower, GA/L    Gait Assessment/Treatment    Gait, Vera Level supervision required;independent  -LN stand by assist  -LN     Gait, Assistive Device --   none  -LN --   none  -LN     Gait, Distance (Feet) 280  -  -LN     Gait, Comment decreased distance due to pt not wanting to increase pain since pain meds not due for another 1 1/2 hrs  -LN occasional reaching for rails but no noticeable lob or unsteadiness  -LN     Recorded by [LN] Denise Herron, PTA [LN] Denise Herron, PTA     Stairs Assessment/Treatment    Number of Stairs 5  -LN      Stairs, Handrail Location none  -LN      Stairs, Vera Level independent  -LN      Stairs, Technique Used step over step (ascending);step over step (descending)  -LN      Recorded by [LN] Denise Herron, PTA      Functional Mobility    Functional Mobility- Ind. Level   supervision required  -KD    Functional Mobility- Device   rolling walker  -KD    Functional Mobility-Distance (Feet)   --   15 x 2  -KD    Recorded by   [KD] Margaret Bower, GA/L    Toileting  Assessment/Training    Toileting Assess/Train, Assistive Device   grab bars  -KD    Toileting Assess/Train, Position   sitting  -KD    Toileting Assess/Train, Indepen Level   supervision required  -KD    Toileting Assess/Train, Comment   Pt t/f to BR x 2 trips  -KD    Recorded by   [KD] TUCKER CoronaA/L    Grooming Assessment/Training    Grooming Assess/Train, Assistive Device   --   wash hands  -KD    Grooming Assess/Train, Position   sink side;standing  -KD    Grooming Assess/Train, Indepen Level   supervision required  -KD    Recorded by   [KD] TUCKER CoronaA/L    Therapy Exercises    Bilateral Lower Extremities AROM:;20 reps;sitting;ankle pumps/circles;hip flexion;LAQ  -LN AROM:;15 reps;20 reps;ankle pumps/circles;sitting;hip flexion;LAQ  -LN     Bilateral Upper Extremity   AROM:;20 reps;sitting;elbow flexion/extension;pronation/supination;shoulder abduction/adduction;shoulder ER/IR  -KD    BUE Resistance   manual resistance- minimal   1 lb HW  -KD    Recorded by [LN] Denise Herron PTA [LN] Denise Herron, LINDA [KD] Margaret Bower, GA/L    Positioning and Restraints    Pre-Treatment Position   in bed  -KD    Post Treatment Position chair  -LN bed  -LN chair  -KD    In Bed --  -LN notified nsg;supine;call light within reach;encouraged to call for assist  -LN     In Chair sitting;call light within reach;encouraged to call for assist  -LN  sitting;call light within reach;encouraged to call for assist  -KD    Recorded by [LN] Denise Herron PTA [LN] Denise Herron, PTA [KD] Margaret Bower GA/L      12/06/17 1435 12/06/17 0915       Rehab Assessment/Intervention    Discipline  occupational therapy assistant  -KD     Document Type  therapy note (daily note)  -KD     Subjective Information agree to therapy;complains of;pain  -LN agree to therapy  -KD     Patient Effort, Rehab Treatment  good  -KD     Precautions/Limitations cardiac precautions;fall precautions;oxygen therapy device and L/min;sternal  precautions  -LN      Recorded by [LN] Denise Herron PTA [KD] TUCKER CoronaA/L     Vital Signs    Pre Systolic BP Rehab 127  -  -KD     Pre Treatment Diastolic BP 84  -LN 84  -KD     Post Systolic BP Rehab 135  -LN      Post Treatment Diastolic BP 68  -LN      Pretreatment Heart Rate (beats/min) 107  -LN 95  -KD     Intratreatment Heart Rate (beats/min) 82  -LN      Posttreatment Heart Rate (beats/min) 108  -LN 96  -KD     Pre SpO2 (%) 88  -LN 95  -KD     O2 Delivery Pre Treatment room air   nc laying in bed-put 02 back on 93%  -LN supplemental O2  -KD     Intra SpO2 (%) 93  -LN      Post SpO2 (%) 94  -LN 97  -KD     O2 Delivery Post Treatment  supplemental O2  -KD     Pre Patient Position Supine  -LN Supine  -KD     Intra Patient Position Standing  -LN Standing  -KD     Post Patient Position Supine  -LN Sitting  -KD     Recorded by [LN] Denise Herron PTA [KD] TUCKER CoronaA/L     Pain Assessment    Pain Assessment 0-10  -LN 0-10  -KD     Pain Score 5  -LN 9  -KD     Post Pain Score 7  -LN 9  -KD     Pain Type Acute pain;Surgical pain  -LN Surgical pain  -KD     Pain Location  Chest  -KD     Pain Intervention(s) Medication (See MAR)  -LN Medication (See MAR)  -KD     Recorded by [LN] Denise Herron PTA [KD] TUCKER CoronaA/L     Vision Assessment/Intervention    Visual Impairment  WFL with corrective lenses  -KD     Recorded by  [KD] TUCKER CoronaA/L     Cognitive Assessment/Intervention    Current Cognitive/Communication Assessment  functional  -KD     Orientation Status oriented to;person     -LN oriented x 4  -KD     Follows Commands/Answers Questions  100% of the time  -KD     Personal Safety  fully aware of deficits  -KD     Personal Safety Interventions  fall prevention program maintained;gait belt;nonskid shoes/slippers when out of bed  -KD     Recorded by [LN] Denise Herron PTA [KD] TUCKER CoronaA/L     Bed Mobility, Assessment/Treatment    Bed Mobility, Assistive Device  head of bed elevated  -LN      Bed Mob, Supine to Sit, [place] supervision required  -LN      Bed Mob, Sit to Supine, [place] supervision required  -LN      Recorded by [LN] Denise Herron PTA      Transfer Assessment/Treatment    Transfers, Sit-Stand [place] supervision required  -LN supervision required  -KD     Transfers, Stand-Sit [place] supervision required  -LN supervision required  -KD     Transfers, Sit-Stand-Sit, Assist Device --   none  -LN      Toilet Transfer, Trent not tested  -LN contact guard assist  -KD     Toilet Transfer, Assistive Device  rolling walker  -KD     Recorded by [LN] Denise Herron PTA [KD] AHRMEET Corona/L     Gait Assessment/Treatment    Gait, [place] Level minimum assist (75% patient effort);stand by assist  -LN      Gait, Assistive Device rolling walker  -LN      Gait, Distance (Feet) 50   350  -LN      Gait, Comment 50' w/o ad pt with several lob and reaching for rail,finished walking with rw  -LN      Recorded by [LN] Denise Herron PTA      Functional Mobility    Functional Mobility- Ind. Level  contact guard assist  -KD     Functional Mobility- Device  rolling walker  -KD     Functional Mobility-Distance (Feet)  --   10 x 2  -KD     Recorded by  [KD] TUCKER CoronaA/L     Upper Body Bathing Assessment/Training    UB Bathing Assess/Train Assistive Device  bath mitt  -KD     UB Bathing Assess/Train, Position  edge of bed;sitting  -KD     UB Bathing Assess/Train, [place] Level  set up required  -KD     Recorded by  [KD] HARMEET Corona/L     Lower Body Bathing Assessment/Training    LB Bathing Assess/Train Assistive Device  bath mitt  -KD     LB Bathing Assess/Train, Position  edge of bed;sitting  -KD     LB Bathing Assess/Train, [place] Level  moderate assist (50% patient effort)  -KD     LB Bathing Assess/Train, Impairments  strength decreased  -KD     Recorded by  [KD] TUCKER CoronaA/L     Upper Body Dressing  Assessment/Training    UB Dressing Assess/Train, Clothing Type  doffing:;donning:;hospital gown  -KD     UB Dressing Assess/Train, Position  edge of bed;sitting  -KD     UB Dressing Assess/Train, [place]  supervision required  -KD     Recorded by  [KD] HARMEET Corona/L     Lower Body Dressing Assessment/Training    LB Dressing Assess/Train, Clothing Type  doffing:;donning:;slipper socks  -KD     LB Dressing Assess/Train, Position  edge of bed;sitting  -KD     LB Dressing Assess/Train, [place]  moderate assist (50% patient effort)  -KD     LB Dressing Assess/Train, Impairments  strength decreased  -KD     Recorded by  [KD] HARMEET Corona/L     Toileting Assessment/Training    Toileting Assess/Train, Assistive Device  grab bars  -KD     Toileting Assess/Train, Position  edge of bed;sitting  -KD     Toileting Assess/Train, Indepen Level  supervision required  -KD     Recorded by  [KD] HARMEET Corona/L     Grooming Assessment/Training    Grooming Assess/Train, Assistive Device  --   wash face  -KD     Grooming Assess/Train, Position  edge of bed;sitting  -KD     Grooming Assess/Train, Indepen Level  conditional independence  -KD     Recorded by  [KD] MARK Corona     Positioning and Restraints    Pre-Treatment Position  sitting in chair/recliner  -KD     Post Treatment Position bed  -LN chair  -KD     In Bed supine;call light within reach;encouraged to call for assist;exit alarm on  -LN      In Chair  sitting;call light within reach;encouraged to call for assist  -KD     Recorded by [LN] Denise Herron PTA [KD] MARK Corona       User Key  (r) = Recorded By, (t) = Taken By, (c) = Cosigned By    Initials Name Effective Dates    LN Denise Herron PTA 10/17/16 -     KD MARK Corona 10/17/16 -                 IP PT Goals       12/08/17 1058 12/07/17 1403 12/06/17 1435    Bed Mobility PT LTG    Bed Mobility PT LTG, [place] Level   1 person + 1 person to manage  equipment  -LN    Bed Mobility PT LTG, Date Goal Reviewed 12/08/17  -LN 12/07/17  -LN 12/06/17  -LN    Bed Mobility PT LTG, Outcome goal not met  -LN goal not met  -LN goal not met  -LN    Transfer Training PT LTG    Transfer Training PT  LTG, Date Goal Reviewed 12/08/17  -LN 12/07/17  -LN 12/06/17  -LN    Transfer Training PT LTG, Outcome goal not met  -LN goal not met  -LN goal not met  -LN    Gait Training PT LTG    Gait Training Goal PT LTG, Date Goal Reviewed 12/08/17  -LN 12/07/17  -LN 12/06/17  -LN    Gait Training Goal PT LTG, Outcome goal not met  -LN goal not met  -LN goal not met  -LN    Stair Training PT LTG    Stair Training Goal PT LTG, Date Established  12/07/17  -LN     Stair Training Goal PT LTG, Date Goal Reviewed 12/08/17  -LN 12/07/17  -LN 12/06/17  -LN    Stair Training Goal PT LTG, Outcome goal met  -LN goal not met  -LN goal not met  -LN      12/05/17 1452          Bed Mobility PT LTG    Bed Mobility PT LTG, Date Established 12/05/17  -GB      Bed Mobility PT LTG, Time to Achieve by discharge  -GB      Bed Mobility PT LTG, Activity Type roll left/roll right;supine to sit/sit to supine  -GB      Bed Mobility PT LTG, [place] Level conditional independence  -GB      Bed Mobility PT LTG, Outcome goal not met  -GB      Transfer Training PT LTG    Transfer Training PT LTG, Date Established 12/05/17  -GB      Transfer Training PT LTG, Time to Achieve by discharge  -GB      Transfer Training PT LTG, Activity Type bed to chair /chair to bed;sit to stand/stand to sit  -GB      Transfer Training PT LTG, [place] Level conditional independence  -GB      Transfer Training PT LTG, Outcome goal not met  -GB      Gait Training PT LTG    Gait Training Goal PT LTG, Date Established 12/05/17  -GB      Gait Training Goal PT LTG, Time to Achieve by discharge  -GB      Gait Training Goal PT LTG, [place] Level conditional independence  -GB      Gait Training Goal PT LTG, Outcome goal not met  -GB       Stair Training PT LTG    Stair Training Goal PT LTG, Date Established 12/05/17  -GB      Stair Training Goal PT LTG, Time to Achieve by discharge  -GB      Stair Training Goal PT LTG, Number of Steps 4  -GB      Stair Training Goal PT LTG, [place] Level conditional independence  -GB      Stair Training Goal PT LTG, Outcome goal not met  -GB        User Key  (r) = Recorded By, (t) = Taken By, (c) = Cosigned By    Initials Name Provider Type    JENNIFER Chandler, PT Physical Therapist    LN Denise Hreron, PTA Physical Therapy Assistant          Physical Therapy Education     Title: PT OT SLP Therapies (Active)     Topic: Physical Therapy (Active)     Point: Mobility training (Done)    Learning Progress Summary    Learner Readiness Method Response Comment Documented by Status   Patient Acceptance E VU,NR reviewed and copies given of hep and cardiac/sternal precautions LN 12/08/17 1135 Done    Acceptance E NR cardiac precautions LN 12/06/17 1650 Active    Acceptance E,D NR,DU,VU POC; coughing w/ abdominal muscles; use of pillow to help cough; tx GB 12/05/17 1450 Done               Point: Home exercise program (Done)    Learning Progress Summary    Learner Readiness Method Response Comment Documented by Status   Patient Acceptance E VU,NR reviewed and copies given of hep and cardiac/sternal precautions LN 12/08/17 1135 Done    Acceptance E,D NR,DU,VU POC; coughing w/ abdominal muscles; use of pillow to help cough; tx GB 12/05/17 1450 Done               Point: Body mechanics (Active)    Learning Progress Summary    Learner Readiness Method Response Comment Documented by Status   Patient Acceptance E NR cardiac precautions LN 12/06/17 1650 Active    Acceptance E,D NR,DU,VU POC; coughing w/ abdominal muscles; use of pillow to help cough; tx GB 12/05/17 1450 Done               Point: Precautions (Done)    Learning Progress Summary    Learner Readiness Method Response Comment Documented by Status   Patient  Acceptance E VU,NR reviewed and copies given of hep and cardiac/sternal precautions  12/08/17 1135 Done    Acceptance E NR cardiac precautions  12/06/17 1650 Active    Acceptance E,D NR,DU,VU POC; coughing w/ abdominal muscles; use of pillow to help cough; tx  12/05/17 1450 Done                      User Key     Initials Effective Dates Name Provider Type Discipline     10/17/16 -  Paige Chandler, PT Physical Therapist PT     10/17/16 -  Denise Herron PTA Physical Therapy Assistant PT                    PT Recommendation and Plan  Anticipated Equipment Needs At Discharge: front wheeled walker  Anticipated Discharge Disposition: home with home health  Planned Therapy Interventions: bed mobility training, gait training, home exercise program, stair training, strengthening, transfer training  PT Frequency: daily  Plan of Care Review  Plan Of Care Reviewed With: patient  Progress: improving  Outcome Summary/Follow up Plan: sit-stand-sit ind,amb 280' w/o sba/ind,up/down steps w/o hr ind-1 new goal met-will check on bed mobs next rx          Outcome Measures       12/08/17 1058 12/07/17 1403 12/07/17 0845    How much help from another person do you currently need...    Turning from your back to your side while in flat bed without using bedrails? 3  -LN 3  -LN     Moving from lying on back to sitting on the side of a flat bed without bedrails? 3  -LN 3  -LN     Moving to and from a bed to a chair (including a wheelchair)? 3  -LN 2  -LN     Standing up from a chair using your arms (e.g., wheelchair, bedside chair)? 4  -LN 4  -LN     Climbing 3-5 steps with a railing? 4  -LN 3  -LN     To walk in hospital room? 3  -LN 3  -LN     AM-PAC 6 Clicks Score 20  -LN 18  -LN     How much help from another is currently needed...    Putting on and taking off regular lower body clothing?   3  -KD    Bathing (including washing, rinsing, and drying)   3  -KD    Toileting (which includes using toilet bed pan or urinal)    3  -KD    Putting on and taking off regular upper body clothing   4  -KD    Taking care of personal grooming (such as brushing teeth)   4  -KD    Eating meals   4  -KD    Score   21  -KD    Functional Assessment    Outcome Measure Options AM-PAC 6 Clicks Basic Mobility (PT)  -LN AM-PAC 6 Clicks Basic Mobility (PT)  -LN       12/06/17 1435 12/06/17 0915 12/05/17 1424    How much help from another person do you currently need...    Turning from your back to your side while in flat bed without using bedrails? 3  -LN      Moving from lying on back to sitting on the side of a flat bed without bedrails? 3  -LN      Moving to and from a bed to a chair (including a wheelchair)? 3  -LN      Standing up from a chair using your arms (e.g., wheelchair, bedside chair)? 3  -LN      Climbing 3-5 steps with a railing? 3  -LN      To walk in hospital room? 3  -LN      AM-PAC 6 Clicks Score 18  -LN      How much help from another is currently needed...    Putting on and taking off regular lower body clothing?  2  -KD 1  -BH    Bathing (including washing, rinsing, and drying)  2  -KD 1  -BH    Toileting (which includes using toilet bed pan or urinal)  3  -KD 2  -BH    Putting on and taking off regular upper body clothing  4  -KD 2  -BH    Taking care of personal grooming (such as brushing teeth)  4  -KD 3  -BH    Eating meals  4  -KD 3  -BH    Score  19  -KD 12  -BH    Functional Assessment    Outcome Measure Options AM-PAC 6 Clicks Basic Mobility (PT)  -LN  AM-PAC 6 Clicks Daily Activity (OT)  -BH      12/05/17 1400          How much help from another person do you currently need...    Turning from your back to your side while in flat bed without using bedrails? 3  -GB      Moving from lying on back to sitting on the side of a flat bed without bedrails? 3  -GB      Moving to and from a bed to a chair (including a wheelchair)? 2  -GB      Standing up from a chair using your arms (e.g., wheelchair, bedside chair)? 2  -GB      Climbing  3-5 steps with a railing? 1  -GB      To walk in hospital room? 1  -GB      AM-PAC 6 Clicks Score 12  -GB      Functional Assessment    Outcome Measure Options AM-PAC 6 Clicks Basic Mobility (PT)  -GB        User Key  (r) = Recorded By, (t) = Taken By, (c) = Cosigned By    Initials Name Provider Type    GB Paige Chandler, PT Physical Therapist     Rossy Ospina, OTR/L Occupational Therapist    LN Denise S Germaine, PTA Physical Therapy Assistant    JENNIFER Bower, GA/L Occupational Therapy Assistant           Time Calculation:         PT Charges       12/08/17 1058          Time Calculation    Start Time 1058  -LN      Stop Time 1125  -LN      Time Calculation (min) 27 min  -LN      PT Received On 12/08/17  -LN      Time Calculation- PT    Total Timed Code Minutes- PT 27 minute(s)  -LN        User Key  (r) = Recorded By, (t) = Taken By, (c) = Cosigned By    Initials Name Provider Type    LN Denise S Germaine, PTA Physical Therapy Assistant          Therapy Charges for Today     Code Description Service Date Service Provider Modifiers Qty    26098435055 HC GAIT TRAINING EA 15 MIN 12/7/2017 Denise S Germaine, PTA GP 1    96082885784 HC PT THER PROC EA 15 MIN 12/7/2017 Denise S Germaine, PTA GP 1    50566232027 HC GAIT TRAINING EA 15 MIN 12/8/2017 Denise S Germaine, PTA GP 1    22776654913 HC PT SELF CARE/MGMT/TRAIN EA 15 MIN 12/8/2017 Denise S Germaine, PTA GP 1          PT G-Codes  PT Professional Judgement Used?: Yes  Outcome Measure Options: AM-PAC 6 Clicks Basic Mobility (PT)  Score: 12  Functional Limitation: Mobility: Walking and moving around  Mobility: Walking and Moving Around Current Status (): At least 60 percent but less than 80 percent impaired, limited or restricted  Mobility: Walking and Moving Around Goal Status (): At least 1 percent but less than 20 percent impaired, limited or restricted    Denise Herron PTA  12/8/2017

## 2017-12-09 LAB
ALBUMIN SERPL-MCNC: 3.9 G/DL (ref 3.4–4.8)
ALBUMIN/GLOB SERPL: 1.3 G/DL (ref 1.1–1.8)
ALP SERPL-CCNC: 178 U/L (ref 38–126)
ALT SERPL W P-5'-P-CCNC: 30 U/L (ref 21–72)
ANION GAP SERPL CALCULATED.3IONS-SCNC: 16 MMOL/L (ref 5–15)
AST SERPL-CCNC: 48 U/L (ref 17–59)
BILIRUB SERPL-MCNC: 1 MG/DL (ref 0.2–1.3)
BUN BLD-MCNC: 30 MG/DL (ref 7–21)
BUN/CREAT SERPL: 13 (ref 7–25)
CALCIUM SPEC-SCNC: 8.9 MG/DL (ref 8.4–10.2)
CHLORIDE SERPL-SCNC: 93 MMOL/L (ref 95–110)
CO2 SERPL-SCNC: 23 MMOL/L (ref 22–31)
CREAT BLD-MCNC: 2.3 MG/DL (ref 0.7–1.3)
GFR SERPL CREATININE-BSD FRML MDRD: 30 ML/MIN/1.73 (ref 60–130)
GLOBULIN UR ELPH-MCNC: 3.1 GM/DL (ref 2.3–3.5)
GLUCOSE BLD-MCNC: 98 MG/DL (ref 60–100)
GLUCOSE BLDC GLUCOMTR-MCNC: 103 MG/DL (ref 70–130)
GLUCOSE BLDC GLUCOMTR-MCNC: 105 MG/DL (ref 70–130)
GLUCOSE BLDC GLUCOMTR-MCNC: 107 MG/DL (ref 70–130)
GLUCOSE BLDC GLUCOMTR-MCNC: 94 MG/DL (ref 70–130)
POTASSIUM BLD-SCNC: 4.6 MMOL/L (ref 3.5–5.1)
PROT SERPL-MCNC: 7 G/DL (ref 6.3–8.6)
SODIUM BLD-SCNC: 132 MMOL/L (ref 137–145)

## 2017-12-09 PROCEDURE — 97530 THERAPEUTIC ACTIVITIES: CPT

## 2017-12-09 PROCEDURE — 97110 THERAPEUTIC EXERCISES: CPT

## 2017-12-09 PROCEDURE — 94799 UNLISTED PULMONARY SVC/PX: CPT

## 2017-12-09 PROCEDURE — 94760 N-INVAS EAR/PLS OXIMETRY 1: CPT

## 2017-12-09 PROCEDURE — 97116 GAIT TRAINING THERAPY: CPT

## 2017-12-09 PROCEDURE — 82962 GLUCOSE BLOOD TEST: CPT

## 2017-12-09 PROCEDURE — 97535 SELF CARE MNGMENT TRAINING: CPT

## 2017-12-09 PROCEDURE — 25010000002 LEVOFLOXACIN PER 250 MG: Performed by: THORACIC SURGERY (CARDIOTHORACIC VASCULAR SURGERY)

## 2017-12-09 PROCEDURE — 80053 COMPREHEN METABOLIC PANEL: CPT | Performed by: INTERNAL MEDICINE

## 2017-12-09 PROCEDURE — 99024 POSTOP FOLLOW-UP VISIT: CPT | Performed by: THORACIC SURGERY (CARDIOTHORACIC VASCULAR SURGERY)

## 2017-12-09 RX ADMIN — TAMSULOSIN HYDROCHLORIDE 0.4 MG: 0.4 CAPSULE ORAL at 20:19

## 2017-12-09 RX ADMIN — LABETALOL HYDROCHLORIDE 100 MG: 100 TABLET, FILM COATED ORAL at 09:10

## 2017-12-09 RX ADMIN — SENNOSIDES AND DOCUSATE SODIUM 1 TABLET: 8.6; 5 TABLET ORAL at 08:20

## 2017-12-09 RX ADMIN — OXYCODONE HYDROCHLORIDE AND ACETAMINOPHEN 1 TABLET: 5; 325 TABLET ORAL at 18:38

## 2017-12-09 RX ADMIN — Medication 3 ML: at 20:20

## 2017-12-09 RX ADMIN — PRENATAL VIT W/ FE FUMARATE-FA TAB 27-0.8 MG 1 TABLET: 27-0.8 TAB at 08:21

## 2017-12-09 RX ADMIN — TICAGRELOR 90 MG: 90 TABLET ORAL at 08:20

## 2017-12-09 RX ADMIN — FUROSEMIDE 20 MG: 20 TABLET ORAL at 08:21

## 2017-12-09 RX ADMIN — OXYCODONE HYDROCHLORIDE AND ACETAMINOPHEN 1 TABLET: 5; 325 TABLET ORAL at 12:47

## 2017-12-09 RX ADMIN — OXYCODONE HYDROCHLORIDE AND ACETAMINOPHEN 1 TABLET: 5; 325 TABLET ORAL at 08:20

## 2017-12-09 RX ADMIN — OXYCODONE HYDROCHLORIDE AND ACETAMINOPHEN 500 MG: 500 TABLET ORAL at 18:38

## 2017-12-09 RX ADMIN — MAGNESIUM OXIDE TAB 400 MG (241.3 MG ELEMENTAL MG) 400 MG: 400 (241.3 MG) TAB at 08:20

## 2017-12-09 RX ADMIN — OXYCODONE HYDROCHLORIDE 5 MG: 5 TABLET ORAL at 10:46

## 2017-12-09 RX ADMIN — OXYCODONE HYDROCHLORIDE 5 MG: 5 TABLET ORAL at 14:48

## 2017-12-09 RX ADMIN — TICAGRELOR 90 MG: 90 TABLET ORAL at 18:38

## 2017-12-09 RX ADMIN — ATORVASTATIN CALCIUM 10 MG: 10 TABLET, FILM COATED ORAL at 20:19

## 2017-12-09 RX ADMIN — MAGNESIUM OXIDE TAB 400 MG (241.3 MG ELEMENTAL MG) 400 MG: 400 (241.3 MG) TAB at 18:38

## 2017-12-09 RX ADMIN — Medication 10 ML: at 12:47

## 2017-12-09 RX ADMIN — LABETALOL HYDROCHLORIDE 100 MG: 100 TABLET, FILM COATED ORAL at 20:19

## 2017-12-09 RX ADMIN — CYCLOBENZAPRINE HYDROCHLORIDE 10 MG: 10 TABLET, FILM COATED ORAL at 20:19

## 2017-12-09 RX ADMIN — OXYCODONE HYDROCHLORIDE AND ACETAMINOPHEN 1 TABLET: 10; 325 TABLET ORAL at 20:19

## 2017-12-09 RX ADMIN — IPRATROPIUM BROMIDE AND ALBUTEROL SULFATE 3 ML: 2.5; .5 SOLUTION RESPIRATORY (INHALATION) at 20:28

## 2017-12-09 RX ADMIN — LEVOFLOXACIN 250 MG: 5 INJECTION, SOLUTION INTRAVENOUS at 18:38

## 2017-12-09 RX ADMIN — ASPIRIN 81 MG: 81 TABLET, COATED ORAL at 08:21

## 2017-12-09 RX ADMIN — OXYCODONE HYDROCHLORIDE AND ACETAMINOPHEN 1 TABLET: 10; 325 TABLET ORAL at 03:39

## 2017-12-09 RX ADMIN — IPRATROPIUM BROMIDE AND ALBUTEROL SULFATE 3 ML: 2.5; .5 SOLUTION RESPIRATORY (INHALATION) at 10:57

## 2017-12-09 RX ADMIN — SENNOSIDES AND DOCUSATE SODIUM 1 TABLET: 8.6; 5 TABLET ORAL at 18:38

## 2017-12-09 RX ADMIN — IPRATROPIUM BROMIDE AND ALBUTEROL SULFATE 3 ML: 2.5; .5 SOLUTION RESPIRATORY (INHALATION) at 15:44

## 2017-12-09 RX ADMIN — CYCLOBENZAPRINE HYDROCHLORIDE 10 MG: 10 TABLET, FILM COATED ORAL at 09:10

## 2017-12-09 RX ADMIN — OXYCODONE HYDROCHLORIDE AND ACETAMINOPHEN 500 MG: 500 TABLET ORAL at 08:20

## 2017-12-09 RX ADMIN — Medication 12.5 MG: at 20:19

## 2017-12-09 NOTE — PLAN OF CARE
Problem: Patient Care Overview (Adult)  Goal: Plan of Care Review  Outcome: Ongoing (interventions implemented as appropriate)    12/09/17 1644   Coping/Psychosocial Response Interventions   Plan Of Care Reviewed With patient   Patient Care Overview   Progress improving   Outcome Evaluation   Outcome Summary/Follow up Plan ed on all cabg precautions with ex and tf and use of heart hugger pt exp ;pain low chest aroind side and wanted sit eob at exit         Problem: Inpatient Occupational Therapy  Goal: Transfer Training Goal 1 LTG- OT  Outcome: Ongoing (interventions implemented as appropriate)    12/05/17 1424 12/09/17 1644   Transfer Training OT LTG   Transfer Training OT LTG, Date Established 12/05/17 --    Transfer Training OT LTG, Time to Achieve by discharge --    Transfer Training OT LTG, Activity Type toilet --    Transfer Training OT LTG, [place] Level supervision required --    Transfer Training OT LTG, Date Goal Reviewed --  12/09/17   Transfer Training OT LTG, Outcome --  goal ongoing       Goal: Patient Education Goal LTG- OT  Outcome: Ongoing (interventions implemented as appropriate)    12/05/17 1424 12/09/17 1644   Patient Education OT LTG   Patient Education OT LTG, Date Established 12/05/17 --    Patient Education OT LTG, Time to Achieve by discharge --    Patient Education OT LTG, Education Type HEP;precautions per surgeon;home safety;energy conservation;adaptive equipment mgmt --    Patient Education OT LTG, Education Understanding independent;demonstrates adequately;verbalizes understanding --    Patient Education OT LTG, Date Goal Reviewed --  12/09/17   Patient Education OT LTG Outcome --  goal ongoing       Goal: ADL Goal LTG- OT  Outcome: Ongoing (interventions implemented as appropriate)    12/05/17 1424 12/09/17 1644   ADL OT LTG   ADL OT LTG, Date Established 12/05/17 --    ADL OT LTG, Time to Achieve by discharge --    ADL OT LTG, Activity Type ADL skills --    ADL OT LTG,  [place] Level standby assist --    ADL OT LTG, Date Goal Reviewed --  12/09/17   ADL OT LTG, Outcome --  goal ongoing       Goal: Functional Mobility Goal LTG- OT  Outcome: Ongoing (interventions implemented as appropriate)    12/05/17 1424 12/09/17 1644   Functional Mobility OT LTG   Functional Mobility Goal OT LTG, Date Established 12/05/17 --    Functional Mobility Goal OT LTG, Time to Achieve by discharge --    Functional Mobility Goal OT LTG, [place] Level supervision --    Functional Mobility Goal OT LTG, Distance to Achieve to the sink;to the bathroom --    Functional Mobility Goal OT LTG, Date Goal Reviewed --  12/09/17   Functional Mobility Goal OT LTG, Outcome --  goal ongoing       Goal: Endurance Goal LTG- OT  Outcome: Ongoing (interventions implemented as appropriate)    12/05/17 1424 12/09/17 1644   Endurance OT LTG   Endurance Goal OT LTG, Date Established 12/05/17 --    Endurance Goal OT LTG, Time to Achieve by discharge --    Endurance Goal OT LTG, Activity Level endurance 2 good- --    Endurance Goal OT LTG, Date Goal Reviewed --  12/09/17   Endurance Goal OT LTG, Outcome --  goal ongoing

## 2017-12-09 NOTE — THERAPY TREATMENT NOTE
Inpatient Rehabilitation - Occupational Therapy Treatment Note  [facility]     Patient Name: Lyle Corona  : 1965  MRN: [id number]  Today's Date: 2017  Onset of Illness/Injury or Date of Surgery Date: 17 (CABG 17)  Date of Referral to OT: 17  Referring Physician: AHSAN Oglesby      Admit Date: 2017    Visit Dx:     ICD-10-CM ICD-9-CM   1. Coronary arteriosclerosis I25.10 414.00   2. Unstable angina I20.0 411.1   3. NSTEMI (non-ST elevated myocardial infarction) I21.4 410.70   4. Coronary artery disease involving native coronary artery of native heart with unstable angina pectoris I25.110 414.01     411.1   5. Impaired functional mobility, balance, gait, and endurance Z74.09 V49.89   6. Impaired mobility and ADLs Z74.09 799.89     Patient Active Problem List   Diagnosis   • Essential hypertension   • Hypertensive emergency, no CHF   • Coronary artery disease involving native coronary artery of native heart with unstable angina pectoris   • Chronic kidney disease, stage 3   • LVH (left ventricular hypertrophy)   • NSTEMI (non-ST elevated myocardial infarction)   • Unstable angina             Adult Rehabilitation Note       17 1445 17 1425 17 1058    Rehab Assessment/Intervention    Discipline physical therapy assistant  -JA occupational therapy assistant  -LM physical therapy assistant  -LN    Document Type therapy note (daily note)  -JA therapy note (daily note)  -LM therapy note (daily note)  -LN    Subjective Information agree to therapy  -JA agree to therapy  -LM agree to therapy;complains of;pain  -LN    Patient Effort, Rehab Treatment good  -JA good  -LM     Precautions/Limitations cardiac precautions;fall precautions;sternal precautions  -JA cardiac precautions   provided ed on all cabg precautions and safety  -LM cardiac precautions;fall precautions;oxygen therapy device and L/min;sternal precautions  -LN    Recorded by [RASHAUN] Jamir FREY  LINDA John [LM] HARMEET Wilkinson/DWAYNE [LN] Denise Herron PTA    Vital Signs    Pre Systolic BP Rehab   124  -LN    Pre Treatment Diastolic BP   47  -LN    Intra Systolic BP Rehab   128  -LN    Intra Treatment Diastolic BP   64  -LN    Post Systolic BP Rehab 114  -JA  108  -LN    Post Treatment Diastolic BP 75  -JA  57  -LN    Pretreatment Heart Rate (beats/min)   75  -LN    Intratreatment Heart Rate (beats/min) 122   during gt. attempt   -JA  83  -LN    Posttreatment Heart Rate (beats/min) 110  -JA  100   nsg aware of all vs  -LN    Pre SpO2 (%)   --   unable to obtain  -LN    Intra SpO2 (%) 96  -JA      O2 Delivery Intra Treatment room air  -JA      Pre Patient Position Standing   with nsg. in doorway preparing to ambulate  -JA Sitting  -LM Sitting  -LN    Intra Patient Position  Sitting  -LM Standing  -LN    Post Patient Position Sitting   EOB  -JA Sitting  -LM Sitting  -LN    Recorded by [JA] Jamir John PTA [LM] HARMEET Wilkinson/DWAYNE [LN] Denise Herron PTA    Pain Assessment    Pain Assessment No/denies pain  -JA  0-10  -LN    Pain Score 0  -JA 5  -LM 6  -LN    Post Pain Score 0  -JA 5  -LM 6  -LN    Pain Type   Acute pain;Surgical pain  -LN    Pain Location  Chest  -LM Chest  -LN    Pain Intervention(s)  Medication (See MAR)  -LM --   per nsg meds not due  -LN    Recorded by [JA] Jamir John PTA [LM] HARMEET Wilkinson/L [LN] Denise Herron PTA    Cognitive Assessment/Intervention    Current Cognitive/Communication Assessment  functional  -LM     Orientation Status  oriented x 4  -LM oriented to;person     -LN    Follows Commands/Answers Questions  100% of the time  -LM     Recorded by  [LM] HARMEET Wilkinson/DWAYNE [LN] Denise Herron PTA    Bed Mobility, Assessment/Treatment    Bed Mob, Supine to Sit, [place] supervision required;verbal cues required   v.c.'s for use of chest hugger & rolling to L   -JA  not tested  -LN    Bed Mob, Sit to Supine, [place] supervision  required;verbal cues required   v.c.'s for use of chest hugger   -JA  not tested  -LN    Recorded by [RASHAUN] Jamir John PTA  [LN] Denise Herron PTA    Transfer Assessment/Treatment    Transfers, Sit-Stand [place]   independent  -LN    Transfers, Stand-Sit [place] independent  -JA  independent  -LN    Recorded by [RASHAUN] Jamir John PTA  [LN] Denise Herron PTA    Gait Assessment/Treatment    Gait, [place] Level independent  -JA  supervision required;independent  -LN    Gait, Assistive Device --   no AD  -JA  --   none  -LN    Gait, Distance (Feet) 200  -JA  280  -LN    Gait, Comment   decreased distance due to pt not wanting to increase pain since pain meds not due for another 1 1/2 hrs  -LN    Recorded by [RASHAUN] Jamir John PTA  [LN] Denise Herron PTA    Stairs Assessment/Treatment    Number of Stairs   5  -LN    Stairs, Handrail Location   none  -LN    Stairs, [place] Level   independent  -LN    Stairs, Technique Used   step over step (ascending);step over step (descending)  -LN    Recorded by   [LN] Denise Herron PTA    Therapy Exercises    Bilateral Lower Extremities AROM:;20 reps;sitting;hip flexion;LAQ;hip abduction/adduction  -JA  AROM:;20 reps;sitting;ankle pumps/circles;hip flexion;LAQ  -LN    Bilateral Upper Extremity  AROM:;elbow flexion/extension;shoulder abduction/adduction;shoulder extension/flexion;shoulder protraction/retraction   all ex with pain and cabg prec ed on using hugger  -LM     Recorded by [RASHAUN] Jamir John PTA [LM] TUCKER WilkinsonA/DWAYNE [LN] Denise Herron PTA    Positioning and Restraints    Pre-Treatment Position standing in room   with nsg.   -JA      Post Treatment Position bed  -JA  chair  -LN    In Bed sitting EOB;call light within reach  -RASHAUN  --  -LN    In Chair   sitting;call light within reach;encouraged to call for assist  -LN    Recorded by [RASHAUN] Jamir John PTA  [LN] Denise Herron PTA      12/08/17 0955 12/07/17 1409  12/07/17 0845    Rehab Assessment/Intervention    Discipline occupational therapy assistant  - physical therapy assistant  -LN occupational therapy assistant  -KD    Document Type therapy note (daily note)  - therapy note (daily note)  - therapy note (daily note)  -KD    Subjective Information agree to therapy  - complains of;fatigue;pain  -LN agree to therapy  -KD    Patient Effort, Rehab Treatment good  -      Precautions/Limitations cardiac precautions  - cardiac precautions;fall precautions;sternal precautions  - cardiac precautions;fall precautions  -KD    Recorded by [] HARMEET Luna/L [LN] Denise Herron, PTA [KD] TUCKER CoronaA/L    Vital Signs    Pre Systolic BP Rehab  118  -  -KD    Pre Treatment Diastolic BP  75  -LN 66  -KD    Post Systolic BP Rehab  130  -LN     Post Treatment Diastolic BP  74   nsg aware  -LN     Pretreatment Heart Rate (beats/min) 102  - 107  -LN 94  -KD    Intratreatment Heart Rate (beats/min)  118  -LN     Posttreatment Heart Rate (beats/min)  113  -  -KD    Pre SpO2 (%) 96  - --   unable to obtain sat reading before/after rx-nsg aware  -LN 94  -KD    O2 Delivery Pre Treatment room air  -  room air  -KD    Post SpO2 (%)   96  -KD    O2 Delivery Post Treatment   room air  -KD    Pre Patient Position  Supine  -LN Supine  -KD    Intra Patient Position  Standing  -LN Standing  -KD    Post Patient Position  Supine  -LN Sitting  -KD    Recorded by [] HARMEET Luna/DWAYNE [LN] Denise Herron, PTA [KD] TUCKER CoronaA/L    Pain Assessment    Pain Assessment 0-10  - 0-10  -LN 0-10  -KD    Pain Score 6  - 8  -LN 8  -KD    Post Pain Score 7  - 8  -LN 8  -KD    Pain Type Surgical pain  - Acute pain;Surgical pain  -LN Acute pain;Surgical pain  -KD    Pain Location Chest  - Chest  -LN Chest  -KD    Pain Intervention(s) Repositioned  - Medication (See MAR)  - Medication (See MAR)  -KD    Recorded by [] HARMEET Luna/DWAYNE [LN]  Denise Herron, PTA [KD] TUKCER CoronaA/L    Vision Assessment/Intervention    Visual Impairment   WFL with corrective lenses  -KD    Recorded by   [KD] TUCKER CoronaA/L    Cognitive Assessment/Intervention    Current Cognitive/Communication Assessment functional  -JH  functional  -KD    Orientation Status oriented x 4  -JH oriented to;person     -LN oriented x 4  -KD    Follows Commands/Answers Questions 100% of the time  -JH  100% of the time  -KD    Personal Safety   fully aware of deficits  -KD    Personal Safety Interventions   fall prevention program maintained  -KD    Recorded by [JH] TUCKER LunaA/L [LN] Denise Herron, PTA [KD] TUCKER CoronaA/L    Bed Mobility, Assessment/Treatment    Bed Mob, Supine to Sit, [place]  contact guard assist  -LN conditional independence  -KD    Bed Mob, Sit to Supine, [place]  conditional independence  -LN     Bed Mob, Sidelying to Sit, [place]   conditional independence  -KD    Recorded by  [LN] Denise Herron, LINDA [KD] TUCKER CoronaA/L    Transfer Assessment/Treatment    Transfers, Bed-Chair [place]   conditional independence  -KD    Transfers, Sit-Stand [place] independent  - independent  -LN supervision required  -KD    Transfers, Stand-Sit [place] independent  - independent  -LN supervision required  -KD    Toilet Transfer, [place] supervision required  -JH      Recorded by [JH] HARMEET Luna/L [LN] Denise Herron, PTA [KD] Margaret Bower, GA/L    Gait Assessment/Treatment    Gait, [place] Level  stand by assist  -LN     Gait, Assistive Device  --   none  -LN     Gait, Distance (Feet)  354  -LN     Gait, Comment  occasional reaching for rails but no noticeable lob or unsteadiness  -LN     Recorded by  [LN] Denise Herron, PTA     Functional Mobility    Functional Mobility- Ind. Level   supervision required  -KD    Functional Mobility- Device   rolling walker  -KD    Functional Mobility-Distance  (Feet)   --   15 x 2  -KD    Recorded by   [] HARMEET Corona/L    Toileting Assessment/Training    Toileting Assess/Train, Assistive Device   grab bars  -KD    Toileting Assess/Train, Position   sitting  -KD    Toileting Assess/Train, Indepen Level supervision required  -  supervision required  -KD    Toileting Assess/Train, Comment   Pt t/f to BR x 2 trips  -KD    Recorded by [] MARK Luna  [KD] TUCKER CoronaA/L    Grooming Assessment/Training    Grooming Assess/Train, Assistive Device   --   wash hands  -KD    Grooming Assess/Train, Position   sink side;standing  -KD    Grooming Assess/Train, Indepen Level supervision required  -  supervision required  -KD    Recorded by [] HARMEET Luna/DWAYNE  [KD] TUCKER CoronaA/L    Therapy Exercises    Bilateral Lower Extremities  AROM:;15 reps;20 reps;ankle pumps/circles;sitting;hip flexion;LAQ  -LN     Bilateral Upper Extremity AROM:;15 reps;sitting;elbow flexion/extension;hand pumps;pronation/supination;shoulder ER/IR  -  AROM:;20 reps;sitting;elbow flexion/extension;pronation/supination;shoulder abduction/adduction;shoulder ER/IR  -KD    BUE Resistance manual resistance- minimal  -  manual resistance- minimal   1 lb HW  -KD    Recorded by [] HARMEET Luna/DWAYNE [LN] Denise Herron, PTA [KD] TUCKER CoronaA/L    Positioning and Restraints    Pre-Treatment Position sitting in chair/recliner  -  in bed  -KD    Post Treatment Position chair  - bed  - chair  -KD    In Bed  notified nsg;supine;call light within reach;encouraged to call for assist  -     In Chair sitting;call light within reach;encouraged to call for assist;with other staff  -  sitting;call light within reach;encouraged to call for assist  -KD    Recorded by [] HARMEET Luna/DWAYNE [LN] Denise Herron, PTA [KD] Margaret Bower GA/L      User Key  (r) = Recorded By, (t) = Taken By, (c) = Cosigned By    Initials Name Effective Pily Bowie  TK John, PTA 10/17/16 -     LN Denisebrian Herron, PTA 10/17/16 -     KD Margaret CURT Bower, GA/L 10/17/16 -      Kandis Hassan, GA/L 10/17/16 -     LM Pushpa Victoria, GA/L 10/17/16 -                 OT Goals       12/09/17 1644 12/08/17 1354 12/07/17 1310    Transfer Training OT LTG    Transfer Training OT LTG, Date Goal Reviewed 12/09/17  -LM 12/08/17  - 12/07/17  -    Transfer Training OT LTG, Outcome goal ongoing  -LM  goal not met  -KD    Patient Education OT LTG    Patient Education OT LTG, Date Goal Reviewed 12/09/17  -LM 12/08/17  - 12/07/17  -    Patient Education OT LTG Outcome goal ongoing  -LM  goal not met  -KD    ADL OT LTG    ADL OT LTG, Date Goal Reviewed 12/09/17  -LM 12/08/17  - 12/07/17  -    ADL OT LTG, Outcome goal ongoing  -LM  goal not met  -KD    Functional Mobility OT LTG    Functional Mobility Goal OT LTG, Date Goal Reviewed 12/09/17  -LM 12/08/17  -     Functional Mobility Goal OT LTG, Outcome goal ongoing  -LM  goal not met  -KD    Endurance OT LTG    Endurance Goal OT LTG, Date Goal Reviewed 12/09/17  -LM 12/08/17  -     Endurance Goal OT LTG, Outcome goal ongoing  -LM        12/07/17 0845 12/06/17 0915 12/05/17 1424    Transfer Training OT LTG    Transfer Training OT LTG, Date Established   12/05/17  -    Transfer Training OT LTG, Time to Achieve   by discharge  -    Transfer Training OT LTG, Activity Type   toilet  -    Transfer Training OT LTG, [place] Level   supervision required  -    Transfer Training OT LTG, Date Goal Reviewed  12/06/17  -     Transfer Training OT LTG, Outcome  goal not met  -     Patient Education OT LTG    Patient Education OT LTG, Date Established   12/05/17  -    Patient Education OT LTG, Time to Achieve   by discharge  -    Patient Education OT LTG, Education Type   HEP;precautions per surgeon;home safety;energy conservation;adaptive equipment mgmt  -    Patient Education OT LTG, Education Understanding    independent;demonstrates adequately;verbalizes understanding  -    Patient Education OT LTG, Date Goal Reviewed  12/06/17  -     Patient Education OT LTG Outcome  goal not met  -     ADL OT LTG    ADL OT LTG, Date Established   12/05/17  -    ADL OT LTG, Time to Achieve   by discharge  -    ADL OT LTG, Activity Type   ADL skills  -    ADL OT LTG, [place] Level   standby assist  -    ADL OT LTG, Date Goal Reviewed  12/06/17  -     ADL OT LTG, Outcome  goal not met  -     Functional Mobility OT LTG    Functional Mobility Goal OT LTG, Date Established   12/05/17  -    Functional Mobility Goal OT LTG, Time to Achieve   by discharge  -    Functional Mobility Goal OT LTG, [place] Level   supervision  -    Functional Mobility Goal OT LTG, Distance to Achieve   to the sink;to the bathroom  -    Functional Mobility Goal OT LTG, Date Goal Reviewed  12/06/17  -     Functional Mobility Goal OT LTG, Outcome  goal not met  -     Endurance OT LTG    Endurance Goal OT LTG, Date Established   12/05/17  -    Endurance Goal OT LTG, Time to Achieve   by discharge  -    Endurance Goal OT LTG, Activity Level   endurance 2 good-  -    Endurance Goal OT LTG, Date Goal Reviewed 12/07/17  - 12/06/17  -     Endurance Goal OT LTG, Outcome goal not met  -        User Key  (r) = Recorded By, (t) = Taken By, (c) = Cosigned By    Initials Name Provider Type     Rossy Ospina, OTR/L Occupational Therapist    JENNIFER Bower, GA/L Occupational Therapy Assistant     Kandis Hassan, GA/L Occupational Therapy Assistant    DARIUS Victoria, GA/L Occupational Therapy Assistant          Occupational Therapy Education     Title: PT OT SLP Therapies (Active)     Topic: Occupational Therapy (Done)     Point: ADL training (Done)    Description: Instruct learner(s) on proper safety adaptation and remediation techniques during self care or transfers.   Instruct in proper use of assistive  devices.    Learning Progress Summary    Learner Readiness Method Response Comment Documented by Status   Patient Acceptance E VU ed onsfaety and all cabg precautions with ex and tf and heart hugger use  12/09/17 1643 Done    Acceptance E VU Educated about OT and POC. Educated on CABG precuations. Educated to call for assist and not get up on his own.  12/05/17 1520 Done               Point: Home exercise program (Done)    Description: Instruct learner(s) on appropriate technique for monitoring, assisting and/or progressing therapeutic exercises/activities.    Learning Progress Summary    Learner Readiness Method Response Comment Documented by Status   Patient Acceptance E VU ed onsfaety and all cabg precautions with ex and tf and heart hugger use  12/09/17 1643 Done               Point: Precautions (Done)    Description: Instruct learner(s) on prescribed precautions during self-care and functional transfers.    Learning Progress Summary    Learner Readiness Method Response Comment Documented by Status   Patient Acceptance E VU ed onsfaety and all cabg precautions with ex and tf and heart hugger use  12/09/17 1643 Done    Acceptance E VU Educated about OT and POC. Educated on CABG precuations. Educated to call for assist and not get up on his own.  12/05/17 1520 Done               Point: Body mechanics (Done)    Description: Instruct learner(s) on proper positioning and spine alignment during self-care, functional mobility activities and/or exercises.    Learning Progress Summary    Learner Readiness Method Response Comment Documented by Status   Patient Acceptance E VU ed onsfaety and all cabg precautions with ex and tf and heart hugger use  12/09/17 1643 Done    Acceptance E VU Educated about OT and POC. Educated on CABG precuations. Educated to call for assist and not get up on his own.  12/05/17 1520 Done                      User Key     Initials Effective Dates Name Provider Type Discipline      10/17/16 -  VALENTÍN Herrera/L Occupational Therapist OT    DARIUS 10/17/16 -  HARMEET Wilkinson/L Occupational Therapy Assistant SINAN MENON Recommendation and Plan  Anticipated Discharge Disposition: inpatient rehabilitation facility  Planned Therapy Interventions: activity intolerance, adaptive equipment training, ADL retraining, IADL retraining, balance training, bed mobility training, energy conservation, fine motor coordination training, home exercise program, motor coordination training, ROM (Range of Motion), strengthening, transfer training  Therapy Frequency: other (see comments) (3-14x a week)  Plan of Care Review  Plan Of Care Reviewed With: patient  Progress: improving  Outcome Summary/Follow up Plan: ed on all cabg precautions with ex and tf and use of heart hugger  pt exp ;pain low chest aroind side and wanted sit eob at exit        Outcome Measures       12/09/17 1445 12/08/17 1058 12/08/17 0955    How much help from another person do you currently need...    Turning from your back to your side while in flat bed without using bedrails? 3  -JA 3  -LN     Moving from lying on back to sitting on the side of a flat bed without bedrails? 3  -JA 3  -LN     Moving to and from a bed to a chair (including a wheelchair)? 3  -JA 3  -LN     Standing up from a chair using your arms (e.g., wheelchair, bedside chair)? 4  -JA 4  -LN     Climbing 3-5 steps with a railing? 4  -JA 4  -LN     To walk in hospital room? 4  -JA 3  -LN     AM-PAC 6 Clicks Score 21  -JA 20  -LN     How much help from another is currently needed...    Putting on and taking off regular lower body clothing?   3  -JH    Bathing (including washing, rinsing, and drying)   3  -JH    Toileting (which includes using toilet bed pan or urinal)   3  -JH    Putting on and taking off regular upper body clothing   3  -JH    Taking care of personal grooming (such as brushing teeth)   4  -JH    Eating meals   4  -JH    Score   20  -JH     Functional Assessment    Outcome Measure Options AM-[facility] 6 Clicks Basic Mobility (PT)  -JA AM-PAC 6 Clicks Basic Mobility (PT)  -LN       12/07/17 1403 12/07/17 0845       How much help from another person do you currently need...    Turning from your back to your side while in flat bed without using bedrails? 3  -LN      Moving from lying on back to sitting on the side of a flat bed without bedrails? 3  -LN      Moving to and from a bed to a chair (including a wheelchair)? 2  -LN      Standing up from a chair using your arms (e.g., wheelchair, bedside chair)? 4  -LN      Climbing 3-5 steps with a railing? 3  -LN      To walk in hospital room? 3  -LN      AM-PAC 6 Clicks Score 18  -LN      How much help from another is currently needed...    Putting on and taking off regular lower body clothing?  3  -KD     Bathing (including washing, rinsing, and drying)  3  -KD     Toileting (which includes using toilet bed pan or urinal)  3  -KD     Putting on and taking off regular upper body clothing  4  -KD     Taking care of personal grooming (such as brushing teeth)  4  -KD     Eating meals  4  -KD     Score  21  -KD     Functional Assessment    Outcome Measure Options AM-PAC 6 Clicks Basic Mobility (PT)  -LN        User Key  (r) = Recorded By, (t) = Taken By, (c) = Cosigned By    Initials Name Provider Type    RASHAUN John PTA Physical Therapy Assistant    LN Denise Herron PTA Physical Therapy Assistant    KD Margaret Bower GA/L Occupational Therapy Assistant     Kandis Hassan GA/L Occupational Therapy Assistant           Time Calculation:         Time Calculation- OT       12/09/17 1638          Time Calculation- OT    OT Start Time 1425  -LM      OT Stop Time 1450  -LM      OT Time Calculation (min) 25 min  -LM      Total Timed Code Minutes- OT 25 minute(s)  -LM      OT Received On 12/09/17  -LM        User Key  (r) = Recorded By, (t) = Taken By, (c) = Cosigned By    Initials Name Provider Type    LM  MARK Wilkinson Occupational Therapy Assistant           Therapy Charges for Today     Code Description Service Date Service Provider Modifiers Qty    55472805737 HC OT THER PROC EA 15 MIN 12/9/2017 MARK Wilkinson GO 1    91891913260 HC OT THERAPEUTIC ACT EA 15 MIN 12/9/2017 MARK Wilkinson GO 1          OT G-codes  OT Professional Judgement Used?: Yes  OT Functional Scales Options: AM-PAC 6 Clicks Daily Activity (OT)  Score: 12  Functional Limitation: Self care  Self Care Current Status (): At least 60 percent but less than 80 percent impaired, limited or restricted  Self Care Goal Status (): At least 40 percent but less than 60 percent impaired, limited or restricted    MARK Wilkinson  12/9/2017

## 2017-12-09 NOTE — THERAPY TREATMENT NOTE
Acute Care - Physical Therapy Treatment Note  [facility]     Patient Name: Lyle Corona  : 1965  MRN: [id number]  Today's Date: 2017  Onset of Illness/Injury or Date of Surgery Date: 17 (CABG 17)  Date of Referral to PT: 17  Referring Physician: AHSAN Oglesby    Admit Date: 2017    Visit Dx:    ICD-10-CM ICD-9-CM   1. Coronary arteriosclerosis I25.10 414.00   2. Unstable angina I20.0 411.1   3. NSTEMI (non-ST elevated myocardial infarction) I21.4 410.70   4. Coronary artery disease involving native coronary artery of native heart with unstable angina pectoris I25.110 414.01     411.1   5. Impaired functional mobility, balance, gait, and endurance Z74.09 V49.89   6. Impaired mobility and ADLs Z74.09 799.89     Patient Active Problem List   Diagnosis   • Essential hypertension   • Hypertensive emergency, no CHF   • Coronary artery disease involving native coronary artery of native heart with unstable angina pectoris   • Chronic kidney disease, stage 3   • LVH (left ventricular hypertrophy)   • NSTEMI (non-ST elevated myocardial infarction)   • Unstable angina               Adult Rehabilitation Note       17 1445 17 1058 17 0955    Rehab Assessment/Intervention    Discipline physical therapy assistant  -RASHAUN physical therapy assistant  -BEBE occupational therapy assistant  -    Document Type therapy note (daily note)  -RASHAUN therapy note (daily note)  -BEBE therapy note (daily note)  -    Subjective Information agree to therapy  -RASHAUN agree to therapy;complains of;pain  -LN agree to therapy  -    Patient Effort, Rehab Treatment good  -JA  good  -    Precautions/Limitations cardiac precautions;fall precautions;sternal precautions  -JA cardiac precautions;fall precautions;oxygen therapy device and L/min;sternal precautions  -LN cardiac precautions  -    Recorded by [RASHAUN] Jamir John PTA [LN] Denise Herron PTA [] HARMEET Luna/DWAYNE    Vital  Signs    Pre Systolic BP Rehab  124  -LN     Pre Treatment Diastolic BP  47  -LN     Intra Systolic BP Rehab  128  -LN     Intra Treatment Diastolic BP  64  -LN     Post Systolic BP Rehab 114  -  -LN     Post Treatment Diastolic BP 75  -JA 57  -LN     Pretreatment Heart Rate (beats/min)  75  -  -JH    Intratreatment Heart Rate (beats/min) 122   during gt. attempt   -JA 83  -LN     Posttreatment Heart Rate (beats/min) 110  -   nsg aware of all vs  -LN     Pre SpO2 (%)  --   unable to obtain  -LN 96  -JH    O2 Delivery Pre Treatment   room air  -JH    Intra SpO2 (%) 96  -JA      O2 Delivery Intra Treatment room air  -JA      Pre Patient Position Standing   with nsg. in doorway preparing to ambulate  -JA Sitting  -LN     Intra Patient Position  Standing  -LN     Post Patient Position Sitting   EOB  -JA Sitting  -LN     Recorded by [JA] Jamir John, PTA [LN] Denise Herron PTA [JH] TUCKER LunaA/L    Pain Assessment    Pain Assessment No/denies pain  -JA 0-10  -LN 0-10  -JH    Pain Score 0  -JA 6  -LN 6  -JH    Post Pain Score 0  -JA 6  -LN 7  -JH    Pain Type  Acute pain;Surgical pain  -LN Surgical pain  -    Pain Location  Chest  -LN Chest  -    Pain Intervention(s)  --   per nsg meds not due  -LN Repositioned  -JH    Recorded by [JA] Jamir John, PTA [LN] Denise Herron PTA [JH] TUCKER LunaA/L    Cognitive Assessment/Intervention    Current Cognitive/Communication Assessment   functional  -JH    Orientation Status  oriented to;person     -LN oriented x 4  -JH    Follows Commands/Answers Questions   100% of the time  -JH    Recorded by  [LN] Denise Herron PTA [JH] TUCKER LunaA/L    Bed Mobility, Assessment/Treatment    Bed Mob, Supine to Sit, [place] supervision required;verbal cues required   v.c.'s for use of chest hugger & rolling to L   -JA not tested  -LN     Bed Mob, Sit to Supine, [place] supervision required;verbal cues required   v.c.'s for  use of chest hugger   -JA not tested  -LN     Recorded by [JA] Jamir John, PTA [LN] Denise Herron, LINDA     Transfer Assessment/Treatment    Transfers, Sit-Stand [place]  independent  -LN independent  -    Transfers, Stand-Sit [place] independent  -JA independent  -LN independent  -    Toilet Transfer, [place]   supervision required  -JH    Recorded by [JA] Jamir John, PTA [LN] Denise Herron, PTA [JH] TUCKER LunaA/L    Gait Assessment/Treatment    Gait, [place] Level independent  - supervision required;independent  -LN     Gait, Assistive Device --   no AD  -JA --   none  -LN     Gait, Distance (Feet) 200  -  -LN     Gait, Comment  decreased distance due to pt not wanting to increase pain since pain meds not due for another 1 1/2 hrs  -LN     Recorded by [JA] Jamir John, LINDA [LN] Denise Herron, PTA     Stairs Assessment/Treatment    Number of Stairs  5  -LN     Stairs, Handrail Location  none  -LN     Stairs, [place] Level  independent  -LN     Stairs, Technique Used  step over step (ascending);step over step (descending)  -LN     Recorded by  [LN] Denise Herron PTA     Toileting Assessment/Training    Toileting Assess/Train, Indepen Level   supervision required  -JH    Recorded by   [JH] HARMEET Luna/L    Grooming Assessment/Training    Grooming Assess/Train, Indepen Level   supervision required  -JH    Recorded by   [JH] TUCKER LunaA/L    Therapy Exercises    Bilateral Lower Extremities AROM:;20 reps;sitting;hip flexion;LAQ;hip abduction/adduction  - AROM:;20 reps;sitting;ankle pumps/circles;hip flexion;LAQ  -LN     Bilateral Upper Extremity   AROM:;15 reps;sitting;elbow flexion/extension;hand pumps;pronation/supination;shoulder ER/IR  -JH    BUE Resistance   manual resistance- minimal  -JH    Recorded by [JA] Jamir John, LINDA [LN] Densie Herron, PTA [JH] TUCKER LunaA/L    Positioning and Restraints    Pre-Treatment  Position standing in room   with nsg.   -JA  sitting in chair/recliner  -    Post Treatment Position bed  -JA chair  -LN chair  -    In Bed sitting EOB;call light within reach  -JA --  -LN     In Chair  sitting;call light within reach;encouraged to call for assist  -LN sitting;call light within reach;encouraged to call for assist;with other staff  -    Recorded by [JA] Jamir John PTA [LN] Denise Herron PTA [] HARMEET Luna/L      12/07/17 1403 12/07/17 0845       Rehab Assessment/Intervention    Discipline physical therapy assistant  -LN occupational therapy assistant  -KD     Document Type therapy note (daily note)  -LN therapy note (daily note)  -KD     Subjective Information complains of;fatigue;pain  -LN agree to therapy  -KD     Precautions/Limitations cardiac precautions;fall precautions;sternal precautions  -LN cardiac precautions;fall precautions  -KD     Recorded by [LN] Denise Herron PTA [KD] Margaret Bower, GA/L     Vital Signs    Pre Systolic BP Rehab 118  -  -KD     Pre Treatment Diastolic BP 75  -LN 66  -KD     Post Systolic BP Rehab 130  -LN      Post Treatment Diastolic BP 74   nsg aware  -LN      Pretreatment Heart Rate (beats/min) 107  -LN 94  -KD     Intratreatment Heart Rate (beats/min) 118  -LN      Posttreatment Heart Rate (beats/min) 113  -  -KD     Pre SpO2 (%) --   unable to obtain sat reading before/after rx-nsg aware  -LN 94  -KD     O2 Delivery Pre Treatment  room air  -KD     Post SpO2 (%)  96  -KD     O2 Delivery Post Treatment  room air  -KD     Pre Patient Position Supine  -LN Supine  -KD     Intra Patient Position Standing  -LN Standing  -KD     Post Patient Position Supine  -LN Sitting  -KD     Recorded by [LN] Denise Herron PTA [KD] Margaret Bower, GA/L     Pain Assessment    Pain Assessment 0-10  -LN 0-10  -KD     Pain Score 8  -LN 8  -KD     Post Pain Score 8  -LN 8  -KD     Pain Type Acute pain;Surgical pain  -LN Acute pain;Surgical pain   -KD     Pain Location Chest  -LN Chest  -KD     Pain Intervention(s) Medication (See MAR)  -LN Medication (See MAR)  -KD     Recorded by [LN] Denise Herron PTA [KD] HARMEET Corona/L     Vision Assessment/Intervention    Visual Impairment  WFL with corrective lenses  -KD     Recorded by  [KD] TUCKER CoronaA/L     Cognitive Assessment/Intervention    Current Cognitive/Communication Assessment  functional  -KD     Orientation Status oriented to;person     -LN oriented x 4  -KD     Follows Commands/Answers Questions  100% of the time  -KD     Personal Safety  fully aware of deficits  -KD     Personal Safety Interventions  fall prevention program maintained  -KD     Recorded by [LN] Denise Herron PTA [KD] Margaret Bower, GA/L     Bed Mobility, Assessment/Treatment    Bed Mob, Supine to Sit, [place] contact guard assist  -LN conditional independence  -KD     Bed Mob, Sit to Supine, [place] conditional independence  -LN      Bed Mob, Sidelying to Sit, [place]  conditional independence  -KD     Recorded by [LN] Denise Herron PTA [KD] TUCKER CoronaA/L     Transfer Assessment/Treatment    Transfers, Bed-Chair [place]  conditional independence  -KD     Transfers, Sit-Stand [place] independent  -LN supervision required  -KD     Transfers, Stand-Sit [place] independent  -LN supervision required  -KD     Recorded by [LN] Denise Herron PTA [KD] TUCKER CoronaA/L     Gait Assessment/Treatment    Gait, [place] Level stand by assist  -LN      Gait, Assistive Device --   none  -LN      Gait, Distance (Feet) 354  -LN      Gait, Comment occasional reaching for rails but no noticeable lob or unsteadiness  -LN      Recorded by [LN] Denise Herron PTA      Functional Mobility    Functional Mobility- Ind. Level  supervision required  -KD     Functional Mobility- Device  rolling walker  -KD     Functional Mobility-Distance (Feet)  --   15 x 2  -KD     Recorded by  [KD] Margaret ELIAS  [place], GA/L     Toileting Assessment/Training    Toileting Assess/Train, Assistive Device  grab bars  -KD     Toileting Assess/Train, Position  sitting  -KD     Toileting Assess/Train, Indepen Level  supervision required  -KD     Toileting Assess/Train, Comment  Pt t/f to BR x 2 trips  -KD     Recorded by  [KD] HARMEET Corona/L     Grooming Assessment/Training    Grooming Assess/Train, Assistive Device  --   wash hands  -KD     Grooming Assess/Train, Position  sink side;standing  -KD     Grooming Assess/Train, Indepen Level  supervision required  -KD     Recorded by  [KD] HARMEET Corona/L     Therapy Exercises    Bilateral Lower Extremities AROM:;15 reps;20 reps;ankle pumps/circles;sitting;hip flexion;LAQ  -LN      Bilateral Upper Extremity  AROM:;20 reps;sitting;elbow flexion/extension;pronation/supination;shoulder abduction/adduction;shoulder ER/IR  -KD     BUE Resistance  manual resistance- minimal   1 lb HW  -KD     Recorded by [LN] Denise Herron, PTA [KD] TUCKER CoronaA/L     Positioning and Restraints    Pre-Treatment Position  in bed  -KD     Post Treatment Position bed  -LN chair  -KD     In Bed notified nsg;supine;call light within reach;encouraged to call for assist  -LN      In Chair  sitting;call light within reach;encouraged to call for assist  -KD     Recorded by [LN] Denise Herron, PTA [KD] Margaret Bower GA/L       User Key  (r) = Recorded By, (t) = Taken By, (c) = Cosigned By    Initials Name Effective Dates    RASHAUN John, PTA 10/17/16 -     LN Denise Herron, PTA 10/17/16 -     KD Margaret Bower GA/L 10/17/16 -     JH Kandis Hassan, GA/L 10/17/16 -                 IP PT Goals       12/09/17 1445 12/08/17 1058 12/07/17 1403    Bed Mobility PT LTG    Bed Mobility PT LTG, Date Goal Reviewed 12/09/17  -JA 12/08/17  -LN 12/07/17  -LN    Bed Mobility PT LTG, Outcome goal ongoing  -JA goal not met  -LN goal not met  -LN    Transfer Training PT LTG    Transfer Training  PT  LTG, Date Goal Reviewed 12/09/17  -JA 12/08/17  -LN 12/07/17  -LN    Transfer Training PT LTG, Outcome goal ongoing  -JA goal not met  -LN goal not met  -LN    Gait Training PT LTG    Gait Training Goal PT LTG, Date Goal Reviewed 12/09/17  -JA 12/08/17  -LN 12/07/17  -LN    Gait Training Goal PT LTG, Outcome goal met  -JA goal not met  -LN goal not met  -LN    Stair Training PT LTG    Stair Training Goal PT LTG, Date Established   12/07/17  -LN    Stair Training Goal PT LTG, Date Goal Reviewed  12/08/17  -LN 12/07/17  -LN    Stair Training Goal PT LTG, Outcome  goal met  -LN goal not met  -LN      12/06/17 1435 12/05/17 1452       Bed Mobility PT LTG    Bed Mobility PT LTG, Date Established  12/05/17  -GB     Bed Mobility PT LTG, Time to Achieve  by discharge  -GB     Bed Mobility PT LTG, Activity Type  roll left/roll right;supine to sit/sit to supine  -GB     Bed Mobility PT LTG, [place] Level 1 person + 1 person to manage equipment  -LN conditional independence  -GB     Bed Mobility PT LTG, Date Goal Reviewed 12/06/17  -LN      Bed Mobility PT LTG, Outcome goal not met  -LN goal not met  -GB     Transfer Training PT LTG    Transfer Training PT LTG, Date Established  12/05/17  -GB     Transfer Training PT LTG, Time to Achieve  by discharge  -GB     Transfer Training PT LTG, Activity Type  bed to chair /chair to bed;sit to stand/stand to sit  -GB     Transfer Training PT LTG, [place] Level  conditional independence  -GB     Transfer Training PT  LTG, Date Goal Reviewed 12/06/17  -LN      Transfer Training PT LTG, Outcome goal not met  -LN goal not met  -GB     Gait Training PT LTG    Gait Training Goal PT LTG, Date Established  12/05/17  -GB     Gait Training Goal PT LTG, Time to Achieve  by discharge  -GB     Gait Training Goal PT LTG, [place] Level  conditional independence  -GB     Gait Training Goal PT LTG, Date Goal Reviewed 12/06/17  -LN      Gait Training Goal PT LTG, Outcome goal not met   -LN goal not met  -GB     Stair Training PT LTG    Stair Training Goal PT LTG, Date Established  12/05/17  -GB     Stair Training Goal PT LTG, Time to Achieve  by discharge  -GB     Stair Training Goal PT LTG, Number of Steps  4  -GB     Stair Training Goal PT LTG, [place] Level  conditional independence  -GB     Stair Training Goal PT LTG, Date Goal Reviewed 12/06/17  -LN      Stair Training Goal PT LTG, Outcome goal not met  -LN goal not met  -GB       User Key  (r) = Recorded By, (t) = Taken By, (c) = Cosigned By    Initials Name Provider Type    JENNIFER Chandler, PT Physical Therapist    RASHAUN John, PTA Physical Therapy Assistant    BEBE Herron, PTA Physical Therapy Assistant          Physical Therapy Education     Title: PT OT SLP Therapies (Active)     Topic: Physical Therapy (Active)     Point: Mobility training (Done)    Learning Progress Summary    Learner Readiness Method Response Comment Documented by Status   Patient Acceptance E VU,NR Proper use of chest hugger & tech. with sup-sit-sup during bed mobility this p.m.  12/09/17 1530 Done    Acceptance E VU,NR reviewed and copies given of hep and cardiac/sternal precautions LN 12/08/17 1135 Done    Acceptance E NR cardiac precautions LN 12/06/17 1650 Active    Acceptance E,D NR,DU,VU POC; coughing w/ abdominal muscles; use of pillow to help cough; tx GB 12/05/17 1450 Done               Point: Home exercise program (Done)    Learning Progress Summary    Learner Readiness Method Response Comment Documented by Status   Patient Acceptance E VU,NR reviewed and copies given of hep and cardiac/sternal precautions LN 12/08/17 1135 Done    Acceptance E,D NR,DU,VU POC; coughing w/ abdominal muscles; use of pillow to help cough; tx GB 12/05/17 1450 Done               Point: Body mechanics (Active)    Learning Progress Summary    Learner Readiness Method Response Comment Documented by Status   Patient Acceptance E NR cardiac precautions   12/06/17 1650 Active    Acceptance E,D NR,DU,VU POC; coughing w/ abdominal muscles; use of pillow to help cough; tx  12/05/17 1450 Done               Point: Precautions (Done)    Learning Progress Summary    Learner Readiness Method Response Comment Documented by Status   Patient Acceptance E VU,NR Proper use of chest hugger & tech. with sup-sit-sup during bed mobility this p.m.  12/09/17 1530 Done    Acceptance E VU,NR reviewed and copies given of hep and cardiac/sternal precautions  12/08/17 1135 Done    Acceptance E NR cardiac precautions  12/06/17 1650 Active    Acceptance E,D NR,DU,VU POC; coughing w/ abdominal muscles; use of pillow to help cough; tx  12/05/17 1450 Done                      User Key     Initials Effective Dates Name Provider Type Discipline     10/17/16 -  Paige Chandler, PT Physical Therapist PT     10/17/16 -  Jamir John, PTA Physical Therapy Assistant PT     10/17/16 -  Denise Herron, PTA Physical Therapy Assistant PT                    PT Recommendation and Plan  Anticipated Equipment Needs At Discharge: front wheeled walker  Anticipated Discharge Disposition: home with home health  Planned Therapy Interventions: bed mobility training, gait training, home exercise program, stair training, strengthening, transfer training  PT Frequency: daily  Plan of Care Review  Plan Of Care Reviewed With: patient  Progress: improving  Outcome Summary/Follow up Plan: Pt. Independent with gt. 200ft. with no LOB, but c/o's fatigue after performance & LE therex at EOB. Pt. needs cont.'d reinforcement & review of proper tech. with bed mobilty & use of chest hugger. Pt. too fatigued this p.m. to practice >1 attempt. Cont. to practice bed mobilty including rolling R & L, assess for independence with transfers next treatment           Outcome Measures       12/09/17 1445 12/08/17 1058 12/08/17 0955    How much help from another person do you currently need...    Turning from  your back to your side while in flat bed without using bedrails? 3  -JA 3  -LN     Moving from lying on back to sitting on the side of a flat bed without bedrails? 3  -JA 3  -LN     Moving to and from a bed to a chair (including a wheelchair)? 3  -JA 3  -LN     Standing up from a chair using your arms (e.g., wheelchair, bedside chair)? 4  -JA 4  -LN     Climbing 3-5 steps with a railing? 4  -JA 4  -LN     To walk in hospital room? 4  -JA 3  -LN     AM-PAC 6 Clicks Score 21  -JA 20  -LN     How much help from another is currently needed...    Putting on and taking off regular lower body clothing?   3  -JH    Bathing (including washing, rinsing, and drying)   3  -JH    Toileting (which includes using toilet bed pan or urinal)   3  -JH    Putting on and taking off regular upper body clothing   3  -JH    Taking care of personal grooming (such as brushing teeth)   4  -JH    Eating meals   4  -JH    Score   20  -JH    Functional Assessment    Outcome Measure Options AM-PAC 6 Clicks Basic Mobility (PT)  -JA AM-PAC 6 Clicks Basic Mobility (PT)  -LN       12/07/17 1403 12/07/17 0845       How much help from another person do you currently need...    Turning from your back to your side while in flat bed without using bedrails? 3  -LN      Moving from lying on back to sitting on the side of a flat bed without bedrails? 3  -LN      Moving to and from a bed to a chair (including a wheelchair)? 2  -LN      Standing up from a chair using your arms (e.g., wheelchair, bedside chair)? 4  -LN      Climbing 3-5 steps with a railing? 3  -LN      To walk in hospital room? 3  -LN      AM-PAC 6 Clicks Score 18  -LN      How much help from another is currently needed...    Putting on and taking off regular lower body clothing?  3  -KD     Bathing (including washing, rinsing, and drying)  3  -KD     Toileting (which includes using toilet bed pan or urinal)  3  -KD     Putting on and taking off regular upper body clothing  4  -KD     Taking  care of personal grooming (such as brushing teeth)  4  -KD     Eating meals  4  -KD     Score  21  -KD     Functional Assessment    Outcome Measure Options AM-PAC 6 Clicks Basic Mobility (PT)  -LN        User Key  (r) = Recorded By, (t) = Taken By, (c) = Cosigned By    Initials Name Provider Type    RASHAUN John PTA Physical Therapy Assistant    BEBE Herron PTA Physical Therapy Assistant    KD Margaret Bower, GA/L Occupational Therapy Assistant     Kandis Hassan, GA/L Occupational Therapy Assistant           Time Calculation:         PT Charges       12/09/17 1536          Time Calculation    Start Time 1445  -JA      Stop Time 1525  -JA      Time Calculation (min) 40 min  -JA      Time Calculation- PT    Total Timed Code Minutes- PT 40 minute(s)  -RASHAUN        User Key  (r) = Recorded By, (t) = Taken By, (c) = Cosigned By    Initials Name Provider Type    RASHAUN John PTA Physical Therapy Assistant          Therapy Charges for Today     Code Description Service Date Service Provider Modifiers Qty    08359514543 HC GAIT TRAINING EA 15 MIN 12/9/2017 Jamir John PTA GP 1    76677440623 HC PT THER PROC EA 15 MIN 12/9/2017 Jamir John PTA GP 1    05884155890 HC PT SELF CARE/MGMT/TRAIN EA 15 MIN 12/9/2017 Jamir John PTA GP 1          PT G-Codes  PT Professional Judgement Used?: Yes  Outcome Measure Options: AM-PAC 6 Clicks Basic Mobility (PT)  Score: 12  Functional Limitation: Mobility: Walking and moving around  Mobility: Walking and Moving Around Current Status (): At least 60 percent but less than 80 percent impaired, limited or restricted  Mobility: Walking and Moving Around Goal Status (): At least 1 percent but less than 20 percent impaired, limited or restricted    Jamir John PTA  12/9/2017

## 2017-12-09 NOTE — PLAN OF CARE
Problem: Patient Care Overview (Adult)  Goal: Plan of Care Review    12/09/17 0454   Coping/Psychosocial Response Interventions   Plan Of Care Reviewed With patient   Patient Care Overview   Progress improving       Goal: Adult Individualization and Mutuality  Outcome: Ongoing (interventions implemented as appropriate)    12/05/17 0652 12/05/17 1452   Individualization   Patient Specific Preferences water and ice --    Patient Specific Goals to alleviate pain --    Patient Specific Interventions --  incentive; regular therapy    Mutuality/Individual Preferences   What Anxieties, Fears or Concerns Do You Have About Your Health or Care? --  trouble coughing up        Goal: Discharge Needs Assessment  Outcome: Ongoing (interventions implemented as appropriate)    12/01/17 0459 12/05/17 1424 12/05/17 1452   Discharge Needs Assessment   Concerns To Be Addressed --  --  homelessness;home safety concerns   Concerns Comments --  --  pt was homeless before admit; will need support system and safe location post d/c   Readmission Within The Last 30 Days no previous admission in last 30 days --  --    Equipment Needed After Discharge --  --  walker, rolling   Discharge Facility/Level Of Care Needs --  --  skilled transitional care;rehabilitation facility   Current Discharge Risk chronically ill;financial support inadequate --  --    Discharge Disposition still a patient --  --    Current Health   Outpatient/Agency/Support Group Needs --  --  skilled nursing facility (specify);inpatient rehabilitation facility (specify)   Anticipated Changes Related to Illness --  --  inability to care for self   Self-Care   Equipment Currently Used at Home --  none --    Living Environment   Transportation Available --  public transportation;family or friend will provide --          Problem: Pain, Chronic (Adult)  Goal: Acceptable Pain Control/Comfort Level  Outcome: Ongoing (interventions implemented as appropriate)    12/09/17 0454   Pain,  Chronic (Adult)   Acceptable Pain Control/Comfort Level making progress toward outcome         Problem: Cardiac Output, Decreased (Adult)  Goal: Adequate Cardiac Output/Effective Tissue Perfusion  Outcome: Ongoing (interventions implemented as appropriate)    12/09/17 0454   Cardiac Output, Decreased (Adult)   Adequate Cardiac Output/Effective Tissue Perfusion making progress toward outcome         Problem: Cardiac Surgery (Adult)  Goal: Signs and Symptoms of Listed Potential Problems Will be Absent or Manageable (Cardiac Surgery)  Outcome: Ongoing (interventions implemented as appropriate)    12/09/17 0454   Cardiac Surgery   Problems Assessed (Cardiac Surgery) all   Problems Present (Cardiac Surgery) pain         Problem: Fall Risk (Adult)  Goal: Absence of Falls  Outcome: Ongoing (interventions implemented as appropriate)    12/09/17 0454   Fall Risk (Adult)   Absence of Falls making progress toward outcome

## 2017-12-09 NOTE — PROGRESS NOTES
"[facility]  [address] [place] [address]. [id number]  T - [phone]  F - [phone]     Progress Note          PATIENT  DEMOGRAPHICS   PATIENT NAME: Lyle Corona                      PHYSICIAN: Adriane Butler MD  : 1965  MRN: [id number]   LOS: 12 days    Patient Care Team:  AHSAN Mayer as PCP - General  Subjective   SUBJECTIVE   Tired after physical therapy       Objective   OBJECTIVE   Vital Signs  Temp:  [97.5 °F (36.4 °C)-102 °F (38.9 °C)] 97.5 °F (36.4 °C)  Heart Rate:  [] 96  Resp:  [18-20] 20  BP: (100-128)/(63-87) 110/70    Flowsheet Rows         First Filed Value    Admission Height  69\" (175.3 cm) Documented at 2017 1722    Admission Weight  151 lb 12.8 oz (68.9 kg) Documented at 2017 1722           I/O last 3 completed shifts:  In: 1900 [P.O.:1900]  Out: -     PHYSICAL EXAM    Physical Exam   Constitutional: He is oriented to person, place, and time. He appears well-developed.   Moderately nourished   HENT:   Head: Normocephalic and atraumatic.   Cardiovascular: Normal rate, regular rhythm and normal heart sounds.  Exam reveals no gallop and no friction rub.    No murmur heard.  Pulmonary/Chest: Effort normal and breath sounds normal. No respiratory distress. He has no wheezes. He has no rales. He exhibits no tenderness.   Abdominal: Soft. Bowel sounds are normal. He exhibits no distension and no mass. There is no tenderness. There is no guarding.   Musculoskeletal: He exhibits no edema or tenderness.   Neurological: He is alert and oriented to person, place, and time.   Skin: Skin is warm and dry.   Nursing note and vitals reviewed.      RESULTS   Results Review:      Results from last 7 days  Lab Units 17  1028 17  1014 17  0527  17  0407   SODIUM mmol/L 132* 133* 133*  < > 136*   SODIUM, VENOUS   --   --   --   < >  --    SODIUM, ARTERIAL   --   --   --   < >  --    POTASSIUM mmol/L 4.6 5.2* 4.7  < > 3.7 "   POTASSIUM, VENOUS   --   --   --   < >  --    CHLORIDE mmol/L 93* 95 93*  < > 100   CO2 mmol/L 23.0 25.0 25.0  < > 24.0   BUN mg/dL 30* 30* 29*  < > 13   CREATININE mg/dL 2.30* 2.14* 2.13*  < > 2.16*   CALCIUM mg/dL 8.9 9.1 9.1  < > 8.6   BILIRUBIN mg/dL 1.0  --  1.2  --  0.7   ALK PHOS U/L 178*  --  148*  --  111   ALT (SGPT) U/L 30  --  14*  --  29   AST (SGOT) U/L 48  --  46  --  29   GLUCOSE mg/dL 98 107* 99  < > 103*   GLUCOSE, ARTERIAL   --   --   --   < >  --    < > = values in this interval not displayed.    Estimated Creatinine Clearance: 36.1 mL/min (by C-G formula based on Cr of 2.3).        Results from last 7 days  Lab Units 12/08/17  1832 12/07/17  0614 12/05/17  0416 12/04/17  1245 12/04/17  1131  12/04/17  0407   WBC 10*3/mm3 11.72* 15.64* 10.13* 8.70  --   --  7.07   HEMOGLOBIN g/dL 8.4* 10.9* 9.1* 9.5*  --   --  9.6*   HEMOGLOBIN, POC g/dL  --   --   --   --  8.2  < >  --    PLATELETS 10*3/mm3 231 212 144* 107*  --   --  148*   < > = values in this interval not displayed.      Results from last 7 days  Lab Units 12/04/17  1245   INR  1.24*         Imaging Results (last 24 hours)     ** No results found for the last 24 hours. **           MEDICATIONS      amiodarone 150 mg Intravenous Once   aspirin 81 mg Oral Daily   atorvastatin 10 mg Oral Daily   cyclobenzaprine 10 mg Oral TID   furosemide 20 mg Oral Daily   insulin aspart 0-24 Units Subcutaneous 4x Daily AC & at Bedtime   insulin detemir 20 Units Subcutaneous Nightly   ipratropium-albuterol 3 mL Nebulization 4x Daily - RT   labetalol 100 mg Oral Q12H   levoFLOXacin 250 mg Intravenous Q24H   losartan 12.5 mg Oral Nightly   magnesium oxide 400 mg Oral BID   prenatal vitamin 27-0.8 1 tablet Oral Daily   sennosides-docusate sodium 1 tablet Oral BID   sodium chloride 1-10 mL Intravenous Q8H   tamsulosin 0.4 mg Oral Nightly   ticagrelor 90 mg Oral BID   vitamin C 500 mg Oral BID       amiodarone 1 mg/min   Pharmacy to Dose LevoFLOXacin (LEVAQUIN)         Assessment/Plan   ASSESSMENT / PLAN    Principal Problem:    Coronary artery disease involving native coronary artery of native heart with unstable angina pectoris  Active Problems:    Chronic kidney disease, stage 3    NSTEMI (non-ST elevated myocardial infarction)    1. CKD 3: baseline creatinine 1.7-1.9 mg/dl  - Creatinine is 2.3 slightly higher. k better. Likely RAAS effect. Observe fo rnow. Keep lasix. Losartan reduced to half. Cant start hctz.     2. Hypertension:  - Blood pressure is acceptable. On labetalol 100mg BID, off hydralazine and terazosin. Plan as above    3. Anemia:  - Hemoglobin is low and received 1 UPRBC. hgb dropped again.    4. NSTEMI:   - s/p left heart cath which shows multivessel disease, s/p CABG.       5. Fever tachycardia culture ngsf             This document has been electronically signed by Adriane Butler MD on December 9, 2017 11:33 AM

## 2017-12-09 NOTE — PLAN OF CARE
Problem: Patient Care Overview (Adult)  Goal: Plan of Care Review  Outcome: Ongoing (interventions implemented as appropriate)    12/09/17 1445   Coping/Psychosocial Response Interventions   Plan Of Care Reviewed With patient   Patient Care Overview   Progress improving   Outcome Evaluation   Outcome Summary/Follow up Plan Pt. Independent with gt. 200ft. with no LOB, but c/o's fatigue after performance & LE therex at EOB. Pt. needs cont.'d reinforcement & review of proper tech. with bed mobilty & use of chest hugger. Pt. too fatigued this p.m. to practice >1 attempt. Cont. to practice bed mobilty including rolling R & L, assess for independence with transfers next treatment        Goal: Discharge Needs Assessment  Outcome: Ongoing (interventions implemented as appropriate)    12/01/17 0459 12/05/17 1424 12/05/17 1452   Discharge Needs Assessment   Concerns To Be Addressed --  --  homelessness;home safety concerns   Concerns Comments --  --  pt was homeless before admit; will need support system and safe location post d/c   Readmission Within The Last 30 Days no previous admission in last 30 days --  --    Equipment Needed After Discharge --  --  walker, rolling   Discharge Facility/Level Of Care Needs --  --  skilled transitional care;rehabilitation facility   Current Discharge Risk chronically ill;financial support inadequate --  --    Discharge Disposition still a patient --  --    Discharge Planning Comments --  --  if d/c to homeless situation, it appears he will need to be as strong as possible to survive homelessnes   Current Health   Outpatient/Agency/Support Group Needs --  --  skilled nursing facility (specify);inpatient rehabilitation facility (specify)   Anticipated Changes Related to Illness --  --  inability to care for self   Self-Care   Equipment Currently Used at Home --  none --    Living Environment   Transportation Available --  public transportation;family or friend will provide --           Problem: Inpatient Physical Therapy  Goal: Bed Mobility Goal LTG- PT  Outcome: Ongoing (interventions implemented as appropriate)    12/05/17 1452 12/06/17 1435 12/09/17 1445   Bed Mobility PT LTG   Bed Mobility PT LTG, Date Established 12/05/17 --  --    Bed Mobility PT LTG, Time to Achieve by discharge --  --    Bed Mobility PT LTG, Activity Type roll left/roll right;supine to sit/sit to supine --  --    Bed Mobility PT LTG, [place] Level --  1 person + 1 person to manage equipment --    Bed Mobility PT LTG, Date Goal Reviewed --  --  12/09/17   Bed Mobility PT LTG, Outcome --  --  goal ongoing       Goal: Transfer Training Goal 1 LTG- PT  Outcome: Ongoing (interventions implemented as appropriate)    12/05/17 1452 12/09/17 1445   Transfer Training PT LTG   Transfer Training PT LTG, Date Established 12/05/17 --    Transfer Training PT LTG, Time to Achieve by discharge --    Transfer Training PT LTG, Activity Type bed to chair /chair to bed;sit to stand/stand to sit --    Transfer Training PT LTG, [place] Level conditional independence --    Transfer Training PT LTG, Date Goal Reviewed --  12/09/17   Transfer Training PT LTG, Outcome --  goal ongoing       Goal: Gait Training Goal LTG- PT  Outcome: Outcome(s) achieved Date Met:  12/09/17 12/05/17 1452 12/09/17 1445   Gait Training PT LTG   Gait Training Goal PT LTG, Date Established 12/05/17 --    Gait Training Goal PT LTG, Time to Achieve by discharge --    Gait Training Goal PT LTG, [place] Level conditional independence --    Gait Training Goal PT LTG, Date Goal Reviewed --  12/09/17   Gait Training Goal PT LTG, Outcome --  goal met

## 2017-12-09 NOTE — PROGRESS NOTES
"  Cardiothoracic - Vascular Surgery Daily Note        LOS: 12 days   Patient Care Team:  AHSAN Mayer as PCP - General     POD5 CABGX3    Chief Complaint: Surgical Chest Pain      Subjective     The following portions of the patient's history were reviewed and updated as appropriate: allergies, current medications, past family history, past medical history, past social history, past surgical history and problem list.     Subjective:  Symptoms:  Improved.  No chest pain (surgical).    Diet:  Adequate intake.  No nausea.    Activity level: Returning to normal.    Pain:  He complains of pain that is moderate.  He reports pain is improving.  Pain is requiring pain medication and well controlled.          Objective     Vital Signs  Temp:  [97.5 °F (36.4 °C)-102 °F (38.9 °C)] 97.5 °F (36.4 °C)  Heart Rate:  [] 96  Resp:  [18] 18  BP: (100-128)/(63-87) 110/70  Body mass index is 22.35 kg/(m^2).    Intake/Output Summary (Last 24 hours) at 12/09/17 0948  Last data filed at 12/09/17 0822   Gross per 24 hour   Intake             1420 ml   Output                0 ml   Net             1420 ml     I/O this shift:  In: 240 [P.O.:240]  Out: -     Wt Readings from Last 3 Encounters:   12/09/17 68.7 kg (151 lb 6.4 oz)   10/31/17 71.4 kg (157 lb 8 oz)   06/12/17 69.4 kg (153 lb)         Physical Exam   Objective:  General Appearance:  Uncomfortable and comfortable.    Vital signs: (most recent): Blood pressure 110/70, pulse 96, temperature 97.5 °F (36.4 °C), temperature source Temporal Artery , resp. rate 18, height 175.3 cm (69.02\"), weight 68.7 kg (151 lb 6.4 oz), SpO2 93 %.  Vital signs are normal.    Output: Producing urine and producing stool.    HEENT: Normal HEENT exam.    Lungs:  Normal respiratory rate and normal effort.  There are decreased breath sounds (bases).  (On RA)  Heart: Tachycardia.    Abdomen: Abdomen is soft and non-distended.  Bowel sounds are normal.     Extremities: Normal range of motion.  There " is no dependent edema.    Pulses: Distal pulses are intact.    Neurological: Patient is alert and oriented to person, place and time.    Pupils:  Pupils are equal, round, and reactive to light.    Skin:  Warm.  (M/S INC: Sm amt sanginous drainage distal, Well approximated. prineo strip intact  LLE EVH: CDI)              Results Review:    Lab Results   Component Value Date    WBC 11.72 (H) 12/08/2017    HGB 8.4 (L) 12/08/2017    HCT 24.6 (L) 12/08/2017    MCV 83.7 12/08/2017     12/08/2017       Estimated Creatinine Clearance: 38.8 mL/min (by C-G formula based on Cr of 2.14).      Results from last 7 days  Lab Units 12/06/17  0212 12/05/17  0416 12/04/17  1245   MAGNESIUM mg/dL 2.3 2.4* 4.0*   PHOSPHORUS mg/dL  --   --  4.3       Lab Results   Component Value Date    GLUCOSE 107 (H) 12/08/2017    BUN 30 (H) 12/08/2017    CREATININE 2.14 (H) 12/08/2017    EGFRIFNONA 32 (L) 12/08/2017    BCR 14.0 12/08/2017    K 5.2 (H) 12/08/2017    CO2 25.0 12/08/2017    CALCIUM 9.1 12/08/2017    ALBUMIN 4.00 12/07/2017    LABIL2 1.2 12/07/2017    AST 46 12/07/2017    ALT 14 (L) 12/07/2017         Results from last 7 days  Lab Units 12/04/17  1245   INR  1.24*       Imaging Results (last 24 hours)     ** No results found for the last 24 hours. **                                  amiodarone 150 mg Intravenous Once   aspirin 81 mg Oral Daily   atorvastatin 10 mg Oral Daily   cyclobenzaprine 10 mg Oral TID   furosemide 20 mg Oral Daily   insulin aspart 0-24 Units Subcutaneous 4x Daily AC & at Bedtime   insulin detemir 20 Units Subcutaneous Nightly   ipratropium-albuterol 3 mL Nebulization 4x Daily - RT   labetalol 100 mg Oral Q12H   levoFLOXacin 250 mg Intravenous Q24H   losartan 12.5 mg Oral Nightly   magnesium oxide 400 mg Oral BID   prenatal vitamin 27-0.8 1 tablet Oral Daily   sennosides-docusate sodium 1 tablet Oral BID   sodium chloride 1-10 mL Intravenous Q8H   tamsulosin 0.4 mg Oral Nightly   ticagrelor 90 mg Oral BID    vitamin C 500 mg Oral BID       amiodarone 1 mg/min   Pharmacy to Dose LevoFLOXacin (LEVAQUIN)              ASSESSMENT/PLAN     Principal Problem:    Coronary artery disease involving native coronary artery of native heart with unstable angina pectoris  Active Problems:    Chronic kidney disease, stage 3    NSTEMI (non-ST elevated myocardial infarction)      Assessment:    Condition: In stable condition.   (Stable Post Op CABG: Progressing well    CAD: ASA, Brilinta, Statin. Beta as BP tolerates. Low dose ARB    Fever (Resolved): Aggressive respiratory interventions. BC negative. Urine cx neg. Sputm cx with 3+ WBC. On Levaquin    Resp: Incentive. On RA. Aggressive Pulmonary Toilet    Pain: Percocet    Rehab: PT/OT, Ambulate. ARU consult-denied. SNF Transfer for Mon (Bed availability)    Transient Post op Hyperglycemia: SSI coverage. Continue Levemir    CKD: Cr peaked 2.1. Follow.).     Plan:   Transfer Plan: Awaitng d/c to SNF.  Encourage ambulation and out of bed and up to chair.  Continue respiratory treatments and start/continue incentive spirometry.  Diet Plan: cardiac diet.  Increase analgesics, administer medications as ordered and start antibiotics.   (Stable. Progress Care 3W.   SNF Approval for Monday DC. Aggressive pulmonary toilet).               This document has been electronically signed by Darien Dejesus MD on December 9, 2017 9:48 AM

## 2017-12-10 LAB
ALBUMIN SERPL-MCNC: 3.8 G/DL (ref 3.4–4.8)
ALBUMIN/GLOB SERPL: 1.2 G/DL (ref 1.1–1.8)
ALP SERPL-CCNC: 316 U/L (ref 38–126)
ALT SERPL W P-5'-P-CCNC: 46 U/L (ref 21–72)
ANION GAP SERPL CALCULATED.3IONS-SCNC: 13 MMOL/L (ref 5–15)
AST SERPL-CCNC: 91 U/L (ref 17–59)
BILIRUB SERPL-MCNC: 1.1 MG/DL (ref 0.2–1.3)
BUN BLD-MCNC: 31 MG/DL (ref 7–21)
BUN/CREAT SERPL: 12.7 (ref 7–25)
CALCIUM SPEC-SCNC: 8.9 MG/DL (ref 8.4–10.2)
CHLORIDE SERPL-SCNC: 92 MMOL/L (ref 95–110)
CO2 SERPL-SCNC: 25 MMOL/L (ref 22–31)
CREAT BLD-MCNC: 2.45 MG/DL (ref 0.7–1.3)
GFR SERPL CREATININE-BSD FRML MDRD: 28 ML/MIN/1.73 (ref 60–130)
GLOBULIN UR ELPH-MCNC: 3.3 GM/DL (ref 2.3–3.5)
GLUCOSE BLD-MCNC: 118 MG/DL (ref 60–100)
GLUCOSE BLDC GLUCOMTR-MCNC: 107 MG/DL (ref 70–130)
GLUCOSE BLDC GLUCOMTR-MCNC: 109 MG/DL (ref 70–130)
GLUCOSE BLDC GLUCOMTR-MCNC: 119 MG/DL (ref 70–130)
GLUCOSE BLDC GLUCOMTR-MCNC: 119 MG/DL (ref 70–130)
POTASSIUM BLD-SCNC: 4.3 MMOL/L (ref 3.5–5.1)
PROT SERPL-MCNC: 7.1 G/DL (ref 6.3–8.6)
SODIUM BLD-SCNC: 130 MMOL/L (ref 137–145)

## 2017-12-10 PROCEDURE — 80053 COMPREHEN METABOLIC PANEL: CPT | Performed by: INTERNAL MEDICINE

## 2017-12-10 PROCEDURE — 94799 UNLISTED PULMONARY SVC/PX: CPT

## 2017-12-10 PROCEDURE — 82962 GLUCOSE BLOOD TEST: CPT

## 2017-12-10 PROCEDURE — 97530 THERAPEUTIC ACTIVITIES: CPT

## 2017-12-10 PROCEDURE — 25010000002 LEVOFLOXACIN PER 250 MG: Performed by: THORACIC SURGERY (CARDIOTHORACIC VASCULAR SURGERY)

## 2017-12-10 PROCEDURE — 99024 POSTOP FOLLOW-UP VISIT: CPT | Performed by: THORACIC SURGERY (CARDIOTHORACIC VASCULAR SURGERY)

## 2017-12-10 RX ADMIN — TICAGRELOR 90 MG: 90 TABLET ORAL at 17:23

## 2017-12-10 RX ADMIN — Medication 3 ML: at 20:00

## 2017-12-10 RX ADMIN — MAGNESIUM OXIDE TAB 400 MG (241.3 MG ELEMENTAL MG) 400 MG: 400 (241.3 MG) TAB at 08:12

## 2017-12-10 RX ADMIN — IPRATROPIUM BROMIDE AND ALBUTEROL SULFATE 3 ML: 2.5; .5 SOLUTION RESPIRATORY (INHALATION) at 15:06

## 2017-12-10 RX ADMIN — ACETAMINOPHEN 650 MG: 325 TABLET ORAL at 18:46

## 2017-12-10 RX ADMIN — CYCLOBENZAPRINE HYDROCHLORIDE 10 MG: 10 TABLET, FILM COATED ORAL at 18:45

## 2017-12-10 RX ADMIN — Medication 10 ML: at 15:18

## 2017-12-10 RX ADMIN — LABETALOL HYDROCHLORIDE 100 MG: 100 TABLET, FILM COATED ORAL at 20:33

## 2017-12-10 RX ADMIN — CYCLOBENZAPRINE HYDROCHLORIDE 10 MG: 10 TABLET, FILM COATED ORAL at 09:29

## 2017-12-10 RX ADMIN — ATORVASTATIN CALCIUM 10 MG: 10 TABLET, FILM COATED ORAL at 20:33

## 2017-12-10 RX ADMIN — MAGNESIUM OXIDE TAB 400 MG (241.3 MG ELEMENTAL MG) 400 MG: 400 (241.3 MG) TAB at 17:23

## 2017-12-10 RX ADMIN — LABETALOL HYDROCHLORIDE 100 MG: 100 TABLET, FILM COATED ORAL at 08:12

## 2017-12-10 RX ADMIN — ASPIRIN 81 MG: 81 TABLET, COATED ORAL at 08:12

## 2017-12-10 RX ADMIN — PRENATAL VIT W/ FE FUMARATE-FA TAB 27-0.8 MG 1 TABLET: 27-0.8 TAB at 08:12

## 2017-12-10 RX ADMIN — FUROSEMIDE 20 MG: 20 TABLET ORAL at 08:12

## 2017-12-10 RX ADMIN — OXYCODONE HYDROCHLORIDE AND ACETAMINOPHEN 500 MG: 500 TABLET ORAL at 17:23

## 2017-12-10 RX ADMIN — IPRATROPIUM BROMIDE AND ALBUTEROL SULFATE 3 ML: 2.5; .5 SOLUTION RESPIRATORY (INHALATION) at 11:21

## 2017-12-10 RX ADMIN — OXYCODONE HYDROCHLORIDE AND ACETAMINOPHEN 500 MG: 500 TABLET ORAL at 08:12

## 2017-12-10 RX ADMIN — TAMSULOSIN HYDROCHLORIDE 0.4 MG: 0.4 CAPSULE ORAL at 20:33

## 2017-12-10 RX ADMIN — SENNOSIDES AND DOCUSATE SODIUM 1 TABLET: 8.6; 5 TABLET ORAL at 17:23

## 2017-12-10 RX ADMIN — OXYCODONE HYDROCHLORIDE AND ACETAMINOPHEN 1 TABLET: 10; 325 TABLET ORAL at 03:28

## 2017-12-10 RX ADMIN — OXYCODONE HYDROCHLORIDE AND ACETAMINOPHEN 1 TABLET: 5; 325 TABLET ORAL at 08:12

## 2017-12-10 RX ADMIN — OXYCODONE HYDROCHLORIDE AND ACETAMINOPHEN 1 TABLET: 5; 325 TABLET ORAL at 17:23

## 2017-12-10 RX ADMIN — OXYCODONE HYDROCHLORIDE AND ACETAMINOPHEN 1 TABLET: 5; 325 TABLET ORAL at 12:02

## 2017-12-10 RX ADMIN — LEVOFLOXACIN 250 MG: 5 INJECTION, SOLUTION INTRAVENOUS at 18:46

## 2017-12-10 RX ADMIN — IPRATROPIUM BROMIDE AND ALBUTEROL SULFATE 3 ML: 2.5; .5 SOLUTION RESPIRATORY (INHALATION) at 07:56

## 2017-12-10 RX ADMIN — OXYCODONE HYDROCHLORIDE AND ACETAMINOPHEN 1 TABLET: 5; 325 TABLET ORAL at 20:34

## 2017-12-10 RX ADMIN — TICAGRELOR 90 MG: 90 TABLET ORAL at 08:12

## 2017-12-10 NOTE — PLAN OF CARE
Problem: Pain, Chronic (Adult)  Goal: Acceptable Pain Control/Comfort Level  Outcome: Ongoing (interventions implemented as appropriate)    12/09/17 0454   Pain, Chronic (Adult)   Acceptable Pain Control/Comfort Level making progress toward outcome         Problem: Cardiac Output, Decreased (Adult)  Goal: Adequate Cardiac Output/Effective Tissue Perfusion  Outcome: Ongoing (interventions implemented as appropriate)    12/10/17 0433   Cardiac Output, Decreased (Adult)   Adequate Cardiac Output/Effective Tissue Perfusion making progress toward outcome         Problem: Cardiac Surgery (Adult)  Goal: Signs and Symptoms of Listed Potential Problems Will be Absent or Manageable (Cardiac Surgery)  Outcome: Ongoing (interventions implemented as appropriate)    12/09/17 0454 12/10/17 0433   Cardiac Surgery   Problems Assessed (Cardiac Surgery) --  all   Problems Present (Cardiac Surgery) pain --          Problem: Fall Risk (Adult)  Goal: Absence of Falls  Outcome: Ongoing (interventions implemented as appropriate)    12/10/17 0433   Fall Risk (Adult)   Absence of Falls making progress toward outcome

## 2017-12-10 NOTE — THERAPY TREATMENT NOTE
Acute Care - Physical Therapy Treatment Note  [facility]     Patient Name: Lyle Corona  : 1965  MRN: [id number]  Today's Date: 12/10/2017  Onset of Illness/Injury or Date of Surgery Date: 17 (CABG 17)  Date of Referral to PT: 17  Referring Physician: AHSAN Oglesby    Admit Date: 2017    Visit Dx:    ICD-10-CM ICD-9-CM   1. Coronary arteriosclerosis I25.10 414.00   2. Unstable angina I20.0 411.1   3. NSTEMI (non-ST elevated myocardial infarction) I21.4 410.70   4. Coronary artery disease involving native coronary artery of native heart with unstable angina pectoris I25.110 414.01     411.1   5. Impaired functional mobility, balance, gait, and endurance Z74.09 V49.89   6. Impaired mobility and ADLs Z74.09 799.89     Patient Active Problem List   Diagnosis   • Essential hypertension   • Hypertensive emergency, no CHF   • Coronary artery disease involving native coronary artery of native heart with unstable angina pectoris   • Chronic kidney disease, stage 3   • LVH (left ventricular hypertrophy)   • NSTEMI (non-ST elevated myocardial infarction)   • Unstable angina               Adult Rehabilitation Note       12/10/17 0951 17 1445 17 1425    Rehab Assessment/Intervention    Discipline physical therapy assistant  -TA physical therapy assistant  -JA occupational therapy assistant  -LM    Document Type therapy note (daily note)  -TA therapy note (daily note)  -JA therapy note (daily note)  -LM    Subjective Information agree to therapy  -TA agree to therapy  -JA agree to therapy  -LM    Patient Effort, Rehab Treatment good  -TA good  -JA good  -LM    Precautions/Limitations cardiac precautions;fall precautions;sternal precautions  -TA cardiac precautions;fall precautions;sternal precautions  -JA cardiac precautions   provided ed on all cabg precautions and safety  -LM    Recorded by [TA] Claudia Martinez PTA [JA] Jamir John PTA [LM] Pushpa Victoria,  GA/L    Vital Signs    Post Systolic BP Rehab  114  -JA     Post Treatment Diastolic BP  75  -JA     Pretreatment Heart Rate (beats/min) 104  -TA      Intratreatment Heart Rate (beats/min) 111  -   during gt. attempt   -JA     Posttreatment Heart Rate (beats/min) 110  -  -JA     Pre SpO2 (%) 94  -TA      O2 Delivery Pre Treatment room air  -TA      Intra SpO2 (%) 96  -TA 96  -JA     O2 Delivery Intra Treatment room air  -TA room air  -JA     Post SpO2 (%) 94  -TA      Pre Patient Position Standing   with RN  -TA Standing   with nsg. in doorway preparing to ambulate  -JA Sitting  -LM    Intra Patient Position   Sitting  -LM    Post Patient Position Sitting  -TA Sitting   EOB  -JA Sitting  -LM    Recorded by [TA] Claudia Martinez PTA [JA] Jamir John PTA [LM] TUCKER WilkinsonA/L    Pain Assessment    Pain Assessment 0-10  -TA No/denies pain  -JA     Pain Score 6  -TA 0  -JA 5  -LM    Post Pain Score 6  -TA 0  -JA 5  -LM    Pain Type Surgical pain  -TA      Pain Location Chest  -TA  Chest  -LM    Pain Orientation Mid  -TA      Pain Intervention(s)   Medication (See MAR)  -LM    Recorded by [TA] Claudia Martinez PTA [JA] Jamir John PTA [LM] TUCKER WilkinsonA/L    Cognitive Assessment/Intervention    Current Cognitive/Communication Assessment functional  -TA  functional  -LM    Orientation Status oriented x 4  -TA  oriented x 4  -LM    Follows Commands/Answers Questions 100% of the time  -TA  100% of the time  -LM    Personal Safety fully aware of deficits  -TA      Personal Safety Interventions fall prevention program maintained;nonskid shoes/slippers when out of bed  -TA      Recorded by [TA] Claudia Martinez PTA  [LM] TUCKER WilkinsonA/L    Bed Mobility, Assessment/Treatment    Bed Mob, Supine to Sit, [place] independent  -TA supervision required;verbal cues required   v.c.'s for use of chest hugger & rolling to L   -JA     Bed Mob, Sit to Supine, [place]  independent  -TA supervision required;verbal cues required   v.c.'s for use of chest hugger   -JA     Recorded by [TA] Claudia Martinez PTA [JA] Jamir John PTA     Transfer Assessment/Treatment    Transfers, Sit-Stand [place] independent  -TA      Transfers, Stand-Sit [place] independent  -TA independent  -JA     Recorded by [TA] Claudia Martinez PTA [JA] Jamir John PTA     Gait Assessment/Treatment    Gait, [place] Level independent  -TA independent  -JA     Gait, Assistive Device other (see comments)   no AD  -TA --   no AD  -JA     Gait, Distance (Feet) 400  -  -JA     Recorded by [TA] Claudia Martinez PTA [JA] Jamir John PTA     Therapy Exercises    Bilateral Lower Extremities AROM:;20 reps;sitting;ankle pumps/circles;glut sets;LAQ;hip flexion  -TA AROM:;20 reps;sitting;hip flexion;LAQ;hip abduction/adduction  -JA     Bilateral Upper Extremity   AROM:;elbow flexion/extension;shoulder abduction/adduction;shoulder extension/flexion;shoulder protraction/retraction   all ex with pain and cabg prec ed on using hugger  -LM    Recorded by [TA] Claudia Martinez PTA [JA] Jamir John PTA [LM] Pushpa Victoria GA/L    Positioning and Restraints    Pre-Treatment Position standing in room  -TA standing in room   with nsg.   -JA     Post Treatment Position bed  -TA bed  -JA     In Bed sitting EOB;call light within reach  -TA sitting EOB;call light within reach  -JA     Recorded by [TA] Claudia Martinez PTA [JA] Jamir John PTA       12/08/17 1058 12/08/17 0955 12/07/17 1403    Rehab Assessment/Intervention    Discipline physical therapy assistant  -LN occupational therapy assistant  -JH physical therapy assistant  -LN    Document Type therapy note (daily note)  -LN therapy note (daily note)  -JH therapy note (daily note)  -LN    Subjective Information agree to therapy;complains of;pain  -BEBE agree to therapy  -JARETH complains of;fatigue;pain  -LN    Patient Effort,  Rehab Treatment  good  -     Precautions/Limitations cardiac precautions;fall precautions;oxygen therapy device and L/min;sternal precautions  -LN cardiac precautions  - cardiac precautions;fall precautions;sternal precautions  -LN    Recorded by [LN] Denise Herron PTA [] HARMEET Luna/DWAYNE [LN] Denise Herron PTA    Vital Signs    Pre Systolic BP Rehab 124  -LN  118  -LN    Pre Treatment Diastolic BP 47  -LN  75  -LN    Intra Systolic BP Rehab 128  -LN      Intra Treatment Diastolic BP 64  -LN      Post Systolic BP Rehab 108  -LN  130  -LN    Post Treatment Diastolic BP 57  -LN  74   nsg aware  -LN    Pretreatment Heart Rate (beats/min) 75  -  -  -LN    Intratreatment Heart Rate (beats/min) 83  -LN  118  -LN    Posttreatment Heart Rate (beats/min) 100   nsg aware of all vs  -LN  113  -LN    Pre SpO2 (%) --   unable to obtain  -LN 96  -JH --   unable to obtain sat reading before/after rx-nsg aware  -LN    O2 Delivery Pre Treatment  room air  -     Pre Patient Position Sitting  -LN  Supine  -LN    Intra Patient Position Standing  -LN  Standing  -LN    Post Patient Position Sitting  -LN  Supine  -LN    Recorded by [LN] Denise Herron PTA [JH] HARMEET Luna/DWAYNE [LN] Denise Herron PTA    Pain Assessment    Pain Assessment 0-10  -LN 0-10  -JH 0-10  -LN    Pain Score 6  -LN 6  -JH 8  -LN    Post Pain Score 6  -LN 7  -JH 8  -LN    Pain Type Acute pain;Surgical pain  -LN Surgical pain  - Acute pain;Surgical pain  -LN    Pain Location Chest  -LN Chest  -JH Chest  -LN    Pain Intervention(s) --   per nsg meds not due  -LN Repositioned  - Medication (See MAR)  -LN    Recorded by [LN] Denise Herron PTA [JH] HARMEET Luna/L [LN] Denise Herron PTA    Cognitive Assessment/Intervention    Current Cognitive/Communication Assessment  functional  -     Orientation Status oriented to;person     -LN oriented x 4  -JH oriented to;person     -LN    Follows Commands/Answers Questions  100% of  the time  -JH     Recorded by [LN] Denise Herron, PTA [JH] HARMEET Luna/DWAYNE [LN] Denise Herron, LINDA    Bed Mobility, Assessment/Treatment    Bed Mob, Supine to Sit, [place] not tested  -LN  contact guard assist  -LN    Bed Mob, Sit to Supine, [place] not tested  -LN  conditional independence  -LN    Recorded by [LN] Denise Herron, PTA  [LN] Denise Herron, PTA    Transfer Assessment/Treatment    Transfers, Sit-Stand [place] independent  -LN independent  -JH independent  -LN    Transfers, Stand-Sit [place] independent  -LN independent  -JH independent  -LN    Toilet Transfer, [place]  supervision required  -JH     Recorded by [LN] Denise Herron, PTA [JH] HARMEET Luna/DWAYNE [LN] Denise Herron, LIDNA    Gait Assessment/Treatment    Gait, [place] Level supervision required;independent  -LN  stand by assist  -LN    Gait, Assistive Device --   none  -LN  --   none  -LN    Gait, Distance (Feet) 280  -LN  354  -LN    Gait, Comment decreased distance due to pt not wanting to increase pain since pain meds not due for another 1 1/2 hrs  -LN  occasional reaching for rails but no noticeable lob or unsteadiness  -LN    Recorded by [LN] Denise Herron, PTA  [LN] Denise Herron, LINDA    Stairs Assessment/Treatment    Number of Stairs 5  -LN      Stairs, Handrail Location none  -LN      Stairs, [place] Level independent  -LN      Stairs, Technique Used step over step (ascending);step over step (descending)  -LN      Recorded by [LN] Denise Herron, LINDA      Toileting Assessment/Training    Toileting Assess/Train, Indepen Level  supervision required  -JH     Recorded by  [JH] HARMEET Luna/L     Grooming Assessment/Training    Grooming Assess/Train, Indepen Level  supervision required  -JH     Recorded by  [JH] HARMEET Luna/L     Therapy Exercises    Bilateral Lower Extremities AROM:;20 reps;sitting;ankle pumps/circles;hip flexion;LAQ  -LN  AROM:;15 reps;20 reps;ankle  pumps/circles;sitting;hip flexion;LAQ  -LN    Bilateral Upper Extremity  AROM:;15 reps;sitting;elbow flexion/extension;hand pumps;pronation/supination;shoulder ER/IR  -JH     BUE Resistance  manual resistance- minimal  -     Recorded by [LN] Denise Herron PTA [JH] TUCKER LunaA/L [LN] Denise Herron PTA    Positioning and Restraints    Pre-Treatment Position  sitting in chair/recliner  -     Post Treatment Position chair  -LN chair  - bed  -LN    In Bed --  -LN  notified nsg;supine;call light within reach;encouraged to call for assist  -LN    In Chair sitting;call light within reach;encouraged to call for assist  -LN sitting;call light within reach;encouraged to call for assist;with other staff  -     Recorded by [LN] Denise Herron PTA [JH] TUCKER LunaA/DWAYNE [LN] Denise Herron PTA      User Key  (r) = Recorded By, (t) = Taken By, (c) = Cosigned By    Initials Name Effective Dates    RASHAUN John, PTA 10/17/16 -     TA Claudia Martinez, PTA 10/17/16 -     LN Denise Herron, LINDA 10/17/16 -     JH Kandis Hassan AG/L 10/17/16 -     LM Pushpa Victoria GA/L 10/17/16 -                 IP PT Goals       12/10/17 1137 12/09/17 1445 12/08/17 1058    Bed Mobility PT LTG    Bed Mobility PT LTG, Date Goal Reviewed  12/09/17  -JA 12/08/17  -LN    Bed Mobility PT LTG, Outcome goal met  -TA goal ongoing  -JA goal not met  -LN    Transfer Training PT LTG    Transfer Training PT  LTG, Date Goal Reviewed  12/09/17  -JA 12/08/17  -LN    Transfer Training PT LTG, Outcome goal met  -TA goal ongoing  -JA goal not met  -LN    Gait Training PT LTG    Gait Training Goal PT LTG, Date Goal Reviewed  12/09/17  -JA 12/08/17  -LN    Gait Training Goal PT LTG, Outcome  goal met  -JA goal not met  -LN    Stair Training PT LTG    Stair Training Goal PT LTG, Date Goal Reviewed   12/08/17  -LN    Stair Training Goal PT LTG, Outcome   goal met  -LN      12/07/17 1403 12/06/17 1435 12/05/17 1452    Bed Mobility PT LTG     Bed Mobility PT LTG, Date Established   12/05/17  -GB    Bed Mobility PT LTG, Time to Achieve   by discharge  -GB    Bed Mobility PT LTG, Activity Type   roll left/roll right;supine to sit/sit to supine  -GB    Bed Mobility PT LTG, [place] Level  1 person + 1 person to manage equipment  -LN conditional independence  -GB    Bed Mobility PT LTG, Date Goal Reviewed 12/07/17  -LN 12/06/17  -LN     Bed Mobility PT LTG, Outcome goal not met  -LN goal not met  -LN goal not met  -GB    Transfer Training PT LTG    Transfer Training PT LTG, Date Established   12/05/17  -GB    Transfer Training PT LTG, Time to Achieve   by discharge  -GB    Transfer Training PT LTG, Activity Type   bed to chair /chair to bed;sit to stand/stand to sit  -GB    Transfer Training PT LTG, [place] Level   conditional independence  -GB    Transfer Training PT  LTG, Date Goal Reviewed 12/07/17  -LN 12/06/17  -LN     Transfer Training PT LTG, Outcome goal not met  -LN goal not met  -LN goal not met  -GB    Gait Training PT LTG    Gait Training Goal PT LTG, Date Established   12/05/17  -GB    Gait Training Goal PT LTG, Time to Achieve   by discharge  -GB    Gait Training Goal PT LTG, [place] Level   conditional independence  -GB    Gait Training Goal PT LTG, Date Goal Reviewed 12/07/17  -LN 12/06/17  -LN     Gait Training Goal PT LTG, Outcome goal not met  -LN goal not met  -LN goal not met  -GB    Stair Training PT LTG    Stair Training Goal PT LTG, Date Established 12/07/17  -LN  12/05/17  -GB    Stair Training Goal PT LTG, Time to Achieve   by discharge  -GB    Stair Training Goal PT LTG, Number of Steps   4  -GB    Stair Training Goal PT LTG, [place] Level   conditional independence  -GB    Stair Training Goal PT LTG, Date Goal Reviewed 12/07/17  -LN 12/06/17  -LN     Stair Training Goal PT LTG, Outcome goal not met  -LN goal not met  -LN goal not met  -GB      User Key  (r) = Recorded By, (t) = Taken By, (c) = Cosigned By     Initials Name Provider Type     Paige Chandler, PT Physical Therapist    RASHAUN John, PTA Physical Therapy Assistant    SHILOH Martinez, PTA Physical Therapy Assistant    BEBE Herron PTA Physical Therapy Assistant          Physical Therapy Education     Title: PT OT SLP Therapies (Active)     Topic: Physical Therapy (Active)     Point: Mobility training (Done)    Learning Progress Summary    Learner Readiness Method Response Comment Documented by Status   Patient Acceptance E VU,NR Proper use of chest hugger & tech. with sup-sit-sup during bed mobility this p.m. JA 12/09/17 1530 Done    Acceptance E VU,NR reviewed and copies given of hep and cardiac/sternal precautions LN 12/08/17 1135 Done    Acceptance E NR cardiac precautions  12/06/17 1650 Active    Acceptance E,D NR,DU,VU POC; coughing w/ abdominal muscles; use of pillow to help cough; tx  12/05/17 1450 Done               Point: Home exercise program (Done)    Learning Progress Summary    Learner Readiness Method Response Comment Documented by Status   Patient Acceptance E VU,NR reviewed and copies given of hep and cardiac/sternal precautions LN 12/08/17 1135 Done    Acceptance E,D NR,DU,VU POC; coughing w/ abdominal muscles; use of pillow to help cough; tx  12/05/17 1450 Done               Point: Body mechanics (Active)    Learning Progress Summary    Learner Readiness Method Response Comment Documented by Status   Patient Acceptance E NR cardiac precautions  12/06/17 1650 Active    Acceptance E,D NR,DU,VU POC; coughing w/ abdominal muscles; use of pillow to help cough; tx  12/05/17 1450 Done               Point: Precautions (Done)    Learning Progress Summary    Learner Readiness Method Response Comment Documented by Status   Patient Acceptance E VU,DU chest precautions  12/10/17 1135 Done    Acceptance E VU,NR Proper use of chest hugger & tech. with sup-sit-sup during bed mobility this p.m. JA 12/09/17 1530 Done     Acceptance E VU,NR reviewed and copies given of hep and cardiac/sternal precautions LN 12/08/17 1135 Done    Acceptance E NR cardiac precautions LN 12/06/17 1650 Active    Acceptance E,D NR,DU,VU POC; coughing w/ abdominal muscles; use of pillow to help cough; tx  12/05/17 1450 Done                      User Key     Initials Effective Dates Name Provider Type Discipline     10/17/16 -  Paige Chandler, PT Physical Therapist PT     10/17/16 -  Jamir John, PTA Physical Therapy Assistant PT    TA 10/17/16 -  Claudia Martinez PTA Physical Therapy Assistant PT    LN 10/17/16 -  Denise Herron, PTA Physical Therapy Assistant PT                    PT Recommendation and Plan  Anticipated Equipment Needs At Discharge: front wheeled walker  Anticipated Discharge Disposition: home with home health  Planned Therapy Interventions: bed mobility training, gait training, home exercise program, stair training, strengthening, transfer training  PT Frequency: daily  Plan of Care Review  Outcome Summary/Follow up Plan: pt independent with transfers & gait, pt met all goals          Outcome Measures       12/10/17 1100 12/09/17 1445 12/08/17 1058    How much help from another person do you currently need...    Turning from your back to your side while in flat bed without using bedrails? 4  -TA 3  -JA 3  -LN    Moving from lying on back to sitting on the side of a flat bed without bedrails? 4  -TA 3  -JA 3  -LN    Moving to and from a bed to a chair (including a wheelchair)? 4  -TA 3  -JA 3  -LN    Standing up from a chair using your arms (e.g., wheelchair, bedside chair)? 4  -TA 4  -JA 4  -LN    Climbing 3-5 steps with a railing? 4  -TA 4  -JA 4  -LN    To walk in hospital room? 4  -TA 4  -JA 3  -LN    AM-PAC 6 Clicks Score 24  -TA 21  -JA 20  -LN    Functional Assessment    Outcome Measure Options AM-PAC 6 Clicks Basic Mobility (PT)  -TA AM-PAC 6 Clicks Basic Mobility (PT)  -JA AM-PAC 6 Clicks Basic Mobility (PT)   -LN      12/08/17 0955 12/07/17 1403       How much help from another person do you currently need...    Turning from your back to your side while in flat bed without using bedrails?  3  -LN     Moving from lying on back to sitting on the side of a flat bed without bedrails?  3  -LN     Moving to and from a bed to a chair (including a wheelchair)?  2  -LN     Standing up from a chair using your arms (e.g., wheelchair, bedside chair)?  4  -LN     Climbing 3-5 steps with a railing?  3  -LN     To walk in hospital room?  3  -LN     AM-PAC 6 Clicks Score  18  -LN     How much help from another is currently needed...    Putting on and taking off regular lower body clothing? 3  -JH      Bathing (including washing, rinsing, and drying) 3  -JH      Toileting (which includes using toilet bed pan or urinal) 3  -JH      Putting on and taking off regular upper body clothing 3  -JH      Taking care of personal grooming (such as brushing teeth) 4  -JH      Eating meals 4  -      Score 20  -      Functional Assessment    Outcome Measure Options  AM-PAC 6 Clicks Basic Mobility (PT)  -LN       User Key  (r) = Recorded By, (t) = Taken By, (c) = Cosigned By    Initials Name Provider Type    RASHAUN John PTA Physical Therapy Assistant    SHILOH Martinez PTA Physical Therapy Assistant    BEBE Herron [facility] Physical Therapy Assistant    JARETH Hassan GA/L Occupational Therapy Assistant           Time Calculation:         PT Charges       12/10/17 1140          Time Calculation    Start Time 0951  -TA      Stop Time 1006  -TA      Time Calculation (min) 15 min  -TA      Time Calculation- PT    Total Timed Code Minutes- PT 15 minute(s)  -TA        User Key  (r) = Recorded By, (t) = Taken By, (c) = Cosigned By    Initials Name Provider Type    SHILOH Martinez PTA Physical Therapy Assistant          Therapy Charges for Today     Code Description Service Date Service Provider Modifiers Qty    [id number] HC PT  THERAPEUTIC ACT EA 15 MIN 12/10/2017 Claudia Martinez PTA GP 1          PT G-Codes  PT Professional Judgement Used?: Yes  Outcome Measure Options: AM-PAC 6 Clicks Basic Mobility (PT)  Score: 12  Functional Limitation: Mobility: Walking and moving around  Mobility: Walking and Moving Around Current Status (): At least 60 percent but less than 80 percent impaired, limited or restricted  Mobility: Walking and Moving Around Goal Status (): At least 1 percent but less than 20 percent impaired, limited or restricted    Claudia Martinez PTA  12/10/2017

## 2017-12-10 NOTE — PLAN OF CARE
Problem: Patient Care Overview (Adult)  Goal: Plan of Care Review  Outcome: Outcome(s) achieved Date Met:  12/10/17    12/09/17 1644 12/10/17 1137   Coping/Psychosocial Response Interventions   Plan Of Care Reviewed With patient --    Patient Care Overview   Progress improving --    Outcome Evaluation   Outcome Summary/Follow up Plan --  pt independent with transfers & gait, pt met all goals         Problem: Inpatient Physical Therapy  Goal: Bed Mobility Goal LTG- PT  Outcome: Outcome(s) achieved Date Met:  12/10/17    12/05/17 1452 12/06/17 1435 12/09/17 1445   Bed Mobility PT LTG   Bed Mobility PT LTG, Date Established 12/05/17 --  --    Bed Mobility PT LTG, Time to Achieve by discharge --  --    Bed Mobility PT LTG, Activity Type roll left/roll right;supine to sit/sit to supine --  --    Bed Mobility PT LTG, [place] Level --  1 person + 1 person to manage equipment --    Bed Mobility PT LTG, Date Goal Reviewed --  --  12/09/17   Bed Mobility PT LTG, Outcome --  --  --      12/10/17 1137   Bed Mobility PT LTG   Bed Mobility PT LTG, Date Established --    Bed Mobility PT LTG, Time to Achieve --    Bed Mobility PT LTG, Activity Type --    Bed Mobility PT LTG, [place] Level --    Bed Mobility PT LTG, Date Goal Reviewed --    Bed Mobility PT LTG, Outcome goal met       Goal: Transfer Training Goal 1 LTG- PT  Outcome: Outcome(s) achieved Date Met:  12/10/17    12/05/17 1452 12/09/17 1445 12/10/17 1137   Transfer Training PT LTG   Transfer Training PT LTG, Date Established 12/05/17 --  --    Transfer Training PT LTG, Time to Achieve by discharge --  --    Transfer Training PT LTG, Activity Type bed to chair /chair to bed;sit to stand/stand to sit --  --    Transfer Training PT LTG, [place] Level conditional independence --  --    Transfer Training PT LTG, Date Goal Reviewed --  12/09/17 --    Transfer Training PT LTG, Outcome --  --  goal met

## 2017-12-10 NOTE — PROGRESS NOTES
"  Cardiothoracic - Vascular Surgery Daily Note        LOS: 13 days   Patient Care Team:  AHSAN Mayer as PCP - General     POD5 CABGX3    Chief Complaint: Surgical Chest Pain      Subjective     The following portions of the patient's history were reviewed and updated as appropriate: allergies, current medications, past family history, past medical history, past social history, past surgical history and problem list.     Subjective:  Symptoms:  Improved.  No chest pain (surgical).    Diet:  Adequate intake.  No nausea.    Activity level: Returning to normal.    Pain:  He complains of pain that is moderate.  He reports pain is improving.  Pain is requiring pain medication and well controlled.          Objective     Vital Signs  Temp:  [97.7 °F (36.5 °C)-99.3 °F (37.4 °C)] 99 °F (37.2 °C)  Heart Rate:  [] 97  Resp:  [16-20] 16  BP: ()/(57-72) 109/72  Body mass index is 22.05 kg/(m^2).    Intake/Output Summary (Last 24 hours) at 12/10/17 0948  Last data filed at 12/10/17 0900   Gross per 24 hour   Intake             1310 ml   Output                0 ml   Net             1310 ml     I/O this shift:  In: 480 [P.O.:480]  Out: -     Wt Readings from Last 3 Encounters:   12/10/17 67.8 kg (149 lb 6.4 oz)   10/31/17 71.4 kg (157 lb 8 oz)   06/12/17 69.4 kg (153 lb)         Physical Exam   Objective:  General Appearance:  Uncomfortable and comfortable.    Vital signs: (most recent): Blood pressure 109/72, pulse 97, temperature 99 °F (37.2 °C), temperature source Oral, resp. rate 16, height 175.3 cm (69.02\"), weight 67.8 kg (149 lb 6.4 oz), SpO2 94 %.  Vital signs are normal.    Output: Producing urine and producing stool.    HEENT: Normal HEENT exam.    Lungs:  Normal respiratory rate and normal effort.  No decreased breath sounds (bases).  (On RA)  Heart: Tachycardia.    Abdomen: Abdomen is soft and non-distended.  Bowel sounds are normal.     Extremities: Normal range of motion.  There is no local " swelling or dependent edema.    Pulses: Distal pulses are intact.    Neurological: Patient is alert and oriented to person, place and time.    Pupils:  Pupils are equal, round, and reactive to light.    Skin:  Warm.  (M/S INC: Sm amt sanginous drainage distal, Well approximated. prineo strip intact  LLE EVH: CDI)              Results Review:    Lab Results   Component Value Date    WBC 11.72 (H) 12/08/2017    HGB 8.4 (L) 12/08/2017    HCT 24.6 (L) 12/08/2017    MCV 83.7 12/08/2017     12/08/2017       Estimated Creatinine Clearance: 33.4 mL/min (by C-G formula based on Cr of 2.45).      Results from last 7 days  Lab Units 12/06/17  0212 12/05/17  0416 12/04/17  1245   MAGNESIUM mg/dL 2.3 2.4* 4.0*   PHOSPHORUS mg/dL  --   --  4.3       Lab Results   Component Value Date    GLUCOSE 118 (H) 12/10/2017    BUN 31 (H) 12/10/2017    CREATININE 2.45 (H) 12/10/2017    EGFRIFNONA 28 (L) 12/10/2017    BCR 12.7 12/10/2017    K 4.3 12/10/2017    CO2 25.0 12/10/2017    CALCIUM 8.9 12/10/2017    ALBUMIN 3.80 12/10/2017    LABIL2 1.2 12/10/2017    AST 91 (H) 12/10/2017    ALT 46 12/10/2017         Results from last 7 days  Lab Units 12/04/17  1245   INR  1.24*       Imaging Results (last 24 hours)     ** No results found for the last 24 hours. **                                  aspirin 81 mg Oral Daily   atorvastatin 10 mg Oral Daily   cyclobenzaprine 10 mg Oral TID   insulin aspart 0-24 Units Subcutaneous 4x Daily AC & at Bedtime   insulin detemir 20 Units Subcutaneous Nightly   ipratropium-albuterol 3 mL Nebulization 4x Daily - RT   labetalol 100 mg Oral Q12H   levoFLOXacin 250 mg Intravenous Q24H   magnesium oxide 400 mg Oral BID   prenatal vitamin 27-0.8 1 tablet Oral Daily   sennosides-docusate sodium 1 tablet Oral BID   sodium chloride 1-10 mL Intravenous Q8H   tamsulosin 0.4 mg Oral Nightly   ticagrelor 90 mg Oral BID   vitamin C 500 mg Oral BID       amiodarone 1 mg/min   Pharmacy to Dose LevoFLOXacin (LEVAQUIN)               ASSESSMENT/PLAN     Principal Problem:    Coronary artery disease involving native coronary artery of native heart with unstable angina pectoris  Active Problems:    Chronic kidney disease, stage 3    NSTEMI (non-ST elevated myocardial infarction)      Assessment:    Condition: In stable condition.   (Stable Post Op CABG: Progressing well    CAD: ASA, Brilinta, Statin. Beta as BP tolerates.     Fever (Resolved): Aggressive respiratory interventions. BC negative. Urine cx neg. Sputm cx with 3+ WBC. On Levaquin    Resp: Incentive. On RA. Aggressive Pulmonary Toilet    Renal: Dosani managing, Cr elevated-d/c ARB and lasix    Pain: Percocet    Rehab: PT/OT, Ambulate. ARU consult-denied. SNF Transfer for Mon (Bed availability)    Transient Post op Hyperglycemia: SSI coverage. Continue Levemir    CKD: Cr peaked 2.1. Follow.).     Plan:   Transfer Plan: Awaitng d/c to SNF.  Encourage ambulation and out of bed and up to chair.  Continue respiratory treatments and start/continue incentive spirometry.  Diet Plan: cardiac diet.  Increase analgesics, administer medications as ordered and start antibiotics.   (Stable. Progress Care 3W.   SNF Approval for Monday DC. Aggressive pulmonary toilet).               This document has been electronically signed by Darien Dejesus MD on December 10, 2017 9:48 AM

## 2017-12-10 NOTE — PROGRESS NOTES
"[facility]  [address] [place] [address]. [id number]  T - [phone]  F - [phone]     Progress Note          PATIENT  DEMOGRAPHICS   PATIENT NAME: Lyle Corona                      PHYSICIAN: Adriane Butler MD  : 1965  MRN: [id number]   LOS: 13 days    Patient Care Team:  AHSAN Mayer as PCP - General  Subjective   SUBJECTIVE   Doing well overall walking in the corridor no SOA       Objective   OBJECTIVE   Vital Signs  Temp:  [97.7 °F (36.5 °C)-99.3 °F (37.4 °C)] 99 °F (37.2 °C)  Heart Rate:  [] 97  Resp:  [16-20] 16  BP: ()/(57-72) 109/72    Flowsheet Rows         First Filed Value    Admission Height  69\" (175.3 cm) Documented at 2017 1722    Admission Weight  151 lb 12.8 oz (68.9 kg) Documented at 2017 1722           I/O last 3 completed shifts:  In: 1770 [P.O.:1720; IV Piggyback:50]  Out: -     PHYSICAL EXAM    Physical Exam   Constitutional: He is oriented to person, place, and time. He appears well-developed.   Moderately nourished   HENT:   Head: Normocephalic and atraumatic.   Cardiovascular: Normal rate, regular rhythm and normal heart sounds.  Exam reveals no gallop and no friction rub.    No murmur heard.  Pulmonary/Chest: Effort normal and breath sounds normal. No respiratory distress. He has no wheezes. He has no rales. He exhibits no tenderness.   Abdominal: Soft. Bowel sounds are normal. He exhibits no distension and no mass. There is no tenderness. There is no guarding.   Musculoskeletal: He exhibits no edema or tenderness.   Neurological: He is alert and oriented to person, place, and time.   Skin: Skin is warm and dry.   Nursing note and vitals reviewed.      RESULTS   Results Review:      Results from last 7 days  Lab Units 12/10/17  0610 17  1028 17  1014 17  0527   SODIUM mmol/L 130* 132* 133* 133*   POTASSIUM mmol/L 4.3 4.6 5.2* 4.7   CHLORIDE mmol/L 92* 93* 95 93*   CO2 mmol/L 25.0 23.0 25.0 25.0 "   BUN mg/dL 31* 30* 30* 29*   CREATININE mg/dL 2.45* 2.30* 2.14* 2.13*   CALCIUM mg/dL 8.9 8.9 9.1 9.1   BILIRUBIN mg/dL 1.1 1.0  --  1.2   ALK PHOS U/L 316* 178*  --  148*   ALT (SGPT) U/L 46 30  --  14*   AST (SGOT) U/L 91* 48  --  46   GLUCOSE mg/dL 118* 98 107* 99       Estimated Creatinine Clearance: 33.4 mL/min (by C-G formula based on Cr of 2.45).        Results from last 7 days  Lab Units 12/08/17  1832 12/07/17  0614 12/05/17  0416 12/04/17  1245 12/04/17  1131  12/04/17  0407   WBC 10*3/mm3 11.72* 15.64* 10.13* 8.70  --   --  7.07   HEMOGLOBIN g/dL 8.4* 10.9* 9.1* 9.5*  --   --  9.6*   HEMOGLOBIN, POC g/dL  --   --   --   --  8.2  < >  --    PLATELETS 10*3/mm3 231 212 144* 107*  --   --  148*   < > = values in this interval not displayed.      Results from last 7 days  Lab Units 12/04/17  1245   INR  1.24*         Imaging Results (last 24 hours)     ** No results found for the last 24 hours. **           MEDICATIONS      aspirin 81 mg Oral Daily   atorvastatin 10 mg Oral Daily   cyclobenzaprine 10 mg Oral TID   insulin aspart 0-24 Units Subcutaneous 4x Daily AC & at Bedtime   insulin detemir 20 Units Subcutaneous Nightly   ipratropium-albuterol 3 mL Nebulization 4x Daily - RT   labetalol 100 mg Oral Q12H   levoFLOXacin 250 mg Intravenous Q24H   magnesium oxide 400 mg Oral BID   prenatal vitamin 27-0.8 1 tablet Oral Daily   sennosides-docusate sodium 1 tablet Oral BID   sodium chloride 1-10 mL Intravenous Q8H   tamsulosin 0.4 mg Oral Nightly   ticagrelor 90 mg Oral BID   vitamin C 500 mg Oral BID       amiodarone 1 mg/min   Pharmacy to Dose LevoFLOXacin (LEVAQUIN)        Assessment/Plan   ASSESSMENT / PLAN    Principal Problem:    Coronary artery disease involving native coronary artery of native heart with unstable angina pectoris  Active Problems:    Chronic kidney disease, stage 3    NSTEMI (non-ST elevated myocardial infarction)    1. CKD 3: baseline creatinine 1.7-1.9 mg/dl  - Creatinine is slowly  rising while on losartan. Agreed with stopping losartan and lasix.     2. Hypertension:  - Blood pressure is acceptable. On labetalol 100mg BID, off hydralazine and terazosin. Plan as above    3. Anemia:  - Hemoglobin is low and received 1 UPRBC. hgb dropped again and check in am.    4. NSTEMI:   - s/p left heart cath which shows multivessel disease, s/p CABG.       5. Fever tachycardia culture ngsf             This document has been electronically signed by Adriane Butler MD on December 10, 2017 10:29 AM

## 2017-12-11 VITALS
DIASTOLIC BLOOD PRESSURE: 64 MMHG | OXYGEN SATURATION: 95 % | HEART RATE: 90 BPM | WEIGHT: 148.37 LBS | BODY MASS INDEX: 21.98 KG/M2 | HEIGHT: 69 IN | SYSTOLIC BLOOD PRESSURE: 104 MMHG | RESPIRATION RATE: 18 BRPM | TEMPERATURE: 98.3 F

## 2017-12-11 LAB
ALBUMIN SERPL-MCNC: 3.7 G/DL (ref 3.4–4.8)
ALBUMIN/GLOB SERPL: 1.1 G/DL (ref 1.1–1.8)
ALP SERPL-CCNC: 284 U/L (ref 38–126)
ALT SERPL W P-5'-P-CCNC: 41 U/L (ref 21–72)
ANION GAP SERPL CALCULATED.3IONS-SCNC: 13 MMOL/L (ref 5–15)
AST SERPL-CCNC: 67 U/L (ref 17–59)
BACTERIA SPEC AEROBE CULT: NORMAL
BACTERIA SPEC AEROBE CULT: NORMAL
BACTERIA SPEC RESP CULT: NORMAL
BACTERIA SPEC RESP CULT: NORMAL
BASOPHILS # BLD AUTO: 0.02 10*3/MM3 (ref 0–0.2)
BASOPHILS NFR BLD AUTO: 0.2 % (ref 0–2)
BILIRUB SERPL-MCNC: 0.9 MG/DL (ref 0.2–1.3)
BUN BLD-MCNC: 34 MG/DL (ref 7–21)
BUN/CREAT SERPL: 14.8 (ref 7–25)
CALCIUM SPEC-SCNC: 8.8 MG/DL (ref 8.4–10.2)
CHLORIDE SERPL-SCNC: 94 MMOL/L (ref 95–110)
CO2 SERPL-SCNC: 24 MMOL/L (ref 22–31)
CREAT BLD-MCNC: 2.3 MG/DL (ref 0.7–1.3)
DEPRECATED RDW RBC AUTO: 46.3 FL (ref 35.1–43.9)
EOSINOPHIL # BLD AUTO: 0.33 10*3/MM3 (ref 0–0.7)
EOSINOPHIL NFR BLD AUTO: 3.1 % (ref 0–7)
ERYTHROCYTE [DISTWIDTH] IN BLOOD BY AUTOMATED COUNT: 14.8 % (ref 11.5–14.5)
GFR SERPL CREATININE-BSD FRML MDRD: 30 ML/MIN/1.73 (ref 60–130)
GLOBULIN UR ELPH-MCNC: 3.4 GM/DL (ref 2.3–3.5)
GLUCOSE BLD-MCNC: 106 MG/DL (ref 60–100)
GLUCOSE BLDC GLUCOMTR-MCNC: 119 MG/DL (ref 70–130)
GRAM STN SPEC: NORMAL
HCT VFR BLD AUTO: 26.8 % (ref 39–49)
HGB BLD-MCNC: 8.9 G/DL (ref 13.7–17.3)
IMM GRANULOCYTES # BLD: 0.08 10*3/MM3 (ref 0–0.02)
IMM GRANULOCYTES NFR BLD: 0.8 % (ref 0–0.5)
LYMPHOCYTES # BLD AUTO: 3.87 10*3/MM3 (ref 0.6–4.2)
LYMPHOCYTES NFR BLD AUTO: 36.6 % (ref 10–50)
MCH RBC QN AUTO: 28.2 PG (ref 26.5–34)
MCHC RBC AUTO-ENTMCNC: 33.2 G/DL (ref 31.5–36.3)
MCV RBC AUTO: 84.8 FL (ref 80–98)
MONOCYTES # BLD AUTO: 0.72 10*3/MM3 (ref 0–0.9)
MONOCYTES NFR BLD AUTO: 6.8 % (ref 0–12)
NEUTROPHILS # BLD AUTO: 5.54 10*3/MM3 (ref 2–8.6)
NEUTROPHILS NFR BLD AUTO: 52.5 % (ref 37–80)
PLATELET # BLD AUTO: 292 10*3/MM3 (ref 150–450)
PMV BLD AUTO: 8.7 FL (ref 8–12)
POTASSIUM BLD-SCNC: 4.9 MMOL/L (ref 3.5–5.1)
PROT SERPL-MCNC: 7.1 G/DL (ref 6.3–8.6)
RBC # BLD AUTO: 3.16 10*6/MM3 (ref 4.37–5.74)
SODIUM BLD-SCNC: 131 MMOL/L (ref 137–145)
WBC NRBC COR # BLD: 10.56 10*3/MM3 (ref 3.2–9.8)

## 2017-12-11 PROCEDURE — 94760 N-INVAS EAR/PLS OXIMETRY 1: CPT

## 2017-12-11 PROCEDURE — 97535 SELF CARE MNGMENT TRAINING: CPT

## 2017-12-11 PROCEDURE — 97116 GAIT TRAINING THERAPY: CPT

## 2017-12-11 PROCEDURE — 80053 COMPREHEN METABOLIC PANEL: CPT | Performed by: INTERNAL MEDICINE

## 2017-12-11 PROCEDURE — 82962 GLUCOSE BLOOD TEST: CPT

## 2017-12-11 PROCEDURE — 97110 THERAPEUTIC EXERCISES: CPT

## 2017-12-11 PROCEDURE — 85025 COMPLETE CBC W/AUTO DIFF WBC: CPT | Performed by: INTERNAL MEDICINE

## 2017-12-11 PROCEDURE — 94799 UNLISTED PULMONARY SVC/PX: CPT

## 2017-12-11 PROCEDURE — 97750 PHYSICAL PERFORMANCE TEST: CPT

## 2017-12-11 PROCEDURE — 99024 POSTOP FOLLOW-UP VISIT: CPT | Performed by: NURSE PRACTITIONER

## 2017-12-11 RX ORDER — LABETALOL 100 MG/1
100 TABLET, FILM COATED ORAL EVERY 12 HOURS SCHEDULED
Status: ON HOLD
Start: 2017-12-11 | End: 2020-08-18

## 2017-12-11 RX ORDER — GUAIFENESIN 600 MG/1
1200 TABLET, EXTENDED RELEASE ORAL EVERY 12 HOURS SCHEDULED
Status: DISCONTINUED | OUTPATIENT
Start: 2017-12-11 | End: 2017-12-11 | Stop reason: HOSPADM

## 2017-12-11 RX ORDER — PRENATAL VIT/IRON FUM/FOLIC AC 27MG-0.8MG
1 TABLET ORAL DAILY
Status: ON HOLD
Start: 2017-12-12 | End: 2020-08-18

## 2017-12-11 RX ORDER — LEVOFLOXACIN 250 MG/1
250 TABLET ORAL DAILY
Qty: 7 TABLET | Refills: 0
Start: 2017-12-11 | End: 2017-12-18

## 2017-12-11 RX ORDER — ASCORBIC ACID 500 MG
500 TABLET ORAL 2 TIMES DAILY
Status: ON HOLD
Start: 2017-12-11 | End: 2020-08-18

## 2017-12-11 RX ORDER — OXYCODONE HYDROCHLORIDE AND ACETAMINOPHEN 5; 325 MG/1; MG/1
1-2 TABLET ORAL EVERY 4 HOURS PRN
Qty: 36 TABLET | Refills: 0 | Status: SHIPPED | OUTPATIENT
Start: 2017-12-11 | End: 2017-12-15

## 2017-12-11 RX ORDER — IPRATROPIUM BROMIDE AND ALBUTEROL SULFATE 2.5; .5 MG/3ML; MG/3ML
3 SOLUTION RESPIRATORY (INHALATION)
Qty: 360 ML | Status: ON HOLD
Start: 2017-12-11 | End: 2020-08-18

## 2017-12-11 RX ORDER — GUAIFENESIN 600 MG/1
1200 TABLET, EXTENDED RELEASE ORAL EVERY 12 HOURS SCHEDULED
Status: ON HOLD
Start: 2017-12-11 | End: 2020-08-18

## 2017-12-11 RX ADMIN — PRENATAL VIT W/ FE FUMARATE-FA TAB 27-0.8 MG 1 TABLET: 27-0.8 TAB at 08:19

## 2017-12-11 RX ADMIN — CYCLOBENZAPRINE HYDROCHLORIDE 10 MG: 10 TABLET, FILM COATED ORAL at 08:18

## 2017-12-11 RX ADMIN — GUAIFENESIN 1200 MG: 600 TABLET, EXTENDED RELEASE ORAL at 10:44

## 2017-12-11 RX ADMIN — TICAGRELOR 90 MG: 90 TABLET ORAL at 08:19

## 2017-12-11 RX ADMIN — ASPIRIN 81 MG: 81 TABLET, COATED ORAL at 08:18

## 2017-12-11 RX ADMIN — OXYCODONE HYDROCHLORIDE 5 MG: 5 TABLET ORAL at 00:28

## 2017-12-11 RX ADMIN — OXYCODONE HYDROCHLORIDE AND ACETAMINOPHEN 1 TABLET: 5; 325 TABLET ORAL at 05:50

## 2017-12-11 RX ADMIN — MAGNESIUM OXIDE TAB 400 MG (241.3 MG ELEMENTAL MG) 400 MG: 400 (241.3 MG) TAB at 08:18

## 2017-12-11 RX ADMIN — IPRATROPIUM BROMIDE AND ALBUTEROL SULFATE 3 ML: 2.5; .5 SOLUTION RESPIRATORY (INHALATION) at 07:20

## 2017-12-11 RX ADMIN — OXYCODONE HYDROCHLORIDE AND ACETAMINOPHEN 500 MG: 500 TABLET ORAL at 08:18

## 2017-12-11 RX ADMIN — OXYCODONE HYDROCHLORIDE AND ACETAMINOPHEN 1 TABLET: 5; 325 TABLET ORAL at 10:43

## 2017-12-11 RX ADMIN — LABETALOL HYDROCHLORIDE 100 MG: 100 TABLET, FILM COATED ORAL at 10:43

## 2017-12-11 NOTE — PLAN OF CARE
Problem: Patient Care Overview (Adult)  Goal: Plan of Care Review  Outcome: Ongoing (interventions implemented as appropriate)    12/11/17 1045   Coping/Psychosocial Response Interventions   Plan Of Care Reviewed With patient   Patient Care Overview   Progress progress toward functional goals as expected   Outcome Evaluation   Outcome Summary/Follow up Plan t/f's ind,amb 120',247' w/o ad ind no lob,27/28 tinetti-all goals met       Goal: Discharge Needs Assessment  Outcome: Ongoing (interventions implemented as appropriate)    12/01/17 0459 12/05/17 1424 12/05/17 1452   Discharge Needs Assessment   Concerns To Be Addressed --  --  homelessness;home safety concerns   Concerns Comments --  --  pt was homeless before admit; will need support system and safe location post d/c   Readmission Within The Last 30 Days no previous admission in last 30 days --  --    Equipment Needed After Discharge --  --  walker, rolling   Discharge Facility/Level Of Care Needs --  --  skilled transitional care;rehabilitation facility   Current Discharge Risk chronically ill;financial support inadequate --  --    Discharge Disposition still a patient --  --    Discharge Planning Comments --  --  if d/c to homeless situation, it appears he will need to be as strong as possible to survive homelessnes   Current Health   Outpatient/Agency/Support Group Needs --  --  skilled nursing facility (specify);inpatient rehabilitation facility (specify)   Anticipated Changes Related to Illness --  --  inability to care for self   Self-Care   Equipment Currently Used at Home --  none --    Living Environment   Transportation Available --  public transportation;family or friend will provide --          Problem: Inpatient Physical Therapy  Goal: Bed Mobility Goal LTG- PT  Outcome: Outcome(s) achieved Date Met:  12/11/17 12/05/17 1452 12/06/17 1435 12/11/17 1045   Bed Mobility PT LTG   Bed Mobility PT LTG, Date Established 12/05/17 --  --    Bed Mobility  PT LTG, Time to Achieve by discharge --  --    Bed Mobility PT LTG, Activity Type roll left/roll right;supine to sit/sit to supine --  --    Bed Mobility PT LTG, [place] Level --  1 person + 1 person to manage equipment --    Bed Mobility PT LTG, Date Goal Reviewed --  --  12/11/17   Bed Mobility PT LTG, Outcome --  --  goal met       Goal: Transfer Training Goal 1 LTG- PT  Outcome: Outcome(s) achieved Date Met:  12/11/17 12/05/17 1452 12/11/17 1045   Transfer Training PT LTG   Transfer Training PT LTG, Date Established 12/05/17 --    Transfer Training PT LTG, Time to Achieve by discharge --    Transfer Training PT LTG, Activity Type bed to chair /chair to bed;sit to stand/stand to sit --    Transfer Training PT LTG, [place] Level conditional independence --    Transfer Training PT LTG, Date Goal Reviewed --  12/11/17   Transfer Training PT LTG, Outcome --  goal met       Goal: Gait Training Goal LTG- PT  Outcome: Outcome(s) achieved Date Met:  12/11/17  Goal: Stair Training Goal LTG- PT  Outcome: Outcome(s) achieved Date Met:  12/11/17 12/05/17 1452 12/07/17 1403 12/11/17 1045   Stair Training PT LTG   Stair Training Goal PT LTG, Date Established --  12/07/17 --    Stair Training Goal PT LTG, Time to Achieve by discharge --  --    Stair Training Goal PT LTG, Number of Steps 4 --  --    Stair Training Goal PT LTG, [place] Level conditional independence --  --    Stair Training Goal PT LTG, Date Goal Reviewed --  --  12/11/17   Stair Training Goal PT LTG, Outcome --  --  goal met

## 2017-12-11 NOTE — THERAPY TREATMENT NOTE
Acute Care - Physical Therapy Treatment Note  [facility]     Patient Name: Lyle Corona  : 1965  MRN: [id number]  Today's Date: 2017  Onset of Illness/Injury or Date of Surgery Date: 17 (CABG 17)  Date of Referral to PT: 17  Referring Physician: AHSAN Oglesby    Admit Date: 2017    Visit Dx:    ICD-10-CM ICD-9-CM   1. Coronary arteriosclerosis I25.10 414.00   2. Unstable angina I20.0 411.1   3. NSTEMI (non-ST elevated myocardial infarction) I21.4 410.70   4. Coronary artery disease involving native coronary artery of native heart with unstable angina pectoris I25.110 414.01     411.1   5. Impaired functional mobility, balance, gait, and endurance Z74.09 V49.89   6. Impaired mobility and ADLs Z74.09 799.89     Patient Active Problem List   Diagnosis   • Essential hypertension   • Hypertensive emergency, no CHF   • Coronary artery disease involving native coronary artery of native heart with unstable angina pectoris   • Chronic kidney disease, stage 3   • LVH (left ventricular hypertrophy)   • NSTEMI (non-ST elevated myocardial infarction)   • Unstable angina               Adult Rehabilitation Note       17 1045 17 0847 12/10/17 0951    Rehab Assessment/Intervention    Discipline physical therapy assistant  -LN occupational therapist  - physical therapy assistant  -TA    Document Type therapy note (daily note)  -LN therapy note (daily note)  - therapy note (daily note)  -TA    Subjective Information agree to therapy;complains of;pain   pt preparing to d/c but agreed to therapy  - agree to therapy;complains of;pain   coughing chest hurting not sleep well  - agree to therapy  -TA    Patient Effort, Rehab Treatment  adequate  - good  -TA    Symptoms Noted During/After Treatment  fatigue;significant change in vital signs  -     Symptoms Noted Comment  Pt O2 reading appeared to drop, OT had max difficulty getting an accurate reading after  toileting and sink act. RN notified of difficulty with getting O2 reading.   -     Precautions/Limitations cardiac precautions;fall precautions;sternal precautions  - cardiac precautions;fall precautions;sternal precautions  - cardiac precautions;fall precautions;sternal precautions  -TA    Specific Treatment Considerations  Pt did not have chest huggar tightened around when OT came in pt adjusted as needed during treatment.   -     Patient Response to Treatment  Pt became agitated with tooth brushing task and OT attempted to educate on purpose. Pt attempted majority of tasks and even in pain engaged.   -BH     Recorded by [LN] Denise Herron PTA [BH] Rossy Ospina OTR/L [TA] Claudia Martinez PTA    Vital Signs    Pre Systolic BP Rehab  112  -BH     Pre Treatment Diastolic BP  72  -BH     Post Systolic BP Rehab 126  -  -BH     Post Treatment Diastolic BP 72  -LN 73  -BH     Pretreatment Heart Rate (beats/min)  102  -  -TA    Intratreatment Heart Rate (beats/min) 90  -LN 65   then 99   -  -TA    Posttreatment Heart Rate (beats/min) 110  -  -  -TA    Pre SpO2 (%) --   unable to obtain  -LN 92  -BH 94  -TA    O2 Delivery Pre Treatment  room air  - room air  -TA    Intra SpO2 (%)  --   first read 81 then different machine 94 after a few minutes  - 96  -TA    O2 Delivery Intra Treatment  room air  - room air  -TA    Post SpO2 (%)  92  -BH 94  -TA    O2 Delivery Post Treatment  room air  -     Pre Patient Position Sitting  -LN Supine   [facility] up  - Standing   with RN  -TA    Intra Patient Position Standing  - Sitting  -BH     Post Patient Position Supine  -LN Supine   [facility] up  - Sitting  -TA    Recorded by [LN] Denise Herron PTA [] Rossy Ospina OTR/L [TA] Claudia Martinez PTA    Pain Assessment    Pain Assessment 0-10  -LN  0-10  -TA    Pain Score 4  -LN 7  -BH 6  -TA    Post Pain Score 4  -LN 7  -BH 6  -TA    Pain Type Surgical pain  -LN  Surgical pain  -TA    Pain  Location Chest  -LN Chest  -BH Chest  -TA    Pain Orientation   Mid  -TA    Pain Intervention(s) --   pre med  -LN Repositioned;Ambulation/increased activity   RN stated not time for pain meds yet  -     Recorded by [LN] Denise Herron, LINDA [] Rossy Ospina, OTR/L [TA] Claudia Martinez PTA    Vision Assessment/Intervention    Visual Impairment  WFL with corrective lenses  -     Recorded by  [] Rossy Ospina, OTR/L     Cognitive Assessment/Intervention    Current Cognitive/Communication Assessment  functional  - functional  -TA    Orientation Status oriented x 4  -LN oriented x 4  -BH oriented x 4  -TA    Follows Commands/Answers Questions  100% of the time  - 100% of the time  -TA    Personal Safety  mild impairment  - fully aware of deficits  -    Personal Safety Interventions  fall prevention program maintained;muscle strengthening facilitated;nonskid shoes/slippers when out of bed;supervised activity  - fall prevention program maintained;nonskid shoes/slippers when out of bed  -TA    Recorded by [LN] Denise Herron, LINDA [] Rossy Ospina, OTR/L [TA] Claudia Martinez PTA    Bed Mobility, Assessment/Treatment    Bed Mob, Supine to Sit, [place]  conditional independence  - independent  -TA    Bed Mob, Sit to Supine, [place]  conditional independence  - independent  -TA    Bed Mobility, Comment  HOB up  -     Recorded by  [] Rossy Ospina, OTR/L [TA] Claudia Martinez PTA    Transfer Assessment/Treatment    Transfers, Bed-Chair [place] independent  -LN      Transfers, Chair-Bed [place] independent  -LN      Transfers, Bed-Chair-Bed, Assist Device --   none  -LN      Transfers, Sit-Stand [place] independent  -LN supervision required  - independent  -TA    Transfers, Stand-Sit [place] independent  -LN supervision required  - independent  -TA    Transfers, Sit-Stand-Sit, Assist Device --   none  -LN --   none  -     Toilet Transfer, [place]  supervision  required  -     Toilet Transfer, Assistive Device  --   none  -BH     Recorded by [LN] Denise Herron PTA [BH] VALENTÍN Herrera/DWAYNE [TA] Claudia Martinez PTA    Gait Assessment/Treatment    Gait, [place] Level independent  -LN  independent  -TA    Gait, Assistive Device --   none  -LN  other (see comments)   no AD  -TA    Gait, Distance (Feet) 120   247  -LN  400  -TA    Recorded by [LN] Denise Herron PTA  [TA] Claudia Martinez PTA    Functional Mobility    Functional Mobility- Ind. Level  supervision required  -     Functional Mobility- Device  --   none  -     Functional Mobility-Distance (Feet)  --   approx 8  feet to bathroom and back to bed   -     Functional Mobility- Comment  pt slightly quick in movements slight safey concern  -     Recorded by  [] VALENTÍN Herrera/L     Upper Body Dressing Assessment/Training    UB Dressing Assess/Train, Comment  Pt declined further ADL at this date of bathing and dressing stating too much pain.   -     Recorded by  [] VALENTÍN Herrera/L     Lower Body Dressing Assessment/Training    LB Dressing Assess/Train, Comment  Attempted sock act pt reports too painful with right sock and unable to reach this date, pt able to reach his left sock did not fully take on and off. OT educated on what a sock aid is and how to use pt declined for OT to get one and demonstrate.   -     Recorded by  [] VALENTÍN Herrera/L     Toileting Assessment/Training    Toileting Assess/Train, Comment  Pt was SBA to demonstrate including clothing management to sit and stand off toilet.   -     Recorded by  [] VALENTÍN Herrera/L     Grooming Assessment/Training    Grooming Assess/Train, Comment  Pt SBA to stand at sink for approx 3-4 minutes to brush his teeth. no LOB noted set up was at the sink for supplies.   -     Recorded by  [] VALENTÍN Herrera/L     Therapy Exercises    Bilateral Lower Extremities   AROM:;20 reps;sitting;ankle pumps/circles;glut  sets;LAQ;hip flexion  -TA    Bilateral Upper Extremity  15 reps;AROM:;elbow flexion/extension;shoulder extension/flexion;hand pumps;pronation/supination   wrist flexion/extension all in CABG precations.   -     Recorded by  [] VALENTÍN Herrera/L [TA] Claudia Martinez PTA    Positioning and Restraints    Pre-Treatment Position  in bed  -BH standing in room  -TA    Post Treatment Position bed  -LN bed  -BH bed  -TA    In Bed supine;call light within reach;encouraged to call for assist  -LN supine;call light within reach;encouraged to call for assist;notified nsg  - sitting EOB;call light within reach  -TA    Recorded by [LN] Denise Herron PTA [] VALENTÍN Herrera/DWAYNE [TA] Claudia Martinez PTA      12/09/17 1445 12/09/17 1425       Rehab Assessment/Intervention    Discipline physical therapy assistant  -RASHAUN occupational therapy assistant  -LM     Document Type therapy note (daily note)  -JA therapy note (daily note)  -LM     Subjective Information agree to therapy  -JA agree to therapy  -LM     Patient Effort, Rehab Treatment good  - good  -LM     Precautions/Limitations cardiac precautions;fall precautions;sternal precautions  - cardiac precautions   provided ed on all cabg precautions and safety  -LM     Recorded by [JA] Jamir John PTA [LM] TUCKER WilkinsonA/L     Vital Signs    Post Systolic BP Rehab 114  -JA      Post Treatment Diastolic BP 75  -JA      Intratreatment Heart Rate (beats/min) 122   during gt. attempt   -      Posttreatment Heart Rate (beats/min) 110  -      Intra SpO2 (%) 96  -JA      O2 Delivery Intra Treatment room air  -JA      Pre Patient Position Standing   with nsg. in doorway preparing to ambulate  -RASHAUN Sitting  -LM     Intra Patient Position  Sitting  -LM     Post Patient Position Sitting   EOB  -JA Sitting  -LM     Recorded by [RASHAUN] Jamir John PTA [LM] TUCKER WilkinsonA/L     Pain Assessment    Pain Assessment No/denies pain  -JA      Pain Score 0   -JA 5  -LM     Post Pain Score 0  -JA 5  -LM     Pain Location  Chest  -LM     Pain Intervention(s)  Medication (See MAR)  -LM     Recorded by [RASHAUN] Jamir John PTA [LM] HARMEET Wilkinson/L     Cognitive Assessment/Intervention    Current Cognitive/Communication Assessment  functional  -LM     Orientation Status  oriented x 4  -LM     Follows Commands/Answers Questions  100% of the time  -LM     Recorded by  [LM] HARMEET Wilkinson/L     Bed Mobility, Assessment/Treatment    Bed Mob, Supine to Sit, [place] supervision required;verbal cues required   v.c.'s for use of chest hugger & rolling to L   -JA      Bed Mob, Sit to Supine, [place] supervision required;verbal cues required   v.c.'s for use of chest hugger   -JA      Recorded by [RASHAUN] Jamir John PTA      Transfer Assessment/Treatment    Transfers, Stand-Sit [place] independent  -JA      Recorded by [RASHAUN] Jamir John PTA      Gait Assessment/Treatment    Gait, [place] Level independent  -JA      Gait, Assistive Device --   no AD  -JA      Gait, Distance (Feet) 200  -JA      Recorded by [RASHAUN] Jamir John PTA      Therapy Exercises    Bilateral Lower Extremities AROM:;20 reps;sitting;hip flexion;LAQ;hip abduction/adduction  -JA      Bilateral Upper Extremity  AROM:;elbow flexion/extension;shoulder abduction/adduction;shoulder extension/flexion;shoulder protraction/retraction   all ex with pain and cabg prec ed on using hugger  -LM     Recorded by [RASHAUN] Jamir John PTA [LM] HARMEET Wilkinson/L     Positioning and Restraints    Pre-Treatment Position standing in room   with nsg.   -JA      Post Treatment Position bed  -JA      In Bed sitting EOB;call light within reach  -JA      Recorded by [RASHAUN] Jamir John PTA        User Key  (r) = Recorded By, (t) = Taken By, (c) = Cosigned By    Initials Name Effective Dates     Rossy Ospina, OTR/L 10/17/16 -     RASHAUN John PTA  10/17/16 -     TA Claudia Martinez, PTA 10/17/16 -     LN Denise S Germaine, PTA 10/17/16 -     LM Pushpa Victoria, GA/L 10/17/16 -                 IP PT Goals       12/11/17 1045 12/10/17 1137 12/09/17 1445    Bed Mobility PT LTG    Bed Mobility PT LTG, Date Goal Reviewed 12/11/17  -LN  12/09/17  -JA    Bed Mobility PT LTG, Outcome goal met  -LN goal met  -TA goal ongoing  -JA    Transfer Training PT LTG    Transfer Training PT  LTG, Date Goal Reviewed 12/11/17  -LN  12/09/17  -JA    Transfer Training PT LTG, Outcome goal met  -LN goal met  -TA goal ongoing  -JA    Gait Training PT LTG    Gait Training Goal PT LTG, Date Goal Reviewed 12/11/17  -LN  12/09/17  -JA    Gait Training Goal PT LTG, Outcome goal met  -LN  goal met  -JA    Stair Training PT LTG    Stair Training Goal PT LTG, Date Goal Reviewed 12/11/17  -LN      Stair Training Goal PT LTG, Outcome goal met  -LN        12/08/17 1058 12/07/17 1403 12/06/17 1435    Bed Mobility PT LTG    Bed Mobility PT LTG, [place] Level   1 person + 1 person to manage equipment  -LN    Bed Mobility PT LTG, Date Goal Reviewed 12/08/17  -LN 12/07/17  -LN 12/06/17  -LN    Bed Mobility PT LTG, Outcome goal not met  -LN goal not met  -LN goal not met  -LN    Transfer Training PT LTG    Transfer Training PT  LTG, Date Goal Reviewed 12/08/17  -LN 12/07/17  -LN 12/06/17  -LN    Transfer Training PT LTG, Outcome goal not met  -LN goal not met  -LN goal not met  -LN    Gait Training PT LTG    Gait Training Goal PT LTG, Date Goal Reviewed 12/08/17  -LN 12/07/17  -LN 12/06/17  -LN    Gait Training Goal PT LTG, Outcome goal not met  -LN goal not met  -LN goal not met  -LN    Stair Training PT LTG    Stair Training Goal PT LTG, Date Established  12/07/17  -LN     Stair Training Goal PT LTG, Date Goal Reviewed 12/08/17  -LN 12/07/17  -LN 12/06/17  -LN    Stair Training Goal PT LTG, Outcome goal met  -LN goal not met  -LN goal not met  -LN      12/05/17 1452          Bed Mobility PT LTG     Bed Mobility PT LTG, Date Established 12/05/17  -GB      Bed Mobility PT LTG, Time to Achieve by discharge  -GB      Bed Mobility PT LTG, Activity Type roll left/roll right;supine to sit/sit to supine  -GB      Bed Mobility PT LTG, [place] Level conditional independence  -GB      Bed Mobility PT LTG, Outcome goal not met  -GB      Transfer Training PT LTG    Transfer Training PT LTG, Date Established 12/05/17  -GB      Transfer Training PT LTG, Time to Achieve by discharge  -GB      Transfer Training PT LTG, Activity Type bed to chair /chair to bed;sit to stand/stand to sit  -GB      Transfer Training PT LTG, [place] Level conditional independence  -GB      Transfer Training PT LTG, Outcome goal not met  -GB      Gait Training PT LTG    Gait Training Goal PT LTG, Date Established 12/05/17  -GB      Gait Training Goal PT LTG, Time to Achieve by discharge  -GB      Gait Training Goal PT LTG, [place] Level conditional independence  -GB      Gait Training Goal PT LTG, Outcome goal not met  -GB      Stair Training PT LTG    Stair Training Goal PT LTG, Date Established 12/05/17  -GB      Stair Training Goal PT LTG, Time to Achieve by discharge  -GB      Stair Training Goal PT LTG, Number of Steps 4  -GB      Stair Training Goal PT LTG, [place] Level conditional independence  -GB      Stair Training Goal PT LTG, Outcome goal not met  -GB        User Key  (r) = Recorded By, (t) = Taken By, (c) = Cosigned By    Initials Name Provider Type    JENNIFER Chandler, PT Physical Therapist    RASHAUN John, PTA Physical Therapy Assistant    SHILOH Martinez, PTA Physical Therapy Assistant    BEBE Herron, PTA Physical Therapy Assistant          Physical Therapy Education     Title: PT OT SLP Therapies (Active)     Topic: Physical Therapy (Active)     Point: Mobility training (Done)    Learning Progress Summary    Learner Readiness Method Response Comment Documented by Status   Patient  Acceptance E VU,NR  LN 12/11/17 1131 Done    Acceptance E VU,NR Proper use of chest hugger & tech. with sup-sit-sup during bed mobility this p.m. JA 12/09/17 1530 Done    Acceptance E VU,NR reviewed and copies given of hep and cardiac/sternal precautions LN 12/08/17 1135 Done    Acceptance E NR cardiac precautions LN 12/06/17 1650 Active    Acceptance E,D NR,DU,VU POC; coughing w/ abdominal muscles; use of pillow to help cough; tx GB 12/05/17 1450 Done               Point: Home exercise program (Done)    Learning Progress Summary    Learner Readiness Method Response Comment Documented by Status   Patient Acceptance E VU,NR  LN 12/11/17 1131 Done    Acceptance E VU,NR reviewed and copies given of hep and cardiac/sternal precautions LN 12/08/17 1135 Done    Acceptance E,D NR,DU,VU POC; coughing w/ abdominal muscles; use of pillow to help cough; tx GB 12/05/17 1450 Done               Point: Body mechanics (Active)    Learning Progress Summary    Learner Readiness Method Response Comment Documented by Status   Patient Acceptance E NR cardiac precautions LN 12/06/17 1650 Active    Acceptance E,D NR,DU,VU POC; coughing w/ abdominal muscles; use of pillow to help cough; tx GB 12/05/17 1450 Done               Point: Precautions (Done)    Learning Progress Summary    Learner Readiness Method Response Comment Documented by Status   Patient Acceptance E VU,NR  LN 12/11/17 1131 Done    Acceptance E VU,DU chest precautions TA 12/10/17 1135 Done    Acceptance E VU,NR Proper use of chest hugger & tech. with sup-sit-sup during bed mobility this p.m. JA 12/09/17 1530 Done    Acceptance E VU,NR reviewed and copies given of hep and cardiac/sternal precautions LN 12/08/17 1135 Done    Acceptance E NR cardiac precautions LN 12/06/17 1650 Active    Acceptance E,D NR,DU,VU POC; coughing w/ abdominal muscles; use of pillow to help cough; tx GB 12/05/17 1450 Done                      User Key     Initials Effective Dates Name Provider Type  "Discipline    GB 10/17/16 -  Paige Chandler, PT Physical Therapist PT    JA 10/17/16 -  Jamir John, PTA Physical Therapy Assistant PT    TA 10/17/16 -  Claudia Martinez, PTA Physical Therapy Assistant PT    LN 10/17/16 -  Denise Herron, PTA Physical Therapy Assistant PT                    PT Recommendation and Plan  Anticipated Equipment Needs At Discharge: front wheeled walker  Anticipated Discharge Disposition: home with home health  Planned Therapy Interventions: bed mobility training, gait training, home exercise program, stair training, strengthening, transfer training  PT Frequency: daily  Plan of Care Review  Plan Of Care Reviewed With: patient  Progress: progress toward functional goals as expected  Outcome Summary/Follow up Plan: t/f's ind,amb 120',247' w/o ad ind no lob,27/28 tinetti-all goals met          Outcome Measures       12/11/17 1100 12/11/17 1045 12/11/17 1000    How much help from another person do you currently need...    Turning from your back to your side while in flat bed without using bedrails?  4  -LN     Moving from lying on back to sitting on the side of a flat bed without bedrails?  4  -LN     Moving to and from a bed to a chair (including a wheelchair)?  4  -LN     Standing up from a chair using your arms (e.g., wheelchair, bedside chair)?  4  -LN     Climbing 3-5 steps with a railing?  4  -LN     To walk in hospital room?  4  -LN     AM-PAC 6 Clicks Score  24  -LN     Tinetti Assessment    Sitting Balance  1  -LN     Arises  2  -LN     Attempts to Rise  2  -LN     Immediate Standing Balance (first 5 sec)  2  -LN     Standing Balance  2  -LN     Sternal Nudge (feet close together)  2  -LN     Eyes Closed (feet close together)  1  -LN     Turning 360 Degrees- Steps  1  -LN     Turning 360 Degrees- Steadiness  1  -LN     Sitting Down  2  -LN     [place] Balance Score  16  -LN     Gait Initiation (immediate after told \"go\")  1  -LN     Step Length- Right Swing  1  -LN     " Step Length- Left Swing  1  -LN     Foot Clearance- Right Foot  1  -LN     Foot Clearance- Left Foot  1  -LN     Step Symmetry  1  -LN     Step Continuity  1  -LN     Path (excursion)  1  -LN     Trunk  2  -LN     Base of Support  1  -LN     Gait Score  11  -LN     T[place]etti Total Score  27  -LN     Functional Assessment    Outcome Measure Options AM-PAC 6 Clicks Basic Mobility (PT)  -LN  --  -LN      12/11/17 0847 12/10/17 1100 12/09/17 1445    How much help from another person do you currently need...    Turning from your back to your side while in flat bed without using bedrails?  4  -TA 3  -JA    Moving from lying on back to sitting on the side of a flat bed without bedrails?  4  -TA 3  -JA    Moving to and from a bed to a chair (including a wheelchair)?  4  -TA 3  -JA    Standing up from a chair using your arms (e.g., wheelchair, bedside chair)?  4  -TA 4  -JA    Climbing 3-5 steps with a railing?  4  -TA 4  -JA    To walk in hospital room?  4  -TA 4  -JA    AM-PAC 6 Clicks Score  24  -TA 21  -JA    How much help from another is currently needed...    Putting on and taking off regular lower body clothing? 3  -BH      Bathing (including washing, rinsing, and drying) 3  -BH      Toileting (which includes using toilet bed pan or urinal) 3  -BH      Putting on and taking off regular upper body clothing 3  -BH      Taking care of personal grooming (such as brushing teeth) 4  -BH      Eating meals 4  -BH      Score 20  -      Functional Assessment    Outcome Measure Options AM-PAC 6 Clicks Daily Activity (OT)  - AM-PAC 6 Clicks Basic Mobility (PT)  -TA AM-PAC 6 Clicks Basic Mobility (PT)  -      User Key  (r) = Recorded By, (t) = Taken By, (c) = Cosigned By    Initials Name Provider Type     Rossy Ospina, OTR/L Occupational Therapist    RASHAUN John, PTA Physical Therapy Assistant    SHILOH Martinez, PTA Physical Therapy Assistant    BEBE Herron, PTA Physical Therapy Assistant           Time  Calculation:         PT Charges       12/11/17 1045          Time Calculation    Start Time 1045  -LN      Stop Time 1110  -LN      Time Calculation (min) 25 min  -LN      PT Received On 12/11/17  -LN      Time Calculation- PT    Total Timed Code Minutes- PT 25 minute(s)  -LN        User Key  (r) = Recorded By, (t) = Taken By, (c) = Cosigned By    Initials Name Provider Type    LN Denise Herron PTA Physical Therapy Assistant          Therapy Charges for Today     Code Description Service Date Service Provider Modifiers Qty    14343560085 HC GAIT TRAINING EA 15 MIN 12/11/2017 Denise Herron, PTA GP 1    [id number] HC PT PHYS PERF TEST/MEASURE EA 15MIN 12/11/2017 Denise Herron, PTA GP 1          PT G-Codes  PT Professional Judgement Used?: Yes  Outcome Measure Options: AM-PAC 6 Clicks Basic Mobility (PT)  Score: 12  Functional Limitation: Mobility: Walking and moving around  Mobility: Walking and Moving Around Current Status (): At least 60 percent but less than 80 percent impaired, limited or restricted  Mobility: Walking and Moving Around Goal Status (): At least 1 percent but less than 20 percent impaired, limited or restricted    Denise Herron PTA  12/11/2017

## 2017-12-11 NOTE — THERAPY TREATMENT NOTE
Acute Care - Occupational Therapy Treatment Note  [facility]     Patient Name: Lyle Corona  : 1965  MRN: [id number]  Today's Date: 2017  Onset of Illness/Injury or Date of Surgery Date: 17 (CABG 17)  Date of Referral to OT: 17  Referring Physician: AHSAN Oglesby      Admit Date: 2017    Visit Dx:     ICD-10-CM ICD-9-CM   1. Coronary arteriosclerosis I25.10 414.00   2. Unstable angina I20.0 411.1   3. NSTEMI (non-ST elevated myocardial infarction) I21.4 410.70   4. Coronary artery disease involving native coronary artery of native heart with unstable angina pectoris I25.110 414.01     411.1   5. Impaired functional mobility, balance, gait, and endurance Z74.09 V49.89   6. Impaired mobility and ADLs Z74.09 799.89     Patient Active Problem List   Diagnosis   • Essential hypertension   • Hypertensive emergency, no CHF   • Coronary artery disease involving native coronary artery of native heart with unstable angina pectoris   • Chronic kidney disease, stage 3   • LVH (left ventricular hypertrophy)   • NSTEMI (non-ST elevated myocardial infarction)   • Unstable angina             Adult Rehabilitation Note       17 0847 12/10/17 0951 17 1445    Rehab Assessment/Intervention    Discipline occupational therapist  - physical therapy assistant  -TA physical therapy assistant  -JA    Document Type therapy note (daily note)  - therapy note (daily note)  -TA therapy note (daily note)  -JA    Subjective Information agree to therapy;complains of;pain   coughing chest hurting not sleep well  - agree to therapy  -TA agree to therapy  -JA    Patient Effort, Rehab Treatment adequate  - good  -TA good  -JA    Symptoms Noted During/After Treatment fatigue;significant change in vital signs  -      Symptoms Noted Comment Pt O2 reading appeared to drop, OT had max difficulty getting an accurate reading after toileting and sink act. RN notified of difficulty with  getting O2 reading.   -      Precautions/Limitations cardiac precautions;fall precautions;sternal precautions  - cardiac precautions;fall precautions;sternal precautions  -TA cardiac precautions;fall precautions;sternal precautions  -    Specific Treatment Considerations Pt did not have chest huggar tightened around when OT came in pt adjusted as needed during treatment.   -      Patient Response to Treatment Pt became agitated with tooth brushing task and OT attempted to educate on purpose. Pt attempted majority of tasks and even in pain engaged.   -      Recorded by [] Rossy Ospina, OTR/L [TA] Claudia Martinez PTA [JA] Jamir John PTA    Vital Signs    Pre Systolic BP Rehab 112  -      Pre Treatment Diastolic BP 72  -      Post Systolic BP Rehab 110  -  114  -JA    Post Treatment Diastolic BP 73  -BH  75  -JA    Pretreatment Heart Rate (beats/min) 102  - 104  -TA     Intratreatment Heart Rate (beats/min) 65   then 99   - 111  -   during gt. attempt   -JA    Posttreatment Heart Rate (beats/min) 100  - 110  -  -JA    Pre SpO2 (%) 92  - 94  -TA     O2 Delivery Pre Treatment room air  - room air  -TA     Intra SpO2 (%) --   first read 81 then different machine 94 after a few minutes  - 96  -TA 96  -JA    O2 Delivery Intra Treatment room air  - room air  - room air  -    Post SpO2 (%) 92  - 94  -TA     O2 Delivery Post Treatment room air  -      Pre Patient Position Supine   [facility] up  - Standing   with RN  -TA Standing   with nsg. in doorway preparing to ambulate  -    Intra Patient Position Sitting  -      Post Patient Position Supine   [facility] up  - Sitting  -TA Sitting   EOB  -JA    Recorded by [] Rossy Ospina, OTR/L [TA] Claudia Martinez PTA [JA] Jamir John PTA    Pain Assessment    Pain Assessment  0-10  -TA No/denies pain  -    Pain Score 7  - 6  -TA 0  -JA    Post Pain Score 7  - 6  -TA 0  -JA    Pain Type  Surgical pain  -TA      Pain Location Chest  - Chest  -TA     Pain Orientation  Mid  -TA     Pain Intervention(s) Repositioned;Ambulation/increased activity   RN stated not time for pain meds yet  -      Recorded by [] Rossy Ospina OTR/L [TA] Claudia Martinez PTA [JA] Jamir John PTA    Vision Assessment/Intervention    Visual Impairment WFL with corrective lenses  -      Recorded by [] Rossy Ospina OTR/L      Cognitive Assessment/Intervention    Current Cognitive/Communication Assessment functional  - functional  -     Orientation Status oriented x 4  - oriented x 4  -TA     Follows Commands/Answers Questions 100% of the time  - 100% of the time  -     Personal Safety mild impairment  - fully aware of deficits  -     Personal Safety Interventions fall prevention program maintained;muscle strengthening facilitated;nonskid shoes/slippers when out of bed;supervised activity  - fall prevention program maintained;nonskid shoes/slippers when out of bed  -TA     Recorded by [] Rossy Ospina OTR/L [TA] Claudia Martinez PTA     Bed Mobility, Assessment/Treatment    Bed Mob, Supine to Sit, [place] conditional independence  - independent  -TA supervision required;verbal cues required   v.c.'s for use of chest hugger & rolling to L   -    Bed Mob, Sit to Supine, [place] conditional independence  - independent  -TA supervision required;verbal cues required   v.c.'s for use of chest hugger   -    Bed Mobility, Comment HOB up  -      Recorded by [] Rossy Ospina OTR/L [TA] Claudia Martinez PTA [JA] Jamir John PTA    Transfer Assessment/Treatment    Transfers, Sit-Stand [place] supervision required  - independent  -TA     Transfers, Stand-Sit [place] supervision required  - independent  - independent  -JA    Transfers, Sit-Stand-Sit, Assist Device --   none  -      Toilet Transfer, [place] supervision required  -      Toilet Transfer, Assistive Device --    none  -      Recorded by [] VALENTÍN Herrera/DWAYNE [TA] Claudia Martinez PTA [JA] Jamir John PTA    Gait Assessment/Treatment    Gait, [place] Level  independent  -TA independent  -JA    Gait, Assistive Device  other (see comments)   no AD  -TA --   no AD  -JA    Gait, Distance (Feet)  400  -  -JA    Recorded by  [TA] Claudia Martinez PTA [JA] Jamir John PTA    Functional Mobility    Functional Mobility- Ind. Level supervision required  -      Functional Mobility- Device --   none  -      Functional Mobility-Distance (Feet) --   approx 8  feet to bathroom and back to bed   -      Functional Mobility- Comment pt slightly quick in movements slight safey concern  -      Recorded by [] VALENTÍN Herrera/L      Upper Body Dressing Assessment/Training    UB Dressing Assess/Train, Comment Pt declined further ADL at this date of bathing and dressing stating too much pain.   -      Recorded by [] VALENTÍN Herrera/L      Lower Body Dressing Assessment/Training    LB Dressing Assess/Train, Comment Attempted sock act pt reports too painful with right sock and unable to reach this date, pt able to reach his left sock did not fully take on and off. OT educated on what a sock aid is and how to use pt declined for OT to get one and demonstrate.   -      Recorded by [] VALENTÍN Herrera/L      Toileting Assessment/Training    Toileting Assess/Train, Comment Pt was SBA to demonstrate including clothing management to sit and stand off toilet.   -      Recorded by [] VALENTÍN Herrera/L      Grooming Assessment/Training    Grooming Assess/Train, Comment Pt SBA to stand at sink for approx 3-4 minutes to brush his teeth. no LOB noted set up was at the sink for supplies.   -      Recorded by [] VALENTÍN Herrera/L      Therapy Exercises    Bilateral Lower Extremities  AROM:;20 reps;sitting;ankle pumps/circles;glut sets;LAQ;hip flexion  -TA AROM:;20 reps;sitting;hip  flexion;LAQ;hip abduction/adduction  -JA    Bilateral Upper Extremity 15 reps;AROM:;elbow flexion/extension;shoulder extension/flexion;hand pumps;pronation/supination   wrist flexion/extension all in CABG precations.   -BH      Recorded by [] VALENTÍN Herrera/L [TA] Claudia Martinez PTA [JA] Jamir John PTA    Positioning and Restraints    Pre-Treatment Position in bed  -BH standing in room  -TA standing in room   with nsg.   -JA    Post Treatment Position bed  -BH bed  -TA bed  -JA    In Bed supine;call light within reach;encouraged to call for assist;notified nsg  - sitting EOB;call light within reach  -TA sitting EOB;call light within reach  -JA    Recorded by [] VALENTÍN Herrera/DWAYNE [TA] Claudia Martinez PTA [JA] Jamir John PTA      12/09/17 1425 12/08/17 1058 12/08/17 0955    Rehab Assessment/Intervention    Discipline occupational therapy assistant  -LM physical therapy assistant  -LN occupational therapy assistant  -    Document Type therapy note (daily note)  -LM therapy note (daily note)  - therapy note (daily note)  -    Subjective Information agree to therapy  -LM agree to therapy;complains of;pain  -LN agree to therapy  -    Patient Effort, Rehab Treatment good  -LM  good  -    Precautions/Limitations cardiac precautions   provided ed on all cabg precautions and safety  -LM cardiac precautions;fall precautions;oxygen therapy device and L/min;sternal precautions  -LN cardiac precautions  -    Recorded by [] HARMEET Wilkinson/L [LN] Denise Herron PTA [] TUCKER LunaA/L    Vital Signs    Pre Systolic BP Rehab  124  -LN     Pre Treatment Diastolic BP  47  -LN     Intra Systolic BP Rehab  128  -LN     Intra Treatment Diastolic BP  64  -LN     Post Systolic BP Rehab  108  -LN     Post Treatment Diastolic BP  57  -LN     Pretreatment Heart Rate (beats/min)  75  -  -JH    Intratreatment Heart Rate (beats/min)  83  -LN     Posttreatment Heart Rate  (beats/min)  100   nsg aware of all vs  -LN     Pre SpO2 (%)  --   unable to obtain  -LN 96  -JH    O2 Delivery Pre Treatment   room air  -JH    Pre Patient Position Sitting  -LM Sitting  -LN     Intra Patient Position Sitting  -LM Standing  -LN     Post Patient Position Sitting  -LM Sitting  -LN     Recorded by [LM] MARK Wilkinson [LN] Denise Herron PTA [JH] HARMEET Luna/L    Pain Assessment    Pain Assessment  0-10  -LN 0-10  -JH    Pain Score 5  -LM 6  -LN 6  -JH    Post Pain Score 5  -LM 6  -LN 7  -JH    Pain Type  Acute pain;Surgical pain  -LN Surgical pain  -JH    Pain Location Chest  -LM Chest  -LN Chest  -JH    Pain Intervention(s) Medication (See MAR)  -LM --   per nsg meds not due  -LN Repositioned  -JH    Recorded by [LM] MARK Wilkinson [LN] Denise Herron, LINDA [JH] HARMEET Luna/L    Cognitive Assessment/Intervention    Current Cognitive/Communication Assessment functional  -LM  functional  -JH    Orientation Status oriented x 4  -LM oriented to;person     -LN oriented x 4  -JH    Follows Commands/Answers Questions 100% of the time  -LM  100% of the time  -JH    Recorded by [LM] MARK Wilkinson [LN] Denise Herron, LINDA [JH] HARMEET Luna/L    Bed Mobility, Assessment/Treatment    Bed Mob, Supine to Sit, [place]  not tested  -LN     Bed Mob, Sit to Supine, [place]  not tested  -LN     Recorded by  [LN] Denise Herron PTA     Transfer Assessment/Treatment    Transfers, Sit-Stand [place]  independent  -LN independent  -JH    Transfers, Stand-Sit [place]  independent  -LN independent  -JH    Toilet Transfer, [place]   supervision required  -JH    Recorded by  [LN] Denise Herron PTA [JH] HARMEET Luna/L    Gait Assessment/Treatment    Gait, [place] Level  supervision required;independent  -LN     Gait, Assistive Device  --   none  -LN     Gait, Distance (Feet)  280  -LN     Gait, Comment  decreased distance due to pt not  wanting to increase pain since pain meds not due for another 1 1/2 hrs  -LN     Recorded by  [LN] Denise Herron PTA     Stairs Assessment/Treatment    Number of Stairs  5  -LN     Stairs, Handrail Location  none  -LN     Stairs, [place] Level  independent  -LN     Stairs, Technique Used  step over step (ascending);step over step (descending)  -LN     Recorded by  [LN] Denise Herron PTA     Toileting Assessment/Training    Toileting Assess/Train, [place] Level   supervision required  -JH    Recorded by   [JH] HARMEET Luna/L    Grooming Assessment/Training    Grooming Assess/Train, [place]ep[place] Level   supervision required  -JH    Recorded by   [JH] MARK Luna    Therapy Exercises    Bilateral Lower Extremities  AROM:;20 reps;sitting;ankle pumps/circles;hip flexion;LAQ  -LN     Bilateral Upper Extremity AROM:;elbow flexion/extension;shoulder abduction/adduction;shoulder extension/flexion;shoulder protraction/retraction   all ex with pain and cabg prec ed on using hugger  -LM  AROM:;15 reps;sitting;elbow flexion/extension;hand pumps;pronation/supination;shoulder ER/IR  -JH    BUE Resistance   manual resistance- minimal  -JH    Recorded by [LM] HARMEET Wilkinson/DWAYNE [LN] Denise Herron, LINDA [JH] HARMEET Luna/L    Positioning and Restraints    Pre-Treatment Position   sitting in chair/recliner  -JH    Post Treatment Position  chair  -LN chair  -JH    In Bed  --  -LN     In Chair  sitting;call light within reach;encouraged to call for assist  -LN sitting;call light within reach;encouraged to call for assist;with other staff  -JH    Recorded by  [LN] Denise Herron, LINDA [JH] TUCKER LunaA/L      User Key  (r) = Recorded By, (t) = Taken By, (c) = Cosigned By    Initials Name Effective Dates     Rossy Ospina, OTR/L 10/17/16 -     RASHAUN John, PTA 10/17/16 -     SHILOH Martinez, PTA 10/17/16 -     LN Denise Herron, LINDA 10/17/16 -     MARK Soler 10/17/16 -     LM  Pushpa Victoria, GA/L 10/17/16 -                 OT Goals       12/11/17 0847 12/09/17 1644 12/08/17 1354    Transfer Training OT LTG    Transfer Training OT LTG, Date Goal Reviewed 12/11/17  -BH 12/09/17  -LM 12/08/17  -    Transfer Training OT LTG, Outcome goal met  - goal ongoing  -LM     Patient Education OT LTG    Patient Education OT LTG, Date Goal Reviewed 12/11/17  -BH 12/09/17  -LM 12/08/17  -    Patient Education OT LTG Outcome goal ongoing  - goal ongoing  -LM     ADL OT LTG    ADL OT LTG, Date Goal Reviewed 12/11/17  -BH 12/09/17  -LM 12/08/17  -    ADL OT LTG, Outcome goal partially met   for grooming/toileting tasks  - goal ongoing  -LM     Functional Mobility OT LTG    Functional Mobility Goal OT LTG, Date Goal Reviewed 12/11/17  -BH 12/09/17  -LM 12/08/17  -    Functional Mobility Goal OT LTG, Outcome goal met  - goal ongoing  -LM     Endurance OT LTG    Endurance Goal OT LTG, Date Goal Reviewed 12/11/17  -BH 12/09/17  -LM 12/08/17  -    Endurance Goal OT LTG, Outcome goal ongoing  - goal ongoing  -LM       12/07/17 1310 12/07/17 0845 12/06/17 0915    Transfer Training OT LTG    Transfer Training OT LTG, Date Goal Reviewed 12/07/17  -KD  12/06/17  -KD    Transfer Training OT LTG, Outcome goal not met  -KD  goal not met  -KD    Patient Education OT LTG    Patient Education OT LTG, Date Goal Reviewed 12/07/17  -KD  12/06/17  -KD    Patient Education OT LTG Outcome goal not met  -KD  goal not met  -KD    ADL OT LTG    ADL OT LTG, Date Goal Reviewed 12/07/17  -KD  12/06/17  -KD    ADL OT LTG, Outcome goal not met  -KD  goal not met  -KD    Functional Mobility OT LTG    Functional Mobility Goal OT LTG, Date Goal Reviewed   12/06/17  -KD    Functional Mobility Goal OT LTG, Outcome goal not met  -KD  goal not met  -KD    Endurance OT LTG    Endurance Goal OT LTG, Date Goal Reviewed  12/07/17  -KD 12/06/17  -KD    Endurance Goal OT LTG, Outcome  goal not met  -KD       12/05/17 1426           Transfer Training OT LTG    Transfer Training OT LTG, Date Established 12/05/17  -      Transfer Training OT LTG, Time to Achieve by discharge  -      Transfer Training OT LTG, Activity Type toilet  -      Transfer Training OT LTG, [place] Level supervision required  -      Patient Education OT LTG    Patient Education OT LTG, Date Established 12/05/17  -      Patient Education OT LTG, Time to Achieve by discharge  -      Patient Education OT LTG, Education Type HEP;precautions per surgeon;home safety;energy conservation;adaptive equipment mgmt  -      Patient Education OT LTG, Education Understanding independent;demonstrates adequately;verbalizes understanding  -      ADL OT LTG    ADL OT LTG, Date Established 12/05/17  -      ADL OT LTG, Time to Achieve by discharge  -      ADL OT LTG, Activity Type ADL skills  -      ADL OT LTG, [place] Level standby assist  -      Functional Mobility OT LTG    Functional Mobility Goal OT LTG, Date Established 12/05/17  -      Functional Mobility Goal OT LTG, Time to Achieve by discharge  -      Functional Mobility Goal OT LTG, [place] Level supervision  -      Functional Mobility Goal OT LTG, Distance to Achieve to the sink;to the bathroom  -      Endurance OT LTG    Endurance Goal OT LTG, Date Established 12/05/17  -      Endurance Goal OT LTG, Time to Achieve by discharge  -      Endurance Goal OT LTG, Activity Level endurance 2 good-  -        User Key  (r) = Recorded By, (t) = Taken By, (c) = Cosigned By    Initials Name Provider Type     VALENTÍN Herrrea/L Occupational Therapist    JENNIFER Bower GA/L Occupational Therapy Assistant     Kandis Hassan GA/L Occupational Therapy Assistant    DARIUS Victoria GA/L Occupational Therapy Assistant          Occupational Therapy Education     Title: PT OT SLP Therapies (Active)     Topic: Occupational Therapy (Done)     Point: ADL training (Done)     Description: Instruct learner(s) on proper safety adaptation and remediation techniques during self care or transfers.   Instruct in proper use of assistive devices.    Learning Progress Summary    Learner Readiness Method Response Comment Documented by Status   Patient Acceptance E VU Educated about OT. Reviewed over/educated on CABG precuations min assist to recall all of them. Educated on safety throughout and to call for assist.  12/11/17 0931 Done    Acceptance E VU ed onsfaety and all cabg precautions with ex and tf and heart hugger use  12/09/17 1643 Done    Acceptance E VU Educated about OT and POC. Educated on CABG precuations. Educated to call for assist and not get up on his own.  12/05/17 1520 Done               Point: Home exercise program (Done)    Description: Instruct learner(s) on appropriate technique for monitoring, assisting and/or progressing therapeutic exercises/activities.    Learning Progress Summary    Learner Readiness Method Response Comment Documented by Status   Patient Acceptance E VU ed onsfaety and all cabg precautions with ex and tf and heart hugger use  12/09/17 1643 Done               Point: Precautions (Done)    Description: Instruct learner(s) on prescribed precautions during self-care and functional transfers.    Learning Progress Summary    Learner Readiness Method Response Comment Documented by Status   Patient Acceptance E VU Educated about OT. Reviewed over/educated on CABG precuations min assist to recall all of them. Educated on safety throughout and to call for assist.  12/11/17 0931 Done    Acceptance E VU ed onsfaety and all cabg precautions with ex and tf and heart hugger use  12/09/17 1643 Done    Acceptance E VU Educated about OT and POC. Educated on CABG precuations. Educated to call for assist and not get up on his own.  12/05/17 1520 Done               Point: Body mechanics (Done)    Description: Instruct learner(s) on proper positioning and spine  alignment during self-care, functional mobility activities and/or exercises.    Learning Progress Summary    Learner Readiness Method Response Comment Documented by Status   Patient Acceptance E VU Educated about OT. Reviewed over/educated on CABG precuations min assist to recall all of them. Educated on safety throughout and to call for assist.  12/11/17 0931 Done    Acceptance E VU ed onsfaety and all cabg precautions with ex and tf and heart hugger use  12/09/17 1643 Done    Acceptance E VU Educated about OT and POC. Educated on CABG precuations. Educated to call for assist and not get up on his own.  12/05/17 1520 Done                      User Key     Initials Effective Dates Name Provider Type Discipline     10/17/16 -  VALENTÍN Herrera/L Occupational Therapist OT     10/17/16 -  HARMEET Wilkinson/L Occupational Therapy Assistant OT                  OT Recommendation and Plan  Anticipated Discharge Disposition: inpatient rehabilitation facility  Planned Therapy Interventions: activity intolerance, adaptive equipment training, ADL retraining, IADL retraining, balance training, bed mobility training, energy conservation, fine motor coordination training, home exercise program, motor coordination training, ROM (Range of Motion), strengthening, transfer training  Therapy Frequency: other (see comments) (3-14x a week)  Plan of Care Review  Plan Of Care Reviewed With: patient  Progress: progress towards functional goals is fair  Outcome Summary/Follow up Plan: OT treatment this date. Pt progressing towards goals and independence. Pt struggles with LB tasks with increased pain and discomfort. Pt met goals of SBA for toilet t/f and sink act. Pt had fair toleration for treatment today with increased pain from coughing at night and not sleeping well. Pt could cont to benefit from skilled OT to increase safety, education, endurance, and independence with ADL. Recommend d/c to SNF or home with 24/7 care  and further cardiac rehab.         Outcome Measures       12/11/17 0847 12/10/17 1100 12/09/17 1445    How much help from another person do you currently need...    Turning from your back to your side while in flat bed without using bedrails?  4  -TA 3  -JA    Moving from lying on back to sitting on the side of a flat bed without bedrails?  4  -TA 3  -JA    Moving to and from a bed to a chair (including a wheelchair)?  4  -TA 3  -JA    Standing up from a chair using your arms (e.g., wheelchair, bedside chair)?  4  -TA 4  -JA    Climbing 3-5 steps with a railing?  4  -TA 4  -JA    To walk in hospital room?  4  -TA 4  -JA    AM-PAC 6 Clicks Score  24  -TA 21  -JA    How much help from another is currently needed...    Putting on and taking off regular lower body clothing? 3  -BH      Bathing (including washing, rinsing, and drying) 3  -BH      Toileting (which includes using toilet bed pan or urinal) 3  -BH      Putting on and taking off regular upper body clothing 3  -BH      Taking care of personal grooming (such as brushing teeth) 4  -BH      Eating meals 4  -BH      Score 20  -BH      Functional Assessment    Outcome Measure Options AM-PAC 6 Clicks Daily Activity (OT)  - AM-PAC 6 Clicks Basic Mobility (PT)  - AM-PAC 6 Clicks Basic Mobility (PT)  -      12/08/17 1058 12/08/17 0955       How much help from another person do you currently need...    Turning from your back to your side while in flat bed without using bedrails? 3  -LN      Moving from lying on back to sitting on the side of a flat bed without bedrails? 3  -LN      Moving to and from a bed to a chair (including a wheelchair)? 3  -LN      Standing up from a chair using your arms (e.g., wheelchair, bedside chair)? 4  -LN      Climbing 3-5 steps with a railing? 4  -LN      To walk in hospital room? 3  -LN      AM-PAC 6 Clicks Score 20  -LN      How much help from another is currently needed...    Putting on and taking off regular lower body clothing?   3  -JH     Bathing (including washing, rinsing, and drying)  3  -JH     Toileting (which includes using toilet bed pan or urinal)  3  -JH     Putting on and taking off regular upper body clothing  3  -JH     Taking care of personal grooming (such as brushing teeth)  4  -JH     Eating meals  4  -     Score  20  -     Functional Assessment    Outcome Measure Options AM-PAC 6 Clicks Basic Mobility (PT)  -        User Key  (r) = Recorded By, (t) = Taken By, (c) = Cosigned By    Initials Name Provider Type     Rossy Ospina OTR/L Occupational Therapist    RASHAUN John, PTA Physical Therapy Assistant    TA Claudia Martinez, PTA Physical Therapy Assistant    BEBE Herron, PTA Physical Therapy Assistant    JARETH Hassan, GA/L Occupational Therapy Assistant           Time Calculation:         Time Calculation- OT       12/11/17 0936          Time Calculation-     OT Start Time 0847  -      OT Stop Time 0921  -      OT Time Calculation (min) 34 min  -      Total Timed Code Minutes- OT 34 minute(s)  -      OT Received On 12/11/17  -        User Key  (r) = Recorded By, (t) = Taken By, (c) = Cosigned By    Initials Name Provider Type     Rossy Ospina OTR/L Occupational Therapist           Therapy Charges for Today     Code Description Service Date Service Provider Modifiers Qty    [id number]  OT SELF CARE/MGMT/TRAIN EA 15 MIN 12/11/2017 Rossy Ospina OTR/L GO 1    [id number]  OT THER PROC EA 15 MIN 12/11/2017 VALENTÍN Herrera/L GO 1          OT G-codes  OT Professional Judgement Used?: Yes  OT Functional Scales Options: AM-PAC 6 Clicks Daily Activity (OT)  Score: 12  Functional Limitation: Self care  Self Care Current Status (): At least 60 percent but less than 80 percent impaired, limited or restricted  Self Care Goal Status (): At least 40 percent but less than 60 percent impaired, limited or restricted    VALENTÍN Herrera/L  12/11/2017

## 2017-12-11 NOTE — THERAPY DISCHARGE NOTE
Acute Care - Occupational Therapy Initial Eval/Discharge  [facility]     Patient Name: Lyle Corona  : 1965  MRN: [id number]  Today's Date: 2017  Onset of Illness/Injury or Date of Surgery Date: 17 (CABG 17)  Date of Referral to OT: 17  Referring Physician: AHSAN Oglesby      Admit Date: 2017       ICD-10-CM ICD-9-CM   1. Coronary arteriosclerosis I25.10 414.00   2. Unstable angina I20.0 411.1   3. NSTEMI (non-ST elevated myocardial infarction) I21.4 410.70   4. Coronary artery disease involving native coronary artery of native heart with unstable angina pectoris I25.110 414.01     411.1   5. Impaired functional mobility, balance, gait, and endurance Z74.09 V49.89   6. Impaired mobility and ADLs Z74.09 799.89     Patient Active Problem List   Diagnosis   • Essential hypertension   • Hypertensive emergency, no CHF   • Coronary artery disease involving native coronary artery of native heart with unstable angina pectoris   • Chronic kidney disease, stage 3   • LVH (left ventricular hypertrophy)   • NSTEMI (non-ST elevated myocardial infarction)   • Unstable angina     Past Medical History:   Diagnosis Date   • Aneurysm of infrarenal abdominal aorta    • Anxiety    • Cervical radiculitis    • Cervical spondylosis without myelopathy    • CHF (congestive heart failure)    • Chronic kidney disease, stage 3    • Common iliac aneurysm    • Coronary arteriosclerosis    • Degeneration of cervical intervertebral disc    • Essential hypertension    • GERD (gastroesophageal reflux disease)    • Headache    • Hyperlipidemia    • Intermittent claudication    • Myocardial infarction    • Uric acid renal calculus      Past Surgical History:   Procedure Laterality Date   • APPENDECTOMY     • CARDIAC CATHETERIZATION  2016    Cardiac cath 18546 (2) - Patent left circumflex stent.Chronic total occlusion of the RCA,more than 20 mm in length.   • CARDIAC CATHETERIZATION N/A  11/28/2017    Procedure: Left Heart Cath/ PCI if indicated;  Surgeon: Carlos Charles MD;  Location: [facility] CATH INVASIVE LOCATION;  Service:    • CERVICAL FUSION     • CHOLECYSTECTOMY     • CORONARY ARTERY BYPASS GRAFT N/A 12/4/2017    Procedure: CORONARY ARTERY BYPASS GRAFTINGX3, ENDOSCOPIC VEIN HARVEST     (CELL SAVER);  Surgeon: Darien Dejesus MD;  Location: [facility] OR;  Service:    • HERNIA REPAIR     • SPINAL FUSION            OT ASSESSMENT FLOWSHEET (last 72 hours)      OT Evaluation       12/11/17 1340 12/11/17 1045 12/11/17 0847 12/10/17 0951 12/09/17 1445    Rehab Evaluation    Document Type  therapy note (daily note)  -LN therapy note (daily note)  - therapy note (daily note)  -TA therapy note (daily note)  -JA    Subjective Information  agree to therapy;complains of;pain   pt preparing to d/c but agreed to therapy  - agree to therapy;complains of;pain   coughing chest hurting not sleep well  - agree to therapy  -TA agree to therapy  -JA    Patient Effort, Rehab Treatment   adequate  - good  -TA good  -JA    Symptoms Noted During/After Treatment   fatigue;significant change in vital signs  -      Symptoms Noted Comment   Pt O2 reading appeared to drop, OT had max difficulty getting an accurate reading after toileting and sink act. RN notified of difficulty with getting O2 reading.   -      General Information    Precautions/Limitations  cardiac precautions;fall precautions;sternal precautions  -LN cardiac precautions;fall precautions;sternal precautions  - cardiac precautions;fall precautions;sternal precautions  -TA cardiac precautions;fall precautions;sternal precautions  -JA    Clinical Impression    Anticipated Discharge Disposition skilled nursing facility  -NN        Vital Signs    Pre Systolic BP Rehab   112  -BH      Pre Treatment Diastolic BP   72  -BH      Post Systolic BP Rehab  126  -  -BH  114  -JA    Post Treatment Diastolic BP  72  -LN 73  -BH  75  -JA     Pretreatment Heart Rate (beats/min)   102  -  -TA     Intratreatment Heart Rate (beats/min)  90  -LN 65   then 99   -  -   during gt. attempt   -JA    Posttreatment Heart Rate (beats/min)  110  -  -  -  -JA    Pre SpO2 (%)  --   unable to obtain  -LN 92  -BH 94  -TA     O2 Delivery Pre Treatment   room air  - room air  -TA     Intra SpO2 (%)   --   first read 81 then different machine 94 after a few minutes  - 96  -TA 96  -JA    O2 Delivery Intra Treatment   room air  - room air  -TA room air  -JA    Post SpO2 (%)   92  -BH 94  -TA     O2 Delivery Post Treatment   room air  -      Pre Patient Position  Sitting  -LN Supine   hospitals up  - Standing   with RN  -TA Standing   with nsg. in doorway preparing to ambulate  -    Intra Patient Position  Standing  -LN Sitting  -      Post Patient Position  Supine  -LN Supine   hospitals up  - Sitting  -TA Sitting   EOB  -JA    Pain Assessment    Pain Assessment  0-10  -LN  0-10  -TA No/denies pain  -JA    Pain Score  4  -LN 7  -BH 6  -TA 0  -JA    Post Pain Score  4  -LN 7  - 6  -TA 0  -JA    Pain Type  Surgical pain  -LN  Surgical pain  -TA     Pain Location  Chest  -LN Chest  - Chest  -TA     Pain Orientation    Mid  -TA     Pain Intervention(s)  --   pre med  -LN Repositioned;Ambulation/increased activity   RN stated not time for pain meds yet  -      Vision Assessment/Intervention    Visual Impairment   WFL with corrective lenses  -      Cognitive Assessment/Intervention    Current Cognitive/Communication Assessment   functional  - functional  -     Orientation Status  oriented x 4  -LN oriented x 4  -BH oriented x 4  -TA     Follows Commands/Answers Questions   100% of the time  - 100% of the time  -TA     Personal Safety   mild impairment  - fully aware of deficits  -TA     Personal Safety Interventions   fall prevention program maintained;muscle strengthening facilitated;nonskid shoes/slippers when out of  bed;supervised activity  - fall prevention program maintained;nonskid shoes/slippers when out of bed  -     Bed Mobility, Assessment/Treatment    Bed Mob, Supine to Sit, [place]   conditional independence  - independent  -TA supervision required;verbal cues required   v.c.'s for use of chest hugger & rolling to L   -    Bed Mob, Sit to Supine, [place]   conditional independence  - independent  -TA supervision required;verbal cues required   v.c.'s for use of chest hugger   -JA    Bed Mobility, Comment   HOB up  -      Transfer Assessment/Treatment    Transfers, Bed-Chair [place]  independent  -LN       Transfers, Chair-Bed [place]  independent  -LN       Transfers, Bed-Chair-Bed, Assist Device  --   none  -LN       Transfers, Sit-Stand [place]  independent  -LN supervision required  - independent  -TA     Transfers, Stand-Sit [place]  independent  -LN supervision required  - independent  -TA independent  -JA    Transfers, Sit-Stand-Sit, Assist Device  --   none  -LN --   none  -      Toilet Transfer, [place]   supervision required  -      Toilet Transfer, Assistive Device   --   none  -      Functional Mobility    Functional Mobility- Ind. Level   supervision required  -      Functional Mobility- Device   --   none  -      Functional Mobility-Distance (Feet)   --   approx 8  feet to bathroom and back to bed   -      Functional Mobility- Comment   pt slightly quick in movements slight safey concern  -      Upper Body Dressing Assessment/Training    UB Dressing Assess/Train, Comment   Pt declined further ADL at this date of bathing and dressing stating too much pain.   -      Lower Body Dressing Assessment/Training    LB Dressing Assess/Train, Comment   Attempted sock act pt reports too painful with right sock and unable to reach this date, pt able to reach his left sock did not fully take on and off. OT educated on what a sock aid is and how to use pt  declined for OT to get one and demonstrate.   -      Toileting Assessment/Training    Toileting Assess/Train, Comment   Pt was SBA to demonstrate including clothing management to sit and stand off toilet.   -      Grooming Assessment/Training    Grooming Assess/Train, Comment   Pt SBA to stand at sink for approx 3-4 minutes to brush his teeth. no LOB noted set up was at the sink for supplies.   -      Therapy Exercises    Bilateral Lower Extremities    AROM:;20 reps;sitting;ankle pumps/circles;glut sets;LAQ;hip flexion  -TA AROM:;20 reps;sitting;hip flexion;LAQ;hip abduction/adduction  -    Bilateral Upper Extremity   15 reps;AROM:;elbow flexion/extension;shoulder extension/flexion;hand pumps;pronation/supination   wrist flexion/extension all in CABG precations.   -      Positioning and Restraints    Pre-Treatment Position   in bed  - standing in room  -TA standing in room   with nsg.   -    Post Treatment Position  bed  -LN bed  - bed  -TA bed  -    In Bed  supine;call light within reach;encouraged to call for assist  -LN supine;call light within reach;encouraged to call for assist;notified [facility]  - sitting EOB;call light within reach  - sitting EOB;call light within reach  -JA      12/09/17 2485                Rehab Evaluation    Document Type therapy note (daily note)  -LM        Subjective Information agree to therapy  -LM        Patient Effort, Rehab Treatment good  -LM        General Information    Precautions/Limitations cardiac precautions   provided ed on all cabg precautions and safety  -LM        Vital Signs    Pre Patient Position Sitting  -LM        Intra Patient Position Sitting  -LM        Post Patient Position Sitting  -LM        Pain Assessment    Pain Score 5  -LM        Post Pain Score 5  -LM        Pain Location Chest  -LM        Pain Intervention(s) Medication (See MAR)  -LM        Cognitive Assessment/Intervention    Current Cognitive/Communication Assessment functional  -LM         Orientation Status oriented x 4  -LM        Follows Commands/Answers Questions 100% of the time  -LM        Therapy Exercises    Bilateral Upper Extremity AROM:;elbow flexion/extension;shoulder abduction/adduction;shoulder extension/flexion;shoulder protraction/retraction   all ex with pain and cabg prec ed on using hugger  -LM          User Key  (r) = Recorded By, (t) = Taken By, (c) = Cosigned By    Initials Name Effective Dates     Rossy JOANA Ospina, OTR/L 10/17/16 -     JA Jamir John, PTA 10/17/16 -     TA Claudia Martinez, PTA 10/17/16 -     LN Denise Herron, PTA 10/17/16 -     LM Pushpa Victoria, GA/L 10/17/16 -     NN Clarice Garcia, OTR/L 11/08/17 -           Occupational Therapy Education     Title: PT OT SLP Therapies (Active)     Topic: Occupational Therapy (Resolved)     Point: ADL training (Resolved)    Description: Instruct learner(s) on proper safety adaptation and remediation techniques during self care or transfers.   Instruct in proper use of assistive devices.    Learning Progress Summary    Learner Readiness Method Response Comment Documented by Status   Patient Acceptance E VU Educated about OT. Reviewed over/educated on CABG precuations min assist to recall all of them. Educated on safety throughout and to call for assist.  12/11/17 0931 Done    Acceptance E VU ed onsfaety and all cabg precautions with ex and tf and heart hugger use LM 12/09/17 1643 Done    Acceptance E VU Educated about OT and POC. Educated on CABG precuations. Educated to call for assist and not get up on his own.  12/05/17 1520 Done               Point: Home exercise program (Resolved)    Description: Instruct learner(s) on appropriate technique for monitoring, assisting and/or progressing therapeutic exercises/activities.    Learning Progress Summary    Learner Readiness Method Response Comment Documented by Status   Patient Acceptance E VU ed onsfaety and all cabg precautions with ex and tf and heart  hugger use  12/09/17 1643 Done               Point: Precautions (Resolved)    Description: Instruct learner(s) on prescribed precautions during self-care and functional transfers.    Learning Progress Summary    Learner Readiness Method Response Comment Documented by Status   Patient Acceptance E VU Educated about OT. Reviewed over/educated on CABG precuations min assist to recall all of them. Educated on safety throughout and to call for assist.  12/11/17 0931 Done    Acceptance E VU ed onsfaety and all cabg precautions with ex and tf and heart hugger use  12/09/17 1643 Done    Acceptance E VU Educated about OT and POC. Educated on CABG precuations. Educated to call for assist and not get up on his own.  12/05/17 1520 Done               Point: Body mechanics (Resolved)    Description: Instruct learner(s) on proper positioning and spine alignment during self-care, functional mobility activities and/or exercises.    Learning Progress Summary    Learner Readiness Method Response Comment Documented by Status   Patient Acceptance E VU Educated about OT. Reviewed over/educated on CABG precuations min assist to recall all of them. Educated on safety throughout and to call for assist.  12/11/17 0931 Done    Acceptance E VU ed onsfaety and all cabg precautions with ex and tf and heart hugger use  12/09/17 1643 Done    Acceptance E VU Educated about OT and POC. Educated on CABG precuations. Educated to call for assist and not get up on his own.  12/05/17 1520 Done                      User Key     Initials Effective Dates Name Provider Type Discipline     10/17/16 -  VALENTÍN Herrera/L Occupational Therapist OT     10/17/16 -  HARMEET Wilkinson/L Occupational Therapy Assistant OT                OT Recommendation and Plan  Anticipated Discharge Disposition: skilled nursing facility  Planned Therapy Interventions: activity intolerance, adaptive equipment training, ADL retraining, IADL retraining,  balance training, bed mobility training, energy conservation, fine motor coordination training, home exercise program, motor coordination training, ROM (Range of Motion), strengthening, transfer training  Therapy Frequency: other (see comments) (3-14x a week)              OT Goals       12/11/17 1339 12/11/17 0847 12/09/17 1644    Transfer Training OT LTG    Transfer Training OT LTG, Date Goal Reviewed  12/11/17  - 12/09/17  -LM    Transfer Training OT LTG, Outcome  goal met  - goal ongoing  -LM    Patient Education OT LTG    Patient Education OT LTG, Date Goal Reviewed 12/11/17  -NN 12/11/17  - 12/09/17  -LM    Patient Education OT LTG Outcome goal not met  - goal ongoing  - goal ongoing  -LM    Patient Education OT LTG, Reason Goal Not Met discharged from facility  -      ADL OT LTG    ADL OT LTG, Date Goal Reviewed 12/11/17  -NN 12/11/17  - 12/09/17  -LM    ADL OT LTG, Outcome goal not met  - goal partially met   for grooming/toileting tasks  - goal ongoing  -LM    ADL OT LTG, Reason Goal Not Met discharged from facility  -NN      Functional Mobility OT LTG    Functional Mobility Goal OT LTG, Date Goal Reviewed  12/11/17  - 12/09/17  -LM    Functional Mobility Goal OT LTG, Outcome  goal met  - goal ongoing  -LM    Endurance OT LTG    Endurance Goal OT LTG, Date Goal Reviewed 12/11/17  -NN 12/11/17  - 12/09/17  -LM    Endurance Goal OT LTG, Outcome goal not met  - goal ongoing  - goal ongoing  -LM    Endurance Goal OT LTG, Reason Goal Not Met discharged from facility  -        12/08/17 1354 12/07/17 1310 12/07/17 0845    Transfer Training OT LTG    Transfer Training OT LTG, Date Goal Reviewed 12/08/17  - 12/07/17  -KD     Transfer Training OT LTG, Outcome  goal not met  -KD     Patient Education OT LTG    Patient Education OT LTG, Date Goal Reviewed 12/08/17  - 12/07/17  -KD     Patient Education OT LTG Outcome  goal not met  -KD     ADL OT LTG    ADL OT LTG, Date Goal Reviewed  12/08/17  - 12/07/17  -     ADL OT LTG, Outcome  goal not met  -     Functional Mobility OT LTG    Functional Mobility Goal OT LTG, Date Goal Reviewed 12/08/17  -      Functional Mobility Goal OT LTG, Outcome  goal not met  -     Endurance OT LTG    Endurance Goal OT LTG, Date Goal Reviewed 12/08/17  -  12/07/17  -    Endurance Goal OT LTG, Outcome   goal not met  -      12/06/17 0915 12/05/17 1424       Transfer Training OT LTG    Transfer Training OT LTG, Date Established  12/05/17  -     Transfer Training OT LTG, Time to Achieve  by discharge  -     Transfer Training OT LTG, Activity Type  toilet  -     Transfer Training OT LTG, [place] Level  supervision required  -     Transfer Training OT LTG, Date Goal Reviewed 12/06/17  -      Transfer Training OT LTG, Outcome goal not met  -      Patient Education OT LTG    Patient Education OT LTG, Date Established  12/05/17  -     Patient Education OT LTG, Time to Achieve  by discharge  -     Patient Education OT LTG, Education Type  HEP;precautions per surgeon;home safety;energy conservation;adaptive equipment mgmt  -     Patient Education OT LTG, Education Understanding  independent;demonstrates adequately;verbalizes understanding  -     Patient Education OT LTG, Date Goal Reviewed 12/06/17  -      Patient Education OT LTG Outcome goal not met  -      ADL OT LTG    ADL OT LTG, Date Established  12/05/17  -     ADL OT LTG, Time to Achieve  by discharge  -     ADL OT LTG, Activity Type  ADL skills  -     ADL OT LTG, [place] Level  standby assist  -     ADL OT LTG, Date Goal Reviewed 12/06/17  -      ADL OT LTG, Outcome goal not met  -      Functional Mobility OT LTG    Functional Mobility Goal OT LTG, Date Established  12/05/17  -     Functional Mobility Goal OT LTG, Time to Achieve  by discharge  -     Functional Mobility Goal OT LTG, [place] Level  supervision  -     Functional Mobility Goal OT  "LTG, Distance to Achieve  to the sink;to the bathroom  -     Functional Mobility Goal OT LTG, Date Goal Reviewed 12/06/17  -      Functional Mobility Goal OT LTG, Outcome goal not met  -      Endurance OT LTG    Endurance Goal OT LTG, Date Established  12/05/17  -     Endurance Goal OT LTG, Time to Achieve  by discharge  -     Endurance Goal OT LTG, Activity Level  endurance 2 good-  -     Endurance Goal OT LTG, Date Goal Reviewed 12/06/17  -        User Key  (r) = Recorded By, (t) = Taken By, (c) = Cosigned By    Initials Name Provider Type     Rossy Ospina, OTR/L Occupational Therapist    KD Margaret Bower, GA/L Occupational Therapy Assistant    JARETH Hassan, GA/L Occupational Therapy Assistant    LM Pushpa Victoria, GA/L Occupational Therapy Assistant    NN Clarice Garcia, OTR/L Occupational Therapist                Outcome Measures       12/11/17 1100 12/11/17 1045 12/11/17 1000    How much help from another person do you currently need...    Turning from your back to your side while in flat bed without using bedrails?  4  -LN     Moving from lying on back to sitting on the side of a flat bed without bedrails?  4  -LN     Moving to and from a bed to a chair (including a wheelchair)?  4  -LN     Standing up from a chair using your arms (e.g., wheelchair, bedside chair)?  4  -LN     Climbing 3-5 steps with a railing?  4  -LN     To walk in hospital room?  4  -LN     AM-PAC 6 Clicks Score  24  -LN     Tinetti Assessment    Sitting Balance  1  -LN     Arises  2  -LN     Attempts to Rise  2  -LN     Immediate Standing Balance (first 5 sec)  2  -LN     Standing Balance  2  -LN     Sternal Nudge (feet close together)  2  -LN     Eyes Closed (feet close together)  1  -LN     Turning 360 Degrees- Steps  1  -LN     Turning 360 Degrees- Steadiness  1  -LN     Sitting Down  2  -LN     Tinetti Balance Score  16  -LN     Gait Initiation (immediate after told \"go\")  1  -LN     Step Length- " Right Swing  1  -LN     Step Length- Left Swing  1  -LN     Foot Clearance- Right Foot  1  -LN     Foot Clearance- Left Foot  1  -LN     Step Symmetry  1  -LN     Step Continuity  1  -LN     Path (excursion)  1  -LN     Trunk  2  -LN     Base of Support  1  -LN     Gait Score  11  -LN     Tinetti Total Score  27  -LN     Functional Assessment    Outcome Measure Options AM-PAC 6 Clicks Basic Mobility (PT)  -LN  --  -LN      12/11/17 0847 12/10/17 1100 12/09/17 1445    How much help from another person do you currently need...    Turning from your back to your side while in flat bed without using bedrails?  4  -TA 3  -JA    Moving from lying on back to sitting on the side of a flat bed without bedrails?  4  -TA 3  -JA    Moving to and from a bed to a chair (including a wheelchair)?  4  -TA 3  -JA    Standing up from a chair using your arms (e.g., wheelchair, bedside chair)?  4  -TA 4  -JA    Climbing 3-5 steps with a railing?  4  -TA 4  -JA    To walk in hospital room?  4  -TA 4  -JA    AM-PAC 6 Clicks Score  24  -TA 21  -JA    How much help from another is currently needed...    Putting on and taking off regular lower body clothing? 3  -BH      Bathing (including washing, rinsing, and drying) 3  -BH      Toileting (which includes using toilet bed pan or urinal) 3  -BH      Putting on and taking off regular upper body clothing 3  -BH      Taking care of personal grooming (such as brushing teeth) 4  -BH      Eating meals 4  -BH      Score 20  -      Functional Assessment    Outcome Measure Options AM-PAC 6 Clicks Daily Activity (OT)  - AM-PAC 6 Clicks Basic Mobility (PT)  -TA AM-PAC 6 Clicks Basic Mobility (PT)  -      User Key  (r) = Recorded By, (t) = Taken By, (c) = Cosigned By    Initials Name Provider Type     Rossy Ospina, OTR/L Occupational Therapist    RASHAUN John, [facility] Physical Therapy Assistant    SHILOH Martinez [facility] Physical Therapy Assistant    BEBE Herron, [facility] Physical Therapy  Assistant          Time Calculation:         Time Calculation- OT       12/11/17 0936          Time Calculation- OT    OT Start Time 0847  -      OT Stop Time 0921  -      OT Time Calculation (min) 34 min  -      Total Timed Code Minutes- OT 34 minute(s)  -      OT Received On 12/11/17  -        User Key  (r) = Recorded By, (t) = Taken By, (c) = Cosigned By    Initials Name Provider Type     Rossy Ospina OTR/L Occupational Therapist              OT G-codes  OT Professional Judgement Used?: Yes  OT Functional Scales Options: AM-PAC 6 Clicks Daily Activity (OT)  Score: 12  Functional Limitation: Self care  Self Care Current Status (): At least 60 percent but less than 80 percent impaired, limited or restricted  Self Care Goal Status (): At least 40 percent but less than 60 percent impaired, limited or restricted    OT Discharge Summary  Anticipated Discharge Disposition: skilled nursing facility  Reason for Discharge: Discharge from facility, Per MD order  Outcomes Achieved: Refer to plan of care for updates on goals achieved  Discharge Destination: SNF    Clarice Garcia OTR/L  12/11/2017

## 2017-12-11 NOTE — PLAN OF CARE
Problem: Inpatient Occupational Therapy  Goal: Patient Education Goal LTG- OT  Outcome: Unable to achieve outcome(s) by discharge Date Met:  12/11/17 12/05/17 1424 12/11/17 1339   Patient Education OT LTG   Patient Education OT LTG, Date Established 12/05/17 --    Patient Education OT LTG, Time to Achieve by discharge --    Patient Education OT LTG, Education Type HEP;precautions per surgeon;home safety;energy conservation;adaptive equipment mgmt --    Patient Education OT LTG, Education Understanding independent;demonstrates adequately;verbalizes understanding --    Patient Education OT LTG, Date Goal Reviewed --  12/11/17   Patient Education OT LTG Outcome --  goal not met   Patient Education OT LTG, Reason Goal Not Met --  discharged from facility       Goal: ADL Goal LTG- OT  Outcome: Unable to achieve outcome(s) by discharge Date Met:  12/11/17 12/05/17 1424 12/11/17 1339   ADL OT LTG   ADL OT LTG, Date Established 12/05/17 --    ADL OT LTG, Time to Achieve by discharge --    ADL OT LTG, Activity Type ADL skills --    ADL OT LTG, Carson Level standby assist --    ADL OT LTG, Date Goal Reviewed --  12/11/17   ADL OT LTG, Outcome --  goal not met   ADL OT LTG, Reason Goal Not Met --  discharged from facility       Goal: Endurance Goal LTG- OT  Outcome: Unable to achieve outcome(s) by discharge Date Met:  12/11/17 12/05/17 1424 12/11/17 1339   Endurance OT LTG   Endurance Goal OT LTG, Date Established 12/05/17 --    Endurance Goal OT LTG, Time to Achieve by discharge --    Endurance Goal OT LTG, Activity Level endurance 2 good- --    Endurance Goal OT LTG, Date Goal Reviewed --  12/11/17   Endurance Goal OT LTG, Outcome --  goal not met   Endurance Goal OT LTG, Reason Goal Not Met --  discharged from facility

## 2017-12-11 NOTE — THERAPY DISCHARGE NOTE
"Acute Care - Physical Therapy Discharge Summary  [facility]       Patient Name: Lyle Corona  : 1965  MRN: [id number]    Today's Date: 2017  Onset of Illness/Injury or Date of Surgery Date: 17 (CABG 17)    Date of Referral to PT: 17  Referring Physician: AHSAN Oglesby      Admit Date: 2017      PT Recommendation and Plan    Visit Dx:    ICD-10-CM ICD-9-CM   1. Coronary arteriosclerosis I25.10 414.00   2. Unstable angina I20.0 411.1   3. NSTEMI (non-ST elevated myocardial infarction) I21.4 410.70   4. Coronary artery disease involving native coronary artery of native heart with unstable angina pectoris I25.110 414.01     411.1   5. Impaired functional mobility, balance, gait, and endurance Z74.09 V49.89   6. Impaired mobility and ADLs Z74.09 799.89             Outcome Measures       17 1100 17 1045 17 1000    How much help from another person do you currently need...    Turning from your back to your side while in flat bed without using bedrails?  4  -LN     Moving from lying on back to sitting on the side of a flat bed without bedrails?  4  -LN     Moving to and from a bed to a chair (including a wheelchair)?  4  -LN     Standing up from a chair using your arms (e.g., wheelchair, bedside chair)?  4  -LN     Climbing 3-5 steps with a railing?  4  -LN     To walk in hospital room?  4  -LN     AM-PAC 6 Clicks Score  24  -LN     Tinetti Assessment    Sitting Balance  1  -LN     Arises  2  -LN     Attempts to Rise  2  -LN     Immediate Standing Balance (first 5 sec)  2  -LN     Standing Balance  2  -LN     Sternal Nudge (feet close together)  2  -LN     Eyes Closed (feet close together)  1  -LN     Turning 360 Degrees- Steps  1  -LN     Turning 360 Degrees- Steadiness  1  -LN     Sitting Down  2  -LN     Tinetti Balance Score  16  -LN     Gait Initiation (immediate after told \"go\")  1  -LN     Step Length- Right Swing  1  -LN     Step Length- Left " Swing  1  -LN     Foot Clearance- Right Foot  1  -LN     Foot Clearance- Left Foot  1  -LN     Step Symmetry  1  -LN     Step Continuity  1  -LN     Path (excursion)  1  -LN     Trunk  2  -LN     Base of Support  1  -LN     Gait Score  11  -LN     Tinetti Total Score  27  -LN     Functional Assessment    Outcome Measure Options AM-PAC 6 Clicks Basic Mobility (PT)  -LN  --  -LN      12/11/17 0847 12/10/17 1100 12/09/17 1445    How much help from another person do you currently need...    Turning from your back to your side while in flat bed without using bedrails?  4  -TA 3  -JA    Moving from lying on back to sitting on the side of a flat bed without bedrails?  4  -TA 3  -JA    Moving to and from a bed to a chair (including a wheelchair)?  4  -TA 3  -JA    Standing up from a chair using your arms (e.g., wheelchair, bedside chair)?  4  -TA 4  -JA    Climbing 3-5 steps with a railing?  4  -TA 4  -JA    To walk in hospital room?  4  -TA 4  -JA    AM-PAC 6 Clicks Score  24  -TA 21  -JA    How much help from another is currently needed...    Putting on and taking off regular lower body clothing? 3  -BH      Bathing (including washing, rinsing, and drying) 3  -BH      Toileting (which includes using toilet bed pan or urinal) 3  -BH      Putting on and taking off regular upper body clothing 3  -BH      Taking care of personal grooming (such as brushing teeth) 4  -BH      Eating meals 4  -BH      Score 20  -      Functional Assessment    Outcome Measure Options AM-PAC 6 Clicks Daily Activity (OT)  - AM-PAC 6 Clicks Basic Mobility (PT)  -TA AM-PAC 6 Clicks Basic Mobility (PT)  -      User Key  (r) = Recorded By, (t) = Taken By, (c) = Cosigned By    Initials Name Provider Type     Rossy Ospina, OTR/L Occupational Therapist    RASHAUN John, PTA Physical Therapy Assistant    SHILOH Martinez, PTA Physical Therapy Assistant    BEBE Herron, PTA Physical Therapy Assistant                PT Charges        12/11/17 1045          Time Calculation    Start Time 1045  -LN      Stop Time 1110  -LN      Time Calculation (min) 25 min  -LN      PT Received On 12/11/17  -LN      Time Calculation- PT    Total Timed Code Minutes- PT 25 minute(s)  -LN        User Key  (r) = Recorded By, (t) = Taken By, (c) = Cosigned By    Initials Name Provider Type    LN Denise Herron, PTA Physical Therapy Assistant                  IP PT Goals       12/11/17 1415 12/11/17 1045 12/10/17 1137    Bed Mobility PT LTG    Bed Mobility PT LTG, Date Goal Reviewed 12/11/17  -CB 12/11/17  -LN     Bed Mobility PT LTG, Outcome goal met  -CB goal met  -LN goal met  -TA    Transfer Training PT LTG    Transfer Training PT  LTG, Date Goal Reviewed 12/11/17  -CB 12/11/17  -LN     Transfer Training PT LTG, Outcome goal met  -CB goal met  -LN goal met  -TA    Gait Training PT LTG    Gait Training Goal PT LTG, Date Goal Reviewed 12/11/17  -CB 12/11/17  -LN     Gait Training Goal PT LTG, Outcome goal met  -CB goal met  -LN     Stair Training PT LTG    Stair Training Goal PT LTG, Date Goal Reviewed 12/11/17  -CB 12/11/17  -LN     Stair Training Goal PT LTG, Outcome  goal met  -LN       12/09/17 1445 12/08/17 1058 12/07/17 1403    Bed Mobility PT LTG    Bed Mobility PT LTG, Date Goal Reviewed 12/09/17  -JA 12/08/17  -LN 12/07/17  -LN    Bed Mobility PT LTG, Outcome goal ongoing  -JA goal not met  -LN goal not met  -LN    Transfer Training PT LTG    Transfer Training PT  LTG, Date Goal Reviewed 12/09/17  -JA 12/08/17  -LN 12/07/17  -LN    Transfer Training PT LTG, Outcome goal ongoing  -JA goal not met  -LN goal not met  -LN    Gait Training PT LTG    Gait Training Goal PT LTG, Date Goal Reviewed 12/09/17  -JA 12/08/17  -LN 12/07/17  -LN    Gait Training Goal PT LTG, Outcome goal met  -JA goal not met  -LN goal not met  -LN    Stair Training PT LTG    Stair Training Goal PT LTG, Date Established   12/07/17  -LN    Stair Training Goal PT LTG, Date Goal Reviewed   12/08/17  -LN 12/07/17  -LN    Stair Training Goal PT LTG, Outcome  goal met  -LN goal not met  -LN      12/06/17 1435 12/05/17 1452       Bed Mobility PT LTG    Bed Mobility PT LTG, Date Established  12/05/17  -GB     Bed Mobility PT LTG, Time to Achieve  by discharge  -GB     Bed Mobility PT LTG, Activity Type  roll left/roll right;supine to sit/sit to supine  -GB     Bed Mobility PT LTG, Irion Level 1 person + 1 person to manage equipment  -LN conditional independence  -GB     Bed Mobility PT LTG, Date Goal Reviewed 12/06/17  -LN      Bed Mobility PT LTG, Outcome goal not met  -LN goal not met  -GB     Transfer Training PT LTG    Transfer Training PT LTG, Date Established  12/05/17  -GB     Transfer Training PT LTG, Time to Achieve  by discharge  -GB     Transfer Training PT LTG, Activity Type  bed to chair /chair to bed;sit to stand/stand to sit  -GB     Transfer Training PT LTG, Irion Level  conditional independence  -GB     Transfer Training PT  LTG, Date Goal Reviewed 12/06/17  -LN      Transfer Training PT LTG, Outcome goal not met  -LN goal not met  -GB     Gait Training PT LTG    Gait Training Goal PT LTG, Date Established  12/05/17  -GB     Gait Training Goal PT LTG, Time to Achieve  by discharge  -GB     Gait Training Goal PT LTG, Irion Level  conditional independence  -GB     Gait Training Goal PT LTG, Date Goal Reviewed 12/06/17  -LN      Gait Training Goal PT LTG, Outcome goal not met  -LN goal not met  -GB     Stair Training PT LTG    Stair Training Goal PT LTG, Date Established  12/05/17  -GB     Stair Training Goal PT LTG, Time to Achieve  by discharge  -GB     Stair Training Goal PT LTG, Number of Steps  4  -GB     Stair Training Goal PT LTG, Irion Level  conditional independence  -GB     Stair Training Goal PT LTG, Date Goal Reviewed 12/06/17  -LN      Stair Training Goal PT LTG, Outcome goal not met  -LN goal not met  -GB       User Key  (r) = Recorded By, (t) = Taken  By, (c) = Cosigned By    Initials Name Provider Type    CB Payton Francis, PT Physical Therapist    GB Paige Chandler, PT Physical Therapist    JA Jamir John, PTA Physical Therapy Assistant    TA Claudia Martinez, PTA Physical Therapy Assistant    BEBE Herron, PTA Physical Therapy Assistant                     Payton Francis, PT   12/11/2017

## 2017-12-11 NOTE — PLAN OF CARE
Problem: Patient Care Overview (Adult)  Goal: Plan of Care Review  Outcome: Ongoing (interventions implemented as appropriate)  Encouraged intake.    12/11/17 4436   Coping/Psychosocial Response Interventions   Plan Of Care Reviewed With patient   Patient Care Overview   Progress improving   Outcome Evaluation   Outcome Summary/Follow up Plan Pt discharged.

## 2017-12-11 NOTE — PLAN OF CARE
Problem: Inpatient Physical Therapy  Goal: Bed Mobility Goal LTG- PT  Outcome: Outcome(s) achieved Date Met:  12/11/17 12/05/17 1452 12/06/17 1435 12/11/17 1415   Bed Mobility PT LTG   Bed Mobility PT LTG, Date Established 12/05/17 --  --    Bed Mobility PT LTG, Time to Achieve by discharge --  --    Bed Mobility PT LTG, Activity Type roll left/roll right;supine to sit/sit to supine --  --    Bed Mobility PT LTG, Foster Level --  1 person + 1 person to manage equipment --    Bed Mobility PT LTG, Date Goal Reviewed --  --  12/11/17   Bed Mobility PT LTG, Outcome --  --  goal met       Goal: Transfer Training Goal 1 LTG- PT  Outcome: Outcome(s) achieved Date Met:  12/11/17 12/05/17 1452 12/11/17 1415   Transfer Training PT LTG   Transfer Training PT LTG, Date Established 12/05/17 --    Transfer Training PT LTG, Time to Achieve by discharge --    Transfer Training PT LTG, Activity Type bed to chair /chair to bed;sit to stand/stand to sit --    Transfer Training PT LTG, Foster Level conditional independence --    Transfer Training PT LTG, Date Goal Reviewed --  12/11/17   Transfer Training PT LTG, Outcome --  goal met       Goal: Gait Training Goal LTG- PT  Outcome: Outcome(s) achieved Date Met:  12/11/17 12/05/17 1452 12/11/17 1415   Gait Training PT LTG   Gait Training Goal PT LTG, Date Established 12/05/17 --    Gait Training Goal PT LTG, Time to Achieve by discharge --    Gait Training Goal PT LTG, Foster Level conditional independence --    Gait Training Goal PT LTG, Date Goal Reviewed --  12/11/17   Gait Training Goal PT LTG, Outcome --  goal met       Goal: Stair Training Goal LTG- PT  Outcome: Outcome(s) achieved Date Met:  12/11/17 12/11/17 1045 12/11/17 1415   Stair Training PT LTG   Stair Training Goal PT LTG, Date Goal Reviewed --  12/11/17   Stair Training Goal PT LTG, Outcome goal met --

## 2017-12-11 NOTE — DISCHARGE SUMMARY
CVTS DISCHARGE SUMMARY  Date of Admission: 11/27/2017  4:15 PM  Date of Discharge:  12/11/2017    Admission Diagnosis:   Coronary arteriosclerosis [I25.10]  NSTEMI (non-ST elevated myocardial infarction) [I21.4]    Discharge Diagnosis:   Post-Op Diagnosis Codes:     * Coronary arteriosclerosis [I25.10]     * NSTEMI (non-ST elevated myocardial infarction) [I21.4]    Consults:   Consults     Date and Time Order Name Status Description    11/28/2017 1648 Inpatient Consult to Cardiothoracic Surgery Completed     11/28/2017 0936 Inpatient Consult to Nephrology Completed     11/28/2017 0933 Inpatient Consult to Cardiology Completed           Procedures Performed  Procedure(s):  CORONARY ARTERY BYPASS GRAFTINGX3, ENDOSCOPIC VEIN HARVEST     (CELL SAVER)  1. LIMA to LAD.    2. Greater saphenous vein to OM    3. Greater saphenous vein to PDA           Discharge Medications   Lyle Corona   Home Medication Instructions ELVER:[id number]    Printed on:12/11/17 1052   Medication Information                      aspirin 81 MG tablet  Take 81 mg by mouth.             cyclobenzaprine (FLEXERIL) 10 MG tablet  Take 10 mg by mouth 3 (three) times a day. Indication: Muscle spasm             docusate sodium 100 MG capsule  Take 200 mg by mouth 2 (Two) Times a Day.             guaiFENesin (MUCINEX) 600 MG 12 hr tablet  Take 2 tablets by mouth Every 12 (Twelve) Hours.             ipratropium-albuterol (DUO-NEB) 0.5-2.5 mg/mL nebulizer  Take 3 mL by nebulization 4 (Four) Times a Day.             labetalol (NORMODYNE) 100 MG tablet  Take 1 tablet by mouth Every 12 (Twelve) Hours.             levoFLOXacin (LEVAQUIN) 250 MG tablet  Take 1 tablet by mouth Daily for 7 days.             magnesium oxide (MAGOX) 400 (241.3 Mg) MG tablet tablet  Take 1 tablet by mouth 2 (Two) Times a Day.             nicotine (NICODERM CQ) 21 MG/24HR patch  Place 1 patch on the skin Daily.             nitroglycerin (NITRODUR) 0.4 MG/HR patch  Place 1 patch  on the skin Daily.             oxyCODONE-acetaminophen (PERCOCET) 5-325 MG per tablet  Take 1-2 tablets by mouth Every 4 (Four) Hours As Needed for Moderate Pain  (surgical pain) for up to 4 days.             pantoprazole (PROTONIX) 40 MG EC tablet               pravastatin (PRAVACHOL) 40 MG tablet  Take 1 tablet by mouth every night.             Prenatal Vit-Fe Fumarate-FA (PRENATAL VITAMIN 27-0.8) 27-0.8 MG tablet tablet  Take 1 tablet by mouth Daily.             ranitidine (ZANTAC) 150 MG tablet  Take 150 mg by mouth 2 (two) times a day. Indication: gerd             tamsulosin (FLOMAX) 0.4 MG capsule 24 hr capsule  Take 1 capsule by mouth Every Night.             ticagrelor (BRILINTA) 90 MG tablet tablet  Take 90 mg by mouth 2 (two) times a day. Indication: blood thinner             vitamin C (VITAMIN C) 500 MG tablet  Take 1 tablet by mouth 2 (Two) Times a Day.               Medical Management: ASA,BRILINTA,STATIN,BETA. HOLD ARB. Reviewed risks, benefits, and habit forming potential and weaning from narcotic medication. Patient understands and wishes to receive prescription.  Prescription written for PERCOCET #36 for post-surgical pain after Eugene placed on file.    Discharge Diet: Heart Healthy    Discharge Disposition: SNF Rehab in stable condition with follow up appointments with CVTS Nurse Practitioner 1 week, Dr. Oh/Anejlica 2 weeks, Cardiology/PCP as scheduled.     History of Present Illness/Hospital Course:   51 y.o. male with CKD III presented with USA/NSTEMI prompt medical management was initiated.  He has ongoing angina on NTG infusion and heparin infusion.  He denies smoking.  He has a history of known CAD with previous PCI with DUE stent placement to CX (2013) and RCA X 2 proximal and mid (2016).  Comorbidities include difficult to control HTN, HLP,CHF, and AAA.  He does have chronic back pain and GERD. His chest pain is at rest,   He underwent cardiac cath which demonstrated 3 V CAD.  12/2017:  Carotid duplex: 0-49%  12/2017: Echo: EF 60% Mild LVH LVSD 35 LVEDD 57  12/2017: PFT: FEV1 59   He remained in hospital until surgery. Day of surgery he was taken to the operating suite placed under general anesthesia underwent said procedure without complications or difficulty.  Weaned easily from cardiopulmonary bypass with the assistance in drips of Dobutamine, Insulin, pecedex and transferred to CCU in stable condition.  They began weaning mechanical ventilation and was extubated in 7.5 hrs of intubation and placed on oxygen per nasal cannula.  A total of 1 unit in blood products were given during their hospital stay.  POD1 they were out of bed to the chair and tolerated breakfast with stable glucose between the hours of 18-24 postoperatively.  Insulin drip was discontinued and they were continued on sliding scale insulin coverage as well as Lantus nightly.  Hemodynamics and neuro status remained stable for transfer to 3W telemetry after 23 hours stay in the CCU. They continued with PT/OT participation to satisfactory levels with activities of daily living. Chest tubes were without air leak and removed with satisfactory post removal CXR viewed on 12/6/17.  He became febrile with TMax 101.5 aggressive pulmonary care was encouraged. Fever resolved. His sputum returned with 3+ WBC Levaquin was initiatiated. With known CKD his Cr elevated to peak at 2.5. lasix and ARB were dc'd as his Cr is trending down currently. Hemodynamics remained stable, they remained in regular sinus rhythm for satisfactory discharge to SNF rehab in stable condition on POD 6.        Vital Signs  Temp:  [98.1 °F (36.7 °C)-101 °F (38.3 °C)] 99.3 °F (37.4 °C)  Heart Rate:  [] 98  Resp:  [16-18] 18  BP: ()/(51-74) 114/72  Last 3 weights    12/09/17  0524 12/10/17  0500 12/11/17  0607   Weight: 68.7 kg (151 lb 6.4 oz) 67.8 kg (149 lb 6.4 oz) 67.3 kg (148 lb 6.4 oz)       Pertinent Test Results:  Lab Results   Component Value Date     "WBC 10.56 (H) 12/11/2017    HGB 8.9 (L) 12/11/2017    HCT 26.8 (L) 12/11/2017    MCV 84.8 12/11/2017     12/11/2017     Lab Results   Component Value Date    GLUCOSE 106 (H) 12/11/2017    BUN 34 (H) 12/11/2017    CREATININE 2.30 (H) 12/11/2017    EGFRIFNONA 30 (L) 12/11/2017    BCR 14.8 12/11/2017    K 4.9 12/11/2017    CO2 24.0 12/11/2017    CALCIUM 8.8 12/11/2017    ALBUMIN 3.70 12/11/2017    LABIL2 1.1 12/11/2017    AST 67 (H) 12/11/2017    ALT 41 12/11/2017       Physical Exam:  Physical Exam   General Appearance:  Uncomfortable and comfortable.    Vital signs: (most recent): Blood pressure 116/71, pulse 98, temperature 99.3 °F (37.4 °C), temperature source Temporal Artery , resp. rate 18, height 175.3 cm (69.02\"), weight 67.3 kg (148 lb 6.4 oz), SpO2 97 %.  Vital signs are normal.    Output: Producing urine and producing stool.    HEENT: Normal HEENT exam.    Lungs:  Normal respiratory rate and normal effort.  No decreased breath sounds (bases).  (On RA)  Heart: Tachycardia.    Abdomen: Abdomen is soft and non-distended.  Bowel sounds are normal.     Extremities: Normal range of motion.  There is no local swelling or dependent edema.    Pulses: Distal pulses are intact.    Neurological: Patient is alert and oriented to person, place and time.    Pupils:  Pupils are equal, round, and reactive to light.    Skin:  Warm.  (M/S INC: Sm amt sanginous drainage distal, Well approximated. prineo strip intact  LLE EVH: CDI)    Additional Instructions for the Follow-ups that You Need to Schedule     Discharge Follow-up with Specialty: Cardiothoracic Surgery; 1 Week    As directed    Specialty:  Cardiothoracic Surgery   Follow Up:  1 Week   Follow Up Details:  Luzbrian FOREMAN 12/18/17 9:40                 Discharge Instructions: Discharge instructions include no heavy lifting anything greater than 10lbs for approximately 12 weeks.  No sex or driving for 3-6 weeks. Printed information given to the patient with " advancement of activities weekly.  Risks and benefits of narcotic medications and weaning postoperatively have been discussed. Clean operative site with antibacterial soap/water, pat dry. Keep open to air unless draining, then may apply dry dressing.  No ointments or creams unless prescribed by provider. Signs and symptoms of infection including drainage from operative site, redness, swelling, with associated fever and/or chills notify [facility] center immediately for wound check.  Patient verbalizes understanding of discharge instructions, all questions are answered, follow up appointments have been made, they are discharged to SNF in stable condition.         Follow-up Appointments  Future Appointments  Date Time Provider Department Center   12/13/2017 2:45 PM Rafa Carrasco MD PhD [facility] None   12/18/2017 9:40 AM AHSAN Rand [facility] None   1/5/2018 9:00 AM Tj Fajardo Rehab Nurse [facility]       Test Results Pending at Discharge   Order Current Status    Blood Culture - Blood, Preliminary result    Blood Culture - Blood, Preliminary result                 This document has been electronically signed by AHSAN Castro on December 11, 2017 10:53 AM      Time: Discharge 60 min

## 2017-12-11 NOTE — PLAN OF CARE
Problem: Patient Care Overview (Adult)  Goal: Plan of Care Review  Outcome: Ongoing (interventions implemented as appropriate)    12/11/17 0847   Coping/Psychosocial Response Interventions   Plan Of Care Reviewed With patient   Patient Care Overview   Progress progress towards functional goals is fair   Outcome Evaluation   Outcome Summary/Follow up Plan OT treatment this date. Pt progressing towards goals and independence. Pt struggles with LB tasks with increased pain and discomfort. Pt met goals of SBA for toilet t/f and sink act. Pt had fair toleration for treatment today with increased pain from coughing at night and not sleeping well. Pt could cont to benefit from skilled OT to increase safety, education, endurance, and independence with ADL. Recommend d/c to SNF or home with 24/7 care and further cardiac rehab.          Problem: Inpatient Occupational Therapy  Goal: Transfer Training Goal 1 LTG- OT  Outcome: Outcome(s) achieved Date Met:  12/11/17 12/05/17 1424 12/11/17 0847   Transfer Training OT LTG   Transfer Training OT LTG, Date Established 12/05/17 --    Transfer Training OT LTG, Time to Achieve by discharge --    Transfer Training OT LTG, Activity Type toilet --    Transfer Training OT LTG, [place] Level supervision required --    Transfer Training OT LTG, Date Goal Reviewed --  12/11/17   Transfer Training OT LTG, Outcome --  goal met       Goal: Patient Education Goal LTG- OT  Outcome: Ongoing (interventions implemented as appropriate)    12/05/17 1424 12/11/17 0847   Patient Education OT LTG   Patient Education OT LTG, Date Established 12/05/17 --    Patient Education OT LTG, Time to Achieve by discharge --    Patient Education OT LTG, Education Type HEP;precautions per surgeon;home safety;energy conservation;adaptive equipment mgmt --    Patient Education OT LTG, Education Understanding independent;demonstrates adequately;verbalizes understanding --    Patient Education OT LTG, Date Goal  Reviewed --  12/11/17   Patient Education OT LTG Outcome --  goal ongoing       Goal: ADL Goal LTG- OT  Outcome: Ongoing (interventions implemented as appropriate)    12/05/17 1424 12/11/17 0847   ADL OT LTG   ADL OT LTG, Date Established 12/05/17 --    ADL OT LTG, Time to Achieve by discharge --    ADL OT LTG, Activity Type ADL skills --    ADL OT LTG, [place] Level standby assist --    ADL OT LTG, Date Goal Reviewed --  12/11/17   ADL OT LTG, Outcome --  goal partially met  (for grooming/toileting tasks)       Goal: Functional Mobility Goal LTG- OT  Outcome: Outcome(s) achieved Date Met:  12/11/17 12/05/17 1424 12/11/17 0847   Functional Mobility OT LTG   Functional Mobility Goal OT LTG, Date Established 12/05/17 --    Functional Mobility Goal OT LTG, Time to Achieve by discharge --    Functional Mobility Goal OT LTG, [place] Level supervision --    Functional Mobility Goal OT LTG, Distance to Achieve to the sink;to the bathroom --    Functional Mobility Goal OT LTG, Date Goal Reviewed --  12/11/17   Functional Mobility Goal OT LTG, Outcome --  goal met       Goal: Endurance Goal LTG- OT  Outcome: Ongoing (interventions implemented as appropriate)    12/05/17 1424 12/11/17 0847   Endurance OT LTG   Endurance Goal OT LTG, Date Established 12/05/17 --    Endurance Goal OT LTG, Time to Achieve by discharge --    Endurance Goal OT LTG, Activity Level endurance 2 good- --    Endurance Goal OT LTG, Date Goal Reviewed --  12/11/17   Endurance Goal OT LTG, Outcome --  goal ongoing

## 2017-12-11 NOTE — PROGRESS NOTES
"  Cardiothoracic - Vascular Surgery Daily Note        LOS: 14 days   Patient Care Team:  AHSAN Mayer as PCP - General     POD6 CABGX3    Chief Complaint: Surgical Chest Pain      Subjective     The following portions of the patient's history were reviewed and updated as appropriate: allergies, current medications, past family history, past medical history, past social history, past surgical history and problem list.     Subjective:  Symptoms:  Improved.  No chest pain (surgical).    Diet:  Adequate intake.  No nausea.    Activity level: Returning to normal.    Pain:  He complains of pain that is moderate.  He reports pain is improving.  Pain is requiring pain medication and well controlled.          Objective     Vital Signs  Temp:  [98.1 °F (36.7 °C)-101 °F (38.3 °C)] 99.3 °F (37.4 °C)  Heart Rate:  [] 98  Resp:  [16-18] 18  BP: ()/(51-74) 116/71  Body mass index is 21.9 kg/(m^2).    Intake/Output Summary (Last 24 hours) at 12/11/17 0957  Last data filed at 12/10/17 2300   Gross per 24 hour   Intake             1860 ml   Output              650 ml   Net             1210 ml          Wt Readings from Last 3 Encounters:   12/11/17 67.3 kg (148 lb 6.4 oz)   10/31/17 71.4 kg (157 lb 8 oz)   06/12/17 69.4 kg (153 lb)         Physical Exam   Objective:  General Appearance:  Uncomfortable and comfortable.    Vital signs: (most recent): Blood pressure 116/71, pulse 98, temperature 99.3 °F (37.4 °C), temperature source Temporal Artery , resp. rate 18, height 175.3 cm (69.02\"), weight 67.3 kg (148 lb 6.4 oz), SpO2 97 %.  Vital signs are normal.    Output: Producing urine and producing stool.    HEENT: Normal HEENT exam.    Lungs:  Normal respiratory rate and normal effort.  No decreased breath sounds (bases).  (On RA)  Heart: Tachycardia.    Abdomen: Abdomen is soft and non-distended.  Bowel sounds are normal.     Extremities: Normal range of motion.  There is no local swelling or dependent edema.  "   Pulses: Distal pulses are intact.    Neurological: Patient is alert and oriented to person, place and time.    Pupils:  Pupils are equal, round, and reactive to light.    Skin:  Warm.  (M/S INC: Sm amt sanginous drainage distal, Well approximated. prineo strip intact  LLE EVH: CDI)              Results Review:    Lab Results   Component Value Date    WBC 10.56 (H) 12/11/2017    HGB 8.9 (L) 12/11/2017    HCT 26.8 (L) 12/11/2017    MCV 84.8 12/11/2017     12/11/2017       Estimated Creatinine Clearance: 35.4 mL/min (by C-G formula based on Cr of 2.3).      Results from last 7 days  Lab Units 12/06/17  0212 12/05/17  0416 12/04/17  1245   MAGNESIUM mg/dL 2.3 2.4* 4.0*   PHOSPHORUS mg/dL  --   --  4.3       Lab Results   Component Value Date    GLUCOSE 106 (H) 12/11/2017    BUN 34 (H) 12/11/2017    CREATININE 2.30 (H) 12/11/2017    EGFRIFNONA 30 (L) 12/11/2017    BCR 14.8 12/11/2017    K 4.9 12/11/2017    CO2 24.0 12/11/2017    CALCIUM 8.8 12/11/2017    ALBUMIN 3.70 12/11/2017    LABIL2 1.1 12/11/2017    AST 67 (H) 12/11/2017    ALT 41 12/11/2017         Results from last 7 days  Lab Units 12/04/17  1245   INR  1.24*       Imaging Results (last 24 hours)     ** No results found for the last 24 hours. **                                  aspirin 81 mg Oral Daily   atorvastatin 10 mg Oral Daily   cyclobenzaprine 10 mg Oral TID   guaiFENesin 1,200 mg Oral Q12H   insulin aspart 0-24 Units Subcutaneous 4x Daily AC & at Bedtime   insulin detemir 20 Units Subcutaneous Nightly   ipratropium-albuterol 3 mL Nebulization 4x Daily - RT   labetalol 100 mg Oral Q12H   levoFLOXacin 250 mg Intravenous Q24H   magnesium oxide 400 mg Oral BID   prenatal vitamin 27-0.8 1 tablet Oral Daily   sennosides-docusate sodium 1 tablet Oral BID   sodium chloride 1-10 mL Intravenous Q8H   tamsulosin 0.4 mg Oral Nightly   ticagrelor 90 mg Oral BID   vitamin C 500 mg Oral BID       amiodarone 1 mg/min   Pharmacy to Dose LevoFLOXacin (LEVAQUIN)               ASSESSMENT/PLAN     Principal Problem:    Coronary artery disease involving native coronary artery of native heart with unstable angina pectoris  Active Problems:    Chronic kidney disease, stage 3    NSTEMI (non-ST elevated myocardial infarction)      Assessment:    Condition: In stable condition.   (Stable Post Op CABG: Progressing well    CAD: ASA, Brilinta, Statin. Beta as BP tolerates.     Fever (Resolved): Aggressive respiratory interventions. BC negative. Urine cx neg. Sputm cx with 3+ WBC. On Levaquin    Resp: Incentive. On RA. Aggressive Pulmonary Toilet    Renal: Dosani managing, Cr elevated-d/c ARB and lasix. Trending down    Pain: Percocet    Rehab: PT/OT, Ambulate. ARU consult-denied. SNF Transfer for Mon (Bed availability)    Transient Post op Hyperglycemia: SSI coverage. Continue Levemir    CKD: Cr peaked 2.4. Follow.).     Plan:   Transfer Plan: Awaitng d/c to SNF.  Encourage ambulation and out of bed and up to chair.  Continue respiratory treatments and start/continue incentive spirometry.  Diet Plan: cardiac diet.  Increase analgesics, administer medications as ordered and start antibiotics.   (Stable. Progress Care 3W. Awaiting SNF confirmation for dc today.).                 This document has been electronically signed by AHSAN Castro on December 11, 2017 10:00 AM        This document has been electronically signed by AHSAN Castro on December 11, 2017 9:57 AM

## 2017-12-11 NOTE — PLAN OF CARE
Problem: Patient Care Overview (Adult)  Goal: Adult Individualization and Mutuality  Outcome: Ongoing (interventions implemented as appropriate)    12/05/17 0652 12/05/17 1452   Individualization   Patient Specific Preferences water and ice --    Patient Specific Goals to alleviate pain --    Patient Specific Interventions --  incentive; regular therapy        Goal: Discharge Needs Assessment  Outcome: Ongoing (interventions implemented as appropriate)    12/01/17 0459 12/05/17 1424 12/05/17 1452   Discharge Needs Assessment   Concerns To Be Addressed --  --  homelessness;home safety concerns   Concerns Comments --  --  pt was homeless before admit; will need support system and safe location post d/c   Readmission Within The Last 30 Days no previous admission in last 30 days --  --    Equipment Needed After Discharge --  --  walker, rolling   Discharge Facility/Level Of Care Needs --  --  skilled transitional care;rehabilitation facility   Current Discharge Risk chronically ill;financial support inadequate --  --    Discharge Disposition still a patient --  --    Discharge Planning Comments --  --  if d/c to homeless situation, it appears he will need to be as strong as possible to survive homelessnes   Current Health   Outpatient/Agency/Support Group Needs --  --  skilled nursing facility (specify);inpatient rehabilitation facility (specify)   Anticipated Changes Related to Illness --  --  inability to care for self   Self-Care   Equipment Currently Used at Home --  none --    Living Environment   Transportation Available --  public transportation;family or friend will provide --          Problem: Pain, Chronic (Adult)  Goal: Acceptable Pain Control/Comfort Level  Outcome: Ongoing (interventions implemented as appropriate)    12/11/17 0442   Pain, Chronic (Adult)   Acceptable Pain Control/Comfort Level making progress toward outcome         Problem: Cardiac Output, Decreased (Adult)  Goal: Adequate Cardiac  Output/Effective Tissue Perfusion  Outcome: Ongoing (interventions implemented as appropriate)    12/10/17 0433   Cardiac Output, Decreased (Adult)   Adequate Cardiac Output/Effective Tissue Perfusion making progress toward outcome         Problem: Cardiac Surgery (Adult)  Goal: Signs and Symptoms of Listed Potential Problems Will be Absent or Manageable (Cardiac Surgery)  Outcome: Ongoing (interventions implemented as appropriate)    12/09/17 0454 12/10/17 0433   Cardiac Surgery   Problems Assessed (Cardiac Surgery) --  all   Problems Present (Cardiac Surgery) pain --          Problem: Fall Risk (Adult)  Goal: Absence of Falls  Outcome: Ongoing (interventions implemented as appropriate)    12/10/17 4133   Fall Risk (Adult)   Absence of Falls making progress toward outcome

## 2017-12-11 NOTE — PROGRESS NOTES
"[facility]  [address] [place] [address]. [id number]  T - [phone]  F - [phone]     Progress Note          PATIENT  DEMOGRAPHICS   PATIENT NAME: Lyle Corona                      PHYSICIAN: Adriane Butler MD  : 1965  MRN: [id number]   LOS: 14 days    Patient Care Team:  AHSAN Mayer as PCP - General  Subjective   SUBJECTIVE   Doing well overall, has mucus plug which he has expectorated and now feeling better       Objective   OBJECTIVE   Vital Signs  Temp:  [98.1 °F (36.7 °C)-101 °F (38.3 °C)] 99.3 °F (37.4 °C)  Heart Rate:  [] 106  Resp:  [16-18] 18  BP: ()/(51-74) 116/71    Flowsheet Rows         First Filed Value    Admission Height  69\" (175.3 cm) Documented at 2017 1722    Admission Weight  151 lb 12.8 oz (68.9 kg) Documented at 2017 1722           I/O last 3 completed shifts:  In: 3120 [P.O.:3070; IV Piggyback:50]  Out: 650 [Urine:650]    PHYSICAL EXAM    Physical Exam   Constitutional: He is oriented to person, place, and time. He appears well-developed.   Moderately nourished   HENT:   Head: Normocephalic and atraumatic.   Cardiovascular: Normal rate, regular rhythm and normal heart sounds.  Exam reveals no gallop and no friction rub.    No murmur heard.  Pulmonary/Chest: Effort normal and breath sounds normal. No respiratory distress. He has no wheezes. He has no rales. He exhibits no tenderness.   Abdominal: Soft. Bowel sounds are normal. He exhibits no distension and no mass. There is no tenderness. There is no guarding.   Musculoskeletal: He exhibits no edema or tenderness.   Neurological: He is alert and oriented to person, place, and time.   Skin: Skin is warm and dry.   Nursing note and vitals reviewed.      RESULTS   Results Review:      Results from last 7 days  Lab Units 17  0603 12/10/17  0610 17  1028   SODIUM mmol/L 131* 130* 132*   POTASSIUM mmol/L 4.9 4.3 4.6   CHLORIDE mmol/L 94* 92* 93*   CO2 mmol/L " 24.0 25.0 23.0   BUN mg/dL 34* 31* 30*   CREATININE mg/dL 2.30* 2.45* 2.30*   CALCIUM mg/dL 8.8 8.9 8.9   BILIRUBIN mg/dL 0.9 1.1 1.0   ALK PHOS U/L 284* 316* 178*   ALT (SGPT) U/L 41 46 30   AST (SGOT) U/L 67* 91* 48   GLUCOSE mg/dL 106* 118* 98       Estimated Creatinine Clearance: 35.4 mL/min (by C-G formula based on Cr of 2.3).        Results from last 7 days  Lab Units 12/11/17  0603 12/08/17  1832 12/07/17  0614 12/05/17  0416 12/04/17  1245   WBC 10*3/mm3 10.56* 11.72* 15.64* 10.13* 8.70   HEMOGLOBIN g/dL 8.9* 8.4* 10.9* 9.1* 9.5*   PLATELETS 10*3/mm3 292 231 212 144* 107*         Results from last 7 days  Lab Units 12/04/17  1245   INR  1.24*         Imaging Results (last 24 hours)     ** No results found for the last 24 hours. **           MEDICATIONS      aspirin 81 mg Oral Daily   atorvastatin 10 mg Oral Daily   cyclobenzaprine 10 mg Oral TID   insulin aspart 0-24 Units Subcutaneous 4x Daily AC & at Bedtime   insulin detemir 20 Units Subcutaneous Nightly   ipratropium-albuterol 3 mL Nebulization 4x Daily - RT   labetalol 100 mg Oral Q12H   levoFLOXacin 250 mg Intravenous Q24H   magnesium oxide 400 mg Oral BID   prenatal vitamin 27-0.8 1 tablet Oral Daily   sennosides-docusate sodium 1 tablet Oral BID   sodium chloride 1-10 mL Intravenous Q8H   tamsulosin 0.4 mg Oral Nightly   ticagrelor 90 mg Oral BID   vitamin C 500 mg Oral BID       amiodarone 1 mg/min   Pharmacy to Dose LevoFLOXacin (LEVAQUIN)        Assessment/Plan   ASSESSMENT / PLAN    Principal Problem:    Coronary artery disease involving native coronary artery of native heart with unstable angina pectoris  Active Problems:    Chronic kidney disease, stage 3    NSTEMI (non-ST elevated myocardial infarction)    1. CKD 3: baseline creatinine 1.7-1.9 mg/dl  - Creatinine is slowly rising while on losartan, not a candidate for RAAS blockades. off losartan and lasix.     2. Hypertension:  - Blood pressure is acceptable. On labetalol 100mg BID, off  hydralazine and terazosin. Plan as above    3. Anemia:  - Hemoglobin is low and received 1 UPRBC. hgb stable at present    4. NSTEMI:   - s/p left heart cath which shows multivessel disease, s/p CABG.       5. Fever tachycardia culture ngsf    F/u In office in 2 - 3 weeks           This document has been electronically signed by Adriane Butler MD on December 11, 2017 9:35 AM

## 2017-12-11 NOTE — CONSULTS
"Adult Nutrition  Assessment    Patient Name:  Lyle Corona  YOB: 1965  MRN: [id number]  Admit Date:  11/27/2017    Assessment Date:  12/11/2017    Comments:  Pt indicated his appetite was OK.  Intake only 25% - 3x, 0% - 1x.  Vocied no food preferences.  Indicated he only received the homemade milkshake a couple of times.  Liked the milk he received with his meals.  Labs reviewed.  Had no questions regarding diet ed provided previously.  Pt discharged.          Reason for Assessment       12/11/17 1708    Reason for Assessment    Reason For Assessment/Visit follow up protocol                  Anthropometrics       12/11/17 1708    Anthropometrics    Height 175.3 cm (69.02\")    Weight 67.3 kg (148 lb 5.9 oz)    Ideal Body Weight (IBW)    Ideal Body Weight (IBW), Male (kg) 73.73    % Ideal Body Weight 91.47    Body Mass Index (BMI)    BMI (kg/m2) 21.95    BMI Grade 19.1 - 24.9 - normal            Labs/Tests/Procedures/Meds       12/11/17 1710    Labs/Tests/Procedures/Meds    Labs/Tests Review Glucose;Na+;BUN;Creat;Hgb Hct            Physical Findings       12/11/17 1710    Physical Appearance    Skin other (see comments)   incisions, wound              Nutrition Prescription Ordered       12/11/17 1711    Nutrition Prescription PO    Current PO Diet Regular    Supplement Milk    Supplement Frequency 3 times a day    Common Modifiers Consistent Carbohydrate;Low Potassium            Evaluation of Received Nutrient/Fluid Intake       12/11/17 1712    PO Evaluation    Number of Meals 4    % PO Intake 25% - 3x, 0% - 1x            Electronically signed by:  Jazmin Rueda RD  12/11/17 5:12 PM  "

## 2017-12-12 ENCOUNTER — DOCUMENTATION (OUTPATIENT)
Dept: CARDIAC REHAB | Facility: HOSPITAL | Age: 52
End: 2017-12-12

## 2018-01-02 ENCOUNTER — OFFICE VISIT (OUTPATIENT)
Dept: CARDIAC SURGERY | Facility: CLINIC | Age: 53
End: 2018-01-02

## 2018-01-02 VITALS
WEIGHT: 139 LBS | SYSTOLIC BLOOD PRESSURE: 110 MMHG | OXYGEN SATURATION: 99 % | DIASTOLIC BLOOD PRESSURE: 65 MMHG | BODY MASS INDEX: 20.59 KG/M2 | HEIGHT: 69 IN | HEART RATE: 110 BPM | TEMPERATURE: 97.1 F

## 2018-01-02 DIAGNOSIS — Z48.812 SURGICAL AFTERCARE, CIRCULATORY SYSTEM: ICD-10-CM

## 2018-01-02 DIAGNOSIS — I25.110 CORONARY ARTERY DISEASE INVOLVING NATIVE CORONARY ARTERY OF NATIVE HEART WITH UNSTABLE ANGINA PECTORIS (HCC): Primary | Chronic | ICD-10-CM

## 2018-01-02 PROCEDURE — 99024 POSTOP FOLLOW-UP VISIT: CPT | Performed by: NURSE PRACTITIONER

## 2018-01-02 RX ORDER — OXYCODONE HYDROCHLORIDE AND ACETAMINOPHEN 5; 325 MG/1; MG/1
1 TABLET ORAL EVERY 6 HOURS PRN
Status: ON HOLD | COMMUNITY
End: 2020-08-18

## 2018-01-02 RX ORDER — CITALOPRAM 20 MG/1
20 TABLET ORAL DAILY
Status: ON HOLD | COMMUNITY
End: 2020-08-18

## 2018-01-02 NOTE — PROGRESS NOTES
Subjective   Patient ID: Lyle Corona is a 52 y.o. male is here today for surgical follow-up SP CABG.  Chief Complaint   Patient presents with   • Coronary Artery Disease     (12/4/17) CABGx3   Weak  Legs get weak with walking   PCP JUSTICE Carrasco  History of Present Illness  53 y/o gentleman presented with NSTEMI, known CAD with Prevnious PCI DUE RCA, CHF, CKDIII, HTN, and HLP. On Plavix.  Cardiac Cath:  3 V CAD.  ECHO Tr MR EF 50-60%  Continue to have USA.requirng IV NTG.  Underwent CABG, received 1 unit pRBCs in OR.  Was discharged to [facility] on POD 7.  Reports developed pneumonia at [facility].   Returns today in scheduled FU.  VAS 0  No SOA.  Fatigue and weakness.   11/27/17 NSTEM  11/29/17  Cardiac Cath:  LM 70%  LAD 75% CX in stent 90%  RCA in stent 90%   11/28/17  ECHO:  Mild LVH Tr MR EF 50-60%  12/04/17  CABG X 3 LIMA->LAD SVG->PDA SVG->OM  12/11/17 Hospital discharge, delay RT social issues    The following portions of the patient's history were reviewed and updated as appropriate: allergies, current medications, past family history, past medical history, past social history, past surgical history and problem list.  See HPI   Allergies   Allergen Reactions   • Imdur [Isosorbide Dinitrate]    • Amlodipine Rash   • Lisinopril Rash   • Metoprolol Rash       Current Outpatient Prescriptions:   •  aspirin 81 MG tablet, Take 81 mg by mouth., Disp: , Rfl:   •  citalopram (CeleXA) 20 MG tablet, Take 20 mg by mouth Daily., Disp: , Rfl:   •  cyclobenzaprine (FLEXERIL) 10 MG tablet, Take 10 mg by mouth 3 (three) times a day. Indication: Muscle spasm, Disp: , Rfl:   •  docusate sodium 100 MG capsule, Take 200 mg by mouth 2 (Two) Times a Day., Disp: 60 capsule, Rfl: 1  •  guaiFENesin (MUCINEX) 600 MG 12 hr tablet, Take 2 tablets by mouth Every 12 (Twelve) Hours., Disp: , Rfl:   •  ipratropium-albuterol (DUO-NEB) 0.5-2.5 mg/mL nebulizer, Take 3 mL by nebulization 4 (Four) Times a Day.,  Disp: 360 mL, Rfl:   •  labetalol (NORMODYNE) 100 MG tablet, Take 1 tablet by mouth Every 12 (Twelve) Hours., Disp: , Rfl:   •  magnesium oxide (MAGOX) 400 (241.3 Mg) MG tablet tablet, Take 1 tablet by mouth 2 (Two) Times a Day., Disp: 30 each, Rfl:   •  oxyCODONE-acetaminophen (PERCOCET) 5-325 MG per tablet, Take 1 tablet by mouth Every 6 (Six) Hours As Needed., Disp: , Rfl:   •  pantoprazole (PROTONIX) 40 MG EC tablet, , Disp: , Rfl: 1  •  pravastatin (PRAVACHOL) 40 MG tablet, Take 1 tablet by mouth every night., Disp: 90 tablet, Rfl: 4  •  Prenatal Vit-Fe Fumarate-FA (PRENATAL VITAMIN 27-0.8) 27-0.8 MG tablet tablet, Take 1 tablet by mouth Daily., Disp: , Rfl:   •  ranitidine (ZANTAC) 150 MG tablet, Take 150 mg by mouth 2 (two) times a day. Indication: gerd, Disp: , Rfl:   •  tamsulosin (FLOMAX) 0.4 MG capsule 24 hr capsule, Take 1 capsule by mouth Every Night., Disp: , Rfl:   •  ticagrelor (BRILINTA) 90 MG tablet tablet, Take 90 mg by mouth 2 (two) times a day. Indication: blood thinner, Disp: , Rfl:   •  vitamin C (VITAMIN C) 500 MG tablet, Take 1 tablet by mouth 2 (Two) Times a Day., Disp: , Rfl:     Review of Systems   Constitution: Positive for weakness and malaise/fatigue. Negative for decreased appetite.   HENT: Negative for hoarse voice, nosebleeds and stridor.    Eyes: Negative for visual disturbance.   Cardiovascular: Positive for chest pain (Surgical soreness ). Negative for claudication, dyspnea on exertion, irregular heartbeat and leg swelling.        Denies sternal popping or clicking      Respiratory: Positive for cough and sputum production. Negative for hemoptysis, shortness of breath and wheezing.    Hematologic/Lymphatic: Negative for bleeding problem. Does not bruise/bleed easily.   Skin: Positive for dry skin. Negative for color change, flushing and poor wound healing.   Musculoskeletal: Positive for muscle weakness. Negative for falls, joint swelling and muscle cramps.   Gastrointestinal:  Negative for abdominal pain, anorexia, hematemesis and melena.   Genitourinary: Negative for hematuria.   Neurological: Negative for brief paralysis, dizziness, focal weakness, light-headedness, numbness and paresthesias.   Psychiatric/Behavioral: Negative for altered mental status.   Allergic/Immunologic: Negative for hives.        Objective   Physical Exam   Constitutional: He is oriented to person, place, and time.   Thin    HENT:   Head: Normocephalic.   Mouth/Throat: Oropharynx is clear and moist.   Eyes: Conjunctivae are normal. Pupils are equal, round, and reactive to light.   Neck: Neck supple. No JVD present.   Cardiovascular: Normal rate, regular rhythm, normal heart sounds and intact distal pulses.    Pulses:       Carotid pulses are 1+ on the right side, and 1+ on the left side.       Radial pulses are 2+ on the right side, and 2+ on the left side.        Posterior tibial pulses are 2+ on the right side, and 2+ on the left side.   HR 94    Pulmonary/Chest: Effort normal. No stridor. He has no wheezes. He has no rales. He exhibits tenderness.   Rhonchi L>R  Sternum stable with cough and moderate pressure   Musculoskeletal: He exhibits edema. He exhibits no tenderness.   Neurological: He is alert and oriented to person, place, and time.   Skin: Skin is warm. No rash noted. No erythema. No pallor.   Sternal incision healing well  CT sutures removed genny well    Psychiatric: His behavior is normal.   Nursing note and vitals reviewed.      Vitals:    01/02/18 1006   BP: 110/65   Pulse: 110   Temp: 97.1 °F (36.2 °C)   SpO2: 99%         Assessment/Plan   Independent Review of Radiographic Studies:    None with today's visit    1. Coronary artery disease involving native coronary artery of native heart with unstable angina pectoris  Lyle Corona is to continue beta blocker, antiplatelet, and statin therapy.  ACE/ARB Contra to BP  Will change BB to Coreg 6.25 mg bid, pt is allergic to lopressor  Develops  rash.   Stop nitrodur   - CBC (No Diff); Future  - Comprehensive Metabolic Panel  - XR Chest 2 View; Future  - ECG 12 Lead; Future    2. Surgical aftercare, circulatory system  Shower daily  Remove Steristrips 1 week  Increase walking   - CBC (No Diff); Future  - Comprehensive Metabolic Panel  - XR Chest 2 View; Future  - ECG 12 Lead; Future

## 2018-01-02 NOTE — PATIENT INSTRUCTIONS
Increase walking  Burst walking   Stop nitrodur patch  Not needed  Change to coreg to improved HR control  Recheck 1 week with chest xray lab work and ekg with Dr Dejesus

## 2018-01-05 ENCOUNTER — HOSPITAL ENCOUNTER (OUTPATIENT)
Dept: CARDIAC REHAB | Facility: HOSPITAL | Age: 53
Setting detail: THERAPIES SERIES
Discharge: HOME | End: 2018-01-05

## 2018-01-05 DIAGNOSIS — Z95.1 HX OF CABG: Primary | ICD-10-CM

## 2020-08-17 ENCOUNTER — APPOINTMENT (OUTPATIENT)
Dept: CT IMAGING | Facility: HOSPITAL | Age: 55
End: 2020-08-17

## 2020-08-17 ENCOUNTER — HOSPITAL ENCOUNTER (OUTPATIENT)
Facility: HOSPITAL | Age: 55
Setting detail: OBSERVATION
Discharge: HOME | End: 2020-08-19
Attending: EMERGENCY MEDICINE | Admitting: FAMILY MEDICINE

## 2020-08-17 ENCOUNTER — APPOINTMENT (OUTPATIENT)
Dept: GENERAL RADIOLOGY | Facility: HOSPITAL | Age: 55
End: 2020-08-17

## 2020-08-17 DIAGNOSIS — R07.9 CHEST PAIN WITH HIGH RISK FOR CARDIAC ETIOLOGY: Primary | ICD-10-CM

## 2020-08-17 DIAGNOSIS — D64.9 ANEMIA, UNSPECIFIED TYPE: ICD-10-CM

## 2020-08-17 DIAGNOSIS — C91.10 CLL (CHRONIC LYMPHOCYTIC LEUKEMIA) (HCC): ICD-10-CM

## 2020-08-17 LAB
ABO GROUP BLD: NORMAL
ALBUMIN SERPL-MCNC: 3.8 G/DL (ref 3.5–5.2)
ALBUMIN/GLOB SERPL: 1.5 G/DL
ALP SERPL-CCNC: 96 U/L (ref 39–117)
ALT SERPL W P-5'-P-CCNC: 6 U/L (ref 1–41)
ANION GAP SERPL CALCULATED.3IONS-SCNC: 13 MMOL/L (ref 5–15)
APTT PPP: 34.4 SECONDS (ref 20–40.3)
AST SERPL-CCNC: 16 U/L (ref 1–40)
BILIRUB SERPL-MCNC: 0.2 MG/DL (ref 0–1.2)
BLD GP AB SCN SERPL QL: NEGATIVE
BUN SERPL-MCNC: 28 MG/DL (ref 6–20)
BUN/CREAT SERPL: 14.1 (ref 7–25)
CALCIUM SPEC-SCNC: 8.7 MG/DL (ref 8.6–10.5)
CHLORIDE SERPL-SCNC: 101 MMOL/L (ref 98–107)
CO2 SERPL-SCNC: 23 MMOL/L (ref 22–29)
CREAT SERPL-MCNC: 1.98 MG/DL (ref 0.76–1.27)
D-DIMER, QUANTITATIVE (MAD,POW, STR): 479 NG/ML (FEU) (ref 0–470)
D-LACTATE SERPL-SCNC: 1.1 MMOL/L (ref 0.5–2)
DEPRECATED RDW RBC AUTO: 42.2 FL (ref 37–54)
ERYTHROCYTE [DISTWIDTH] IN BLOOD BY AUTOMATED COUNT: 14.5 % (ref 12.3–15.4)
FERRITIN SERPL-MCNC: 100.6 NG/ML (ref 30–400)
GFR SERPL CREATININE-BSD FRML MDRD: 35 ML/MIN/1.73
GLOBULIN UR ELPH-MCNC: 2.6 GM/DL
GLUCOSE SERPL-MCNC: 89 MG/DL (ref 65–99)
HCT VFR BLD AUTO: 20.3 % (ref 37.5–51)
HCT VFR BLD AUTO: 35.6 % (ref 37.5–51)
HGB BLD-MCNC: 11.5 G/DL (ref 13–17.7)
HGB BLD-MCNC: 6.7 G/DL (ref 13–17.7)
HGB RETIC QN AUTO: 31.2 PG (ref 29.8–36.1)
HOLD SPECIMEN: NORMAL
IMM RETICS NFR: 17.4 % (ref 3–15.8)
INR PPP: 0.99 (ref 0.8–1.2)
IRON 24H UR-MRATE: 51 MCG/DL (ref 59–158)
IRON SATN MFR SERPL: 17 % (ref 20–50)
LDH SERPL-CCNC: 241 U/L (ref 135–225)
Lab: NORMAL
MAGNESIUM SERPL-MCNC: 1.9 MG/DL (ref 1.6–2.6)
MCH RBC QN AUTO: 27.3 PG (ref 26.6–33)
MCHC RBC AUTO-ENTMCNC: 33 G/DL (ref 31.5–35.7)
MCV RBC AUTO: 82.9 FL (ref 79–97)
NT-PROBNP SERPL-MCNC: 730.6 PG/ML (ref 0–900)
PLATELET # BLD AUTO: 129 10*3/MM3 (ref 140–450)
PMV BLD AUTO: 9.5 FL (ref 6–12)
POTASSIUM SERPL-SCNC: 4.9 MMOL/L (ref 3.5–5.2)
PROT SERPL-MCNC: 6.4 G/DL (ref 6–8.5)
PROTHROMBIN TIME: 13.4 SECONDS (ref 11.1–15.3)
RBC # BLD AUTO: 2.45 10*6/MM3 (ref 4.14–5.8)
RETICS # AUTO: 0.03 10*6/MM3 (ref 0.02–0.13)
RETICS/RBC NFR AUTO: 1.13 % (ref 0.7–1.9)
RH BLD: NEGATIVE
SODIUM SERPL-SCNC: 137 MMOL/L (ref 136–145)
T&S EXPIRATION DATE: NORMAL
TIBC SERPL-MCNC: 304 MCG/DL (ref 298–536)
TRANSFERRIN SERPL-MCNC: 204 MG/DL (ref 200–360)
TROPONIN T SERPL-MCNC: <0.01 NG/ML (ref 0–0.03)
TROPONIN T SERPL-MCNC: <0.01 NG/ML (ref 0–0.03)
WBC # BLD AUTO: 78.7 10*3/MM3 (ref 3.4–10.8)
WHOLE BLOOD HOLD SPECIMEN: NORMAL
WHOLE BLOOD HOLD SPECIMEN: NORMAL

## 2020-08-17 PROCEDURE — 84466 ASSAY OF TRANSFERRIN: CPT | Performed by: INTERNAL MEDICINE

## 2020-08-17 PROCEDURE — 82746 ASSAY OF FOLIC ACID SERUM: CPT | Performed by: NURSE PRACTITIONER

## 2020-08-17 PROCEDURE — 85060 BLOOD SMEAR INTERPRETATION: CPT | Performed by: EMERGENCY MEDICINE

## 2020-08-17 PROCEDURE — 86923 COMPATIBILITY TEST ELECTRIC: CPT

## 2020-08-17 PROCEDURE — 83615 LACTATE (LD) (LDH) ENZYME: CPT | Performed by: INTERNAL MEDICINE

## 2020-08-17 PROCEDURE — 96374 THER/PROPH/DIAG INJ IV PUSH: CPT

## 2020-08-17 PROCEDURE — 83605 ASSAY OF LACTIC ACID: CPT | Performed by: EMERGENCY MEDICINE

## 2020-08-17 PROCEDURE — 99285 EMERGENCY DEPT VISIT HI MDM: CPT

## 2020-08-17 PROCEDURE — 85379 FIBRIN DEGRADATION QUANT: CPT | Performed by: EMERGENCY MEDICINE

## 2020-08-17 PROCEDURE — 25010000002 MORPHINE PER 10 MG: Performed by: EMERGENCY MEDICINE

## 2020-08-17 PROCEDURE — 74176 CT ABD & PELVIS W/O CONTRAST: CPT

## 2020-08-17 PROCEDURE — 83735 ASSAY OF MAGNESIUM: CPT | Performed by: EMERGENCY MEDICINE

## 2020-08-17 PROCEDURE — P9016 RBC LEUKOCYTES REDUCED: HCPCS

## 2020-08-17 PROCEDURE — 85018 HEMOGLOBIN: CPT | Performed by: HOSPITALIST

## 2020-08-17 PROCEDURE — 93005 ELECTROCARDIOGRAM TRACING: CPT | Performed by: EMERGENCY MEDICINE

## 2020-08-17 PROCEDURE — 85046 RETICYTE/HGB CONCENTRATE: CPT | Performed by: INTERNAL MEDICINE

## 2020-08-17 PROCEDURE — 70490 CT SOFT TISSUE NECK W/O DYE: CPT

## 2020-08-17 PROCEDURE — 96375 TX/PRO/DX INJ NEW DRUG ADDON: CPT

## 2020-08-17 PROCEDURE — G0378 HOSPITAL OBSERVATION PER HR: HCPCS

## 2020-08-17 PROCEDURE — 85007 BL SMEAR W/DIFF WBC COUNT: CPT | Performed by: EMERGENCY MEDICINE

## 2020-08-17 PROCEDURE — 85025 COMPLETE CBC W/AUTO DIFF WBC: CPT | Performed by: EMERGENCY MEDICINE

## 2020-08-17 PROCEDURE — 96376 TX/PRO/DX INJ SAME DRUG ADON: CPT

## 2020-08-17 PROCEDURE — 93005 ELECTROCARDIOGRAM TRACING: CPT

## 2020-08-17 PROCEDURE — 83880 ASSAY OF NATRIURETIC PEPTIDE: CPT | Performed by: EMERGENCY MEDICINE

## 2020-08-17 PROCEDURE — 25010000002 MORPHINE PER 10 MG: Performed by: NURSE PRACTITIONER

## 2020-08-17 PROCEDURE — 84484 ASSAY OF TROPONIN QUANT: CPT | Performed by: NURSE PRACTITIONER

## 2020-08-17 PROCEDURE — 82728 ASSAY OF FERRITIN: CPT | Performed by: INTERNAL MEDICINE

## 2020-08-17 PROCEDURE — 85014 HEMATOCRIT: CPT | Performed by: HOSPITALIST

## 2020-08-17 PROCEDURE — 71045 X-RAY EXAM CHEST 1 VIEW: CPT

## 2020-08-17 PROCEDURE — 86900 BLOOD TYPING SEROLOGIC ABO: CPT | Performed by: EMERGENCY MEDICINE

## 2020-08-17 PROCEDURE — 85610 PROTHROMBIN TIME: CPT | Performed by: EMERGENCY MEDICINE

## 2020-08-17 PROCEDURE — 86901 BLOOD TYPING SEROLOGIC RH(D): CPT | Performed by: EMERGENCY MEDICINE

## 2020-08-17 PROCEDURE — 86880 COOMBS TEST DIRECT: CPT | Performed by: INTERNAL MEDICINE

## 2020-08-17 PROCEDURE — 93010 ELECTROCARDIOGRAM REPORT: CPT | Performed by: INTERNAL MEDICINE

## 2020-08-17 PROCEDURE — 85730 THROMBOPLASTIN TIME PARTIAL: CPT | Performed by: EMERGENCY MEDICINE

## 2020-08-17 PROCEDURE — 86900 BLOOD TYPING SEROLOGIC ABO: CPT

## 2020-08-17 PROCEDURE — 80053 COMPREHEN METABOLIC PANEL: CPT | Performed by: EMERGENCY MEDICINE

## 2020-08-17 PROCEDURE — 87040 BLOOD CULTURE FOR BACTERIA: CPT | Performed by: EMERGENCY MEDICINE

## 2020-08-17 PROCEDURE — 84484 ASSAY OF TROPONIN QUANT: CPT | Performed by: EMERGENCY MEDICINE

## 2020-08-17 PROCEDURE — 82607 VITAMIN B-12: CPT | Performed by: NURSE PRACTITIONER

## 2020-08-17 PROCEDURE — 36415 COLL VENOUS BLD VENIPUNCTURE: CPT | Performed by: NURSE PRACTITIONER

## 2020-08-17 PROCEDURE — 93005 ELECTROCARDIOGRAM TRACING: CPT | Performed by: NURSE PRACTITIONER

## 2020-08-17 PROCEDURE — 36430 TRANSFUSION BLD/BLD COMPNT: CPT

## 2020-08-17 PROCEDURE — 83540 ASSAY OF IRON: CPT | Performed by: INTERNAL MEDICINE

## 2020-08-17 PROCEDURE — 86850 RBC ANTIBODY SCREEN: CPT | Performed by: EMERGENCY MEDICINE

## 2020-08-17 PROCEDURE — 96361 HYDRATE IV INFUSION ADD-ON: CPT

## 2020-08-17 PROCEDURE — 71250 CT THORAX DX C-: CPT

## 2020-08-17 RX ORDER — FAMOTIDINE 20 MG/1
40 TABLET, FILM COATED ORAL
Status: DISCONTINUED | OUTPATIENT
Start: 2020-08-17 | End: 2020-08-19 | Stop reason: HOSPADM

## 2020-08-17 RX ORDER — SUCRALFATE 1 G/1
1 TABLET ORAL
Status: DISCONTINUED | OUTPATIENT
Start: 2020-08-17 | End: 2020-08-19 | Stop reason: HOSPADM

## 2020-08-17 RX ORDER — SODIUM CHLORIDE 0.9 % (FLUSH) 0.9 %
10 SYRINGE (ML) INJECTION AS NEEDED
Status: DISCONTINUED | OUTPATIENT
Start: 2020-08-17 | End: 2020-08-19 | Stop reason: HOSPADM

## 2020-08-17 RX ORDER — SUCRALFATE 1 G/1
1 TABLET ORAL 4 TIMES DAILY
COMMUNITY
End: 2021-08-31 | Stop reason: SDUPTHER

## 2020-08-17 RX ORDER — ONDANSETRON 2 MG/ML
4 INJECTION INTRAMUSCULAR; INTRAVENOUS EVERY 6 HOURS PRN
Status: DISCONTINUED | OUTPATIENT
Start: 2020-08-17 | End: 2020-08-19 | Stop reason: HOSPADM

## 2020-08-17 RX ORDER — SODIUM CHLORIDE 9 MG/ML
125 INJECTION, SOLUTION INTRAVENOUS CONTINUOUS
Status: DISCONTINUED | OUTPATIENT
Start: 2020-08-17 | End: 2020-08-19 | Stop reason: HOSPADM

## 2020-08-17 RX ORDER — SODIUM CHLORIDE 0.9 % (FLUSH) 0.9 %
10 SYRINGE (ML) INJECTION EVERY 12 HOURS SCHEDULED
Status: DISCONTINUED | OUTPATIENT
Start: 2020-08-17 | End: 2020-08-19 | Stop reason: HOSPADM

## 2020-08-17 RX ORDER — AMLODIPINE BESYLATE 2.5 MG/1
2.5 TABLET ORAL DAILY
Status: DISCONTINUED | OUTPATIENT
Start: 2020-08-17 | End: 2020-08-18

## 2020-08-17 RX ORDER — ASPIRIN 325 MG
325 TABLET ORAL ONCE
Status: COMPLETED | OUTPATIENT
Start: 2020-08-17 | End: 2020-08-17

## 2020-08-17 RX ORDER — FAMOTIDINE 40 MG/1
40 TABLET, FILM COATED ORAL 2 TIMES DAILY
COMMUNITY
End: 2021-04-24 | Stop reason: HOSPADM

## 2020-08-17 RX ORDER — MORPHINE SULFATE 2 MG/ML
2 INJECTION, SOLUTION INTRAMUSCULAR; INTRAVENOUS EVERY 4 HOURS PRN
Status: DISCONTINUED | OUTPATIENT
Start: 2020-08-17 | End: 2020-08-19 | Stop reason: HOSPADM

## 2020-08-17 RX ORDER — HYDRALAZINE HYDROCHLORIDE 20 MG/ML
10 INJECTION INTRAMUSCULAR; INTRAVENOUS EVERY 6 HOURS PRN
Status: DISCONTINUED | OUTPATIENT
Start: 2020-08-17 | End: 2020-08-19 | Stop reason: HOSPADM

## 2020-08-17 RX ORDER — CARVEDILOL 12.5 MG/1
12.5 TABLET ORAL 2 TIMES DAILY WITH MEALS
COMMUNITY
End: 2020-08-19 | Stop reason: HOSPADM

## 2020-08-17 RX ORDER — NITROGLYCERIN 0.4 MG/1
0.4 TABLET SUBLINGUAL
Status: DISCONTINUED | OUTPATIENT
Start: 2020-08-17 | End: 2020-08-19 | Stop reason: HOSPADM

## 2020-08-17 RX ORDER — INDAPAMIDE 2.5 MG/1
2.5 TABLET, FILM COATED ORAL EVERY MORNING
COMMUNITY
End: 2021-04-24 | Stop reason: HOSPADM

## 2020-08-17 RX ORDER — AMLODIPINE BESYLATE 2.5 MG/1
2.5 TABLET ORAL DAILY
COMMUNITY
End: 2020-08-19 | Stop reason: HOSPADM

## 2020-08-17 RX ORDER — SODIUM CHLORIDE 9 MG/ML
INJECTION, SOLUTION INTRAVENOUS
Status: DISCONTINUED
Start: 2020-08-17 | End: 2020-08-19 | Stop reason: HOSPADM

## 2020-08-17 RX ADMIN — SUCRALFATE 1 G: 1 TABLET ORAL at 22:47

## 2020-08-17 RX ADMIN — SODIUM CHLORIDE, PRESERVATIVE FREE 10 ML: 5 INJECTION INTRAVENOUS at 22:48

## 2020-08-17 RX ADMIN — SODIUM CHLORIDE 125 ML/HR: 9 INJECTION, SOLUTION INTRAVENOUS at 15:31

## 2020-08-17 RX ADMIN — MORPHINE SULFATE 2 MG: 2 INJECTION, SOLUTION INTRAMUSCULAR; INTRAVENOUS at 23:01

## 2020-08-17 RX ADMIN — NITROGLYCERIN 0.4 MG: 0.4 TABLET, ORALLY DISINTEGRATING SUBLINGUAL at 15:36

## 2020-08-17 RX ADMIN — MORPHINE SULFATE 4 MG: 4 INJECTION INTRAVENOUS at 17:11

## 2020-08-17 RX ADMIN — FAMOTIDINE 40 MG: 20 TABLET, FILM COATED ORAL at 23:33

## 2020-08-17 RX ADMIN — SODIUM CHLORIDE 125 ML/HR: 9 INJECTION, SOLUTION INTRAVENOUS at 22:48

## 2020-08-17 RX ADMIN — ASPIRIN 325 MG: 325 TABLET ORAL at 15:33

## 2020-08-17 RX ADMIN — AMLODIPINE BESYLATE 2.5 MG: 2.5 TABLET ORAL at 22:47

## 2020-08-17 RX ADMIN — MORPHINE SULFATE 2 MG: 2 INJECTION, SOLUTION INTRAMUSCULAR; INTRAVENOUS at 19:01

## 2020-08-17 NOTE — ED PROVIDER NOTES
"Subjective   53yo male pmh significant htn/hyperlipidemia/cad/chf/ckd/CLL/common iliac artery aneurysm presents ED c/o 1wk hx intermittent left sided/substernal chest pain characterized as \"sharp\"/radiating LUE, neck/associated soa/neg exac factors/partial relief sl ntg; pt rates pain 7/10 at time of exam.  ROS neg fever/chills/cough/syncope/palpitations/abd pain.      History provided by:  Patient  Chest Pain   Pain location:  L chest and substernal area  Pain radiates to:  L arm, L shoulder and neck  Pain severity:  Moderate  Onset quality:  Sudden  Duration:  1 week  Timing:  Intermittent  Associated symptoms: shortness of breath    Associated symptoms: no cough and no palpitations        Review of Systems   Constitutional: Negative.    HENT: Negative.    Eyes: Negative for redness.   Respiratory: Positive for chest tightness and shortness of breath. Negative for cough.    Cardiovascular: Positive for chest pain. Negative for palpitations and leg swelling.   Gastrointestinal: Negative.    Genitourinary: Negative.    Musculoskeletal: Negative.    Skin: Negative.    Allergic/Immunologic: Positive for immunocompromised state.   All other systems reviewed and are negative.      Past Medical History:   Diagnosis Date   • Aneurysm of infrarenal abdominal aorta (CMS/HCC)    • Anxiety    • Cervical radiculitis    • Cervical spondylosis without myelopathy    • CHF (congestive heart failure) (CMS/HCC)    • Chronic kidney disease, stage 3    • Common iliac aneurysm (CMS/HCC)    • Coronary arteriosclerosis    • Degeneration of cervical intervertebral disc    • Essential hypertension    • GERD (gastroesophageal reflux disease)    • Headache    • Hyperlipidemia    • Intermittent claudication (CMS/HCC)    • Myocardial infarction    • Uric acid renal calculus        Allergies   Allergen Reactions   • Amlodipine Rash   • Imdur [Isosorbide Dinitrate] Rash   • Lisinopril Rash   • Metoprolol Rash       Past Surgical History: "   Procedure Laterality Date   • APPENDECTOMY     • CARDIAC CATHETERIZATION  05/25/2016    Cardiac cath 57237 (2) - Patent left circumflex stent.Chronic total occlusion of the RCA,more than 20 mm in length.   • CARDIAC CATHETERIZATION N/A 11/28/2017    Procedure: Left Heart Cath/ PCI if indicated;  Surgeon: Carlos Charles MD;  Location: [facility] CATH INVASIVE LOCATION;  Service:    • CERVICAL FUSION     • CHOLECYSTECTOMY     • CORONARY ARTERY BYPASS GRAFT N/A 12/4/2017    Procedure: CORONARY ARTERY BYPASS GRAFTINGX3, ENDOSCOPIC VEIN HARVEST     (CELL SAVER);  Surgeon: Darien Dejesus MD;  Location: [facility] OR;  Service:    • HERNIA REPAIR     • SPINAL FUSION         Family History   Problem Relation Age of Onset   • Hyperlipidemia Mother    • Hypertension Mother    • Cancer Father    • Hyperlipidemia Father    • Hypertension Father    • Cancer Brother    • Diabetes Other    • Diabetes Other        Social History     Socioeconomic History   • Marital status:      Spouse name: Not on file   • Number of children: Not on file   • Years of education: Not on file   • Highest education level: Not on file   Tobacco Use   • Smoking status: Former Smoker     Last attempt to quit: 8/28/2016     Years since quitting: 3.9   • Smokeless tobacco: Never Used   Substance and Sexual Activity   • Alcohol use: No   • Drug use: No   • Sexual activity: Defer     Comment: Marital status: Single           Objective   Physical Exam   Constitutional: He is oriented to person, place, and time. He appears well-developed and well-nourished.   HENT:   Head: Normocephalic and atraumatic.   Mouth/Throat: Oropharynx is clear and moist.   Eyes: Pupils are equal, round, and reactive to light.   Neck: Normal range of motion. Neck supple. No JVD present. No tracheal deviation present.   Cardiovascular: Normal rate, regular rhythm, normal heart sounds and intact distal pulses. Exam reveals no gallop and no friction rub.   No murmur  heard.  Pulmonary/Chest: Effort normal and breath sounds normal. He has no wheezes. He has no rales.   Abdominal: Soft. Bowel sounds are normal. He exhibits no distension and no mass. There is no tenderness. There is no guarding.   Musculoskeletal: He exhibits no edema or tenderness.   Lymphadenopathy:     He has no cervical adenopathy.   Neurological: He is alert and oriented to person, place, and time.   Nursing note and vitals reviewed.      ECG 12 Lead    Date/Time: 8/17/2020 4:02 PM  Performed by: Jean Carlos Vidal MD  Authorized by: Jean Carlos Vidal MD   Interpreted by physician  Rhythm: sinus rhythm  Rate: normal  BPM: 66  QRS axis: normal  ST Segments: ST segments normal  T Waves: T waves normal  Other: no other findings  Clinical impression: normal ECG                 ED Course      Labs Reviewed   COMPREHENSIVE METABOLIC PANEL - Abnormal; Notable for the following components:       Result Value    BUN 28 (*)     Creatinine 1.98 (*)     eGFR Non  Amer 35 (*)     All other components within normal limits    Narrative:     GFR Normal >60  Chronic Kidney Disease <60  Kidney Failure <15     CBC WITH AUTO DIFFERENTIAL - Abnormal; Notable for the following components:    WBC 78.70 (*)     RBC 2.45 (*)     Hemoglobin 6.7 (*)     Hematocrit 20.3 (*)     Platelets 129 (*)     All other components within normal limits   D-DIMER, QUANTITATIVE - Abnormal; Notable for the following components:    D-Dimer, Quantitative 479 (*)     All other components within normal limits    Narrative:     Dimer values <500 ng/ml FEU are FDA approved as aid in diagnosis of deep venous thrombosis and pulmonary embolism.  This test should not be used in an exclusion strategy with pretest probability alone.    A recent guideline regarding diagnosis for pulmonary thromboembolism recommends an adjusted exclusion criterion of age x 10 ng/ml FEU for patients >50 years of age ([employer]yla Intern Med 2015; 163: 701-711).     TROPONIN (IN-HOUSE) -  Normal    Narrative:     Troponin T Reference Range:  <= 0.03 ng/mL-   Negative for AMI  >0.03 ng/mL-     Abnormal for myocardial necrosis.  Clinicians would have to utilize clinical acumen, EKG, Troponin and serial changes to determine if it is an Acute Myocardial Infarction or myocardial injury due to an underlying chronic condition.       Results may be falsely decreased if patient taking Biotin.     BNP (IN-HOUSE) - Normal    Narrative:     Among patients with dyspnea, NT-proBNP is highly sensitive for the detection of acute congestive heart failure. In addition NT-proBNP of <300 pg/ml effectively rules out acute congestive heart failure with 99% negative predictive value.    Results may be falsely decreased if patient taking Biotin.     APTT - Normal    Narrative:     The recommended Heparin therapeutic range is 68-97 seconds.   PROTIME-INR - Normal    Narrative:     Therapeutic range for most indications is 2.0-3.0 INR,  or 2.5-3.5 for mechanical heart valves.   MAGNESIUM - Normal   LACTIC ACID, PLASMA - Normal   BLOOD CULTURE   BLOOD CULTURE   RAINBOW DRAW    Narrative:     The following orders were created for panel order [place] Draw.  Procedure                               Abnormality         Status                     ---------                               -----------         ------                     Light Blue Top[[id number]]                                   Final result               Green Top (Gel)[[id number]]                                  Final result               Lavender Top[[id number]]                                     Final result               Gold Top - SST[[id number]]                                                                Please view results for these tests on the individual orders.   TROPONIN (IN-HOUSE)   TROPONIN (IN-HOUSE)   MANUAL DIFFERENTIAL   TYPE AND SCREEN   PREPARE RBC   PREVIOUS HISTORY   PERIPHERAL BLOOD SMEAR, PATH REVIEW   CBC AND DIFFERENTIAL    Narrative:     The  following orders were created for panel order CBC & Differential.  Procedure                               Abnormality         Status                     ---------                               -----------         ------                     CBC Auto Differential[[id number]]        Abnormal            Final result                 Please view results for these tests on the individual orders.   LIGHT BLUE TOP   GREEN TOP   LAVENDER TOP   GOLD TOP - SST     Xr Chest 1 View    Result Date: 8/17/2020  Narrative: PROCEDURE: Single chest view portable erect REASON FOR EXAM:Chest Pain triage protocol Chest Pain Triage Protocol FINDINGS: Comparison exam dated December 6, 2017. Stable postsurgical changes with median sternotomy. Cardiac and pulmonary vasculature are normal. Lungs are clear. Pleural spaces are normal. No acute osseous abnormality. Metallic plate is seen fixating the lower cervical spine.     Impression: 1.  Stable postsurgical changes described above. 2.  No acute cardiopulmonary abnormality. Electronically signed by:  Derek Galloway MD  8/17/2020 3:38 PM CDT Workstation: [id number]                                         [facility]    Final diagnoses:   Chest pain with high risk for cardiac etiology   CLL (chronic lymphocytic leukemia) (CMS/[facility])   Anemia, unspecified type            Jean Carlos Vidal MD  08/17/20 1939

## 2020-08-17 NOTE — H&P
[facility] Medicine Admission      Date of Admission: 8/17/2020      Primary Care Physician: Fernanda Pope APRN      Chief Complaint: Chest pain    HPI: This is a 54-year-old  male with past medical history of anxiety, CHF, CKD 4, common iliac aneurysm, GERD, hyperlipidemia and CAD (status post stent and CABG in 2017) that presented to [facility] on 8/17/2020 with complaints of chest pain, left arm pain radiating to the jaw.  EKG shows no ST-T abnormality.  WBC 78.70, hemoglobin 6.7, platelets 129.  Patient reports he underwent CABG in 2017 and went to a nursing home for rehab afterwards.  He states he was told he had some concerns for leukemia but was not able to follow-up.  Patient has an appointment scheduled with Dr. Henderson on 8/21/2020.  Patient also reports a 22 pound weight loss over the last 3 weeks secondary to trouble swallowing and night sweats.    Concurrent Medical History:  has a past medical history of Aneurysm of infrarenal abdominal aorta (CMS/HCC), Anxiety, Cervical radiculitis, Cervical spondylosis without myelopathy, CHF (congestive heart failure) (CMS/HCC), Chronic kidney disease, stage 3 (CMS/HCC), Common iliac aneurysm (CMS/HCC), Coronary arteriosclerosis, Degeneration of cervical intervertebral disc, Essential hypertension, GERD (gastroesophageal reflux disease), Headache, Hyperlipidemia, Intermittent claudication (CMS/HCC), Myocardial infarction (CMS/HCC), and Uric acid renal calculus.    Past Surgical History:  has a past surgical history that includes Cardiac catheterization (05/25/2016); Cholecystectomy; Hernia repair; Cervical fusion; Spinal fusion; Appendectomy; Cardiac catheterization (N/A, 11/28/2017); and Coronary artery bypass graft (N/A, 12/4/2017).    Family History: family history includes Cancer in his brother and father; Diabetes in some other family members; Hyperlipidemia in his father and mother; Hypertension in his  father and mother.    Social History:  reports that he quit smoking about 3 years ago. He has never used smokeless tobacco. He reports that he does not drink alcohol or use drugs.    Allergies:   Allergies   Allergen Reactions   • Amlodipine Rash   • Imdur [Isosorbide Dinitrate] Rash   • Lisinopril Rash   • Metoprolol Rash       Medications:   Prior to Admission medications    Medication Sig Start Date End Date Taking? Authorizing Provider   amLODIPine (NORVASC) 2.5 MG tablet Take 2.5 mg by mouth Daily.   Yes Shannon Belle MD   famotidine (PEPCID) 40 MG tablet Take 40 mg by mouth Daily.   Yes Shannon Belle MD   indapamide (LOZOL) 2.5 MG tablet Take 2.5 mg by mouth Every Morning.   Yes Shannon Belle MD   pravastatin (PRAVACHOL) 40 MG tablet Take 1 tablet by mouth every night. 8/29/16  Yes Merlene Dodge MD   sucralfate (CARAFATE) 1 g tablet Take 1 g by mouth 4 (Four) Times a Day.   Yes Shannon Belle MD   aspirin 81 MG tablet Take 81 mg by mouth.    ProviderShannon MD   citalopram (CeleXA) 20 MG tablet Take 20 mg by mouth Daily.    ProviderShannon MD   cyclobenzaprine (FLEXERIL) 10 MG tablet Take 10 mg by mouth 3 (three) times a day. Indication: Muscle spasm    ProviderShannon MD   docusate sodium 100 MG capsule Take 200 mg by mouth 2 (Two) Times a Day. 5/13/17   Emile Kinsey MD   guaiFENesin (MUCINEX) 600 MG 12 hr tablet Take 2 tablets by mouth Every 12 (Twelve) Hours. 12/11/17   María Saleh APRN   ipratropium-albuterol (DUO-NEB) 0.5-2.5 mg/mL nebulizer Take 3 mL by nebulization 4 (Four) Times a Day. 12/11/17   María Saleh APRN   labetalol (NORMODYNE) 100 MG tablet Take 1 tablet by mouth Every 12 (Twelve) Hours. 12/11/17   María Saleh APRN   magnesium oxide (MAGOX) 400 (241.3 Mg) MG tablet tablet Take 1 tablet by mouth 2 (Two) Times a Day. 12/11/17   María Saleh APRN   oxyCODONE-acetaminophen (PERCOCET) 5-325 MG per tablet Take 1  tablet by mouth Every 6 (Six) Hours As Needed.    Shannon Belle MD   pantoprazole (PROTONIX) 40 MG EC tablet  6/16/16   ProviderShannon MD   Prenatal Vit-Fe Fumarate-FA (PRENATAL VITAMIN 27-0.8) 27-0.8 MG tablet tablet Take 1 tablet by mouth Daily. 12/12/17   María Saleh APRN   ranitidine (ZANTAC) 150 MG tablet Take 150 mg by mouth 2 (two) times a day. Indication: gerd    Shannon Belle MD   tamsulosin (FLOMAX) 0.4 MG capsule 24 hr capsule Take 1 capsule by mouth Every Night.    Shannon Belle MD   ticagrelor (BRILINTA) 90 MG tablet tablet Take 90 mg by mouth 2 (two) times a day. Indication: blood thinner    ProviderShannon MD   vitamin C (VITAMIN C) 500 MG tablet Take 1 tablet by mouth 2 (Two) Times a Day. 12/11/17   María Saleh APRN       Review of Systems:  Review of Systems   Constitutional: Positive for appetite change and unexpected weight change. Negative for activity change and fatigue.   HENT: Positive for trouble swallowing. Negative for ear pain and sore throat.    Eyes: Negative for pain and discharge.   Respiratory: Negative for cough and shortness of breath.    Cardiovascular: Positive for chest pain. Negative for palpitations.   Gastrointestinal: Negative for abdominal pain and nausea.   Endocrine: Negative for cold intolerance and heat intolerance.   Genitourinary: Negative for difficulty urinating and dysuria.   Musculoskeletal: Negative for arthralgias and gait problem.   Skin: Negative for color change and rash.   Neurological: Negative for dizziness and weakness.   Psychiatric/Behavioral: Negative for agitation and confusion.      Otherwise complete ROS is negative except as mentioned above.    Physical Exam:   Temp:  [98 °F (36.7 °C)] 98 °F (36.7 °C)  Heart Rate:  [62-68] 65  Resp:  [20] 20  BP: (129-189)/() 166/89  Physical Exam   Constitutional: He is oriented to person, place, and time. He appears well-developed and well-nourished.    HENT:   Head: Normocephalic and atraumatic.   Eyes: Pupils are equal, round, and reactive to light. EOM are normal.   Neck: Normal range of motion. Neck supple.   Cardiovascular: Normal rate and regular rhythm.   Pulmonary/Chest: Effort normal and breath sounds normal.   Abdominal: Soft. Bowel sounds are normal.   Musculoskeletal: Normal range of motion.   Neurological: He is alert and oriented to person, place, and time.   Skin: Skin is warm and dry.   Psychiatric: He has a normal mood and affect. His behavior is normal.     Results Reviewed:  I have personally reviewed current lab, radiology, and data and agree with results.  Lab Results (last 24 hours)     Procedure Component Value Units Date/Time    Retic With IRF & RET-He [[id number]] Collected:  08/17/20 1526    Specimen:  Blood Updated:  08/17/20 1816    Lactate Dehydrogenase [[id number]] Collected:  08/17/20 1526    Specimen:  Blood Updated:  08/17/20 1814    Iron Profile [[id number]] Collected:  08/17/20 1526    Specimen:  Blood Updated:  08/17/20 1814    Ferritin [[id number]] Collected:  08/17/20 1526    Specimen:  Blood Updated:  08/17/20 1814    Blood Culture - Blood, Arm, Right [[id number]] Collected:  08/17/20 1750    Specimen:  Blood from Arm, Right Updated:  08/17/20 1750    aPTT [[id number]]  (Normal) Collected:  08/17/20 1526    Specimen:  Blood from Arm, Right Updated:  08/17/20 1653     PTT 34.4 seconds     Narrative:       The recommended Heparin therapeutic range is 68-97 seconds.    Protime-INR [[id number]]  (Normal) Collected:  08/17/20 1526    Specimen:  Blood from Arm, Right Updated:  08/17/20 1653     Protime 13.4 Seconds      INR 0.99    Narrative:       Therapeutic range for most indications is 2.0-3.0 INR,  or 2.5-3.5 for mechanical heart valves.    D-dimer, Quantitative [[id number]]  (Abnormal) Collected:  08/17/20 1526    Specimen:  Blood from Arm, Right Updated:  08/17/20 1652     D-Dimer, Quantitative 479 ng/mL (FEU)     Narrative:        Dimer values <500 ng/ml FEU are FDA approved as aid in diagnosis of deep venous thrombosis and pulmonary embolism.  This test should not be used in an exclusion strategy with pretest probability alone.    A recent guideline regarding diagnosis for pulmonary thromboembolism recommends an adjusted exclusion criterion of age x 10 ng/ml FEU for patients >50 years of age (Ayla Intern Med 2015; 163: 701-711).      [place] Draw [[id number]] Collected:  08/17/20 1526    Specimen:  Blood Updated:  08/17/20 1645    Narrative:       The following orders were created for panel order [place] Draw.  Procedure                               Abnormality         Status                     ---------                               -----------         ------                     Light Blue Top[[id number]]                                   Final result               Green Top (Gel)[[id number]]                                  Final result               Lavender Top[[id number]]                                     Final result               Gold Top - SST[[id number]]                                                                Please view results for these tests on the individual orders.    Lavender Top [[id number]] Collected:  08/17/20 1526    Specimen:  Blood Updated:  08/17/20 1645     Extra Tube hold for add-on     Comment: Auto resulted       Lactic Acid, Plasma [[id number]]  (Normal) Collected:  08/17/20 1613    Specimen:  Blood Updated:  08/17/20 1642     Lactate 1.1 mmol/L     Magnesium [[id number]]  (Normal) Collected:  08/17/20 1526    Specimen:  Blood Updated:  08/17/20 1634     Magnesium 1.9 mg/dL     Light Blue Top [[id number]] Collected:  08/17/20 1526    Specimen:  Blood Updated:  08/17/20 1630     Extra Tube hold for add-on     Comment: Auto resulted       Green Top (Gel) [[id number]] Collected:  08/17/20 1526    Specimen:  Blood Updated:  08/17/20 1630     Extra Tube Hold for add-ons.     Comment: Auto resulted.       Blood Culture -  Blood, Arm, Left [[id number]] Collected:  08/17/20 1613    Specimen:  Blood from Arm, Left Updated:  08/17/20 1619    CBC & Differential [[id number]] Collected:  08/17/20 1526    Specimen:  Blood Updated:  08/17/20 1552    Narrative:       The following orders were created for panel order CBC & Differential.  Procedure                               Abnormality         Status                     ---------                               -----------         ------                     CBC Auto Differential[[id number]]        Abnormal            Final result                 Please view results for these tests on the individual orders.    CBC Auto Differential [[id number]]  (Abnormal) Collected:  08/17/20 1526    Specimen:  Blood Updated:  08/17/20 1552     WBC 78.70 10*3/mm3      RBC 2.45 10*6/mm3      Hemoglobin 6.7 g/dL      Hematocrit 20.3 %      MCV 82.9 fL      MCH 27.3 pg      MCHC 33.0 g/dL      RDW 14.5 %      RDW-SD 42.2 fl      MPV 9.5 fL      Platelets 129 10*3/mm3     Comprehensive Metabolic Panel [[id number]]  (Abnormal) Collected:  08/17/20 1526    Specimen:  Blood Updated:  08/17/20 1549     Glucose 89 mg/dL      BUN 28 mg/dL      Creatinine 1.98 mg/dL      Sodium 137 mmol/L      Potassium 4.9 mmol/L      Comment: Specimen hemolyzed.  Results may be affected.        Chloride 101 mmol/L      CO2 23.0 mmol/L      Calcium 8.7 mg/dL      Total Protein 6.4 g/dL      Albumin 3.80 g/dL      ALT (SGPT) 6 U/L      AST (SGOT) 16 U/L      Comment: Specimen hemolyzed.  Results may be affected.        Alkaline Phosphatase 96 U/L      Total Bilirubin 0.2 mg/dL      eGFR Non African Amer 35 mL/min/1.73      Globulin 2.6 gm/dL      A/G Ratio 1.5 g/dL      BUN/Creatinine Ratio 14.1     Anion Gap 13.0 mmol/L     Narrative:       GFR Normal >60  Chronic Kidney Disease <60  Kidney Failure <15      Troponin [[id number]]  (Normal) Collected:  08/17/20 1526    Specimen:  Blood Updated:  08/17/20 1546     Troponin T <0.010 ng/mL      Narrative:       Troponin T Reference Range:  <= 0.03 ng/mL-   Negative for AMI  >0.03 ng/mL-     Abnormal for myocardial necrosis.  Clinicians would have to utilize clinical acumen, EKG, Troponin and serial changes to determine if it is an Acute Myocardial Infarction or myocardial injury due to an underlying chronic condition.       Results may be falsely decreased if patient taking Biotin.      BNP [[id number]]  (Normal) Collected:  08/17/20 1526    Specimen:  Blood Updated:  08/17/20 1545     proBNP 730.6 pg/mL     Narrative:       Among patients with dyspnea, NT-proBNP is highly sensitive for the detection of acute congestive heart failure. In addition NT-proBNP of <300 pg/ml effectively rules out acute congestive heart failure with 99% negative predictive value.    Results may be falsely decreased if patient taking Biotin.      Manual Differential [[id number]] Collected:  08/17/20 1526    Specimen:  Blood Updated:  08/17/20 1530        Imaging Results (Last 24 Hours)     Procedure Component Value Units Date/Time    XR Chest 1 View [[id number]] Collected:  08/17/20 1523     Updated:  08/17/20 1539    Narrative:         PROCEDURE: Single chest view portable erect    REASON FOR EXAM:Chest Pain triage protocol  Chest Pain Triage Protocol    FINDINGS: Comparison exam dated December 6, 2017. Stable  postsurgical changes with median sternotomy. Cardiac and  pulmonary vasculature are normal. Lungs are clear. Pleural spaces  are normal. No acute osseous abnormality. Metallic plate is seen  fixating the lower cervical spine.      Impression:       1.  Stable postsurgical changes described above.  2.  No acute cardiopulmonary abnormality.    Electronically signed by:  Derek Galloway MD  8/17/2020 3:38 PM CDT  Workstation: [id number]            Assessment:        Active Hospital Problems    Diagnosis POA   • Chest pain with high risk for cardiac etiology [R07.9] Yes       Plan:  1.  Chest pain, rule out ACS: Serial cardiac  enzymes, EKG and continuous telemetry.  Notified cardiologist on-call (Dr. Duenas) about the patient and will consult the patient's cardiologist in the a.m. (Dr. Yanez).  Morphine for pain.  2.  History of possible chronic lymphocytic leukemia: Dr. Henderson consulted.  CT of neck, chest, abdomen and pelvis.  Anemia panel pending.  Flow cytometry pending.  3.  Acute on chronic anemia: 2 units of packed red blood cells ordered in the ED.  Clear liquids.  Anemia panel pending.  Dr. Henderson consult appreciated.  Brilinta, aspirin and DVT prophylaxis held secondary to anemia.  4.  CKD, stage IV: Patient follows with Dr. Butler.  Creatinine 1.98 today.  5.  Hypertension/CAD: Hydralazine PRN systolic blood pressure greater than 160.    I discussed the patient's findings and my recommendations with: Patient      This document has been electronically signed by AHSAN Whitaker on August 17, 2020 18:18

## 2020-08-17 NOTE — ED NOTES
Pt here with midsternal chest pain that radiates to left arm, jaw, and shoulder. The pain started this am while at rest. Pt has hx of multiple MI's, CABG x3 in 2017. Pt describes pain as a burning pressure.      Vincenzo Alarcon, RN  08/17/20 1543       Vincenzo Alarcon, RN  08/17/20 1543

## 2020-08-18 ENCOUNTER — APPOINTMENT (OUTPATIENT)
Dept: CARDIOLOGY | Facility: HOSPITAL | Age: 55
End: 2020-08-18

## 2020-08-18 ENCOUNTER — APPOINTMENT (OUTPATIENT)
Dept: ULTRASOUND IMAGING | Facility: HOSPITAL | Age: 55
End: 2020-08-18

## 2020-08-18 ENCOUNTER — APPOINTMENT (OUTPATIENT)
Dept: NUCLEAR MEDICINE | Facility: HOSPITAL | Age: 55
End: 2020-08-18

## 2020-08-18 LAB
ANION GAP SERPL CALCULATED.3IONS-SCNC: 14 MMOL/L (ref 5–15)
ANISOCYTOSIS BLD QL: ABNORMAL
BASOPHILS # BLD AUTO: 0.09 10*3/MM3 (ref 0–0.2)
BASOPHILS NFR BLD AUTO: 0.1 % (ref 0–1.5)
BH CV ECHO MEAS - ACS: 1.7 CM
BH CV ECHO MEAS - AO ISTHMUS: 3.1 CM
BH CV ECHO MEAS - AO MAX PG (FULL): 4.1 MMHG
BH CV ECHO MEAS - AO MAX PG: 7.8 MMHG
BH CV ECHO MEAS - AO MEAN PG (FULL): 3 MMHG
BH CV ECHO MEAS - AO MEAN PG: 5 MMHG
BH CV ECHO MEAS - AO ROOT AREA (BSA CORRECTED): 2.3
BH CV ECHO MEAS - AO ROOT AREA: 13.2 CM^2
BH CV ECHO MEAS - AO ROOT DIAM: 4.1 CM
BH CV ECHO MEAS - AO V2 MAX: 140 CM/SEC
BH CV ECHO MEAS - AO V2 MEAN: 103 CM/SEC
BH CV ECHO MEAS - AO V2 VTI: 31.1 CM
BH CV ECHO MEAS - ASC AORTA: 3.9 CM
BH CV ECHO MEAS - AVA(I,A): 1.9 CM^2
BH CV ECHO MEAS - AVA(I,D): 1.9 CM^2
BH CV ECHO MEAS - AVA(V,A): 2.6 CM^2
BH CV ECHO MEAS - AVA(V,D): 2.6 CM^2
BH CV ECHO MEAS - BSA(HAYCOCK): 1.8 M^2
BH CV ECHO MEAS - BSA: 1.8 M^2
BH CV ECHO MEAS - BZI_BMI: 21.3 KILOGRAMS/M^2
BH CV ECHO MEAS - BZI_METRIC_HEIGHT: 175.3 CM
BH CV ECHO MEAS - BZI_METRIC_WEIGHT: 65.3 KG
BH CV ECHO MEAS - EDV(CUBED): 143.1 ML
BH CV ECHO MEAS - EDV(MOD-SP2): 66.8 ML
BH CV ECHO MEAS - EDV(MOD-SP4): 83.8 ML
BH CV ECHO MEAS - EDV(TEICH): 131.2 ML
BH CV ECHO MEAS - EF(CUBED): 61 %
BH CV ECHO MEAS - EF(MOD-SP2): 43.1 %
BH CV ECHO MEAS - EF(MOD-SP4): 70.5 %
BH CV ECHO MEAS - EF(TEICH): 52.2 %
BH CV ECHO MEAS - ESV(CUBED): 55.7 ML
BH CV ECHO MEAS - ESV(MOD-SP2): 38 ML
BH CV ECHO MEAS - ESV(MOD-SP4): 24.7 ML
BH CV ECHO MEAS - ESV(TEICH): 62.7 ML
BH CV ECHO MEAS - FS: 27 %
BH CV ECHO MEAS - IVS/LVPW: 0.9
BH CV ECHO MEAS - IVSD: 1 CM
BH CV ECHO MEAS - LA DIMENSION: 3.4 CM
BH CV ECHO MEAS - LA/AO: 0.83
BH CV ECHO MEAS - LV DIASTOLIC VOL/BSA (35-75): 46.6 ML/M^2
BH CV ECHO MEAS - LV MASS(C)D: 216 GRAMS
BH CV ECHO MEAS - LV MASS(C)DI: 120.2 GRAMS/M^2
BH CV ECHO MEAS - LV MAX PG: 3.7 MMHG
BH CV ECHO MEAS - LV MEAN PG: 2 MMHG
BH CV ECHO MEAS - LV SYSTOLIC VOL/BSA (12-30): 13.7 ML/M^2
BH CV ECHO MEAS - LV V1 MAX: 96.1 CM/SEC
BH CV ECHO MEAS - LV V1 MEAN: 57.2 CM/SEC
BH CV ECHO MEAS - LV V1 VTI: 15.6 CM
BH CV ECHO MEAS - LVIDD: 5.2 CM
BH CV ECHO MEAS - LVIDS: 3.8 CM
BH CV ECHO MEAS - LVLD AP2: 8.5 CM
BH CV ECHO MEAS - LVLD AP4: 9.1 CM
BH CV ECHO MEAS - LVLS AP2: 6.8 CM
BH CV ECHO MEAS - LVLS AP4: 7.9 CM
BH CV ECHO MEAS - LVOT AREA (M): 3.8 CM^2
BH CV ECHO MEAS - LVOT AREA: 3.8 CM^2
BH CV ECHO MEAS - LVOT DIAM: 2.2 CM
BH CV ECHO MEAS - LVPWD: 1.1 CM
BH CV ECHO MEAS - MR MAX PG: 39.4 MMHG
BH CV ECHO MEAS - MR MAX VEL: 314 CM/SEC
BH CV ECHO MEAS - MV A MAX VEL: 77.1 CM/SEC
BH CV ECHO MEAS - MV DEC SLOPE: 407 CM/SEC^2
BH CV ECHO MEAS - MV E MAX VEL: 80.1 CM/SEC
BH CV ECHO MEAS - MV E/A: 1
BH CV ECHO MEAS - MV MAX PG: 3.7 MMHG
BH CV ECHO MEAS - MV MEAN PG: 1 MMHG
BH CV ECHO MEAS - MV P1/2T MAX VEL: 95.3 CM/SEC
BH CV ECHO MEAS - MV P1/2T: 68.6 MSEC
BH CV ECHO MEAS - MV V2 MAX: 96.7 CM/SEC
BH CV ECHO MEAS - MV V2 MEAN: 56.8 CM/SEC
BH CV ECHO MEAS - MV V2 VTI: 25.8 CM
BH CV ECHO MEAS - MVA P1/2T LCG: 2.3 CM^2
BH CV ECHO MEAS - MVA(P1/2T): 3.2 CM^2
BH CV ECHO MEAS - MVA(VTI): 2.3 CM^2
BH CV ECHO MEAS - PA MAX PG: 3.5 MMHG
BH CV ECHO MEAS - PA V2 MAX: 93.7 CM/SEC
BH CV ECHO MEAS - RAP SYSTOLE: 5 MMHG
BH CV ECHO MEAS - RVDD: 2 CM
BH CV ECHO MEAS - RVSP: 14.7 MMHG
BH CV ECHO MEAS - SI(AO): 228.6 ML/M^2
BH CV ECHO MEAS - SI(CUBED): 48.6 ML/M^2
BH CV ECHO MEAS - SI(LVOT): 33 ML/M^2
BH CV ECHO MEAS - SI(MOD-SP2): 16 ML/M^2
BH CV ECHO MEAS - SI(MOD-SP4): 32.9 ML/M^2
BH CV ECHO MEAS - SI(TEICH): 38.1 ML/M^2
BH CV ECHO MEAS - SV(AO): 410.6 ML
BH CV ECHO MEAS - SV(CUBED): 87.3 ML
BH CV ECHO MEAS - SV(LVOT): 59.3 ML
BH CV ECHO MEAS - SV(MOD-SP2): 28.8 ML
BH CV ECHO MEAS - SV(MOD-SP4): 59.1 ML
BH CV ECHO MEAS - SV(TEICH): 68.5 ML
BH CV ECHO MEAS - TR MAX VEL: 156 CM/SEC
BUN SERPL-MCNC: 22 MG/DL (ref 6–20)
BUN/CREAT SERPL: 13.9 (ref 7–25)
CALCIUM SPEC-SCNC: 8.5 MG/DL (ref 8.6–10.5)
CHLORIDE SERPL-SCNC: 100 MMOL/L (ref 98–107)
CO2 SERPL-SCNC: 22 MMOL/L (ref 22–29)
CREAT SERPL-MCNC: 1.58 MG/DL (ref 0.76–1.27)
CYTOLOGIST CVX/VAG CYTO: NORMAL
DAT POLY-SP REAG RBC QL: NEGATIVE
DEPRECATED RDW RBC AUTO: 41.6 FL (ref 37–54)
EOSINOPHIL # BLD AUTO: 0.2 10*3/MM3 (ref 0–0.4)
EOSINOPHIL # BLD MANUAL: 1.57 10*3/MM3 (ref 0–0.4)
EOSINOPHIL NFR BLD AUTO: 0.2 % (ref 0.3–6.2)
EOSINOPHIL NFR BLD MANUAL: 2 % (ref 0.3–6.2)
ERYTHROCYTE [DISTWIDTH] IN BLOOD BY AUTOMATED COUNT: 14.5 % (ref 12.3–15.4)
FOLATE SERPL-MCNC: 3.47 NG/ML (ref 4.78–24.2)
GFR SERPL CREATININE-BSD FRML MDRD: 46 ML/MIN/1.73
GLUCOSE SERPL-MCNC: 94 MG/DL (ref 65–99)
HCT VFR BLD AUTO: 33.6 % (ref 37.5–51)
HGB BLD-MCNC: 11.1 G/DL (ref 13–17.7)
HYPOCHROMIA BLD QL: ABNORMAL
IMM GRANULOCYTES # BLD AUTO: 0.28 10*3/MM3 (ref 0–0.05)
IMM GRANULOCYTES NFR BLD AUTO: 0.3 % (ref 0–0.5)
LYMPHOCYTES # BLD AUTO: 86.66 10*3/MM3 (ref 0.7–3.1)
LYMPHOCYTES # BLD MANUAL: 64.53 10*3/MM3 (ref 0.7–3.1)
LYMPHOCYTES NFR BLD AUTO: 90.3 % (ref 19.6–45.3)
LYMPHOCYTES NFR BLD MANUAL: 5 % (ref 5–12)
LYMPHOCYTES NFR BLD MANUAL: 82 % (ref 19.6–45.3)
MAXIMAL PREDICTED HEART RATE: 164 BPM
MCH RBC QN AUTO: 27.1 PG (ref 26.6–33)
MCHC RBC AUTO-ENTMCNC: 33 G/DL (ref 31.5–35.7)
MCV RBC AUTO: 82.2 FL (ref 79–97)
MONOCYTES # BLD AUTO: 3.41 10*3/MM3 (ref 0.1–0.9)
MONOCYTES # BLD AUTO: 3.94 10*3/MM3 (ref 0.1–0.9)
MONOCYTES NFR BLD AUTO: 3.6 % (ref 5–12)
NEUTROPHILS # BLD AUTO: 8.66 10*3/MM3 (ref 1.7–7)
NEUTROPHILS NFR BLD AUTO: 5.37 10*3/MM3 (ref 1.7–7)
NEUTROPHILS NFR BLD AUTO: 5.5 % (ref 42.7–76)
NEUTROPHILS NFR BLD MANUAL: 11 % (ref 42.7–76)
NRBC BLD AUTO-RTO: 0 /100 WBC (ref 0–0.2)
PATH INTERP BLD-IMP: NORMAL
PLATELET # BLD AUTO: 163 10*3/MM3 (ref 140–450)
PMV BLD AUTO: 9.5 FL (ref 6–12)
POTASSIUM SERPL-SCNC: 3.4 MMOL/L (ref 3.5–5.2)
RBC # BLD AUTO: 4.09 10*6/MM3 (ref 4.14–5.8)
SMALL PLATELETS BLD QL SMEAR: ABNORMAL
SODIUM SERPL-SCNC: 136 MMOL/L (ref 136–145)
STRESS TARGET HR: 139 BPM
VIT B12 BLD-MCNC: 360 PG/ML (ref 211–946)
WBC # BLD AUTO: 96.01 10*3/MM3 (ref 3.4–10.8)
WBC MORPH BLD: NORMAL

## 2020-08-18 PROCEDURE — 25010000002 MORPHINE PER 10 MG: Performed by: NURSE PRACTITIONER

## 2020-08-18 PROCEDURE — 99219 PR INITIAL OBSERVATION CARE/DAY 50 MINUTES: CPT | Performed by: INTERNAL MEDICINE

## 2020-08-18 PROCEDURE — 96361 HYDRATE IV INFUSION ADD-ON: CPT

## 2020-08-18 PROCEDURE — 93306 TTE W/DOPPLER COMPLETE: CPT

## 2020-08-18 PROCEDURE — 96376 TX/PRO/DX INJ SAME DRUG ADON: CPT

## 2020-08-18 PROCEDURE — G0378 HOSPITAL OBSERVATION PER HR: HCPCS

## 2020-08-18 PROCEDURE — 93005 ELECTROCARDIOGRAM TRACING: CPT | Performed by: NURSE PRACTITIONER

## 2020-08-18 PROCEDURE — 25010000002 NA FERRIC GLUC CPLX PER 12.5 MG: Performed by: INTERNAL MEDICINE

## 2020-08-18 PROCEDURE — 78580 LUNG PERFUSION IMAGING: CPT

## 2020-08-18 PROCEDURE — 96365 THER/PROPH/DIAG IV INF INIT: CPT

## 2020-08-18 PROCEDURE — 99204 OFFICE O/P NEW MOD 45 MIN: CPT | Performed by: NURSE PRACTITIONER

## 2020-08-18 PROCEDURE — 88184 FLOWCYTOMETRY/ TC 1 MARKER: CPT

## 2020-08-18 PROCEDURE — 85025 COMPLETE CBC W/AUTO DIFF WBC: CPT | Performed by: NURSE PRACTITIONER

## 2020-08-18 PROCEDURE — 88185 FLOWCYTOMETRY/TC ADD-ON: CPT | Performed by: FAMILY MEDICINE

## 2020-08-18 PROCEDURE — 93010 ELECTROCARDIOGRAM REPORT: CPT | Performed by: INTERNAL MEDICINE

## 2020-08-18 PROCEDURE — A9540 TC99M MAA: HCPCS | Performed by: FAMILY MEDICINE

## 2020-08-18 PROCEDURE — 0 TECHNETIUM ALBUMIN AGGREGATED: Performed by: FAMILY MEDICINE

## 2020-08-18 PROCEDURE — 80048 BASIC METABOLIC PNL TOTAL CA: CPT | Performed by: NURSE PRACTITIONER

## 2020-08-18 PROCEDURE — 93970 EXTREMITY STUDY: CPT

## 2020-08-18 PROCEDURE — 93306 TTE W/DOPPLER COMPLETE: CPT | Performed by: INTERNAL MEDICINE

## 2020-08-18 RX ORDER — PRAVASTATIN SODIUM 40 MG
40 TABLET ORAL NIGHTLY
Status: DISCONTINUED | OUTPATIENT
Start: 2020-08-18 | End: 2020-08-19 | Stop reason: HOSPADM

## 2020-08-18 RX ORDER — FOLIC ACID 1 MG/1
1 TABLET ORAL DAILY
Status: DISCONTINUED | OUTPATIENT
Start: 2020-08-18 | End: 2020-08-19 | Stop reason: HOSPADM

## 2020-08-18 RX ORDER — AMLODIPINE BESYLATE 5 MG/1
5 TABLET ORAL DAILY
Status: DISCONTINUED | OUTPATIENT
Start: 2020-08-19 | End: 2020-08-19 | Stop reason: HOSPADM

## 2020-08-18 RX ORDER — CARVEDILOL 3.12 MG/1
3.12 TABLET ORAL 2 TIMES DAILY WITH MEALS
Status: DISCONTINUED | OUTPATIENT
Start: 2020-08-18 | End: 2020-08-19

## 2020-08-18 RX ORDER — AMLODIPINE BESYLATE 2.5 MG/1
2.5 TABLET ORAL DAILY
Status: DISCONTINUED | OUTPATIENT
Start: 2020-08-18 | End: 2020-08-18

## 2020-08-18 RX ORDER — LANOLIN ALCOHOL/MO/W.PET/CERES
1000 CREAM (GRAM) TOPICAL DAILY
Status: DISCONTINUED | OUTPATIENT
Start: 2020-08-18 | End: 2020-08-19 | Stop reason: HOSPADM

## 2020-08-18 RX ORDER — CARVEDILOL 12.5 MG/1
12.5 TABLET ORAL 2 TIMES DAILY WITH MEALS
Status: DISCONTINUED | OUTPATIENT
Start: 2020-08-18 | End: 2020-08-18

## 2020-08-18 RX ORDER — ASPIRIN 81 MG/1
81 TABLET ORAL DAILY
Status: DISCONTINUED | OUTPATIENT
Start: 2020-08-18 | End: 2020-08-19 | Stop reason: HOSPADM

## 2020-08-18 RX ORDER — ASPIRIN 81 MG/1
81 TABLET ORAL DAILY
Status: DISCONTINUED | OUTPATIENT
Start: 2020-08-18 | End: 2020-08-18

## 2020-08-18 RX ORDER — INDAPAMIDE 2.5 MG/1
2.5 TABLET, FILM COATED ORAL EVERY MORNING
Status: DISCONTINUED | OUTPATIENT
Start: 2020-08-19 | End: 2020-08-19 | Stop reason: HOSPADM

## 2020-08-18 RX ADMIN — SUCRALFATE 1 G: 1 TABLET ORAL at 11:28

## 2020-08-18 RX ADMIN — CYANOCOBALAMIN TAB 1000 MCG 1000 MCG: 1000 TAB at 20:27

## 2020-08-18 RX ADMIN — Medication 1 DOSE: at 13:08

## 2020-08-18 RX ADMIN — FOLIC ACID 1 MG: 1 TABLET ORAL at 20:27

## 2020-08-18 RX ADMIN — SODIUM CHLORIDE 125 ML/HR: 9 INJECTION, SOLUTION INTRAVENOUS at 17:38

## 2020-08-18 RX ADMIN — SUCRALFATE 1 G: 1 TABLET ORAL at 20:27

## 2020-08-18 RX ADMIN — SUCRALFATE 1 G: 1 TABLET ORAL at 06:01

## 2020-08-18 RX ADMIN — SODIUM CHLORIDE 125 MG: 9 INJECTION, SOLUTION INTRAVENOUS at 20:27

## 2020-08-18 RX ADMIN — FAMOTIDINE 40 MG: 20 TABLET, FILM COATED ORAL at 17:39

## 2020-08-18 RX ADMIN — SODIUM CHLORIDE 125 ML/HR: 9 INJECTION, SOLUTION INTRAVENOUS at 06:01

## 2020-08-18 RX ADMIN — PRAVASTATIN SODIUM 40 MG: 40 TABLET ORAL at 20:27

## 2020-08-18 RX ADMIN — MORPHINE SULFATE 2 MG: 2 INJECTION, SOLUTION INTRAMUSCULAR; INTRAVENOUS at 03:40

## 2020-08-18 RX ADMIN — FAMOTIDINE 40 MG: 20 TABLET, FILM COATED ORAL at 06:01

## 2020-08-18 RX ADMIN — CARVEDILOL 3.12 MG: 3.12 TABLET, FILM COATED ORAL at 17:39

## 2020-08-18 RX ADMIN — SODIUM CHLORIDE, PRESERVATIVE FREE 10 ML: 5 INJECTION INTRAVENOUS at 20:26

## 2020-08-18 RX ADMIN — SODIUM CHLORIDE, PRESERVATIVE FREE 10 ML: 5 INJECTION INTRAVENOUS at 08:58

## 2020-08-18 RX ADMIN — ASPIRIN 81 MG: 81 TABLET, COATED ORAL at 17:39

## 2020-08-18 RX ADMIN — SUCRALFATE 1 G: 1 TABLET ORAL at 17:39

## 2020-08-18 NOTE — CONSULTS
[facility] Cardiology Consult    Referring Provider: Jean Carlos Vidal MD    Reason for Consultation: chest pain    Patient Care Team:  Fernanda Pope APRN as PCP - General  Vladimir Henderson MD as Consulting Physician (Hematology and Oncology)    Chief complaint   Chief Complaint   Patient presents with   • Chest Pain       Subjective .     History of present illness:     Mr. Rudolph Corona is a 54-year-old  male who appears much older than age.  Past medical history of anxiety, CHF, CKD 4, common iliac aneurysm, GERD, hyperlipidemia, CAD history of known CAD with previous PCI with DUE stent placement to CX (2013) and RCA X 2 proximal and mid (2016), CABG x3  LIMA->LAD SVG->PDA SVG->OM 12/2017).  He presented to the ER yesterday with complaints of chest pain described as sharp, radiating down left arm, neck with associated shortness of breath x1 week.  Reports minimal relief with nitro.  Denies cough, palpitations, orthopnea, peripheral edema. He admits to NOT following with cardiology routinely after his CABG.    In the ER he was found to be anemic with hemoglobin 6.7, this was replaced with 1 unit of blood increasing hemoglobin to 11.  WBC now 96.01.  Patient states he was told he had possible CLL a few years ago but was not able to follow-up.  Patient also reports a 22 pound weight loss over the past 3 months secondary to trouble swallowing and night sweats. EKG nsr, no significant change. Troponinx2 negative and ProBNP negative.     Risk factors: arteriosclerotic heart disease, hypercholesterolemia and hypertension      The CVD Risk score (D'Agostino, et al., 2008) failed to calculate for the following reasons:    The patient has a prior MI, stroke, CHF, or peripheral vascular disease diagnosis      Review of Systems  Review of Systems   Constitutional: Positive for fatigue and unexpected weight change.   HENT: Negative for congestion and voice change.    Eyes: Negative for pain and discharge.   Respiratory:  Negative for shortness of breath and wheezing.    Cardiovascular: Positive for chest pain. Negative for palpitations and leg swelling.   Gastrointestinal: Negative for abdominal distention, nausea and vomiting.   Endocrine: Negative for polydipsia, polyphagia and polyuria.   Genitourinary: Negative for dysuria and hematuria.   Musculoskeletal: Positive for arthralgias. Negative for gait problem.   Skin: Negative for pallor and rash.   Allergic/Immunologic: Negative for environmental allergies and food allergies.   Neurological: Positive for weakness. Negative for dizziness, seizures, syncope and light-headedness.   Hematological: Negative for adenopathy. Does not bruise/bleed easily.   Psychiatric/Behavioral: Negative for agitation, confusion and hallucinations.       History  Past Medical History:   Diagnosis Date   • Aneurysm of infrarenal abdominal aorta (CMS/HCC)    • Anxiety    • Cervical radiculitis    • Cervical spondylosis without myelopathy    • CHF (congestive heart failure) (CMS/[facility])    • Chronic kidney disease, stage 3 (CMS/[facility])    • Common iliac aneurysm (CMS/[facility])    • Coronary arteriosclerosis    • Degeneration of cervical intervertebral disc    • Essential hypertension    • GERD (gastroesophageal reflux disease)    • Headache    • Hyperlipidemia    • Intermittent claudication (CMS/[facility])    • Myocardial infarction (CMS/[facility])    • Uric acid renal calculus    ,   Past Surgical History:   Procedure Laterality Date   • APPENDECTOMY     • CARDIAC CATHETERIZATION  05/25/2016    Cardiac cath 50490 (2) - Patent left circumflex stent.Chronic total occlusion of the RCA,more than 20 mm in length.   • CARDIAC CATHETERIZATION N/A 11/28/2017    Procedure: Left Heart Cath/ PCI if indicated;  Surgeon: Carlos Charles MD;  Location: [facility] INVASIVE LOCATION;  Service:    • CERVICAL FUSION     • CHOLECYSTECTOMY     • CORONARY ARTERY BYPASS GRAFT N/A 12/4/2017    Procedure: CORONARY ARTERY BYPASS GRAFTINGX3,  ENDOSCOPIC VEIN HARVEST     (CELL SAVER);  Surgeon: Darien Dejesus MD;  Location: [facility];  Service:    • HERNIA REPAIR     • SPINAL FUSION     ,   Family History   Problem Relation Age of Onset   • Hyperlipidemia Mother    • Hypertension Mother    • Cancer Father    • Hyperlipidemia Father    • Hypertension Father    • Cancer Brother    • Diabetes Other    • Diabetes Other    ,   Social History     Tobacco Use   • Smoking status: Former Smoker     Last attempt to quit: 8/28/2016     Years since quitting: 3.9   • Smokeless tobacco: Never Used   Substance Use Topics   • Alcohol use: No   • Drug use: No   ,   Medications Prior to Admission   Medication Sig Dispense Refill Last Dose   • amLODIPine (NORVASC) 2.5 MG tablet Take 2.5 mg by mouth Daily.      • carvedilol (COREG) 12.5 MG tablet Take 12.5 mg by mouth 2 (Two) Times a Day With Meals.      • famotidine (PEPCID) 40 MG tablet Take 40 mg by mouth 2 (Two) Times a Day.      • indapamide (LOZOL) 2.5 MG tablet Take 2.5 mg by mouth Every Morning.      • pravastatin (PRAVACHOL) 40 MG tablet Take 1 tablet by mouth every night. 90 tablet 4 Taking   • sucralfate (CARAFATE) 1 g tablet Take 1 g by mouth 4 (Four) Times a Day.      • aspirin 81 MG tablet Take 81 mg by mouth.   Taking   • citalopram (CeleXA) 20 MG tablet Take 20 mg by mouth Daily.   Taking   • cyclobenzaprine (FLEXERIL) 10 MG tablet Take 10 mg by mouth 3 (three) times a day. Indication: Muscle spasm   Taking   • docusate sodium 100 MG capsule Take 200 mg by mouth 2 (Two) Times a Day. 60 capsule 1 Taking   • guaiFENesin (MUCINEX) 600 MG 12 hr tablet Take 2 tablets by mouth Every 12 (Twelve) Hours.   Taking   • ipratropium-albuterol (DUO-NEB) 0.5-2.5 mg/mL nebulizer Take 3 mL by nebulization 4 (Four) Times a Day. 360 mL  Taking   • labetalol (NORMODYNE) 100 MG tablet Take 1 tablet by mouth Every 12 (Twelve) Hours.   Taking   • magnesium oxide (MAGOX) 400 (241.3 Mg) MG tablet tablet Take 1 tablet by mouth 2  "(Two) Times a Day. 30 each  Taking   • oxyCODONE-acetaminophen (PERCOCET) 5-325 MG per tablet Take 1 tablet by mouth Every 6 (Six) Hours As Needed.   Taking   • pantoprazole (PROTONIX) 40 MG EC tablet   1 Taking   • Prenatal Vit-Fe Fumarate-FA (PRENATAL VITAMIN 27-0.8) 27-0.8 MG tablet tablet Take 1 tablet by mouth Daily.   Taking   • ranitidine (ZANTAC) 150 MG tablet Take 150 mg by mouth 2 (two) times a day. Indication: gerd   Taking   • tamsulosin (FLOMAX) 0.4 MG capsule 24 hr capsule Take 1 capsule by mouth Every Night.   Taking   • ticagrelor (BRILINTA) 90 MG tablet tablet Take 90 mg by mouth 2 (two) times a day. Indication: blood thinner   Taking   • vitamin C (VITAMIN C) 500 MG tablet Take 1 tablet by mouth 2 (Two) Times a Day.   Taking      ALLERGIES  Amlodipine; Imdur [isosorbide dinitrate]; Lisinopril; and Metoprolol    Objective     Vital Sign Min/Max for last 24 hours  Temp  Min: 96.7 °F (35.9 °C)  Max: 98 °F (36.7 °C)   BP  Min: 129/77  Max: 189/98   Pulse  Min: 61  Max: 76   Resp  Min: 17  Max: 20   SpO2  Min: 97 %  Max: 100 %   No data recorded   Weight  Min: 65.4 kg (144 lb 2 oz)  Max: 66.5 kg (146 lb 8 oz)     Flowsheet Rows      First Filed Value   Admission Height  175.3 cm (69\") Documented at 08/17/2020 1719   Admission Weight  65.8 kg (145 lb) Documented at 08/17/2020 1514             Physical Exam:  Physical Exam   Constitutional: He is oriented to person, place, and time. Vital signs are normal. He appears well-developed and well-nourished. No distress.   HENT:   Head: Normocephalic.   Right Ear: External ear normal.   Left Ear: External ear normal.   Eyes: Pupils are equal, round, and reactive to light. EOM and lids are normal.   Neck: No JVD present. Carotid bruit is not present. No tracheal deviation present. No thyromegaly present.   Cardiovascular: Normal rate, regular rhythm, normal heart sounds and intact distal pulses. Exam reveals no gallop and no friction rub.   No murmur " heard.  Pulmonary/Chest: Effort normal. No stridor. No respiratory distress. He has decreased breath sounds. He has no wheezes. He has no rales. He exhibits no tenderness.   Abdominal: Soft. Normal appearance and bowel sounds are normal. He exhibits no distension. There is no tenderness.   Musculoskeletal: He exhibits no edema.     Vascular Status -  His right foot exhibits normal foot vasculature  and no edema. His left foot exhibits normal foot vasculature  and no edema.  Neurological: He is alert and oriented to person, place, and time. He has normal reflexes. He displays normal reflexes. No cranial nerve deficit.   Skin: Skin is warm and dry. Capillary refill takes 2 to 3 seconds. No rash noted. He is not diaphoretic. No erythema.   Psychiatric: He has a normal mood and affect. His behavior is normal. Judgment and thought content normal.   Nursing note and vitals reviewed.         Results Review:     Results from last 7 days   Lab Units 08/18/20  0601 08/17/20  1526   SODIUM mmol/L 136 137   POTASSIUM mmol/L 3.4* 4.9   CHLORIDE mmol/L 100 101   CO2 mmol/L 22.0 23.0   BUN mg/dL 22* 28*   CREATININE mg/dL 1.58* 1.98*   CALCIUM mg/dL 8.5* 8.7   BILIRUBIN mg/dL  --  0.2   ALK PHOS U/L  --  96   ALT (SGPT) U/L  --  6   AST (SGOT) U/L  --  16   GLUCOSE mg/dL 94 89       Estimated Creatinine Clearance: 48.3 mL/min (A) (by C-G formula based on SCr of 1.58 mg/dL (H)).    Results from last 7 days   Lab Units 08/17/20  1526   MAGNESIUM mg/dL 1.9       Results from last 7 days   Lab Units 08/18/20  0601 08/17/20  2304 08/17/20  1526   WBC 10*3/mm3 96.01*  --  78.70*   HEMOGLOBIN g/dL 11.1* 11.5* 6.7*   PLATELETS 10*3/mm3 163  --  129*       Results from last 7 days   Lab Units 08/17/20  1526   INR  0.99       Lab Results   Component Value Date    CKTOTAL 25 (L) 05/12/2017    CKMB 0.55 05/12/2017    TROPONINI 0.522 (C) 11/28/2017    TROPONINT <0.010 08/17/2020     Lab Results   Component Value Date    PROBNP 730.6 08/17/2020        I/O last 3 completed shifts:  In: 1649 [P.O.:460; I.V.:889; Blood:300]  Out: -     Cardiographics  ECG/EMG Results (last 24 hours)     Procedure Component Value Units Date/Time    ECG 12 Lead [[id number]] Collected:  08/17/20 1504     Updated:  08/17/20 1515    ECG 12 Lead [[id number]] Resulted:  08/17/20 1711     Updated:  08/17/20 1711    ECG 12 Lead [[id number]] Collected:  08/17/20 2210     Updated:  08/17/20 2210    ECG 12 Lead [[id number]] Collected:  08/18/20 0441     Updated:  08/18/20 0441    Narrative:       Test Reason : Chest Pain  Blood Pressure : **/** mmHG  Vent. Rate : 070 BPM     Atrial Rate : 070 BPM     P-R Int : 182 ms          QRS Dur : 114 ms      QT Int : 422 ms       P-R-T Axes : 072 010 023 degrees     QTc Int : 455 ms    Sinus rhythm with premature atrial complexes with aberrant conduction  Incomplete left bundle branch block  Borderline ECG  When compared with ECG of 17-AUG-2020 22:10,  aberrant conduction is now present  Incomplete left bundle branch block is now present    Referred By:             Confirmed By:     ECG 12 Lead [[id number]] Collected:  08/18/20 1015     Updated:  08/18/20 1015    Narrative:       Test Reason : Chest Pain  Blood Pressure : **/** mmHG  Vent. Rate : 065 BPM     Atrial Rate : 065 BPM     P-R Int : 180 ms          QRS Dur : 116 ms      QT Int : 434 ms       P-R-T Axes : 065 022 038 degrees     QTc Int : 451 ms    Normal sinus rhythm  Normal ECG  When compared with ECG of 18-AUG-2020 04:41,  aberrant conduction is no longer present    Referred By:             Confirmed By:         Results for orders placed during the hospital encounter of 11/27/17   Adult Transthoracic Echo Complete W/ Cont if Necessary Per Protocol    Narrative · Left ventricular wall thickness is consistent with mild concentric   hypertrophy.  · Left ventricular systolic function is normal. Estimated EF = 65%.  · Left ventricular diastolic dysfunction (grade I) consistent with   impaired  relaxation.  · Left atrial cavity size is borderline dilated.  · Mild aortic valve regurgitation is present.  · Hypokinesis of the inferior wall close to the base of the heart            Ct Abdomen Pelvis Without Contrast    Result Date: 8/17/2020  1. Adenopathy in the neck, chest, abdomen and pelvis consistent with the patient's history of CLL with also splenomegaly also consistent with the same history. 2. Emphysema with findings suggesting mild bronchitis as well. 3. 3.1 cm infrarenal abdominal aortic aneurysm, recommend follow-up imaging every three years. Electronically signed by:  Arnaldo Rivers  8/17/2020 10:53 PM CDT Workstation: [id number]    Ct Soft Tissue Neck Without Contrast    Result Date: 8/17/2020  1. Adenopathy in the neck, chest, abdomen and pelvis consistent with the patient's history of CLL with also splenomegaly also consistent with the same history. 2. Emphysema with findings suggesting mild bronchitis as well. 3. 3.1 cm infrarenal abdominal aortic aneurysm, recommend follow-up imaging every three years. Electronically signed by:  Arnaldo Rivers  8/17/2020 10:53 PM CDT Workstation: [id number]    Ct Chest Without Contrast    Result Date: 8/17/2020  1. Adenopathy in the neck, chest, abdomen and pelvis consistent with the patient's history of CLL with also splenomegaly also consistent with the same history. 2. Emphysema with findings suggesting mild bronchitis as well. 3. 3.1 cm infrarenal abdominal aortic aneurysm, recommend follow-up imaging every three years. Electronically signed by:  Arnaldo Rivers  8/17/2020 10:53 PM CDT Workstation: [id number]    Xr Chest 1 View    Result Date: 8/17/2020  1.  Stable postsurgical changes described above. 2.  No acute cardiopulmonary abnormality. Electronically signed by:  Derek Galloway MD  8/17/2020 3:38 PM CDT Workstation: [id number]      Intake/Output       08/17/20 0700 - 08/18/20 0659    Intake (ml) 1649    Output (ml) --    Net (ml) 1649    Last Weight   65.4 kg (144 lb 2 oz)            Assessment/Plan     Chest pain-  Atypical for Angina      Chest pain: CAD history of known CAD with previous PCI with DUE stent placement to CX (2013) and RCA X 2 proximal and mid (2016), CABG x3  LIMA->LAD SVG->PDA SVG->OM 12/2017)  The patient's CP is atypical and likely non-cardiac and related to anemia and suspected CLL.The patient's EKG is Normal. Troponins negative. No high risk features.   -Will update echocardiogram while he is here to assess LV function and RWMAs.   -D-dimer elevated. --VQ scan is indeterminate ,  Will need a  CTA  When OK with Renal. Rx up to Dr Henderson  -BB restarted at low dose coreg 3.125mg BID d/t HR in 60's  -ASA 81mg  -pravastatin 40mg  -Norvasc increased to 5mg  -Patient has appt on 9/14/20 with Dr. Yanez. He was instructed to keep f/u appointment.  Can reevaluate as an outpatient to see if patient needs a stress test at that time.    History of possible CLL:  Dr. Henderson following.  Appreciate his help in the management.    Acute on chronic anemia: H&H now stable    CKD stage IV: Nephrology following, creatinine improving now at 1.58.  Avoid nephrotoxins.     Hypertension: Treatment as discussed above.      I discussed the patients findings and my recommendations with patient and consulting provider and Dr. Yanez.      Patient was seen and examined.  We will add Ranexa to his coronary disease management.            This document has been electronically signed by AHSAN Porter on August 18, 2020 13:46

## 2020-08-18 NOTE — CONSULTS
DATE OF CONSULT: 8/18/2020    REQUESTING SOURCE:  Nataliya Bee APRN      REASON FOR CONSULTATION: Chronic lymphocytic leukemia, leukocytosis, anemia, thrombocytopenia, weight loss      HISTORY OF PRESENT ILLNESS:    54-year-old male with medical problem consisting of anxiety, congestive heart failure, chronic kidney disease stage IV being followed by Dr. Butler, gastroesophageal reflux disease, hypertension, coronary artery disease status post CABG in 2017, chronic lymphocytic leukemia that was diagnosed in 2018 as per patient and was being followed by Dr. Beyer until February 2019 was admitted to [facility] on August 17, 2020 with complaint of left-sided chest pain, weight loss of 25 pounds in last 3 weeks.  Patient was found to have leukocytosis with white blood cell count of 78,000, anemia with a hemoglobin of 11 and thrombocytopenia with platelet count of 127,000.  Hematology oncology has been consulted for recommendation regarding history of CLL, weight loss and abnormal CBC.  Patient states he was diagnosed in 2018 and was not on any treatment until February 2019 .  Patient states he lost follow-up with Dr. Myrtle LOPEZ secondary to transportation issue.  Complains of recent swelling of lymph nodes in bilateral neck as well as axillary area about a month ago and making it difficult for him to swallow.  States he has lost 25 pounds in last 3 weeks.  Complains of ongoing drenching night sweats for the last 2 to 3 months.  Complains of chronic chest discomfort which is predominantly on left side has been ongoing for the last 4 to 5 months.  Denies any swelling of lower extremity.  Complains of rectal bleeding that happened about 2 months ago.  Complains of fatigue.  Complains of generalized bone pain.                PAST MEDICAL HISTORY:    Past Medical History:   Diagnosis Date   • Aneurysm of infrarenal abdominal aorta (CMS/HCC)    • Anxiety    • Cervical radiculitis    • Cervical  spondylosis without myelopathy    • CHF (congestive heart failure) (CMS/HCC)    • Chronic kidney disease, stage 3 (CMS/HCC)    • Common iliac aneurysm (CMS/HCC)    • Coronary arteriosclerosis    • Degeneration of cervical intervertebral disc    • Essential hypertension    • GERD (gastroesophageal reflux disease)    • Headache    • Hyperlipidemia    • Intermittent claudication (CMS/HCC)    • Myocardial infarction (CMS/HCC)    • Uric acid renal calculus        PAST SURGICAL HISTORY:  Past Surgical History:   Procedure Laterality Date   • APPENDECTOMY     • CARDIAC CATHETERIZATION  05/25/2016    Cardiac cath 39299 (2) - Patent left circumflex stent.Chronic total occlusion of the RCA,more than 20 mm in length.   • CARDIAC CATHETERIZATION N/A 11/28/2017    Procedure: Left Heart Cath/ PCI if indicated;  Surgeon: Carlos Charles MD;  Location: [facility] CATH INVASIVE LOCATION;  Service:    • CERVICAL FUSION     • CHOLECYSTECTOMY     • CORONARY ARTERY BYPASS GRAFT N/A 12/4/2017    Procedure: CORONARY ARTERY BYPASS GRAFTINGX3, ENDOSCOPIC VEIN HARVEST     (CELL SAVER);  Surgeon: Darien Dejesus MD;  Location: [facility] OR;  Service:    • HERNIA REPAIR     • SPINAL FUSION         ALLERGIES:    Allergies   Allergen Reactions   • Amlodipine Rash   • Imdur [Isosorbide Dinitrate] Rash   • Lisinopril Rash   • Metoprolol Rash       SOCIAL HISTORY:   Social History     Tobacco Use   • Smoking status: Former Smoker     Last attempt to quit: 8/28/2016     Years since quitting: 3.9   • Smokeless tobacco: Never Used   Substance Use Topics   • Alcohol use: No   • Drug use: No       CURRENT MEDICATIONS:    Current Facility-Administered Medications   Medication Dose Route Frequency Provider Last Rate Last Dose   • [START ON 8/19/2020] amLODIPine (NORVASC) tablet 5 mg  5 mg Oral Daily Bhumika Rehman APRN       • aspirin EC tablet 81 mg  81 mg Oral Daily Bhumika eRhman APRN   81 mg at 08/18/20 1739   • carvedilol (COREG) tablet  3.125 mg  3.125 mg Oral BID With Meals Bhumika Rehman, APRN   3.125 mg at 08/18/20 1739   • famotidine (PEPCID) tablet 40 mg  40 mg Oral BID AC Amber Nolasco MD   40 mg at 08/18/20 1739   • hydrALAZINE (APRESOLINE) injection 10 mg  10 mg Intravenous Q6H PRN Levill, Nataliya G, APRN       • [START ON 8/19/2020] indapamide (LOZOL) tablet 2.5 mg  2.5 mg Oral QAM Brit Stauffer, APRN       • morphine injection 2 mg  2 mg Intravenous Q4H PRN Levill, Nataliya G, APRN   2 mg at 08/18/20 0340   • nitroglycerin (NITROSTAT) SL tablet 0.4 mg  0.4 mg Sublingual Q5 Min PRN Jean Carlos Vidal MD   0.4 mg at 08/17/20 1536   • ondansetron (ZOFRAN) injection 4 mg  4 mg Intravenous Q6H PRN Levill, Nataliya G, APRN       • pravastatin (PRAVACHOL) tablet 40 mg  40 mg Oral Nightly Chad Staufferi, APRN       • sodium chloride 0.9 % flush 10 mL  10 mL Intravenous PRN Jean Carlos Vidal MD       • sodium chloride 0.9 % flush 10 mL  10 mL Intravenous Q12H Levill, Nataliya G, APRN       • sodium chloride 0.9 % flush 10 mL  10 mL Intravenous PRN Levill, Nataliya G, APRN       • sodium chloride 0.9 % flush 10 mL  10 mL Intravenous Q12H Levill, Nataliya G, APRN   10 mL at 08/18/20 0858   • sodium chloride 0.9 % flush 10 mL  10 mL Intravenous PRN Levill, Nataliya G, APRN       • sodium chloride 0.9 % infusion  125 mL/hr Intravenous Continuous Jean Carlos Vidal  mL/hr at 08/18/20 1738 125 mL/hr at 08/18/20 1738   • sucralfate (CARAFATE) tablet 1 g  1 g Oral 4x Daily AC & at Bedtime Amber Nolasco MD   1 g at 08/18/20 1739        HOME MEDICATIONS:   No current facility-administered medications on file prior to encounter.      Current Outpatient Medications on File Prior to Encounter   Medication Sig Dispense Refill   • amLODIPine (NORVASC) 2.5 MG tablet Take 2.5 mg by mouth Daily.     • carvedilol (COREG) 12.5 MG tablet Take 12.5 mg by mouth 2 (Two) Times a Day With Meals.     • famotidine (PEPCID) 40 MG tablet Take  40 mg by mouth 2 (Two) Times a Day.     • indapamide (LOZOL) 2.5 MG tablet Take 2.5 mg by mouth Every Morning.     • pravastatin (PRAVACHOL) 40 MG tablet Take 1 tablet by mouth every night. 90 tablet 4   • sucralfate (CARAFATE) 1 g tablet Take 1 g by mouth 4 (Four) Times a Day.     • [DISCONTINUED] aspirin 81 MG tablet Take 81 mg by mouth.     • [DISCONTINUED] citalopram (CeleXA) 20 MG tablet Take 20 mg by mouth Daily.     • [DISCONTINUED] cyclobenzaprine (FLEXERIL) 10 MG tablet Take 10 mg by mouth 3 (three) times a day. Indication: Muscle spasm     • [DISCONTINUED] docusate sodium 100 MG capsule Take 200 mg by mouth 2 (Two) Times a Day. 60 capsule 1   • [DISCONTINUED] guaiFENesin (MUCINEX) 600 MG 12 hr tablet Take 2 tablets by mouth Every 12 (Twelve) Hours.     • [DISCONTINUED] ipratropium-albuterol (DUO-NEB) 0.5-2.5 mg/mL nebulizer Take 3 mL by nebulization 4 (Four) Times a Day. 360 mL    • [DISCONTINUED] labetalol (NORMODYNE) 100 MG tablet Take 1 tablet by mouth Every 12 (Twelve) Hours.     • [DISCONTINUED] magnesium oxide (MAGOX) 400 (241.3 Mg) MG tablet tablet Take 1 tablet by mouth 2 (Two) Times a Day. 30 each    • [DISCONTINUED] oxyCODONE-acetaminophen (PERCOCET) 5-325 MG per tablet Take 1 tablet by mouth Every 6 (Six) Hours As Needed.     • [DISCONTINUED] pantoprazole (PROTONIX) 40 MG EC tablet   1   • [DISCONTINUED] Prenatal Vit-Fe Fumarate-FA (PRENATAL VITAMIN 27-0.8) 27-0.8 MG tablet tablet Take 1 tablet by mouth Daily.     • [DISCONTINUED] ranitidine (ZANTAC) 150 MG tablet Take 150 mg by mouth 2 (two) times a day. Indication: gerd     • [DISCONTINUED] tamsulosin (FLOMAX) 0.4 MG capsule 24 hr capsule Take 1 capsule by mouth Every Night.     • [DISCONTINUED] ticagrelor (BRILINTA) 90 MG tablet tablet Take 90 mg by mouth 2 (two) times a day. Indication: blood thinner     • [DISCONTINUED] vitamin C (VITAMIN C) 500 MG tablet Take 1 tablet by mouth 2 (Two) Times a Day.         FAMILY HISTORY:    Family History  "  Problem Relation Age of Onset   • Hyperlipidemia Mother    • Hypertension Mother    • Cancer Father    • Hyperlipidemia Father    • Hypertension Father    • Cancer Brother    • Diabetes Other    • Diabetes Other        REVIEW OF SYSTEMS:        CONSTITUTIONAL:  Complains of fatigue.  Positive for 25 pound weight loss in last 3 weeks.  Positive for drenching night sweats that started 2 to 3 months ago.  Denies any fever, chills .     EYES: No visual disturbances. No discharge. No new lesions    ENMT: States he had trouble swallowing with enlarged lymph node about a month ago.  States trouble swallowing is significantly improved.  No epistaxis, mouth sores .    RESPIRATORY: Positive for chronic shortness of breath with exertion. No new cough or hemoptysis.    CARDIOVASCULAR: Complains of left-sided chest pain ongoing for the last 4 to 5 months.  No palpitations.    GASTROINTESTINAL: Complains of rectal bleeding x1 about 2 months ago, denies any bleeding since then.  No abdominal pain nausea, vomiting or blood in the stool.    GENITOURINARY: No Dysuria or Hematuria.    MUSCULOSKELETAL: Complains of generalized arthritis pain.    LYMPHATICS:  Denies any abnormal swollen glands anywhere in the body.    NEUROLOGICAL : Complains of intermittent tingling and numbness affecting bilateral hands. No headache or dizziness. No seizures or balance problems.    SKIN: No new skin lesions.    ENDOCRINE : No new hot or cold intolerance. No new polyuria . No polydipsia.        PHYSICAL EXAMINATION:      VITAL SIGNS:  /83 (BP Location: Left arm, Patient Position: Lying)   Pulse 63   Temp 97.6 °F (36.4 °C) (Temporal)   Resp 17   Ht 175 cm (68.9\")   Wt 65.4 kg (144 lb 2.9 oz)   SpO2 99%   BMI 21.35 kg/m²       08/17/20  2154 08/18/20  0448 08/18/20  1538   Weight: 66.5 kg (146 lb 8 oz) 65.4 kg (144 lb 2 oz) 65.4 kg (144 lb 2.9 oz)       ECOG performance status: 2    CONSTITUTIONAL:  Not in any distress.  Appears older than " stated age.    EYES: Mild conjunctival Pallor. No Icterus. No Pterygium. Extraocular Movements intact.No ptosis.    ENMT:  Normocephalic, Atraumatic.No Facial Asymmetry noted.    NECK: Enlarged lymph node palpable in bilateral neck, largest lymph node present on left side of neck measuring at 1.5 cm.Trachea midline. NO JVD.    RESPIRATORY:  Fair air entry bilateral. No rhonchi or wheezing.Fair respiratory effort.    CARDIOVASCULAR:  S1, S2. Regular rate and rhythm. No murmur or gallop appreciated.    ABDOMEN:  Soft,  nontender. Bowel sounds present in all four quadrants.  No Hepatosplenomegaly appreciated.    MUSCULOSKELETAL:  No edema.No Calf Tenderness.Decreased range of motion.    NEUROLOGIC:    No  Motor  deficit appreciated. Cranial Nerves 2-12 grossly intact.    SKIN : No new skin lesion identified. Skin is warm and dry to touch.    LYMPHATICS: Enlarged lymph nodes palpable in bilateral neck, largest lymph node present on left side of neck measuring at 1.5 cm.  There is no enlarged adenopathy palpable in bilateral axilla or groin region.    PSYCHIATRY: Alert, awake and oriented ×3.            DIAGNOSTIC DATA:    WBC   Date Value Ref Range Status   08/18/2020 96.01 (C) 3.40 - 10.80 10*3/mm3 Final     RBC   Date Value Ref Range Status   08/18/2020 4.09 (L) 4.14 - 5.80 10*6/mm3 Final     Hemoglobin   Date Value Ref Range Status   08/18/2020 11.1 (L) 13.0 - 17.7 g/dL Final     Hematocrit   Date Value Ref Range Status   08/18/2020 33.6 (L) 37.5 - 51.0 % Final     MCV   Date Value Ref Range Status   08/18/2020 82.2 79.0 - 97.0 fL Final     MCH   Date Value Ref Range Status   08/18/2020 27.1 26.6 - 33.0 pg Final     MCHC   Date Value Ref Range Status   08/18/2020 33.0 31.5 - 35.7 g/dL Final     RDW   Date Value Ref Range Status   08/18/2020 14.5 12.3 - 15.4 % Final     RDW-SD   Date Value Ref Range Status   08/18/2020 41.6 37.0 - 54.0 fl Final     MPV   Date Value Ref Range Status   08/18/2020 9.5 6.0 - 12.0 fL Final      Platelets   Date Value Ref Range Status   08/18/2020 163 140 - 450 10*3/mm3 Final     Neutrophil %   Date Value Ref Range Status   08/18/2020 5.5 (L) 42.7 - 76.0 % Final     Lymphocyte %   Date Value Ref Range Status   08/18/2020 90.3 (H) 19.6 - 45.3 % Final     Monocyte %   Date Value Ref Range Status   08/18/2020 3.6 (L) 5.0 - 12.0 % Final     Eosinophil %   Date Value Ref Range Status   08/18/2020 0.2 (L) 0.3 - 6.2 % Final     Basophil %   Date Value Ref Range Status   08/18/2020 0.1 0.0 - 1.5 % Final     Immature Grans %   Date Value Ref Range Status   08/18/2020 0.3 0.0 - 0.5 % Final     Neutrophils, Absolute   Date Value Ref Range Status   08/18/2020 5.37 1.70 - 7.00 10*3/mm3 Final     Lymphocytes, Absolute   Date Value Ref Range Status   08/18/2020 86.66 (H) 0.70 - 3.10 10*3/mm3 Final     Monocytes, Absolute   Date Value Ref Range Status   08/18/2020 3.41 (H) 0.10 - 0.90 10*3/mm3 Final     Eosinophils, Absolute   Date Value Ref Range Status   08/18/2020 0.20 0.00 - 0.40 10*3/mm3 Final     Basophils, Absolute   Date Value Ref Range Status   08/18/2020 0.09 0.00 - 0.20 10*3/mm3 Final     Immature Grans, Absolute   Date Value Ref Range Status   08/18/2020 0.28 (H) 0.00 - 0.05 10*3/mm3 Final     nRBC   Date Value Ref Range Status   08/18/2020 0.0 0.0 - 0.2 /100 WBC Final     Glucose   Date Value Ref Range Status   08/18/2020 94 65 - 99 mg/dL Final     Sodium   Date Value Ref Range Status   08/18/2020 136 136 - 145 mmol/L Final     Potassium   Date Value Ref Range Status   08/18/2020 3.4 (L) 3.5 - 5.2 mmol/L Final     CO2   Date Value Ref Range Status   08/18/2020 22.0 22.0 - 29.0 mmol/L Final     Chloride   Date Value Ref Range Status   08/18/2020 100 98 - 107 mmol/L Final     Anion Gap   Date Value Ref Range Status   08/18/2020 14.0 5.0 - 15.0 mmol/L Final     Creatinine   Date Value Ref Range Status   08/18/2020 1.58 (H) 0.76 - 1.27 mg/dL Final     BUN   Date Value Ref Range Status   08/18/2020 22 (H) 6 -  20 mg/dL Final     BUN/Creatinine Ratio   Date Value Ref Range Status   08/18/2020 13.9 7.0 - 25.0 Final     Calcium   Date Value Ref Range Status   08/18/2020 8.5 (L) 8.6 - 10.5 mg/dL Final     eGFR Non  Amer   Date Value Ref Range Status   08/18/2020 46 (L) >60 mL/min/1.73 Final     Alkaline Phosphatase   Date Value Ref Range Status   08/17/2020 96 39 - 117 U/L Final     Total Protein   Date Value Ref Range Status   08/17/2020 6.4 6.0 - 8.5 g/dL Final     ALT (SGPT)   Date Value Ref Range Status   08/17/2020 6 1 - 41 U/L Final     AST (SGOT)   Date Value Ref Range Status   08/17/2020 16 1 - 40 U/L Final     Comment:     Specimen hemolyzed.  Results may be affected.     Total Bilirubin   Date Value Ref Range Status   08/17/2020 0.2 0.0 - 1.2 mg/dL Final     Albumin   Date Value Ref Range Status   08/17/2020 3.80 3.50 - 5.20 g/dL Final     Globulin   Date Value Ref Range Status   08/17/2020 2.6 gm/dL Final     Lab Results   Component Value Date    IRON 51 (L) 08/17/2020    TIBC 304 08/17/2020    LABIRON 17 (L) 08/17/2020    FERRITIN 100.60 08/17/2020    SCTOVXIF22 360 08/17/2020    FOLATE 3.47 (L) 08/17/2020     No results found for: , LABCA2, AFPTM, HCGQUANT, , CHROMGRNA, [id number], CEA, REFLABREPO]    Radiology Data :  Ct Abdomen Pelvis Without Contrast    Result Date: 8/17/2020  1. Adenopathy in the neck, chest, abdomen and pelvis consistent with the patient's history of CLL with also splenomegaly also consistent with the same history. 2. Emphysema with findings suggesting mild bronchitis as well. 3. 3.1 cm infrarenal abdominal aortic aneurysm, recommend follow-up imaging every three years. Electronically signed by:  Arnaldo Rivers  8/17/2020 10:53 PM CDT Workstation: [id number]    Ct Soft Tissue Neck Without Contrast    Result Date: 8/17/2020  1. Adenopathy in the neck, chest, abdomen and pelvis consistent with the patient's history of CLL with also splenomegaly also consistent with the same  history. 2. Emphysema with findings suggesting mild bronchitis as well. 3. 3.1 cm infrarenal abdominal aortic aneurysm, recommend follow-up imaging every three years. Electronically signed by:  Arnaldo Rivers  8/17/2020 10:53 PM CDT Workstation: [id number]    Ct Chest Without Contrast    Result Date: 8/17/2020  1. Adenopathy in the neck, chest, abdomen and pelvis consistent with the patient's history of CLL with also splenomegaly also consistent with the same history. 2. Emphysema with findings suggesting mild bronchitis as well. 3. 3.1 cm infrarenal abdominal aortic aneurysm, recommend follow-up imaging every three years. Electronically signed by:  Arnaldo Rivers  8/17/2020 10:53 PM CDT Workstation: [id number]    Nm Lung Scan Perfusion Particulate    Addendum Date: 8/18/2020     ADDENDUM ADDENDUM #1 Addendum: Referring clinician was notified of findings by phone at 2:23 PM on 8/18/2020. Electronically signed by:  Derek Galloway MD  8/18/2020 2:23 PM CDT Workstation: [id number]     Result Date: 8/18/2020  CONCLUSION:  Intermediate probability of pulmonary embolus. COMMENTS: Normal exam = statistically less than 2% have pulmonary emboli. Low probability = statistically 5-19% have pulmonary emboli. Intermediate probability = statistically 20-70% have pulmonary emboli. High probability = statistically greater than 80% have pulmonary emboli. NOTE: The likelihood of pulmonary embolism, especially in the low and intermediate categories, should be interpreted in conjunction with the pre- and post-test probability and other test results such as venous Doppler, D dimer and if necessary pulmonary angiography as clinically indicated. Electronically signed by:  Derek Galloway MD  8/18/2020 2:09 PM CDT Workstation: [id number]    Xr Chest 1 View    Result Date: 8/17/2020  1.  Stable postsurgical changes described above. 2.  No acute cardiopulmonary abnormality. Electronically signed by:  Derek Galloway MD  8/17/2020 3:38 PM CDT  Workstation: [id number]          ASSESSMENT AND PLAN:      1.  Chronic lymphocytic leukemia:  - Patient states he has been diagnosed with chronic lymphocytic leukemia in 2018 by Dr. Beyer.  Has been on surveillance since then.  - His last follow-up appointment with Dr. Beyer was 1 year ago and at that point his white blood cell count was 42,000.  - CBC done earlier today shows white blood cell count is increased to 96,000 which is predominantly neutrophils.  Hemoglobin is 11.1 after 1 unit of PRBC.  Hemoglobin was 6.7 yesterday.  Platelet count  Is 167,000 today.  -Patient does have minimally enlarged lymph node in neck, bilateral axilla, mediastinum, abdomen retroperitoneum and pelvic region with splenomegaly on CT scan.  - He is symptom of weight loss and night sweats is concerning for his CLL progression.  - Peripheral blood flow cytometry has been sent out earlier today we will follow-up on the results.  -We will also get some records from Dr. Beyer office regarding his work-up including bone marrow biopsy.  - It was discussed with patient if he continues to have weight loss and drenching night sweats, he will need chemotherapy for his chronic lymphocytic leukemia.      2.  Anemia:  -Secondary to CLL.  Anemia work-up done on August 17, 2020 shows component of iron deficiency.  We will start patient on intravenous Ferrlecit during hospital stay.  - Patient also has a folate deficiency for which we will start him on folic acid 1 mg p.o. daily.  We will also start him on B12 thousand micrograms p.o. daily.    3.  Lymphocytosis: Secondary to CLL    4.  Intermediate VQ scan:  - Patient's d-dimer was minimally elevated at 479.  Upper normal is 470.  - Due to his elevated creatinine, VQ scan was ordered.  Due to COVID-19 pandemic, ventilation is not performed nowadays with VQ scan and only perfusion scan was done which showed some defect in bilateral upper lobe which is symmetrical.  - Recommend getting  Doppler ultrasound of bilateral lower extremity, if there is no evidence of DVT would not recommend starting him on full dose anticoagulation due to his iron deficiency.  - If patient's creatinine improved and become in the normal range, recommend getting CT angiogram done.  Recommendation were also discussed with patient today.    5.  Coronary artery disease status post CABG            Thank you for this consultation.    Vladimir Henderson MD  8/18/2020  17:41        Part of this note may be an electronic transcription/translation of spoken language to printed text using the Dragon Dictation System.

## 2020-08-18 NOTE — NURSING NOTE
Dr. Shabazz called to review pt's case for risk and benefits for 2nd unit of blood. Per MD repeat H&H and if Hemoglobin <7 transfuse. Orders obtained for some of patient's home meds. Will continue to monitor.

## 2020-08-18 NOTE — PROGRESS NOTES
[facility] Medicine Services  INPATIENT PROGRESS NOTE    Length of Stay: 0  Date of Admission: 8/17/2020  Primary Care Physician: Fernanda Pope APRN    Subjective   Chief Complaint: Chest pain, shortness of breath  HPI: 54-year-old  male with past medical history of anxiety, CHF, CKD stage IV, gastroesophageal reflux disease, hyperlipidemia, CAD status post CABG in 2017, suspected CLL that presented on 8/17/2020 with complaints of chest pain, left arm pain, and dyspnea.  Patient has been placed under observation for further cardiac monitoring and work-up to rule out ACS.  He was noted to be anemic in the emergency department with hemoglobin of 6.7 on admission.  He hasreceived 1 unit of packed red blood cells overnight and dyspnea has improved but patient reports chest pain intermittently continued overnight.    Review of Systems   Constitutional: Positive for activity change, appetite change, fatigue and unexpected weight change. Negative for chills, diaphoresis and fever.   HENT: Negative for congestion, rhinorrhea and sore throat.    Respiratory: Positive for chest tightness and shortness of breath. Negative for wheezing.    Cardiovascular: Negative for chest pain, palpitations and leg swelling.   Gastrointestinal: Negative for abdominal pain, diarrhea, nausea and vomiting.   Musculoskeletal: Negative for back pain and neck pain.   Skin: Negative for pallor.   Neurological: Positive for weakness. Negative for dizziness and light-headedness.   Psychiatric/Behavioral: Negative for confusion. The patient is not nervous/anxious.         All pertinent negatives and positives are as above. All other systems have been reviewed and are negative unless otherwise stated.     Objective    Temp:  [96.7 °F (35.9 °C)-98 °F (36.7 °C)] 97.5 °F (36.4 °C)  Heart Rate:  [61-76] 63  Resp:  [17-20] 18  BP: (129-189)/() 171/87    Physical Exam   Constitutional: He is oriented  to person, place, and time. No distress.   HENT:   Head: Normocephalic and atraumatic.   Right Ear: External ear normal.   Left Ear: External ear normal.   Nose: Nose normal.   Eyes: Conjunctivae are normal. Right eye exhibits no discharge. Left eye exhibits no discharge. No scleral icterus.   Neck: Normal range of motion. Neck supple. No JVD present.   Cardiovascular: Normal rate, regular rhythm, normal heart sounds and intact distal pulses. Exam reveals no gallop and no friction rub.   No murmur heard.  Pulmonary/Chest: Effort normal. No accessory muscle usage or stridor. No respiratory distress. He has decreased breath sounds in the right upper field, the right lower field, the left upper field and the left lower field. He has no wheezes. He has no rales.   Abdominal: Soft. Bowel sounds are normal. He exhibits no distension. There is no tenderness.   Musculoskeletal: Normal range of motion. He exhibits no edema.   Neurological: He is alert and oriented to person, place, and time.   Skin: Skin is warm and dry. He is not diaphoretic.   Psychiatric: He has a normal mood and affect. His behavior is normal.   Nursing note and vitals reviewed.          Results Review:  I have reviewed the labs, radiology results, and diagnostic studies.    Laboratory Data:   Results from last 7 days   Lab Units 08/18/20  0601 08/17/20  1526   SODIUM mmol/L 136 137   POTASSIUM mmol/L 3.4* 4.9   CHLORIDE mmol/L 100 101   CO2 mmol/L 22.0 23.0   BUN mg/dL 22* 28*   CREATININE mg/dL 1.58* 1.98*   GLUCOSE mg/dL 94 89   CALCIUM mg/dL 8.5* 8.7   BILIRUBIN mg/dL  --  0.2   ALK PHOS U/L  --  96   ALT (SGPT) U/L  --  6   AST (SGOT) U/L  --  16   ANION GAP mmol/L 14.0 13.0     Estimated Creatinine Clearance: 48.3 mL/min (A) (by C-G formula based on SCr of 1.58 mg/dL (H)).  Results from last 7 days   Lab Units 08/17/20  1526   MAGNESIUM mg/dL 1.9         Results from last 7 days   Lab Units 08/18/20  0601 08/17/20  2304 08/17/20  1526   WBC  10*3/mm3 96.01*  --  78.70*   HEMOGLOBIN g/dL 11.1* 11.5* 6.7*   HEMATOCRIT % 33.6* 35.6* 20.3*   PLATELETS 10*3/mm3 163  --  129*     Results from last 7 days   Lab Units 08/17/20  1526   INR  0.99       Culture Data:   No results found for: BLOODCX  No results found for: URINECX  No results found for: RESPCX  No results found for: WOUNDCX  No results found for: STOOLCX  No components found for: BODYFLD    Radiology Data:   Imaging Results (Last 24 Hours)     Procedure Component Value Units Date/Time    NM Lung Scan Perfusion Particulate [[id number]] Resulted:  08/18/20 1310     Updated:  08/18/20 1326    CT Chest Without Contrast [[id number]] Collected:  08/17/20 2214     Updated:  08/17/20 2254    Narrative:       CT neck, chest, abdomen and pelvis without contrast on  8/17/2020      CLINICAL INDICATION: Swollen lymph nodes in neck, chest pain,  CLL, anemia    TECHNIQUE: Multiple axial images are obtained throughout the  neck, chest, abdomen and pelvis without the administration of  contrast. This exam was performed according to our departmental  dose-optimization program, which includes automated exposure  control, adjustment of the mA and/or kV according to patient size  and/or use of iterative reconstruction technique.  Total DLP is 756.2 mGy*cm.     COMPARISON:  None    FINDINGS: Of note, evaluation of the neck without contrast is  limited.    NECK: There are extensive bilateral jugular chain and posterior  triangle lymph nodes consistent with patient's reported history  of CLL. For example left posterior triangle lymph node on axial  image 35 measures 1.7 x 1.1 cm. No gross mucosal lesion is noted.  The epiglottis and airway is unremarkable. There is no  prevertebral soft tissue swelling. Degenerative and postsurgical  changes are noted in the cervical spine. The patient is status  post anterior cervical disc fusion from C5 through C7. No neck  mass or fluid collection is noted.    CHEST: Emphysematous changes  of the lungs are noted. There is  evidence of calcified granulomatous disease in the chest. The  patient is status post median sternotomy and CABG. There is no  pleural or pericardial effusion. There is some bronchial wall  thickening suggesting mild bronchitis. The lungs are otherwise  clear. There is bilateral axillary adenopathy with borderline  size mediastinal lymph nodes. For example largest precarinal  lymph node on axial image 27 measures 1.7 x 0.8 cm. The largest  right axillary lymph node but still maintains fatty hilum  measures 2.3 x 1.5 cm on axial image 20. No acute bony  abnormality of the thorax is noted.    ABDOMEN: Splenomegaly is noted with the spleen measuring 17.4 cm  in greatest pole to pole length. The patient is status post  cholecystectomy. Vascular calcifications are noted. There is a  3.1 cm infrarenal abdominal aortic aneurysm. Would recommend  follow-up imaging every three years. The unenhanced solid  abdominal organs are otherwise unremarkable. There is  retroperitoneal adenopathy with aortocaval and para-aortic  adenopathy. For example of the larger aortocaval lymph nodes on  axial image 89 measures 1.5 x 1.5 cm. The abdominal portion of  the GI tract is unremarkable.    Pelvis: There is calcification of the vas deferens indicative of  diabetes. There is no free fluid in the pelvis. There is  bilateral iliac adenopathy. For example one of the larger right  external iliac lymph nodes on axial image 105 measures 1.5 x 1.2  cm. Pelvic portion of the GI tract is unremarkable. No acute bony  abnormality is noted.      Impression:       1. Adenopathy in the neck, chest, abdomen and pelvis consistent  with the patient's history of CLL with also splenomegaly also  consistent with the same history.  2. Emphysema with findings suggesting mild bronchitis as well.  3. 3.1 cm infrarenal abdominal aortic aneurysm, recommend  follow-up imaging every three years.    Electronically signed by:  Arnaldo  Silvestre  8/17/2020 10:53 PM  CDT Workstation: [id number]    CT Soft Tissue Neck Without Contrast [[id number]] Collected:  08/17/20 2214     Updated:  08/17/20 2254    Narrative:       CT neck, chest, abdomen and pelvis without contrast on  8/17/2020      CLINICAL INDICATION: Swollen lymph nodes in neck, chest pain,  CLL, anemia    TECHNIQUE: Multiple axial images are obtained throughout the  neck, chest, abdomen and pelvis without the administration of  contrast. This exam was performed according to our departmental  dose-optimization program, which includes automated exposure  control, adjustment of the mA and/or kV according to patient size  and/or use of iterative reconstruction technique.  Total DLP is 756.2 mGy*cm.     COMPARISON:  None    FINDINGS: Of note, evaluation of the neck without contrast is  limited.    NECK: There are extensive bilateral jugular chain and posterior  triangle lymph nodes consistent with patient's reported history  of CLL. For example left posterior triangle lymph node on axial  image 35 measures 1.7 x 1.1 cm. No gross mucosal lesion is noted.  The epiglottis and airway is unremarkable. There is no  prevertebral soft tissue swelling. Degenerative and postsurgical  changes are noted in the cervical spine. The patient is status  post anterior cervical disc fusion from C5 through C7. No neck  mass or fluid collection is noted.    CHEST: Emphysematous changes of the lungs are noted. There is  evidence of calcified granulomatous disease in the chest. The  patient is status post median sternotomy and CABG. There is no  pleural or pericardial effusion. There is some bronchial wall  thickening suggesting mild bronchitis. The lungs are otherwise  clear. There is bilateral axillary adenopathy with borderline  size mediastinal lymph nodes. For example largest precarinal  lymph node on axial image 27 measures 1.7 x 0.8 cm. The largest  right axillary lymph node but still maintains fatty  hilum  measures 2.3 x 1.5 cm on axial image 20. No acute bony  abnormality of the thorax is noted.    ABDOMEN: Splenomegaly is noted with the spleen measuring 17.4 cm  in greatest pole to pole length. The patient is status post  cholecystectomy. Vascular calcifications are noted. There is a  3.1 cm infrarenal abdominal aortic aneurysm. Would recommend  follow-up imaging every three years. The unenhanced solid  abdominal organs are otherwise unremarkable. There is  retroperitoneal adenopathy with aortocaval and para-aortic  adenopathy. For example of the larger aortocaval lymph nodes on  axial image 89 measures 1.5 x 1.5 cm. The abdominal portion of  the GI tract is unremarkable.    Pelvis: There is calcification of the vas deferens indicative of  diabetes. There is no free fluid in the pelvis. There is  bilateral iliac adenopathy. For example one of the larger right  external iliac lymph nodes on axial image 105 measures 1.5 x 1.2  cm. Pelvic portion of the GI tract is unremarkable. No acute bony  abnormality is noted.      Impression:       1. Adenopathy in the neck, chest, abdomen and pelvis consistent  with the patient's history of CLL with also splenomegaly also  consistent with the same history.  2. Emphysema with findings suggesting mild bronchitis as well.  3. 3.1 cm infrarenal abdominal aortic aneurysm, recommend  follow-up imaging every three years.    Electronically signed by:  Arnaldo Rivers  8/17/2020 10:53 PM  CDT Workstation: [id number]    CT Abdomen Pelvis Without Contrast [[id number]] Collected:  08/17/20 2214     Updated:  08/17/20 2254    Narrative:       CT neck, chest, abdomen and pelvis without contrast on  8/17/2020      CLINICAL INDICATION: Swollen lymph nodes in neck, chest pain,  CLL, anemia    TECHNIQUE: Multiple axial images are obtained throughout the  neck, chest, abdomen and pelvis without the administration of  contrast. This exam was performed according to our  departmental  dose-optimization program, which includes automated exposure  control, adjustment of the mA and/or kV according to patient size  and/or use of iterative reconstruction technique.  Total DLP is 756.2 mGy*cm.     COMPARISON:  None    FINDINGS: Of note, evaluation of the neck without contrast is  limited.    NECK: There are extensive bilateral jugular chain and posterior  triangle lymph nodes consistent with patient's reported history  of CLL. For example left posterior triangle lymph node on axial  image 35 measures 1.7 x 1.1 cm. No gross mucosal lesion is noted.  The epiglottis and airway is unremarkable. There is no  prevertebral soft tissue swelling. Degenerative and postsurgical  changes are noted in the cervical spine. The patient is status  post anterior cervical disc fusion from C5 through C7. No neck  mass or fluid collection is noted.    CHEST: Emphysematous changes of the lungs are noted. There is  evidence of calcified granulomatous disease in the chest. The  patient is status post median sternotomy and CABG. There is no  pleural or pericardial effusion. There is some bronchial wall  thickening suggesting mild bronchitis. The lungs are otherwise  clear. There is bilateral axillary adenopathy with borderline  size mediastinal lymph nodes. For example largest precarinal  lymph node on axial image 27 measures 1.7 x 0.8 cm. The largest  right axillary lymph node but still maintains fatty hilum  measures 2.3 x 1.5 cm on axial image 20. No acute bony  abnormality of the thorax is noted.    ABDOMEN: Splenomegaly is noted with the spleen measuring 17.4 cm  in greatest pole to pole length. The patient is status post  cholecystectomy. Vascular calcifications are noted. There is a  3.1 cm infrarenal abdominal aortic aneurysm. Would recommend  follow-up imaging every three years. The unenhanced solid  abdominal organs are otherwise unremarkable. There is  retroperitoneal adenopathy with aortocaval and  para-aortic  adenopathy. For example of the larger aortocaval lymph nodes on  axial image 89 measures 1.5 x 1.5 cm. The abdominal portion of  the GI tract is unremarkable.    Pelvis: There is calcification of the vas deferens indicative of  diabetes. There is no free fluid in the pelvis. There is  bilateral iliac adenopathy. For example one of the larger right  external iliac lymph nodes on axial image 105 measures 1.5 x 1.2  cm. Pelvic portion of the GI tract is unremarkable. No acute bony  abnormality is noted.      Impression:       1. Adenopathy in the neck, chest, abdomen and pelvis consistent  with the patient's history of CLL with also splenomegaly also  consistent with the same history.  2. Emphysema with findings suggesting mild bronchitis as well.  3. 3.1 cm infrarenal abdominal aortic aneurysm, recommend  follow-up imaging every three years.    Electronically signed by:  Arnaldo Rivers  8/17/2020 10:53 PM  CDT Workstation: [id number]    XR Chest 1 View [[id number]] Collected:  08/17/20 1523     Updated:  08/17/20 1539    Narrative:         PROCEDURE: Single chest view portable erect    REASON FOR EXAM:Chest Pain triage protocol  Chest Pain Triage Protocol    FINDINGS: Comparison exam dated December 6, 2017. Stable  postsurgical changes with median sternotomy. Cardiac and  pulmonary vasculature are normal. Lungs are clear. Pleural spaces  are normal. No acute osseous abnormality. Metallic plate is seen  fixating the lower cervical spine.      Impression:       1.  Stable postsurgical changes described above.  2.  No acute cardiopulmonary abnormality.    Electronically signed by:  Derek Galloway MD  8/17/2020 3:38 PM CDT  Workstation: [id number]          I have reviewed the patient's current medications.     Assessment/Plan     Active Hospital Problems    Diagnosis   • Chest pain with high risk for cardiac etiology       Plan:    1.  Chest pain, rule out ACS: Serial cardiac enzymes are negative x 2.  Chest  pain mildly improved s/p blood transfusion but he reports intermittent pain continued overnight.  Morphine, oxygen, nitro PRN.  Continuous telemetry.  Will discuss case with cardiology.    2.  History of possible chronic lymphocytic leukemia: Dr. Henderson to see.  CT of neck, chest, abdomen and pelvis reviewed and adenopathy in the neck, chest, abdomen, and pelvis consistent with diagnosis of CLL.  Flow cytometry pending.  3.  Acute on chronic anemia: s/p one unit PRBC and hemoglobin increased from 6.7 to 11.1.  Unsure of accuracy of first hemoglobin level due to large increase with only one unit transfused. Dr. Henderson to see.  Brilinta, aspirin and DVT prophylaxis held secondary to anemia.  4.  CKD, stage IV: Patient follows with Dr. Butler.  Creatinine 1.58 today.  5.  Hypertension/CAD: Hydralazine PRN systolic blood pressure greater than 160.  Will continue home meds when confirmed.  Nursing staff contacting patient's pharmacy to confirm home meds.       Discharge Planning: awaiting hematology/oncology and cardiology consults.         This document has been electronically signed by AHSAN Borrego on August 18, 2020 13:52

## 2020-08-19 ENCOUNTER — TELEPHONE (OUTPATIENT)
Dept: ONCOLOGY | Facility: CLINIC | Age: 55
End: 2020-08-19

## 2020-08-19 VITALS
DIASTOLIC BLOOD PRESSURE: 90 MMHG | SYSTOLIC BLOOD PRESSURE: 160 MMHG | RESPIRATION RATE: 17 BRPM | HEIGHT: 69 IN | HEART RATE: 73 BPM | TEMPERATURE: 97.9 F | WEIGHT: 144 LBS | BODY MASS INDEX: 21.33 KG/M2 | OXYGEN SATURATION: 98 %

## 2020-08-19 PROBLEM — D50.9 IRON DEFICIENCY ANEMIA: Status: ACTIVE | Noted: 2020-08-19

## 2020-08-19 PROBLEM — C91.10 CLL (CHRONIC LYMPHOCYTIC LEUKEMIA) (HCC): Status: ACTIVE | Noted: 2020-08-19

## 2020-08-19 LAB
ANION GAP SERPL CALCULATED.3IONS-SCNC: 13 MMOL/L (ref 5–15)
BASOPHILS # BLD AUTO: 0.07 10*3/MM3 (ref 0–0.2)
BASOPHILS NFR BLD AUTO: 0.1 % (ref 0–1.5)
BUN SERPL-MCNC: 16 MG/DL (ref 6–20)
BUN/CREAT SERPL: 10.5 (ref 7–25)
CALCIUM SPEC-SCNC: 8.7 MG/DL (ref 8.6–10.5)
CHLORIDE SERPL-SCNC: 101 MMOL/L (ref 98–107)
CO2 SERPL-SCNC: 24 MMOL/L (ref 22–29)
CREAT SERPL-MCNC: 1.52 MG/DL (ref 0.76–1.27)
DEPRECATED RDW RBC AUTO: 43 FL (ref 37–54)
EOSINOPHIL # BLD AUTO: 0.16 10*3/MM3 (ref 0–0.4)
EOSINOPHIL NFR BLD AUTO: 0.2 % (ref 0.3–6.2)
ERYTHROCYTE [DISTWIDTH] IN BLOOD BY AUTOMATED COUNT: 14.7 % (ref 12.3–15.4)
GFR SERPL CREATININE-BSD FRML MDRD: 48 ML/MIN/1.73
GLUCOSE SERPL-MCNC: 115 MG/DL (ref 65–99)
HCT VFR BLD AUTO: 31.9 % (ref 37.5–51)
HGB BLD-MCNC: 10.6 G/DL (ref 13–17.7)
IMM GRANULOCYTES # BLD AUTO: 0.17 10*3/MM3 (ref 0–0.05)
IMM GRANULOCYTES NFR BLD AUTO: 0.2 % (ref 0–0.5)
LYMPHOCYTES # BLD AUTO: 76.58 10*3/MM3 (ref 0.7–3.1)
LYMPHOCYTES NFR BLD AUTO: 91.4 % (ref 19.6–45.3)
MCH RBC QN AUTO: 27 PG (ref 26.6–33)
MCHC RBC AUTO-ENTMCNC: 33.2 G/DL (ref 31.5–35.7)
MCV RBC AUTO: 81.4 FL (ref 79–97)
MONOCYTES # BLD AUTO: 2.4 10*3/MM3 (ref 0.1–0.9)
MONOCYTES NFR BLD AUTO: 2.9 % (ref 5–12)
NEUTROPHILS NFR BLD AUTO: 4.37 10*3/MM3 (ref 1.7–7)
NEUTROPHILS NFR BLD AUTO: 5.2 % (ref 42.7–76)
NRBC BLD AUTO-RTO: 0 /100 WBC (ref 0–0.2)
PLATELET # BLD AUTO: 150 10*3/MM3 (ref 140–450)
PMV BLD AUTO: 9.2 FL (ref 6–12)
POTASSIUM SERPL-SCNC: 3.4 MMOL/L (ref 3.5–5.2)
RBC # BLD AUTO: 3.92 10*6/MM3 (ref 4.14–5.8)
SODIUM SERPL-SCNC: 138 MMOL/L (ref 136–145)
WBC # BLD AUTO: 83.75 10*3/MM3 (ref 3.4–10.8)

## 2020-08-19 PROCEDURE — 80048 BASIC METABOLIC PNL TOTAL CA: CPT | Performed by: NURSE PRACTITIONER

## 2020-08-19 PROCEDURE — 99225 PR SBSQ OBSERVATION CARE/DAY 25 MINUTES: CPT | Performed by: INTERNAL MEDICINE

## 2020-08-19 PROCEDURE — 96361 HYDRATE IV INFUSION ADD-ON: CPT

## 2020-08-19 PROCEDURE — 85025 COMPLETE CBC W/AUTO DIFF WBC: CPT | Performed by: NURSE PRACTITIONER

## 2020-08-19 PROCEDURE — 99226 PR SBSQ OBSERVATION CARE/DAY 35 MINUTES: CPT | Performed by: NURSE PRACTITIONER

## 2020-08-19 PROCEDURE — G0378 HOSPITAL OBSERVATION PER HR: HCPCS

## 2020-08-19 RX ORDER — FOLIC ACID 1 MG/1
1 TABLET ORAL DAILY
Qty: 30 TABLET | Refills: 0 | Status: SHIPPED | OUTPATIENT
Start: 2020-08-20 | End: 2020-10-26

## 2020-08-19 RX ORDER — RANOLAZINE 500 MG/1
500 TABLET, EXTENDED RELEASE ORAL EVERY 12 HOURS SCHEDULED
Qty: 60 TABLET | Refills: 0 | Status: SHIPPED | OUTPATIENT
Start: 2020-08-19

## 2020-08-19 RX ORDER — CARVEDILOL 6.25 MG/1
6.25 TABLET ORAL 2 TIMES DAILY WITH MEALS
Qty: 60 TABLET | Refills: 0 | Status: ON HOLD | OUTPATIENT
Start: 2020-08-19 | End: 2021-04-24 | Stop reason: SDUPTHER

## 2020-08-19 RX ORDER — POTASSIUM CHLORIDE 750 MG/1
40 CAPSULE, EXTENDED RELEASE ORAL ONCE
Status: COMPLETED | OUTPATIENT
Start: 2020-08-19 | End: 2020-08-19

## 2020-08-19 RX ORDER — FERROUS SULFATE 325(65) MG
325 TABLET ORAL
Qty: 30 TABLET | Refills: 3 | Status: SHIPPED | OUTPATIENT
Start: 2020-08-19 | End: 2020-10-26 | Stop reason: SDUPTHER

## 2020-08-19 RX ORDER — FOLIC ACID 1 MG/1
1 TABLET ORAL DAILY
Qty: 90 TABLET | Refills: 1 | Status: SHIPPED | OUTPATIENT
Start: 2020-08-19 | End: 2020-10-26

## 2020-08-19 RX ORDER — LANOLIN ALCOHOL/MO/W.PET/CERES
1000 CREAM (GRAM) TOPICAL DAILY
Qty: 90 TABLET | Refills: 1 | Status: SHIPPED | OUTPATIENT
Start: 2020-08-19

## 2020-08-19 RX ORDER — CARVEDILOL 6.25 MG/1
6.25 TABLET ORAL 2 TIMES DAILY WITH MEALS
Qty: 60 TABLET | Refills: 0 | Status: SHIPPED | OUTPATIENT
Start: 2020-08-19 | End: 2020-08-19

## 2020-08-19 RX ORDER — AMLODIPINE BESYLATE 5 MG/1
5 TABLET ORAL DAILY
Qty: 30 TABLET | Refills: 0 | Status: SHIPPED | OUTPATIENT
Start: 2020-08-20 | End: 2020-08-19

## 2020-08-19 RX ORDER — ASPIRIN 81 MG/1
81 TABLET ORAL DAILY
Start: 2020-08-20

## 2020-08-19 RX ORDER — RANOLAZINE 500 MG/1
500 TABLET, EXTENDED RELEASE ORAL EVERY 12 HOURS SCHEDULED
Status: DISCONTINUED | OUTPATIENT
Start: 2020-08-19 | End: 2020-08-19 | Stop reason: HOSPADM

## 2020-08-19 RX ORDER — AMLODIPINE BESYLATE 5 MG/1
5 TABLET ORAL DAILY
Qty: 30 TABLET | Refills: 0 | Status: SHIPPED | OUTPATIENT
Start: 2020-08-20 | End: 2021-04-24 | Stop reason: HOSPADM

## 2020-08-19 RX ORDER — RANOLAZINE 500 MG/1
500 TABLET, EXTENDED RELEASE ORAL EVERY 12 HOURS SCHEDULED
Qty: 60 TABLET | Refills: 0 | Status: SHIPPED | OUTPATIENT
Start: 2020-08-19 | End: 2020-08-19

## 2020-08-19 RX ORDER — ASPIRIN 81 MG/1
81 TABLET ORAL DAILY
Start: 2020-08-20 | End: 2020-08-19

## 2020-08-19 RX ORDER — CARVEDILOL 6.25 MG/1
6.25 TABLET ORAL 2 TIMES DAILY WITH MEALS
Status: DISCONTINUED | OUTPATIENT
Start: 2020-08-19 | End: 2020-08-19 | Stop reason: HOSPADM

## 2020-08-19 RX ORDER — DOCUSATE SODIUM 100 MG/1
100 CAPSULE, LIQUID FILLED ORAL 2 TIMES DAILY PRN
Qty: 60 CAPSULE | Refills: 2 | Status: SHIPPED | OUTPATIENT
Start: 2020-08-19 | End: 2020-10-26 | Stop reason: SDUPTHER

## 2020-08-19 RX ORDER — FOLIC ACID 1 MG/1
1 TABLET ORAL DAILY
Qty: 30 TABLET | Refills: 0 | Status: SHIPPED | OUTPATIENT
Start: 2020-08-20 | End: 2020-08-19

## 2020-08-19 RX ADMIN — POTASSIUM CHLORIDE 40 MEQ: 10 CAPSULE, COATED, EXTENDED RELEASE ORAL at 08:45

## 2020-08-19 RX ADMIN — RANOLAZINE 500 MG: 500 TABLET, FILM COATED, EXTENDED RELEASE ORAL at 11:03

## 2020-08-19 RX ADMIN — AMLODIPINE BESYLATE 5 MG: 5 TABLET ORAL at 08:37

## 2020-08-19 RX ADMIN — FAMOTIDINE 40 MG: 20 TABLET, FILM COATED ORAL at 08:38

## 2020-08-19 RX ADMIN — SODIUM CHLORIDE 125 ML/HR: 9 INJECTION, SOLUTION INTRAVENOUS at 04:21

## 2020-08-19 RX ADMIN — ASPIRIN 81 MG: 81 TABLET, COATED ORAL at 08:38

## 2020-08-19 RX ADMIN — SUCRALFATE 1 G: 1 TABLET ORAL at 08:38

## 2020-08-19 RX ADMIN — CYANOCOBALAMIN TAB 1000 MCG 1000 MCG: 1000 TAB at 08:38

## 2020-08-19 RX ADMIN — SODIUM CHLORIDE, PRESERVATIVE FREE 10 ML: 5 INJECTION INTRAVENOUS at 08:39

## 2020-08-19 RX ADMIN — SUCRALFATE 1 G: 1 TABLET ORAL at 11:03

## 2020-08-19 RX ADMIN — CARVEDILOL 3.12 MG: 3.12 TABLET, FILM COATED ORAL at 08:38

## 2020-08-19 RX ADMIN — INDAPAMIDE 2.5 MG: 2.5 TABLET, FILM COATED ORAL at 05:53

## 2020-08-19 RX ADMIN — FOLIC ACID 1 MG: 1 TABLET ORAL at 08:38

## 2020-08-19 NOTE — DISCHARGE SUMMARY
[facility] Medicine Services  DISCHARGE SUMMARY       Date of Admission: 8/17/2020  Date of Discharge:  8/19/2020  Primary Care Physician: Fernanda Pope APRN    Presenting Problem/History of Present Illness:  CLL (chronic lymphocytic leukemia) (CMS/HCC) [C91.10]  Chest pain with high risk for cardiac etiology [R07.9]  Anemia, unspecified type [D64.9]       Final Discharge Diagnoses:  Active Hospital Problems    Diagnosis   • CLL (chronic lymphocytic leukemia) (CMS/HCC)   • Iron deficiency anemia   • Chest pain with high risk for cardiac etiology   • Essential hypertension       Consults:   Consults     Date and Time Order Name Status Description    8/18/2020 1100 Inpatient Cardiology Consult Completed     8/17/2020 2149 Hematology & Oncology Inpatient Consult Completed     8/17/2020 1710 Hospitalist (on-call MD unless specified)            Procedures Performed:                 Pertinent Test Results:   Lab Results (last 24 hours)     Procedure Component Value Units Date/Time    Basic Metabolic Panel [[id number]]  (Abnormal) Collected:  08/19/20 0504    Specimen:  Blood Updated:  08/19/20 0609     Glucose 115 mg/dL      BUN 16 mg/dL      Creatinine 1.52 mg/dL      Sodium 138 mmol/L      Potassium 3.4 mmol/L      Chloride 101 mmol/L      CO2 24.0 mmol/L      Calcium 8.7 mg/dL      eGFR Non African Amer 48 mL/min/1.73      BUN/Creatinine Ratio 10.5     Anion Gap 13.0 mmol/L     Narrative:       GFR Normal >60  Chronic Kidney Disease <60  Kidney Failure <15      CBC & Differential [[id number]] Collected:  08/19/20 0504    Specimen:  Blood Updated:  08/19/20 0555    Narrative:       The following orders were created for panel order CBC & Differential.  Procedure                               Abnormality         Status                     ---------                               -----------         ------                     CBC Auto Differential[[id number]]        Abnormal          "   Final result                 Please view results for these tests on the individual orders.    CBC Auto Differential [[id number]]  (Abnormal) Collected:  08/19/20 0504    Specimen:  Blood Updated:  08/19/20 0555     WBC 83.75 10*3/mm3      RBC 3.92 10*6/mm3      Hemoglobin 10.6 g/dL      Hematocrit 31.9 %      MCV 81.4 fL      MCH 27.0 pg      MCHC 33.2 g/dL      RDW 14.7 %      RDW-SD 43.0 fl      MPV 9.2 fL      Platelets 150 10*3/mm3      Neutrophil % 5.2 %      Lymphocyte % 91.4 %      Monocyte % 2.9 %      Eosinophil % 0.2 %      Basophil % 0.1 %      Immature Grans % 0.2 %      Neutrophils, Absolute 4.37 10*3/mm3      Lymphocytes, Absolute 76.58 10*3/mm3      Monocytes, Absolute 2.40 10*3/mm3      Eosinophils, Absolute 0.16 10*3/mm3      Basophils, Absolute 0.07 10*3/mm3      Immature Grans, Absolute 0.17 10*3/mm3      nRBC 0.0 /100 WBC     Blood Culture - Blood, Arm, Right [[id number]] Collected:  08/17/20 1750    Specimen:  Blood from Arm, Right Updated:  08/18/20 1800     Blood Culture No growth at 24 hours    Blood Culture - Blood, Arm, Left [[id number]] Collected:  08/17/20 1613    Specimen:  Blood from Arm, Left Updated:  08/18/20 1631     Blood Culture No growth at 24 hours    Vitamin B12 [[id number]]  (Normal) Collected:  08/17/20 2207    Specimen:  Blood Updated:  08/18/20 1308     Vitamin B-12 360 pg/mL     Narrative:       Results may be falsely increased if patient taking Biotin.      Folate [[id number]]  (Abnormal) Collected:  08/17/20 2207    Specimen:  Blood Updated:  08/18/20 1308     Folate 3.47 ng/mL     Narrative:       Results may be falsely increased if patient taking Biotin.      Peripheral Blood Smear [[id number]] Collected:  08/17/20 1526    Specimen:  Blood Updated:  08/18/20 1306     Performed by: Lamin Valladares M.D.     Pathologist Interpretation --     Red cells are normocytic and normochromic.  The leukocyte count is markedly elevated, and innumerable \"smudge\" cells are present.  An " albumin slide is prepared and is used for differential counting.  My differential count follows: neutrophils 2%, lymphocytes 96%, atypical lymphocytes 2%.  Lymphocytes have the appearance of small, mature lymphocytes.  Platelets are borderline decreased in number, and are normal in size and granulation.    The lymphocytosis is strongly suggestive of chronic lymphocytic leukemia; flow cytometry is pending and will be reported separately.  No morphologic findings of either iron deficiency anemia or megaloblastic anemia are identified.  Reticulocyte count is not elevated, suggestive of inadequate bone marrow response.  The anemia is likely multifactorial.    Impression:  Lymphocytosis, marked, strongly suggestive of chronic lymphocytic leukemia  Anemia, marked, with inadequate reticulocyte count response  Thrombocytopenia, borderline    Manual Differential [[id number]]  (Abnormal) Collected:  08/17/20 1526    Specimen:  Blood Updated:  08/18/20 1110     Neutrophil % 11.0 %      Lymphocyte % 82.0 %      Monocyte % 5.0 %      Eosinophil % 2.0 %      Neutrophils Absolute 8.66 10*3/mm3      Lymphocytes Absolute 64.53 10*3/mm3      Monocytes Absolute 3.94 10*3/mm3      Eosinophils Absolute 1.57 10*3/mm3      Anisocytosis Slight/1+     Hypochromia Slight/1+     WBC Morphology Normal     Platelet Estimate Decreased        Imaging Results (All)     Procedure Component Value Units Date/Time    US Venous Doppler Lower Extremity Bilateral (duplex) [[id number]] Collected:  08/18/20 1830     Updated:  08/18/20 1920    Narrative:         Ultrasound venous duplex bilateral lower extremities    HISTORY: Chest pain. Leukemia.    Duplex ultrasound of the deep venous system of each lower  extremity was performed    FINDINGS:  Real-time images demonstrate normal compressibility without  evidence of intraluminal thrombus.  Doppler shows phasic flow and augmentation.  Color Doppler also reveals venous patency.      Impression:        CONCLUSION:  No ultrasound evidence of deep venous thrombosis of either lower  extremity.    [id number]    Electronically signed by:  Melvin Preciado MD  8/18/2020 7:19 PM CDT  Workstation: [id number]    NM Lung Scan Perfusion Particulate [[id number]] Collected:  08/18/20 1309     Updated:  08/18/20 1424    Addenda:         ADDENDUM   ADDENDUM #1       Addendum: Referring clinician was notified of findings by phone  at 2:23 PM on 8/18/2020.    Electronically signed by:  Derek Galloway MD  8/18/2020 2:23 PM CDT  Workstation: [id number]    Signed:  08/18/20 1423 by Rafa Galloway MD    Narrative:       Procedure: Nuclear medicine lung perfusion scan only (Covid    Protocol)     CLINICAL INDICATION: Prior revascularization (either PTCA or  CABG), R07.9 Chest pain, unspecified C91.10 Chronic lymphocytic  leukemia of B-cell type not having achieved remission D64.9  Anemia, unspecified    COMPARISON: None    TECHNIQUE:. 5.2 mCi Tc-99m MAA administered intravenously.  Perfusion images obtained in multiple projections.    FINDINGS: Bilateral upper lobe small perfusion defects. Otherwise  perfusion exam is unremarkable.      Impression:       CONCLUSION:  Intermediate probability of pulmonary embolus.    COMMENTS:    Normal exam = statistically less than 2% have pulmonary emboli.  Low probability = statistically 5-19% have pulmonary emboli.  Intermediate probability = statistically 20-70% have pulmonary  emboli.  High probability = statistically greater than 80% have pulmonary  emboli.    NOTE: The likelihood of pulmonary embolism, especially in the low  and intermediate categories, should be interpreted in conjunction  with the pre- and post-test probability and other test results  such as venous Doppler, D dimer and if necessary pulmonary  angiography as clinically indicated.    Electronically signed by:  Derek Galloway MD  8/18/2020 2:09 PM CDT  Workstation: [id number]    CT Chest Without Contrast [[id number]] Collected:  08/17/20  2214     Updated:  08/17/20 2254    Narrative:       CT neck, chest, abdomen and pelvis without contrast on  8/17/2020      CLINICAL INDICATION: Swollen lymph nodes in neck, chest pain,  CLL, anemia    TECHNIQUE: Multiple axial images are obtained throughout the  neck, chest, abdomen and pelvis without the administration of  contrast. This exam was performed according to our departmental  dose-optimization program, which includes automated exposure  control, adjustment of the mA and/or kV according to patient size  and/or use of iterative reconstruction technique.  Total DLP is 756.2 mGy*cm.     COMPARISON:  None    FINDINGS: Of note, evaluation of the neck without contrast is  limited.    NECK: There are extensive bilateral jugular chain and posterior  triangle lymph nodes consistent with patient's reported history  of CLL. For example left posterior triangle lymph node on axial  image 35 measures 1.7 x 1.1 cm. No gross mucosal lesion is noted.  The epiglottis and airway is unremarkable. There is no  prevertebral soft tissue swelling. Degenerative and postsurgical  changes are noted in the cervical spine. The patient is status  post anterior cervical disc fusion from C5 through C7. No neck  mass or fluid collection is noted.    CHEST: Emphysematous changes of the lungs are noted. There is  evidence of calcified granulomatous disease in the chest. The  patient is status post median sternotomy and CABG. There is no  pleural or pericardial effusion. There is some bronchial wall  thickening suggesting mild bronchitis. The lungs are otherwise  clear. There is bilateral axillary adenopathy with borderline  size mediastinal lymph nodes. For example largest precarinal  lymph node on axial image 27 measures 1.7 x 0.8 cm. The largest  right axillary lymph node but still maintains fatty hilum  measures 2.3 x 1.5 cm on axial image 20. No acute bony  abnormality of the thorax is noted.    ABDOMEN: Splenomegaly is noted with the  spleen measuring 17.4 cm  in greatest pole to pole length. The patient is status post  cholecystectomy. Vascular calcifications are noted. There is a  3.1 cm infrarenal abdominal aortic aneurysm. Would recommend  follow-up imaging every three years. The unenhanced solid  abdominal organs are otherwise unremarkable. There is  retroperitoneal adenopathy with aortocaval and para-aortic  adenopathy. For example of the larger aortocaval lymph nodes on  axial image 89 measures 1.5 x 1.5 cm. The abdominal portion of  the GI tract is unremarkable.    Pelvis: There is calcification of the vas deferens indicative of  diabetes. There is no free fluid in the pelvis. There is  bilateral iliac adenopathy. For example one of the larger right  external iliac lymph nodes on axial image 105 measures 1.5 x 1.2  cm. Pelvic portion of the GI tract is unremarkable. No acute bony  abnormality is noted.      Impression:       1. Adenopathy in the neck, chest, abdomen and pelvis consistent  with the patient's history of CLL with also splenomegaly also  consistent with the same history.  2. Emphysema with findings suggesting mild bronchitis as well.  3. 3.1 cm infrarenal abdominal aortic aneurysm, recommend  follow-up imaging every three years.    Electronically signed by:  Arnaldo Rivers  8/17/2020 10:53 PM  CDT Workstation: [id number]    CT Soft Tissue Neck Without Contrast [[id number]] Collected:  08/17/20 2214     Updated:  08/17/20 2254    Narrative:       CT neck, chest, abdomen and pelvis without contrast on  8/17/2020      CLINICAL INDICATION: Swollen lymph nodes in neck, chest pain,  CLL, anemia    TECHNIQUE: Multiple axial images are obtained throughout the  neck, chest, abdomen and pelvis without the administration of  contrast. This exam was performed according to our departmental  dose-optimization program, which includes automated exposure  control, adjustment of the mA and/or kV according to patient size  and/or use of  iterative reconstruction technique.  Total DLP is 756.2 mGy*cm.     COMPARISON:  None    FINDINGS: Of note, evaluation of the neck without contrast is  limited.    NECK: There are extensive bilateral jugular chain and posterior  triangle lymph nodes consistent with patient's reported history  of CLL. For example left posterior triangle lymph node on axial  image 35 measures 1.7 x 1.1 cm. No gross mucosal lesion is noted.  The epiglottis and airway is unremarkable. There is no  prevertebral soft tissue swelling. Degenerative and postsurgical  changes are noted in the cervical spine. The patient is status  post anterior cervical disc fusion from C5 through C7. No neck  mass or fluid collection is noted.    CHEST: Emphysematous changes of the lungs are noted. There is  evidence of calcified granulomatous disease in the chest. The  patient is status post median sternotomy and CABG. There is no  pleural or pericardial effusion. There is some bronchial wall  thickening suggesting mild bronchitis. The lungs are otherwise  clear. There is bilateral axillary adenopathy with borderline  size mediastinal lymph nodes. For example largest precarinal  lymph node on axial image 27 measures 1.7 x 0.8 cm. The largest  right axillary lymph node but still maintains fatty hilum  measures 2.3 x 1.5 cm on axial image 20. No acute bony  abnormality of the thorax is noted.    ABDOMEN: Splenomegaly is noted with the spleen measuring 17.4 cm  in greatest pole to pole length. The patient is status post  cholecystectomy. Vascular calcifications are noted. There is a  3.1 cm infrarenal abdominal aortic aneurysm. Would recommend  follow-up imaging every three years. The unenhanced solid  abdominal organs are otherwise unremarkable. There is  retroperitoneal adenopathy with aortocaval and para-aortic  adenopathy. For example of the larger aortocaval lymph nodes on  axial image 89 measures 1.5 x 1.5 cm. The abdominal portion of  the GI tract is  unremarkable.    Pelvis: There is calcification of the vas deferens indicative of  diabetes. There is no free fluid in the pelvis. There is  bilateral iliac adenopathy. For example one of the larger right  external iliac lymph nodes on axial image 105 measures 1.5 x 1.2  cm. Pelvic portion of the GI tract is unremarkable. No acute bony  abnormality is noted.      Impression:       1. Adenopathy in the neck, chest, abdomen and pelvis consistent  with the patient's history of CLL with also splenomegaly also  consistent with the same history.  2. Emphysema with findings suggesting mild bronchitis as well.  3. 3.1 cm infrarenal abdominal aortic aneurysm, recommend  follow-up imaging every three years.    Electronically signed by:  Arnaldo Rivers  8/17/2020 10:53 PM  CDT Workstation: [id number]    CT Abdomen Pelvis Without Contrast [[id number]] Collected:  08/17/20 2214     Updated:  08/17/20 2254    Narrative:       CT neck, chest, abdomen and pelvis without contrast on  8/17/2020      CLINICAL INDICATION: Swollen lymph nodes in neck, chest pain,  CLL, anemia    TECHNIQUE: Multiple axial images are obtained throughout the  neck, chest, abdomen and pelvis without the administration of  contrast. This exam was performed according to our departmental  dose-optimization program, which includes automated exposure  control, adjustment of the mA and/or kV according to patient size  and/or use of iterative reconstruction technique.  Total DLP is 756.2 mGy*cm.     COMPARISON:  None    FINDINGS: Of note, evaluation of the neck without contrast is  limited.    NECK: There are extensive bilateral jugular chain and posterior  triangle lymph nodes consistent with patient's reported history  of CLL. For example left posterior triangle lymph node on axial  image 35 measures 1.7 x 1.1 cm. No gross mucosal lesion is noted.  The epiglottis and airway is unremarkable. There is no  prevertebral soft tissue swelling. Degenerative and  postsurgical  changes are noted in the cervical spine. The patient is status  post anterior cervical disc fusion from C5 through C7. No neck  mass or fluid collection is noted.    CHEST: Emphysematous changes of the lungs are noted. There is  evidence of calcified granulomatous disease in the chest. The  patient is status post median sternotomy and CABG. There is no  pleural or pericardial effusion. There is some bronchial wall  thickening suggesting mild bronchitis. The lungs are otherwise  clear. There is bilateral axillary adenopathy with borderline  size mediastinal lymph nodes. For example largest precarinal  lymph node on axial image 27 measures 1.7 x 0.8 cm. The largest  right axillary lymph node but still maintains fatty hilum  measures 2.3 x 1.5 cm on axial image 20. No acute bony  abnormality of the thorax is noted.    ABDOMEN: Splenomegaly is noted with the spleen measuring 17.4 cm  in greatest pole to pole length. The patient is status post  cholecystectomy. Vascular calcifications are noted. There is a  3.1 cm infrarenal abdominal aortic aneurysm. Would recommend  follow-up imaging every three years. The unenhanced solid  abdominal organs are otherwise unremarkable. There is  retroperitoneal adenopathy with aortocaval and para-aortic  adenopathy. For example of the larger aortocaval lymph nodes on  axial image 89 measures 1.5 x 1.5 cm. The abdominal portion of  the GI tract is unremarkable.    Pelvis: There is calcification of the vas deferens indicative of  diabetes. There is no free fluid in the pelvis. There is  bilateral iliac adenopathy. For example one of the larger right  external iliac lymph nodes on axial image 105 measures 1.5 x 1.2  cm. Pelvic portion of the GI tract is unremarkable. No acute bony  abnormality is noted.      Impression:       1. Adenopathy in the neck, chest, abdomen and pelvis consistent  with the patient's history of CLL with also splenomegaly also  consistent with the same  history.  2. Emphysema with findings suggesting mild bronchitis as well.  3. 3.1 cm infrarenal abdominal aortic aneurysm, recommend  follow-up imaging every three years.    Electronically signed by:  Arnaldo Rivers  8/17/2020 10:53 PM  CDT Workstation: [id number]    XR Chest 1 View [[id number]] Collected:  08/17/20 1523     Updated:  08/17/20 1539    Narrative:         PROCEDURE: Single chest view portable erect    REASON FOR EXAM:Chest Pain triage protocol  Chest Pain Triage Protocol    FINDINGS: Comparison exam dated December 6, 2017. Stable  postsurgical changes with median sternotomy. Cardiac and  pulmonary vasculature are normal. Lungs are clear. Pleural spaces  are normal. No acute osseous abnormality. Metallic plate is seen  fixating the lower cervical spine.      Impression:       1.  Stable postsurgical changes described above.  2.  No acute cardiopulmonary abnormality.    Electronically signed by:  Derek Galloway MD  8/17/2020 3:38 PM CDT  Workstation: [id number]            Chief Complaint on Day of Discharge: none    Hospital Course:  54-year-old  male with past medical history of anxiety, CHF, CKD stage IV, gastroesophageal reflux disease, CLL, hyperlipidemia, CAD status post CABG in 2017 who presented on 8/17/2020 with complaints of chest pain, left arm pain, and dyspnea.  Patient was placed under observation for further cardiac monitoring and work-up to rule out ACS.  Patient was evaluated by cardiology during his hospital stay and started on Ranexa for chest pain management.  Adjustments were also made to patient's dosing of Coreg and amlodipine.  Echo revealed EF 46-50% and hypokinesis of mid inferior wall unchanged from 2017 readings.  Patient was noted to be anemic in the emergency department with hemoglobin of 6.7 requiring blood transfusion.  Posttransfusion, patient reports improvement in his chest pain and dyspnea symptoms.  Elevated d-dimer was noted and V/Q scanning performed with  "indeterminate probability for pulmonary embolus.  Bilateral lower extremity venous duplex was performed and no evidence of DVT was noted.  Due to patient's recent anemia hospitalist team and hematology/oncology opted against anticoagulation.  Cardiology team has opted for medical management of patient's chest pain at this time and hematology/oncology has recommended continuation of B12, folic acid and outpatient follow up in regards to anemia and possible progression of CLL.  Case was discussed with AHSAN Sotelo with cardiology and Dr. Henderson on day of discharge and patient stable from specialist standpoint to go home.   Patient is chest pain-free on day of discharge and will be sent home in stable condition with instructions for 1 week PCP and oncology follow-up as well as cardiology follow-up on 9/14/2020 as previously scheduled.    Condition on Discharge:  Stable     Physical Exam on Discharge:  /98 (BP Location: Left arm, Patient Position: Lying)   Pulse 60   Temp 97.7 °F (36.5 °C) (Temporal)   Resp 16   Ht 175 cm (68.9\")   Wt 65.3 kg (144 lb)   SpO2 97%   BMI 21.33 kg/m²   Physical Exam   Constitutional: He is oriented to person, place, and time. He appears well-developed and well-nourished. No distress.   HENT:   Head: Normocephalic and atraumatic.   Right Ear: External ear normal.   Left Ear: External ear normal.   Nose: Nose normal.   Eyes: Conjunctivae are normal. Right eye exhibits no discharge. Left eye exhibits no discharge. No scleral icterus.   Neck: Normal range of motion. Neck supple. No JVD present.   Cardiovascular: Normal rate, regular rhythm and intact distal pulses. Exam reveals no gallop and no friction rub.   No murmur heard.  Pulmonary/Chest: Effort normal and breath sounds normal. No stridor. No respiratory distress. He has no wheezes. He has no rales.   Abdominal: Soft. Bowel sounds are normal. He exhibits no distension. There is no tenderness.   Musculoskeletal: Normal " range of motion. He exhibits no edema.   Neurological: He is alert and oriented to person, place, and time.   Skin: Skin is warm and dry. He is not diaphoretic.   Psychiatric: He has a normal mood and affect. His behavior is normal.   Nursing note and vitals reviewed.        Discharge Disposition:  Home or Self Care    Discharge Medications:     Discharge Medications      New Medications      Instructions Start Date   aspirin 81 MG EC tablet   81 mg, Oral, Daily   Start Date:  August 20, 2020     cyanocobalamin 1000 MCG tablet  Commonly known as:  VITAMIN B-12   1,000 mcg, Oral, Daily   Start Date:  August 20, 2020     folic acid 1 MG tablet  Commonly known as:  FOLVITE   1 mg, Oral, Daily   Start Date:  August 20, 2020     ranolazine 500 MG 12 hr tablet  Commonly known as:  RANEXA   500 mg, Oral, Every 12 Hours Scheduled         Changes to Medications      Instructions Start Date   amLODIPine 5 MG tablet  Commonly known as:  NORVASC  What changed:    · medication strength  · how much to take   5 mg, Oral, Daily   Start Date:  August 20, 2020     carvedilol 6.25 MG tablet  Commonly known as:  COREG  What changed:    · medication strength  · how much to take   6.25 mg, Oral, 2 Times Daily With Meals         Continue These Medications      Instructions Start Date   famotidine 40 MG tablet  Commonly known as:  PEPCID   40 mg, Oral, 2 Times Daily      indapamide 2.5 MG tablet  Commonly known as:  LOZOL   2.5 mg, Oral, Every Morning      pravastatin 40 MG tablet  Commonly known as:  Pravachol   40 mg, Oral, Nightly      sucralfate 1 g tablet  Commonly known as:  CARAFATE   1 g, Oral, 4 Times Daily             Discharge Diet:   Diet Instructions     Diet: Cardiac; Thin      Discharge Diet:  Cardiac    Fluid Consistency:  Thin          Activity at Discharge:   Activity Instructions     Activity as Tolerated            Discharge Care Plan/Instructions: Follow up with PCP and oncology within one week and keep scheduled  follow up with Dr. Yanez on 9/14/2020.    Follow-up Appointments:   Additional Instructions for the Follow-ups that You Need to Schedule     Discharge Follow-up with PCP   As directed       Currently Documented PCP:    Fernanda Pope APRN    PCP Phone Number:    [phone]     Follow Up Details:  one week         Discharge Follow-up with Specified Provider: Beatriz; 1 Week   As directed      To:  Beatriz    Follow Up:  1 Week           Follow-up Information     Fernanda Pope APRN .    Specialty:  Family Medicine  Why:  one week  Contact information:  [address] [place] [id number]  [phone]                   Test Results Pending at Discharge:    Order Current Status    Flow Cytometry, Blood In process    Blood Culture - Blood, Arm, Left Preliminary result    Blood Culture - Blood, Arm, Right Preliminary result                This document has been electronically signed by AHSAN Borrego on August 19, 2020 10:45        Time: 30 minutes spent on assessment, discussion, management, and discharge planning for this patient.

## 2020-08-19 NOTE — TELEPHONE ENCOUNTER
PT RECEIVED TWO PRESCRIPTIONS FOR FOLIC ACID. ONE FROM DR BURRELL AND ONE FROM DR DUNN. IS HE SUPPOSE TO TAKE BOTH OR WAS THIS AN ERROR.    PT HAS ADDITIONAL QUESTION ON OTHER MEDICATIONS RECEIVED WHEN RELEASED FROM THE HOSPITAL.    BEST CALL BACK # [phone]

## 2020-08-19 NOTE — PROGRESS NOTES
Chief complaint: Fatigue      HISTORY OF PRESENT ILLNESS:    Patient states his chest pain is improved since yesterday.  Denies any difficulty swallowing.  Denies any reaction to intravenous Ferrlecit that was started yesterday.  Denies any worsening adenopathy involving neck.  Denies any bleeding.          PAST MEDICAL HISTORY:    Past Medical History:   Diagnosis Date   • Aneurysm of infrarenal abdominal aorta (CMS/HCC)    • Anxiety    • Cervical radiculitis    • Cervical spondylosis without myelopathy    • CHF (congestive heart failure) (CMS/HCC)    • Chronic kidney disease, stage 3 (CMS/HCC)    • Common iliac aneurysm (CMS/HCC)    • Coronary arteriosclerosis    • Degeneration of cervical intervertebral disc    • Essential hypertension    • GERD (gastroesophageal reflux disease)    • Headache    • Hyperlipidemia    • Intermittent claudication (CMS/HCC)    • Myocardial infarction (CMS/HCC)    • Uric acid renal calculus        SOCIAL HISTORY:    Social History     Tobacco Use   • Smoking status: Former Smoker     Last attempt to quit: 8/28/2016     Years since quitting: 3.9   • Smokeless tobacco: Never Used   Substance Use Topics   • Alcohol use: No   • Drug use: No       Surgical History :  Past Surgical History:   Procedure Laterality Date   • APPENDECTOMY     • CARDIAC CATHETERIZATION  05/25/2016    Cardiac cath 62871 (2) - Patent left circumflex stent.Chronic total occlusion of the RCA,more than 20 mm in length.   • CARDIAC CATHETERIZATION N/A 11/28/2017    Procedure: Left Heart Cath/ PCI if indicated;  Surgeon: Carlos Charles MD;  Location: [facility] CATH INVASIVE LOCATION;  Service:    • CERVICAL FUSION     • CHOLECYSTECTOMY     • CORONARY ARTERY BYPASS GRAFT N/A 12/4/2017    Procedure: CORONARY ARTERY BYPASS GRAFTINGX3, ENDOSCOPIC VEIN HARVEST     (CELL SAVER);  Surgeon: Darien Dejesus MD;  Location: [facility] OR;  Service:    • HERNIA REPAIR     • SPINAL FUSION         ALLERGIES:    Allergies  "  Allergen Reactions   • Amlodipine Rash   • Imdur [Isosorbide Dinitrate] Rash   • Lisinopril Rash   • Metoprolol Rash         FAMILY HISTORY:  Family History   Problem Relation Age of Onset   • Hyperlipidemia Mother    • Hypertension Mother    • Cancer Father    • Hyperlipidemia Father    • Hypertension Father    • Cancer Brother    • Diabetes Other    • Diabetes Other            REVIEW OF SYSTEMS:      CONSTITUTIONAL:  Complains of fatigue.  Positive for 25 pound weight loss in the last 3 weeks.  Positive for drenching night sweats ongoing for last 2 to 3 months.  Denies any fever, chills .    EYES: No visual disturbances. No discharge. No new lesions    ENMT:  No epistaxis, mouth sores or difficulty swallowing.    RESPIRATORY: Positive for chronic shortness of breath with exertion. No new cough or hemoptysis.    CARDIOVASCULAR:  No chest pain or palpitations today.    GASTROINTESTINAL:  No abdominal pain nausea, vomiting or blood in the stool.    GENITOURINARY: Positive for generalized arthritis pain.    MUSCULOSKELETAL:  No new back pain or arthralgia..    LYMPHATICS:  Denies any abnormal swollen glands anywhere in the body.    NEUROLOGICAL : Positive for intermittent tingling and numbness affecting bilateral hands. No headache or dizziness. No seizures or balance problems.    SKIN: No new skin lesions.    ENDOCRINE : No new hot or cold intolerance. No new polyuria . No polydipsia.        PHYSICAL EXAMINATION:      VITAL SIGNS:  /90 (BP Location: Right arm, Patient Position: Sitting)   Pulse 73   Temp 97.9 °F (36.6 °C) (Temporal)   Resp 17   Ht 175 cm (68.9\")   Wt 65.3 kg (144 lb)   SpO2 98%   BMI 21.33 kg/m²       08/18/20  0448 08/18/20  1538 08/19/20  0504   Weight: 65.4 kg (144 lb 2 oz) 65.4 kg (144 lb 2.9 oz) 65.3 kg (144 lb)         ECOG performance status: 2    CONSTITUTIONAL:  Not in any distress.  Appears older than stated age    EYES: Mild conjunctival Pallor. No Icterus. No Pterygium. " Extraocular Movements intact.No ptosis.    ENMT:  Normocephalic, Atraumatic.No Facial Asymmetry noted.    NECK: Minimally enlarged lymph node in bilateral neck largest lymph node is on the left side measuring at 1.5 cm.Trachea midline. NO JVD.    RESPIRATORY:  Fair air entry bilateral. No rhonchi or wheezing.Fair respiratory effort.    CARDIOVASCULAR:  S1, S2. Regular rate and rhythm. No murmur or gallop appreciated.    ABDOMEN:  Soft,  nontender. Bowel sounds present in all four quadrants.  No Hepatosplenomegaly appreciated.    MUSCULOSKELETAL:  No edema.No Calf Tenderness.Decreased range of motion.    NEUROLOGIC:    No  Motor  deficit appreciated. Cranial Nerves 2-12 grossly intact.    SKIN : No new skin lesion identified. Skin is warm and moist to touch.    LYMPHATICS: Minimally enlarged lymph node present in bilateral neck, largest lymph node measuring at 1.5 cm present on left side of neck.    PSYCHIATRY: Alert, awake and oriented ×3.        DIAGNOSTIC DATA:    Glucose   Date Value Ref Range Status   08/19/2020 115 (H) 65 - 99 mg/dL Final     Sodium   Date Value Ref Range Status   08/19/2020 138 136 - 145 mmol/L Final     Potassium   Date Value Ref Range Status   08/19/2020 3.4 (L) 3.5 - 5.2 mmol/L Final     CO2   Date Value Ref Range Status   08/19/2020 24.0 22.0 - 29.0 mmol/L Final     Chloride   Date Value Ref Range Status   08/19/2020 101 98 - 107 mmol/L Final     Anion Gap   Date Value Ref Range Status   08/19/2020 13.0 5.0 - 15.0 mmol/L Final     Creatinine   Date Value Ref Range Status   08/19/2020 1.52 (H) 0.76 - 1.27 mg/dL Final     BUN   Date Value Ref Range Status   08/19/2020 16 6 - 20 mg/dL Final     BUN/Creatinine Ratio   Date Value Ref Range Status   08/19/2020 10.5 7.0 - 25.0 Final     Calcium   Date Value Ref Range Status   08/19/2020 8.7 8.6 - 10.5 mg/dL Final     eGFR Non  Amer   Date Value Ref Range Status   08/19/2020 48 (L) >60 mL/min/1.73 Final     Alkaline Phosphatase   Date Value  Ref Range Status   08/17/2020 96 39 - 117 U/L Final     Total Protein   Date Value Ref Range Status   08/17/2020 6.4 6.0 - 8.5 g/dL Final     ALT (SGPT)   Date Value Ref Range Status   08/17/2020 6 1 - 41 U/L Final     AST (SGOT)   Date Value Ref Range Status   08/17/2020 16 1 - 40 U/L Final     Comment:     Specimen hemolyzed.  Results may be affected.     Total Bilirubin   Date Value Ref Range Status   08/17/2020 0.2 0.0 - 1.2 mg/dL Final     Albumin   Date Value Ref Range Status   08/17/2020 3.80 3.50 - 5.20 g/dL Final     Globulin   Date Value Ref Range Status   08/17/2020 2.6 gm/dL Final     Lab Results   Component Value Date    WBC 83.75 (C) 08/19/2020    HGB 10.6 (L) 08/19/2020    HCT 31.9 (L) 08/19/2020    MCV 81.4 08/19/2020     08/19/2020     Lab Results   Component Value Date    NEUTROABS 4.37 08/19/2020    IRON 51 (L) 08/17/2020    TIBC 304 08/17/2020    LABIRON 17 (L) 08/17/2020    FERRITIN 100.60 08/17/2020    [id number] 360 08/17/2020    FOLATE 3.47 (L) 08/17/2020     No results found for: , LABCA2, AFPTM, HCGQUANT, , CHROMGRNA, [id number], CEA, REFLABREPO              RADIOLOGY DATA :  Ct Abdomen Pelvis Without Contrast    Result Date: 8/17/2020  1. Adenopathy in the neck, chest, abdomen and pelvis consistent with the patient's history of CLL with also splenomegaly also consistent with the same history. 2. Emphysema with findings suggesting mild bronchitis as well. 3. 3.1 cm infrarenal abdominal aortic aneurysm, recommend follow-up imaging every three years. Electronically signed by:  Arnaldo Rivers  8/17/2020 10:53 PM CDT Workstation: [id number]    Ct Soft Tissue Neck Without Contrast    Result Date: 8/17/2020  1. Adenopathy in the neck, chest, abdomen and pelvis consistent with the patient's history of CLL with also splenomegaly also consistent with the same history. 2. Emphysema with findings suggesting mild bronchitis as well. 3. 3.1 cm infrarenal abdominal aortic aneurysm,  recommend follow-up imaging every three years. Electronically signed by:  Arnaldo Rivers  8/17/2020 10:53 PM CDT Workstation: [id number]    Ct Chest Without Contrast    Result Date: 8/17/2020  1. Adenopathy in the neck, chest, abdomen and pelvis consistent with the patient's history of CLL with also splenomegaly also consistent with the same history. 2. Emphysema with findings suggesting mild bronchitis as well. 3. 3.1 cm infrarenal abdominal aortic aneurysm, recommend follow-up imaging every three years. Electronically signed by:  Arnaldo Rivers  8/17/2020 10:53 PM CDT Workstation: [id number]    Nm Lung Scan Perfusion Particulate    Addendum Date: 8/18/2020     ADDENDUM ADDENDUM #1 Addendum: Referring clinician was notified of findings by phone at 2:23 PM on 8/18/2020. Electronically signed by:  Derek Galloway MD  8/18/2020 2:23 PM CDT Workstation: [id number]     Result Date: 8/18/2020  CONCLUSION:  Intermediate probability of pulmonary embolus. COMMENTS: Normal exam = statistically less than 2% have pulmonary emboli. Low probability = statistically 5-19% have pulmonary emboli. Intermediate probability = statistically 20-70% have pulmonary emboli. High probability = statistically greater than 80% have pulmonary emboli. NOTE: The likelihood of pulmonary embolism, especially in the low and intermediate categories, should be interpreted in conjunction with the pre- and post-test probability and other test results such as venous Doppler, D dimer and if necessary pulmonary angiography as clinically indicated. Electronically signed by:  Derek Galloway MD  8/18/2020 2:09 PM CDT Workstation: [id number]    Xr Chest 1 View    Result Date: 8/17/2020  1.  Stable postsurgical changes described above. 2.  No acute cardiopulmonary abnormality. Electronically signed by:  Derek Galloway MD  8/17/2020 3:38 PM CDT Workstation: [id number]    Us Venous Doppler Lower Extremity Bilateral (duplex)    Result Date: 8/18/2020  CONCLUSION: No  ultrasound evidence of deep venous thrombosis of either lower extremity. [id number] Electronically signed by:  Melvin Preciado MD  8/18/2020 7:19 PM CDT Workstation: [id number]          ASSESSMENT AND PLAN:      1.  Chronic lymphocytic leukemia:  -As per patient he was diagnosed in 2018 by Dr. Beyer. we are still waiting for records from Dr. Iraheta's office.  -White blood cell count is 83,000 today with hemoglobin of 10.6.  -Peripheral blood flow cytometry is also pending.  -CT of chest abdomen pelvis was minimally enlarged lymph node without any bulky adenopathy.  -From hematology oncology point of view patient can be discharged home with outpatient follow-up next week.  Recommendation were discussed with patient today.    2.  Anemia:  -Secondary to CLL plus nutritional deficiency  -Patient was started on intravenous Ferrlecit on August 18, 2020 that he tolerated well.  - In view of folate deficiency, recommend continue with folic acid 1 mg p.o. daily along with B12 thousand micrograms p.o. daily at home.  -We will also start him on ferrous sulfate 1 tablet p.o. daily along with stool softener.    3.  Lymphocytosis: Secondary to CLL    .    4.Intermediate VQ scan:  -Due to d-dimer being minimally elevated at 479, patient had a VQ scan which was limited secondary to inability to do a ventilation scan because of coronavirus on average.  -Doppler ultrasound of bilateral lower extremity did not show any evidence of DVT.  Would not recommend any full dose anticoagulation at present.       5.  Coronary artery disease status post CABG        Vladimir Henderson MD  8/19/2020  12:04        Part of this note may be an electronic transcription/translation of spoken language to printed text using the Dragon Dictation System.

## 2020-08-19 NOTE — PROGRESS NOTES
"[facility] Progress Note   LOS: 0 days   Patient Care Team:  Fernanda Pope APRN as PCP - General  Vladimir Henderson MD as Consulting Physician (Hematology and Oncology)    Chief Complaint:    Chief Complaint   Patient presents with   • Chest Pain        Subjective     Interval History:   Patient Denies: chest pain, PND, orthopnea, palpitations, dizziness, syncope and edema  Patient Complaints: shortness of air with exertion.   History taken from: patient chart     Patient reports doing well today and feeling better overall. Does still have SOA on exertion. VSS. No acute events overnight.    Objective     Vital Sign Min/Max for last 24 hours  Temp  Min: 96.7 °F (35.9 °C)  Max: 97.7 °F (36.5 °C)   BP  Min: 132/86  Max: 171/87   Pulse  Min: 54  Max: 77   Resp  Min: 16  Max: 18   SpO2  Min: 97 %  Max: 99 %   No data recorded   Weight  Min: 65.3 kg (144 lb)  Max: 65.4 kg (144 lb 2.9 oz)     Flowsheet Rows      First Filed Value   Admission Height  175.3 cm (69\") Documented at 08/17/2020 1719   Admission Weight  65.8 kg (145 lb) Documented at 08/17/2020 1514            08/18/20  0448 08/18/20  1538 08/19/20  0504   Weight: 65.4 kg (144 lb 2 oz) 65.4 kg (144 lb 2.9 oz) 65.3 kg (144 lb)       Physical Exam:  Physical Exam   Constitutional: He is oriented to person, place, and time. Vital signs are normal. He appears well-developed and well-nourished. No distress.   HENT:   Head: Normocephalic.   Right Ear: External ear normal.   Left Ear: External ear normal.   Eyes: Pupils are equal, round, and reactive to light. EOM and lids are normal.   Neck: Neck supple. No JVD present. Carotid bruit is not present.   Cardiovascular: Normal rate, regular rhythm, S1 normal, S2 normal, normal heart sounds and intact distal pulses. PMI is not displaced. Exam reveals no gallop, no S3, no S4 and no friction rub.   No murmur heard.  Pulmonary/Chest: No stridor. No respiratory distress. He has decreased breath sounds. He has no wheezes. " He has no rales. He exhibits no tenderness.   Abdominal: Soft. Normal appearance and bowel sounds are normal. He exhibits no distension. There is no tenderness.   Musculoskeletal: He exhibits no edema.     Vascular Status -  His right foot exhibits normal foot vasculature  and no edema. His left foot exhibits normal foot vasculature  and no edema.  Neurological: He is alert and oriented to person, place, and time. He has normal reflexes. He displays normal reflexes. No cranial nerve deficit.   Skin: Skin is warm and dry. Capillary refill takes 2 to 3 seconds. No rash noted. He is not diaphoretic. No erythema.   Psychiatric: He has a normal mood and affect. His behavior is normal. Judgment and thought content normal.   Nursing note and vitals reviewed.       Results Review:     Results from last 7 days   Lab Units 08/19/20  0504 08/18/20  0601 08/17/20  1526   SODIUM mmol/L 138 136 137   POTASSIUM mmol/L 3.4* 3.4* 4.9   CHLORIDE mmol/L 101 100 101   CO2 mmol/L 24.0 22.0 23.0   BUN mg/dL 16 22* 28*   CREATININE mg/dL 1.52* 1.58* 1.98*   CALCIUM mg/dL 8.7 8.5* 8.7   BILIRUBIN mg/dL  --   --  0.2   ALK PHOS U/L  --   --  96   ALT (SGPT) U/L  --   --  6   AST (SGOT) U/L  --   --  16   GLUCOSE mg/dL 115* 94 89       Estimated Creatinine Clearance: 50.1 mL/min (A) (by C-G formula based on SCr of 1.52 mg/dL (H)).    Results from last 7 days   Lab Units 08/17/20  1526   MAGNESIUM mg/dL 1.9             Results from last 7 days   Lab Units 08/19/20  0504 08/18/20  0601 08/17/20  2304 08/17/20  1526   WBC 10*3/mm3 83.75* 96.01*  --  78.70*   HEMOGLOBIN g/dL 10.6* 11.1* 11.5* 6.7*   PLATELETS 10*3/mm3 150 163  --  129*       Results from last 7 days   Lab Units 08/17/20  1526   INR  0.99     Lab Results   Component Value Date    PROBNP 730.6 08/17/2020       I/O last 3 completed shifts:  In: 4508 [P.O.:940; I.V.:3158; Blood:300; IV Piggyback:110]  Out: -     Cardiographics:  ECG/EMG Results (last 24 hours)     Procedure  Component Value Units Date/Time    ECG 12 Lead [[id number]] Collected:  08/17/20 1504     Updated:  08/18/20 1543    Narrative:       Test Reason : cp  Blood Pressure : **/** mmHG  Vent. Rate : 066 BPM     Atrial Rate : 066 BPM     P-R Int : 172 ms          QRS Dur : 118 ms      QT Int : 432 ms       P-R-T Axes : 078 033 031 degrees     QTc Int : 452 ms    Normal sinus rhythm  Nonspecific intraventricular conduction delay  Borderline ECG  When compared with ECG of 07-DEC-2017 05:56,  Vent. rate has decreased BY  46 BPM  Confirmed by CASEY COPELAND ([id number]) on 8/18/2020 3:43:29 PM    Referred By:             Confirmed By:CASEY COPELAND    ECG 12 Lead [[id number]] Collected:  08/17/20 2210     Updated:  08/18/20 1545    Narrative:       Test Reason : Chest Pain  Blood Pressure : **/** mmHG  Vent. Rate : 065 BPM     Atrial Rate : 065 BPM     P-R Int : 186 ms          QRS Dur : 112 ms      QT Int : 430 ms       P-R-T Axes : 078 018 028 degrees     QTc Int : 447 ms    Normal sinus rhythm  Normal ECG  When compared with ECG of 17-AUG-2020 15:04,  No significant change was found  Confirmed by CASEY COPELAND ([id number]) on 8/18/2020 3:45:27 PM    Referred By:             Confirmed By:CASEY COPELAND    ECG 12 Lead [[id number]] Collected:  08/18/20 0441     Updated:  08/18/20 1546    Narrative:       Test Reason : Chest Pain  Blood Pressure : **/** mmHG  Vent. Rate : 070 BPM     Atrial Rate : 070 BPM     P-R Int : 182 ms          QRS Dur : 114 ms      QT Int : 422 ms       P-R-T Axes : 072 010 023 degrees     QTc Int : 455 ms    Sinus rhythm with premature atrial complexes with aberrant conduction  Incomplete left bundle branch block  Borderline ECG  When compared with ECG of 17-AUG-2020 22:10,  aberrant conduction is now present  Incomplete left bundle branch block is now present  Confirmed by CASEY COPELAND ([id number]) on 8/18/2020 3:45:58 PM    Referred By:             Confirmed By:CASEY COPELAND    ECG 12 Lead [[id number]] Collected:  08/18/20 1015      Updated:  08/18/20 1548    Narrative:       Test Reason : Chest Pain  Blood Pressure : **/** mmHG  Vent. Rate : 065 BPM     Atrial Rate : 065 BPM     P-R Int : 180 ms          QRS Dur : 116 ms      QT Int : 434 ms       P-R-T Axes : 065 022 038 degrees     QTc Int : 451 ms    Normal sinus rhythm  Normal ECG  When compared with ECG of 18-AUG-2020 04:41,  aberrant conduction is no longer present  Confirmed by CASEY COPELAND ([id number]) on 8/18/2020 3:48:41 PM    Referred By:             Confirmed By:CASEY COPELAND    Adult Transthoracic Echo Complete W/ Cont if Necessary Per Protocol [[id number]] Collected:  08/18/20 1457     Updated:  08/18/20 1709     BSA 1.8 m^2      RVIDd 2.0 cm      IVSd 1.0 cm      LVIDd 5.2 cm      LVIDs 3.8 cm      LVPWd 1.1 cm      IVS/LVPW 0.9     FS 27.0 %      EDV(Teich) 131.2 ml      ESV(Teich) 62.7 ml      EF(Teich) 52.2 %      EDV(cubed) 143.1 ml      ESV(cubed) 55.7 ml      EF(cubed) 61.0 %      LV mass(C)d 216.0 grams      LV mass(C)dI 120.2 grams/m^2      SV(Teich) 68.5 ml      SI(Teich) 38.1 ml/m^2      SV(cubed) 87.3 ml      SI(cubed) 48.6 ml/m^2      Ao root diam 4.1 cm      Ao root area 13.2 cm^2      ACS 1.7 cm      LA dimension 3.4 cm      asc Aorta Diam 3.9 cm      Ao Isth Diam 3.1 cm      LA/Ao 0.83     LVOT diam 2.2 cm      LVOT area 3.8 cm^2      LVOT area(traced) 3.8 cm^2      LVLd ap4 9.1 cm      EDV(MOD-sp4) 83.8 ml      LVLs ap4 7.9 cm      ESV(MOD-sp4) 24.7 ml      EF(MOD-sp4) 70.5 %      LVLd ap2 8.5 cm      EDV(MOD-sp2) 66.8 ml      LVLs ap2 6.8 cm      ESV(MOD-sp2) 38.0 ml      EF(MOD-sp2) 43.1 %      SV(MOD-sp4) 59.1 ml      SI(MOD-sp4) 32.9 ml/m^2      SV(MOD-sp2) 28.8 ml      SI(MOD-sp2) 16.0 ml/m^2      Ao root area (BSA corrected) 2.3     LV Devlin Vol (BSA corrected) 46.6 ml/m^2      LV Sys Vol (BSA corrected) 13.7 ml/m^2      MV E max marita 80.1 cm/sec      MV A max marita 77.1 cm/sec      MV E/A 1.0     MV V2 max 96.7 cm/sec      MV max PG 3.7 mmHg      MV V2 mean 56.8  cm/sec      MV mean PG 1.0 mmHg      MV V2 VTI 25.8 cm      MVA(VTI) 2.3 cm^2      MV P1/2t max marita 95.3 cm/sec      MV P1/2t 68.6 msec      MVA(P1/2t) 3.2 cm^2      MV dec slope 407.0 cm/sec^2      Ao pk marita 140.0 cm/sec      Ao max PG 7.8 mmHg      Ao max PG (full) 4.1 mmHg      Ao V2 mean 103.0 cm/sec      Ao mean PG 5.0 mmHg      Ao mean PG (full) 3.0 mmHg      Ao V2 VTI 31.1 cm      WALI(I,A) 1.9 cm^2      WALI(I,D) 1.9 cm^2      WALI(V,A) 2.6 cm^2      WALI(V,D) 2.6 cm^2      LV V1 max PG 3.7 mmHg      LV V1 mean PG 2.0 mmHg      LV V1 max 96.1 cm/sec      LV V1 mean 57.2 cm/sec      LV V1 VTI 15.6 cm      MR max marita 314.0 cm/sec      MR max PG 39.4 mmHg      SV(Ao) 410.6 ml      SI(Ao) 228.6 ml/m^2      SV(LVOT) 59.3 ml      SI(LVOT) 33.0 ml/m^2      PA V2 max 93.7 cm/sec      PA max PG 3.5 mmHg      TR max marita 156.0 cm/sec      RVSP(TR) 14.7 mmHg      RAP systole 5.0 mmHg      MVA P1/2T LCG 2.3 cm^2       CV ECHO YVETTE - BZI_BMI 21.3 kilograms/m^2       CV ECHO YVETTE - BSA(HAYCOCK) 1.8 m^2       CV ECHO YVETTE - BZI_METRIC_WEIGHT 65.3 kg       CV ECHO YVETTE - BZI_METRIC_HEIGHT 175.3 cm      Target HR (85%) 139 bpm      Max. Pred. HR (100%) 164 bpm     Narrative:       · The following left ventricular wall segments are hypokinetic: mid   inferior.  · Mild mitral valve regurgitation is present  · Inferior wall HK. EF 45-50%.       SCANNED EKG [[id number]] Resulted:  08/17/20      Updated:  08/18/20 2233        Results for orders placed during the hospital encounter of 08/17/20   Adult Transthoracic Echo Complete W/ Cont if Necessary Per Protocol    Narrative · The following left ventricular wall segments are hypokinetic: mid   inferior.  · Mild mitral valve regurgitation is present  · Inferior wall HK. EF 45-50%.          @LPGRADLAST    Ct Abdomen Pelvis Without Contrast    Result Date: 8/17/2020  1. Adenopathy in the neck, chest, abdomen and pelvis consistent with the patient's history of CLL with also  splenomegaly also consistent with the same history. 2. Emphysema with findings suggesting mild bronchitis as well. 3. 3.1 cm infrarenal abdominal aortic aneurysm, recommend follow-up imaging every three years. Electronically signed by:  Arnaldo Rivers  8/17/2020 10:53 PM CDT Workstation: [id number]    Ct Soft Tissue Neck Without Contrast    Result Date: 8/17/2020  1. Adenopathy in the neck, chest, abdomen and pelvis consistent with the patient's history of CLL with also splenomegaly also consistent with the same history. 2. Emphysema with findings suggesting mild bronchitis as well. 3. 3.1 cm infrarenal abdominal aortic aneurysm, recommend follow-up imaging every three years. Electronically signed by:  Arnaldo Rivers  8/17/2020 10:53 PM CDT Workstation: [id number]    Ct Chest Without Contrast    Result Date: 8/17/2020  1. Adenopathy in the neck, chest, abdomen and pelvis consistent with the patient's history of CLL with also splenomegaly also consistent with the same history. 2. Emphysema with findings suggesting mild bronchitis as well. 3. 3.1 cm infrarenal abdominal aortic aneurysm, recommend follow-up imaging every three years. Electronically signed by:  Arnaldo Rivers  8/17/2020 10:53 PM CDT Workstation: [id number]    Nm Lung Scan Perfusion Particulate    Addendum Date: 8/18/2020     ADDENDUM ADDENDUM #1 Addendum: Referring clinician was notified of findings by phone at 2:23 PM on 8/18/2020. Electronically signed by:  Derek Galloway MD  8/18/2020 2:23 PM CDT Workstation: [id number]     Result Date: 8/18/2020  CONCLUSION:  Intermediate probability of pulmonary embolus. COMMENTS: Normal exam = statistically less than 2% have pulmonary emboli. Low probability = statistically 5-19% have pulmonary emboli. Intermediate probability = statistically 20-70% have pulmonary emboli. High probability = statistically greater than 80% have pulmonary emboli. NOTE: The likelihood of pulmonary embolism, especially in the low and  intermediate categories, should be interpreted in conjunction with the pre- and post-test probability and other test results such as venous Doppler, D dimer and if necessary pulmonary angiography as clinically indicated. Electronically signed by:  Derek Galloway MD  8/18/2020 2:09 PM CDT Workstation: [id number]    Xr Chest 1 View    Result Date: 8/17/2020  1.  Stable postsurgical changes described above. 2.  No acute cardiopulmonary abnormality. Electronically signed by:  Derek Galloway MD  8/17/2020 3:38 PM CDT Workstation: [id number]    Us Venous Doppler Lower Extremity Bilateral (duplex)    Result Date: 8/18/2020  CONCLUSION: No ultrasound evidence of deep venous thrombosis of either lower extremity. [id number] Electronically signed by:  Melvin Preciado MD  8/18/2020 7:19 PM CDT Workstation: [id number]      Medication Review:     Current Facility-Administered Medications:   •  amLODIPine (NORVASC) tablet 5 mg, 5 mg, Oral, Daily, Bhumika Rehman APRN, 5 mg at 08/19/20 0837  •  aspirin EC tablet 81 mg, 81 mg, Oral, Daily, Bhumika Rehman APRN, 81 mg at 08/19/20 0838  •  carvedilol (COREG) tablet 6.25 mg, 6.25 mg, Oral, BID With Meals, Bhumika Rehman APRN  •  famotidine (PEPCID) tablet 40 mg, 40 mg, Oral, BID AC, Amber Nolasco MD, 40 mg at 08/19/20 0838  •  ferric gluconate (FERRLECIT) 125 mg in sodium chloride 0.9 % 110 mL IVPB, 125 mg, Intravenous, Daily, Vladimir Henderson MD, Stopped at 08/19/20 0057  •  folic acid (FOLVITE) tablet 1 mg, 1 mg, Oral, Daily, Vladimir Henderson MD, 1 mg at 08/19/20 0838  •  hydrALAZINE (APRESOLINE) injection 10 mg, 10 mg, Intravenous, Q6H PRN, Nataliya Bee G, APRN  •  indapamide (LOZOL) tablet 2.5 mg, 2.5 mg, Oral, Xiomy VARELA Brandi APRN, 2.5 mg at 08/19/20 0516  •  morphine injection 2 mg, 2 mg, Intravenous, Q4H PRN, Nataliya Bee, APRN, 2 mg at 08/18/20 0340  •  nitroglycerin (NITROSTAT) SL tablet 0.4 mg, 0.4 mg, Sublingual, Q5 Min PRN, Jean Carlos Vidal MD,  0.4 mg at 08/17/20 1536  •  ondansetron (ZOFRAN) injection 4 mg, 4 mg, Intravenous, Q6H PRN, Levill, Nataliya G, APRN  •  pravastatin (PRAVACHOL) tablet 40 mg, 40 mg, Oral, Nightly, Stauffer, Brit, APRN, 40 mg at 08/18/20 2027  •  ranolazine (RANEXA) 12 hr tablet 500 mg, 500 mg, Oral, Q12H, Ernestine Yanez MD  •  sodium chloride 0.9 % flush 10 mL, 10 mL, Intravenous, PRN, Jean Carlos Vidal MD  •  sodium chloride 0.9 % flush 10 mL, 10 mL, Intravenous, Q12H, Levill, Nataliya G, APRN, 10 mL at 08/18/20 2026  •  sodium chloride 0.9 % flush 10 mL, 10 mL, Intravenous, PRN, Levill, Nataliya G, APRN  •  sodium chloride 0.9 % flush 10 mL, 10 mL, Intravenous, Q12H, Levill, Nataliya G, APRN, 10 mL at 08/19/20 0839  •  sodium chloride 0.9 % flush 10 mL, 10 mL, Intravenous, PRN, Levill, Nataliya G, APRN  •  sodium chloride 0.9 % infusion, 125 mL/hr, Intravenous, Continuous, Jean Carlos Vidal MD, Last Rate: 125 mL/hr at 08/19/20 0944, 125 mL/hr at 08/19/20 0944  •  sucralfate (CARAFATE) tablet 1 g, 1 g, Oral, 4x Daily AC & at Bedtime, Amber Nolasco MD, 1 g at 08/19/20 0838  •  vitamin B-12 (CYANOCOBALAMIN) tablet 1,000 mcg, 1,000 mcg, Oral, Daily, Vladimir Henderson MD, 1,000 mcg at 08/19/20 0838    Assessment/Plan       Chest painATYPICAL  Chest pain: CAD history of known CAD with previous PCI with DUE stent placement to CX (2013) and RCA X 2 proximal and mid (2016), CABG x3  LIMA->LAD SVG->PDA SVG->OM 12/2017)  The patient's CP is atypical and likely non-cardiac and related to anemia and suspected CLL.The patient's EKG is Normal. Troponins negative. No high risk features.   -Echocardiogram showed EF 46-50%, Hypokinesis to mid inferior wall. Which is unchanged from previous in 2017.   -D-dimer elevated. --VQ scan showed some defect in bilateral upper lobe which is symmetrical. U/s venous doppler BLE negative for DVT. Will need a CTA when renal function will allow.  Will defer anticoagulation to Dr Henderson.  -Coreg 6.25mg BID    -ASA 81mg  -pravastatin 40mg  -Norvasc 5mg  -Anti-anginal of Ranexa 500mg BID was added per Dr. Yanez  -Patient has appt on 9/14/20 with Dr. Yanez. He was instructed to keep f/u appointment.  Can reevaluate as an outpatient to see if patient needs a stress test at that time.     History of possible CLL:  Dr. Henderson following.  Appreciate his help in the management.     Acute on chronic anemia: H&H now stable     CKD stage IV: Nephrology following, creatinine improving now at 1.58.  Avoid nephrotoxins.      Hypertension: Treatment as discussed above.        I discussed the patients findings and my recommendations with patient and consulting provider and Dr. Yanez.    Plan for disposition: per primary. Cardiology will SIGN OFF at this point. Thank you for allowing us to participate in the care of Mr. Lyle Corona. Please do not hesitate to contact us in the future. Patient to keep scheduled f/u with Dr. Yanez on 9.14.20.                     This document has been electronically signed by AHSAN Porter on August 19, 2020 10:30

## 2020-08-20 ENCOUNTER — READMISSION MANAGEMENT (OUTPATIENT)
Dept: CALL CENTER | Facility: HOSPITAL | Age: 55
End: 2020-08-20

## 2020-08-20 LAB — REF LAB TEST METHOD: NORMAL

## 2020-08-20 NOTE — TELEPHONE ENCOUNTER
"Called patient back to answer any questions/concerns he had. Per patient, \"I think I figured it out\". No further questions at this time. Instructed him that if he needed anything to please call us back. He verbalized understanding.   "

## 2020-08-20 NOTE — OUTREACH NOTE
Prep Survey      Responses   [facility] patient discharged from?  [facility]   Is LACE score < 7 ?  No   Eligibility  Readm Mgmt   Discharge diagnosis  CLL, chest pain, anemia   COVID-19 Test Status  Not tested   Does the patient have one of the following disease processes/diagnoses(primary or secondary)?  Other   Does the patient have Home health ordered?  No   Is there a DME ordered?  No   Comments regarding appointments  Per AVS   Medication alerts for this patient  started on aspirin   Prep survey completed?  Yes          Elif Falcon RN

## 2020-08-21 ENCOUNTER — APPOINTMENT (OUTPATIENT)
Dept: ONCOLOGY | Facility: HOSPITAL | Age: 55
End: 2020-08-21

## 2020-08-21 ENCOUNTER — APPOINTMENT (OUTPATIENT)
Dept: ONCOLOGY | Facility: CLINIC | Age: 55
End: 2020-08-21

## 2020-08-21 ENCOUNTER — READMISSION MANAGEMENT (OUTPATIENT)
Dept: CALL CENTER | Facility: HOSPITAL | Age: 55
End: 2020-08-21

## 2020-08-21 NOTE — OUTREACH NOTE
Medical Week 1 Survey      Responses   [facility] patient discharged from?  [place]   COVID-19 Test Status  Not tested   Does the patient have one of the following disease processes/diagnoses(primary or secondary)?  Other   Is there a successful TCM telephone encounter documented?  No   Week 1 attempt successful?  Yes   Call start time  0524   Call end time  1726   Discharge diagnosis  CLL, chest pain, anemia   Meds reviewed with patient/caregiver?  Yes   Is the patient having any side effects they believe may be caused by any medication additions or changes?  No   Does the patient have all medications ordered at discharge?  Yes   Is the patient taking all medications as directed (includes completed medication regime)?  Yes   Does the patient have a primary care provider?   Yes   Does the patient have an appointment with their PCP within 7 days of discharge?  Yes   Has the patient kept scheduled appointments due by today?  Yes   Has home health visited the patient within 72 hours of discharge?  N/A   Pulse Ox monitoring  None   Psychosocial issues?  No   Did the patient receive a copy of their discharge instructions?  Yes   Nursing interventions  Reviewed instructions with patient   What is the patient's perception of their health status since discharge?  Improving   Is the patient/caregiver able to teach back signs and symptoms related to disease process for when to call PCP?  Yes   Is the patient/caregiver able to teach back signs and symptoms related to disease process for when to call 911?  Yes   Is the patient/caregiver able to teach back the hierarchy of who to call/visit for symptoms/problems? PCP, Specialist, Home health nurse, Urgent Care, ED, 911  Yes   Week 1 call completed?  Yes          Fermin Ocampo RN

## 2020-08-22 LAB
BACTERIA SPEC AEROBE CULT: NORMAL
BACTERIA SPEC AEROBE CULT: NORMAL
BH BB BLOOD EXPIRATION DATE: NORMAL
BH BB BLOOD EXPIRATION DATE: NORMAL
BH BB BLOOD TYPE BARCODE: 600
BH BB BLOOD TYPE BARCODE: 600
BH BB DISPENSE STATUS: NORMAL
BH BB DISPENSE STATUS: NORMAL
BH BB PRODUCT CODE: NORMAL
BH BB PRODUCT CODE: NORMAL
BH BB UNIT NUMBER: NORMAL
BH BB UNIT NUMBER: NORMAL
CROSSMATCH INTERPRETATION: NORMAL
CROSSMATCH INTERPRETATION: NORMAL
UNIT  ABO: NORMAL
UNIT  ABO: NORMAL
UNIT  RH: NORMAL
UNIT  RH: NORMAL

## 2020-08-27 ENCOUNTER — READMISSION MANAGEMENT (OUTPATIENT)
Dept: CALL CENTER | Facility: HOSPITAL | Age: 55
End: 2020-08-27

## 2020-08-27 NOTE — OUTREACH NOTE
Medical Week 2 Survey      Responses   [facility] patient discharged from?  [place]   COVID-19 Test Status  Not tested   Does the patient have one of the following disease processes/diagnoses(primary or secondary)?  Other   Week 2 attempt successful?  Yes   Call start time  1306   Discharge diagnosis  CLL, chest pain, anemia   Call end time  1307   Meds reviewed with patient/caregiver?  Yes   Is the patient having any side effects they believe may be caused by any medication additions or changes?  No   Does the patient have all medications ordered at discharge?  Yes   Is the patient taking all medications as directed (includes completed medication regime)?  Yes   Does the patient have a primary care provider?   Yes   Does the patient have an appointment with their PCP within 7 days of discharge?  Yes   Has the patient kept scheduled appointments due by today?  Yes   Pulse Ox monitoring  None   Psychosocial issues?  No   Did the patient receive a copy of their discharge instructions?  Yes   Nursing interventions  Reviewed instructions with patient, Educated on MyChart   What is the patient's perception of their health status since discharge?  Improving   Is the patient/caregiver able to teach back signs and symptoms related to disease process for when to call PCP?  Yes   Is the patient/caregiver able to teach back signs and symptoms related to disease process for when to call 911?  Yes   Is the patient/caregiver able to teach back the hierarchy of who to call/visit for symptoms/problems? PCP, Specialist, Home health nurse, Urgent Care, ED, 911  Yes   Week 2 Call Completed?  Yes          Estefania Morgan RN

## 2020-08-31 ENCOUNTER — LAB (OUTPATIENT)
Dept: ONCOLOGY | Facility: HOSPITAL | Age: 55
End: 2020-08-31

## 2020-08-31 ENCOUNTER — CONSULT (OUTPATIENT)
Dept: ONCOLOGY | Facility: CLINIC | Age: 55
End: 2020-08-31

## 2020-08-31 ENCOUNTER — DOCUMENTATION (OUTPATIENT)
Dept: ONCOLOGY | Facility: CLINIC | Age: 55
End: 2020-08-31

## 2020-08-31 VITALS
HEIGHT: 69 IN | SYSTOLIC BLOOD PRESSURE: 140 MMHG | RESPIRATION RATE: 18 BRPM | TEMPERATURE: 98.5 F | WEIGHT: 143 LBS | DIASTOLIC BLOOD PRESSURE: 98 MMHG | BODY MASS INDEX: 21.18 KG/M2 | HEART RATE: 85 BPM

## 2020-08-31 DIAGNOSIS — C91.10 CLL (CHRONIC LYMPHOCYTIC LEUKEMIA) (HCC): Primary | ICD-10-CM

## 2020-08-31 DIAGNOSIS — D50.9 IRON DEFICIENCY ANEMIA, UNSPECIFIED IRON DEFICIENCY ANEMIA TYPE: Primary | ICD-10-CM

## 2020-08-31 DIAGNOSIS — D50.9 IRON DEFICIENCY ANEMIA, UNSPECIFIED IRON DEFICIENCY ANEMIA TYPE: ICD-10-CM

## 2020-08-31 DIAGNOSIS — C91.10 CLL (CHRONIC LYMPHOCYTIC LEUKEMIA) (HCC): ICD-10-CM

## 2020-08-31 LAB
ANISOCYTOSIS BLD QL: ABNORMAL
DEPRECATED RDW RBC AUTO: 46.6 FL (ref 37–54)
ERYTHROCYTE [DISTWIDTH] IN BLOOD BY AUTOMATED COUNT: 15.6 % (ref 12.3–15.4)
HCT VFR BLD AUTO: 39.6 % (ref 37.5–51)
HGB BLD-MCNC: 12.5 G/DL (ref 13–17.7)
HYPOCHROMIA BLD QL: ABNORMAL
LYMPHOCYTES # BLD MANUAL: 104.24 10*3/MM3 (ref 0.7–3.1)
LYMPHOCYTES NFR BLD MANUAL: 2 % (ref 5–12)
LYMPHOCYTES NFR BLD MANUAL: 85 % (ref 19.6–45.3)
MCH RBC QN AUTO: 27.2 PG (ref 26.6–33)
MCHC RBC AUTO-ENTMCNC: 31.6 G/DL (ref 31.5–35.7)
MCV RBC AUTO: 86.1 FL (ref 79–97)
MONOCYTES # BLD AUTO: 2.45 10*3/MM3 (ref 0.1–0.9)
NEUTROPHILS # BLD AUTO: 6.13 10*3/MM3 (ref 1.7–7)
NEUTROPHILS NFR BLD MANUAL: 5 % (ref 42.7–76)
PLAT MORPH BLD: NORMAL
PLATELET # BLD AUTO: 146 10*3/MM3 (ref 140–450)
PMV BLD AUTO: 9.7 FL (ref 6–12)
PROLYMPHOCYTES NFR BLD MANUAL: 4 % (ref 0–0)
RBC # BLD AUTO: 4.6 10*6/MM3 (ref 4.14–5.8)
VARIANT LYMPHS NFR BLD MANUAL: 4 % (ref 0–5)
WBC # BLD AUTO: 122.64 10*3/MM3 (ref 3.4–10.8)
WBC MORPH BLD: NORMAL

## 2020-08-31 PROCEDURE — 99214 OFFICE O/P EST MOD 30 MIN: CPT | Performed by: INTERNAL MEDICINE

## 2020-08-31 PROCEDURE — G0463 HOSPITAL OUTPT CLINIC VISIT: HCPCS | Performed by: INTERNAL MEDICINE

## 2020-08-31 PROCEDURE — 1123F ACP DISCUSS/DSCN MKR DOCD: CPT | Performed by: INTERNAL MEDICINE

## 2020-08-31 PROCEDURE — 85025 COMPLETE CBC W/AUTO DIFF WBC: CPT | Performed by: INTERNAL MEDICINE

## 2020-08-31 PROCEDURE — 85007 BL SMEAR W/DIFF WBC COUNT: CPT | Performed by: INTERNAL MEDICINE

## 2020-08-31 PROCEDURE — G9903 PT SCRN TBCO ID AS NON USER: HCPCS | Performed by: INTERNAL MEDICINE

## 2020-08-31 PROCEDURE — G8730 PAIN DOC POS AND PLAN: HCPCS | Performed by: INTERNAL MEDICINE

## 2020-08-31 RX ORDER — NITROGLYCERIN 0.4 MG/1
TABLET SUBLINGUAL
COMMUNITY
Start: 2020-08-26

## 2020-08-31 NOTE — PROGRESS NOTES
DATE OF VISIT: 2020      REASON FOR VISIT: Chronic lymphocytic leukemia      HISTORY OF PRESENT ILLNESS:    54-year-old male with medical problem consisting of coronary artery disease, congestive heart failure, hypertension, dyslipidemia, gastroesophageal reflux disease was initially seen in consultation on 2020 when patient was admitted to [facility] for problem of weight loss and difficulty swallowing.  Due to extreme leukocytosis hematology oncology was consulted.  Patient provided history that he was diagnosed with CLL in 2018 and was being followed by Dr. Beyer until 2019 after that he was lost to follow-up.  Patient is for hospital follow-up appointment today.  Denies any bleeding.  Denies any recent worsening of lymph node enlargement.  Denies any drenching night sweats.  Denies any fevers.        PAST MEDICAL HISTORY:    Past Medical History:   Diagnosis Date   • Aneurysm of infrarenal abdominal aorta (CMS/HCC)    • Anxiety    • Cervical radiculitis    • Cervical spondylosis without myelopathy    • CHF (congestive heart failure) (CMS/HCC)    • Chronic kidney disease, stage 3 (CMS/HCC)    • Common iliac aneurysm (CMS/HCC)    • Coronary arteriosclerosis    • Degeneration of cervical intervertebral disc    • Essential hypertension    • GERD (gastroesophageal reflux disease)    • Headache    • Hyperlipidemia    • Intermittent claudication (CMS/HCC)    • Myocardial infarction (CMS/HCC)    • Uric acid renal calculus        SOCIAL HISTORY:    Social History     Tobacco Use   • Smoking status: Former Smoker     Last attempt to quit: 2016     Years since quittin.0   • Smokeless tobacco: Never Used   Substance Use Topics   • Alcohol use: No   • Drug use: No       Surgical History :  Past Surgical History:   Procedure Laterality Date   • APPENDECTOMY     • CARDIAC CATHETERIZATION  2016    Cardiac cath 47008 (2) - Patent left circumflex stent.Chronic total occlusion of  the RCA,more than 20 mm in length.   • CARDIAC CATHETERIZATION N/A 11/28/2017    Procedure: Left Heart Cath/ PCI if indicated;  Surgeon: Carlos Charles MD;  Location: [facility] CATH INVASIVE LOCATION;  Service:    • CERVICAL FUSION     • CHOLECYSTECTOMY     • CORONARY ARTERY BYPASS GRAFT N/A 12/4/2017    Procedure: CORONARY ARTERY BYPASS GRAFTINGX3, ENDOSCOPIC VEIN HARVEST     (CELL SAVER);  Surgeon: Darien Dejesus MD;  Location: [facility] OR;  Service:    • HERNIA REPAIR     • SPINAL FUSION         ALLERGIES:    Allergies   Allergen Reactions   • Amlodipine Rash   • Imdur [Isosorbide Dinitrate] Rash   • Lisinopril Rash   • Metoprolol Rash         FAMILY HISTORY:  Family History   Problem Relation Age of Onset   • Hyperlipidemia Mother    • Hypertension Mother    • Cancer Father    • Hyperlipidemia Father    • Hypertension Father    • Cancer Brother    • Diabetes Other    • Diabetes Other            REVIEW OF SYSTEMS:      CONSTITUTIONAL:  Complains of fatigue.  Has lost 20 pounds in last 3 months.  Denies any weight loss since hospital discharge.  Positive for chronic intermittent night sweats.  Denies any fever, chills .     EYES: No visual disturbances. No discharge. No new lesions    ENMT:  No epistaxis, mouth sores or difficulty swallowing.    RESPIRATORY: Positive for chronic shortness of breath with exertion.  No new cough or hemoptysis.    CARDIOVASCULAR:  No chest pain or palpitations.    GASTROINTESTINAL:  No abdominal pain nausea, vomiting or blood in the stool.    GENITOURINARY: No Dysuria or Hematuria.    MUSCULOSKELETAL: Positive for generalized arthritis pain.    LYMPHATICS:  Denies any abnormal swollen glands anywhere in the body.    NEUROLOGICAL : Positive for intermittent tingling and numbness affecting bilateral hands. No headache or dizziness. No seizures or balance problems.    SKIN: No new skin lesions.    ENDOCRINE : No new hot or cold intolerance. No new polyuria . No  "polydipsia.        PHYSICAL EXAMINATION:      VITAL SIGNS:  /98   Pulse 85   Temp 98.5 °F (36.9 °C)   Resp 18   Ht 175.3 cm (69\")   Wt 64.9 kg (143 lb)   BMI 21.12 kg/m²       08/31/20  1435   Weight: 64.9 kg (143 lb)         ECOG performance status: 2    CONSTITUTIONAL:  Not in any distress.  Appears older than stated age.    EYES: Mild conjunctival Pallor. No Icterus. No Pterygium. Extraocular Movements intact.No ptosis.    ENMT:  Normocephalic, Atraumatic.No Facial Asymmetry noted.    NECK: Minimally enlarged lymph node present in bilateral neck, largest lymph node measures at 1.5 cm in the left side of neck.Trachea midline. NO JVD.    RESPIRATORY:  Fair air entry bilateral. No rhonchi or wheezing.Fair respiratory effort.    CARDIOVASCULAR:  S1, S2. Regular rate and rhythm. No murmur or gallop appreciated.    ABDOMEN:  Soft, nontender. Bowel sounds present in all four quadrants.  No Hepatosplenomegaly appreciated.    MUSCULOSKELETAL:  No edema.No Calf Tenderness.Decreased range of motion.    NEUROLOGIC:    No  Motor  deficit appreciated. Cranial Nerves 2-12 grossly intact.    SKIN : No new skin lesion identified. Skin is warm and moist to touch.    LYMPHATICS: Minimally enlarged lymph node present in bilateral neck, largest lymph node measures at 1.5 cm in size on left side of neck.  No palpable enlarged lymphadenopathy in axilla.    PSYCHIATRY: Alert, awake and oriented ×3.Appears anxious.  Normal judgment.  Makes good eye contact.        DIAGNOSTIC DATA:    Glucose   Date Value Ref Range Status   08/19/2020 115 (H) 65 - 99 mg/dL Final     Glucose, Arterial   Date Value Ref Range Status   12/05/2017 100 mmol/L Final     Sodium   Date Value Ref Range Status   08/19/2020 138 136 - 145 mmol/L Final     Potassium   Date Value Ref Range Status   08/19/2020 3.4 (L) 3.5 - 5.2 mmol/L Final     CO2   Date Value Ref Range Status   08/19/2020 24.0 22.0 - 29.0 mmol/L Final     Chloride   Date Value Ref Range " Status   08/19/2020 101 98 - 107 mmol/L Final     Anion Gap   Date Value Ref Range Status   08/19/2020 13.0 5.0 - 15.0 mmol/L Final     Creatinine   Date Value Ref Range Status   08/19/2020 1.52 (H) 0.76 - 1.27 mg/dL Final     BUN   Date Value Ref Range Status   08/19/2020 16 6 - 20 mg/dL Final     BUN/Creatinine Ratio   Date Value Ref Range Status   08/19/2020 10.5 7.0 - 25.0 Final     Calcium   Date Value Ref Range Status   08/19/2020 8.7 8.6 - 10.5 mg/dL Final     eGFR Non  Amer   Date Value Ref Range Status   08/19/2020 48 (L) >60 mL/min/1.73 Final     Alkaline Phosphatase   Date Value Ref Range Status   08/17/2020 96 39 - 117 U/L Final     Total Protein   Date Value Ref Range Status   08/17/2020 6.4 6.0 - 8.5 g/dL Final     ALT (SGPT)   Date Value Ref Range Status   08/17/2020 6 1 - 41 U/L Final     AST (SGOT)   Date Value Ref Range Status   08/17/2020 16 1 - 40 U/L Final     Comment:     Specimen hemolyzed.  Results may be affected.     Total Bilirubin   Date Value Ref Range Status   08/17/2020 0.2 0.0 - 1.2 mg/dL Final     Albumin   Date Value Ref Range Status   08/17/2020 3.80 3.50 - 5.20 g/dL Final     Globulin   Date Value Ref Range Status   08/17/2020 2.6 gm/dL Final     Lab Results   Component Value Date    .64 (C) 08/31/2020    HGB 12.5 (L) 08/31/2020    HCT 39.6 08/31/2020    MCV 86.1 08/31/2020     08/31/2020     Lab Results   Component Value Date    NEUTROABS 4.37 08/19/2020    IRON 51 (L) 08/17/2020    TIBC 304 08/17/2020    LABIRON 17 (L) 08/17/2020    FERRITIN 100.60 08/17/2020    RYVSAKLO48 360 08/17/2020    FOLATE 3.47 (L) 08/17/2020     No results found for: , LABCA2, AFPTM, HCGQUANT, , CHROMGRNA, [id number], CEA, REFLABREPO      Flowcytometry done on August 18, 2020 showed:                RADIOLOGY DATA :  CT of soft tissue neck, chest, abdomen and pelvis without contrast done on August 17, 2020 showed:      NECK: There are extensive bilateral jugular chain and  posterior  triangle lymph nodes consistent with patient's reported history  of CLL. For example left posterior triangle lymph node on axial  image 35 measures 1.7 x 1.1 cm. No gross mucosal lesion is noted.  The epiglottis and airway is unremarkable. There is no  prevertebral soft tissue swelling. Degenerative and postsurgical  changes are noted in the cervical spine. The patient is status  post anterior cervical disc fusion from C5 through C7. No neck  mass or fluid collection is noted.     CHEST: Emphysematous changes of the lungs are noted. There is  evidence of calcified granulomatous disease in the chest. The  patient is status post median sternotomy and CABG. There is no  pleural or pericardial effusion. There is some bronchial wall  thickening suggesting mild bronchitis. The lungs are otherwise  clear. There is bilateral axillary adenopathy with borderline  size mediastinal lymph nodes. For example largest precarinal  lymph node on axial image 27 measures 1.7 x 0.8 cm. The largest  right axillary lymph node but still maintains fatty hilum  measures 2.3 x 1.5 cm on axial image 20. No acute bony  abnormality of the thorax is noted.     ABDOMEN: Splenomegaly is noted with the spleen measuring 17.4 cm  in greatest pole to pole length. The patient is status post  cholecystectomy. Vascular calcifications are noted. There is a  3.1 cm infrarenal abdominal aortic aneurysm. Would recommend  follow-up imaging every three years. The unenhanced solid  abdominal organs are otherwise unremarkable. There is  retroperitoneal adenopathy with aortocaval and para-aortic  adenopathy. For example of the larger aortocaval lymph nodes on  axial image 89 measures 1.5 x 1.5 cm. The abdominal portion of  the GI tract is unremarkable.     Pelvis: There is calcification of the vas deferens indicative of  diabetes. There is no free fluid in the pelvis. There is  bilateral iliac adenopathy. For example one of the larger right  external  iliac lymph nodes on axial image 105 measures 1.5 x 1.2  cm. Pelvic portion of the GI tract is unremarkable. No acute bony  abnormality is noted.     IMPRESSION:  1. Adenopathy in the neck, chest, abdomen and pelvis consistent  with the patient's history of CLL with also splenomegaly also  consistent with the same history.  2. Emphysema with findings suggesting mild bronchitis as well.  3. 3.1 cm infrarenal abdominal aortic aneurysm, recommend  follow-up imaging every three years.    No radiology results for the last 7 days          ASSESSMENT AND PLAN:      1.  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 Beyer  office.   -Patient was diagnosed on peripheral blood flow cytometry.  FISH profile showed high GVH mutation as well as 13 q. deletion.  - Repeat peripheral blood flow cytometry done on August 18, 2020 showed 88% of cells showing CD5 plus population consistent with chronic lymphocytic leukemia.  - Patient had a CT scan done on chest abdomen and pelvis without contrast on August 17, 2020 that showed adenopathy in the neck mediastinum axilla abdomen and retroperitoneum and pelvis but none of them were bulky.  - Patient was found to be anemic for which she has been started on ferrous sulfate B12 and folic acid supplement.  - CBC done today on August 31, 2020 shows white blood cell count is elevated at 120,000, hemoglobin is 12.5, platelet count 146,000.  Due to his poor performance status and comorbidities, will continue with monitoring for now.  -We will ask patient to return to clinic in 2 months with repeat CBC, CMP, anemia work-up and CT of chest abdomen and pelvis without contrast to be done prior to that.    2.  Anemia:  -Secondary to CLL plus nutritional deficiency  - Patient is currently on ferrous sulfate 1 tablet p.o. daily along with B12 and folic acid daily.  -We will repeat anemia work-up upon next clinic visit in 2 months.    3.   Chronic kidney disease stage III    4.  Coronary artery disease status post CABG    5.  Health maintenance: Patient quit smoking in 2016.  Had a colonoscopy in the last 10 years.    6. Advance Care Planning: For now patient remains full code and is able to make decisions.  Patient has health care surrogate mentioned on chart.      PHQ-9 Total Score: 18   Patient screened positive for depression based on a PHQ-9 score of 18 on 8/31/2020. Follow-up recommendations include: Elevated PHQ score reflective of acute illness, not depression.    Lyle Corona reports a pain score of 8.  Given his pain assessment as noted, treatment options were discussed and the following options were decided upon as a follow-up plan to address the patient's pain: continuation of current treatment plan for pain.         Vladimir Henderson MD  8/31/2020  17:06        Part of this note may be an electronic transcription/translation of spoken language to printed text using the Dragon Dictation System.

## 2020-09-01 ENCOUNTER — TELEPHONE (OUTPATIENT)
Dept: ONCOLOGY | Facility: CLINIC | Age: 55
End: 2020-09-01

## 2020-09-01 NOTE — TELEPHONE ENCOUNTER
Grazyna from [facility] pharmacy says she sent a fax to [phone] on 08/27 for ferrous sulfate tablet but has not heard back    Her phone# is [phone]

## 2020-09-02 ENCOUNTER — READMISSION MANAGEMENT (OUTPATIENT)
Dept: CALL CENTER | Facility: HOSPITAL | Age: 55
End: 2020-09-02

## 2020-09-02 NOTE — OUTREACH NOTE
Medical Week 3 Survey      Responses   [facility] patient discharged from?  [place]   COVID-19 Test Status  Not tested   Does the patient have one of the following disease processes/diagnoses(primary or secondary)?  Other   Week 3 attempt successful?  Yes   Call start time  1151   Rescheduled  Rescheduled-pt requested [This is not a good time. ]   Call end time  1151   Discharge diagnosis  CLL, chest pain, anemia          Sigrid Bonilla RN

## 2020-09-04 ENCOUNTER — READMISSION MANAGEMENT (OUTPATIENT)
Dept: CALL CENTER | Facility: HOSPITAL | Age: 55
End: 2020-09-04

## 2020-09-11 ENCOUNTER — TELEPHONE (OUTPATIENT)
Dept: ONCOLOGY | Facility: CLINIC | Age: 55
End: 2020-09-11

## 2020-10-23 ENCOUNTER — APPOINTMENT (OUTPATIENT)
Dept: CT IMAGING | Facility: HOSPITAL | Age: 55
End: 2020-10-23

## 2020-10-23 ENCOUNTER — LAB (OUTPATIENT)
Dept: LAB | Facility: HOSPITAL | Age: 55
End: 2020-10-23

## 2020-10-23 ENCOUNTER — HOSPITAL ENCOUNTER (OUTPATIENT)
Dept: CT IMAGING | Facility: HOSPITAL | Age: 55
Discharge: HOME | End: 2020-10-23

## 2020-10-23 DIAGNOSIS — D50.9 IRON DEFICIENCY ANEMIA, UNSPECIFIED IRON DEFICIENCY ANEMIA TYPE: ICD-10-CM

## 2020-10-23 DIAGNOSIS — C91.10 CLL (CHRONIC LYMPHOCYTIC LEUKEMIA) (HCC): ICD-10-CM

## 2020-10-23 LAB
ALBUMIN SERPL-MCNC: 4.5 G/DL (ref 3.5–5.2)
ALBUMIN/GLOB SERPL: 1.7 G/DL
ALP SERPL-CCNC: 138 U/L (ref 39–117)
ALT SERPL W P-5'-P-CCNC: 7 U/L (ref 1–41)
ANION GAP SERPL CALCULATED.3IONS-SCNC: 11 MMOL/L (ref 5–15)
ANISOCYTOSIS BLD QL: ABNORMAL
AST SERPL-CCNC: 14 U/L (ref 1–40)
BILIRUB SERPL-MCNC: 0.4 MG/DL (ref 0–1.2)
BUN SERPL-MCNC: 25 MG/DL (ref 6–20)
BUN/CREAT SERPL: 10.7 (ref 7–25)
CALCIUM SPEC-SCNC: 9.4 MG/DL (ref 8.6–10.5)
CHLORIDE SERPL-SCNC: 97 MMOL/L (ref 98–107)
CO2 SERPL-SCNC: 30 MMOL/L (ref 22–29)
CREAT SERPL-MCNC: 2.33 MG/DL (ref 0.76–1.27)
DEPRECATED RDW RBC AUTO: 54.2 FL (ref 37–54)
ERYTHROCYTE [DISTWIDTH] IN BLOOD BY AUTOMATED COUNT: 17.2 % (ref 12.3–15.4)
FERRITIN SERPL-MCNC: 107.7 NG/ML (ref 30–400)
FOLATE SERPL-MCNC: >20 NG/ML (ref 4.78–24.2)
GFR SERPL CREATININE-BSD FRML MDRD: 29 ML/MIN/1.73
GLOBULIN UR ELPH-MCNC: 2.6 GM/DL
GLUCOSE SERPL-MCNC: 103 MG/DL (ref 65–99)
HCT VFR BLD AUTO: 36.7 % (ref 37.5–51)
HGB BLD-MCNC: 11.8 G/DL (ref 13–17.7)
IRON 24H UR-MRATE: 67 MCG/DL (ref 59–158)
IRON SATN MFR SERPL: 17 % (ref 20–50)
LYMPHOCYTES # BLD MANUAL: 129.22 10*3/MM3 (ref 0.7–3.1)
LYMPHOCYTES NFR BLD MANUAL: 1 % (ref 5–12)
LYMPHOCYTES NFR BLD MANUAL: 93 % (ref 19.6–45.3)
MCH RBC QN AUTO: 28.5 PG (ref 26.6–33)
MCHC RBC AUTO-ENTMCNC: 32.2 G/DL (ref 31.5–35.7)
MCV RBC AUTO: 88.6 FL (ref 79–97)
MONOCYTES # BLD AUTO: 1.39 10*3/MM3 (ref 0.1–0.9)
NEUTROPHILS # BLD AUTO: 8.34 10*3/MM3 (ref 1.7–7)
NEUTROPHILS NFR BLD MANUAL: 6 % (ref 42.7–76)
PLATELET # BLD AUTO: 133 10*3/MM3 (ref 140–450)
PMV BLD AUTO: 9.2 FL (ref 6–12)
POTASSIUM SERPL-SCNC: 3.9 MMOL/L (ref 3.5–5.2)
PROT SERPL-MCNC: 7.1 G/DL (ref 6–8.5)
RBC # BLD AUTO: 4.14 10*6/MM3 (ref 4.14–5.8)
SMALL PLATELETS BLD QL SMEAR: ABNORMAL
SMUDGE CELLS IN BLOOD BY LIGHT MICROSCOPY: 5 /100 WBC
SODIUM SERPL-SCNC: 138 MMOL/L (ref 136–145)
TIBC SERPL-MCNC: 387 MCG/DL (ref 298–536)
TRANSFERRIN SERPL-MCNC: 260 MG/DL (ref 200–360)
VIT B12 BLD-MCNC: 966 PG/ML (ref 211–946)
WBC # BLD AUTO: 138.95 10*3/MM3 (ref 3.4–10.8)
WBC MORPH BLD: NORMAL

## 2020-10-23 PROCEDURE — 82607 VITAMIN B-12: CPT

## 2020-10-23 PROCEDURE — 85025 COMPLETE CBC W/AUTO DIFF WBC: CPT

## 2020-10-23 PROCEDURE — 74176 CT ABD & PELVIS W/O CONTRAST: CPT

## 2020-10-23 PROCEDURE — 85007 BL SMEAR W/DIFF WBC COUNT: CPT

## 2020-10-23 PROCEDURE — 82746 ASSAY OF FOLIC ACID SERUM: CPT

## 2020-10-23 PROCEDURE — 71250 CT THORAX DX C-: CPT

## 2020-10-23 PROCEDURE — 83540 ASSAY OF IRON: CPT

## 2020-10-23 PROCEDURE — 80053 COMPREHEN METABOLIC PANEL: CPT

## 2020-10-23 PROCEDURE — 82728 ASSAY OF FERRITIN: CPT

## 2020-10-23 PROCEDURE — 84466 ASSAY OF TRANSFERRIN: CPT

## 2020-10-26 ENCOUNTER — OFFICE VISIT (OUTPATIENT)
Dept: ONCOLOGY | Facility: CLINIC | Age: 55
End: 2020-10-26

## 2020-10-26 VITALS
HEART RATE: 84 BPM | HEIGHT: 69 IN | DIASTOLIC BLOOD PRESSURE: 107 MMHG | BODY MASS INDEX: 21.4 KG/M2 | SYSTOLIC BLOOD PRESSURE: 170 MMHG | WEIGHT: 144.5 LBS | TEMPERATURE: 97.7 F | RESPIRATION RATE: 18 BRPM

## 2020-10-26 DIAGNOSIS — N17.9 ACUTE KIDNEY INJURY SUPERIMPOSED ON CKD (HCC): ICD-10-CM

## 2020-10-26 DIAGNOSIS — C91.10 CLL (CHRONIC LYMPHOCYTIC LEUKEMIA) (HCC): ICD-10-CM

## 2020-10-26 DIAGNOSIS — D50.9 IRON DEFICIENCY ANEMIA, UNSPECIFIED IRON DEFICIENCY ANEMIA TYPE: Primary | ICD-10-CM

## 2020-10-26 DIAGNOSIS — N18.9 ACUTE KIDNEY INJURY SUPERIMPOSED ON CKD (HCC): ICD-10-CM

## 2020-10-26 PROCEDURE — G9903 PT SCRN TBCO ID AS NON USER: HCPCS | Performed by: INTERNAL MEDICINE

## 2020-10-26 PROCEDURE — 1123F ACP DISCUSS/DSCN MKR DOCD: CPT | Performed by: INTERNAL MEDICINE

## 2020-10-26 PROCEDURE — G0463 HOSPITAL OUTPT CLINIC VISIT: HCPCS | Performed by: INTERNAL MEDICINE

## 2020-10-26 PROCEDURE — G8730 PAIN DOC POS AND PLAN: HCPCS | Performed by: INTERNAL MEDICINE

## 2020-10-26 PROCEDURE — 99214 OFFICE O/P EST MOD 30 MIN: CPT | Performed by: INTERNAL MEDICINE

## 2020-10-26 RX ORDER — FOLIC ACID 1 MG/1
1 TABLET ORAL DAILY
Qty: 90 TABLET | Refills: 1 | Status: SHIPPED | OUTPATIENT
Start: 2020-10-26 | End: 2021-04-26

## 2020-10-26 RX ORDER — DOCUSATE SODIUM 100 MG/1
100 CAPSULE, LIQUID FILLED ORAL 2 TIMES DAILY PRN
Qty: 60 CAPSULE | Refills: 2 | Status: SHIPPED | OUTPATIENT
Start: 2020-10-26

## 2020-10-26 RX ORDER — FERROUS SULFATE 325(65) MG
325 TABLET ORAL
Qty: 30 TABLET | Refills: 3 | Status: SHIPPED | OUTPATIENT
Start: 2020-10-26

## 2020-10-26 NOTE — PROGRESS NOTES
DATE OF VISIT: 10/26/2020      REASON FOR VISIT: Chronic lymphocytic leukemia, anemia, thrombocytopenia      HISTORY OF PRESENT ILLNESS:   54-year-old male with medical problem consisting of coronary artery disease, congestive heart failure, hypertension, dyslipidemia, gastroesophageal reflux disease was initially seen in consultation on 2020 when patient was admitted to [facility].  Patient is here for follow-up appointment today.  Patient had a blood work done as well as a restaging CT of chest abdomen and pelvis done recently, is here to discuss the results and further recommendation.  States he has been diagnosed with gout affecting bilateral lower extremity and and started on medication for gout.  Denies any fevers or chills.  Denies any drenching night sweats.  Denies any bleeding.            PAST MEDICAL HISTORY:    Past Medical History:   Diagnosis Date   • Aneurysm of infrarenal abdominal aorta (CMS/HCC)    • Anxiety    • Cervical radiculitis    • Cervical spondylosis without myelopathy    • CHF (congestive heart failure) (CMS/HCC)    • Chronic kidney disease, stage 3    • Common iliac aneurysm (CMS/HCC)    • Coronary arteriosclerosis    • Degeneration of cervical intervertebral disc    • Essential hypertension    • GERD (gastroesophageal reflux disease)    • Headache    • Hyperlipidemia    • Intermittent claudication (CMS/HCC)    • Myocardial infarction (CMS/HCC)    • Uric acid renal calculus        SOCIAL HISTORY:    Social History     Tobacco Use   • Smoking status: Former Smoker     Quit date: 2016     Years since quittin.1   • Smokeless tobacco: Never Used   Substance Use Topics   • Alcohol use: No   • Drug use: No       Surgical History :  Past Surgical History:   Procedure Laterality Date   • APPENDECTOMY     • CARDIAC CATHETERIZATION  2016    Cardiac cath 83564 (2) - Patent left circumflex stent.Chronic total occlusion of the RCA,more than 20 mm in length.    • CARDIAC CATHETERIZATION N/A 11/28/2017    Procedure: Left Heart Cath/ PCI if indicated;  Surgeon: Carlos Charles MD;  Location: [facility] CATH INVASIVE LOCATION;  Service:    • CERVICAL FUSION     • CHOLECYSTECTOMY     • CORONARY ARTERY BYPASS GRAFT N/A 12/4/2017    Procedure: CORONARY ARTERY BYPASS GRAFTINGX3, ENDOSCOPIC VEIN HARVEST     (CELL SAVER);  Surgeon: Darien Dejesus MD;  Location: [facility] OR;  Service:    • HERNIA REPAIR     • SPINAL FUSION         ALLERGIES:    Allergies   Allergen Reactions   • Amlodipine Rash   • Imdur [Isosorbide Dinitrate] Rash   • Lisinopril Rash   • Metoprolol Rash         FAMILY HISTORY:  Family History   Problem Relation Age of Onset   • Hyperlipidemia Mother    • Hypertension Mother    • Cancer Father    • Hyperlipidemia Father    • Hypertension Father    • Cancer Brother    • Diabetes Other    • Diabetes Other            REVIEW OF SYSTEMS:      CONSTITUTIONAL:  Complains of fatigue.  Positive for chronic intermittent night sweats.  Denies any fever, chills or weight loss.     EYES: No visual disturbances. No discharge. No new lesions    ENMT:  No epistaxis, mouth sores or difficulty swallowing.    RESPIRATORY: Positive for chronic shortness of breath with exertion. No new cough or hemoptysis.    CARDIOVASCULAR:  No chest pain or palpitations.    GASTROINTESTINAL:  No abdominal pain nausea, vomiting or blood in the stool.    GENITOURINARY: No Dysuria or Hematuria.    MUSCULOSKELETAL: Positive for generalized arthritis pain.  States he has been diagnosed with gout affecting bilateral lower extremity    LYMPHATICS:  Denies any abnormal swollen glands anywhere in the body.    NEUROLOGICAL : Positive for intermittent tingling and numbness affecting bilateral hands. Complain of intermittent dizziness .no headache . No seizures or balance problems.    SKIN: No new skin lesions.    ENDOCRINE : No new hot or cold intolerance. No new polyuria . No polydipsia.        PHYSICAL  "EXAMINATION:      VITAL SIGNS:  BP (!) 170/107 Comment: Pt has not taken BP meds  Pulse 84   Temp 97.7 °F (36.5 °C) (Temporal)   Resp 18   Ht 175.3 cm (69.02\")   Wt 65.5 kg (144 lb 8 oz)   BMI 21.33 kg/m²       10/26/20  1015   Weight: 65.5 kg (144 lb 8 oz)         ECOG performance status: 2    CONSTITUTIONAL:  Not in any distress.  Appears older than stated age    EYES: Mild conjunctival Pallor. No Icterus. No Pterygium. Extraocular Movements intact.No ptosis.    ENMT:  Normocephalic, Atraumatic.No Facial Asymmetry noted.    NECK: Multiple small enlarged lymph node present in bilateral neck.Trachea midline. NO JVD.    RESPIRATORY:  Fair air entry bilateral. No rhonchi or wheezing.Fair respiratory effort.    CARDIOVASCULAR:  S1, S2. Regular rate and rhythm. No murmur or gallop appreciated.    ABDOMEN:  Soft, obese, nontender. Bowel sounds present in all four quadrants.  No Hepatosplenomegaly appreciated.    MUSCULOSKELETAL:  No edema.No Calf Tenderness.Decreased range of motion.    NEUROLOGIC:    No  Motor  deficit appreciated. Cranial Nerves 2-12 grossly intact.    SKIN : No new skin lesion identified. Skin is warm and moist to touch.    LYMPHATICS: Multiple small enlarged lymph node present in bilateral neck as well as axillary area    PSYCHIATRY: Alert, awake and oriented ×3.  Appears anxious.  Normal judgment.  Makes good eye contact.        DIAGNOSTIC DATA:    Glucose   Date Value Ref Range Status   10/23/2020 103 (H) 65 - 99 mg/dL Final     Glucose, Arterial   Date Value Ref Range Status   12/05/2017 100 mmol/L Final     Sodium   Date Value Ref Range Status   10/23/2020 138 136 - 145 mmol/L Final     Potassium   Date Value Ref Range Status   10/23/2020 3.9 3.5 - 5.2 mmol/L Final     CO2   Date Value Ref Range Status   10/23/2020 30.0 (H) 22.0 - 29.0 mmol/L Final     Chloride   Date Value Ref Range Status   10/23/2020 97 (L) 98 - 107 mmol/L Final     Anion Gap   Date Value Ref Range Status   10/23/2020 " 11.0 5.0 - 15.0 mmol/L Final     Creatinine   Date Value Ref Range Status   10/23/2020 2.33 (H) 0.76 - 1.27 mg/dL Final     BUN   Date Value Ref Range Status   10/23/2020 25 (H) 6 - 20 mg/dL Final     BUN/Creatinine Ratio   Date Value Ref Range Status   10/23/2020 10.7 7.0 - 25.0 Final     Calcium   Date Value Ref Range Status   10/23/2020 9.4 8.6 - 10.5 mg/dL Final     eGFR Non  Amer   Date Value Ref Range Status   10/23/2020 29 (L) >60 mL/min/1.73 Final     Alkaline Phosphatase   Date Value Ref Range Status   10/23/2020 138 (H) 39 - 117 U/L Final     Total Protein   Date Value Ref Range Status   10/23/2020 7.1 6.0 - 8.5 g/dL Final     ALT (SGPT)   Date Value Ref Range Status   10/23/2020 7 1 - 41 U/L Final     AST (SGOT)   Date Value Ref Range Status   10/23/2020 14 1 - 40 U/L Final     Total Bilirubin   Date Value Ref Range Status   10/23/2020 0.4 0.0 - 1.2 mg/dL Final     Albumin   Date Value Ref Range Status   10/23/2020 4.50 3.50 - 5.20 g/dL Final     Globulin   Date Value Ref Range Status   10/23/2020 2.6 gm/dL Final     Lab Results   Component Value Date    .95 (C) 10/23/2020    HGB 11.8 (L) 10/23/2020    HCT 36.7 (L) 10/23/2020    MCV 88.6 10/23/2020     (L) 10/23/2020     Lab Results   Component Value Date    NEUTROABS 8.34 (H) 10/23/2020    IRON 67 10/23/2020    TIBC 387 10/23/2020    LABIRON 17 (L) 10/23/2020    FERRITIN 107.70 10/23/2020    [id number] 966 (H) 10/23/2020    FOLATE >20.00 10/23/2020     No results found for: , LABCA2, AFPTM, HCGQUANT, , CHROMGRNA, [id number], CEA, REFLABREPO      Flowcytometry done on August 18, 2020 showed:                   PATHOLOGY:  * Cannot find OR log *         RADIOLOGY DATA :  Ct Abdomen Pelvis Without Contrast    Result Date: 10/23/2020  1.Mediastinal, bilateral axillary, adolfo hepatis, bilateral para-aortic, bilateral obturator internus, and bilateral external iliac chain lymphadenopathy consistent with patient's history of CLL.  2. Splenomegaly consistent with CLL. 3. Two left kidney upper pole nonobstructive renal calculi with the largest measuring 3.2 mm. 4. Remainder CT chest abdomen and pelvis study unremarkable. Electronically signed by:  Derek Galloway MD  10/23/2020 11:20 AM CDT Workstation: [id number]    Ct Chest Without Contrast    Result Date: 10/23/2020  1.Mediastinal, bilateral axillary, adolfo hepatis, bilateral para-aortic, bilateral obturator internus, and bilateral external iliac chain lymphadenopathy consistent with patient's history of CLL. 2. Splenomegaly consistent with CLL. 3. Two left kidney upper pole nonobstructive renal calculi with the largest measuring 3.2 mm. 4. Remainder CT chest abdomen and pelvis study unremarkable. Electronically signed by:  Derek Galloway MD  10/23/2020 11:20 AM CDT Workstation: [id number]        ASSESSMENT AND PLAN:      1.  Chronic lymphocytic leukemia, I GVH mutated, 13 q. Deletion:  -Patient was initially diagnosed by Dr. Beyer.  Records were obtained and reviewed.  -Peripheral blood flow cytometry done on August 18, 2020 showed 88% of cells showing CD5 plus positive population consistent with chronic lymphocytic leukemia.   -Recent CT scan done on October 23, 2020 shows enlarged lymph node in mediastinum underwent bilateral axillary area, adolfo hepatis, periaortic area, obturator internus and external iliac lymphadenopathy with splenomegaly.  -CBC done on October 23, 2020 shows white blood cell count is 138,000, hemoglobin is 11.6, platelet count is 133,000.  -At this point will continue with clinical surveillance.  Will ask patient to return to clinic in 3 months with repeat CBC CMP, anemia work-up and CT of chest abdomen and pelvis without contrast to be done prior to that.    2.  Anemia:  -Multifactorial secondary to chronic kidney disease plus chronic lymphocytic leukemia plus nutritional deficiency.  -Hemoglobin is 11.8  -Recommend continue with ferrous sulfate, B12 and folic acid  p.o. daily.  Prescription for ferrous sulfate, B12 and folic acid has been sent to his pharmacy today  -We will repeat anemia work-up upon next clinic visit in 3 months.    3.  Acute on chronic kidney disease:  -Creatinine is elevated at 2.3.  Patient was notified about elevated creatinine and need to increase hydration  -Recommend following up with primary medical provider in the next 7 to 10 days with recheck of CMP after increasing hydration at home.  Patient was understanding.    4.  Coronary artery disease status post CABG    5.  Hypertension:  -Blood pressure is elevated at 170/107.  Patient states he has not taken antihypertensive medication today since he is out of feet.  He is going to  his medication later today.  Recommend monitoring blood pressure closely at home.    6.  Health maintenance: Patient quit smoking in 2016.  Had a colonoscopy last 10 years    7. Advance Care Planning: For now patient remains full code and is able to make decisions.  Patient has health care surrogate mentioned on chart.      PHQ-9 Total Score: 0   -Patient is not homicidal or suicidal.  No acute intervention required.    Lyle Corona reports a pain score of 7.  Given his pain assessment as noted, treatment options were discussed and the following options were decided upon as a follow-up plan to address the patient's pain: continuation of current treatment plan for pain.         Vladimir Henderson MD  10/26/2020  10:40 CDT        Part of this note may be an electronic transcription/translation of spoken language to printed text using the Dragon Dictation System.

## 2021-01-22 ENCOUNTER — LAB (OUTPATIENT)
Dept: LAB | Facility: HOSPITAL | Age: 56
End: 2021-01-22

## 2021-01-22 ENCOUNTER — HOSPITAL ENCOUNTER (OUTPATIENT)
Dept: CT IMAGING | Facility: HOSPITAL | Age: 56
Discharge: HOME | End: 2021-01-22

## 2021-01-22 DIAGNOSIS — N18.9 ACUTE KIDNEY INJURY SUPERIMPOSED ON CKD (HCC): ICD-10-CM

## 2021-01-22 DIAGNOSIS — C91.10 CLL (CHRONIC LYMPHOCYTIC LEUKEMIA) (HCC): ICD-10-CM

## 2021-01-22 DIAGNOSIS — N17.9 ACUTE KIDNEY INJURY SUPERIMPOSED ON CKD (HCC): ICD-10-CM

## 2021-01-22 DIAGNOSIS — D50.9 IRON DEFICIENCY ANEMIA, UNSPECIFIED IRON DEFICIENCY ANEMIA TYPE: ICD-10-CM

## 2021-01-22 LAB
ALBUMIN SERPL-MCNC: 4.2 G/DL (ref 3.5–5.2)
ALBUMIN/GLOB SERPL: 1.4 G/DL
ALP SERPL-CCNC: 153 U/L (ref 39–117)
ALT SERPL W P-5'-P-CCNC: <5 U/L (ref 1–41)
ANION GAP SERPL CALCULATED.3IONS-SCNC: 11 MMOL/L (ref 5–15)
AST SERPL-CCNC: 15 U/L (ref 1–40)
BILIRUB SERPL-MCNC: 0.3 MG/DL (ref 0–1.2)
BUN SERPL-MCNC: 24 MG/DL (ref 6–20)
BUN/CREAT SERPL: 11 (ref 7–25)
CALCIUM SPEC-SCNC: 9.1 MG/DL (ref 8.6–10.5)
CHLORIDE SERPL-SCNC: 99 MMOL/L (ref 98–107)
CO2 SERPL-SCNC: 29 MMOL/L (ref 22–29)
CREAT SERPL-MCNC: 2.19 MG/DL (ref 0.76–1.27)
DEPRECATED RDW RBC AUTO: 47.6 FL (ref 37–54)
ERYTHROCYTE [DISTWIDTH] IN BLOOD BY AUTOMATED COUNT: 15.3 % (ref 12.3–15.4)
FERRITIN SERPL-MCNC: 119.2 NG/ML (ref 30–400)
FOLATE SERPL-MCNC: 6.47 NG/ML (ref 4.78–24.2)
GFR SERPL CREATININE-BSD FRML MDRD: 31 ML/MIN/1.73
GLOBULIN UR ELPH-MCNC: 2.9 GM/DL
GLUCOSE SERPL-MCNC: 87 MG/DL (ref 65–99)
HCT VFR BLD AUTO: 36.1 % (ref 37.5–51)
HGB BLD-MCNC: 11.2 G/DL (ref 13–17.7)
HYPOCHROMIA BLD QL: ABNORMAL
IRON 24H UR-MRATE: 69 MCG/DL (ref 59–158)
IRON SATN MFR SERPL: 18 % (ref 20–50)
LYMPHOCYTES # BLD MANUAL: 172.7 10*3/MM3 (ref 0.7–3.1)
LYMPHOCYTES NFR BLD MANUAL: 94 % (ref 19.6–45.3)
MCH RBC QN AUTO: 28.1 PG (ref 26.6–33)
MCHC RBC AUTO-ENTMCNC: 31 G/DL (ref 31.5–35.7)
MCV RBC AUTO: 90.5 FL (ref 79–97)
NEUTROPHILS # BLD AUTO: 11.02 10*3/MM3 (ref 1.7–7)
NEUTROPHILS NFR BLD MANUAL: 6 % (ref 42.7–76)
PLATELET # BLD AUTO: 142 10*3/MM3 (ref 140–450)
PMV BLD AUTO: 9 FL (ref 6–12)
POTASSIUM SERPL-SCNC: 3.6 MMOL/L (ref 3.5–5.2)
PROT SERPL-MCNC: 7.1 G/DL (ref 6–8.5)
RBC # BLD AUTO: 3.99 10*6/MM3 (ref 4.14–5.8)
SMALL PLATELETS BLD QL SMEAR: ABNORMAL
SODIUM SERPL-SCNC: 139 MMOL/L (ref 136–145)
TIBC SERPL-MCNC: 381 MCG/DL (ref 298–536)
TRANSFERRIN SERPL-MCNC: 256 MG/DL (ref 200–360)
VIT B12 BLD-MCNC: 1043 PG/ML (ref 211–946)
WBC # BLD AUTO: 183.72 10*3/MM3 (ref 3.4–10.8)
WBC MORPH BLD: NORMAL

## 2021-01-22 PROCEDURE — 85025 COMPLETE CBC W/AUTO DIFF WBC: CPT

## 2021-01-22 PROCEDURE — 84466 ASSAY OF TRANSFERRIN: CPT

## 2021-01-22 PROCEDURE — 82607 VITAMIN B-12: CPT

## 2021-01-22 PROCEDURE — 82746 ASSAY OF FOLIC ACID SERUM: CPT

## 2021-01-22 PROCEDURE — 74176 CT ABD & PELVIS W/O CONTRAST: CPT

## 2021-01-22 PROCEDURE — 83540 ASSAY OF IRON: CPT

## 2021-01-22 PROCEDURE — 85007 BL SMEAR W/DIFF WBC COUNT: CPT

## 2021-01-22 PROCEDURE — 80053 COMPREHEN METABOLIC PANEL: CPT

## 2021-01-22 PROCEDURE — 71250 CT THORAX DX C-: CPT

## 2021-01-22 PROCEDURE — 82728 ASSAY OF FERRITIN: CPT

## 2021-01-25 ENCOUNTER — OFFICE VISIT (OUTPATIENT)
Dept: ONCOLOGY | Facility: CLINIC | Age: 56
End: 2021-01-25

## 2021-01-25 ENCOUNTER — LAB (OUTPATIENT)
Dept: ONCOLOGY | Facility: HOSPITAL | Age: 56
End: 2021-01-25

## 2021-01-25 VITALS
SYSTOLIC BLOOD PRESSURE: 143 MMHG | TEMPERATURE: 97.5 F | HEART RATE: 79 BPM | DIASTOLIC BLOOD PRESSURE: 85 MMHG | RESPIRATION RATE: 18 BRPM | WEIGHT: 150.5 LBS | BODY MASS INDEX: 22.29 KG/M2 | HEIGHT: 69 IN

## 2021-01-25 DIAGNOSIS — C91.10 CLL (CHRONIC LYMPHOCYTIC LEUKEMIA) (HCC): Primary | ICD-10-CM

## 2021-01-25 DIAGNOSIS — D50.9 IRON DEFICIENCY ANEMIA, UNSPECIFIED IRON DEFICIENCY ANEMIA TYPE: ICD-10-CM

## 2021-01-25 PROCEDURE — 1125F AMNT PAIN NOTED PAIN PRSNT: CPT | Performed by: INTERNAL MEDICINE

## 2021-01-25 PROCEDURE — G0463 HOSPITAL OUTPT CLINIC VISIT: HCPCS | Performed by: INTERNAL MEDICINE

## 2021-01-25 PROCEDURE — G9903 PT SCRN TBCO ID AS NON USER: HCPCS | Performed by: INTERNAL MEDICINE

## 2021-01-25 PROCEDURE — 99214 OFFICE O/P EST MOD 30 MIN: CPT | Performed by: INTERNAL MEDICINE

## 2021-01-25 PROCEDURE — 1157F ADVNC CARE PLAN IN RCRD: CPT | Performed by: INTERNAL MEDICINE

## 2021-01-25 NOTE — PROGRESS NOTES
"DATE OF VISIT: 1/25/2021      REASON FOR VISIT: Chronic lymphocytic leukemia, anemia, thrombocytopenia      HISTORY OF PRESENT ILLNESS:   55-year-old male with medical problem consisting of coronary artery disease, congestive heart failure, hypertension, dyslipidemia, gastroesophageal reflux disease was initially seen in consultation on August 18, 2020 when patient was admitted to [facility].  Patient is here for follow-up appointment today.  Patient had a blood work as well as restaging CT of chest abdomen and pelvis done, is here to discuss results.  Complains of  fatigue.  Complains of some pain stable enlarged lymph node in neck.  Denies any worsening drenching night sweats.  Denies any bleeding.            Past Medical History, Past Surgical History, Social History, Family History have been reviewed and are without significant changes except as mentioned.    Review of Systems   Constitutional: Positive for fatigue.        Positive for chronic intermittent night sweats   Respiratory: Positive for shortness of breath.    Musculoskeletal: Positive for arthralgias.   Neurological: Positive for dizziness and numbness.      A comprehensive 14 point review of systems was performed and was negative except as mentioned.    Medications:  The current medication list was reviewed in the EMR    ALLERGIES:    Allergies   Allergen Reactions   • Amlodipine Rash   • Imdur [Isosorbide Dinitrate] Rash   • Lisinopril Rash   • Metoprolol Rash       Objective      Vitals:    01/25/21 1047   BP: 143/85   Pulse: 79   Resp: 18   Temp: 97.5 °F (36.4 °C)   Weight: 68.3 kg (150 lb 8 oz)   Height: 175.3 cm (69\")   PainSc:   8   PainLoc: Generalized     Current Status 10/26/2020   ECOG score 0       Physical Exam  Constitutional:       Comments: Appears older than stated age   Neck:      Comments: Multiple enlarged lymph nodes present in bilateral neck.  Cardiovascular:      Rate and Rhythm: Normal rate and regular rhythm. "   Pulmonary:      Effort: Pulmonary effort is normal.      Breath sounds: Normal breath sounds.   Abdominal:      General: Bowel sounds are normal.      Palpations: Abdomen is soft.   Musculoskeletal:      Comments: No edema.  Decreased range of motion   Neurological:      Mental Status: He is oriented to person, place, and time.           RECENT LABS:  Glucose   Date Value Ref Range Status   01/22/2021 87 65 - 99 mg/dL Final     Glucose, Arterial   Date Value Ref Range Status   12/05/2017 100 mmol/L Final     Sodium   Date Value Ref Range Status   01/22/2021 139 136 - 145 mmol/L Final     Potassium   Date Value Ref Range Status   01/22/2021 3.6 3.5 - 5.2 mmol/L Final     CO2   Date Value Ref Range Status   01/22/2021 29.0 22.0 - 29.0 mmol/L Final     Chloride   Date Value Ref Range Status   01/22/2021 99 98 - 107 mmol/L Final     Anion Gap   Date Value Ref Range Status   01/22/2021 11.0 5.0 - 15.0 mmol/L Final     Creatinine   Date Value Ref Range Status   01/22/2021 2.19 (H) 0.76 - 1.27 mg/dL Final     BUN   Date Value Ref Range Status   01/22/2021 24 (H) 6 - 20 mg/dL Final     BUN/Creatinine Ratio   Date Value Ref Range Status   01/22/2021 11.0 7.0 - 25.0 Final     Calcium   Date Value Ref Range Status   01/22/2021 9.1 8.6 - 10.5 mg/dL Final     eGFR Non  Amer   Date Value Ref Range Status   01/22/2021 31 (L) >60 mL/min/1.73 Final     Alkaline Phosphatase   Date Value Ref Range Status   01/22/2021 153 (H) 39 - 117 U/L Final     Total Protein   Date Value Ref Range Status   01/22/2021 7.1 6.0 - 8.5 g/dL Final     ALT (SGPT)   Date Value Ref Range Status   01/22/2021 <5 1 - 41 U/L Final     AST (SGOT)   Date Value Ref Range Status   01/22/2021 15 1 - 40 U/L Final     Total Bilirubin   Date Value Ref Range Status   01/22/2021 0.3 0.0 - 1.2 mg/dL Final     Albumin   Date Value Ref Range Status   01/22/2021 4.20 3.50 - 5.20 g/dL Final     Globulin   Date Value Ref Range Status   01/22/2021 2.9 gm/dL Final      Lab Results   Component Value Date    .72 (C) 01/22/2021    HGB 11.2 (L) 01/22/2021    HCT 36.1 (L) 01/22/2021    MCV 90.5 01/22/2021     01/22/2021     Lab Results   Component Value Date    NEUTROABS 11.02 (H) 01/22/2021    IRON 69 01/22/2021    IRON 67 10/23/2020    IRON 51 (L) 08/17/2020    TIBC 381 01/22/2021    TIBC 387 10/23/2020    TIBC 304 08/17/2020    LABIRON 18 (L) 01/22/2021    LABIRON 17 (L) 10/23/2020    LABIRON 17 (L) 08/17/2020    FERRITIN 119.20 01/22/2021    FERRITIN 107.70 10/23/2020    FERRITIN 100.60 08/17/2020    [id number] 1,043 (H) 01/22/2021    [id number] 966 (H) 10/23/2020    [id number] 360 08/17/2020    FOLATE 6.47 01/22/2021    FOLATE >20.00 10/23/2020    FOLATE 3.47 (L) 08/17/2020     No results found for: , LABCA2, AFPTM, HCGQUANT, , CHROMGRNA, [id number], CEA, REFLABREPO      Flowcytometry done on August 18, 2020 showed:                     PATHOLOGY:  * Cannot find OR log *         RADIOLOGY DATA :  Ct Abdomen Pelvis Without Contrast    Result Date: 1/24/2021  There is significant bilateral axillary lymphadenopathy the largest lymph node in the left axillary region measuring up to 2.1 cm There is mediastinal lymphadenopathy the largest lymph node in the right pretracheal region measuring up to 1.5 cm There is splenomegaly the spleen measuring up to 17 cm. There is periaortic lymphadenopathy the largest lymph node in the right periaortic region measuring 2 cm. There is shoddy bilateral inguinal lymphadenopathy Electronically signed by:  Neal Chen MD  1/24/2021 6:18 PM CST Workstation: [id number]    Ct Chest Without Contrast Diagnostic    Result Date: 1/24/2021  There is significant bilateral axillary lymphadenopathy the largest lymph node in the left axillary region measuring up to 2.1 cm There is mediastinal lymphadenopathy the largest lymph node in the right pretracheal region measuring up to 1.5 cm There is splenomegaly the spleen measuring up to 17 cm.  There is periaortic lymphadenopathy the largest lymph node in the right periaortic region measuring 2 cm. There is shoddy bilateral inguinal lymphadenopathy Electronically signed by:  Neal Chen MD  1/24/2021 6:18 PM [facility] Workstation: [id number]          Assessment/Plan     1.  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 Monroe.  Records were obtained and reviewed  -Peripheral blood flow cytometry done on August 18, 2020 showed 82% of cells showing CD5 plus positive population consistent with chronic lymphocytic leukemia  -Recent CT of chest abdomen and pelvis without contrast done on January 24, 2021 still shows significant adenopathy involving the axilla, mediastinum, periaortic and inguinal adenopathy.  There is also splenomegaly measuring at 17 cm.  -CBC done on January 22, 2021 shows white blood cell count is 183,000, hemoglobin is 11.2, platelet count is 142,000.  -Due to his comorbidities, his tolerance for chemotherapy will be poor.  -We will continue with clinical monitoring.  Will ask patient to return to clinic in 3 months with repeat CBC, CMP, anemia work-up and CT of chest abdomen and pelvis without contrast to be done prior to that.    2.  Anemia:  -Multifactorial secondary to chronic kidney disease stage III plus chronic lymphocytic leukemia plus nutritional deficiency  -Hemoglobin is 11.2.  -Recommend continue with ferrous sulfate, B12 and folic acid p.o. daily.  -We will repeat anemia work-up upon next clinic visit in 3 months.    3.Chronic kidney disease:  -Creatinine is again elevated at 2.13.  Patient was notified about elevated creatinine and need to increase hydration.    4.  Coronary artery disease s/p CABG    5.  Hypertension    6.  Health maintenance: Patient quit smoking in 2016.  Had a colonoscopy last 10 years    7. Advance Care Planning: For now patient remains full code and is able to make decisions.  Patient has health care  surrogate mentioned on chart.                 PHQ-9 Total Score:       Lyle Corona reports a pain score of 8.  Given his pain assessment as noted, treatment options were discussed and the following options were decided upon as a follow-up plan to address the patient's pain: continuation of current treatment plan for pain.         Vladimir Henderson MD  1/25/2021  11:01 CST        Part of this note may be an electronic transcription/translation of spoken language to printed text using the Dragon Dictation System.          CC:

## 2021-04-22 ENCOUNTER — APPOINTMENT (OUTPATIENT)
Dept: GENERAL RADIOLOGY | Facility: HOSPITAL | Age: 56
End: 2021-04-22

## 2021-04-22 ENCOUNTER — APPOINTMENT (OUTPATIENT)
Dept: ULTRASOUND IMAGING | Facility: HOSPITAL | Age: 56
End: 2021-04-22

## 2021-04-22 ENCOUNTER — HOSPITAL ENCOUNTER (INPATIENT)
Facility: HOSPITAL | Age: 56
LOS: 2 days | Discharge: HOME HEALTH | End: 2021-04-24
Attending: EMERGENCY MEDICINE | Admitting: INTERNAL MEDICINE

## 2021-04-22 ENCOUNTER — APPOINTMENT (OUTPATIENT)
Dept: INTERVENTIONAL RADIOLOGY/VASCULAR | Facility: HOSPITAL | Age: 56
End: 2021-04-22

## 2021-04-22 ENCOUNTER — LAB (OUTPATIENT)
Dept: ONCOLOGY | Facility: HOSPITAL | Age: 56
End: 2021-04-22

## 2021-04-22 ENCOUNTER — HOSPITAL ENCOUNTER (OUTPATIENT)
Dept: CT IMAGING | Facility: HOSPITAL | Age: 56
Discharge: HOME | End: 2021-04-22

## 2021-04-22 DIAGNOSIS — E87.5 HYPERKALEMIA: ICD-10-CM

## 2021-04-22 DIAGNOSIS — I16.0 HYPERTENSIVE URGENCY: Primary | ICD-10-CM

## 2021-04-22 DIAGNOSIS — C91.10 CLL (CHRONIC LYMPHOCYTIC LEUKEMIA) (HCC): ICD-10-CM

## 2021-04-22 DIAGNOSIS — C91.10 CLL (CHRONIC LYMPHOCYTIC LEUKEMIA) (HCC): Chronic | ICD-10-CM

## 2021-04-22 DIAGNOSIS — D50.9 IRON DEFICIENCY ANEMIA, UNSPECIFIED IRON DEFICIENCY ANEMIA TYPE: ICD-10-CM

## 2021-04-22 DIAGNOSIS — I16.1 HYPERTENSIVE EMERGENCY: ICD-10-CM

## 2021-04-22 LAB
ALBUMIN SERPL-MCNC: 4.9 G/DL (ref 3.5–5.2)
ALBUMIN SERPL-MCNC: 5 G/DL (ref 3.5–5.2)
ALBUMIN/GLOB SERPL: 1.6 G/DL
ALBUMIN/GLOB SERPL: 2 G/DL
ALP SERPL-CCNC: 150 U/L (ref 39–117)
ALP SERPL-CCNC: 152 U/L (ref 39–117)
ALT SERPL W P-5'-P-CCNC: 5 U/L (ref 1–41)
ALT SERPL W P-5'-P-CCNC: 5 U/L (ref 1–41)
AMPHET+METHAMPHET UR QL: NEGATIVE
AMPHETAMINES UR QL: NEGATIVE
ANION GAP SERPL CALCULATED.3IONS-SCNC: 11 MMOL/L (ref 5–15)
ANION GAP SERPL CALCULATED.3IONS-SCNC: 7 MMOL/L (ref 5–15)
ANISOCYTOSIS BLD QL: ABNORMAL
AST SERPL-CCNC: 22 U/L (ref 1–40)
AST SERPL-CCNC: 24 U/L (ref 1–40)
BARBITURATES UR QL SCN: NEGATIVE
BASOPHILS # BLD AUTO: 0.3 10*3/MM3 (ref 0–0.2)
BASOPHILS NFR BLD AUTO: 0.1 % (ref 0–1.5)
BENZODIAZ UR QL SCN: NEGATIVE
BILIRUB SERPL-MCNC: 0.5 MG/DL (ref 0–1.2)
BILIRUB SERPL-MCNC: 0.5 MG/DL (ref 0–1.2)
BILIRUB UR QL STRIP: NEGATIVE
BUN SERPL-MCNC: 16 MG/DL (ref 6–20)
BUN SERPL-MCNC: 17 MG/DL (ref 6–20)
BUN/CREAT SERPL: 8.4 (ref 7–25)
BUN/CREAT SERPL: 8.6 (ref 7–25)
BUPRENORPHINE SERPL-MCNC: NEGATIVE NG/ML
CALCIUM SPEC-SCNC: 9.1 MG/DL (ref 8.6–10.5)
CALCIUM SPEC-SCNC: 9.5 MG/DL (ref 8.6–10.5)
CANNABINOIDS SERPL QL: POSITIVE
CHLORIDE SERPL-SCNC: 101 MMOL/L (ref 98–107)
CHLORIDE SERPL-SCNC: 102 MMOL/L (ref 98–107)
CLARITY UR: CLEAR
CO2 SERPL-SCNC: 25 MMOL/L (ref 22–29)
CO2 SERPL-SCNC: 27 MMOL/L (ref 22–29)
COCAINE UR QL: NEGATIVE
COLOR UR: YELLOW
CREAT SERPL-MCNC: 1.86 MG/DL (ref 0.76–1.27)
CREAT SERPL-MCNC: 2.03 MG/DL (ref 0.76–1.27)
DEPRECATED RDW RBC AUTO: 49.9 FL (ref 37–54)
DEPRECATED RDW RBC AUTO: 50.4 FL (ref 37–54)
EOSINOPHIL # BLD AUTO: 0.17 10*3/MM3 (ref 0–0.4)
EOSINOPHIL NFR BLD AUTO: 0.1 % (ref 0.3–6.2)
ERYTHROCYTE [DISTWIDTH] IN BLOOD BY AUTOMATED COUNT: 16.9 % (ref 12.3–15.4)
ERYTHROCYTE [DISTWIDTH] IN BLOOD BY AUTOMATED COUNT: 17.2 % (ref 12.3–15.4)
FERRITIN SERPL-MCNC: 102.1 NG/ML (ref 30–400)
FLUAV RNA RESP QL NAA+PROBE: NOT DETECTED
FLUBV RNA RESP QL NAA+PROBE: NOT DETECTED
FOLATE SERPL-MCNC: 4.57 NG/ML (ref 4.78–24.2)
GFR SERPL CREATININE-BSD FRML MDRD: 34 ML/MIN/1.73
GFR SERPL CREATININE-BSD FRML MDRD: 38 ML/MIN/1.73
GLOBULIN UR ELPH-MCNC: 2.5 GM/DL
GLOBULIN UR ELPH-MCNC: 3.1 GM/DL
GLUCOSE BLDC GLUCOMTR-MCNC: 154 MG/DL (ref 70–130)
GLUCOSE SERPL-MCNC: 96 MG/DL (ref 65–99)
GLUCOSE SERPL-MCNC: 98 MG/DL (ref 65–99)
GLUCOSE UR STRIP-MCNC: NEGATIVE MG/DL
HCT VFR BLD AUTO: 39.8 % (ref 37.5–51)
HCT VFR BLD AUTO: 41.7 % (ref 37.5–51)
HGB BLD-MCNC: 11.8 G/DL (ref 13–17.7)
HGB BLD-MCNC: 12.1 G/DL (ref 13–17.7)
HGB UR QL STRIP.AUTO: NEGATIVE
HOLD SPECIMEN: NORMAL
IMM GRANULOCYTES # BLD AUTO: 0.38 10*3/MM3 (ref 0–0.05)
IMM GRANULOCYTES NFR BLD AUTO: 0.1 % (ref 0–0.5)
IRON 24H UR-MRATE: 58 MCG/DL (ref 59–158)
IRON SATN MFR SERPL: 14 % (ref 20–50)
KETONES UR QL STRIP: NEGATIVE
LEUKOCYTE ESTERASE UR QL STRIP.AUTO: NEGATIVE
LYMPHOCYTES # BLD AUTO: 262.41 10*3/MM3 (ref 0.7–3.1)
LYMPHOCYTES # BLD MANUAL: 251.34 10*3/MM3 (ref 0.7–3.1)
LYMPHOCYTES NFR BLD AUTO: 96.2 % (ref 19.6–45.3)
LYMPHOCYTES NFR BLD MANUAL: 97 % (ref 19.6–45.3)
MCH RBC QN AUTO: 26.7 PG (ref 26.6–33)
MCH RBC QN AUTO: 26.9 PG (ref 26.6–33)
MCHC RBC AUTO-ENTMCNC: 29 G/DL (ref 31.5–35.7)
MCHC RBC AUTO-ENTMCNC: 29.6 G/DL (ref 31.5–35.7)
MCV RBC AUTO: 90.7 FL (ref 79–97)
MCV RBC AUTO: 92.1 FL (ref 79–97)
METHADONE UR QL SCN: NEGATIVE
MONOCYTES # BLD AUTO: 5.18 10*3/MM3 (ref 0.1–0.9)
MONOCYTES NFR BLD AUTO: 1.9 % (ref 5–12)
NEUTROPHILS # BLD AUTO: 2.54 10*3/MM3 (ref 1.7–7)
NEUTROPHILS NFR BLD AUTO: 1.6 % (ref 42.7–76)
NEUTROPHILS NFR BLD AUTO: 4.27 10*3/MM3 (ref 1.7–7)
NEUTROPHILS NFR BLD MANUAL: 1 % (ref 42.7–76)
NITRITE UR QL STRIP: NEGATIVE
NRBC BLD AUTO-RTO: 0 /100 WBC (ref 0–0.2)
OPIATES UR QL: NEGATIVE
OXYCODONE UR QL SCN: NEGATIVE
PCP UR QL SCN: NEGATIVE
PH UR STRIP.AUTO: 7 [PH] (ref 5–9)
PLATELET # BLD AUTO: 96 10*3/MM3 (ref 140–450)
PLATELET # BLD AUTO: 96 10*3/MM3 (ref 140–450)
PMV BLD AUTO: 9.5 FL (ref 6–12)
PMV BLD AUTO: 9.6 FL (ref 6–12)
POTASSIUM SERPL-SCNC: 6 MMOL/L (ref 3.5–5.2)
POTASSIUM SERPL-SCNC: 6.4 MMOL/L (ref 3.5–5.2)
PROPOXYPH UR QL: NEGATIVE
PROT SERPL-MCNC: 7.4 G/DL (ref 6–8.5)
PROT SERPL-MCNC: 8.1 G/DL (ref 6–8.5)
PROT UR QL STRIP: NEGATIVE
QT INTERVAL: 402 MS
QTC INTERVAL: 472 MS
RBC # BLD AUTO: 4.39 10*6/MM3 (ref 4.14–5.8)
RBC # BLD AUTO: 4.53 10*6/MM3 (ref 4.14–5.8)
SARS-COV-2 RNA RESP QL NAA+PROBE: NOT DETECTED
SMALL PLATELETS BLD QL SMEAR: ABNORMAL
SMUDGE CELLS IN BLOOD BY LIGHT MICROSCOPY: 14 /100 WBC
SODIUM SERPL-SCNC: 136 MMOL/L (ref 136–145)
SODIUM SERPL-SCNC: 137 MMOL/L (ref 136–145)
SP GR UR STRIP: 1.01 (ref 1–1.03)
TIBC SERPL-MCNC: 428 MCG/DL (ref 298–536)
TRANSFERRIN SERPL-MCNC: 287 MG/DL (ref 200–360)
TRICYCLICS UR QL SCN: NEGATIVE
UROBILINOGEN UR QL STRIP: NORMAL
VARIANT LYMPHS NFR BLD MANUAL: 2 % (ref 0–5)
VIT B12 BLD-MCNC: 565 PG/ML (ref 211–946)
WBC # BLD AUTO: 253.88 10*3/MM3 (ref 3.4–10.8)
WBC # BLD AUTO: 272.71 10*3/MM3 (ref 3.4–10.8)
WBC MORPH BLD: NORMAL
WHOLE BLOOD HOLD SPECIMEN: NORMAL
WHOLE BLOOD HOLD SPECIMEN: NORMAL

## 2021-04-22 PROCEDURE — 80306 DRUG TEST PRSMV INSTRMNT: CPT | Performed by: NURSE PRACTITIONER

## 2021-04-22 PROCEDURE — 71250 CT THORAX DX C-: CPT

## 2021-04-22 PROCEDURE — 25010000002 ONDANSETRON PER 1 MG: Performed by: NURSE PRACTITIONER

## 2021-04-22 PROCEDURE — 80053 COMPREHEN METABOLIC PANEL: CPT | Performed by: EMERGENCY MEDICINE

## 2021-04-22 PROCEDURE — 63710000001 INSULIN REGULAR HUMAN PER 5 UNITS: Performed by: NURSE PRACTITIONER

## 2021-04-22 PROCEDURE — C1751 CATH, INF, PER/CENT/MIDLINE: HCPCS

## 2021-04-22 PROCEDURE — 81003 URINALYSIS AUTO W/O SCOPE: CPT | Performed by: NURSE PRACTITIONER

## 2021-04-22 PROCEDURE — 85007 BL SMEAR W/DIFF WBC COUNT: CPT

## 2021-04-22 PROCEDURE — 93005 ELECTROCARDIOGRAM TRACING: CPT | Performed by: NURSE PRACTITIONER

## 2021-04-22 PROCEDURE — 85025 COMPLETE CBC W/AUTO DIFF WBC: CPT

## 2021-04-22 PROCEDURE — 84466 ASSAY OF TRANSFERRIN: CPT

## 2021-04-22 PROCEDURE — 25010000002 ONDANSETRON PER 1 MG: Performed by: INTERNAL MEDICINE

## 2021-04-22 PROCEDURE — 82607 VITAMIN B-12: CPT

## 2021-04-22 PROCEDURE — 80053 COMPREHEN METABOLIC PANEL: CPT

## 2021-04-22 PROCEDURE — 82962 GLUCOSE BLOOD TEST: CPT

## 2021-04-22 PROCEDURE — 85025 COMPLETE CBC W/AUTO DIFF WBC: CPT | Performed by: EMERGENCY MEDICINE

## 2021-04-22 PROCEDURE — 02HV33Z INSERTION OF INFUSION DEVICE INTO SUPERIOR VENA CAVA, PERCUTANEOUS APPROACH: ICD-10-PCS | Performed by: INTERNAL MEDICINE

## 2021-04-22 PROCEDURE — 71045 X-RAY EXAM CHEST 1 VIEW: CPT

## 2021-04-22 PROCEDURE — 25010000002 HYDRALAZINE PER 20 MG: Performed by: NURSE PRACTITIONER

## 2021-04-22 PROCEDURE — 82728 ASSAY OF FERRITIN: CPT

## 2021-04-22 PROCEDURE — 87636 SARSCOV2 & INF A&B AMP PRB: CPT | Performed by: NURSE PRACTITIONER

## 2021-04-22 PROCEDURE — 99285 EMERGENCY DEPT VISIT HI MDM: CPT

## 2021-04-22 PROCEDURE — 74176 CT ABD & PELVIS W/O CONTRAST: CPT

## 2021-04-22 PROCEDURE — 25010000002 METOCLOPRAMIDE PER 10 MG: Performed by: NURSE PRACTITIONER

## 2021-04-22 PROCEDURE — 25010000002 HEPARIN (PORCINE) PER 1000 UNITS: Performed by: INTERNAL MEDICINE

## 2021-04-22 PROCEDURE — 93010 ELECTROCARDIOGRAM REPORT: CPT | Performed by: INTERNAL MEDICINE

## 2021-04-22 PROCEDURE — 83540 ASSAY OF IRON: CPT

## 2021-04-22 PROCEDURE — 82746 ASSAY OF FOLIC ACID SERUM: CPT

## 2021-04-22 PROCEDURE — 36415 COLL VENOUS BLD VENIPUNCTURE: CPT

## 2021-04-22 PROCEDURE — 25010000002 CALCIUM GLUCONATE-NACL 1-0.675 GM/50ML-% SOLUTION: Performed by: NURSE PRACTITIONER

## 2021-04-22 RX ORDER — ALLOPURINOL 100 MG/1
100 TABLET ORAL DAILY
Status: DISCONTINUED | OUTPATIENT
Start: 2021-04-23 | End: 2021-04-24 | Stop reason: HOSPADM

## 2021-04-22 RX ORDER — NITROGLYCERIN 0.4 MG/1
0.4 TABLET SUBLINGUAL
Status: DISCONTINUED | OUTPATIENT
Start: 2021-04-22 | End: 2021-04-24 | Stop reason: HOSPADM

## 2021-04-22 RX ORDER — SODIUM CHLORIDE 0.9 % (FLUSH) 0.9 %
10 SYRINGE (ML) INJECTION AS NEEDED
Status: DISCONTINUED | OUTPATIENT
Start: 2021-04-22 | End: 2021-04-24 | Stop reason: HOSPADM

## 2021-04-22 RX ORDER — CALCIUM GLUCONATE 94 MG/ML
1 INJECTION, SOLUTION INTRAVENOUS ONCE
Status: DISCONTINUED | OUTPATIENT
Start: 2021-04-22 | End: 2021-04-22

## 2021-04-22 RX ORDER — DEXTROSE MONOHYDRATE 25 G/50ML
50 INJECTION, SOLUTION INTRAVENOUS
Status: DISCONTINUED | OUTPATIENT
Start: 2021-04-22 | End: 2021-04-24 | Stop reason: HOSPADM

## 2021-04-22 RX ORDER — HYDRALAZINE HYDROCHLORIDE 20 MG/ML
20 INJECTION INTRAMUSCULAR; INTRAVENOUS ONCE
Status: COMPLETED | OUTPATIENT
Start: 2021-04-22 | End: 2021-04-22

## 2021-04-22 RX ORDER — POTASSIUM CHLORIDE 20 MEQ/1
20 TABLET, EXTENDED RELEASE ORAL DAILY
COMMUNITY
End: 2021-04-24 | Stop reason: HOSPADM

## 2021-04-22 RX ORDER — CLONIDINE HYDROCHLORIDE 0.2 MG/1
0.2 TABLET ORAL ONCE
Status: COMPLETED | OUTPATIENT
Start: 2021-04-22 | End: 2021-04-22

## 2021-04-22 RX ORDER — SODIUM POLYSTYRENE SULFONATE 15 G/60ML
15 SUSPENSION ORAL; RECTAL ONCE
Status: COMPLETED | OUTPATIENT
Start: 2021-04-22 | End: 2021-04-22

## 2021-04-22 RX ORDER — FOLIC ACID 1 MG/1
1 TABLET ORAL DAILY
Status: DISCONTINUED | OUTPATIENT
Start: 2021-04-23 | End: 2021-04-24 | Stop reason: HOSPADM

## 2021-04-22 RX ORDER — HEPARIN SODIUM 5000 [USP'U]/ML
5000 INJECTION, SOLUTION INTRAVENOUS; SUBCUTANEOUS EVERY 8 HOURS SCHEDULED
Status: DISCONTINUED | OUTPATIENT
Start: 2021-04-22 | End: 2021-04-24 | Stop reason: HOSPADM

## 2021-04-22 RX ORDER — CARVEDILOL 6.25 MG/1
6.25 TABLET ORAL 2 TIMES DAILY WITH MEALS
Status: DISCONTINUED | OUTPATIENT
Start: 2021-04-22 | End: 2021-04-23

## 2021-04-22 RX ORDER — SODIUM CHLORIDE 0.9 % (FLUSH) 0.9 %
10 SYRINGE (ML) INJECTION EVERY 12 HOURS SCHEDULED
Status: DISCONTINUED | OUTPATIENT
Start: 2021-04-22 | End: 2021-04-24 | Stop reason: HOSPADM

## 2021-04-22 RX ORDER — MORPHINE SULFATE 2 MG/ML
2 INJECTION, SOLUTION INTRAMUSCULAR; INTRAVENOUS EVERY 4 HOURS PRN
Status: DISCONTINUED | OUTPATIENT
Start: 2021-04-22 | End: 2021-04-24 | Stop reason: HOSPADM

## 2021-04-22 RX ORDER — ASPIRIN 81 MG/1
81 TABLET ORAL DAILY
Status: DISCONTINUED | OUTPATIENT
Start: 2021-04-23 | End: 2021-04-24 | Stop reason: HOSPADM

## 2021-04-22 RX ORDER — PROCHLORPERAZINE EDISYLATE 5 MG/ML
5 INJECTION INTRAMUSCULAR; INTRAVENOUS EVERY 6 HOURS PRN
Status: DISCONTINUED | OUTPATIENT
Start: 2021-04-22 | End: 2021-04-24 | Stop reason: HOSPADM

## 2021-04-22 RX ORDER — SODIUM CHLORIDE 9 MG/ML
75 INJECTION, SOLUTION INTRAVENOUS CONTINUOUS
Status: DISCONTINUED | OUTPATIENT
Start: 2021-04-22 | End: 2021-04-24 | Stop reason: HOSPADM

## 2021-04-22 RX ORDER — ESCITALOPRAM OXALATE 10 MG/1
10 TABLET ORAL DAILY
Status: DISCONTINUED | OUTPATIENT
Start: 2021-04-23 | End: 2021-04-24 | Stop reason: HOSPADM

## 2021-04-22 RX ORDER — PRAVASTATIN SODIUM 40 MG
40 TABLET ORAL NIGHTLY
Status: DISCONTINUED | OUTPATIENT
Start: 2021-04-22 | End: 2021-04-24 | Stop reason: HOSPADM

## 2021-04-22 RX ORDER — SUCRALFATE 1 G/1
1 TABLET ORAL 4 TIMES DAILY
Status: DISCONTINUED | OUTPATIENT
Start: 2021-04-22 | End: 2021-04-24 | Stop reason: HOSPADM

## 2021-04-22 RX ORDER — RANOLAZINE 500 MG/1
500 TABLET, EXTENDED RELEASE ORAL EVERY 12 HOURS SCHEDULED
Status: DISCONTINUED | OUTPATIENT
Start: 2021-04-22 | End: 2021-04-24 | Stop reason: HOSPADM

## 2021-04-22 RX ORDER — ONDANSETRON 2 MG/ML
4 INJECTION INTRAMUSCULAR; INTRAVENOUS ONCE
Status: COMPLETED | OUTPATIENT
Start: 2021-04-22 | End: 2021-04-22

## 2021-04-22 RX ORDER — ACETAMINOPHEN 325 MG/1
650 TABLET ORAL EVERY 4 HOURS PRN
Status: DISCONTINUED | OUTPATIENT
Start: 2021-04-22 | End: 2021-04-24 | Stop reason: HOSPADM

## 2021-04-22 RX ORDER — NALOXONE HCL 0.4 MG/ML
0.4 VIAL (ML) INJECTION
Status: DISCONTINUED | OUTPATIENT
Start: 2021-04-22 | End: 2021-04-24 | Stop reason: HOSPADM

## 2021-04-22 RX ORDER — DOCUSATE SODIUM 100 MG/1
100 CAPSULE, LIQUID FILLED ORAL 2 TIMES DAILY PRN
Status: DISCONTINUED | OUTPATIENT
Start: 2021-04-22 | End: 2021-04-24 | Stop reason: HOSPADM

## 2021-04-22 RX ORDER — ALLOPURINOL 100 MG/1
100 TABLET ORAL DAILY
COMMUNITY

## 2021-04-22 RX ORDER — ESCITALOPRAM OXALATE 10 MG/1
10 TABLET ORAL DAILY
COMMUNITY

## 2021-04-22 RX ORDER — FERROUS SULFATE TAB EC 324 MG (65 MG FE EQUIVALENT) 324 (65 FE) MG
324 TABLET DELAYED RESPONSE ORAL
Status: DISCONTINUED | OUTPATIENT
Start: 2021-04-23 | End: 2021-04-24 | Stop reason: HOSPADM

## 2021-04-22 RX ORDER — CALCIUM GLUCONATE 20 MG/ML
1 INJECTION, SOLUTION INTRAVENOUS ONCE
Status: COMPLETED | OUTPATIENT
Start: 2021-04-22 | End: 2021-04-22

## 2021-04-22 RX ORDER — LANOLIN ALCOHOL/MO/W.PET/CERES
1000 CREAM (GRAM) TOPICAL DAILY
Status: DISCONTINUED | OUTPATIENT
Start: 2021-04-23 | End: 2021-04-24 | Stop reason: HOSPADM

## 2021-04-22 RX ORDER — FAMOTIDINE 20 MG/1
20 TABLET, FILM COATED ORAL 2 TIMES DAILY
Status: DISCONTINUED | OUTPATIENT
Start: 2021-04-22 | End: 2021-04-24 | Stop reason: HOSPADM

## 2021-04-22 RX ORDER — HYDRALAZINE HYDROCHLORIDE 50 MG/1
50 TABLET, FILM COATED ORAL EVERY 8 HOURS SCHEDULED
Status: DISCONTINUED | OUTPATIENT
Start: 2021-04-22 | End: 2021-04-24 | Stop reason: HOSPADM

## 2021-04-22 RX ORDER — METOCLOPRAMIDE HYDROCHLORIDE 5 MG/ML
10 INJECTION INTRAMUSCULAR; INTRAVENOUS ONCE
Status: COMPLETED | OUTPATIENT
Start: 2021-04-22 | End: 2021-04-22

## 2021-04-22 RX ORDER — ONDANSETRON 2 MG/ML
4 INJECTION INTRAMUSCULAR; INTRAVENOUS EVERY 6 HOURS PRN
Status: DISCONTINUED | OUTPATIENT
Start: 2021-04-22 | End: 2021-04-24 | Stop reason: HOSPADM

## 2021-04-22 RX ADMIN — RANOLAZINE 500 MG: 500 TABLET, FILM COATED, EXTENDED RELEASE ORAL at 21:11

## 2021-04-22 RX ADMIN — HYDRALAZINE HYDROCHLORIDE 50 MG: 50 TABLET, FILM COATED ORAL at 21:12

## 2021-04-22 RX ADMIN — ONDANSETRON 4 MG: 2 INJECTION INTRAMUSCULAR; INTRAVENOUS at 14:39

## 2021-04-22 RX ADMIN — CALCIUM GLUCONATE 1 G: 20 INJECTION, SOLUTION INTRAVENOUS at 14:56

## 2021-04-22 RX ADMIN — CLONIDINE HYDROCHLORIDE 0.2 MG: 0.2 TABLET ORAL at 14:39

## 2021-04-22 RX ADMIN — FAMOTIDINE 20 MG: 20 TABLET, FILM COATED ORAL at 21:11

## 2021-04-22 RX ADMIN — SODIUM CHLORIDE 125 ML/HR: 9 INJECTION, SOLUTION INTRAVENOUS at 13:18

## 2021-04-22 RX ADMIN — HUMAN INSULIN 10 UNITS: 100 INJECTION, SOLUTION SUBCUTANEOUS at 15:23

## 2021-04-22 RX ADMIN — CARVEDILOL 6.25 MG: 6.25 TABLET, FILM COATED ORAL at 21:12

## 2021-04-22 RX ADMIN — HYDRALAZINE HYDROCHLORIDE 20 MG: 20 INJECTION INTRAMUSCULAR; INTRAVENOUS at 15:22

## 2021-04-22 RX ADMIN — HYDRALAZINE HYDROCHLORIDE 20 MG: 20 INJECTION INTRAMUSCULAR; INTRAVENOUS at 13:18

## 2021-04-22 RX ADMIN — ONDANSETRON 4 MG: 2 INJECTION INTRAMUSCULAR; INTRAVENOUS at 20:05

## 2021-04-22 RX ADMIN — DEXTROSE MONOHYDRATE 50 ML: 500 INJECTION PARENTERAL at 15:26

## 2021-04-22 RX ADMIN — PRAVASTATIN SODIUM 40 MG: 40 TABLET ORAL at 21:48

## 2021-04-22 RX ADMIN — SODIUM CHLORIDE 5 MG/HR: 9 INJECTION, SOLUTION INTRAVENOUS at 22:30

## 2021-04-22 RX ADMIN — SUCRALFATE 1 G: 1 TABLET ORAL at 21:11

## 2021-04-22 RX ADMIN — HEPARIN SODIUM 5000 UNITS: 5000 INJECTION INTRAVENOUS; SUBCUTANEOUS at 21:11

## 2021-04-22 RX ADMIN — SODIUM POLYSTYRENE SULFONATE 15 G: 15 SUSPENSION ORAL; RECTAL at 14:56

## 2021-04-22 RX ADMIN — SODIUM CHLORIDE 5 MG/HR: 9 INJECTION, SOLUTION INTRAVENOUS at 16:01

## 2021-04-22 RX ADMIN — METOCLOPRAMIDE 10 MG: 5 INJECTION, SOLUTION INTRAMUSCULAR; INTRAVENOUS at 15:40

## 2021-04-22 NOTE — CODE DOCUMENTATION
Spoke with Dr. Henderson regarding critical K+ of 6.4.  Patient currently in CT scan - spoke to Juan R.  Per Dr. Henderson, pt to go to ER to be evaluated.     CT informed they would let pt know.

## 2021-04-23 LAB
ANION GAP SERPL CALCULATED.3IONS-SCNC: 9 MMOL/L (ref 5–15)
BASOPHILS # BLD AUTO: 0.38 10*3/MM3 (ref 0–0.2)
BASOPHILS NFR BLD AUTO: 0.1 % (ref 0–1.5)
BUN SERPL-MCNC: 19 MG/DL (ref 6–20)
BUN/CREAT SERPL: 9.8 (ref 7–25)
CALCIUM SPEC-SCNC: 8.9 MG/DL (ref 8.6–10.5)
CHLORIDE SERPL-SCNC: 104 MMOL/L (ref 98–107)
CO2 SERPL-SCNC: 22 MMOL/L (ref 22–29)
CREAT SERPL-MCNC: 1.94 MG/DL (ref 0.76–1.27)
DEPRECATED RDW RBC AUTO: 49.1 FL (ref 37–54)
EOSINOPHIL # BLD AUTO: 0 10*3/MM3 (ref 0–0.4)
EOSINOPHIL NFR BLD AUTO: 0 % (ref 0.3–6.2)
ERYTHROCYTE [DISTWIDTH] IN BLOOD BY AUTOMATED COUNT: 18.6 % (ref 12.3–15.4)
GFR SERPL CREATININE-BSD FRML MDRD: 36 ML/MIN/1.73
GLUCOSE SERPL-MCNC: 123 MG/DL (ref 65–99)
HCT VFR BLD AUTO: 37.7 % (ref 37.5–51)
HGB BLD-MCNC: 11 G/DL (ref 13–17.7)
IMM GRANULOCYTES # BLD AUTO: 0.7 10*3/MM3 (ref 0–0.05)
IMM GRANULOCYTES NFR BLD AUTO: 0.2 % (ref 0–0.5)
LYMPHOCYTES # BLD AUTO: 318.38 10*3/MM3 (ref 0.7–3.1)
LYMPHOCYTES NFR BLD AUTO: 95.7 % (ref 19.6–45.3)
MCH RBC QN AUTO: 26.6 PG (ref 26.6–33)
MCHC RBC AUTO-ENTMCNC: 29.2 G/DL (ref 31.5–35.7)
MCV RBC AUTO: 91.1 FL (ref 79–97)
MONOCYTES # BLD AUTO: 7.3 10*3/MM3 (ref 0.1–0.9)
MONOCYTES NFR BLD AUTO: 2.2 % (ref 5–12)
NEUTROPHILS NFR BLD AUTO: 1.8 % (ref 42.7–76)
NEUTROPHILS NFR BLD AUTO: 5.86 10*3/MM3 (ref 1.7–7)
NRBC BLD AUTO-RTO: 0 /100 WBC (ref 0–0.2)
PLATELET # BLD AUTO: 111 10*3/MM3 (ref 140–450)
PMV BLD AUTO: 9.7 FL (ref 6–12)
POTASSIUM SERPL-SCNC: 5.6 MMOL/L (ref 3.5–5.2)
RBC # BLD AUTO: 4.14 10*6/MM3 (ref 4.14–5.8)
SODIUM SERPL-SCNC: 135 MMOL/L (ref 136–145)
WBC # BLD AUTO: 332.62 10*3/MM3 (ref 3.4–10.8)

## 2021-04-23 PROCEDURE — 85025 COMPLETE CBC W/AUTO DIFF WBC: CPT | Performed by: INTERNAL MEDICINE

## 2021-04-23 PROCEDURE — 25010000002 HEPARIN (PORCINE) PER 1000 UNITS: Performed by: INTERNAL MEDICINE

## 2021-04-23 PROCEDURE — 94799 UNLISTED PULMONARY SVC/PX: CPT

## 2021-04-23 PROCEDURE — 80048 BASIC METABOLIC PNL TOTAL CA: CPT | Performed by: INTERNAL MEDICINE

## 2021-04-23 PROCEDURE — 25010000002 PROCHLORPERAZINE 10 MG/2ML SOLUTION: Performed by: INTERNAL MEDICINE

## 2021-04-23 PROCEDURE — 25010000002 ONDANSETRON PER 1 MG: Performed by: INTERNAL MEDICINE

## 2021-04-23 RX ORDER — CARVEDILOL 12.5 MG/1
12.5 TABLET ORAL 2 TIMES DAILY WITH MEALS
Status: DISCONTINUED | OUTPATIENT
Start: 2021-04-23 | End: 2021-04-24 | Stop reason: HOSPADM

## 2021-04-23 RX ORDER — SODIUM CHLORIDE 0.9 % (FLUSH) 0.9 %
10 SYRINGE (ML) INJECTION EVERY 12 HOURS SCHEDULED
Status: DISCONTINUED | OUTPATIENT
Start: 2021-04-23 | End: 2021-04-24 | Stop reason: HOSPADM

## 2021-04-23 RX ADMIN — HEPARIN SODIUM 5000 UNITS: 5000 INJECTION INTRAVENOUS; SUBCUTANEOUS at 13:03

## 2021-04-23 RX ADMIN — FAMOTIDINE 20 MG: 20 TABLET, FILM COATED ORAL at 20:06

## 2021-04-23 RX ADMIN — SODIUM CHLORIDE, PRESERVATIVE FREE 10 ML: 5 INJECTION INTRAVENOUS at 20:06

## 2021-04-23 RX ADMIN — SODIUM CHLORIDE 75 ML/HR: 9 INJECTION, SOLUTION INTRAVENOUS at 04:49

## 2021-04-23 RX ADMIN — FOLIC ACID 1 MG: 1 TABLET ORAL at 08:17

## 2021-04-23 RX ADMIN — SODIUM CHLORIDE 75 ML/HR: 9 INJECTION, SOLUTION INTRAVENOUS at 20:06

## 2021-04-23 RX ADMIN — SUCRALFATE 1 G: 1 TABLET ORAL at 17:55

## 2021-04-23 RX ADMIN — SUCRALFATE 1 G: 1 TABLET ORAL at 13:03

## 2021-04-23 RX ADMIN — PROCHLORPERAZINE EDISYLATE 5 MG: 5 INJECTION INTRAMUSCULAR; INTRAVENOUS at 15:54

## 2021-04-23 RX ADMIN — HYDRALAZINE HYDROCHLORIDE 50 MG: 50 TABLET, FILM COATED ORAL at 05:19

## 2021-04-23 RX ADMIN — SODIUM CHLORIDE, PRESERVATIVE FREE 10 ML: 5 INJECTION INTRAVENOUS at 20:08

## 2021-04-23 RX ADMIN — ESCITALOPRAM OXALATE 10 MG: 10 TABLET ORAL at 08:18

## 2021-04-23 RX ADMIN — HEPARIN SODIUM 5000 UNITS: 5000 INJECTION INTRAVENOUS; SUBCUTANEOUS at 05:18

## 2021-04-23 RX ADMIN — CYANOCOBALAMIN TAB 1000 MCG 1000 MCG: 1000 TAB at 08:18

## 2021-04-23 RX ADMIN — ASPIRIN 81 MG: 81 TABLET, COATED ORAL at 08:18

## 2021-04-23 RX ADMIN — RANOLAZINE 500 MG: 500 TABLET, FILM COATED, EXTENDED RELEASE ORAL at 20:05

## 2021-04-23 RX ADMIN — PRAVASTATIN SODIUM 40 MG: 40 TABLET ORAL at 20:05

## 2021-04-23 RX ADMIN — HYDRALAZINE HYDROCHLORIDE 50 MG: 50 TABLET, FILM COATED ORAL at 13:03

## 2021-04-23 RX ADMIN — FAMOTIDINE 20 MG: 20 TABLET, FILM COATED ORAL at 08:17

## 2021-04-23 RX ADMIN — CARVEDILOL 12.5 MG: 12.5 TABLET, FILM COATED ORAL at 17:55

## 2021-04-23 RX ADMIN — RANOLAZINE 500 MG: 500 TABLET, FILM COATED, EXTENDED RELEASE ORAL at 08:18

## 2021-04-23 RX ADMIN — ALLOPURINOL 100 MG: 100 TABLET ORAL at 08:18

## 2021-04-23 RX ADMIN — SUCRALFATE 1 G: 1 TABLET ORAL at 22:18

## 2021-04-23 RX ADMIN — CARVEDILOL 6.25 MG: 6.25 TABLET, FILM COATED ORAL at 08:17

## 2021-04-23 RX ADMIN — SUCRALFATE 1 G: 1 TABLET ORAL at 08:18

## 2021-04-23 RX ADMIN — ONDANSETRON 4 MG: 2 INJECTION INTRAMUSCULAR; INTRAVENOUS at 09:47

## 2021-04-23 RX ADMIN — HEPARIN SODIUM 5000 UNITS: 5000 INJECTION INTRAVENOUS; SUBCUTANEOUS at 22:18

## 2021-04-23 RX ADMIN — HYDRALAZINE HYDROCHLORIDE 50 MG: 50 TABLET, FILM COATED ORAL at 21:28

## 2021-04-23 RX ADMIN — FERROUS SULFATE TAB EC 324 MG (65 MG FE EQUIVALENT) 324 MG: 324 (65 FE) TABLET DELAYED RESPONSE at 08:18

## 2021-04-24 VITALS
WEIGHT: 146.4 LBS | DIASTOLIC BLOOD PRESSURE: 98 MMHG | TEMPERATURE: 97.4 F | RESPIRATION RATE: 18 BRPM | HEIGHT: 69 IN | OXYGEN SATURATION: 97 % | SYSTOLIC BLOOD PRESSURE: 172 MMHG | HEART RATE: 95 BPM | BODY MASS INDEX: 21.68 KG/M2

## 2021-04-24 LAB
ANION GAP SERPL CALCULATED.3IONS-SCNC: 11 MMOL/L (ref 5–15)
ANISOCYTOSIS BLD QL: ABNORMAL
BUN SERPL-MCNC: 21 MG/DL (ref 6–20)
BUN/CREAT SERPL: 10.6 (ref 7–25)
CALCIUM SPEC-SCNC: 8.6 MG/DL (ref 8.6–10.5)
CHLORIDE SERPL-SCNC: 104 MMOL/L (ref 98–107)
CO2 SERPL-SCNC: 21 MMOL/L (ref 22–29)
CREAT SERPL-MCNC: 1.98 MG/DL (ref 0.76–1.27)
DEPRECATED RDW RBC AUTO: 52 FL (ref 37–54)
ERYTHROCYTE [DISTWIDTH] IN BLOOD BY AUTOMATED COUNT: 18.1 % (ref 12.3–15.4)
GFR SERPL CREATININE-BSD FRML MDRD: 35 ML/MIN/1.73
GLUCOSE SERPL-MCNC: 100 MG/DL (ref 65–99)
HCT VFR BLD AUTO: 34.7 % (ref 37.5–51)
HGB BLD-MCNC: 10 G/DL (ref 13–17.7)
LYMPHOCYTES # BLD MANUAL: 271.18 10*3/MM3 (ref 0.7–3.1)
LYMPHOCYTES NFR BLD MANUAL: 93 % (ref 19.6–45.3)
MCH RBC QN AUTO: 26.6 PG (ref 26.6–33)
MCHC RBC AUTO-ENTMCNC: 28.8 G/DL (ref 31.5–35.7)
MCV RBC AUTO: 92.3 FL (ref 79–97)
NEUTROPHILS # BLD AUTO: 2.74 10*3/MM3 (ref 1.7–7)
NEUTROPHILS NFR BLD MANUAL: 1 % (ref 42.7–76)
PLATELET # BLD AUTO: 90 10*3/MM3 (ref 140–450)
PMV BLD AUTO: 10 FL (ref 6–12)
POTASSIUM SERPL-SCNC: 4.6 MMOL/L (ref 3.5–5.2)
RBC # BLD AUTO: 3.76 10*6/MM3 (ref 4.14–5.8)
SMALL PLATELETS BLD QL SMEAR: ABNORMAL
SMUDGE CELLS IN BLOOD BY LIGHT MICROSCOPY: 17 /100 WBC
SODIUM SERPL-SCNC: 136 MMOL/L (ref 136–145)
VARIANT LYMPHS NFR BLD MANUAL: 6 % (ref 0–5)
WBC # BLD AUTO: 273.92 10*3/MM3 (ref 3.4–10.8)
WBC MORPH BLD: NORMAL

## 2021-04-24 PROCEDURE — 85007 BL SMEAR W/DIFF WBC COUNT: CPT | Performed by: INTERNAL MEDICINE

## 2021-04-24 PROCEDURE — 85025 COMPLETE CBC W/AUTO DIFF WBC: CPT | Performed by: INTERNAL MEDICINE

## 2021-04-24 PROCEDURE — 36415 COLL VENOUS BLD VENIPUNCTURE: CPT | Performed by: INTERNAL MEDICINE

## 2021-04-24 PROCEDURE — 80048 BASIC METABOLIC PNL TOTAL CA: CPT | Performed by: INTERNAL MEDICINE

## 2021-04-24 PROCEDURE — 25010000002 HEPARIN (PORCINE) PER 1000 UNITS: Performed by: INTERNAL MEDICINE

## 2021-04-24 RX ORDER — ONDANSETRON 4 MG/1
4 TABLET, FILM COATED ORAL EVERY 8 HOURS PRN
Qty: 40 TABLET | Refills: 1 | Status: SHIPPED | OUTPATIENT
Start: 2021-04-24

## 2021-04-24 RX ORDER — CARVEDILOL 25 MG/1
25 TABLET ORAL 2 TIMES DAILY WITH MEALS
Qty: 60 TABLET | Refills: 1 | Status: SHIPPED | OUTPATIENT
Start: 2021-04-24

## 2021-04-24 RX ORDER — ACETAMINOPHEN 325 MG/1
650 TABLET ORAL EVERY 4 HOURS PRN
Start: 2021-04-24

## 2021-04-24 RX ORDER — FAMOTIDINE 20 MG/1
20 TABLET, FILM COATED ORAL 2 TIMES DAILY
Qty: 60 TABLET | Refills: 1 | Status: SHIPPED | OUTPATIENT
Start: 2021-04-24 | End: 2021-07-02

## 2021-04-24 RX ORDER — HYDRALAZINE HYDROCHLORIDE 50 MG/1
50 TABLET, FILM COATED ORAL EVERY 8 HOURS SCHEDULED
Qty: 90 TABLET | Refills: 1 | Status: SHIPPED | OUTPATIENT
Start: 2021-04-24

## 2021-04-24 RX ADMIN — RANOLAZINE 500 MG: 500 TABLET, FILM COATED, EXTENDED RELEASE ORAL at 08:31

## 2021-04-24 RX ADMIN — SUCRALFATE 1 G: 1 TABLET ORAL at 08:31

## 2021-04-24 RX ADMIN — HEPARIN SODIUM 5000 UNITS: 5000 INJECTION INTRAVENOUS; SUBCUTANEOUS at 06:25

## 2021-04-24 RX ADMIN — CYANOCOBALAMIN TAB 1000 MCG 1000 MCG: 1000 TAB at 08:30

## 2021-04-24 RX ADMIN — HYDRALAZINE HYDROCHLORIDE 50 MG: 50 TABLET, FILM COATED ORAL at 06:25

## 2021-04-24 RX ADMIN — FOLIC ACID 1 MG: 1 TABLET ORAL at 08:30

## 2021-04-24 RX ADMIN — ASPIRIN 81 MG: 81 TABLET, COATED ORAL at 08:31

## 2021-04-24 RX ADMIN — FAMOTIDINE 20 MG: 20 TABLET, FILM COATED ORAL at 08:30

## 2021-04-24 RX ADMIN — ESCITALOPRAM OXALATE 10 MG: 10 TABLET ORAL at 08:31

## 2021-04-24 RX ADMIN — FERROUS SULFATE TAB EC 324 MG (65 MG FE EQUIVALENT) 324 MG: 324 (65 FE) TABLET DELAYED RESPONSE at 08:30

## 2021-04-24 RX ADMIN — CARVEDILOL 12.5 MG: 12.5 TABLET, FILM COATED ORAL at 08:30

## 2021-04-24 RX ADMIN — ALLOPURINOL 100 MG: 100 TABLET ORAL at 08:30

## 2021-04-25 ENCOUNTER — READMISSION MANAGEMENT (OUTPATIENT)
Dept: CALL CENTER | Facility: HOSPITAL | Age: 56
End: 2021-04-25

## 2021-04-25 NOTE — OUTREACH NOTE
Prep Survey      Responses   [facility] facility patient discharged from?  [place]   Is LACE score < 7 ?  No   Emergency Room discharge w/ pulse ox?  No   Eligibility  Readm Mgmt   Discharge diagnosis  CLL (chronic lymphocytic leukemia   Does the patient have one of the following disease processes/diagnoses(primary or secondary)?  Other   Does the patient have Home health ordered?  No   Is there a DME ordered?  No   General alerts for this patient  Declined HH   Is this a private patient?  Yes   Prep survey completed?  Yes          July Vance RN

## 2021-04-26 ENCOUNTER — DOCUMENTATION (OUTPATIENT)
Dept: NUTRITION | Facility: HOSPITAL | Age: 56
End: 2021-04-26

## 2021-04-26 ENCOUNTER — OFFICE VISIT (OUTPATIENT)
Dept: ONCOLOGY | Facility: CLINIC | Age: 56
End: 2021-04-26

## 2021-04-26 VITALS
WEIGHT: 147 LBS | TEMPERATURE: 97.9 F | HEIGHT: 69 IN | DIASTOLIC BLOOD PRESSURE: 86 MMHG | BODY MASS INDEX: 21.77 KG/M2 | HEART RATE: 70 BPM | SYSTOLIC BLOOD PRESSURE: 120 MMHG | RESPIRATION RATE: 20 BRPM

## 2021-04-26 DIAGNOSIS — D50.9 IRON DEFICIENCY ANEMIA, UNSPECIFIED IRON DEFICIENCY ANEMIA TYPE: Chronic | ICD-10-CM

## 2021-04-26 DIAGNOSIS — K90.9 IRON MALABSORPTION: ICD-10-CM

## 2021-04-26 DIAGNOSIS — C91.10 CLL (CHRONIC LYMPHOCYTIC LEUKEMIA) (HCC): Primary | Chronic | ICD-10-CM

## 2021-04-26 DIAGNOSIS — D69.6 THROMBOCYTOPENIA (HCC): ICD-10-CM

## 2021-04-26 DIAGNOSIS — N18.30 STAGE 3 CHRONIC KIDNEY DISEASE, UNSPECIFIED WHETHER STAGE 3A OR 3B CKD (HCC): Chronic | ICD-10-CM

## 2021-04-26 DIAGNOSIS — E87.5 HYPERKALEMIA: ICD-10-CM

## 2021-04-26 PROCEDURE — 99214 OFFICE O/P EST MOD 30 MIN: CPT | Performed by: INTERNAL MEDICINE

## 2021-04-26 PROCEDURE — G0463 HOSPITAL OUTPT CLINIC VISIT: HCPCS | Performed by: INTERNAL MEDICINE

## 2021-04-26 PROCEDURE — G9903 PT SCRN TBCO ID AS NON USER: HCPCS | Performed by: INTERNAL MEDICINE

## 2021-04-26 PROCEDURE — 1125F AMNT PAIN NOTED PAIN PRSNT: CPT | Performed by: INTERNAL MEDICINE

## 2021-04-26 PROCEDURE — 1123F ACP DISCUSS/DSCN MKR DOCD: CPT | Performed by: INTERNAL MEDICINE

## 2021-04-26 RX ORDER — DIPHENHYDRAMINE HCL 25 MG
25 CAPSULE ORAL ONCE
Status: CANCELLED | OUTPATIENT
Start: 2021-05-03 | End: 2021-05-03

## 2021-04-26 RX ORDER — CETIRIZINE HYDROCHLORIDE 10 MG/1
10 TABLET ORAL ONCE
Status: CANCELLED | OUTPATIENT
Start: 2021-05-03 | End: 2021-05-03

## 2021-04-26 RX ORDER — FOLIC ACID 1 MG/1
1 TABLET ORAL 2 TIMES DAILY
Qty: 180 TABLET | Refills: 1 | Status: SHIPPED | OUTPATIENT
Start: 2021-04-26

## 2021-04-26 RX ORDER — SODIUM CHLORIDE 9 MG/ML
250 INJECTION, SOLUTION INTRAVENOUS ONCE
Status: CANCELLED | OUTPATIENT
Start: 2021-05-03

## 2021-04-26 RX ORDER — ACETAMINOPHEN 325 MG/1
650 TABLET ORAL ONCE
Status: CANCELLED | OUTPATIENT
Start: 2021-05-03 | End: 2021-05-03

## 2021-04-26 RX ORDER — FAMOTIDINE 10 MG/ML
20 INJECTION, SOLUTION INTRAVENOUS ONCE
Status: CANCELLED | OUTPATIENT
Start: 2021-05-03 | End: 2021-05-03

## 2021-04-26 NOTE — PROGRESS NOTES
"DATE OF VISIT: 4/26/2021      REASON FOR VISIT: Chronic lymphocytic leukemia, anemia, thrombocytopenia, hyperkalemia      HISTORY OF PRESENT ILLNESS:   55-year-old male with medical problem consisting of coronary artery disease, congestive heart failure, hypertension, dyslipidemia, gastroesophageal reflux disease was initially seen in consultation on August 18, 2020  Patient was admitted to [facility].  Patient is here for appointment today.  Patient had a blood work and CT scan done last week.  Due to hyperkalemia with potassium of 6.4 and uncontrolled hypertension patient was admitted to the hospital.  Denies any new lymph node enlargement.  Denies any bleeding.  Complains of fatigue.  Denies any bleeding worsening drenching night sweats.          Past Medical History, Past Surgical History, Social History, Family History have been reviewed and are without significant changes except as mentioned.    Review of Systems   Constitutional: Positive for fatigue.        Positive for chronic intermittent night sweats, denies any recent worsening.  Has lost 3 pounds since last clinic visit.   Respiratory: Positive for shortness of breath.    Musculoskeletal: Positive for arthralgias.   Neurological: Positive for dizziness and numbness.   Hematological: Positive for adenopathy (Positive for chronic enlarged lymph node in bilateral neck, denies any recent worsening).      A comprehensive 14 point review of systems was performed and was negative except as mentioned.    Medications:  The current medication list was reviewed in the EMR    ALLERGIES:    Allergies   Allergen Reactions   • Amlodipine Rash   • Imdur [Isosorbide Dinitrate] Rash   • Lisinopril Rash   • Metoprolol Rash       Objective      Vitals:    04/26/21 1117   BP: 120/86   Pulse: 70   Resp: 20   Temp: 97.9 °F (36.6 °C)   TempSrc: Temporal   Weight: 66.7 kg (147 lb)   Height: 175.3 cm (69.02\")   PainSc:   6   PainLoc: Generalized     Current " Status 4/26/2021   ECOG score 1       Physical Exam  Cardiovascular:      Rate and Rhythm: Normal rate and regular rhythm.   Pulmonary:      Breath sounds: Normal breath sounds. No rhonchi.   Abdominal:      General: Bowel sounds are normal.      Palpations: Abdomen is soft.      Tenderness: There is no abdominal tenderness.   Musculoskeletal:      Comments: Decreased range of motion   Lymphadenopathy:      Cervical: Cervical adenopathy (Minimally enlarged lymph node in bilateral neck largest of lymph node measuring 1 cm.) present.   Neurological:      Mental Status: He is alert and oriented to person, place, and time.           RECENT LABS:  Glucose   Date Value Ref Range Status   04/24/2021 100 (H) 65 - 99 mg/dL Final     Glucose, Arterial   Date Value Ref Range Status   12/05/2017 100 mmol/L Final     Sodium   Date Value Ref Range Status   04/24/2021 136 136 - 145 mmol/L Final     Potassium   Date Value Ref Range Status   04/24/2021 4.6 3.5 - 5.2 mmol/L Final     CO2   Date Value Ref Range Status   04/24/2021 21.0 (L) 22.0 - 29.0 mmol/L Final     Chloride   Date Value Ref Range Status   04/24/2021 104 98 - 107 mmol/L Final     Anion Gap   Date Value Ref Range Status   04/24/2021 11.0 5.0 - 15.0 mmol/L Final     Creatinine   Date Value Ref Range Status   04/24/2021 1.98 (H) 0.76 - 1.27 mg/dL Final     BUN   Date Value Ref Range Status   04/24/2021 21 (H) 6 - 20 mg/dL Final     BUN/Creatinine Ratio   Date Value Ref Range Status   04/24/2021 10.6 7.0 - 25.0 Final     Calcium   Date Value Ref Range Status   04/24/2021 8.6 8.6 - 10.5 mg/dL Final     eGFR Non  Amer   Date Value Ref Range Status   04/24/2021 35 (L) >60 mL/min/1.73 Final     Alkaline Phosphatase   Date Value Ref Range Status   04/22/2021 152 (H) 39 - 117 U/L Final     Total Protein   Date Value Ref Range Status   04/22/2021 8.1 6.0 - 8.5 g/dL Final     ALT (SGPT)   Date Value Ref Range Status   04/22/2021 5 1 - 41 U/L Final     AST (SGOT)   Date  Value Ref Range Status   04/22/2021 24 1 - 40 U/L Final     Total Bilirubin   Date Value Ref Range Status   04/22/2021 0.5 0.0 - 1.2 mg/dL Final     Albumin   Date Value Ref Range Status   04/22/2021 5.00 3.50 - 5.20 g/dL Final     Globulin   Date Value Ref Range Status   04/22/2021 3.1 gm/dL Final     Lab Results   Component Value Date    .92 (C) 04/24/2021    HGB 10.0 (L) 04/24/2021    HCT 34.7 (L) 04/24/2021    MCV 92.3 04/24/2021    PLT 90 (L) 04/24/2021     Lab Results   Component Value Date    NEUTROABS 2.74 04/24/2021    IRON 58 (L) 04/22/2021    IRON 69 01/22/2021    IRON 67 10/23/2020    TIBC 428 04/22/2021    TIBC 381 01/22/2021    TIBC 387 10/23/2020    LABIRON 14 (L) 04/22/2021    LABIRON 18 (L) 01/22/2021    LABIRON 17 (L) 10/23/2020    FERRITIN 102.10 04/22/2021    FERRITIN 119.20 01/22/2021    FERRITIN 107.70 10/23/2020    BAEMGBTI43 565 04/22/2021    PXWAMUHW59 1,043 (H) 01/22/2021    TNJQJBGU09 966 (H) 10/23/2020    FOLATE 4.57 (L) 04/22/2021    FOLATE 6.47 01/22/2021    FOLATE >20.00 10/23/2020     No results found for: , LABCA2, AFPTM, HCGQUANT, , CHROMGRNA, 3PPAY26EWJ, CEA, REFLABREPO      PATHOLOGY:  * Cannot find OR log *         RADIOLOGY DATA :  CT Abdomen Pelvis Without Contrast    Result Date: 4/23/2021  1. Stable enlarged axillary, mediastinal, iliac, and periaortic adenopathy in this patient with given history of CLL. 2. Homogeneous enlargement of the spleen again noted and unchanged. 3. Aneurysmal dilatation of the left common and internal iliac arteries as above. Electronically signed by:  Ml Fernandes MD  4/22/2021 1:41 PM CDT Workstation: [id number]    CT Chest Without Contrast Diagnostic    Result Date: 4/23/2021  1. Stable enlarged axillary, mediastinal, iliac, and periaortic adenopathy in this patient with given history of CLL. 2. Homogeneous enlargement of the spleen again noted and unchanged. 3. Aneurysmal dilatation of the left common and internal iliac arteries  as above. Electronically signed by:  Ml Fernandes MD  4/22/2021 1:41 PM CDT Workstation: [id number]    XR Chest 1 View    Result Date: 4/22/2021  1. Stable appearance of the chest without radiographic evidence of acute cardiopulmonary disease. Electronically signed by:  Ml Fernandes MD  4/22/2021 1:06 PM CDT Workstation: [id number]    XR Chest Post CVA Port    Result Date: 4/22/2021  Right-sided PICC line terminates in the superior vena cava without radiographic evidence of immediate complication Electronically signed by:  Sandy Reeder MD  4/22/2021 6:31 PM CDT Workstation: [id number]          Assessment/Plan     1.  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 Monroe.  -Peripheral blood flow cytometry on August 18, 2020 showed 82% of cells showing CD5 plus positive population consistent with chronic lymphocytic leukemia  She has CT of chest, abdomen and pelvis that was done on April 22, 2021 shows stable enlarged lymph node involving axilla, mediastinum periaortic and inguinal region.  Splenomegaly stable at 17 cm  -CBC shows white blood cell count of 2/7/2000, hemoglobin is decreased to 10 and platelet count is decreased to 92,000.  -It was again discussed with patient was notified that development of chronic mild thrombocytopenia is worrisome for progression of CLL and will need to start on chemotherapy.  -We will ask patient to return to clinic in 2 months with repeat CBC, CMP, anemia work-up and CT of chest abdomen and pelvis without contrast to be done prior to that  -We will start patient on acalabrutinib if required in future.    2.  Anemia:  -Multifactorial  -Hemoglobin is decreased to 10  -Iron studies are consistent with worsening iron deficiency consistent with iron malabsorption.  -We will start patient on intravenous Feraheme starting next week.  Side effect of Feraheme including allergic reaction were discussed with patient  -Recommend continue  with B12 1000 mcg p.o. daily  -Folate level is decreased to 4.57 consistent with folate deficiency.  Will increase folic acid to 1 mg twice daily  -We will repeat anemia work-up upon next clinic visit in 2 months.  -Prescription for folic acid has been sent to his pharmacy    3.  Hyperkalemia  -Potassium was 6.4.  Patient was hospitalized potassium level is normalized    4.  Thrombocytopenia:  The secondary to CLL plus splenomegaly.  Platelet count is 92,000    5.  Urinary artery disease status post CABG    6.  Health maintenance patient quit smoking in 2016.  Colonoscopy last 10 years    7. Advance Care Planning: For now patient remains full code and is able to make decisions.  Patient has health care surrogate mentioned on chart.               PHQ-9 Total Score: 0   -Patient is not homicidal or suicidal.  No acute intervention required.    Lylelynn Corona reports a pain score of 6.  Given his pain assessment as noted, treatment options were discussed and the following options were decided upon as a follow-up plan to address the patient's pain: continuation of current treatment plan for pain.         Vladimir Henderson MD  4/26/2021  11:45 CDT        Part of this note may be an electronic transcription/translation of spoken language to printed text using the Dragon Dictation System.          CC:

## 2021-04-26 NOTE — PROGRESS NOTES
Adult Outpatient Nutrition  Assessment    Patient Name:  Lyle Corona  YOB: 1965  MRN: [id number]    Assessment Date:  4/26/2021    Comments: Chart review revealed 55WM coming to [facility] due to CLL. Regular diet. NKFA. Ht 69 in. Wt 147 lb. BMI 21.7. Pt stated has been eating less lately. Does not feel up to cooking. States thinking about trying supplements. Provided suggestions for increasing calories/protein. Hints for easily prepared snacks. Supplement samples given. Written info with RDN's name/number provided.                        Electronically signed by:  Ana Stone RD  04/26/21 14:43 CDT

## 2021-04-27 ENCOUNTER — READMISSION MANAGEMENT (OUTPATIENT)
Dept: CALL CENTER | Facility: HOSPITAL | Age: 56
End: 2021-04-27

## 2021-04-27 NOTE — OUTREACH NOTE
Medical Week 1 Survey      Responses   [facility] [place] patient discharged from?  [place]   Does the patient have one of the following disease processes/diagnoses(primary or secondary)?  Other   Week 1 attempt successful?  No   Unsuccessful attempts  Attempt 1          Belkys Martinez RN

## 2021-04-29 ENCOUNTER — READMISSION MANAGEMENT (OUTPATIENT)
Dept: CALL CENTER | Facility: HOSPITAL | Age: 56
End: 2021-04-29

## 2021-04-29 NOTE — OUTREACH NOTE
Medical Week 1 Survey      Responses   [facility] patient discharged from?  [place]   Does the patient have one of the following disease processes/diagnoses(primary or secondary)?  Other   Week 1 attempt successful?  No   Unsuccessful attempts  Attempt 2          Dede Hammond RN

## 2021-05-03 ENCOUNTER — INFUSION (OUTPATIENT)
Dept: ONCOLOGY | Facility: HOSPITAL | Age: 56
End: 2021-05-03

## 2021-05-03 ENCOUNTER — READMISSION MANAGEMENT (OUTPATIENT)
Dept: CALL CENTER | Facility: HOSPITAL | Age: 56
End: 2021-05-03

## 2021-05-03 VITALS
RESPIRATION RATE: 18 BRPM | TEMPERATURE: 97.8 F | DIASTOLIC BLOOD PRESSURE: 80 MMHG | HEART RATE: 62 BPM | SYSTOLIC BLOOD PRESSURE: 140 MMHG

## 2021-05-03 DIAGNOSIS — D50.9 IRON DEFICIENCY ANEMIA, UNSPECIFIED IRON DEFICIENCY ANEMIA TYPE: ICD-10-CM

## 2021-05-03 DIAGNOSIS — K90.9 IRON MALABSORPTION: Primary | ICD-10-CM

## 2021-05-03 PROCEDURE — 63710000001 DIPHENHYDRAMINE PER 50 MG: Performed by: INTERNAL MEDICINE

## 2021-05-03 PROCEDURE — 25010000002 FERUMOXYTOL 510 MG/17ML SOLUTION 510 MG VIAL: Performed by: INTERNAL MEDICINE

## 2021-05-03 PROCEDURE — 96374 THER/PROPH/DIAG INJ IV PUSH: CPT | Performed by: INTERNAL MEDICINE

## 2021-05-03 PROCEDURE — 96375 TX/PRO/DX INJ NEW DRUG ADDON: CPT | Performed by: INTERNAL MEDICINE

## 2021-05-03 RX ORDER — DIPHENHYDRAMINE HCL 25 MG
25 CAPSULE ORAL ONCE
Status: CANCELLED | OUTPATIENT
Start: 2021-05-10 | End: 2021-05-10

## 2021-05-03 RX ORDER — FAMOTIDINE 10 MG/ML
20 INJECTION, SOLUTION INTRAVENOUS ONCE
Status: COMPLETED | OUTPATIENT
Start: 2021-05-03 | End: 2021-05-03

## 2021-05-03 RX ORDER — FAMOTIDINE 10 MG/ML
20 INJECTION, SOLUTION INTRAVENOUS ONCE
Status: CANCELLED | OUTPATIENT
Start: 2021-05-10 | End: 2021-05-10

## 2021-05-03 RX ORDER — CETIRIZINE HYDROCHLORIDE 10 MG/1
10 TABLET ORAL ONCE
Status: COMPLETED | OUTPATIENT
Start: 2021-05-03 | End: 2021-05-03

## 2021-05-03 RX ORDER — SODIUM CHLORIDE 9 MG/ML
250 INJECTION, SOLUTION INTRAVENOUS ONCE
Status: CANCELLED | OUTPATIENT
Start: 2021-05-10

## 2021-05-03 RX ORDER — SODIUM CHLORIDE 9 MG/ML
250 INJECTION, SOLUTION INTRAVENOUS ONCE
Status: COMPLETED | OUTPATIENT
Start: 2021-05-03 | End: 2021-05-03

## 2021-05-03 RX ORDER — DIPHENHYDRAMINE HCL 25 MG
25 CAPSULE ORAL ONCE
Status: COMPLETED | OUTPATIENT
Start: 2021-05-03 | End: 2021-05-03

## 2021-05-03 RX ORDER — ACETAMINOPHEN 325 MG/1
650 TABLET ORAL ONCE
Status: CANCELLED | OUTPATIENT
Start: 2021-05-10 | End: 2021-05-10

## 2021-05-03 RX ORDER — CETIRIZINE HYDROCHLORIDE 10 MG/1
10 TABLET ORAL ONCE
Status: CANCELLED | OUTPATIENT
Start: 2021-05-10 | End: 2021-05-10

## 2021-05-03 RX ADMIN — FAMOTIDINE 20 MG: 10 INJECTION INTRAVENOUS at 14:05

## 2021-05-03 RX ADMIN — FERUMOXYTOL 510 MG: 510 INJECTION INTRAVENOUS at 14:21

## 2021-05-03 RX ADMIN — SODIUM CHLORIDE 250 ML: 9 INJECTION, SOLUTION INTRAVENOUS at 14:05

## 2021-05-03 RX ADMIN — DIPHENHYDRAMINE HYDROCHLORIDE 25 MG: 25 CAPSULE ORAL at 13:49

## 2021-05-03 RX ADMIN — CETIRIZINE HYDROCHLORIDE 10 MG: 10 TABLET, FILM COATED ORAL at 13:50

## 2021-05-03 NOTE — OUTREACH NOTE
Medical Week 2 Survey      Responses   [facility] patient discharged from?  [place]   Does the patient have one of the following disease processes/diagnoses(primary or secondary)?  Other   Week 2 attempt successful?  No   Unsuccessful attempts  Attempt 1          Tanika Manzanares RN

## 2021-05-07 ENCOUNTER — READMISSION MANAGEMENT (OUTPATIENT)
Dept: CALL CENTER | Facility: HOSPITAL | Age: 56
End: 2021-05-07

## 2021-05-07 NOTE — OUTREACH NOTE
Medical Week 2 Survey      Responses   [facility] patient discharged from?  [place]   Does the patient have one of the following disease processes/diagnoses(primary or secondary)?  Other   Week 2 Call Completed?  Yes   Is the patient interested in additional calls from an ambulatory ?  NOTE:  applies to high risk patients requiring additional follow-up.  No   Revoked  No further contact(revokes)-requires comment   Graduated/Revoked comments  UTR x 4          Lanny Erazo RN

## 2021-05-13 ENCOUNTER — INFUSION (OUTPATIENT)
Dept: ONCOLOGY | Facility: HOSPITAL | Age: 56
End: 2021-05-13

## 2021-05-13 ENCOUNTER — DOCUMENTATION (OUTPATIENT)
Dept: NUTRITION | Facility: HOSPITAL | Age: 56
End: 2021-05-13

## 2021-05-13 VITALS
HEART RATE: 56 BPM | DIASTOLIC BLOOD PRESSURE: 86 MMHG | TEMPERATURE: 98 F | SYSTOLIC BLOOD PRESSURE: 157 MMHG | RESPIRATION RATE: 18 BRPM

## 2021-05-13 DIAGNOSIS — K90.9 IRON MALABSORPTION: Primary | ICD-10-CM

## 2021-05-13 DIAGNOSIS — D50.9 IRON DEFICIENCY ANEMIA, UNSPECIFIED IRON DEFICIENCY ANEMIA TYPE: ICD-10-CM

## 2021-05-13 PROCEDURE — 96374 THER/PROPH/DIAG INJ IV PUSH: CPT | Performed by: NURSE PRACTITIONER

## 2021-05-13 PROCEDURE — 63710000001 DIPHENHYDRAMINE PER 50 MG: Performed by: INTERNAL MEDICINE

## 2021-05-13 PROCEDURE — 96375 TX/PRO/DX INJ NEW DRUG ADDON: CPT | Performed by: INTERNAL MEDICINE

## 2021-05-13 PROCEDURE — 25010000002 FERUMOXYTOL 510 MG/17ML SOLUTION 510 MG VIAL: Performed by: INTERNAL MEDICINE

## 2021-05-13 RX ORDER — ACETAMINOPHEN 325 MG/1
650 TABLET ORAL ONCE
Status: CANCELLED | OUTPATIENT
Start: 2021-05-17 | End: 2021-05-17

## 2021-05-13 RX ORDER — FAMOTIDINE 10 MG/ML
20 INJECTION, SOLUTION INTRAVENOUS ONCE
Status: COMPLETED | OUTPATIENT
Start: 2021-05-13 | End: 2021-05-13

## 2021-05-13 RX ORDER — CETIRIZINE HYDROCHLORIDE 10 MG/1
10 TABLET ORAL ONCE
Status: COMPLETED | OUTPATIENT
Start: 2021-05-13 | End: 2021-05-13

## 2021-05-13 RX ORDER — DIPHENHYDRAMINE HCL 25 MG
25 CAPSULE ORAL ONCE
Status: CANCELLED | OUTPATIENT
Start: 2021-05-17 | End: 2021-05-17

## 2021-05-13 RX ORDER — SODIUM CHLORIDE 9 MG/ML
250 INJECTION, SOLUTION INTRAVENOUS ONCE
Status: CANCELLED | OUTPATIENT
Start: 2021-05-17

## 2021-05-13 RX ORDER — FAMOTIDINE 10 MG/ML
20 INJECTION, SOLUTION INTRAVENOUS ONCE
Status: CANCELLED | OUTPATIENT
Start: 2021-05-17 | End: 2021-05-17

## 2021-05-13 RX ORDER — SODIUM CHLORIDE 9 MG/ML
250 INJECTION, SOLUTION INTRAVENOUS ONCE
Status: COMPLETED | OUTPATIENT
Start: 2021-05-13 | End: 2021-05-13

## 2021-05-13 RX ORDER — DIPHENHYDRAMINE HCL 25 MG
25 CAPSULE ORAL ONCE
Status: COMPLETED | OUTPATIENT
Start: 2021-05-13 | End: 2021-05-13

## 2021-05-13 RX ORDER — CETIRIZINE HYDROCHLORIDE 10 MG/1
10 TABLET ORAL ONCE
Status: CANCELLED | OUTPATIENT
Start: 2021-05-17 | End: 2021-05-17

## 2021-05-13 RX ADMIN — DIPHENHYDRAMINE HYDROCHLORIDE 25 MG: 25 CAPSULE ORAL at 13:08

## 2021-05-13 RX ADMIN — SODIUM CHLORIDE 250 ML: 9 INJECTION, SOLUTION INTRAVENOUS at 13:25

## 2021-05-13 RX ADMIN — FAMOTIDINE 20 MG: 10 INJECTION INTRAVENOUS at 13:30

## 2021-05-13 RX ADMIN — FERUMOXYTOL 510 MG: 510 INJECTION INTRAVENOUS at 14:10

## 2021-05-13 RX ADMIN — CETIRIZINE HYDROCHLORIDE 10 MG: 10 TABLET, FILM COATED ORAL at 13:08

## 2021-07-02 ENCOUNTER — HOSPITAL ENCOUNTER (INPATIENT)
Facility: HOSPITAL | Age: 56
LOS: 1 days | Discharge: HOME | End: 2021-07-03
Attending: EMERGENCY MEDICINE | Admitting: FAMILY MEDICINE

## 2021-07-02 ENCOUNTER — HOSPITAL ENCOUNTER (OUTPATIENT)
Dept: CT IMAGING | Facility: HOSPITAL | Age: 56
Discharge: HOME | End: 2021-07-02

## 2021-07-02 ENCOUNTER — LAB (OUTPATIENT)
Dept: ONCOLOGY | Facility: HOSPITAL | Age: 56
End: 2021-07-02

## 2021-07-02 DIAGNOSIS — E87.5 HYPERKALEMIA: Primary | ICD-10-CM

## 2021-07-02 DIAGNOSIS — D50.9 IRON DEFICIENCY ANEMIA, UNSPECIFIED IRON DEFICIENCY ANEMIA TYPE: Chronic | ICD-10-CM

## 2021-07-02 DIAGNOSIS — C91.10 CLL (CHRONIC LYMPHOCYTIC LEUKEMIA) (HCC): Chronic | ICD-10-CM

## 2021-07-02 DIAGNOSIS — N18.30 STAGE 3 CHRONIC KIDNEY DISEASE, UNSPECIFIED WHETHER STAGE 3A OR 3B CKD (HCC): Chronic | ICD-10-CM

## 2021-07-02 DIAGNOSIS — N18.30 STAGE 3 CHRONIC KIDNEY DISEASE, UNSPECIFIED WHETHER STAGE 3A OR 3B CKD (HCC): ICD-10-CM

## 2021-07-02 DIAGNOSIS — C91.10 CLL (CHRONIC LYMPHOCYTIC LEUKEMIA) (HCC): ICD-10-CM

## 2021-07-02 DIAGNOSIS — D69.6 THROMBOCYTOPENIA (HCC): ICD-10-CM

## 2021-07-02 DIAGNOSIS — D50.9 IRON DEFICIENCY ANEMIA, UNSPECIFIED IRON DEFICIENCY ANEMIA TYPE: ICD-10-CM

## 2021-07-02 LAB
ALBUMIN SERPL-MCNC: 4.4 G/DL (ref 3.5–5.2)
ALBUMIN SERPL-MCNC: 4.6 G/DL (ref 3.5–5.2)
ALBUMIN/GLOB SERPL: 1.7 G/DL
ALBUMIN/GLOB SERPL: 2.2 G/DL
ALP SERPL-CCNC: 148 U/L (ref 39–117)
ALP SERPL-CCNC: 152 U/L (ref 39–117)
ALT SERPL W P-5'-P-CCNC: 6 U/L (ref 1–41)
ALT SERPL W P-5'-P-CCNC: 6 U/L (ref 1–41)
ANION GAP SERPL CALCULATED.3IONS-SCNC: 11 MMOL/L (ref 5–15)
ANION GAP SERPL CALCULATED.3IONS-SCNC: 13 MMOL/L (ref 5–15)
ANISOCYTOSIS BLD QL: ABNORMAL
AST SERPL-CCNC: 19 U/L (ref 1–40)
AST SERPL-CCNC: 22 U/L (ref 1–40)
BASOPHILS # BLD AUTO: 0.22 10*3/MM3 (ref 0–0.2)
BASOPHILS NFR BLD AUTO: 0.1 % (ref 0–1.5)
BILIRUB SERPL-MCNC: 0.4 MG/DL (ref 0–1.2)
BILIRUB SERPL-MCNC: 0.4 MG/DL (ref 0–1.2)
BILIRUB UR QL STRIP: NEGATIVE
BUN SERPL-MCNC: 21 MG/DL (ref 6–20)
BUN SERPL-MCNC: 22 MG/DL (ref 6–20)
BUN/CREAT SERPL: 10.5 (ref 7–25)
BUN/CREAT SERPL: 10.6 (ref 7–25)
CALCIUM SPEC-SCNC: 9.4 MG/DL (ref 8.6–10.5)
CALCIUM SPEC-SCNC: 9.5 MG/DL (ref 8.6–10.5)
CHLORIDE SERPL-SCNC: 101 MMOL/L (ref 98–107)
CHLORIDE SERPL-SCNC: 101 MMOL/L (ref 98–107)
CLARITY UR: CLEAR
CO2 SERPL-SCNC: 18 MMOL/L (ref 22–29)
CO2 SERPL-SCNC: 23 MMOL/L (ref 22–29)
COLOR UR: YELLOW
CREAT SERPL-MCNC: 1.98 MG/DL (ref 0.76–1.27)
CREAT SERPL-MCNC: 2.09 MG/DL (ref 0.76–1.27)
DEPRECATED RDW RBC AUTO: 54 FL (ref 37–54)
DEPRECATED RDW RBC AUTO: 54.1 FL (ref 37–54)
EOSINOPHIL # BLD AUTO: 0.12 10*3/MM3 (ref 0–0.4)
EOSINOPHIL NFR BLD AUTO: 0 % (ref 0.3–6.2)
ERYTHROCYTE [DISTWIDTH] IN BLOOD BY AUTOMATED COUNT: 18.1 % (ref 12.3–15.4)
ERYTHROCYTE [DISTWIDTH] IN BLOOD BY AUTOMATED COUNT: 18.1 % (ref 12.3–15.4)
FERRITIN SERPL-MCNC: 245.2 NG/ML (ref 30–400)
FLUAV RNA RESP QL NAA+PROBE: NOT DETECTED
FLUBV RNA RESP QL NAA+PROBE: NOT DETECTED
FOLATE SERPL-MCNC: >20 NG/ML (ref 4.78–24.2)
GFR SERPL CREATININE-BSD FRML MDRD: 33 ML/MIN/1.73
GFR SERPL CREATININE-BSD FRML MDRD: 35 ML/MIN/1.73
GLOBULIN UR ELPH-MCNC: 2.1 GM/DL
GLOBULIN UR ELPH-MCNC: 2.6 GM/DL
GLUCOSE SERPL-MCNC: 100 MG/DL (ref 65–99)
GLUCOSE SERPL-MCNC: 102 MG/DL (ref 65–99)
GLUCOSE UR STRIP-MCNC: ABNORMAL MG/DL
HCT VFR BLD AUTO: 35.4 % (ref 37.5–51)
HCT VFR BLD AUTO: 36.7 % (ref 37.5–51)
HGB BLD-MCNC: 10.6 G/DL (ref 13–17.7)
HGB BLD-MCNC: 10.8 G/DL (ref 13–17.7)
HGB UR QL STRIP.AUTO: NEGATIVE
HOLD SPECIMEN: NORMAL
IMM GRANULOCYTES # BLD AUTO: 0.32 10*3/MM3 (ref 0–0.05)
IMM GRANULOCYTES NFR BLD AUTO: 0.1 % (ref 0–0.5)
IRON 24H UR-MRATE: 57 MCG/DL (ref 59–158)
IRON SATN MFR SERPL: 20 % (ref 20–50)
KETONES UR QL STRIP: NEGATIVE
LEUKOCYTE ESTERASE UR QL STRIP.AUTO: NEGATIVE
LYMPHOCYTES # BLD AUTO: 256.46 10*3/MM3 (ref 0.7–3.1)
LYMPHOCYTES # BLD MANUAL: 256.9 10*3/MM3 (ref 0.7–3.1)
LYMPHOCYTES NFR BLD AUTO: 96.8 % (ref 19.6–45.3)
LYMPHOCYTES NFR BLD MANUAL: 97 % (ref 19.6–45.3)
MAGNESIUM SERPL-MCNC: 2.1 MG/DL (ref 1.6–2.6)
MCH RBC QN AUTO: 27.2 PG (ref 26.6–33)
MCH RBC QN AUTO: 28 PG (ref 26.6–33)
MCHC RBC AUTO-ENTMCNC: 29.4 G/DL (ref 31.5–35.7)
MCHC RBC AUTO-ENTMCNC: 29.9 G/DL (ref 31.5–35.7)
MCV RBC AUTO: 92.4 FL (ref 79–97)
MCV RBC AUTO: 93.4 FL (ref 79–97)
MONOCYTES # BLD AUTO: 4.33 10*3/MM3 (ref 0.1–0.9)
MONOCYTES NFR BLD AUTO: 1.6 % (ref 5–12)
NEUTROPHILS # BLD AUTO: 7.95 10*3/MM3 (ref 1.7–7)
NEUTROPHILS NFR BLD AUTO: 1.4 % (ref 42.7–76)
NEUTROPHILS NFR BLD AUTO: 3.44 10*3/MM3 (ref 1.7–7)
NEUTROPHILS NFR BLD MANUAL: 3 % (ref 42.7–76)
NITRITE UR QL STRIP: NEGATIVE
NRBC BLD AUTO-RTO: 0 /100 WBC (ref 0–0.2)
PH UR STRIP.AUTO: 6 [PH] (ref 5–9)
PLATELET # BLD AUTO: 100 10*3/MM3 (ref 140–450)
PLATELET # BLD AUTO: 98 10*3/MM3 (ref 140–450)
PMV BLD AUTO: 9.5 FL (ref 6–12)
PMV BLD AUTO: 9.6 FL (ref 6–12)
POTASSIUM SERPL-SCNC: 6.3 MMOL/L (ref 3.5–5.2)
POTASSIUM SERPL-SCNC: 7.3 MMOL/L (ref 3.5–5.2)
PROT SERPL-MCNC: 6.7 G/DL (ref 6–8.5)
PROT SERPL-MCNC: 7 G/DL (ref 6–8.5)
PROT UR QL STRIP: NEGATIVE
QT INTERVAL: 474 MS
QTC INTERVAL: 477 MS
RBC # BLD AUTO: 3.79 10*6/MM3 (ref 4.14–5.8)
RBC # BLD AUTO: 3.97 10*6/MM3 (ref 4.14–5.8)
SARS-COV-2 RNA RESP QL NAA+PROBE: NOT DETECTED
SMALL PLATELETS BLD QL SMEAR: ABNORMAL
SMUDGE CELLS IN BLOOD BY LIGHT MICROSCOPY: 20 /100 WBC
SODIUM SERPL-SCNC: 132 MMOL/L (ref 136–145)
SODIUM SERPL-SCNC: 135 MMOL/L (ref 136–145)
SP GR UR STRIP: 1.01 (ref 1–1.03)
TIBC SERPL-MCNC: 291 MCG/DL (ref 298–536)
TRANSFERRIN SERPL-MCNC: 195 MG/DL (ref 200–360)
UROBILINOGEN UR QL STRIP: ABNORMAL
VIT B12 BLD-MCNC: 795 PG/ML (ref 211–946)
WBC # BLD AUTO: 264.85 10*3/MM3 (ref 3.4–10.8)
WBC # BLD AUTO: 264.89 10*3/MM3 (ref 3.4–10.8)
WBC MORPH BLD: NORMAL

## 2021-07-02 PROCEDURE — 25010000002 MORPHINE PER 10 MG: Performed by: FAMILY MEDICINE

## 2021-07-02 PROCEDURE — 84466 ASSAY OF TRANSFERRIN: CPT

## 2021-07-02 PROCEDURE — 93010 ELECTROCARDIOGRAM REPORT: CPT | Performed by: INTERNAL MEDICINE

## 2021-07-02 PROCEDURE — 99285 EMERGENCY DEPT VISIT HI MDM: CPT

## 2021-07-02 PROCEDURE — 82728 ASSAY OF FERRITIN: CPT

## 2021-07-02 PROCEDURE — 36415 COLL VENOUS BLD VENIPUNCTURE: CPT | Performed by: NURSE PRACTITIONER

## 2021-07-02 PROCEDURE — 36415 COLL VENOUS BLD VENIPUNCTURE: CPT | Performed by: PHYSICIAN ASSISTANT

## 2021-07-02 PROCEDURE — 87636 SARSCOV2 & INF A&B AMP PRB: CPT | Performed by: PHYSICIAN ASSISTANT

## 2021-07-02 PROCEDURE — 81003 URINALYSIS AUTO W/O SCOPE: CPT | Performed by: PHYSICIAN ASSISTANT

## 2021-07-02 PROCEDURE — 74176 CT ABD & PELVIS W/O CONTRAST: CPT

## 2021-07-02 PROCEDURE — 63710000001 INSULIN REGULAR HUMAN PER 5 UNITS: Performed by: PHYSICIAN ASSISTANT

## 2021-07-02 PROCEDURE — 82746 ASSAY OF FOLIC ACID SERUM: CPT

## 2021-07-02 PROCEDURE — 25010000002 ONDANSETRON PER 1 MG: Performed by: PHYSICIAN ASSISTANT

## 2021-07-02 PROCEDURE — 80053 COMPREHEN METABOLIC PANEL: CPT

## 2021-07-02 PROCEDURE — 71250 CT THORAX DX C-: CPT

## 2021-07-02 PROCEDURE — 82607 VITAMIN B-12: CPT

## 2021-07-02 PROCEDURE — 85025 COMPLETE CBC W/AUTO DIFF WBC: CPT | Performed by: PHYSICIAN ASSISTANT

## 2021-07-02 PROCEDURE — 25010000002 CALCIUM GLUCONATE PER 10 ML: Performed by: PHYSICIAN ASSISTANT

## 2021-07-02 PROCEDURE — 83735 ASSAY OF MAGNESIUM: CPT | Performed by: PHYSICIAN ASSISTANT

## 2021-07-02 PROCEDURE — 80053 COMPREHEN METABOLIC PANEL: CPT | Performed by: PHYSICIAN ASSISTANT

## 2021-07-02 PROCEDURE — 85025 COMPLETE CBC W/AUTO DIFF WBC: CPT

## 2021-07-02 PROCEDURE — 25010000002 HYDRALAZINE PER 20 MG: Performed by: PHYSICIAN ASSISTANT

## 2021-07-02 PROCEDURE — 85007 BL SMEAR W/DIFF WBC COUNT: CPT

## 2021-07-02 PROCEDURE — 93005 ELECTROCARDIOGRAM TRACING: CPT | Performed by: PHYSICIAN ASSISTANT

## 2021-07-02 PROCEDURE — 83540 ASSAY OF IRON: CPT

## 2021-07-02 RX ORDER — SUCRALFATE 1 G/1
1 TABLET ORAL 4 TIMES DAILY
Status: DISCONTINUED | OUTPATIENT
Start: 2021-07-02 | End: 2021-07-03 | Stop reason: HOSPADM

## 2021-07-02 RX ORDER — FAMOTIDINE 40 MG/1
40 TABLET, FILM COATED ORAL 2 TIMES DAILY
COMMUNITY

## 2021-07-02 RX ORDER — HYDRALAZINE HYDROCHLORIDE 20 MG/ML
20 INJECTION INTRAMUSCULAR; INTRAVENOUS ONCE
Status: COMPLETED | OUTPATIENT
Start: 2021-07-02 | End: 2021-07-02

## 2021-07-02 RX ORDER — RANOLAZINE 500 MG/1
500 TABLET, EXTENDED RELEASE ORAL EVERY 12 HOURS SCHEDULED
Status: DISCONTINUED | OUTPATIENT
Start: 2021-07-02 | End: 2021-07-03 | Stop reason: HOSPADM

## 2021-07-02 RX ORDER — SODIUM CHLORIDE 0.9 % (FLUSH) 0.9 %
10 SYRINGE (ML) INJECTION AS NEEDED
Status: DISCONTINUED | OUTPATIENT
Start: 2021-07-02 | End: 2021-07-03 | Stop reason: HOSPADM

## 2021-07-02 RX ORDER — ACETAMINOPHEN 325 MG/1
650 TABLET ORAL EVERY 4 HOURS PRN
Status: DISCONTINUED | OUTPATIENT
Start: 2021-07-02 | End: 2021-07-03 | Stop reason: HOSPADM

## 2021-07-02 RX ORDER — ALLOPURINOL 100 MG/1
100 TABLET ORAL DAILY
Status: DISCONTINUED | OUTPATIENT
Start: 2021-07-03 | End: 2021-07-03 | Stop reason: HOSPADM

## 2021-07-02 RX ORDER — SODIUM CHLORIDE 0.9 % (FLUSH) 0.9 %
10 SYRINGE (ML) INJECTION EVERY 12 HOURS SCHEDULED
Status: DISCONTINUED | OUTPATIENT
Start: 2021-07-02 | End: 2021-07-03 | Stop reason: HOSPADM

## 2021-07-02 RX ORDER — DEXTROSE MONOHYDRATE 25 G/50ML
50 INJECTION, SOLUTION INTRAVENOUS ONCE
Status: COMPLETED | OUTPATIENT
Start: 2021-07-02 | End: 2021-07-02

## 2021-07-02 RX ORDER — HYDRALAZINE HYDROCHLORIDE 50 MG/1
50 TABLET, FILM COATED ORAL EVERY 8 HOURS SCHEDULED
Status: DISCONTINUED | OUTPATIENT
Start: 2021-07-02 | End: 2021-07-03

## 2021-07-02 RX ORDER — MORPHINE SULFATE 2 MG/ML
1 INJECTION, SOLUTION INTRAMUSCULAR; INTRAVENOUS EVERY 4 HOURS PRN
Status: DISCONTINUED | OUTPATIENT
Start: 2021-07-02 | End: 2021-07-03 | Stop reason: HOSPADM

## 2021-07-02 RX ORDER — FAMOTIDINE 40 MG/1
40 TABLET, FILM COATED ORAL 2 TIMES DAILY
Status: DISCONTINUED | OUTPATIENT
Start: 2021-07-02 | End: 2021-07-03 | Stop reason: HOSPADM

## 2021-07-02 RX ORDER — HYDRALAZINE HYDROCHLORIDE 50 MG/1
50 TABLET, FILM COATED ORAL ONCE
Status: COMPLETED | OUTPATIENT
Start: 2021-07-02 | End: 2021-07-02

## 2021-07-02 RX ORDER — PRAVASTATIN SODIUM 40 MG
40 TABLET ORAL NIGHTLY
Status: DISCONTINUED | OUTPATIENT
Start: 2021-07-02 | End: 2021-07-03 | Stop reason: HOSPADM

## 2021-07-02 RX ORDER — ESCITALOPRAM OXALATE 10 MG/1
10 TABLET ORAL DAILY
Status: DISCONTINUED | OUTPATIENT
Start: 2021-07-03 | End: 2021-07-03 | Stop reason: HOSPADM

## 2021-07-02 RX ORDER — SODIUM POLYSTYRENE SULFONATE 15 G/60ML
15 SUSPENSION ORAL; RECTAL ONCE
Status: COMPLETED | OUTPATIENT
Start: 2021-07-02 | End: 2021-07-02

## 2021-07-02 RX ORDER — ONDANSETRON 2 MG/ML
4 INJECTION INTRAMUSCULAR; INTRAVENOUS ONCE
Status: COMPLETED | OUTPATIENT
Start: 2021-07-02 | End: 2021-07-02

## 2021-07-02 RX ORDER — SODIUM CHLORIDE 9 MG/ML
50 INJECTION, SOLUTION INTRAVENOUS CONTINUOUS
Status: DISCONTINUED | OUTPATIENT
Start: 2021-07-02 | End: 2021-07-03

## 2021-07-02 RX ORDER — CARVEDILOL 25 MG/1
25 TABLET ORAL 2 TIMES DAILY WITH MEALS
Status: DISCONTINUED | OUTPATIENT
Start: 2021-07-02 | End: 2021-07-03 | Stop reason: HOSPADM

## 2021-07-02 RX ORDER — NALOXONE HCL 0.4 MG/ML
0.4 VIAL (ML) INJECTION
Status: DISCONTINUED | OUTPATIENT
Start: 2021-07-02 | End: 2021-07-03 | Stop reason: HOSPADM

## 2021-07-02 RX ORDER — ASPIRIN 81 MG/1
81 TABLET ORAL DAILY
Status: DISCONTINUED | OUTPATIENT
Start: 2021-07-02 | End: 2021-07-03 | Stop reason: HOSPADM

## 2021-07-02 RX ADMIN — SODIUM BICARBONATE 50 MEQ: 84 INJECTION INTRAVENOUS at 14:59

## 2021-07-02 RX ADMIN — FAMOTIDINE 40 MG: 40 TABLET, FILM COATED ORAL at 21:30

## 2021-07-02 RX ADMIN — CARVEDILOL 25 MG: 25 TABLET, FILM COATED ORAL at 19:32

## 2021-07-02 RX ADMIN — ASPIRIN 81 MG: 81 TABLET, FILM COATED ORAL at 18:01

## 2021-07-02 RX ADMIN — HYDRALAZINE HYDROCHLORIDE 20 MG: 20 INJECTION INTRAMUSCULAR; INTRAVENOUS at 13:25

## 2021-07-02 RX ADMIN — MORPHINE SULFATE 1 MG: 2 INJECTION, SOLUTION INTRAMUSCULAR; INTRAVENOUS at 22:03

## 2021-07-02 RX ADMIN — SODIUM CHLORIDE 50 ML/HR: 9 INJECTION, SOLUTION INTRAVENOUS at 18:02

## 2021-07-02 RX ADMIN — PRAVASTATIN SODIUM 40 MG: 40 TABLET ORAL at 21:26

## 2021-07-02 RX ADMIN — DEXTROSE MONOHYDRATE 50 ML: 500 INJECTION PARENTERAL at 14:52

## 2021-07-02 RX ADMIN — SUCRALFATE 1 G: 1 TABLET ORAL at 22:00

## 2021-07-02 RX ADMIN — HYDRALAZINE HYDROCHLORIDE 50 MG: 50 TABLET, FILM COATED ORAL at 16:43

## 2021-07-02 RX ADMIN — HUMAN INSULIN 10 UNITS: 100 INJECTION, SOLUTION SUBCUTANEOUS at 14:57

## 2021-07-02 RX ADMIN — ONDANSETRON 4 MG: 2 INJECTION INTRAMUSCULAR; INTRAVENOUS at 16:56

## 2021-07-02 RX ADMIN — SODIUM POLYSTYRENE SULFONATE 15 G: 15 SUSPENSION ORAL; RECTAL at 16:56

## 2021-07-02 RX ADMIN — CALCIUM GLUCONATE 1 G: 98 INJECTION, SOLUTION INTRAVENOUS at 15:01

## 2021-07-02 RX ADMIN — RANOLAZINE 500 MG: 500 TABLET, FILM COATED, EXTENDED RELEASE ORAL at 21:26

## 2021-07-02 NOTE — ED NOTES
Pt states that he is unable to provide urine specimen at this time.      Joseline Dowd RN  07/02/21 [id number]

## 2021-07-02 NOTE — ED NOTES
Urinal placed at bedside and instructed patient to provide urine specimen.       Joseline Dowd RN  07/02/21 3110

## 2021-07-02 NOTE — H&P
[facility] Medicine Admission      Date of Admission: 7/2/2021      Primary Care Physician: Fernanda Pope APRN      Chief Complaint: Elevated potassium    HPI:    This is a 55-year-old male with concurrent medical history of leukemia, chronic kidney disease stage III, COPD, hypertension, GERD presenting to the ER with complaint of having abnormal labs.  He went to his oncologist office and had routine labs done and his potassium level was around 7 and when he came to the ER his labs were rechecked and it was around 6.  Patient denies any chest pain he does have some occasional shortness of air.    Past Medical History:  has a past medical history of Aneurysm of infrarenal abdominal aorta (CMS/HCC), Anxiety, Cervical radiculitis, Cervical spondylosis without myelopathy, CHF (congestive heart failure) (CMS/HCC), Chronic kidney disease, stage 3 (CMS/HCC), CLL (chronic lymphocytic leukemia) (CMS/HCC), Common iliac aneurysm (CMS/HCC), COPD (chronic obstructive pulmonary disease) (CMS/HCC), Coronary arteriosclerosis, Degeneration of cervical intervertebral disc, Elevated cholesterol, Essential hypertension, GERD (gastroesophageal reflux disease), Headache, History of transfusion, Hyperlipidemia, Intermittent claudication (CMS/HCC), Myocardial infarction (CMS/HCC), and Uric acid renal calculus.    Past Surgical History:  has a past surgical history that includes Cardiac catheterization (05/25/2016); Cholecystectomy; Hernia repair; Cervical fusion; Spinal fusion; Appendectomy; Cardiac catheterization (N/A, 11/28/2017); Coronary artery bypass graft (N/A, 12/4/2017); Abdominal surgery; Colonoscopy; Esophagogastroduodenoscopy; and Cardiac surgery.    Family History: family history includes Cancer in his brother and father; Diabetes in some other family members; Hyperlipidemia in his father and mother; Hypertension in his father and mother.    Social History:  reports that he quit  smoking about 3 years ago. His smoking use included cigarettes. He has a 96.00 pack-year smoking history. He has never used smokeless tobacco. He reports current drug use. Frequency: 3.00 times per week. Drug: Marijuana. He reports that he does not drink alcohol.    Allergies:   Allergies   Allergen Reactions   • Amlodipine Rash   • Imdur [Isosorbide Dinitrate] Rash   • Lisinopril Rash   • Metoprolol Rash       Medications: Scheduled Meds:sodium polystyrene, 15 g, Oral, Once      Continuous Infusions:   PRN Meds:.•  [COMPLETED] Insert peripheral IV **AND** sodium chloride  No current facility-administered medications on file prior to encounter.     Current Outpatient Medications on File Prior to Encounter   Medication Sig Dispense Refill   • acetaminophen (TYLENOL) 325 MG tablet Take 2 tablets by mouth Every 4 (Four) Hours As Needed for Mild Pain , Headache or Fever.     • allopurinol (ZYLOPRIM) 100 MG tablet Take 100 mg by mouth Daily.     • aspirin 81 MG EC tablet Take 1 tablet by mouth Daily.     • carvedilol (COREG) 25 MG tablet Take 1 tablet by mouth 2 (Two) Times a Day With Meals. 60 tablet 1   • cyanocobalamin (VITAMIN B-12) 1000 MCG tablet Take 1 tablet by mouth Daily. 90 tablet 1   • docusate sodium (COLACE) 100 MG capsule Take 1 capsule by mouth 2 (Two) Times a Day As Needed for Constipation. 60 capsule 2   • escitalopram (LEXAPRO) 10 MG tablet Take 10 mg by mouth Daily.     • famotidine (PEPCID) 20 MG tablet Take 1 tablet by mouth 2 (Two) Times a Day. 60 tablet 1   • ferrous sulfate 325 (65 FE) MG tablet Take 1 tablet by mouth Daily With Breakfast. 30 tablet 3   • folic acid (FOLVITE) 1 MG tablet Take 1 tablet by mouth 2 (two) times a day. 180 tablet 1   • hydrALAZINE (APRESOLINE) 50 MG tablet Take 1 tablet by mouth Every 8 (Eight) Hours. 90 tablet 1   • nitroglycerin (NITROSTAT) 0.4 MG SL tablet DISSOLVE ONE TABLET UNDER THE TONGUE EVERY 5 MINUTES AS NEEDED FOR CHEST PAIN. DO NOT EXCEED A TOTAL OF 3  DOSES IN 15 MINUTES. SEEK MEDICAL     • ondansetron (Zofran) 4 MG tablet Take 1 tablet by mouth Every 8 (Eight) Hours As Needed for Nausea or Vomiting. 40 tablet 1   • pravastatin (PRAVACHOL) 40 MG tablet Take 1 tablet by mouth every night. 90 tablet 4   • ranolazine (RANEXA) 500 MG 12 hr tablet Take 1 tablet by mouth Every 12 (Twelve) Hours. 60 tablet 0   • sucralfate (CARAFATE) 1 g tablet Take 1 g by mouth 4 (Four) Times a Day.     • vitamin B-12 (CYANOCOBALAMIN) 1000 MCG tablet Take 1 tablet by mouth Daily. 90 tablet 1       Review of Systems:  Review of Systems   Respiratory: Positive for shortness of breath.    Cardiovascular: Negative for chest pain.   Gastrointestinal: Negative for diarrhea.   Genitourinary: Negative for dysuria.      Otherwise complete ROS is negative except as mentioned above.    Physical Exam:   Temp:  [97.3 °F (36.3 °C)] 97.3 °F (36.3 °C)  Heart Rate:  [60-72] 72  Resp:  [18-20] 18  BP: (166-212)/() 166/85  Physical Exam  Vitals and nursing note reviewed.   Constitutional:       General: He is not in acute distress.     Appearance: He is well-developed. He is not diaphoretic.   HENT:      Head: Normocephalic and atraumatic.   Cardiovascular:      Rate and Rhythm: Normal rate.   Pulmonary:      Effort: Pulmonary effort is normal. No respiratory distress.      Breath sounds: No wheezing.   Abdominal:      General: There is no distension.      Palpations: Abdomen is soft.   Musculoskeletal:         General: Normal range of motion.   Skin:     General: Skin is warm and dry.   Neurological:      Mental Status: He is alert.      Cranial Nerves: No cranial nerve deficit.   Psychiatric:         Behavior: Behavior normal.         Thought Content: Thought content normal.         Judgment: Judgment normal.           Results Reviewed:  I have personally reviewed current lab, radiology, and data and agree with results.  Lab Results (last 24 hours)     Procedure Component Value Units Date/Time     Urinalysis With Microscopic If Indicated (No Culture) - Urine, Clean Catch [[id number]]  (Abnormal) Collected: 07/02/21 1529    Specimen: Urine, Clean Catch Updated: 07/02/21 1534     Color, UA Yellow     Appearance, UA Clear     pH, UA 6.0     Specific Gravity, UA 1.006     Glucose,  mg/dL (Trace)     Ketones, UA Negative     Bilirubin, UA Negative     Blood, UA Negative     Protein, UA Negative     Leuk Esterase, UA Negative     Nitrite, UA Negative     Urobilinogen, UA 0.2 E.U./dL    Narrative:      Urine microscopic not indicated.    Extra Tubes [[id number]] Collected: 07/02/21 1311    Specimen: Blood, Venous Line Updated: 07/02/21 1415    Narrative:      The following orders were created for panel order Extra Tubes.  Procedure                               Abnormality         Status                     ---------                               -----------         ------                     Gold Top - SST[[id number]]                                   Final result                 Please view results for these tests on the individual orders.    Gold Top - SST [[id number]] Collected: 07/02/21 1311    Specimen: Blood Updated: 07/02/21 1415     Extra Tube Hold for add-ons.     Comment: Auto resulted.       Comprehensive Metabolic Panel [[id number]]  (Abnormal) Collected: 07/02/21 1311    Specimen: Blood Updated: 07/02/21 1411     Glucose 102 mg/dL      BUN 22 mg/dL      Creatinine 2.09 mg/dL      Sodium 135 mmol/L      Potassium 6.3 mmol/L      Chloride 101 mmol/L      CO2 23.0 mmol/L      Calcium 9.5 mg/dL      Total Protein 6.7 g/dL      Albumin 4.60 g/dL      ALT (SGPT) 6 U/L      AST (SGOT) 22 U/L      Alkaline Phosphatase 148 U/L      Total Bilirubin 0.4 mg/dL      eGFR Non African Amer 33 mL/min/1.73      Globulin 2.1 gm/dL      A/G Ratio 2.2 g/dL      BUN/Creatinine Ratio 10.5     Anion Gap 11.0 mmol/L     Narrative:      GFR Normal >60  Chronic Kidney Disease <60  Kidney Failure <15      Magnesium [[id number]]   (Normal) Collected: 07/02/21 1311    Specimen: Blood Updated: 07/02/21 1407     Magnesium 2.1 mg/dL     COVID-19 and FLU A/B PCR - Swab, Nasopharynx [[id number]]  (Normal) Collected: 07/02/21 1317    Specimen: Swab from Nasopharynx Updated: 07/02/21 1347     COVID19 Not Detected     Influenza A PCR Not Detected     Influenza B PCR Not Detected    Narrative:      Fact sheet for providers: https://www.fda.gov/media/747488/download    Fact sheet for patients: https://www.fda.gov/media/853466/download    Test performed by PCR.    CBC & Differential [[id number]]  (Abnormal) Collected: 07/02/21 1233    Specimen: Blood Updated: 07/02/21 1248    Narrative:      The following orders were created for panel order CBC & Differential.  Procedure                               Abnormality         Status                     ---------                               -----------         ------                     Scan Slide[[id number]]                                                                  CBC Auto Differential[[id number]]        Abnormal            Final result                 Please view results for these tests on the individual orders.    CBC Auto Differential [[id number]]  (Abnormal) Collected: 07/02/21 1233    Specimen: Blood Updated: 07/02/21 1246     .89 10*3/mm3      RBC 3.97 10*6/mm3      Hemoglobin 10.8 g/dL      Hematocrit 36.7 %      MCV 92.4 fL      MCH 27.2 pg      MCHC 29.4 g/dL      RDW 18.1 %      RDW-SD 54.0 fl      MPV 9.5 fL      Platelets 100 10*3/mm3      Neutrophil % 1.4 %      Lymphocyte % 96.8 %      Monocyte % 1.6 %      Eosinophil % 0.0 %      Basophil % 0.1 %      Immature Grans % 0.1 %      Neutrophils, Absolute 3.44 10*3/mm3      Lymphocytes, Absolute 256.46 10*3/mm3      Monocytes, Absolute 4.33 10*3/mm3      Eosinophils, Absolute 0.12 10*3/mm3      Basophils, Absolute 0.22 10*3/mm3      Immature Grans, Absolute 0.32 10*3/mm3      nRBC 0.0 /100 WBC         Imaging Results (Last 24 Hours)      ** No results found for the last 24 hours. **            Assessment:    Active Hospital Problems    Diagnosis    • Hyperkalemia          Hyperkalemia-we will continue to monitor, Kayexalate has been ordered.  We will start on IV fluids    Chronic kidney disease-we will monitor creatinine level and urine output    Leukemia-we will continue outpatient follow-up with Dr. Henderson    Hypertension-continue to manage, continue with home medications    COPD-not in exacerbation-nebulizer treatments as needed    DVT prophylaxis-SCDs         I confirmed that the patient's Advance Care Plan is present, code status is documented, or surrogate decision maker is listed in the patient's medical record.             Fco Whitlock MD  07/02/21  15:48 CDT

## 2021-07-02 NOTE — ED PROVIDER NOTES
Subjective   Patient presents to emergency department for hyperkalemia.  States he had routine labs drawn today and was told to come to the emergency department.  States he has had a few palpitations but denies any other symptoms.  Current diagnosis of Leukemia.        History provided by:  Patient   used: No        Review of Systems   Constitutional: Negative for chills and fever.   HENT: Negative for sore throat and trouble swallowing.    Respiratory: Negative for cough, shortness of breath and wheezing.    Cardiovascular: Positive for palpitations.   Gastrointestinal: Negative for abdominal pain, nausea and vomiting.   Genitourinary: Negative for dysuria and flank pain.   Skin: Negative for color change and rash.   Allergic/Immunologic: Negative for immunocompromised state.   Neurological: Negative for syncope and weakness.   Hematological: Does not bruise/bleed easily.   Psychiatric/Behavioral: Negative for confusion.       Past Medical History:   Diagnosis Date   • Aneurysm of infrarenal abdominal aorta (CMS/HCC)    • Anxiety    • Cervical radiculitis    • Cervical spondylosis without myelopathy    • CHF (congestive heart failure) (CMS/HCC)    • Chronic kidney disease, stage 3 (CMS/HCC)    • CLL (chronic lymphocytic leukemia) (CMS/HCC)    • Common iliac aneurysm (CMS/HCC)    • COPD (chronic obstructive pulmonary disease) (CMS/HCC)    • Coronary arteriosclerosis    • Degeneration of cervical intervertebral disc    • Elevated cholesterol    • Essential hypertension    • GERD (gastroesophageal reflux disease)    • Headache    • History of transfusion    • Hyperlipidemia    • Intermittent claudication (CMS/HCC)    • Myocardial infarction (CMS/HCC)    • Uric acid renal calculus        Allergies   Allergen Reactions   • Amlodipine Rash   • Imdur [Isosorbide Dinitrate] Rash   • Lisinopril Rash   • Metoprolol Rash       Past Surgical History:   Procedure Laterality Date   • ABDOMINAL SURGERY     •  "APPENDECTOMY     • CARDIAC CATHETERIZATION  05/25/2016    Cardiac cath 60839 (2) - Patent left circumflex stent.Chronic total occlusion of the RCA,more than 20 mm in length.   • CARDIAC CATHETERIZATION N/A 11/28/2017    Procedure: Left Heart Cath/ PCI if indicated;  Surgeon: Carlos Charles MD;  Location: [facility] CATH INVASIVE LOCATION;  Service:    • CARDIAC SURGERY     • CERVICAL FUSION     • CHOLECYSTECTOMY     • COLONOSCOPY     • CORONARY ARTERY BYPASS GRAFT N/A 12/4/2017    Procedure: CORONARY ARTERY BYPASS GRAFTINGX3, ENDOSCOPIC VEIN HARVEST     (CELL SAVER);  Surgeon: Darien Dejesus MD;  Location: [facility] OR;  Service:    • ENDOSCOPY     • HERNIA REPAIR     • SPINAL FUSION         Family History   Problem Relation Age of Onset   • Hyperlipidemia Mother    • Hypertension Mother    • Cancer Father    • Hyperlipidemia Father    • Hypertension Father    • Cancer Brother    • Diabetes Other    • Diabetes Other        Social History     Socioeconomic History   • Marital status: Single     Spouse name: Not on file   • Number of children: Not on file   • Years of education: Not on file   • Highest education level: Not on file   Tobacco Use   • Smoking status: Former Smoker     Packs/day: 3.00     Years: 32.00     Pack years: 96.00     Types: Cigarettes     Quit date: 8/3/2017     Years since quitting: 3.9   • Smokeless tobacco: Never Used   Vaping Use   • Vaping Use: Never used   Substance and Sexual Activity   • Alcohol use: No   • Drug use: Yes     Frequency: 3.0 times per week     Types: Marijuana   • Sexual activity: Defer     Comment: Marital status: Single           Objective      BP (!) 182/93 (BP Location: Right arm, Patient Position: Sitting)   Pulse 70   Temp 97.3 °F (36.3 °C) (Infrared)   Resp 18   Ht 175.3 cm (69\")   Wt 63.2 kg (139 lb 6.4 oz)   SpO2 100%   BMI 20.59 kg/m²     Physical Exam  Vitals and nursing note reviewed.   Constitutional:       Appearance: Normal appearance.   HENT:    "   Head: Normocephalic and atraumatic.      Mouth/Throat:      Mouth: Mucous membranes are moist.   Eyes:      Pupils: Pupils are equal, round, and reactive to light.   Cardiovascular:      Rate and Rhythm: Normal rate and regular rhythm.      Pulses: Normal pulses.      Heart sounds: Normal heart sounds.   Pulmonary:      Effort: Pulmonary effort is normal. No respiratory distress.      Breath sounds: Normal breath sounds. No wheezing.   Skin:     General: Skin is warm.      Capillary Refill: Capillary refill takes less than 2 seconds.   Neurological:      General: No focal deficit present.      Mental Status: He is alert.   Psychiatric:         Mood and Affect: Mood normal.         Behavior: Behavior normal.         Thought Content: Thought content normal.         ECG 12 Lead      Date/Time: 7/2/2021 2:54 PM  Performed by: Toni Do PA-C  Authorized by: Toni Do PA-C   Interpreted by physician  Comparison: compared with previous ECG from 4/22/2021  Similar to previous ECG  Rhythm: sinus rhythm  Rate: normal  BPM: 61  ST Segments: ST segments normal  Clinical impression: non-specific ECG                 ED Course  ED Course as of Jul 02 1501   Fri Jul 02, 2021   1415 Paged Hospitalist.    [ROBERT]      ED Course User Index  [ROBERT] Toni Do PA-C      Results for orders placed or performed during the hospital encounter of 07/02/21   COVID-19 and FLU A/B PCR - Swab, Nasopharynx    Specimen: Nasopharynx; Swab   Result Value Ref Range    COVID19 Not Detected Not Detected - Ref. Range    Influenza A PCR Not Detected Not Detected    Influenza B PCR Not Detected Not Detected   Magnesium    Specimen: Blood   Result Value Ref Range    Magnesium 2.1 1.6 - 2.6 mg/dL   Comprehensive Metabolic Panel    Specimen: Blood   Result Value Ref Range    Glucose 102 (H) 65 - 99 mg/dL    BUN 22 (H) 6 - 20 mg/dL    Creatinine 2.09 (H) 0.76 - 1.27 mg/dL    Sodium 135 (L) 136 - 145 mmol/L    Potassium 6.3 (C)  3.5 - 5.2 mmol/L    Chloride 101 98 - 107 mmol/L    CO2 23.0 22.0 - 29.0 mmol/L    Calcium 9.5 8.6 - 10.5 mg/dL    Total Protein 6.7 6.0 - 8.5 g/dL    Albumin 4.60 3.50 - 5.20 g/dL    ALT (SGPT) 6 1 - 41 U/L    AST (SGOT) 22 1 - 40 U/L    Alkaline Phosphatase 148 (H) 39 - 117 U/L    Total Bilirubin 0.4 0.0 - 1.2 mg/dL    eGFR Non African Amer 33 (L) >60 mL/min/1.73    Globulin 2.1 gm/dL    A/G Ratio 2.2 g/dL    BUN/Creatinine Ratio 10.5 7.0 - 25.0    Anion Gap 11.0 5.0 - 15.0 mmol/L   CBC Auto Differential    Specimen: Blood   Result Value Ref Range    .89 (C) 3.40 - 10.80 10*3/mm3    RBC 3.97 (L) 4.14 - 5.80 10*6/mm3    Hemoglobin 10.8 (L) 13.0 - 17.7 g/dL    Hematocrit 36.7 (L) 37.5 - 51.0 %    MCV 92.4 79.0 - 97.0 fL    MCH 27.2 26.6 - 33.0 pg    MCHC 29.4 (L) 31.5 - 35.7 g/dL    RDW 18.1 (H) 12.3 - 15.4 %    RDW-SD 54.0 37.0 - 54.0 fl    MPV 9.5 6.0 - 12.0 fL    Platelets 100 (L) 140 - 450 10*3/mm3    Neutrophil % 1.4 (L) 42.7 - 76.0 %    Lymphocyte % 96.8 (H) 19.6 - 45.3 %    Monocyte % 1.6 (L) 5.0 - 12.0 %    Eosinophil % 0.0 (L) 0.3 - 6.2 %    Basophil % 0.1 0.0 - 1.5 %    Immature Grans % 0.1 0.0 - 0.5 %    Neutrophils, Absolute 3.44 1.70 - 7.00 10*3/mm3    Lymphocytes, Absolute 256.46 (H) 0.70 - 3.10 10*3/mm3    Monocytes, Absolute 4.33 (H) 0.10 - 0.90 10*3/mm3    Eosinophils, Absolute 0.12 0.00 - 0.40 10*3/mm3    Basophils, Absolute 0.22 (H) 0.00 - 0.20 10*3/mm3    Immature Grans, Absolute 0.32 (H) 0.00 - 0.05 10*3/mm3    nRBC 0.0 0.0 - 0.2 /100 WBC   Gold Top - SST   Result Value Ref Range    Extra Tube Hold for add-ons.                                             MDM    Final diagnoses:   Hyperkalemia       ED Disposition  ED Disposition     ED Disposition Condition Comment    Decision to Admit  Level of Care: Telemetry [5]   Diagnosis: Hyperkalemia [886355]   Admitting Physician: MACIEJ WASHINGTON [[id number]]   Attending Physician: MAICEJ WASHINGTON [[id number]]   Certification: I Certify That Inpatient  Hospital Services Are Medically Necessary For Greater Than 2 Midnights            No follow-up provider specified.       Medication List      No changes were made to your prescriptions during this visit.          Toni Do PA-C  07/02/21 5541

## 2021-07-03 VITALS
HEIGHT: 69 IN | RESPIRATION RATE: 18 BRPM | TEMPERATURE: 98.3 F | BODY MASS INDEX: 20.35 KG/M2 | DIASTOLIC BLOOD PRESSURE: 96 MMHG | SYSTOLIC BLOOD PRESSURE: 187 MMHG | HEART RATE: 67 BPM | WEIGHT: 137.4 LBS | OXYGEN SATURATION: 97 %

## 2021-07-03 LAB
ANION GAP SERPL CALCULATED.3IONS-SCNC: 10 MMOL/L (ref 5–15)
BASOPHILS # BLD AUTO: 1.35 10*3/MM3 (ref 0–0.2)
BASOPHILS NFR BLD AUTO: 0.8 % (ref 0–1.5)
BUN SERPL-MCNC: 22 MG/DL (ref 6–20)
BUN/CREAT SERPL: 11.1 (ref 7–25)
CALCIUM SPEC-SCNC: 8.8 MG/DL (ref 8.6–10.5)
CHLORIDE SERPL-SCNC: 102 MMOL/L (ref 98–107)
CO2 SERPL-SCNC: 25 MMOL/L (ref 22–29)
CREAT SERPL-MCNC: 1.99 MG/DL (ref 0.76–1.27)
DEPRECATED RDW RBC AUTO: 53.2 FL (ref 37–54)
EOSINOPHIL # BLD AUTO: 0.05 10*3/MM3 (ref 0–0.4)
EOSINOPHIL NFR BLD AUTO: 0 % (ref 0.3–6.2)
ERYTHROCYTE [DISTWIDTH] IN BLOOD BY AUTOMATED COUNT: 16.7 % (ref 12.3–15.4)
GFR SERPL CREATININE-BSD FRML MDRD: 35 ML/MIN/1.73
GLUCOSE SERPL-MCNC: 91 MG/DL (ref 65–99)
HCT VFR BLD AUTO: 30.2 % (ref 37.5–51)
HGB BLD-MCNC: 9.7 G/DL (ref 13–17.7)
IMM GRANULOCYTES # BLD AUTO: 0.19 10*3/MM3 (ref 0–0.05)
IMM GRANULOCYTES NFR BLD AUTO: 0.1 % (ref 0–0.5)
LYMPHOCYTES # BLD AUTO: 167.18 10*3/MM3 (ref 0.7–3.1)
LYMPHOCYTES NFR BLD AUTO: 96 % (ref 19.6–45.3)
MAGNESIUM SERPL-MCNC: 1.9 MG/DL (ref 1.6–2.6)
MCH RBC QN AUTO: 29.6 PG (ref 26.6–33)
MCHC RBC AUTO-ENTMCNC: 32.1 G/DL (ref 31.5–35.7)
MCV RBC AUTO: 92.1 FL (ref 79–97)
MONOCYTES # BLD AUTO: 3.91 10*3/MM3 (ref 0.1–0.9)
MONOCYTES NFR BLD AUTO: 2.2 % (ref 5–12)
NEUTROPHILS NFR BLD AUTO: 0.9 % (ref 42.7–76)
NEUTROPHILS NFR BLD AUTO: 1.45 10*3/MM3 (ref 1.7–7)
NRBC BLD AUTO-RTO: 0 /100 WBC (ref 0–0.2)
PLATELET # BLD AUTO: 79 10*3/MM3 (ref 140–450)
PMV BLD AUTO: 9.8 FL (ref 6–12)
POTASSIUM SERPL-SCNC: 4.9 MMOL/L (ref 3.5–5.2)
RBC # BLD AUTO: 3.28 10*6/MM3 (ref 4.14–5.8)
SODIUM SERPL-SCNC: 137 MMOL/L (ref 136–145)
WBC # BLD AUTO: 174.13 10*3/MM3 (ref 3.4–10.8)

## 2021-07-03 PROCEDURE — 83735 ASSAY OF MAGNESIUM: CPT | Performed by: FAMILY MEDICINE

## 2021-07-03 PROCEDURE — 25010000002 MORPHINE PER 10 MG: Performed by: FAMILY MEDICINE

## 2021-07-03 PROCEDURE — 85025 COMPLETE CBC W/AUTO DIFF WBC: CPT | Performed by: FAMILY MEDICINE

## 2021-07-03 PROCEDURE — 80048 BASIC METABOLIC PNL TOTAL CA: CPT | Performed by: FAMILY MEDICINE

## 2021-07-03 RX ADMIN — HYDRALAZINE HYDROCHLORIDE 50 MG: 50 TABLET, FILM COATED ORAL at 01:36

## 2021-07-03 RX ADMIN — Medication 10 ML: at 09:45

## 2021-07-03 RX ADMIN — ESCITALOPRAM OXALATE 10 MG: 10 TABLET ORAL at 09:08

## 2021-07-03 RX ADMIN — FAMOTIDINE 40 MG: 40 TABLET, FILM COATED ORAL at 09:08

## 2021-07-03 RX ADMIN — ASPIRIN 81 MG: 81 TABLET, FILM COATED ORAL at 09:09

## 2021-07-03 RX ADMIN — RANOLAZINE 500 MG: 500 TABLET, FILM COATED, EXTENDED RELEASE ORAL at 09:09

## 2021-07-03 RX ADMIN — CARVEDILOL 25 MG: 25 TABLET, FILM COATED ORAL at 09:08

## 2021-07-03 RX ADMIN — ALLOPURINOL 100 MG: 100 TABLET ORAL at 09:08

## 2021-07-03 RX ADMIN — HYDRALAZINE HYDROCHLORIDE 50 MG: 50 TABLET, FILM COATED ORAL at 09:08

## 2021-07-03 RX ADMIN — MORPHINE SULFATE 1 MG: 2 INJECTION, SOLUTION INTRAMUSCULAR; INTRAVENOUS at 05:44

## 2021-07-03 RX ADMIN — SUCRALFATE 1 G: 1 TABLET ORAL at 09:08

## 2021-07-03 NOTE — PLAN OF CARE
Goal Outcome Evaluation:           Progress: no change  Outcome Summary: Pt new admit, alert and oriented, independent; vital signs stable

## 2021-07-03 NOTE — CONSULTS
Nutrition Services    Patient Name:  Lyle Corona  YOB: 1965  MRN: [id number]  Admit Date:  7/2/2021    RD visited pt due to MST of 2.  Pt has already been discharged.      Electronically signed by:  Faina Chaudhary RD  07/03/21 14:39 CDT

## 2021-07-04 ENCOUNTER — READMISSION MANAGEMENT (OUTPATIENT)
Dept: CALL CENTER | Facility: HOSPITAL | Age: 56
End: 2021-07-04

## 2021-07-04 NOTE — OUTREACH NOTE
Prep Survey      Responses   [facility] facility patient discharged from?  [place]   Is LACE score < 7 ?  No   Emergency Room discharge w/ pulse ox?  No   Eligibility  Readm Mgmt   Discharge diagnosis  Hyperkalemia   Does the patient have one of the following disease processes/diagnoses(primary or secondary)?  Other   Does the patient have Home health ordered?  No   Is there a DME ordered?  No   Prep survey completed?  Yes          Stella Morgan RN

## 2021-07-05 ENCOUNTER — HOSPITAL ENCOUNTER (EMERGENCY)
Facility: HOSPITAL | Age: 56
Discharge: HOME | End: 2021-07-06
Attending: EMERGENCY MEDICINE | Admitting: EMERGENCY MEDICINE

## 2021-07-05 DIAGNOSIS — M79.89 LEFT ARM SWELLING: ICD-10-CM

## 2021-07-05 DIAGNOSIS — R59.0 LYMPHADENOPATHY, AXILLARY: Primary | ICD-10-CM

## 2021-07-05 PROCEDURE — 96374 THER/PROPH/DIAG INJ IV PUSH: CPT

## 2021-07-05 PROCEDURE — 83880 ASSAY OF NATRIURETIC PEPTIDE: CPT | Performed by: EMERGENCY MEDICINE

## 2021-07-05 PROCEDURE — 99283 EMERGENCY DEPT VISIT LOW MDM: CPT

## 2021-07-05 PROCEDURE — 85007 BL SMEAR W/DIFF WBC COUNT: CPT | Performed by: EMERGENCY MEDICINE

## 2021-07-05 PROCEDURE — 96375 TX/PRO/DX INJ NEW DRUG ADDON: CPT

## 2021-07-05 PROCEDURE — 80053 COMPREHEN METABOLIC PANEL: CPT | Performed by: EMERGENCY MEDICINE

## 2021-07-05 PROCEDURE — 85025 COMPLETE CBC W/AUTO DIFF WBC: CPT | Performed by: EMERGENCY MEDICINE

## 2021-07-06 ENCOUNTER — OFFICE VISIT (OUTPATIENT)
Dept: ONCOLOGY | Facility: CLINIC | Age: 56
End: 2021-07-06

## 2021-07-06 ENCOUNTER — INFUSION (OUTPATIENT)
Dept: ONCOLOGY | Facility: HOSPITAL | Age: 56
End: 2021-07-06

## 2021-07-06 ENCOUNTER — APPOINTMENT (OUTPATIENT)
Dept: ULTRASOUND IMAGING | Facility: HOSPITAL | Age: 56
End: 2021-07-06

## 2021-07-06 ENCOUNTER — READMISSION MANAGEMENT (OUTPATIENT)
Dept: CALL CENTER | Facility: HOSPITAL | Age: 56
End: 2021-07-06

## 2021-07-06 VITALS
HEART RATE: 62 BPM | OXYGEN SATURATION: 96 % | SYSTOLIC BLOOD PRESSURE: 148 MMHG | TEMPERATURE: 98.6 F | RESPIRATION RATE: 18 BRPM | DIASTOLIC BLOOD PRESSURE: 85 MMHG

## 2021-07-06 VITALS
WEIGHT: 139.7 LBS | SYSTOLIC BLOOD PRESSURE: 160 MMHG | RESPIRATION RATE: 20 BRPM | HEART RATE: 65 BPM | TEMPERATURE: 97.2 F | BODY MASS INDEX: 20.69 KG/M2 | HEIGHT: 69 IN | DIASTOLIC BLOOD PRESSURE: 87 MMHG

## 2021-07-06 DIAGNOSIS — D50.9 IRON DEFICIENCY ANEMIA, UNSPECIFIED IRON DEFICIENCY ANEMIA TYPE: ICD-10-CM

## 2021-07-06 DIAGNOSIS — R11.2 NAUSEA AND VOMITING, INTRACTABILITY OF VOMITING NOT SPECIFIED, UNSPECIFIED VOMITING TYPE: Primary | ICD-10-CM

## 2021-07-06 DIAGNOSIS — K90.9 IRON MALABSORPTION: ICD-10-CM

## 2021-07-06 DIAGNOSIS — C91.10 CLL (CHRONIC LYMPHOCYTIC LEUKEMIA) (HCC): Primary | Chronic | ICD-10-CM

## 2021-07-06 DIAGNOSIS — D69.6 THROMBOCYTOPENIA (HCC): ICD-10-CM

## 2021-07-06 DIAGNOSIS — N18.30 STAGE 3 CHRONIC KIDNEY DISEASE, UNSPECIFIED WHETHER STAGE 3A OR 3B CKD (HCC): ICD-10-CM

## 2021-07-06 DIAGNOSIS — R11.2 NAUSEA AND VOMITING, INTRACTABILITY OF VOMITING NOT SPECIFIED, UNSPECIFIED VOMITING TYPE: Chronic | ICD-10-CM

## 2021-07-06 LAB
ALBUMIN SERPL-MCNC: 4.2 G/DL (ref 3.5–5.2)
ALBUMIN/GLOB SERPL: 2.2 G/DL
ALP SERPL-CCNC: 125 U/L (ref 39–117)
ALT SERPL W P-5'-P-CCNC: 5 U/L (ref 1–41)
ANION GAP SERPL CALCULATED.3IONS-SCNC: 14 MMOL/L (ref 5–15)
AST SERPL-CCNC: 28 U/L (ref 1–40)
BILIRUB SERPL-MCNC: 0.3 MG/DL (ref 0–1.2)
BUN SERPL-MCNC: 20 MG/DL (ref 6–20)
BUN/CREAT SERPL: 9.4 (ref 7–25)
CALCIUM SPEC-SCNC: 8.4 MG/DL (ref 8.6–10.5)
CHLORIDE SERPL-SCNC: 101 MMOL/L (ref 98–107)
CO2 SERPL-SCNC: 21 MMOL/L (ref 22–29)
CREAT SERPL-MCNC: 2.12 MG/DL (ref 0.76–1.27)
DEPRECATED RDW RBC AUTO: 53.4 FL (ref 37–54)
ERYTHROCYTE [DISTWIDTH] IN BLOOD BY AUTOMATED COUNT: 16.7 % (ref 12.3–15.4)
GFR SERPL CREATININE-BSD FRML MDRD: 32 ML/MIN/1.73
GLOBULIN UR ELPH-MCNC: 1.9 GM/DL
GLUCOSE SERPL-MCNC: 98 MG/DL (ref 65–99)
HCT VFR BLD AUTO: 31.8 % (ref 37.5–51)
HGB BLD-MCNC: 9.7 G/DL (ref 13–17.7)
HOLD SPECIMEN: NORMAL
HOLD SPECIMEN: NORMAL
HYPOCHROMIA BLD QL: ABNORMAL
LYMPHOCYTES # BLD MANUAL: 223.38 10*3/MM3 (ref 0.7–3.1)
LYMPHOCYTES NFR BLD MANUAL: 98 % (ref 19.6–45.3)
MCH RBC QN AUTO: 28.4 PG (ref 26.6–33)
MCHC RBC AUTO-ENTMCNC: 30.5 G/DL (ref 31.5–35.7)
MCV RBC AUTO: 93.3 FL (ref 79–97)
METAMYELOCYTES NFR BLD MANUAL: 1 % (ref 0–0)
NEUTROPHILS # BLD AUTO: 2.28 10*3/MM3 (ref 1.7–7)
NEUTROPHILS NFR BLD MANUAL: 1 % (ref 42.7–76)
NT-PROBNP SERPL-MCNC: 1398 PG/ML (ref 0–900)
PLATELET # BLD AUTO: 99 10*3/MM3 (ref 140–450)
PMV BLD AUTO: 10.2 FL (ref 6–12)
POTASSIUM SERPL-SCNC: 4.6 MMOL/L (ref 3.5–5.2)
PROT SERPL-MCNC: 6.1 G/DL (ref 6–8.5)
RBC # BLD AUTO: 3.41 10*6/MM3 (ref 4.14–5.8)
SMALL PLATELETS BLD QL SMEAR: ABNORMAL
SODIUM SERPL-SCNC: 136 MMOL/L (ref 136–145)
WBC # BLD AUTO: 227.94 10*3/MM3 (ref 3.4–10.8)
WBC MORPH BLD: NORMAL
WHOLE BLOOD HOLD SPECIMEN: NORMAL

## 2021-07-06 PROCEDURE — 93971 EXTREMITY STUDY: CPT

## 2021-07-06 PROCEDURE — 96361 HYDRATE IV INFUSION ADD-ON: CPT | Performed by: INTERNAL MEDICINE

## 2021-07-06 PROCEDURE — 25010000002 ONDANSETRON PER 1 MG: Performed by: INTERNAL MEDICINE

## 2021-07-06 PROCEDURE — 25010000002 DEXAMETHASONE SODIUM PHOSPHATE 100 MG/10ML SOLUTION 10 ML VIAL: Performed by: INTERNAL MEDICINE

## 2021-07-06 PROCEDURE — 99214 OFFICE O/P EST MOD 30 MIN: CPT | Performed by: INTERNAL MEDICINE

## 2021-07-06 PROCEDURE — 96375 TX/PRO/DX INJ NEW DRUG ADDON: CPT

## 2021-07-06 PROCEDURE — 96374 THER/PROPH/DIAG INJ IV PUSH: CPT

## 2021-07-06 PROCEDURE — 96374 THER/PROPH/DIAG INJ IV PUSH: CPT | Performed by: INTERNAL MEDICINE

## 2021-07-06 PROCEDURE — 25010000002 MORPHINE PER 10 MG: Performed by: EMERGENCY MEDICINE

## 2021-07-06 PROCEDURE — 25010000002 ONDANSETRON PER 1 MG: Performed by: EMERGENCY MEDICINE

## 2021-07-06 RX ORDER — ONDANSETRON 4 MG/1
4 TABLET, FILM COATED ORAL 4 TIMES DAILY PRN
Qty: 40 TABLET | Refills: 3 | Status: SHIPPED | OUTPATIENT
Start: 2021-07-06

## 2021-07-06 RX ORDER — SODIUM CHLORIDE 9 MG/ML
500 INJECTION, SOLUTION INTRAVENOUS ONCE
Status: COMPLETED | OUTPATIENT
Start: 2021-07-06 | End: 2021-07-06

## 2021-07-06 RX ORDER — HYDROCODONE BITARTRATE AND ACETAMINOPHEN 5; 325 MG/1; MG/1
1 TABLET ORAL ONCE
Status: DISCONTINUED | OUTPATIENT
Start: 2021-07-06 | End: 2021-07-06 | Stop reason: HOSPADM

## 2021-07-06 RX ORDER — SODIUM CHLORIDE 9 MG/ML
500 INJECTION, SOLUTION INTRAVENOUS ONCE
OUTPATIENT
Start: 2021-07-06

## 2021-07-06 RX ORDER — SODIUM CHLORIDE 9 MG/ML
500 INJECTION, SOLUTION INTRAVENOUS ONCE
Status: CANCELLED | OUTPATIENT
Start: 2021-07-06

## 2021-07-06 RX ORDER — SODIUM CHLORIDE 0.9 % (FLUSH) 0.9 %
10 SYRINGE (ML) INJECTION AS NEEDED
Status: DISCONTINUED | OUTPATIENT
Start: 2021-07-06 | End: 2021-07-06 | Stop reason: HOSPADM

## 2021-07-06 RX ORDER — ONDANSETRON 2 MG/ML
4 INJECTION INTRAMUSCULAR; INTRAVENOUS ONCE
Status: COMPLETED | OUTPATIENT
Start: 2021-07-06 | End: 2021-07-06

## 2021-07-06 RX ORDER — MORPHINE SULFATE 2 MG/ML
4 INJECTION, SOLUTION INTRAMUSCULAR; INTRAVENOUS ONCE
Status: COMPLETED | OUTPATIENT
Start: 2021-07-06 | End: 2021-07-06

## 2021-07-06 RX ADMIN — ONDANSETRON HYDROCHLORIDE 4 MG: 2 INJECTION, SOLUTION INTRAMUSCULAR; INTRAVENOUS at 00:20

## 2021-07-06 RX ADMIN — DEXAMETHASONE SODIUM PHOSPHATE: 10 INJECTION, SOLUTION INTRAMUSCULAR; INTRAVENOUS at 12:33

## 2021-07-06 RX ADMIN — SODIUM CHLORIDE 500 ML: 9 INJECTION, SOLUTION INTRAVENOUS at 12:22

## 2021-07-06 RX ADMIN — MORPHINE SULFATE 4 MG: 2 INJECTION, SOLUTION INTRAMUSCULAR; INTRAVENOUS at 01:04

## 2021-07-06 NOTE — OUTREACH NOTE
Medical Week 1 Survey      Responses   [facility] patient discharged from?  [place]   Does the patient have one of the following disease processes/diagnoses(primary or secondary)?  Other   Week 1 attempt successful?  No   Unsuccessful attempts  Attempt 1          Carmela Blanco RN

## 2021-07-06 NOTE — PATIENT INSTRUCTIONS
Acalabrutinib capsules  What is this medicine?  ACALABRUTINIB (a cece a broo ti nib) is a medicine that targets proteins in cancer cells and stops the cancer cells from growing. It is used to treat mantle cell lymphoma, chronic lymphocytic leukemia, and small lymphocytic lymphoma.  This medicine may be used for other purposes; ask your health care provider or pharmacist if you have questions.  COMMON BRAND NAME(S): CALQUENCE  What should I tell my health care provider before I take this medicine?  They need to know if you have any of these conditions:  · bleeding disorders  · high blood pressure  · history of irregular heartbeat  · infection including hepatitis B virus infection  · liver disease  · recent surgery  · take medicines that treat or prevent blood clots  · an unusual or allergic reaction to acalabrutinib, other medicines, foods, dyes, or preservatives  · pregnant or trying to get pregnant  · breast-feeding  How should I use this medicine?  Take this medicine by mouth with a glass of water. Follow the directions on the prescription label. You can take it with or without food. If it upsets your stomach, take it with food. Do not cut, crush or chew this medicine. Do not take with grapefruit juice. Avoid taking H2-blockers and antacids within 2 hours of taking this medicine. Take your medicine at regular intervals. Do not take it more often than directed. Do not stop taking except on your doctor's advice.  Talk to your pediatrician regarding the use of this medicine in children. Special care may be needed.  Overdosage: If you think you have taken too much of this medicine contact a poison control center or emergency room at once.  NOTE: This medicine is only for you. Do not share this medicine with others.  What if I miss a dose?  If you miss a dose, take it as soon as you can. If your next dose is to be taken in less than 9 hours, then do not take the missed dose. Take the next dose at your regular time. Do  not take double or extra doses.  What may interact with this medicine?  This medicine may interact with the following medications:  · antiviral medications for HIV or AIDS  · aprepitant  · boceprevir  · calcium channel blockers like diltiazem and verapamil  · certain antibiotics like clarithromycin, erythromycin, and troleandomycin  · certain medicines for fungal infections like fluconazole, ketoconazole, itraconazole, posaconazole, and voriconazole  · certain medicines for seizures like carbamazepine and phenytoin  · certain medicines for stomach problems like cimetidine, famotidine, omeprazole, lansoprazole  · ciprofloxacin  · clotrimazole  · conivaptan  · crizotinib  · cyclosporine  · dronedarone  · enzalutamide  · fluvoxamine  · grapefruit juice  · idelalisib  · imatinib  · methotrexate  · mitotane  · nefazodone  · rifampin  · [place]'s wort  This list may not describe all possible interactions. Give your health care provider a list of all the medicines, herbs, non-prescription drugs, or dietary supplements you use. Also tell them if you smoke, drink alcohol, or use illegal drugs. Some items may interact with your medicine.  What should I watch for while using this medicine?  You may need blood work done while you are taking this medicine.  This medicine may increase your risk to bruise or bleed. Call your doctor or health care professional if you notice any unusual bleeding.  Call your doctor or health care professional for advice if you get a fever, chills or sore throat, or other symptoms of a cold or flu. Do not treat yourself. This drug decreases your body's ability to fight infections. Try to avoid being around people who are sick.  If you are going to have surgery or any other procedures, tell your doctor you are taking this medicine. Tell your dentist and dental surgeon that you are taking this medicine. You should not have major dental surgery while on this medicine. See your dentist to have a dental  exam and fix any dental problems before starting this medicine.  Talk to your doctor about your risk of cancer. You may be more at risk for certain types of cancers if you take this medicine. Keep out of the sun. If you cannot avoid being in the sun, wear protective clothing and use sunscreen.  Do not become pregnant while taking this medicine or for at least 1 week after stopping it. Women should inform their doctor if they wish to become pregnant or think they might be pregnant. There is a potential for serious side effects to an unborn child. Talk to your health care professional or pharmacist for more information. Do not breast-feed an infant while taking this medicine or for 2 weeks after stopping it.  What side effects may I notice from receiving this medicine?  Side effects that you should report to your doctor or health care professional as soon as possible:  · allergic reactions like skin rash, itching or hives, swelling of the face, lips, or tongue  · low blood counts - this medicine may decrease the number of white blood cells, red blood cells and platelets. You may be at increased risk for infections and bleeding.  · signs of decreased red blood cells - unusually weak or tired, feeling faint or lightheaded, falls  · signs or symptoms of bleeding such as bloody or black, tarry stools; red or dark-brown urine; spitting up blood or brown material that looks like coffee grounds; red spots on the skin; unusual bruising or bleeding from the eye, gums, or nose; confusion; trouble speaking or understanding; severe headaches; weakness; or dizziness  · signs and symptoms of a dangerous change in heartbeat or heart rhythm like chest pain; dizziness; fast or irregular heartbeat; palpitations; feeling faint or lightheaded, falls; breathing problems  · signs of infection - fever or chills, cough, sore throat, pain or difficulty passing urine  Side effects that usually do not require medical attention (report these to  your doctor or health care professional if they continue or are bothersome):  · constipation  · diarrhea  · headache  · muscle aches  · stomach pain  · tiredness  This list may not describe all possible side effects. Call your doctor for medical advice about side effects. You may report side effects to FDA at 6-374-AIW-8565.  Where should I keep my medicine?  Keep out of the reach of children.  Store between 20 and 25 degrees C (68 and 77 degrees F). Throw away any unused medicine after the expiration date.  NOTE: This sheet is a summary. It may not cover all possible information. If you have questions about this medicine, talk to your doctor, pharmacist, or health care provider.  © 2021 [employer] (2019-11-22 17:27:50)

## 2021-07-06 NOTE — PAYOR COMM NOTE
"  Request for inpatient  Pending auth # [id number]  Admitted 7/2/2021  Discharge 7/3/2021  Arely Swanson RN,[facility]  [phone] phone  [phone] fax          Lyle Corona (55 y.o. Male)     Date of Birth Social Security Number Address Home Phone MRN    1965  [id number] [address] [phone] [id number]    Worship Marital Status          Moravian        Admission Date Admission Type Admitting Provider Attending Provider Department, Room/Bed    7/2/21 Emergency Fco Whitlock MD  [address], 318/1    Discharge Date Discharge Disposition Discharge Destination        7/3/2021 Home or Self Care              Attending Provider: (none)   Allergies: Amlodipine, Imdur [Isosorbide Dinitrate], Lisinopril, Metoprolol    Isolation: None   Infection: None   Code Status: Prior    Ht: 175.3 cm (69\")   Wt: 62.3 kg (137 lb 6.4 oz)    Admission Cmt: None   Principal Problem: None                Active Insurance as of 7/2/2021     Primary Coverage     Payor Plan Insurance Group Employer/Plan Group    ANTHEM MEDICARE REPLACEMENT ANTHEM MEDICARE ADVANTAGE [id number]     Payor Plan Address Payor Plan Phone Number Payor Plan Fax Number Effective Dates    [phone]  3/1/2021 - None Entered    [facility] [address]       Subscriber Name Subscriber Birth Date Member ID       LYLE CORONA 1965 [id number]                 Emergency Contacts          No emergency contacts on file.               History & Physical      Fco Whitlock MD at 07/02/21 1548                [facility] Medicine Admission      Date of Admission: 7/2/2021      Primary Care Physician: Fernanda Pope APRN      Chief Complaint: Elevated potassium    HPI:    This is a 55-year-old male with concurrent medical history of leukemia, chronic kidney disease stage III, COPD, hypertension, GERD presenting to the ER with complaint of " having abnormal labs.  He went to his oncologist office and had routine labs done and his potassium level was around 7 and when he came to the ER his labs were rechecked and it was around 6.  Patient denies any chest pain he does have some occasional shortness of air.    Past Medical History:  has a past medical history of Aneurysm of infrarenal abdominal aorta (CMS/HCC), Anxiety, Cervical radiculitis, Cervical spondylosis without myelopathy, CHF (congestive heart failure) (CMS/HCC), Chronic kidney disease, stage 3 (CMS/HCC), CLL (chronic lymphocytic leukemia) (CMS/HCC), Common iliac aneurysm (CMS/HCC), COPD (chronic obstructive pulmonary disease) (CMS/HCC), Coronary arteriosclerosis, Degeneration of cervical intervertebral disc, Elevated cholesterol, Essential hypertension, GERD (gastroesophageal reflux disease), Headache, History of transfusion, Hyperlipidemia, Intermittent claudication (CMS/HCC), Myocardial infarction (CMS/HCC), and Uric acid renal calculus.    Past Surgical History:  has a past surgical history that includes Cardiac catheterization (05/25/2016); Cholecystectomy; Hernia repair; Cervical fusion; Spinal fusion; Appendectomy; Cardiac catheterization (N/A, 11/28/2017); Coronary artery bypass graft (N/A, 12/4/2017); Abdominal surgery; Colonoscopy; Esophagogastroduodenoscopy; and Cardiac surgery.    Family History: family history includes Cancer in his brother and father; Diabetes in some other family members; Hyperlipidemia in his father and mother; Hypertension in his father and mother.    Social History:  reports that he quit smoking about 3 years ago. His smoking use included cigarettes. He has a 96.00 pack-year smoking history. He has never used smokeless tobacco. He reports current drug use. Frequency: 3.00 times per week. Drug: Marijuana. He reports that he does not drink alcohol.    Allergies:   Allergies   Allergen Reactions   • Amlodipine Rash   • Imdur [Isosorbide Dinitrate] Rash   •  Lisinopril Rash   • Metoprolol Rash       Medications: Scheduled Meds:sodium polystyrene, 15 g, Oral, Once      Continuous Infusions:   PRN Meds:.•  [COMPLETED] Insert peripheral IV **AND** sodium chloride  No current facility-administered medications on file prior to encounter.     Current Outpatient Medications on File Prior to Encounter   Medication Sig Dispense Refill   • acetaminophen (TYLENOL) 325 MG tablet Take 2 tablets by mouth Every 4 (Four) Hours As Needed for Mild Pain , Headache or Fever.     • allopurinol (ZYLOPRIM) 100 MG tablet Take 100 mg by mouth Daily.     • aspirin 81 MG EC tablet Take 1 tablet by mouth Daily.     • carvedilol (COREG) 25 MG tablet Take 1 tablet by mouth 2 (Two) Times a Day With Meals. 60 tablet 1   • cyanocobalamin (VITAMIN B-12) 1000 MCG tablet Take 1 tablet by mouth Daily. 90 tablet 1   • docusate sodium (COLACE) 100 MG capsule Take 1 capsule by mouth 2 (Two) Times a Day As Needed for Constipation. 60 capsule 2   • escitalopram (LEXAPRO) 10 MG tablet Take 10 mg by mouth Daily.     • famotidine (PEPCID) 20 MG tablet Take 1 tablet by mouth 2 (Two) Times a Day. 60 tablet 1   • ferrous sulfate 325 (65 FE) MG tablet Take 1 tablet by mouth Daily With Breakfast. 30 tablet 3   • folic acid (FOLVITE) 1 MG tablet Take 1 tablet by mouth 2 (two) times a day. 180 tablet 1   • hydrALAZINE (APRESOLINE) 50 MG tablet Take 1 tablet by mouth Every 8 (Eight) Hours. 90 tablet 1   • nitroglycerin (NITROSTAT) 0.4 MG SL tablet DISSOLVE ONE TABLET UNDER THE TONGUE EVERY 5 MINUTES AS NEEDED FOR CHEST PAIN. DO NOT EXCEED A TOTAL OF 3 DOSES IN 15 MINUTES. SEEK MEDICAL     • ondansetron (Zofran) 4 MG tablet Take 1 tablet by mouth Every 8 (Eight) Hours As Needed for Nausea or Vomiting. 40 tablet 1   • pravastatin (PRAVACHOL) 40 MG tablet Take 1 tablet by mouth every night. 90 tablet 4   • ranolazine (RANEXA) 500 MG 12 hr tablet Take 1 tablet by mouth Every 12 (Twelve) Hours. 60 tablet 0   • sucralfate  (CARAFATE) 1 g tablet Take 1 g by mouth 4 (Four) Times a Day.     • vitamin B-12 (CYANOCOBALAMIN) 1000 MCG tablet Take 1 tablet by mouth Daily. 90 tablet 1       Review of Systems:  Review of Systems   Respiratory: Positive for shortness of breath.    Cardiovascular: Negative for chest pain.   Gastrointestinal: Negative for diarrhea.   Genitourinary: Negative for dysuria.      Otherwise complete ROS is negative except as mentioned above.    Physical Exam:   Temp:  [97.3 °F (36.3 °C)] 97.3 °F (36.3 °C)  Heart Rate:  [60-72] 72  Resp:  [18-20] 18  BP: (166-212)/() 166/85  Physical Exam  Vitals and nursing note reviewed.   Constitutional:       General: He is not in acute distress.     Appearance: He is well-developed. He is not diaphoretic.   HENT:      Head: Normocephalic and atraumatic.   Cardiovascular:      Rate and Rhythm: Normal rate.   Pulmonary:      Effort: Pulmonary effort is normal. No respiratory distress.      Breath sounds: No wheezing.   Abdominal:      General: There is no distension.      Palpations: Abdomen is soft.   Musculoskeletal:         General: Normal range of motion.   Skin:     General: Skin is warm and dry.   Neurological:      Mental Status: He is alert.      Cranial Nerves: No cranial nerve deficit.   Psychiatric:         Behavior: Behavior normal.         Thought Content: Thought content normal.         Judgment: Judgment normal.           Results Reviewed:  I have personally reviewed current lab, radiology, and data and agree with results.  Lab Results (last 24 hours)     Procedure Component Value Units Date/Time    Urinalysis With Microscopic If Indicated (No Culture) - Urine, Clean Catch [[id number]]  (Abnormal) Collected: 07/02/21 1529    Specimen: Urine, Clean Catch Updated: 07/02/21 1534     Color, UA Yellow     Appearance, UA Clear     pH, UA 6.0     Specific Gravity, UA 1.006     Glucose,  mg/dL (Trace)     Ketones, UA Negative     Bilirubin, UA Negative     Blood, UA  Negative     Protein, UA Negative     Leuk Esterase, UA Negative     Nitrite, UA Negative     Urobilinogen, UA 0.2 E.U./dL    Narrative:      Urine microscopic not indicated.    Extra Tubes [[id number]] Collected: 07/02/21 1311    Specimen: Blood, Venous Line Updated: 07/02/21 1415    Narrative:      The following orders were created for panel order Extra Tubes.  Procedure                               Abnormality         Status                     ---------                               -----------         ------                     Gold Top - SST[[id number]]                                   Final result                 Please view results for these tests on the individual orders.    Gold Top - SST [[id number]] Collected: 07/02/21 1311    Specimen: Blood Updated: 07/02/21 1415     Extra Tube Hold for add-ons.     Comment: Auto resulted.       Comprehensive Metabolic Panel [[id number]]  (Abnormal) Collected: 07/02/21 1311    Specimen: Blood Updated: 07/02/21 1411     Glucose 102 mg/dL      BUN 22 mg/dL      Creatinine 2.09 mg/dL      Sodium 135 mmol/L      Potassium 6.3 mmol/L      Chloride 101 mmol/L      CO2 23.0 mmol/L      Calcium 9.5 mg/dL      Total Protein 6.7 g/dL      Albumin 4.60 g/dL      ALT (SGPT) 6 U/L      AST (SGOT) 22 U/L      Alkaline Phosphatase 148 U/L      Total Bilirubin 0.4 mg/dL      eGFR Non African Amer 33 mL/min/1.73      Globulin 2.1 gm/dL      A/G Ratio 2.2 g/dL      BUN/Creatinine Ratio 10.5     Anion Gap 11.0 mmol/L     Narrative:      GFR Normal >60  Chronic Kidney Disease <60  Kidney Failure <15      Magnesium [[id number]]  (Normal) Collected: 07/02/21 1311    Specimen: Blood Updated: 07/02/21 1407     Magnesium 2.1 mg/dL     COVID-19 and FLU A/B PCR - Swab, Nasopharynx [[id number]]  (Normal) Collected: 07/02/21 1317    Specimen: Swab from Nasopharynx Updated: 07/02/21 1347     COVID19 Not Detected     Influenza A PCR Not Detected     Influenza B PCR Not Detected    Narrative:      Fact  sheet for providers: https://www.fda.gov/media/811704/download    Fact sheet for patients: https://www.fda.gov/media/705941/download    Test performed by PCR.    CBC & Differential [[id number]]  (Abnormal) Collected: 07/02/21 1233    Specimen: Blood Updated: 07/02/21 1248    Narrative:      The following orders were created for panel order CBC & Differential.  Procedure                               Abnormality         Status                     ---------                               -----------         ------                     Scan Slide[[id number]]                                                                  CBC Auto Differential[[id number]]        Abnormal            Final result                 Please view results for these tests on the individual orders.    CBC Auto Differential [[id number]]  (Abnormal) Collected: 07/02/21 1233    Specimen: Blood Updated: 07/02/21 1246     .89 10*3/mm3      RBC 3.97 10*6/mm3      Hemoglobin 10.8 g/dL      Hematocrit 36.7 %      MCV 92.4 fL      MCH 27.2 pg      MCHC 29.4 g/dL      RDW 18.1 %      RDW-SD 54.0 fl      MPV 9.5 fL      Platelets 100 10*3/mm3      Neutrophil % 1.4 %      Lymphocyte % 96.8 %      Monocyte % 1.6 %      Eosinophil % 0.0 %      Basophil % 0.1 %      Immature Grans % 0.1 %      Neutrophils, Absolute 3.44 10*3/mm3      Lymphocytes, Absolute 256.46 10*3/mm3      Monocytes, Absolute 4.33 10*3/mm3      Eosinophils, Absolute 0.12 10*3/mm3      Basophils, Absolute 0.22 10*3/mm3      Immature Grans, Absolute 0.32 10*3/mm3      nRBC 0.0 /100 WBC         Imaging Results (Last 24 Hours)     ** No results found for the last 24 hours. **            Assessment:    Active Hospital Problems    Diagnosis    • Hyperkalemia          Hyperkalemia-we will continue to monitor, Kayexalate has been ordered.  We will start on IV fluids    Chronic kidney disease-we will monitor creatinine level and urine output    Leukemia-we will continue outpatient follow-up with   Beatriz    Hypertension-continue to manage, continue with home medications    COPD-not in exacerbation-nebulizer treatments as needed    DVT prophylaxis-SCDs         I confirmed that the patient's Advance Care Plan is present, code status is documented, or surrogate decision maker is listed in the patient's medical record.             Fco Whitlock MD  07/02/21  15:48 CDT                  Electronically signed by Fco Whitlock MD at 07/02/21 1556          Emergency Department Notes      Joseline Dowd RN at 07/02/21 1237        Urinal placed at bedside and instructed patient to provide urine specimen.       Joseline Dowd RN  07/02/21 1238      Electronically signed by Joseline Dowd RN at 07/02/21 1238     Toni Do PA-C at 07/02/21 1315      Procedure Orders    1. ECG 12 Lead [[id number]] ordered by Toni Do PA-C          Attestation signed by Mikey Garsia MD at 07/02/21 1716          For this patient encounter, I reviewed the NP or PA documentation, treatment plan, and medical decision making. Mikey Garsia MD 7/2/2021 17:16 CDT                  Subjective   Patient presents to emergency department for hyperkalemia.  States he had routine labs drawn today and was told to come to the emergency department.  States he has had a few palpitations but denies any other symptoms.  Current diagnosis of Leukemia.        History provided by:  Patient   used: No        Review of Systems   Constitutional: Negative for chills and fever.   HENT: Negative for sore throat and trouble swallowing.    Respiratory: Negative for cough, shortness of breath and wheezing.    Cardiovascular: Positive for palpitations.   Gastrointestinal: Negative for abdominal pain, nausea and vomiting.   Genitourinary: Negative for dysuria and flank pain.   Skin: Negative for color change and rash.   Allergic/Immunologic: Negative for immunocompromised state.   Neurological:  Negative for syncope and weakness.   Hematological: Does not bruise/bleed easily.   Psychiatric/Behavioral: Negative for confusion.       Past Medical History:   Diagnosis Date   • Aneurysm of infrarenal abdominal aorta (CMS/HCC)    • Anxiety    • Cervical radiculitis    • Cervical spondylosis without myelopathy    • CHF (congestive heart failure) (CMS/HCC)    • Chronic kidney disease, stage 3 (CMS/HCC)    • CLL (chronic lymphocytic leukemia) (CMS/HCC)    • Common iliac aneurysm (CMS/HCC)    • COPD (chronic obstructive pulmonary disease) (CMS/HCC)    • Coronary arteriosclerosis    • Degeneration of cervical intervertebral disc    • Elevated cholesterol    • Essential hypertension    • GERD (gastroesophageal reflux disease)    • Headache    • History of transfusion    • Hyperlipidemia    • Intermittent claudication (CMS/HCC)    • Myocardial infarction (CMS/HCC)    • Uric acid renal calculus        Allergies   Allergen Reactions   • Amlodipine Rash   • Imdur [Isosorbide Dinitrate] Rash   • Lisinopril Rash   • Metoprolol Rash       Past Surgical History:   Procedure Laterality Date   • ABDOMINAL SURGERY     • APPENDECTOMY     • CARDIAC CATHETERIZATION  05/25/2016    Cardiac cath 95552 (2) - Patent left circumflex stent.Chronic total occlusion of the RCA,more than 20 mm in length.   • CARDIAC CATHETERIZATION N/A 11/28/2017    Procedure: Left Heart Cath/ PCI if indicated;  Surgeon: Carlos Charles MD;  Location: [facility] INVASIVE LOCATION;  Service:    • CARDIAC SURGERY     • CERVICAL FUSION     • CHOLECYSTECTOMY     • COLONOSCOPY     • CORONARY ARTERY BYPASS GRAFT N/A 12/4/2017    Procedure: CORONARY ARTERY BYPASS GRAFTINGX3, ENDOSCOPIC VEIN HARVEST     (CELL SAVER);  Surgeon: Darien Dejesus MD;  Location: [facility] OR;  Service:    • ENDOSCOPY     • HERNIA REPAIR     • SPINAL FUSION         Family History   Problem Relation Age of Onset   • Hyperlipidemia Mother    • Hypertension Mother    • Cancer Father   "  • Hyperlipidemia Father    • Hypertension Father    • Cancer Brother    • Diabetes Other    • Diabetes Other        Social History     Socioeconomic History   • Marital status: Single     Spouse name: Not on file   • Number of children: Not on file   • Years of education: Not on file   • Highest education level: Not on file   Tobacco Use   • Smoking status: Former Smoker     Packs/day: 3.00     Years: 32.00     Pack years: 96.00     Types: Cigarettes     Quit date: 8/3/2017     Years since quitting: 3.9   • Smokeless tobacco: Never Used   Vaping Use   • Vaping Use: Never used   Substance and Sexual Activity   • Alcohol use: No   • Drug use: Yes     Frequency: 3.0 times per week     Types: Marijuana   • Sexual activity: Defer     Comment: Marital status: Single           Objective      BP (!) 182/93 (BP Location: Right arm, Patient Position: Sitting)   Pulse 70   Temp 97.3 °F (36.3 °C) (Infrared)   Resp 18   Ht 175.3 cm (69\")   Wt 63.2 kg (139 lb 6.4 oz)   SpO2 100%   BMI 20.59 kg/m²     Physical Exam  Vitals and nursing note reviewed.   Constitutional:       Appearance: Normal appearance.   HENT:      Head: Normocephalic and atraumatic.      Mouth/Throat:      Mouth: Mucous membranes are moist.   Eyes:      Pupils: Pupils are equal, round, and reactive to light.   Cardiovascular:      Rate and Rhythm: Normal rate and regular rhythm.      Pulses: Normal pulses.      Heart sounds: Normal heart sounds.   Pulmonary:      Effort: Pulmonary effort is normal. No respiratory distress.      Breath sounds: Normal breath sounds. No wheezing.   Skin:     General: Skin is warm.      Capillary Refill: Capillary refill takes less than 2 seconds.   Neurological:      General: No focal deficit present.      Mental Status: He is alert.   Psychiatric:         Mood and Affect: Mood normal.         Behavior: Behavior normal.         Thought Content: Thought content normal.         ECG 12 Lead      Date/Time: 7/2/2021 2:54 " PM  Performed by: Toni Do PA-C  Authorized by: Toni Do PA-C   Interpreted by physician  Comparison: compared with previous ECG from 4/22/2021  Similar to previous ECG  Rhythm: sinus rhythm  Rate: normal  BPM: 61  ST Segments: ST segments normal  Clinical impression: non-specific ECG                ED Course  ED Course as of Jul 02 1501   Fri Jul 02, 2021   1415 Paged Hospitalist.    [ROBERT]      ED Course User Index  [ROBERT] Toni Do PA-C      Results for orders placed or performed during the hospital encounter of 07/02/21   COVID-19 and FLU A/B PCR - Swab, Nasopharynx    Specimen: Nasopharynx; Swab   Result Value Ref Range    COVID19 Not Detected Not Detected - Ref. Range    Influenza A PCR Not Detected Not Detected    Influenza B PCR Not Detected Not Detected   Magnesium    Specimen: Blood   Result Value Ref Range    Magnesium 2.1 1.6 - 2.6 mg/dL   Comprehensive Metabolic Panel    Specimen: Blood   Result Value Ref Range    Glucose 102 (H) 65 - 99 mg/dL    BUN 22 (H) 6 - 20 mg/dL    Creatinine 2.09 (H) 0.76 - 1.27 mg/dL    Sodium 135 (L) 136 - 145 mmol/L    Potassium 6.3 (C) 3.5 - 5.2 mmol/L    Chloride 101 98 - 107 mmol/L    CO2 23.0 22.0 - 29.0 mmol/L    Calcium 9.5 8.6 - 10.5 mg/dL    Total Protein 6.7 6.0 - 8.5 g/dL    Albumin 4.60 3.50 - 5.20 g/dL    ALT (SGPT) 6 1 - 41 U/L    AST (SGOT) 22 1 - 40 U/L    Alkaline Phosphatase 148 (H) 39 - 117 U/L    Total Bilirubin 0.4 0.0 - 1.2 mg/dL    eGFR Non African Amer 33 (L) >60 mL/min/1.73    Globulin 2.1 gm/dL    A/G Ratio 2.2 g/dL    BUN/Creatinine Ratio 10.5 7.0 - 25.0    Anion Gap 11.0 5.0 - 15.0 mmol/L   CBC Auto Differential    Specimen: Blood   Result Value Ref Range    .89 (C) 3.40 - 10.80 10*3/mm3    RBC 3.97 (L) 4.14 - 5.80 10*6/mm3    Hemoglobin 10.8 (L) 13.0 - 17.7 g/dL    Hematocrit 36.7 (L) 37.5 - 51.0 %    MCV 92.4 79.0 - 97.0 fL    MCH 27.2 26.6 - 33.0 pg    MCHC 29.4 (L) 31.5 - 35.7 g/dL    RDW 18.1 (H) 12.3 -  15.4 %    RDW-SD 54.0 37.0 - 54.0 fl    MPV 9.5 6.0 - 12.0 fL    Platelets 100 (L) 140 - 450 10*3/mm3    Neutrophil % 1.4 (L) 42.7 - 76.0 %    Lymphocyte % 96.8 (H) 19.6 - 45.3 %    Monocyte % 1.6 (L) 5.0 - 12.0 %    Eosinophil % 0.0 (L) 0.3 - 6.2 %    Basophil % 0.1 0.0 - 1.5 %    Immature Grans % 0.1 0.0 - 0.5 %    Neutrophils, Absolute 3.44 1.70 - 7.00 10*3/mm3    Lymphocytes, Absolute 256.46 (H) 0.70 - 3.10 10*3/mm3    Monocytes, Absolute 4.33 (H) 0.10 - 0.90 10*3/mm3    Eosinophils, Absolute 0.12 0.00 - 0.40 10*3/mm3    Basophils, Absolute 0.22 (H) 0.00 - 0.20 10*3/mm3    Immature Grans, Absolute 0.32 (H) 0.00 - 0.05 10*3/mm3    nRBC 0.0 0.0 - 0.2 /100 WBC   Gold Top - SST   Result Value Ref Range    Extra Tube Hold for add-ons.                                             MDM    Final diagnoses:   Hyperkalemia       ED Disposition  ED Disposition     ED Disposition Condition Comment    Decision to Admit  Level of Care: Telemetry [5]   Diagnosis: Hyperkalemia [470948]   Admitting Physician: MACIEJ WASHINGTON [[id number]]   Attending Physician: MACIEJ WASHINGTON [[id number]]   Certification: I Certify That Inpatient Hospital Services Are Medically Necessary For Greater Than 2 Midnights            No follow-up provider specified.       Medication List      No changes were made to your prescriptions during this visit.          Toni Do PA-C  07/02/21 1501      Electronically signed by Mikey Garsia MD at 07/02/21 1716     Joseline Dowd, RN at 07/02/21 1320        Pt states that he is unable to provide urine specimen at this time.      Joseline Dowd RN  07/02/21 1321      Electronically signed by Joseline Dowd, RN at 07/02/21 1321     Patience Jansen, RN at 07/02/21 1729        Telemetry box 8583 on and verified.      Patience Jansen RN  07/02/21 1729      Electronically signed by Patience Jansen, RN at 07/02/21 1729       Vital Signs (last 2 days) before discharge     Date/Time    Temp   Temp src   Pulse   Resp   BP   Patient Position   SpO2    07/03/21 0830   --   --   67   --   --   --   --    07/03/21 0748   98.3 (36.8)   Oral   62   18   (!) 187/96   Lying   97    07/03/21 0256   97.3 (36.3)   Temporal   66   16   153/69   Lying   97    07/02/21 2354   --   --   64   --   --   --   --    07/02/21 2258   97.8 (36.6)   Temporal   76   16   142/98   Sitting   98    07/02/21 1853   96.3 (35.7)   Oral   78   18   (!) 182/100   Lying   97    07/02/21 18:02:43   --   --   80   18   (!) 172/101   Sitting   98    07/02/21 1734   --   --   79   --   --   --   --    07/02/21 1646   --   --   78   20   (!) 198/104   --   98    07/02/21 1643   --   --   79   --   152/98   --   --    07/02/21 15:56:22   --   --   76   18   (!) 189/100   Lying   99    07/02/21 1516   --   --   72   --   166/85   --   100    07/02/21 14:54:47   --   --   70   18   (!) 182/93   Sitting   100    07/02/21 1431   --   --   64   --   180/88   --   99    07/02/21 1325   --   --   64   --   (!) 183/106   --   --    07/02/21 1316   --   --   60   18   (!) 195/95   --   99    07/02/21 12:45:12   --   --   --   --   (!) 192/110   Sitting   --    07/02/21 1244   --   --   62   20   (!) 188/99   --   98    07/02/21 1204   97.3 (36.3)   Infrared   63   20   (!) 212/114   Sitting   99              No current facility-administered medications for this encounter.     Current Outpatient Medications   Medication Sig Dispense Refill   • allopurinol (ZYLOPRIM) 100 MG tablet Take 100 mg by mouth Daily.     • aspirin 81 MG EC tablet Take 1 tablet by mouth Daily.     • carvedilol (COREG) 25 MG tablet Take 1 tablet by mouth 2 (Two) Times a Day With Meals. 60 tablet 1   • docusate sodium (COLACE) 100 MG capsule Take 1 capsule by mouth 2 (Two) Times a Day As Needed for Constipation. 60 capsule 2   • folic acid (FOLVITE) 1 MG tablet Take 1 tablet by mouth 2 (two) times a day. 180 tablet 1   • hydrALAZINE (APRESOLINE) 50 MG tablet Take 1 tablet by  mouth Every 8 (Eight) Hours. 90 tablet 1   • nitroglycerin (NITROSTAT) 0.4 MG SL tablet DISSOLVE ONE TABLET UNDER THE TONGUE EVERY 5 MINUTES AS NEEDED FOR CHEST PAIN. DO NOT EXCEED A TOTAL OF 3 DOSES IN 15 MINUTES. SEEK MEDICAL     • ondansetron (Zofran) 4 MG tablet Take 1 tablet by mouth Every 8 (Eight) Hours As Needed for Nausea or Vomiting. 40 tablet 1   • pravastatin (PRAVACHOL) 40 MG tablet Take 1 tablet by mouth every night. 90 tablet 4   • ranolazine (RANEXA) 500 MG 12 hr tablet Take 1 tablet by mouth Every 12 (Twelve) Hours. 60 tablet 0   • sucralfate (CARAFATE) 1 g tablet Take 1 g by mouth 4 (Four) Times a Day.     • acalabrutinib (CALQUENCE) 100 MG capsule capsule Take 1 capsule by mouth Daily. 30 capsule 1   • acetaminophen (TYLENOL) 325 MG tablet Take 2 tablets by mouth Every 4 (Four) Hours As Needed for Mild Pain , Headache or Fever.     • cyanocobalamin (VITAMIN B-12) 1000 MCG tablet Take 1 tablet by mouth Daily. 90 tablet 1   • escitalopram (LEXAPRO) 10 MG tablet Take 10 mg by mouth Daily.     • famotidine (PEPCID) 40 MG tablet Take 40 mg by mouth 2 (Two) Times a Day.     • ferrous sulfate 325 (65 FE) MG tablet Take 1 tablet by mouth Daily With Breakfast. 30 tablet 3   • ondansetron (ZOFRAN) 4 MG tablet Take 1 tablet by mouth 4 (Four) Times a Day As Needed for Nausea or Vomiting. 40 tablet 3   • vitamin B-12 (CYANOCOBALAMIN) 1000 MCG tablet Take 1 tablet by mouth Daily. 90 tablet 1     Lab Results (most recent)     Procedure Component Value Units Date/Time    CBC & Differential [[id number]]  (Abnormal) Collected: 07/03/21 0529    Specimen: Blood Updated: 07/03/21 0650    Narrative:      The following orders were created for panel order CBC & Differential.  Procedure                               Abnormality         Status                     ---------                               -----------         ------                     Scan Slide[[id number]]                                                                   CBC Auto Differential[[id number]]        Abnormal            Final result                 Please view results for these tests on the individual orders.    CBC Auto Differential [[id number]]  (Abnormal) Collected: 07/03/21 0529    Specimen: Blood Updated: 07/03/21 0650     .13 10*3/mm3      RBC 3.28 10*6/mm3      Hemoglobin 9.7 g/dL      Hematocrit 30.2 %      MCV 92.1 fL      MCH 29.6 pg      MCHC 32.1 g/dL      RDW 16.7 %      RDW-SD 53.2 fl      MPV 9.8 fL      Platelets 79 10*3/mm3      Neutrophil % 0.9 %      Lymphocyte % 96.0 %      Monocyte % 2.2 %      Eosinophil % 0.0 %      Basophil % 0.8 %      Immature Grans % 0.1 %      Neutrophils, Absolute 1.45 10*3/mm3      Lymphocytes, Absolute 167.18 10*3/mm3      Monocytes, Absolute 3.91 10*3/mm3      Eosinophils, Absolute 0.05 10*3/mm3      Basophils, Absolute 1.35 10*3/mm3      Immature Grans, Absolute 0.19 10*3/mm3      nRBC 0.0 /100 WBC     Basic Metabolic Panel [[id number]]  (Abnormal) Collected: 07/03/21 0529    Specimen: Blood Updated: 07/03/21 0646     Glucose 91 mg/dL      BUN 22 mg/dL      Creatinine 1.99 mg/dL      Sodium 137 mmol/L      Potassium 4.9 mmol/L      Chloride 102 mmol/L      CO2 25.0 mmol/L      Calcium 8.8 mg/dL      eGFR Non African Amer 35 mL/min/1.73      BUN/Creatinine Ratio 11.1     Anion Gap 10.0 mmol/L     Narrative:      GFR Normal >60  Chronic Kidney Disease <60  Kidney Failure <15      Magnesium [[id number]]  (Normal) Collected: 07/03/21 0529    Specimen: Blood Updated: 07/03/21 0646     Magnesium 1.9 mg/dL     Urinalysis With Microscopic If Indicated (No Culture) - Urine, Clean Catch [[id number]]  (Abnormal) Collected: 07/02/21 1529    Specimen: Urine, Clean Catch Updated: 07/02/21 1534     Color, UA Yellow     Appearance, UA Clear     pH, UA 6.0     Specific Gravity, UA 1.006     Glucose,  mg/dL (Trace)     Ketones, UA Negative     Bilirubin, UA Negative     Blood, UA Negative     Protein, UA Negative     Leuk  Esterase, UA Negative     Nitrite, UA Negative     Urobilinogen, UA 0.2 E.U./dL    Narrative:      Urine microscopic not indicated.    Extra Tubes [[id number]] Collected: 07/02/21 1311    Specimen: Blood, Venous Line Updated: 07/02/21 1415    Narrative:      The following orders were created for panel order Extra Tubes.  Procedure                               Abnormality         Status                     ---------                               -----------         ------                     Gold Top - SST[[id number]]                                   Final result                 Please view results for these tests on the individual orders.    Gold Top - SST [[id number]] Collected: 07/02/21 1311    Specimen: Blood Updated: 07/02/21 1415     Extra Tube Hold for add-ons.     Comment: Auto resulted.       Comprehensive Metabolic Panel [[id number]]  (Abnormal) Collected: 07/02/21 1311    Specimen: Blood Updated: 07/02/21 1411     Glucose 102 mg/dL      BUN 22 mg/dL      Creatinine 2.09 mg/dL      Sodium 135 mmol/L      Potassium 6.3 mmol/L      Chloride 101 mmol/L      CO2 23.0 mmol/L      Calcium 9.5 mg/dL      Total Protein 6.7 g/dL      Albumin 4.60 g/dL      ALT (SGPT) 6 U/L      AST (SGOT) 22 U/L      Alkaline Phosphatase 148 U/L      Total Bilirubin 0.4 mg/dL      eGFR Non African Amer 33 mL/min/1.73      Globulin 2.1 gm/dL      A/G Ratio 2.2 g/dL      BUN/Creatinine Ratio 10.5     Anion Gap 11.0 mmol/L     Narrative:      GFR Normal >60  Chronic Kidney Disease <60  Kidney Failure <15      Magnesium [[id number]]  (Normal) Collected: 07/02/21 1311    Specimen: Blood Updated: 07/02/21 1407     Magnesium 2.1 mg/dL     COVID-19 and FLU A/B PCR - Swab, Nasopharynx [[id number]]  (Normal) Collected: 07/02/21 1317    Specimen: Swab from Nasopharynx Updated: 07/02/21 1347     COVID19 Not Detected     Influenza A PCR Not Detected     Influenza B PCR Not Detected    Narrative:      Fact sheet for providers:  https://www.fda.gov/media/240808/download    Fact sheet for patients: https://www.fda.gov/media/228331/download    Test performed by PCR.    CBC & Differential [[id number]]  (Abnormal) Collected: 07/02/21 1233    Specimen: Blood Updated: 07/02/21 1248    Narrative:      The following orders were created for panel order CBC & Differential.  Procedure                               Abnormality         Status                     ---------                               -----------         ------                     Scan Slide[[id number]]                                                                  CBC Auto Differential[[id number]]        Abnormal            Final result                 Please view results for these tests on the individual orders.    CBC Auto Differential [[id number]]  (Abnormal) Collected: 07/02/21 1233    Specimen: Blood Updated: 07/02/21 1246     .89 10*3/mm3      RBC 3.97 10*6/mm3      Hemoglobin 10.8 g/dL      Hematocrit 36.7 %      MCV 92.4 fL      MCH 27.2 pg      MCHC 29.4 g/dL      RDW 18.1 %      RDW-SD 54.0 fl      MPV 9.5 fL      Platelets 100 10*3/mm3      Neutrophil % 1.4 %      Lymphocyte % 96.8 %      Monocyte % 1.6 %      Eosinophil % 0.0 %      Basophil % 0.1 %      Immature Grans % 0.1 %      Neutrophils, Absolute 3.44 10*3/mm3      Lymphocytes, Absolute 256.46 10*3/mm3      Monocytes, Absolute 4.33 10*3/mm3      Eosinophils, Absolute 0.12 10*3/mm3      Basophils, Absolute 0.22 10*3/mm3      Immature Grans, Absolute 0.32 10*3/mm3      nRBC 0.0 /100 WBC           Imaging Results (Most Recent)     None

## 2021-07-06 NOTE — PROGRESS NOTES
DATE OF VISIT: 7/6/2021      REASON FOR VISIT: Chronic lymphocytic leukemia, lymphocytosis, anemia, thrombocytosis, chronic kidney disease, nausea and vomiting, left upper extremity swelling      HISTORY OF PRESENT ILLNESS:   55-year-old male with medical problem consisting of coronary artery disease, congestive heart failure, hypertension, dyslipidemia, gastroesophageal reflux disease who was initially seen in consultation on August 18, 2020 when patient was admitted to [facility] for diagnosis of CLL, anemia and thrombocytopenia.  Patient is here for follow-up appointment today to discuss recently done CT scan and blood work.  Patient was admitted to hospital last week due to severe hyperkalemia with potassium greater than 7.  Patient was seen in the emergency room last night secondary to swelling of left upper extremity and Doppler was negative for DVT.  Patient complains of nausea and vomiting that started earlier today.  Denies any new lymph node enlargement.  Complains of fatigue.  Complains of recent weight loss.  Denies any drenching night sweats.          Past Medical History, Past Surgical History, Social History, Family History have been reviewed and are without significant changes except as mentioned.    Review of Systems   Constitutional: Positive for fatigue.        Complains of chronic intermittent night sweats, denies any recent worsening.  Has lost 8 pounds since last clinic visit.   Respiratory: Positive for shortness of breath.    Gastrointestinal: Positive for nausea and vomiting. Negative for blood in stool.   Musculoskeletal: Positive for arthralgias and back pain.        Complains of swelling of left upper extremity that started yesterday   Neurological: Positive for dizziness and numbness.   Hematological: Positive for adenopathy (Positive for chronic adenopathy affecting bilateral neck).      A comprehensive 14 point review of systems was performed and was negative except as  "mentioned.    Medications:  The current medication list was reviewed in the EMR    ALLERGIES:    Allergies   Allergen Reactions   • Amlodipine Rash   • Imdur [Isosorbide Dinitrate] Rash   • Lisinopril Rash   • Metoprolol Rash       Objective      Vitals:    07/06/21 1117   BP: 160/87   Pulse: 65   Resp: 20   Temp: 97.2 °F (36.2 °C)   TempSrc: Temporal   Weight: 63.4 kg (139 lb 11.2 oz)   Height: 175.3 cm (69.02\")   PainSc:   8   PainLoc: Generalized     Current Status 7/6/2021   ECOG score 0       Physical Exam  Constitutional:       Appearance: He is ill-appearing.   Cardiovascular:      Rate and Rhythm: Normal rate.      Heart sounds: Murmur heard.     Pulmonary:      Breath sounds: Normal breath sounds.   Lymphadenopathy:      Cervical: Cervical adenopathy present.   Neurological:      Mental Status: He is alert and oriented to person, place, and time.           RECENT LABS:  Glucose   Date Value Ref Range Status   07/05/2021 98 65 - 99 mg/dL Final     Glucose, Arterial   Date Value Ref Range Status   12/05/2017 100 mmol/L Final     Sodium   Date Value Ref Range Status   07/05/2021 136 136 - 145 mmol/L Final     Potassium   Date Value Ref Range Status   07/05/2021 4.6 3.5 - 5.2 mmol/L Final     Comment:     Slight hemolysis detected by analyzer. Results may be affected.     CO2   Date Value Ref Range Status   07/05/2021 21.0 (L) 22.0 - 29.0 mmol/L Final     Chloride   Date Value Ref Range Status   07/05/2021 101 98 - 107 mmol/L Final     Anion Gap   Date Value Ref Range Status   07/05/2021 14.0 5.0 - 15.0 mmol/L Final     Creatinine   Date Value Ref Range Status   07/05/2021 2.12 (H) 0.76 - 1.27 mg/dL Final     BUN   Date Value Ref Range Status   07/05/2021 20 6 - 20 mg/dL Final     BUN/Creatinine Ratio   Date Value Ref Range Status   07/05/2021 9.4 7.0 - 25.0 Final     Calcium   Date Value Ref Range Status   07/05/2021 8.4 (L) 8.6 - 10.5 mg/dL Final     eGFR Non  Amer   Date Value Ref Range Status "   07/05/2021 32 (L) >60 mL/min/1.73 Final     Alkaline Phosphatase   Date Value Ref Range Status   07/05/2021 125 (H) 39 - 117 U/L Final     Total Protein   Date Value Ref Range Status   07/05/2021 6.1 6.0 - 8.5 g/dL Final     ALT (SGPT)   Date Value Ref Range Status   07/05/2021 5 1 - 41 U/L Final     AST (SGOT)   Date Value Ref Range Status   07/05/2021 28 1 - 40 U/L Final     Comment:     Slight hemolysis detected by analyzer. Results may be affected.     Total Bilirubin   Date Value Ref Range Status   07/05/2021 0.3 0.0 - 1.2 mg/dL Final     Albumin   Date Value Ref Range Status   07/05/2021 4.20 3.50 - 5.20 g/dL Final     Globulin   Date Value Ref Range Status   07/05/2021 1.9 gm/dL Final     Lab Results   Component Value Date    .94 (C) 07/05/2021    HGB 9.7 (L) 07/05/2021    HCT 31.8 (L) 07/05/2021    MCV 93.3 07/05/2021    PLT 99 (L) 07/05/2021     Lab Results   Component Value Date    NEUTROABS 2.28 07/05/2021    IRON 57 (L) 07/02/2021    IRON 58 (L) 04/22/2021    IRON 69 01/22/2021    TIBC 291 (L) 07/02/2021    TIBC 428 04/22/2021    TIBC 381 01/22/2021    LABIRON 20 07/02/2021    LABIRON 14 (L) 04/22/2021    LABIRON 18 (L) 01/22/2021    FERRITIN 245.20 07/02/2021    FERRITIN 102.10 04/22/2021    FERRITIN 119.20 01/22/2021    BUIODDMQ14 795 07/02/2021    MXFDBEST14 565 04/22/2021    PBLVDWRS17 1,043 (H) 01/22/2021    FOLATE >20.00 07/02/2021    FOLATE 4.57 (L) 04/22/2021    FOLATE 6.47 01/22/2021     No results found for: , LABCA2, AFPTM, HCGQUANT, , CHROMGRNA, 4IRAK60OHT, CEA, REFLABREPO      PATHOLOGY:  * Cannot find OR log *         RADIOLOGY DATA :  CT Abdomen Pelvis Without Contrast    Result Date: 7/2/2021  CONCLUSION: 1.  Mixed response to therapy 2.  Splenomegaly 3.  There is a faint hypodense lesion in the left lobe of the liver measuring 1.4 cm, similar to the prior study 4.  3 cm AAA For management of fusiform aneurysmal abdominal aortas: 3.0-3.4 cm AAA, recommend follow-up  every 3 years. Note:   For AAA enlargement of >0.5 cm in 6 months or >1 cm in 1 year, recommend vascular consultation. References: J Am Dori Radiol 2013; 10(10):789-794; J Vasc Surg. 2018; 67:2-77 Electronically signed by:  Shakir Topete MD  7/2/2021 3:56 PM CDT Workstation: [id number]    CT Chest Without Contrast Diagnostic    Result Date: 7/2/2021  CONCLUSION: 1.  Mixed response to therapy 2.  Splenomegaly 3.  There is a faint hypodense lesion in the left lobe of the liver measuring 1.4 cm, similar to the prior study 4.  3 cm AAA For management of fusiform aneurysmal abdominal aortas: 3.0-3.4 cm AAA, recommend follow-up every 3 years. Note:   For AAA enlargement of >0.5 cm in 6 months or >1 cm in 1 year, recommend vascular consultation. References: J Am Dori Radiol 2013; 10(10):789-794; J Vasc Surg. 2018; 67:2-77 Electronically signed by:  Shakir Topete MD  7/2/2021 3:56 PM CDT Workstation: [id number]    US Venous Doppler Upper Extremity Left (duplex)    Result Date: 7/6/2021  No evidence of acute thrombus in the venous lumens of the left upper extremity.  Echogenic material within a duplicated basilic vein which may represent chronic thrombus. This may be related to prior PICC catheter placement. Electronically signed by:  Fermin Sotomayor MD  7/6/2021 1:51 AM CDT Workstation: [id number]          Assessment/Plan     1.  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  -Side effect of acalabrutinib were discussed with patient today  -Due to his performance status and chronic kidney disease we will start him on acalabrutinib 100 mg p.o. daily.  -We will ask patient to return to clinic 2 weeks after starting acalabrutinib with repeat CBC and CMP on that day.      2.  Anemia:  -Multifactorial  -Hemoglobin is decreased to 9.7  -Due to iron malabsorption patient received 2 dose of intravenous Feraheme in May 2021  -Recommend continue with folic acid twice daily and B12 1000 mcg p.o. daily  -We will recommend transfusing as needed if hemoglobin is less than 7    3.  Thrombocytopenia:  -Secondary to CLL plus splenomegaly.  Platelet count is 99,000.  Will monitor with CBC    4.  Nausea and vomiting: Questionable etiology  -Patient has been vomiting in clinic today.  Patient was recommended to go to the emergency room but is refusing.  -We will provide him with 500 mL of saline with 8 mg of dexamethasone and 8 mg of Zofran intravenously today.    5.  Coronary artery disease s/p CABG    6.  Health maintenance: Patient quit smoking in 2016.  Had a colonoscopy last 10 years    7. Advance Care Planning: For now patient remains full code and is able to make decisions.  Patient has health care surrogate mentioned on chart.    8.  Prescription prescription for acalabrutinib and Zofran has been provided today             PHQ-9 Total Score: 0   -Patient is not homicidal or suicidal.  No acute intervention required.    Lyle Corona reports a pain score of 8.  Given his pain assessment as noted, treatment options were discussed and the following options were decided upon as a follow-up plan to address the patient's pain: continuation of current treatment plan for pain.         Vladimir Henderson MD  7/6/2021  18:55 CDT        Part of this note may be an electronic transcription/translation of spoken language to printed text using the Dragon Dictation System.          CC:

## 2021-07-07 ENCOUNTER — DOCUMENTATION (OUTPATIENT)
Dept: NUTRITION | Facility: HOSPITAL | Age: 56
End: 2021-07-07

## 2021-07-07 ENCOUNTER — READMISSION MANAGEMENT (OUTPATIENT)
Dept: CALL CENTER | Facility: HOSPITAL | Age: 56
End: 2021-07-07

## 2021-07-07 NOTE — OUTREACH NOTE
Medical Week 1 Survey      Responses   [facility] patient discharged from?  [place]   Does the patient have one of the following disease processes/diagnoses(primary or secondary)?  Other   Week 1 attempt successful?  No   Unsuccessful attempts  Attempt 2          Ml Lo LPN

## 2021-07-08 ENCOUNTER — READMISSION MANAGEMENT (OUTPATIENT)
Dept: CALL CENTER | Facility: HOSPITAL | Age: 56
End: 2021-07-08

## 2021-07-08 NOTE — PAYOR COMM NOTE
"Discharge summary  Auth pending    [id number]        Lyle Corona (55 y.o. Male)     Date of Birth Social Security Number Address Home Phone MRN    1965  [id number] [facility] [address] [phone] [id number]    Anabaptist Marital Status          Buddhism        Admission Date Admission Type Admitting Provider Attending Provider Department, Room/Bed    7/2/21 Emergency Fco Whitlock MD  [address], [facility]/1    Discharge Date Discharge Disposition Discharge Destination        7/3/2021 Home or Self Care              Attending Provider: (none)   Allergies: Amlodipine, Imdur [Isosorbide Dinitrate], Lisinopril, Metoprolol    Isolation: None   Infection: None   Code Status: Prior    Ht: 175.3 cm (69\")   Wt: 62.3 kg (137 lb 6.4 oz)    Admission Cmt: None   Principal Problem: None                Active Insurance as of 7/2/2021     Primary Coverage     Payor Plan Insurance Group Employer/Plan Group    ANTH MEDICARE REPLACEMENT ANTH MEDICARE ADVANTAGE [id number]     Payor Plan Address Payor Plan Phone Number Payor Plan Fax Number Effective Dates    [address] [phone]  3/1/2021 - None Entered    [facility] [id number]       Subscriber Name Subscriber Birth Date Member ID       LYLE CORONA 1965 [id number]                 Emergency Contacts          No emergency contacts on file.               Discharge Summary      Fco Whitlock MD at 07/03/21 [address] [place] [address] Medicine Services  DISCHARGE SUMMARY       Date of Admission: 7/2/2021  Date of Discharge:  7/3/2021  Primary Care Physician: Fernanda Pope APRN    Presenting Problem/History of Present Illness:  Hyperkalemia [E87.5]       Final Discharge Diagnoses:  Active Hospital Problems    Diagnosis    • Hyperkalemia        Consults:   Consults     No orders found for last 30 day(s).              Chief Complaint on Day of Discharge: " "None    Hospital Course:  The patient is a 55 y.o. male who presented to [facility] with abnormal labs.  He was diagnosed with had a high potassium level and it was 6.0 when he came to the ER.  He had been given Kayexalate and IV fluids and after which his potassium normalized.  He was advised to continue drinking plenty of fluids.  Patient was then discharged home with outpatient follow-up with PCP and oncology.    Condition on Discharge: Stable    Physical Exam on Discharge:  BP (!) 187/96 (BP Location: Right arm, Patient Position: Lying)   Pulse 67   Temp 98.3 °F (36.8 °C) (Oral)   Resp 18   Ht 175.3 cm (69\")   Wt 62.3 kg (137 lb 6.4 oz)   SpO2 97%   BMI 20.29 kg/m²   Physical Exam  Vitals reviewed.   Constitutional:       General: He is not in acute distress.     Appearance: He is well-developed. He is not diaphoretic.   HENT:      Head: Normocephalic and atraumatic.   Cardiovascular:      Rate and Rhythm: Normal rate.   Pulmonary:      Effort: Pulmonary effort is normal. No respiratory distress.      Breath sounds: No wheezing.   Abdominal:      General: There is no distension.      Palpations: Abdomen is soft.   Musculoskeletal:         General: Normal range of motion.   Skin:     General: Skin is warm and dry.   Neurological:      Mental Status: He is alert.      Cranial Nerves: No cranial nerve deficit.   Psychiatric:         Behavior: Behavior normal.         Thought Content: Thought content normal.         Judgment: Judgment normal.           Discharge Disposition:  Home or Self Care    Discharge Medications:     Discharge Medications      Continue These Medications      Instructions Start Date   acetaminophen 325 MG tablet  Commonly known as: TYLENOL   650 mg, Oral, Every 4 Hours PRN      allopurinol 100 MG tablet  Commonly known as: ZYLOPRIM   100 mg, Oral, Daily      aspirin 81 MG EC tablet   81 mg, Oral, Daily      carvedilol 25 MG tablet  Commonly known as: COREG   25 mg, Oral, " 2 Times Daily With Meals      docusate sodium 100 MG capsule  Commonly known as: COLACE   100 mg, Oral, 2 Times Daily PRN      escitalopram 10 MG tablet  Commonly known as: LEXAPRO   10 mg, Oral, Daily      famotidine 40 MG tablet  Commonly known as: PEPCID   40 mg, Oral, 2 Times Daily      ferrous sulfate 325 (65 FE) MG tablet   325 mg, Oral, Daily With Breakfast      folic acid 1 MG tablet  Commonly known as: FOLVITE   1 mg, Oral, 2 times daily      hydrALAZINE 50 MG tablet  Commonly known as: APRESOLINE   50 mg, Oral, Every 8 Hours Scheduled      nitroglycerin 0.4 MG SL tablet  Commonly known as: NITROSTAT   DISSOLVE ONE TABLET UNDER THE TONGUE EVERY 5 MINUTES AS NEEDED FOR CHEST PAIN. DO NOT EXCEED A TOTAL OF 3 DOSES IN 15 MINUTES. SEEK MEDICAL      ondansetron 4 MG tablet  Commonly known as: Zofran   4 mg, Oral, Every 8 Hours PRN      pravastatin 40 MG tablet  Commonly known as: Pravachol   40 mg, Oral, Nightly      ranolazine 500 MG 12 hr tablet  Commonly known as: RANEXA   500 mg, Oral, Every 12 Hours Scheduled      sucralfate 1 g tablet  Commonly known as: CARAFATE   1 g, Oral, 4 Times Daily      vitamin B-12 1000 MCG tablet  Commonly known as: CYANOCOBALAMIN   1,000 mcg, Oral, Daily      cyanocobalamin 1000 MCG tablet  Commonly known as: VITAMIN B-12   1,000 mcg, Oral, Daily             Discharge Diet:   Diet Instructions     Diet: Cardiac, Renal      Discharge Diet:  Cardiac  Renal             Activity at Discharge:   Activity Instructions     Activity as Tolerated            Discharge Care Plan/Instructions: Continue with medications, continue follow-up with PCP and oncology    Follow-up Appointments:   Future Appointments   Date Time Provider Department Center   7/6/2021 11:30 AM Vladimir Henderson MD [facility] Whitlock MD  07/03/21  11:11 CDT              Electronically signed by Fco Whitlock MD at 07/03/21 0524

## 2021-07-08 NOTE — OUTREACH NOTE
Medical Week 1 Survey      Responses   [facility] patient discharged from?  [place]   Does the patient have one of the following disease processes/diagnoses(primary or secondary)?  Other   Week 1 attempt successful?  No   Unsuccessful attempts  Attempt 3          Nitza Palma RN

## 2021-07-14 ENCOUNTER — READMISSION MANAGEMENT (OUTPATIENT)
Dept: CALL CENTER | Facility: HOSPITAL | Age: 56
End: 2021-07-14

## 2021-07-14 NOTE — OUTREACH NOTE
Medical Week 2 Survey      Responses   [facility] patient discharged from?  [place]   Does the patient have one of the following disease processes/diagnoses(primary or secondary)?  Other   Week 2 attempt successful?  No   Unsuccessful attempts  Attempt 1          Tanika Manzanares RN

## 2021-07-20 ENCOUNTER — READMISSION MANAGEMENT (OUTPATIENT)
Dept: CALL CENTER | Facility: HOSPITAL | Age: 56
End: 2021-07-20

## 2021-07-20 NOTE — OUTREACH NOTE
Medical Week 2 Survey      Responses   [facility] patient discharged from?  [place]   Does the patient have one of the following disease processes/diagnoses(primary or secondary)?  Other   Week 2 attempt successful?  No   Unsuccessful attempts  Attempt 2          Natasha Smalls RN

## 2021-07-26 ENCOUNTER — READMISSION MANAGEMENT (OUTPATIENT)
Dept: CALL CENTER | Facility: HOSPITAL | Age: 56
End: 2021-07-26

## 2021-07-26 NOTE — OUTREACH NOTE
Medical Week 3 Survey      Responses   [facility] patient discharged from?  [place]   Does the patient have one of the following disease processes/diagnoses(primary or secondary)?  Other   Week 3 attempt successful?  No   Unsuccessful attempts  Attempt 1          Sigrid Bonilla RN

## 2021-07-30 ENCOUNTER — READMISSION MANAGEMENT (OUTPATIENT)
Dept: CALL CENTER | Facility: HOSPITAL | Age: 56
End: 2021-07-30

## 2021-07-30 NOTE — OUTREACH NOTE
"Medical Week 3 Survey      Responses   [facility] patient discharged from?  [place]   Does the patient have one of the following disease processes/diagnoses(primary or secondary)?  Other   Week 3 attempt successful?  No   Unsuccessful attempts  Attempt 2   Rescheduled  Revoked   Revoke  Phone number issues [voice mailbox not set up yet]   Call end time  [id number]   Revoked  No further contact(revokes)-requires comment   Graduated/Revoked comments  UTR x 6, \"voicemail box not set up yet \"          Anne Nagy RN  "

## 2021-08-03 ENCOUNTER — OFFICE VISIT (OUTPATIENT)
Dept: ONCOLOGY | Facility: CLINIC | Age: 56
End: 2021-08-03

## 2021-08-03 ENCOUNTER — LAB (OUTPATIENT)
Dept: ONCOLOGY | Facility: HOSPITAL | Age: 56
End: 2021-08-03

## 2021-08-03 VITALS
DIASTOLIC BLOOD PRESSURE: 93 MMHG | BODY MASS INDEX: 19.16 KG/M2 | HEIGHT: 69 IN | SYSTOLIC BLOOD PRESSURE: 154 MMHG | HEART RATE: 71 BPM | RESPIRATION RATE: 18 BRPM | TEMPERATURE: 97 F | WEIGHT: 129.4 LBS

## 2021-08-03 DIAGNOSIS — D50.9 IRON DEFICIENCY ANEMIA, UNSPECIFIED IRON DEFICIENCY ANEMIA TYPE: ICD-10-CM

## 2021-08-03 DIAGNOSIS — N18.30 STAGE 3 CHRONIC KIDNEY DISEASE, UNSPECIFIED WHETHER STAGE 3A OR 3B CKD (HCC): ICD-10-CM

## 2021-08-03 DIAGNOSIS — C91.10 CLL (CHRONIC LYMPHOCYTIC LEUKEMIA) (HCC): Primary | ICD-10-CM

## 2021-08-03 DIAGNOSIS — C91.10 CLL (CHRONIC LYMPHOCYTIC LEUKEMIA) (HCC): ICD-10-CM

## 2021-08-03 DIAGNOSIS — D69.6 THROMBOCYTOPENIA (HCC): ICD-10-CM

## 2021-08-03 DIAGNOSIS — K90.9 IRON MALABSORPTION: ICD-10-CM

## 2021-08-03 DIAGNOSIS — R11.2 NAUSEA AND VOMITING, INTRACTABILITY OF VOMITING NOT SPECIFIED, UNSPECIFIED VOMITING TYPE: ICD-10-CM

## 2021-08-03 LAB
ALBUMIN SERPL-MCNC: 4.2 G/DL (ref 3.5–5.2)
ALBUMIN/GLOB SERPL: 1.4 G/DL
ALP SERPL-CCNC: 126 U/L (ref 39–117)
ALT SERPL W P-5'-P-CCNC: 7 U/L (ref 1–41)
ANION GAP SERPL CALCULATED.3IONS-SCNC: 12 MMOL/L (ref 5–15)
ANISOCYTOSIS BLD QL: ABNORMAL
AST SERPL-CCNC: 14 U/L (ref 1–40)
BILIRUB SERPL-MCNC: 0.4 MG/DL (ref 0–1.2)
BUN SERPL-MCNC: 27 MG/DL (ref 6–20)
BUN/CREAT SERPL: 17.8 (ref 7–25)
CALCIUM SPEC-SCNC: 8.9 MG/DL (ref 8.6–10.5)
CHLORIDE SERPL-SCNC: 100 MMOL/L (ref 98–107)
CO2 SERPL-SCNC: 21 MMOL/L (ref 22–29)
CREAT SERPL-MCNC: 1.52 MG/DL (ref 0.76–1.27)
DEPRECATED RDW RBC AUTO: ABNORMAL FL
ERYTHROCYTE [DISTWIDTH] IN BLOOD BY AUTOMATED COUNT: ABNORMAL %
GFR SERPL CREATININE-BSD FRML MDRD: 48 ML/MIN/1.73
GLOBULIN UR ELPH-MCNC: 3.1 GM/DL
GLUCOSE SERPL-MCNC: 104 MG/DL (ref 65–99)
HCT VFR BLD AUTO: 35.3 % (ref 37.5–51)
HGB BLD-MCNC: 9.8 G/DL (ref 13–17.7)
HOLD SPECIMEN: NORMAL
LYMPHOCYTES # BLD MANUAL: 296.7 10*3/MM3 (ref 0.7–3.1)
LYMPHOCYTES NFR BLD MANUAL: 96 % (ref 19.6–45.3)
MCH RBC QN AUTO: 26.6 PG (ref 26.6–33)
MCHC RBC AUTO-ENTMCNC: 27.8 G/DL (ref 31.5–35.7)
MCV RBC AUTO: 95.9 FL (ref 79–97)
NEUTROPHILS # BLD AUTO: 9.18 10*3/MM3 (ref 1.7–7)
NEUTROPHILS NFR BLD MANUAL: 3 % (ref 42.7–76)
PLATELET # BLD AUTO: 213 10*3/MM3 (ref 140–450)
PMV BLD AUTO: 10 FL (ref 6–12)
POTASSIUM SERPL-SCNC: 5.2 MMOL/L (ref 3.5–5.2)
PROT SERPL-MCNC: 7.3 G/DL (ref 6–8.5)
RBC # BLD AUTO: 3.68 10*6/MM3 (ref 4.14–5.8)
SMALL PLATELETS BLD QL SMEAR: ADEQUATE
SMUDGE CELLS IN BLOOD BY LIGHT MICROSCOPY: 10 /100 WBC
SODIUM SERPL-SCNC: 133 MMOL/L (ref 136–145)
VARIANT LYMPHS NFR BLD MANUAL: 1 % (ref 0–5)
WBC # BLD AUTO: 305.88 10*3/MM3 (ref 3.4–10.8)
WBC MORPH BLD: NORMAL

## 2021-08-03 PROCEDURE — 99214 OFFICE O/P EST MOD 30 MIN: CPT | Performed by: INTERNAL MEDICINE

## 2021-08-03 PROCEDURE — 85007 BL SMEAR W/DIFF WBC COUNT: CPT

## 2021-08-03 PROCEDURE — 80053 COMPREHEN METABOLIC PANEL: CPT

## 2021-08-03 PROCEDURE — 85025 COMPLETE CBC W/AUTO DIFF WBC: CPT

## 2021-08-03 PROCEDURE — G9903 PT SCRN TBCO ID AS NON USER: HCPCS | Performed by: INTERNAL MEDICINE

## 2021-08-03 PROCEDURE — 1125F AMNT PAIN NOTED PAIN PRSNT: CPT | Performed by: INTERNAL MEDICINE

## 2021-08-03 PROCEDURE — G0463 HOSPITAL OUTPT CLINIC VISIT: HCPCS | Performed by: INTERNAL MEDICINE

## 2021-08-03 PROCEDURE — 1123F ACP DISCUSS/DSCN MKR DOCD: CPT | Performed by: INTERNAL MEDICINE

## 2021-08-03 RX ORDER — DRONABINOL 2.5 MG/1
2.5 CAPSULE ORAL
Qty: 60 CAPSULE | Refills: 0 | Status: SHIPPED | OUTPATIENT
Start: 2021-08-03 | End: 2021-08-31 | Stop reason: SDUPTHER

## 2021-08-03 RX ORDER — HYDROCODONE BITARTRATE AND ACETAMINOPHEN 10; 325 MG/1; MG/1
TABLET ORAL
COMMUNITY
Start: 2021-06-09

## 2021-08-03 RX ORDER — POTASSIUM CHLORIDE 1500 MG/1
20 TABLET, FILM COATED, EXTENDED RELEASE ORAL DAILY
COMMUNITY
Start: 2021-05-23

## 2021-08-03 RX ORDER — AMLODIPINE BESYLATE 5 MG/1
TABLET ORAL
COMMUNITY
Start: 2021-05-23

## 2021-08-03 RX ORDER — PROMETHAZINE HYDROCHLORIDE 12.5 MG/1
12.5 TABLET ORAL EVERY 6 HOURS PRN
Qty: 40 TABLET | Refills: 3 | Status: SHIPPED | OUTPATIENT
Start: 2021-08-03

## 2021-08-03 NOTE — PROGRESS NOTES
"DATE OF VISIT: 8/3/2021      REASON FOR VISIT: Chronic lymphocytic leukemia on acalabrutinib, lymphocytosis, anemia, thrombocytopenia, chronic kidney disease, nausea and vomiting      HISTORY OF PRESENT ILLNESS:   55-year-old male with medical problem consisting of coronary artery disease, congestive heart failure, hypertension, dyslipidemia, gastroesophageal reflux disease has been following with pulmonology consistently since August 18, 2024 chronic lymphocytic leukemia, anemia and thrombocytopenia.  Patient was started on acalabrutinib in July 2021.  Patient is here for follow-up appointment today.  Complains of nausea and vomiting for Zofran has not been helping.  Complains of both appetite and recently loss of 10 pounds.  States swelling of left upper extremity is improved since starting Acalabrutinib.  Denies any new lymph node enlargement.  Denies any bleeding.          Past Medical History, Past Surgical History, Social History, Family History have been reviewed and are without significant changes except as mentioned.    Review of Systems   Constitutional: Positive for appetite change and unexpected weight change (Has lost 10 pounds since last clinic visit).   Respiratory: Positive for shortness of breath.    Gastrointestinal: Positive for nausea and vomiting.   Musculoskeletal: Positive for arthralgias and back pain.   Hematological: Negative for adenopathy.      A comprehensive 14 point review of systems was performed and was negative except as mentioned.    Medications:  The current medication list was reviewed in the EMR    ALLERGIES:    Allergies   Allergen Reactions   • Amlodipine Rash   • Imdur [Isosorbide Dinitrate] Rash   • Lisinopril Rash   • Metoprolol Rash       Objective      Vitals:    08/03/21 0900   BP: 154/93   Pulse: 71   Resp: 18   Temp: 97 °F (36.1 °C)   TempSrc: Temporal   Weight: 58.7 kg (129 lb 6.4 oz)   Height: 175.3 cm (69.02\")   PainSc:   8   PainLoc: Generalized     Current Status " 8/3/2021   ECOG score 0       Physical Exam  Cardiovascular:      Rate and Rhythm: Normal rate and regular rhythm.   Pulmonary:      Breath sounds: Normal breath sounds.   Lymphadenopathy:      Cervical: No cervical adenopathy.   Neurological:      Mental Status: He is alert and oriented to person, place, and time.           RECENT LABS:  Glucose   Date Value Ref Range Status   08/03/2021 104 (H) 65 - 99 mg/dL Final     Glucose, Arterial   Date Value Ref Range Status   12/05/2017 100 mmol/L Final     Sodium   Date Value Ref Range Status   08/03/2021 133 (L) 136 - 145 mmol/L Final     Potassium   Date Value Ref Range Status   08/03/2021 5.2 3.5 - 5.2 mmol/L Final     CO2   Date Value Ref Range Status   08/03/2021 21.0 (L) 22.0 - 29.0 mmol/L Final     Chloride   Date Value Ref Range Status   08/03/2021 100 98 - 107 mmol/L Final     Anion Gap   Date Value Ref Range Status   08/03/2021 12.0 5.0 - 15.0 mmol/L Final     Creatinine   Date Value Ref Range Status   08/03/2021 1.52 (H) 0.76 - 1.27 mg/dL Final     BUN   Date Value Ref Range Status   08/03/2021 27 (H) 6 - 20 mg/dL Final     BUN/Creatinine Ratio   Date Value Ref Range Status   08/03/2021 17.8 7.0 - 25.0 Final     Calcium   Date Value Ref Range Status   08/03/2021 8.9 8.6 - 10.5 mg/dL Final     eGFR Non  Amer   Date Value Ref Range Status   08/03/2021 48 (L) >60 mL/min/1.73 Final     Alkaline Phosphatase   Date Value Ref Range Status   08/03/2021 126 (H) 39 - 117 U/L Final     Total Protein   Date Value Ref Range Status   08/03/2021 7.3 6.0 - 8.5 g/dL Final     ALT (SGPT)   Date Value Ref Range Status   08/03/2021 7 1 - 41 U/L Final     AST (SGOT)   Date Value Ref Range Status   08/03/2021 14 1 - 40 U/L Final     Total Bilirubin   Date Value Ref Range Status   08/03/2021 0.4 0.0 - 1.2 mg/dL Final     Albumin   Date Value Ref Range Status   08/03/2021 4.20 3.50 - 5.20 g/dL Final     Globulin   Date Value Ref Range Status   08/03/2021 3.1 gm/dL Final     Lab  Results   Component Value Date    .88 (C) 08/03/2021    HGB 9.8 (L) 08/03/2021    HCT 35.3 (L) 08/03/2021    MCV 95.9 08/03/2021     08/03/2021     Lab Results   Component Value Date    NEUTROABS 9.18 (H) 08/03/2021    IRON 57 (L) 07/02/2021    IRON 58 (L) 04/22/2021    IRON 69 01/22/2021    TIBC 291 (L) 07/02/2021    TIBC 428 04/22/2021    TIBC 381 01/22/2021    LABIRON 20 07/02/2021    LABIRON 14 (L) 04/22/2021    LABIRON 18 (L) 01/22/2021    FERRITIN 245.20 07/02/2021    FERRITIN 102.10 04/22/2021    FERRITIN 119.20 01/22/2021    [id number] 795 07/02/2021    [id number] 565 04/22/2021    SWRRVFCT75 1,043 (H) 01/22/2021    FOLATE >20.00 07/02/2021    FOLATE 4.57 (L) 04/22/2021    FOLATE 6.47 01/22/2021     No results found for: , LABCA2, AFPTM, HCGQUANT, , CHROMGRNA, [id number], CEA, REFLABREPO      PATHOLOGY:  * Cannot find OR log *         RADIOLOGY DATA :  No radiology results for the last 7 days        Assessment/Plan     1.  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  Patient was started on acalabrutinib 800 mg p.o. daily in July 2021  -Patient is complaining of persistent nausea and vomiting since starting Acalabrutinib despite of taking Zofran  -We will prescribe patient Phenergan and Marinol for intractable nausea and vomiting.  -Clinically patient has a significant decrease in lymphadenopathy involving neck as well as left axillary area.  Left upper extremity swelling is completely resolved since starting Acalabrutinib  -Recommend continue with Acalabrutinib 100 mg p.o. daily  -We will ask patient to return to clinic in 4 weeks with repeat CBC and CMP on that day.  -Plan is to do restaging CT scan of chest, abdomen and pelvis without contrast around October 2021    2.  Anemia:  -Multifactorial  -Hemoglobin is 9.8  -Due to iron malabsorption patient has required intravenous Feraheme in May 2020  -Remains on folic acid  twice daily along with B12 1000 mcg p.o. daily  -We will transfuse as needed hemoglobin is less than 7  -We will repeat anemia work-up in October 2021    3.  Thrombocytopenia:  -Secondary to CLL plus splenomegaly  -Platelet count was normal today.  Will monitor with CBC    4.  Persistent nausea and vomiting:  -Patient is complaining of persistent nausea and vomiting despite of taking Zofran  -We will prescribe patient Phenergan 12.5 mg every 6 hours along with Marinol 2.5 mg twice daily daily.  Prescription has been sent to his pharmacy    5.  Weight loss and loss of appetite:  -Prescription for Marinol has been sent to his pharmacy today    6.  Coronary artery disease    7.  Chronic kidney disease    8.  Health maintenance: Patient quit smoking in 2016.  Has had colonoscopy last 10 years    9. Advance Care Planning: For now patient remains full code and is able to make decisions.  Patient has health care surrogate mentioned on chart.                 PHQ-9 Total Score: 0   -Patient is not homicidal or suicidal.  No acute intervention required.    Lyle Corona reports a pain score of 8.  Given his pain assessment as noted, treatment options were discussed and the following options were decided upon as a follow-up plan to address the patient's pain: continuation of current treatment plan for pain.         Vladimir Henderson MD  8/3/2021  22:47 CDT        Part of this note may be an electronic transcription/translation of spoken language to printed text using the Dragon Dictation System.          CC:

## 2021-08-31 ENCOUNTER — OFFICE VISIT (OUTPATIENT)
Dept: ONCOLOGY | Facility: CLINIC | Age: 56
End: 2021-08-31

## 2021-08-31 ENCOUNTER — LAB (OUTPATIENT)
Dept: ONCOLOGY | Facility: HOSPITAL | Age: 56
End: 2021-08-31

## 2021-08-31 VITALS
HEIGHT: 69 IN | HEART RATE: 74 BPM | BODY MASS INDEX: 19.85 KG/M2 | DIASTOLIC BLOOD PRESSURE: 105 MMHG | TEMPERATURE: 97.1 F | RESPIRATION RATE: 18 BRPM | SYSTOLIC BLOOD PRESSURE: 188 MMHG | WEIGHT: 134 LBS

## 2021-08-31 DIAGNOSIS — D50.9 IRON DEFICIENCY ANEMIA, UNSPECIFIED IRON DEFICIENCY ANEMIA TYPE: ICD-10-CM

## 2021-08-31 DIAGNOSIS — C91.10 CLL (CHRONIC LYMPHOCYTIC LEUKEMIA) (HCC): Primary | Chronic | ICD-10-CM

## 2021-08-31 DIAGNOSIS — C91.10 CLL (CHRONIC LYMPHOCYTIC LEUKEMIA) (HCC): ICD-10-CM

## 2021-08-31 DIAGNOSIS — K90.9 IRON MALABSORPTION: ICD-10-CM

## 2021-08-31 DIAGNOSIS — N18.30 STAGE 3 CHRONIC KIDNEY DISEASE, UNSPECIFIED WHETHER STAGE 3A OR 3B CKD (HCC): ICD-10-CM

## 2021-08-31 DIAGNOSIS — D69.6 THROMBOCYTOPENIA (HCC): ICD-10-CM

## 2021-08-31 DIAGNOSIS — R11.2 NAUSEA AND VOMITING, INTRACTABILITY OF VOMITING NOT SPECIFIED, UNSPECIFIED VOMITING TYPE: Chronic | ICD-10-CM

## 2021-08-31 DIAGNOSIS — D50.9 IRON DEFICIENCY ANEMIA, UNSPECIFIED IRON DEFICIENCY ANEMIA TYPE: Chronic | ICD-10-CM

## 2021-08-31 DIAGNOSIS — K90.9 IRON MALABSORPTION: Chronic | ICD-10-CM

## 2021-08-31 DIAGNOSIS — R11.2 NAUSEA AND VOMITING, INTRACTABILITY OF VOMITING NOT SPECIFIED, UNSPECIFIED VOMITING TYPE: ICD-10-CM

## 2021-08-31 LAB
ALBUMIN SERPL-MCNC: 4.3 G/DL (ref 3.5–5.2)
ALBUMIN/GLOB SERPL: 1.8 G/DL
ALP SERPL-CCNC: 133 U/L (ref 39–117)
ALT SERPL W P-5'-P-CCNC: 9 U/L (ref 1–41)
ANION GAP SERPL CALCULATED.3IONS-SCNC: 13 MMOL/L (ref 5–15)
ANISOCYTOSIS BLD QL: ABNORMAL
AST SERPL-CCNC: 16 U/L (ref 1–40)
BILIRUB SERPL-MCNC: 0.4 MG/DL (ref 0–1.2)
BUN SERPL-MCNC: 15 MG/DL (ref 6–20)
BUN/CREAT SERPL: 8.9 (ref 7–25)
CALCIUM SPEC-SCNC: 8.7 MG/DL (ref 8.6–10.5)
CHLORIDE SERPL-SCNC: 101 MMOL/L (ref 98–107)
CO2 SERPL-SCNC: 20 MMOL/L (ref 22–29)
CREAT SERPL-MCNC: 1.68 MG/DL (ref 0.76–1.27)
DEPRECATED RDW RBC AUTO: 58.2 FL (ref 37–54)
ERYTHROCYTE [DISTWIDTH] IN BLOOD BY AUTOMATED COUNT: 19.2 % (ref 12.3–15.4)
GFR SERPL CREATININE-BSD FRML MDRD: 42 ML/MIN/1.73
GLOBULIN UR ELPH-MCNC: 2.4 GM/DL
GLUCOSE SERPL-MCNC: 130 MG/DL (ref 65–99)
HCT VFR BLD AUTO: 34.3 % (ref 37.5–51)
HGB BLD-MCNC: 9.9 G/DL (ref 13–17.7)
HOLD SPECIMEN: NORMAL
HYPOCHROMIA BLD QL: ABNORMAL
LYMPHOCYTES # BLD MANUAL: 194.34 10*3/MM3 (ref 0.7–3.1)
LYMPHOCYTES NFR BLD MANUAL: 98 % (ref 19.6–45.3)
MCH RBC QN AUTO: 27.1 PG (ref 26.6–33)
MCHC RBC AUTO-ENTMCNC: 28.9 G/DL (ref 31.5–35.7)
MCV RBC AUTO: 94 FL (ref 79–97)
NEUTROPHILS # BLD AUTO: 3.97 10*3/MM3 (ref 1.7–7)
NEUTROPHILS NFR BLD MANUAL: 2 % (ref 42.7–76)
PLATELET # BLD AUTO: 140 10*3/MM3 (ref 140–450)
PMV BLD AUTO: 9.7 FL (ref 6–12)
POTASSIUM SERPL-SCNC: 4.9 MMOL/L (ref 3.5–5.2)
PROT SERPL-MCNC: 6.7 G/DL (ref 6–8.5)
RBC # BLD AUTO: 3.65 10*6/MM3 (ref 4.14–5.8)
SMALL PLATELETS BLD QL SMEAR: ABNORMAL
SODIUM SERPL-SCNC: 134 MMOL/L (ref 136–145)
WBC # BLD AUTO: 198.31 10*3/MM3 (ref 3.4–10.8)
WBC MORPH BLD: NORMAL

## 2021-08-31 PROCEDURE — 85025 COMPLETE CBC W/AUTO DIFF WBC: CPT

## 2021-08-31 PROCEDURE — G9903 PT SCRN TBCO ID AS NON USER: HCPCS | Performed by: INTERNAL MEDICINE

## 2021-08-31 PROCEDURE — 99214 OFFICE O/P EST MOD 30 MIN: CPT | Performed by: INTERNAL MEDICINE

## 2021-08-31 PROCEDURE — 85007 BL SMEAR W/DIFF WBC COUNT: CPT

## 2021-08-31 PROCEDURE — 80053 COMPREHEN METABOLIC PANEL: CPT

## 2021-08-31 PROCEDURE — 1123F ACP DISCUSS/DSCN MKR DOCD: CPT | Performed by: INTERNAL MEDICINE

## 2021-08-31 PROCEDURE — 1125F AMNT PAIN NOTED PAIN PRSNT: CPT | Performed by: INTERNAL MEDICINE

## 2021-08-31 PROCEDURE — G0463 HOSPITAL OUTPT CLINIC VISIT: HCPCS | Performed by: INTERNAL MEDICINE

## 2021-08-31 RX ORDER — DRONABINOL 2.5 MG/1
2.5 CAPSULE ORAL
Qty: 60 CAPSULE | Refills: 0 | Status: SHIPPED | OUTPATIENT
Start: 2021-08-31 | End: 2021-09-28 | Stop reason: SDUPTHER

## 2021-08-31 RX ORDER — SUCRALFATE 1 G/1
1 TABLET ORAL 4 TIMES DAILY
Qty: 120 TABLET | Refills: 1 | Status: SHIPPED | OUTPATIENT
Start: 2021-08-31

## 2021-08-31 NOTE — PROGRESS NOTES
"DATE OF VISIT: 8/31/2021      REASON FOR VISIT: Chronic lymphocytic leukemia on acalabrutinib, lymphocytosis, anemia, thrombocytopenia, chronic kidney disease, nausea and vomiting      HISTORY OF PRESENT ILLNESS:   55-year-old male with medical problem consisting of coronary artery disease, congestive heart failure, hypertension, dyslipidemia, gastroesophageal reflux disease who has been following up with [facility] since August 18, 2020 for chronic lymphocytic leukemia.  Patient has been on acalabrutinib since July 2021.  Patient is here for follow-up appointment today states his nausea and vomiting is improved since starting Phenergan and Marinol.  States his appetite is improved and has gained 5 pounds since last clinic visit.  Denies any new lymph node enlargement.  Denies any bleeding.          Past Medical History, Past Surgical History, Social History, Family History have been reviewed and are without significant changes except as mentioned.    Review of Systems   Constitutional: Positive for appetite change.        Has gained 5 pounds since last clinic visit   Respiratory: Positive for shortness of breath.    Gastrointestinal: Positive for nausea and vomiting.   Musculoskeletal: Positive for arthralgias and back pain.   Hematological: Negative for adenopathy.      A comprehensive 14 point review of systems was performed and was negative except as mentioned.    Medications:  The current medication list was reviewed in the EMR    ALLERGIES:    Allergies   Allergen Reactions   • Amlodipine Rash   • Imdur [Isosorbide Dinitrate] Rash   • Lisinopril Rash   • Metoprolol Rash       Objective      Vitals:    08/31/21 0930   BP: (!) 188/105   Pulse: 74   Resp: 18   Temp: 97.1 °F (36.2 °C)   TempSrc: Temporal   Weight: 60.8 kg (134 lb)   Height: 175.3 cm (69.02\")   PainSc:   7   PainLoc: Generalized     Current Status 8/31/2021   ECOG score 0       Physical Exam  Cardiovascular:      Rate and Rhythm: Normal " rate. Rhythm irregular.   Pulmonary:      Breath sounds: Normal breath sounds.   Lymphadenopathy:      Cervical: No cervical adenopathy.   Neurological:      Mental Status: He is alert and oriented to person, place, and time.           RECENT LABS:  Glucose   Date Value Ref Range Status   08/31/2021 130 (H) 65 - 99 mg/dL Final     Glucose, Arterial   Date Value Ref Range Status   12/05/2017 100 mmol/L Final     Sodium   Date Value Ref Range Status   08/31/2021 134 (L) 136 - 145 mmol/L Final     Potassium   Date Value Ref Range Status   08/31/2021 4.9 3.5 - 5.2 mmol/L Final     CO2   Date Value Ref Range Status   08/31/2021 20.0 (L) 22.0 - 29.0 mmol/L Final     Chloride   Date Value Ref Range Status   08/31/2021 101 98 - 107 mmol/L Final     Anion Gap   Date Value Ref Range Status   08/31/2021 13.0 5.0 - 15.0 mmol/L Final     Creatinine   Date Value Ref Range Status   08/31/2021 1.68 (H) 0.76 - 1.27 mg/dL Final     BUN   Date Value Ref Range Status   08/31/2021 15 6 - 20 mg/dL Final     BUN/Creatinine Ratio   Date Value Ref Range Status   08/31/2021 8.9 7.0 - 25.0 Final     Calcium   Date Value Ref Range Status   08/31/2021 8.7 8.6 - 10.5 mg/dL Final     eGFR Non  Amer   Date Value Ref Range Status   08/31/2021 42 (L) >60 mL/min/1.73 Final     Alkaline Phosphatase   Date Value Ref Range Status   08/31/2021 133 (H) 39 - 117 U/L Final     Total Protein   Date Value Ref Range Status   08/31/2021 6.7 6.0 - 8.5 g/dL Final     ALT (SGPT)   Date Value Ref Range Status   08/31/2021 9 1 - 41 U/L Final     AST (SGOT)   Date Value Ref Range Status   08/31/2021 16 1 - 40 U/L Final     Total Bilirubin   Date Value Ref Range Status   08/31/2021 0.4 0.0 - 1.2 mg/dL Final     Albumin   Date Value Ref Range Status   08/31/2021 4.30 3.50 - 5.20 g/dL Final     Globulin   Date Value Ref Range Status   08/31/2021 2.4 gm/dL Final     Lab Results   Component Value Date    .31 (C) 08/31/2021    HGB 9.9 (L) 08/31/2021    HCT  34.3 (L) 08/31/2021    MCV 94.0 08/31/2021     08/31/2021     Lab Results   Component Value Date    NEUTROABS 3.97 08/31/2021    IRON 57 (L) 07/02/2021    IRON 58 (L) 04/22/2021    IRON 69 01/22/2021    TIBC 291 (L) 07/02/2021    TIBC 428 04/22/2021    TIBC 381 01/22/2021    LABIRON 20 07/02/2021    LABIRON 14 (L) 04/22/2021    LABIRON 18 (L) 01/22/2021    FERRITIN 245.20 07/02/2021    FERRITIN 102.10 04/22/2021    FERRITIN 119.20 01/22/2021    [id number] 795 07/02/2021    [id number] 565 04/22/2021    GEEKJNCQ46 1,043 (H) 01/22/2021    FOLATE >20.00 07/02/2021    FOLATE 4.57 (L) 04/22/2021    FOLATE 6.47 01/22/2021     No results found for: , LABCA2, AFPTM, HCGQUANT, , CHROMGRNA, [id number], CEA, REFLABREPO      PATHOLOGY:  * Cannot find OR log *         RADIOLOGY DATA :  No radiology results for the last 7 days        Assessment/Plan     1.  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 daily since July 2021  -Patient is tolerating treatment well except for nausea and vomiting which is improved with recent adjustment of antiemetic medication  -CBC done earlier today shows white blood cell count is 198,000, hemoglobin is 9.9, platelet count 241,000.  -Recommend continue with Acalabrutinib 100 mg p.o. daily  -We will ask patient to return to clinic in 4 weeks with repeat CBC, CMP, CT of chest, abdomen and pelvis without contrast to be done prior to that for restaging purpose    2.  Anemia:  -Multifactorial  -Hemoglobin is 9.9  -Due to iron malabsorption patient has received intravenous Feraheme in May 2020  -Remains on folic acid twice daily along with B12 1000 mcg p.o. daily  -We will repeat anemia work-up in October 2021    3.  Thrombocytopenia:  -Secondary to CLL plus splenomegaly  -Platelet count 245,000.  Will monitor with CBC    4.  Nausea and vomiting  -Recommend continue with Zofran, Phenergan and Marinol.    5.  Weight  loss:  -Patient has gained 5 pounds since starting Marinol 2.5 mg twice daily  -Prescription for Marinol has been sent to his pharmacy today    6.  Coronary artery disease    7.  Chronic kidney disease    8.  Health maintenance: Patient quit smoking in 2016.  Had a colonoscopy last 10 years    9. Advance Care Planning: For now patient remains full code and is able to make decisions.  Patient has health care surrogate mentioned on chart.                 PHQ-9 Total Score: 0   -Patient is not homicidal or suicidal.  No acute intervention required.    Lyle Herbert Corona reports a pain score of 7.  Given his pain assessment as noted, treatment options were discussed and the following options were decided upon as a follow-up plan to address the patient's pain: continuation of current treatment plan for pain.         Vladimir Henderson MD  8/31/2021  17:42 CDT        Part of this note may be an electronic transcription/translation of spoken language to printed text using the Dragon Dictation System.          CC:

## 2021-09-22 ENCOUNTER — TELEPHONE (OUTPATIENT)
Dept: ONCOLOGY | Facility: CLINIC | Age: 56
End: 2021-09-22

## 2021-09-22 DIAGNOSIS — C91.10 CLL (CHRONIC LYMPHOCYTIC LEUKEMIA) (HCC): Chronic | ICD-10-CM

## 2021-09-27 ENCOUNTER — TELEPHONE (OUTPATIENT)
Dept: GENERAL RADIOLOGY | Facility: HOSPITAL | Age: 56
End: 2021-09-27

## 2021-09-28 ENCOUNTER — LAB (OUTPATIENT)
Dept: ONCOLOGY | Facility: HOSPITAL | Age: 56
End: 2021-09-28

## 2021-09-28 ENCOUNTER — OFFICE VISIT (OUTPATIENT)
Dept: ONCOLOGY | Facility: CLINIC | Age: 56
End: 2021-09-28

## 2021-09-28 ENCOUNTER — DOCUMENTATION (OUTPATIENT)
Dept: ONCOLOGY | Facility: CLINIC | Age: 56
End: 2021-09-28

## 2021-09-28 ENCOUNTER — APPOINTMENT (OUTPATIENT)
Dept: CT IMAGING | Facility: HOSPITAL | Age: 56
End: 2021-09-28

## 2021-09-28 VITALS
DIASTOLIC BLOOD PRESSURE: 79 MMHG | OXYGEN SATURATION: 97 % | BODY MASS INDEX: 20.18 KG/M2 | TEMPERATURE: 97.5 F | RESPIRATION RATE: 18 BRPM | HEART RATE: 80 BPM | WEIGHT: 136.7 LBS | SYSTOLIC BLOOD PRESSURE: 168 MMHG

## 2021-09-28 DIAGNOSIS — C91.10 CLL (CHRONIC LYMPHOCYTIC LEUKEMIA) (HCC): Chronic | ICD-10-CM

## 2021-09-28 DIAGNOSIS — C91.10 CLL (CHRONIC LYMPHOCYTIC LEUKEMIA) (HCC): ICD-10-CM

## 2021-09-28 DIAGNOSIS — C91.10 CLL (CHRONIC LYMPHOCYTIC LEUKEMIA) (HCC): Primary | Chronic | ICD-10-CM

## 2021-09-28 DIAGNOSIS — D50.9 IRON DEFICIENCY ANEMIA, UNSPECIFIED IRON DEFICIENCY ANEMIA TYPE: Chronic | ICD-10-CM

## 2021-09-28 DIAGNOSIS — K90.9 IRON MALABSORPTION: ICD-10-CM

## 2021-09-28 DIAGNOSIS — D69.6 THROMBOCYTOPENIA (HCC): ICD-10-CM

## 2021-09-28 DIAGNOSIS — K90.9 IRON MALABSORPTION: Chronic | ICD-10-CM

## 2021-09-28 DIAGNOSIS — D69.6 THROMBOCYTOPENIA (HCC): Chronic | ICD-10-CM

## 2021-09-28 DIAGNOSIS — D50.9 IRON DEFICIENCY ANEMIA, UNSPECIFIED IRON DEFICIENCY ANEMIA TYPE: ICD-10-CM

## 2021-09-28 DIAGNOSIS — R11.2 NAUSEA AND VOMITING, INTRACTABILITY OF VOMITING NOT SPECIFIED, UNSPECIFIED VOMITING TYPE: Chronic | ICD-10-CM

## 2021-09-28 DIAGNOSIS — N18.30 STAGE 3 CHRONIC KIDNEY DISEASE, UNSPECIFIED WHETHER STAGE 3A OR 3B CKD (HCC): ICD-10-CM

## 2021-09-28 DIAGNOSIS — R11.2 NAUSEA AND VOMITING, INTRACTABILITY OF VOMITING NOT SPECIFIED, UNSPECIFIED VOMITING TYPE: ICD-10-CM

## 2021-09-28 LAB
ALBUMIN SERPL-MCNC: 4.5 G/DL (ref 3.5–5.2)
ALBUMIN/GLOB SERPL: 2 G/DL
ALP SERPL-CCNC: 134 U/L (ref 39–117)
ALT SERPL W P-5'-P-CCNC: 9 U/L (ref 1–41)
ANION GAP SERPL CALCULATED.3IONS-SCNC: 11 MMOL/L (ref 5–15)
ANISOCYTOSIS BLD QL: ABNORMAL
AST SERPL-CCNC: 17 U/L (ref 1–40)
BILIRUB SERPL-MCNC: 0.4 MG/DL (ref 0–1.2)
BUN SERPL-MCNC: 20 MG/DL (ref 6–20)
BUN/CREAT SERPL: 10.5 (ref 7–25)
CALCIUM SPEC-SCNC: 8.5 MG/DL (ref 8.6–10.5)
CHLORIDE SERPL-SCNC: 99 MMOL/L (ref 98–107)
CO2 SERPL-SCNC: 22 MMOL/L (ref 22–29)
CREAT SERPL-MCNC: 1.9 MG/DL (ref 0.76–1.27)
DEPRECATED RDW RBC AUTO: 59.8 FL (ref 37–54)
ERYTHROCYTE [DISTWIDTH] IN BLOOD BY AUTOMATED COUNT: 18.5 % (ref 12.3–15.4)
FERRITIN SERPL-MCNC: 156.6 NG/ML (ref 30–400)
FOLATE SERPL-MCNC: >20 NG/ML (ref 4.78–24.2)
GFR SERPL CREATININE-BSD FRML MDRD: 37 ML/MIN/1.73
GLOBULIN UR ELPH-MCNC: 2.3 GM/DL
GLUCOSE SERPL-MCNC: 118 MG/DL (ref 65–99)
HCT VFR BLD AUTO: 32.3 % (ref 37.5–51)
HGB BLD-MCNC: 10.3 G/DL (ref 13–17.7)
IRON 24H UR-MRATE: 61 MCG/DL (ref 59–158)
IRON SATN MFR SERPL: 19 % (ref 20–50)
LYMPHOCYTES # BLD MANUAL: 88.56 10*3/MM3 (ref 0.7–3.1)
LYMPHOCYTES NFR BLD MANUAL: 1 % (ref 5–12)
LYMPHOCYTES NFR BLD MANUAL: 97 % (ref 19.6–45.3)
MCH RBC QN AUTO: 29.2 PG (ref 26.6–33)
MCHC RBC AUTO-ENTMCNC: 31.9 G/DL (ref 31.5–35.7)
MCV RBC AUTO: 91.5 FL (ref 79–97)
MONOCYTES # BLD AUTO: 0.91 10*3/MM3 (ref 0.1–0.9)
NEUTROPHILS # BLD AUTO: 1.83 10*3/MM3 (ref 1.7–7)
NEUTROPHILS NFR BLD MANUAL: 2 % (ref 42.7–76)
OVALOCYTES BLD QL SMEAR: ABNORMAL
PLATELET # BLD AUTO: 89 10*3/MM3 (ref 140–450)
PMV BLD AUTO: 9.3 FL (ref 6–12)
POTASSIUM SERPL-SCNC: 3.9 MMOL/L (ref 3.5–5.2)
PROT SERPL-MCNC: 6.8 G/DL (ref 6–8.5)
RBC # BLD AUTO: 3.53 10*6/MM3 (ref 4.14–5.8)
SMALL PLATELETS BLD QL SMEAR: ABNORMAL
SMUDGE CELLS IN BLOOD BY LIGHT MICROSCOPY: 18 /100 WBC
SODIUM SERPL-SCNC: 132 MMOL/L (ref 136–145)
TIBC SERPL-MCNC: 328 MCG/DL (ref 298–536)
TRANSFERRIN SERPL-MCNC: 220 MG/DL (ref 200–360)
VIT B12 BLD-MCNC: 684 PG/ML (ref 211–946)
WBC # BLD AUTO: 91.3 10*3/MM3 (ref 3.4–10.8)
WBC MORPH BLD: NORMAL

## 2021-09-28 PROCEDURE — 82746 ASSAY OF FOLIC ACID SERUM: CPT | Performed by: INTERNAL MEDICINE

## 2021-09-28 PROCEDURE — 82607 VITAMIN B-12: CPT | Performed by: INTERNAL MEDICINE

## 2021-09-28 PROCEDURE — 85025 COMPLETE CBC W/AUTO DIFF WBC: CPT | Performed by: INTERNAL MEDICINE

## 2021-09-28 PROCEDURE — 80053 COMPREHEN METABOLIC PANEL: CPT | Performed by: INTERNAL MEDICINE

## 2021-09-28 PROCEDURE — 1123F ACP DISCUSS/DSCN MKR DOCD: CPT | Performed by: INTERNAL MEDICINE

## 2021-09-28 PROCEDURE — 83540 ASSAY OF IRON: CPT | Performed by: INTERNAL MEDICINE

## 2021-09-28 PROCEDURE — 1125F AMNT PAIN NOTED PAIN PRSNT: CPT | Performed by: INTERNAL MEDICINE

## 2021-09-28 PROCEDURE — 84466 ASSAY OF TRANSFERRIN: CPT | Performed by: INTERNAL MEDICINE

## 2021-09-28 PROCEDURE — G0463 HOSPITAL OUTPT CLINIC VISIT: HCPCS | Performed by: INTERNAL MEDICINE

## 2021-09-28 PROCEDURE — 85007 BL SMEAR W/DIFF WBC COUNT: CPT | Performed by: INTERNAL MEDICINE

## 2021-09-28 PROCEDURE — 99214 OFFICE O/P EST MOD 30 MIN: CPT | Performed by: INTERNAL MEDICINE

## 2021-09-28 PROCEDURE — 82728 ASSAY OF FERRITIN: CPT | Performed by: INTERNAL MEDICINE

## 2021-09-28 RX ORDER — DRONABINOL 2.5 MG/1
2.5 CAPSULE ORAL
Qty: 60 CAPSULE | Refills: 0 | Status: SHIPPED | OUTPATIENT
Start: 2021-09-28

## 2021-09-28 NOTE — PROGRESS NOTES
DATE OF VISIT: 9/30/2021      REASON FOR VISIT: Chronic lymphocytic leukemia on acalabrutinib, lymphocytosis, anemia, thrombocytopenia, nausea and vomiting      HISTORY OF PRESENT ILLNESS:   55-year-old male with medical problem consisting of coronary artery disease, congestive heart failure, hypertension, dyslipidemia, gastroesophageal reflux disease who has been following up in [facility] since August 18, 2020 for diagnosis of CLL anemia, thrombocytopenia.  Patient is here for ointment today.  Complains of generalized bone pain.  States that he has not been taking his chemotherapy for 5 to 6 days as he ran out of it and did not have gas money to come to [place] to pick it up from pharmacy.  States lymph node has been fluctuating in size in neck.  Complains of intermittent nausea and vomiting.  Denies any bleeding.      Past Medical History, Past Surgical History, Social History, Family History have been reviewed and are without significant changes except as mentioned.    Review of Systems   A comprehensive 14 point review of systems was performed and was negative except as mentioned in HPI.    Medications:  The current medication list was reviewed in the EMR    ALLERGIES:    Allergies   Allergen Reactions   • Amlodipine Rash   • Imdur [Isosorbide Dinitrate] Rash   • Lisinopril Rash   • Metoprolol Rash       Objective      Vitals:    09/28/21 1108   BP: 168/79   Pulse: 80   Resp: 18   Temp: 97.5 °F (36.4 °C)   SpO2: 97%   Weight: 62 kg (136 lb 11.2 oz)   PainSc:   8     Current Status 8/31/2021   ECOG score 0       Physical Exam  Pulmonary:      Breath sounds: Normal breath sounds.   Lymphadenopathy:      Cervical: Cervical adenopathy present.   Neurological:      Mental Status: He is alert and oriented to person, place, and time.           RECENT LABS:  Glucose   Date Value Ref Range Status   09/28/2021 118 (H) 65 - 99 mg/dL Final     Glucose, Arterial   Date Value Ref Range Status   12/05/2017 100  mmol/L Final     Sodium   Date Value Ref Range Status   09/28/2021 132 (L) 136 - 145 mmol/L Final     Potassium   Date Value Ref Range Status   09/28/2021 3.9 3.5 - 5.2 mmol/L Final     CO2   Date Value Ref Range Status   09/28/2021 22.0 22.0 - 29.0 mmol/L Final     Chloride   Date Value Ref Range Status   09/28/2021 99 98 - 107 mmol/L Final     Anion Gap   Date Value Ref Range Status   09/28/2021 11.0 5.0 - 15.0 mmol/L Final     Creatinine   Date Value Ref Range Status   09/28/2021 1.90 (H) 0.76 - 1.27 mg/dL Final     BUN   Date Value Ref Range Status   09/28/2021 20 6 - 20 mg/dL Final     BUN/Creatinine Ratio   Date Value Ref Range Status   09/28/2021 10.5 7.0 - 25.0 Final     Calcium   Date Value Ref Range Status   09/28/2021 8.5 (L) 8.6 - 10.5 mg/dL Final     eGFR Non  Amer   Date Value Ref Range Status   09/28/2021 37 (L) >60 mL/min/1.73 Final     Alkaline Phosphatase   Date Value Ref Range Status   09/28/2021 134 (H) 39 - 117 U/L Final     Total Protein   Date Value Ref Range Status   09/28/2021 6.8 6.0 - 8.5 g/dL Final     ALT (SGPT)   Date Value Ref Range Status   09/28/2021 9 1 - 41 U/L Final     AST (SGOT)   Date Value Ref Range Status   09/28/2021 17 1 - 40 U/L Final     Total Bilirubin   Date Value Ref Range Status   09/28/2021 0.4 0.0 - 1.2 mg/dL Final     Albumin   Date Value Ref Range Status   09/28/2021 4.50 3.50 - 5.20 g/dL Final     Globulin   Date Value Ref Range Status   09/28/2021 2.3 gm/dL Final     Lab Results   Component Value Date    WBC 91.30 (C) 09/28/2021    HGB 10.3 (L) 09/28/2021    HCT 32.3 (L) 09/28/2021    MCV 91.5 09/28/2021    PLT 89 (L) 09/28/2021     Lab Results   Component Value Date    NEUTROABS 1.83 09/28/2021    IRON 61 09/28/2021    IRON 57 (L) 07/02/2021    IRON 58 (L) 04/22/2021    TIBC 328 09/28/2021    TIBC 291 (L) 07/02/2021    TIBC 428 04/22/2021    LABIRON 19 (L) 09/28/2021    LABIRON 20 07/02/2021    LABIRON 14 (L) 04/22/2021    FERRITIN 156.60 09/28/2021     FERRITIN 245.20 07/02/2021    FERRITIN 102.10 04/22/2021    [id number] 684 09/28/2021    [id number] 795 07/02/2021    AWLUNTBC28 565 04/22/2021    FOLATE >20.00 09/28/2021    FOLATE >20.00 07/02/2021    FOLATE 4.57 (L) 04/22/2021     No results found for: , LABCA2, AFPTM, HCGQUANT, , CHROMGRNA, [id number], CEA, REFLABREPO      PATHOLOGY:  * Cannot find OR log *         RADIOLOGY DATA :  No radiology results for the last 7 days        Assessment/Plan     1.  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 daily since July 2021  -Patient has not taken acalabrutinib for the last 5 to 6 days as he ran out of it.  -CBC done earlier today shows white blood cell count is 91,000, hemoglobin is 10.4, platelet count is 89,000.  -Patient is scheduled to get CT scan of chest abdomen and pelvis without contrast later today for restaging purpose we will call patient with result of CT scan once available  -We will ask patient to return to clinic in 4 weeks with repeat CBC and CMP on that day.  -Next restaging CT of chest abdomen and pelvis without contrast will be done in December 2021    2.  Anemia:  -Multifactorial  -Hemoglobin is 10.4  -Patient has received intravenous Feraheme in May 2020  -Currently on B12 p.o. daily along with folic acid twice daily.  -We will repeat anemia work-up in December 2021    3.  Thrombocytopenia:  -Secondary to CLL plus splenomegaly  -Platelet count is 89,000    4.  Nausea and vomiting:  -Remains on as needed Zofran, Phenergan and Marinol    5.  Weight loss:  -We will continue with Marinol 2.5 mg twice daily.  Prescription for Marinol has been sent to his pharmacy today    6.  Acute on chronic kidney disease  -Creatinine is elevated at 1.90.  Patient was notified about elevated creatinine and need to increase hydration    7.  Coronary artery disease    8.  Health maintenance: Patient quit smoking in 2016.  Had a colonoscopy last  10 years    9. Advance Care Planning: For now patient remains full code and is able to make decisions.  Patient has health care surrogate mentioned on chart.                 PHQ-9 Total Score: 0   -Patient is not homicidal or suicidal.  No acute intervention required.    Lyle Corona reports a pain score of 8.  Given his pain assessment as noted, treatment options were discussed and the following options were decided upon as a follow-up plan to address the patient's pain: continuation of current treatment plan for pain.         Vladimir Henderson MD  9/30/2021  13:04 CDT        Part of this note may be an electronic transcription/translation of spoken language to printed text using the Dragon Dictation System.          CC:

## 2021-09-28 NOTE — PROGRESS NOTES
Oncology SW met with patient in the exam room as he presents for follow up. SW advised of pt missed apt for CT and presets to inquire as to needs or barriers. Pt. States it was communication and misunderstanding, thinking his scan was today.  He remains independent and self sufficient, lives alone, drives himself, socially isolates intentionally, part due to pandemic and also describes self as enjoying alone time. He described productive hobby and interests.  No report of symptoms consistent with depression and no barriers to care identified or reported. Ongoing support of undersigned reinforced.  Roz Preciado RN assisted pt this day with clarification of receipt of mail in medications.  Pt. Departed with no additional questions. Reportedly pt was rescheduled for CT today as he was here and he was not prepared to  wait for the 3:00 apt.

## 2021-10-08 ENCOUNTER — TELEPHONE (OUTPATIENT)
Dept: ONCOLOGY | Facility: CLINIC | Age: 56
End: 2021-10-08

## 2021-10-08 NOTE — TELEPHONE ENCOUNTER
Oncology SW advised of pt cancellation of past two CT. He is scheduled to return 10-26-21 to see Dr. Henderson. Pt needs to either have CT scheduled prior to follow up or change apt.  SW calling to assess for barriers to care and need that may be preventing his ability to keep apt.   SW unable to reach pt by phone. No answer and no VM set up.  SW will continue attempts to speak with pt. No family or emergency contact listed.

## 2021-10-26 ENCOUNTER — APPOINTMENT (OUTPATIENT)
Dept: ONCOLOGY | Facility: CLINIC | Age: 56
End: 2021-10-26

## 2021-10-26 ENCOUNTER — APPOINTMENT (OUTPATIENT)
Dept: ONCOLOGY | Facility: HOSPITAL | Age: 56
End: 2021-10-26

## 2021-11-08 ENCOUNTER — SPECIALTY PHARMACY (OUTPATIENT)
Dept: ONCOLOGY | Facility: CLINIC | Age: 56
End: 2021-11-08

## 2022-01-05 ENCOUNTER — SPECIALTY PHARMACY (OUTPATIENT)
Dept: ONCOLOGY | Facility: CLINIC | Age: 57
End: 2022-01-05

## 2022-02-25 ENCOUNTER — SPECIALTY PHARMACY (OUTPATIENT)
Dept: ONCOLOGY | Facility: CLINIC | Age: 57
End: 2022-02-25

## 2022-02-28 ENCOUNTER — SPECIALTY PHARMACY (OUTPATIENT)
Dept: ONCOLOGY | Facility: CLINIC | Age: 57
End: 2022-02-28

## 2022-09-01 ENCOUNTER — SPECIALTY PHARMACY (OUTPATIENT)
Dept: ENDOCRINOLOGY | Facility: CLINIC | Age: 57
End: 2022-09-01

## 2024-01-05 NOTE — OUTREACH NOTE
Medical Week 3 Survey      Responses   [facility] patient discharged from?  [place]   COVID-19 Test Status  Not tested   Does the patient have one of the following disease processes/diagnoses(primary or secondary)?  Other   Week 3 attempt successful?  No   Rescheduled  Revoked          Brie Rondon RN  
05-Jan-2024 07:28

## (undated) DEVICE — CANN VESL DLP 1WY BLNT/TP 3MM

## (undated) DEVICE — SUT ETHLN 3-0 FS118IN 663H

## (undated) DEVICE — ELECTRD BLD EZ CLN MOD 2.5IN

## (undated) DEVICE — SAFESECURE,SECUREMENT,FOLEY CATH,STERILE: Brand: MEDLINE

## (undated) DEVICE — CANN ART SELCT 3D 20FR

## (undated) DEVICE — SUT CARD 2/0 30IN ETX833H

## (undated) DEVICE — SHEET,DRAPE,53X77,STERILE: Brand: MEDLINE

## (undated) DEVICE — SUT SILK 0 FSL 18IN 678G

## (undated) DEVICE — ROTATING SURGICAL PUNCHES, 1 PER POUCH: Brand: A&E MEDICAL / ROTATING SURGICAL PUNCHES

## (undated) DEVICE — SKIN AFFIX SURG ADHESIVE 72/CS 0.55ML: Brand: MEDLINE

## (undated) DEVICE — CATH DIAG EXPO M/ PK 6FR FL4/FR4 PIG 3PK

## (undated) DEVICE — GLV SURG SENSICARE GREEN W/ALOE PF LF 7 STRL

## (undated) DEVICE — GOWN,AURORA,NOREINF,RAGLAN,XL,STERILE: Brand: MEDLINE

## (undated) DEVICE — CATH MEDIASTINAL SIL FLT 9EYE 20IN STRL

## (undated) DEVICE — CATH IV INSYTE AUTOGARD SHLD 22G 1IN BK

## (undated) DEVICE — CATH CHST TB SIL 6EYE 32F 21IN RT

## (undated) DEVICE — BLD SCLPL BEAVR MINI STR 2BVL 180D LF

## (undated) DEVICE — TUBING, SUCTION, 3/16" X 6', STRAIGHT: Brand: MEDLINE

## (undated) DEVICE — INTRO SHEATH ULTIMUM ACT 5F

## (undated) DEVICE — SUT PROLENE CARDIO C1D 6/0 24IN 8726H

## (undated) DEVICE — DOUBLE MALE LL ADAPTER SPECIAL ADAPTER TO CONVERT A FEMALE FITTING TO MALE LL: Brand: DOUBLE MALE LL ADAPTER

## (undated) DEVICE — ADAPT CANCER LUER STUB 18G

## (undated) DEVICE — Device

## (undated) DEVICE — SUT PROLN 4/0 RB1 D/A 36IN 8557H

## (undated) DEVICE — SUT VIC 3/0 SH 27IN J416H

## (undated) DEVICE — ANGIO-SEAL EVOLUTION VASCULAR CLOSURE DEVICE: Brand: ANGIO-SEAL

## (undated) DEVICE — GLV SURG SENSICARE GREEN W/ALOE PF LF 6 STRL

## (undated) DEVICE — GW PERIPH GUIDERIGHT STD/J/TP PTFE/PCOAT SS 0.038IN 5X150CM

## (undated) DEVICE — SPNG LAP 18X18IN LF STRL PK/5

## (undated) DEVICE — MODEL BT2000 P/N 700287-012KIT CONTENTS: MANIFOLD WITH SALINE AND CONTRAST PORTS, SALINE TUBING WITH SPIKE AND HAND SYRINGE, TRANSDUCER: Brand: BT2000 AUTOMATED MANIFOLD KIT

## (undated) DEVICE — ST PERFUS M/

## (undated) DEVICE — SUT PDS 2 0 CT1 27IN CLR Z259H

## (undated) DEVICE — PRESSURE MONITORING INJECTION LINE 6FT. M/F: Brand: PRESSURE MONITORING INJECTION LINE

## (undated) DEVICE — MODEL AT P65, P/N 701554-001KIT CONTENTS: HAND CONTROLLER, 3-WAY HIGH-PRESSURE STOPCOCK WITH ROTATING END AND PREMIUM HIGH-PRESSURE TUBING: Brand: ANGIOTOUCH® KIT

## (undated) DEVICE — NDL HYPO ECLPS SFTY 25G 1 1/2IN

## (undated) DEVICE — 3M™ IOBAN™ 2 ANTIMICROBIAL INCISE DRAPE 6651EZ: Brand: IOBAN™ 2

## (undated) DEVICE — SYS SKIN CLS DERMABOND PRINEO W/22CM MESH TP

## (undated) DEVICE — ELECTRODE,RT,STRESS,FOAM,50PK: Brand: MEDLINE

## (undated) DEVICE — CANN AORT ROOT DLP VNT 14G 7F

## (undated) DEVICE — CATHETER,URETHRAL,REDRUBBER,STERILE,20FR: Brand: MEDLINE

## (undated) DEVICE — PK CATH LAB 60

## (undated) DEVICE — SUT ETHIBOND 2-0 SH-2 30IN X582H

## (undated) DEVICE — INTENDED FOR TISSUE SEPARATION, AND OTHER PROCEDURES THAT REQUIRE A SHARP SURGICAL BLADE TO PUNCTURE OR CUT.: Brand: BARD-PARKER ® CARBON RIB-BACK BLADES

## (undated) DEVICE — SOL NACL 0.9PCT 1000ML

## (undated) DEVICE — SUT SILK 1 LBYRNTH TIES 30IN A307H

## (undated) DEVICE — SUT ETHIB 2/0 SH SH 36IN X523H

## (undated) DEVICE — STERILE POLYISOPRENE POWDER-FREE SURGICAL GLOVES WITH EMOLLIENT COATING: Brand: PROTEXIS

## (undated) DEVICE — HI-TORQUE VERSACORE FLOPPY GUIDE WIRE SYSTEM 260 CM: Brand: HI-TORQUE VERSACORE

## (undated) DEVICE — SUT PDS 0 CT-1 Z340H

## (undated) DEVICE — I-KNIFE II CUTTING INSTRUMENT 5 MM: Brand: I-KNIFE

## (undated) DEVICE — SUT DEKELENE XT6.5 8.0MM 3/8CIR 24IN D7505K

## (undated) DEVICE — SOL NS 500ML

## (undated) DEVICE — I-KNIFE CUTTING INSTRUMENT 15 DEGREE: Brand: I-KNIFE

## (undated) DEVICE — VASOVIEW HEMOPRO: Brand: VASOVIEW HEMOPRO

## (undated) DEVICE — 3M™ STERI-STRIP™ REINFORCED ADHESIVE SKIN CLOSURES, R1548, 1 IN X 5 IN (25 MM X 125 MM), 4 STRIPS/ENVELOPE: Brand: 3M™ STERI-STRIP™

## (undated) DEVICE — DRSNG TELFA PAD NONADH STR 1S 3X4IN

## (undated) DEVICE — GLV SURG TRIUMPH LT PF LTX 6 STRL

## (undated) DEVICE — SUT SILK 0 TIES 18IN A186H BX36

## (undated) DEVICE — SPNG DRN AMD EXCILON 6PLY 4X4IN PK/2

## (undated) DEVICE — SUT PROLN 7/0 BV1 D/A 24IN 8702H

## (undated) DEVICE — ADHESIVE ISLAND DRESSING: Brand: TELFA

## (undated) DEVICE — SUT MONOCRYL 4/0 PS2 27IN Y426H ETY426H

## (undated) DEVICE — PK OPN HEART 60

## (undated) DEVICE — DRP SLUSH MACH OM-ORS-320

## (undated) DEVICE — INTRO SHEATH ART/FEM ENGAGE .038 6F12CM

## (undated) DEVICE — CONTAINER,SPECIMEN,OR STERILE,4OZ: Brand: MEDLINE

## (undated) DEVICE — ARTERIAL NEEDLE: Brand: UNBRANDED